# Patient Record
Sex: MALE | Race: WHITE | NOT HISPANIC OR LATINO | Employment: OTHER | URBAN - METROPOLITAN AREA
[De-identification: names, ages, dates, MRNs, and addresses within clinical notes are randomized per-mention and may not be internally consistent; named-entity substitution may affect disease eponyms.]

---

## 2017-01-01 ENCOUNTER — ANTI-COAG VISIT (OUTPATIENT)
Dept: CARDIOLOGY | Facility: CLINIC | Age: 82
End: 2017-01-01

## 2017-01-01 ENCOUNTER — TELEPHONE (OUTPATIENT)
Dept: NEUROSURGERY | Facility: CLINIC | Age: 82
End: 2017-01-01

## 2017-01-01 ENCOUNTER — PATIENT MESSAGE (OUTPATIENT)
Dept: NEUROSURGERY | Facility: CLINIC | Age: 82
End: 2017-01-01

## 2017-01-01 ENCOUNTER — ANESTHESIA EVENT (OUTPATIENT)
Dept: SURGERY | Facility: HOSPITAL | Age: 82
DRG: 907 | End: 2017-01-01
Payer: MEDICARE

## 2017-01-01 ENCOUNTER — OFFICE VISIT (OUTPATIENT)
Dept: CARDIOLOGY | Facility: CLINIC | Age: 82
End: 2017-01-01
Payer: MEDICARE

## 2017-01-01 ENCOUNTER — HOSPITAL ENCOUNTER (EMERGENCY)
Facility: HOSPITAL | Age: 82
Discharge: ANOTHER HOSPITAL | End: 2017-10-04
Attending: SURGERY
Payer: MEDICARE

## 2017-01-01 ENCOUNTER — LAB VISIT (OUTPATIENT)
Dept: LAB | Facility: HOSPITAL | Age: 82
End: 2017-01-01
Attending: INTERNAL MEDICINE
Payer: MEDICARE

## 2017-01-01 ENCOUNTER — OFFICE VISIT (OUTPATIENT)
Dept: DIALYSIS | Facility: HOSPITAL | Age: 82
DRG: 907 | End: 2017-01-01
Attending: HOSPITALIST
Payer: MEDICARE

## 2017-01-01 ENCOUNTER — PATIENT MESSAGE (OUTPATIENT)
Dept: CARDIOLOGY | Facility: CLINIC | Age: 82
End: 2017-01-01

## 2017-01-01 ENCOUNTER — ANESTHESIA EVENT (OUTPATIENT)
Dept: SURGERY | Facility: HOSPITAL | Age: 82
DRG: 459 | End: 2017-01-01
Payer: MEDICARE

## 2017-01-01 ENCOUNTER — ANESTHESIA (OUTPATIENT)
Dept: SURGERY | Facility: HOSPITAL | Age: 82
DRG: 907 | End: 2017-01-01
Payer: MEDICARE

## 2017-01-01 ENCOUNTER — TELEPHONE (OUTPATIENT)
Dept: CARDIOLOGY | Facility: CLINIC | Age: 82
End: 2017-01-01

## 2017-01-01 ENCOUNTER — ANESTHESIA (OUTPATIENT)
Dept: SURGERY | Facility: HOSPITAL | Age: 82
DRG: 459 | End: 2017-01-01
Payer: MEDICARE

## 2017-01-01 ENCOUNTER — HOSPITAL ENCOUNTER (INPATIENT)
Facility: HOSPITAL | Age: 82
LOS: 21 days | DRG: 907 | End: 2017-12-06
Attending: EMERGENCY MEDICINE | Admitting: HOSPITALIST
Payer: MEDICARE

## 2017-01-01 ENCOUNTER — HOSPITAL ENCOUNTER (INPATIENT)
Facility: HOSPITAL | Age: 82
LOS: 17 days | Discharge: NURSING, REHAB OR LONG-TERM CARE | DRG: 459 | End: 2017-10-27
Attending: INTERNAL MEDICINE | Admitting: INTERNAL MEDICINE
Payer: MEDICARE

## 2017-01-01 VITALS
WEIGHT: 200.81 LBS | BODY MASS INDEX: 30.44 KG/M2 | HEART RATE: 82 BPM | HEIGHT: 68 IN | DIASTOLIC BLOOD PRESSURE: 48 MMHG | SYSTOLIC BLOOD PRESSURE: 86 MMHG

## 2017-01-01 VITALS
WEIGHT: 164.44 LBS | HEIGHT: 62 IN | TEMPERATURE: 98 F | DIASTOLIC BLOOD PRESSURE: 72 MMHG | HEART RATE: 110 BPM | SYSTOLIC BLOOD PRESSURE: 101 MMHG | BODY MASS INDEX: 30.26 KG/M2 | OXYGEN SATURATION: 99 % | RESPIRATION RATE: 20 BRPM

## 2017-01-01 VITALS
BODY MASS INDEX: 28.34 KG/M2 | TEMPERATURE: 98 F | WEIGHT: 176.38 LBS | HEIGHT: 66 IN | RESPIRATION RATE: 35 BRPM | HEART RATE: 100 BPM | SYSTOLIC BLOOD PRESSURE: 133 MMHG | DIASTOLIC BLOOD PRESSURE: 93 MMHG | OXYGEN SATURATION: 100 %

## 2017-01-01 VITALS
SYSTOLIC BLOOD PRESSURE: 115 MMHG | OXYGEN SATURATION: 96 % | WEIGHT: 200 LBS | HEART RATE: 88 BPM | RESPIRATION RATE: 16 BRPM | TEMPERATURE: 97 F | DIASTOLIC BLOOD PRESSURE: 59 MMHG | BODY MASS INDEX: 30.41 KG/M2

## 2017-01-01 DIAGNOSIS — Z79.01 LONG-TERM (CURRENT) USE OF ANTICOAGULANTS: ICD-10-CM

## 2017-01-01 DIAGNOSIS — I48.0 PAROXYSMAL ATRIAL FIBRILLATION: ICD-10-CM

## 2017-01-01 DIAGNOSIS — R53.1 WEAKNESS: ICD-10-CM

## 2017-01-01 DIAGNOSIS — I35.0 NONRHEUMATIC AORTIC VALVE STENOSIS: ICD-10-CM

## 2017-01-01 DIAGNOSIS — M54.9 BACK PAIN, UNSPECIFIED BACK LOCATION, UNSPECIFIED BACK PAIN LATERALITY, UNSPECIFIED CHRONICITY: ICD-10-CM

## 2017-01-01 DIAGNOSIS — M54.16 LUMBAR BACK PAIN WITH RADICULOPATHY AFFECTING RIGHT LOWER EXTREMITY: ICD-10-CM

## 2017-01-01 DIAGNOSIS — I25.10 CORONARY ARTERY DISEASE INVOLVING NATIVE CORONARY ARTERY OF NATIVE HEART WITHOUT ANGINA PECTORIS: ICD-10-CM

## 2017-01-01 DIAGNOSIS — N18.6 TYPE 2 DIABETES MELLITUS WITH CHRONIC KIDNEY DISEASE ON CHRONIC DIALYSIS, WITH LONG-TERM CURRENT USE OF INSULIN: ICD-10-CM

## 2017-01-01 DIAGNOSIS — E78.00 HYPERCHOLESTEROLEMIA: ICD-10-CM

## 2017-01-01 DIAGNOSIS — I10 ESSENTIAL HYPERTENSION: ICD-10-CM

## 2017-01-01 DIAGNOSIS — M54.9 BACK PAIN: Primary | ICD-10-CM

## 2017-01-01 DIAGNOSIS — I35.0 SEVERE AORTIC STENOSIS: ICD-10-CM

## 2017-01-01 DIAGNOSIS — Z99.2 ESRD (END STAGE RENAL DISEASE) ON DIALYSIS: ICD-10-CM

## 2017-01-01 DIAGNOSIS — E11.22 TYPE 2 DIABETES MELLITUS WITH CHRONIC KIDNEY DISEASE ON CHRONIC DIALYSIS, WITH LONG-TERM CURRENT USE OF INSULIN: ICD-10-CM

## 2017-01-01 DIAGNOSIS — M54.50 LUMBAR BACK PAIN: ICD-10-CM

## 2017-01-01 DIAGNOSIS — N18.6 ESRD (END STAGE RENAL DISEASE) ON DIALYSIS: ICD-10-CM

## 2017-01-01 DIAGNOSIS — I21.4 NSTEMI (NON-ST ELEVATED MYOCARDIAL INFARCTION): Primary | ICD-10-CM

## 2017-01-01 DIAGNOSIS — D72.829 LEUKOCYTOSIS: ICD-10-CM

## 2017-01-01 DIAGNOSIS — R79.1 SUPRATHERAPEUTIC INR: ICD-10-CM

## 2017-01-01 DIAGNOSIS — Z98.1 STATUS POST LUMBAR SPINAL FUSION: ICD-10-CM

## 2017-01-01 DIAGNOSIS — M54.50 ACUTE BILATERAL LOW BACK PAIN: ICD-10-CM

## 2017-01-01 DIAGNOSIS — R00.0 TACHYCARDIA: ICD-10-CM

## 2017-01-01 DIAGNOSIS — Z79.4 TYPE 2 DIABETES MELLITUS WITH CHRONIC KIDNEY DISEASE ON CHRONIC DIALYSIS, WITH LONG-TERM CURRENT USE OF INSULIN: ICD-10-CM

## 2017-01-01 DIAGNOSIS — J18.9 PNEUMONIA: ICD-10-CM

## 2017-01-01 DIAGNOSIS — E78.00 HYPERCHOLESTEREMIA: Chronic | ICD-10-CM

## 2017-01-01 DIAGNOSIS — R79.89 ELEVATED TROPONIN: ICD-10-CM

## 2017-01-01 DIAGNOSIS — M54.40 ACUTE BILATERAL LOW BACK PAIN WITH SCIATICA, SCIATICA LATERALITY UNSPECIFIED: ICD-10-CM

## 2017-01-01 DIAGNOSIS — E78.00 HYPERCHOLESTEROLEMIA: Primary | ICD-10-CM

## 2017-01-01 DIAGNOSIS — I50.32 CHRONIC DIASTOLIC HEART FAILURE: Chronic | ICD-10-CM

## 2017-01-01 DIAGNOSIS — E86.0 DEHYDRATION: ICD-10-CM

## 2017-01-01 DIAGNOSIS — M54.17 L-S RADICULOPATHY: ICD-10-CM

## 2017-01-01 DIAGNOSIS — I21.4 NSTEMI (NON-ST ELEVATED MYOCARDIAL INFARCTION): ICD-10-CM

## 2017-01-01 DIAGNOSIS — D62 ACUTE BLOOD LOSS ANEMIA: ICD-10-CM

## 2017-01-01 DIAGNOSIS — M48.061 SPINAL STENOSIS OF LUMBAR REGION WITHOUT NEUROGENIC CLAUDICATION: ICD-10-CM

## 2017-01-01 DIAGNOSIS — I48.91 ATRIAL FIBRILLATION: ICD-10-CM

## 2017-01-01 DIAGNOSIS — T81.40XA POSTOPERATIVE INFECTION, INITIAL ENCOUNTER: ICD-10-CM

## 2017-01-01 DIAGNOSIS — T14.8XXA HEMATOMA: ICD-10-CM

## 2017-01-01 DIAGNOSIS — I50.32 CHRONIC DIASTOLIC HEART FAILURE: Primary | Chronic | ICD-10-CM

## 2017-01-01 DIAGNOSIS — N30.00 ACUTE CYSTITIS WITHOUT HEMATURIA: ICD-10-CM

## 2017-01-01 DIAGNOSIS — Z99.2 TYPE 2 DIABETES MELLITUS WITH CHRONIC KIDNEY DISEASE ON CHRONIC DIALYSIS, WITH LONG-TERM CURRENT USE OF INSULIN: ICD-10-CM

## 2017-01-01 DIAGNOSIS — Z98.890 S/P SPINAL SURGERY: ICD-10-CM

## 2017-01-01 DIAGNOSIS — T45.514A POISONING BY WARFARIN SODIUM, UNDETERMINED INTENT, INITIAL ENCOUNTER: ICD-10-CM

## 2017-01-01 DIAGNOSIS — R53.1 WEAKNESS GENERALIZED: ICD-10-CM

## 2017-01-01 DIAGNOSIS — I50.9 CONGESTIVE HEART FAILURE, UNSPECIFIED CONGESTIVE HEART FAILURE CHRONICITY, UNSPECIFIED CONGESTIVE HEART FAILURE TYPE: ICD-10-CM

## 2017-01-01 DIAGNOSIS — Z99.2 DIALYSIS PATIENT: ICD-10-CM

## 2017-01-01 DIAGNOSIS — J69.0 ASPIRATION PNEUMONIA: ICD-10-CM

## 2017-01-01 DIAGNOSIS — M48.061 LUMBAR STENOSIS: ICD-10-CM

## 2017-01-01 DIAGNOSIS — I48.91 A-FIB: ICD-10-CM

## 2017-01-01 DIAGNOSIS — R07.9 CHEST PAIN: ICD-10-CM

## 2017-01-01 DIAGNOSIS — M54.9 INTRACTABLE BACK PAIN: ICD-10-CM

## 2017-01-01 DIAGNOSIS — M48.061 SPINAL STENOSIS OF LUMBAR REGION, UNSPECIFIED WHETHER NEUROGENIC CLAUDICATION PRESENT: ICD-10-CM

## 2017-01-01 LAB
ABO + RH BLD: NORMAL
ALBUMIN SERPL BCP-MCNC: 2 G/DL
ALBUMIN SERPL BCP-MCNC: 2.1 G/DL
ALBUMIN SERPL BCP-MCNC: 2.1 G/DL
ALBUMIN SERPL BCP-MCNC: 2.2 G/DL
ALBUMIN SERPL BCP-MCNC: 2.3 G/DL
ALBUMIN SERPL BCP-MCNC: 2.4 G/DL
ALBUMIN SERPL BCP-MCNC: 2.5 G/DL
ALBUMIN SERPL BCP-MCNC: 2.6 G/DL
ALBUMIN SERPL BCP-MCNC: 2.7 G/DL
ALBUMIN SERPL BCP-MCNC: 2.8 G/DL
ALBUMIN SERPL BCP-MCNC: 2.9 G/DL
ALBUMIN SERPL BCP-MCNC: 2.9 G/DL
ALBUMIN SERPL BCP-MCNC: 3 G/DL
ALBUMIN SERPL BCP-MCNC: 3 G/DL
ALBUMIN SERPL BCP-MCNC: 3.4 G/DL
ALLENS TEST: ABNORMAL
ALP SERPL-CCNC: 100 U/L
ALP SERPL-CCNC: 100 U/L
ALP SERPL-CCNC: 101 U/L
ALP SERPL-CCNC: 103 U/L
ALP SERPL-CCNC: 104 U/L
ALP SERPL-CCNC: 107 U/L
ALP SERPL-CCNC: 108 U/L
ALP SERPL-CCNC: 110 U/L
ALP SERPL-CCNC: 110 U/L
ALP SERPL-CCNC: 114 U/L
ALP SERPL-CCNC: 116 U/L
ALP SERPL-CCNC: 117 U/L
ALP SERPL-CCNC: 119 U/L
ALP SERPL-CCNC: 119 U/L
ALP SERPL-CCNC: 134 U/L
ALP SERPL-CCNC: 155 U/L
ALP SERPL-CCNC: 164 U/L
ALP SERPL-CCNC: 164 U/L
ALP SERPL-CCNC: 168 U/L
ALP SERPL-CCNC: 176 U/L
ALP SERPL-CCNC: 176 U/L
ALP SERPL-CCNC: 182 U/L
ALP SERPL-CCNC: 193 U/L
ALP SERPL-CCNC: 193 U/L
ALP SERPL-CCNC: 196 U/L
ALP SERPL-CCNC: 201 U/L
ALP SERPL-CCNC: 203 U/L
ALP SERPL-CCNC: 207 U/L
ALP SERPL-CCNC: 233 U/L
ALP SERPL-CCNC: 84 U/L
ALP SERPL-CCNC: 93 U/L
ALP SERPL-CCNC: 96 U/L
ALP SERPL-CCNC: 99 U/L
ALT SERPL W/O P-5'-P-CCNC: 10 U/L
ALT SERPL W/O P-5'-P-CCNC: 11 U/L
ALT SERPL W/O P-5'-P-CCNC: 11 U/L
ALT SERPL W/O P-5'-P-CCNC: 13 U/L
ALT SERPL W/O P-5'-P-CCNC: 15 U/L
ALT SERPL W/O P-5'-P-CCNC: 16 U/L
ALT SERPL W/O P-5'-P-CCNC: 16 U/L
ALT SERPL W/O P-5'-P-CCNC: 18 U/L
ALT SERPL W/O P-5'-P-CCNC: 18 U/L
ALT SERPL W/O P-5'-P-CCNC: 19 U/L
ALT SERPL W/O P-5'-P-CCNC: 20 U/L
ALT SERPL W/O P-5'-P-CCNC: 21 U/L
ALT SERPL W/O P-5'-P-CCNC: 23 U/L
ALT SERPL W/O P-5'-P-CCNC: 24 U/L
ALT SERPL W/O P-5'-P-CCNC: 26 U/L
ALT SERPL W/O P-5'-P-CCNC: 27 U/L
ALT SERPL W/O P-5'-P-CCNC: 27 U/L
ALT SERPL W/O P-5'-P-CCNC: 28 U/L
ALT SERPL W/O P-5'-P-CCNC: 28 U/L
ALT SERPL W/O P-5'-P-CCNC: 29 U/L
ALT SERPL W/O P-5'-P-CCNC: 30 U/L
ALT SERPL W/O P-5'-P-CCNC: 31 U/L
ALT SERPL W/O P-5'-P-CCNC: 32 U/L
ALT SERPL W/O P-5'-P-CCNC: 32 U/L
ALT SERPL W/O P-5'-P-CCNC: 33 U/L
ALT SERPL W/O P-5'-P-CCNC: 34 U/L
ALT SERPL W/O P-5'-P-CCNC: 38 U/L
ALT SERPL W/O P-5'-P-CCNC: 5 U/L
ALT SERPL W/O P-5'-P-CCNC: 8 U/L
ALT SERPL W/O P-5'-P-CCNC: 9 U/L
ALT SERPL W/O P-5'-P-CCNC: <5 U/L
AMMONIA PLAS-SCNC: 23 UMOL/L
ANION GAP SERPL CALC-SCNC: 10 MMOL/L
ANION GAP SERPL CALC-SCNC: 11 MMOL/L
ANION GAP SERPL CALC-SCNC: 12 MMOL/L
ANION GAP SERPL CALC-SCNC: 13 MMOL/L
ANION GAP SERPL CALC-SCNC: 14 MMOL/L
ANION GAP SERPL CALC-SCNC: 15 MMOL/L
ANION GAP SERPL CALC-SCNC: 15 MMOL/L
ANION GAP SERPL CALC-SCNC: 16 MMOL/L
ANION GAP SERPL CALC-SCNC: 8 MMOL/L
ANION GAP SERPL CALC-SCNC: 9 MMOL/L
ANISOCYTOSIS BLD QL SMEAR: SLIGHT
AORTIC VALVE REGURGITATION: ABNORMAL
APTT BLDCRRT: 24.8 SEC
APTT BLDCRRT: 30.1 SEC
APTT BLDCRRT: 31.6 SEC
APTT BLDCRRT: 40.9 SEC
APTT BLDCRRT: 48.3 SEC
AST SERPL-CCNC: 101 U/L
AST SERPL-CCNC: 102 U/L
AST SERPL-CCNC: 107 U/L
AST SERPL-CCNC: 121 U/L
AST SERPL-CCNC: 20 U/L
AST SERPL-CCNC: 21 U/L
AST SERPL-CCNC: 25 U/L
AST SERPL-CCNC: 25 U/L
AST SERPL-CCNC: 26 U/L
AST SERPL-CCNC: 29 U/L
AST SERPL-CCNC: 30 U/L
AST SERPL-CCNC: 31 U/L
AST SERPL-CCNC: 32 U/L
AST SERPL-CCNC: 33 U/L
AST SERPL-CCNC: 34 U/L
AST SERPL-CCNC: 34 U/L
AST SERPL-CCNC: 35 U/L
AST SERPL-CCNC: 37 U/L
AST SERPL-CCNC: 52 U/L
AST SERPL-CCNC: 54 U/L
AST SERPL-CCNC: 66 U/L
AST SERPL-CCNC: 70 U/L
AST SERPL-CCNC: 70 U/L
AST SERPL-CCNC: 80 U/L
AST SERPL-CCNC: 83 U/L
AST SERPL-CCNC: 83 U/L
AST SERPL-CCNC: 84 U/L
AST SERPL-CCNC: 87 U/L
AST SERPL-CCNC: 96 U/L
AST SERPL-CCNC: 99 U/L
AST SERPL-CCNC: 99 U/L
BACTERIA #/AREA URNS AUTO: ABNORMAL /HPF
BACTERIA BLD CULT: NORMAL
BACTERIA SPEC AEROBE CULT: NO GROWTH
BACTERIA SPEC AEROBE CULT: NO GROWTH
BACTERIA SPEC AEROBE CULT: NORMAL
BACTERIA SPEC AEROBE CULT: NORMAL
BACTERIA SPEC ANAEROBE CULT: NORMAL
BACTERIA SPEC ANAEROBE CULT: NORMAL
BACTERIA UR CULT: NORMAL
BASOPHILS # BLD AUTO: 0.01 K/UL
BASOPHILS # BLD AUTO: 0.01 K/UL
BASOPHILS # BLD AUTO: 0.02 K/UL
BASOPHILS # BLD AUTO: 0.03 K/UL
BASOPHILS # BLD AUTO: 0.04 K/UL
BASOPHILS # BLD AUTO: 0.05 K/UL
BASOPHILS # BLD AUTO: 0.05 K/UL
BASOPHILS # BLD AUTO: 0.06 K/UL
BASOPHILS NFR BLD: 0.1 %
BASOPHILS NFR BLD: 0.2 %
BASOPHILS NFR BLD: 0.3 %
BASOPHILS NFR BLD: 0.4 %
BASOPHILS NFR BLD: 0.5 %
BASOPHILS NFR BLD: 0.5 %
BASOPHILS NFR BLD: 0.6 %
BASOPHILS NFR BLD: 0.7 %
BASOPHILS NFR BLD: 0.8 %
BILIRUB SERPL-MCNC: 0.3 MG/DL
BILIRUB SERPL-MCNC: 0.4 MG/DL
BILIRUB SERPL-MCNC: 0.5 MG/DL
BILIRUB SERPL-MCNC: 0.6 MG/DL
BILIRUB SERPL-MCNC: 0.8 MG/DL
BILIRUB SERPL-MCNC: 1.1 MG/DL
BILIRUB UR QL STRIP: NEGATIVE
BLD GP AB SCN CELLS X3 SERPL QL: NORMAL
BLD PROD TYP BPU: NORMAL
BLOOD UNIT EXPIRATION DATE: NORMAL
BLOOD UNIT TYPE CODE: 5100
BLOOD UNIT TYPE CODE: 6200
BLOOD UNIT TYPE CODE: 9500
BLOOD UNIT TYPE: NORMAL
BNP SERPL-MCNC: 1135 PG/ML
BNP SERPL-MCNC: 831 PG/ML
BNP SERPL-MCNC: 907 PG/ML
BUN SERPL-MCNC: 13 MG/DL
BUN SERPL-MCNC: 13 MG/DL
BUN SERPL-MCNC: 15 MG/DL
BUN SERPL-MCNC: 16 MG/DL
BUN SERPL-MCNC: 18 MG/DL
BUN SERPL-MCNC: 19 MG/DL
BUN SERPL-MCNC: 20 MG/DL
BUN SERPL-MCNC: 20 MG/DL
BUN SERPL-MCNC: 21 MG/DL
BUN SERPL-MCNC: 22 MG/DL
BUN SERPL-MCNC: 22 MG/DL
BUN SERPL-MCNC: 23 MG/DL
BUN SERPL-MCNC: 26 MG/DL
BUN SERPL-MCNC: 28 MG/DL
BUN SERPL-MCNC: 29 MG/DL
BUN SERPL-MCNC: 29 MG/DL
BUN SERPL-MCNC: 30 MG/DL
BUN SERPL-MCNC: 30 MG/DL
BUN SERPL-MCNC: 31 MG/DL
BUN SERPL-MCNC: 33 MG/DL
BUN SERPL-MCNC: 34 MG/DL
BUN SERPL-MCNC: 35 MG/DL
BUN SERPL-MCNC: 36 MG/DL
BUN SERPL-MCNC: 37 MG/DL
BUN SERPL-MCNC: 39 MG/DL
BUN SERPL-MCNC: 42 MG/DL
BUN SERPL-MCNC: 43 MG/DL
BUN SERPL-MCNC: 44 MG/DL
BUN SERPL-MCNC: 45 MG/DL
BUN SERPL-MCNC: 46 MG/DL
BUN SERPL-MCNC: 47 MG/DL
BUN SERPL-MCNC: 52 MG/DL
BUN SERPL-MCNC: 56 MG/DL
BUN SERPL-MCNC: 58 MG/DL
CALCIUM SERPL-MCNC: 10 MG/DL
CALCIUM SERPL-MCNC: 10 MG/DL
CALCIUM SERPL-MCNC: 10.1 MG/DL
CALCIUM SERPL-MCNC: 10.1 MG/DL
CALCIUM SERPL-MCNC: 10.2 MG/DL
CALCIUM SERPL-MCNC: 10.3 MG/DL
CALCIUM SERPL-MCNC: 10.3 MG/DL
CALCIUM SERPL-MCNC: 10.4 MG/DL
CALCIUM SERPL-MCNC: 10.5 MG/DL
CALCIUM SERPL-MCNC: 10.5 MG/DL
CALCIUM SERPL-MCNC: 10.6 MG/DL
CALCIUM SERPL-MCNC: 10.6 MG/DL
CALCIUM SERPL-MCNC: 7.7 MG/DL
CALCIUM SERPL-MCNC: 8.4 MG/DL
CALCIUM SERPL-MCNC: 8.6 MG/DL
CALCIUM SERPL-MCNC: 8.8 MG/DL
CALCIUM SERPL-MCNC: 9.1 MG/DL
CALCIUM SERPL-MCNC: 9.1 MG/DL
CALCIUM SERPL-MCNC: 9.2 MG/DL
CALCIUM SERPL-MCNC: 9.3 MG/DL
CALCIUM SERPL-MCNC: 9.4 MG/DL
CALCIUM SERPL-MCNC: 9.5 MG/DL
CALCIUM SERPL-MCNC: 9.6 MG/DL
CALCIUM SERPL-MCNC: 9.7 MG/DL
CALCIUM SERPL-MCNC: 9.8 MG/DL
CALCIUM SERPL-MCNC: 9.8 MG/DL
CALCIUM SERPL-MCNC: 9.9 MG/DL
CHLORIDE SERPL-SCNC: 100 MMOL/L
CHLORIDE SERPL-SCNC: 100 MMOL/L
CHLORIDE SERPL-SCNC: 101 MMOL/L
CHLORIDE SERPL-SCNC: 102 MMOL/L
CHLORIDE SERPL-SCNC: 103 MMOL/L
CHLORIDE SERPL-SCNC: 104 MMOL/L
CHLORIDE SERPL-SCNC: 105 MMOL/L
CHLORIDE SERPL-SCNC: 106 MMOL/L
CHLORIDE SERPL-SCNC: 107 MMOL/L
CHLORIDE SERPL-SCNC: 108 MMOL/L
CHLORIDE SERPL-SCNC: 109 MMOL/L
CHLORIDE SERPL-SCNC: 110 MMOL/L
CHLORIDE SERPL-SCNC: 111 MMOL/L
CHLORIDE SERPL-SCNC: 112 MMOL/L
CHLORIDE SERPL-SCNC: 95 MMOL/L
CHLORIDE SERPL-SCNC: 96 MMOL/L
CHLORIDE SERPL-SCNC: 98 MMOL/L
CHLORIDE SERPL-SCNC: 99 MMOL/L
CHOLEST SERPL-MCNC: 118 MG/DL
CHOLEST/HDLC SERPL: 2.9 {RATIO}
CHOLEST/HDLC SERPL: 6.5 {RATIO}
CK MB SERPL-MCNC: 1.4 NG/ML
CK MB SERPL-RTO: 6.4 %
CK SERPL-CCNC: 121 U/L
CK SERPL-CCNC: 22 U/L
CK SERPL-CCNC: 22 U/L
CLARITY UR REFRACT.AUTO: ABNORMAL
CO2 SERPL-SCNC: 15 MMOL/L
CO2 SERPL-SCNC: 17 MMOL/L
CO2 SERPL-SCNC: 18 MMOL/L
CO2 SERPL-SCNC: 18 MMOL/L
CO2 SERPL-SCNC: 19 MMOL/L
CO2 SERPL-SCNC: 19 MMOL/L
CO2 SERPL-SCNC: 20 MMOL/L
CO2 SERPL-SCNC: 20 MMOL/L
CO2 SERPL-SCNC: 21 MMOL/L
CO2 SERPL-SCNC: 22 MMOL/L
CO2 SERPL-SCNC: 23 MMOL/L
CO2 SERPL-SCNC: 24 MMOL/L
CO2 SERPL-SCNC: 25 MMOL/L
CO2 SERPL-SCNC: 26 MMOL/L
CO2 SERPL-SCNC: 27 MMOL/L
CO2 SERPL-SCNC: 27 MMOL/L
CO2 SERPL-SCNC: 28 MMOL/L
CO2 SERPL-SCNC: 31 MMOL/L
CO2 SERPL-SCNC: 35 MMOL/L
CODING SYSTEM: NORMAL
COLOR UR AUTO: ABNORMAL
CORONARY STENOSIS: ABNORMAL
CREAT SERPL-MCNC: 2.4 MG/DL
CREAT SERPL-MCNC: 2.4 MG/DL
CREAT SERPL-MCNC: 2.5 MG/DL
CREAT SERPL-MCNC: 2.6 MG/DL
CREAT SERPL-MCNC: 2.7 MG/DL
CREAT SERPL-MCNC: 2.8 MG/DL
CREAT SERPL-MCNC: 3 MG/DL
CREAT SERPL-MCNC: 3.1 MG/DL
CREAT SERPL-MCNC: 3.2 MG/DL
CREAT SERPL-MCNC: 3.2 MG/DL
CREAT SERPL-MCNC: 3.3 MG/DL
CREAT SERPL-MCNC: 3.4 MG/DL
CREAT SERPL-MCNC: 3.5 MG/DL
CREAT SERPL-MCNC: 3.5 MG/DL
CREAT SERPL-MCNC: 3.6 MG/DL
CREAT SERPL-MCNC: 3.7 MG/DL
CREAT SERPL-MCNC: 3.7 MG/DL
CREAT SERPL-MCNC: 3.9 MG/DL
CREAT SERPL-MCNC: 4 MG/DL
CREAT SERPL-MCNC: 4.1 MG/DL
CREAT SERPL-MCNC: 4.2 MG/DL
CREAT SERPL-MCNC: 4.4 MG/DL
CREAT SERPL-MCNC: 4.4 MG/DL
CREAT SERPL-MCNC: 4.5 MG/DL
CREAT SERPL-MCNC: 4.5 MG/DL
CREAT SERPL-MCNC: 4.6 MG/DL
CREAT SERPL-MCNC: 4.6 MG/DL
CREAT SERPL-MCNC: 4.7 MG/DL
CREAT SERPL-MCNC: 4.7 MG/DL
CREAT SERPL-MCNC: 4.8 MG/DL
CREAT SERPL-MCNC: 5.1 MG/DL
CREAT SERPL-MCNC: 5.1 MG/DL
CREAT SERPL-MCNC: 5.3 MG/DL
CREAT SERPL-MCNC: 5.3 MG/DL
CREAT SERPL-MCNC: 5.5 MG/DL
CREAT SERPL-MCNC: 5.5 MG/DL
CREAT SERPL-MCNC: 5.7 MG/DL
CREAT SERPL-MCNC: 5.8 MG/DL
CREAT SERPL-MCNC: 5.9 MG/DL
CREAT SERPL-MCNC: 6 MG/DL
CREAT SERPL-MCNC: 6.3 MG/DL
CREAT SERPL-MCNC: 6.3 MG/DL
CREAT SERPL-MCNC: 6.5 MG/DL
CREAT SERPL-MCNC: 6.6 MG/DL
CREAT SERPL-MCNC: 6.6 MG/DL
CRP SERPL-MCNC: 159.61 MG/L
DELSYS: ABNORMAL
DIFFERENTIAL METHOD: ABNORMAL
DISPENSE STATUS: NORMAL
EOSINOPHIL # BLD AUTO: 0 K/UL
EOSINOPHIL # BLD AUTO: 0.1 K/UL
EOSINOPHIL # BLD AUTO: 0.2 K/UL
EOSINOPHIL # BLD AUTO: 0.3 K/UL
EOSINOPHIL # BLD AUTO: 0.4 K/UL
EOSINOPHIL # BLD AUTO: 0.4 K/UL
EOSINOPHIL NFR BLD: 0 %
EOSINOPHIL NFR BLD: 0.1 %
EOSINOPHIL NFR BLD: 0.2 %
EOSINOPHIL NFR BLD: 0.3 %
EOSINOPHIL NFR BLD: 0.4 %
EOSINOPHIL NFR BLD: 0.5 %
EOSINOPHIL NFR BLD: 0.6 %
EOSINOPHIL NFR BLD: 0.7 %
EOSINOPHIL NFR BLD: 0.7 %
EOSINOPHIL NFR BLD: 0.8 %
EOSINOPHIL NFR BLD: 0.9 %
EOSINOPHIL NFR BLD: 1 %
EOSINOPHIL NFR BLD: 1.1 %
EOSINOPHIL NFR BLD: 1.2 %
EOSINOPHIL NFR BLD: 1.2 %
EOSINOPHIL NFR BLD: 1.3 %
EOSINOPHIL NFR BLD: 1.3 %
EOSINOPHIL NFR BLD: 1.5 %
EOSINOPHIL NFR BLD: 1.5 %
EOSINOPHIL NFR BLD: 1.6 %
EOSINOPHIL NFR BLD: 1.7 %
EOSINOPHIL NFR BLD: 1.7 %
EOSINOPHIL NFR BLD: 1.9 %
EOSINOPHIL NFR BLD: 1.9 %
EOSINOPHIL NFR BLD: 2 %
EOSINOPHIL NFR BLD: 2.2 %
EOSINOPHIL NFR BLD: 2.2 %
EOSINOPHIL NFR BLD: 2.4 %
EOSINOPHIL NFR BLD: 2.5 %
EOSINOPHIL NFR BLD: 2.7 %
EOSINOPHIL NFR BLD: 2.8 %
EOSINOPHIL NFR BLD: 2.9 %
EOSINOPHIL NFR BLD: 2.9 %
EOSINOPHIL NFR BLD: 3.4 %
EOSINOPHIL NFR BLD: 3.8 %
EOSINOPHIL NFR BLD: 3.9 %
EOSINOPHIL NFR BLD: 4.3 %
ERYTHROCYTE [DISTWIDTH] IN BLOOD BY AUTOMATED COUNT: 15.6 %
ERYTHROCYTE [DISTWIDTH] IN BLOOD BY AUTOMATED COUNT: 15.6 %
ERYTHROCYTE [DISTWIDTH] IN BLOOD BY AUTOMATED COUNT: 15.7 %
ERYTHROCYTE [DISTWIDTH] IN BLOOD BY AUTOMATED COUNT: 15.7 %
ERYTHROCYTE [DISTWIDTH] IN BLOOD BY AUTOMATED COUNT: 15.8 %
ERYTHROCYTE [DISTWIDTH] IN BLOOD BY AUTOMATED COUNT: 15.9 %
ERYTHROCYTE [DISTWIDTH] IN BLOOD BY AUTOMATED COUNT: 16 %
ERYTHROCYTE [DISTWIDTH] IN BLOOD BY AUTOMATED COUNT: 16.4 %
ERYTHROCYTE [DISTWIDTH] IN BLOOD BY AUTOMATED COUNT: 16.9 %
ERYTHROCYTE [DISTWIDTH] IN BLOOD BY AUTOMATED COUNT: 17.1 %
ERYTHROCYTE [DISTWIDTH] IN BLOOD BY AUTOMATED COUNT: 17.3 %
ERYTHROCYTE [DISTWIDTH] IN BLOOD BY AUTOMATED COUNT: 17.4 %
ERYTHROCYTE [DISTWIDTH] IN BLOOD BY AUTOMATED COUNT: 17.6 %
ERYTHROCYTE [DISTWIDTH] IN BLOOD BY AUTOMATED COUNT: 17.6 %
ERYTHROCYTE [DISTWIDTH] IN BLOOD BY AUTOMATED COUNT: 17.9 %
ERYTHROCYTE [DISTWIDTH] IN BLOOD BY AUTOMATED COUNT: 18 %
ERYTHROCYTE [DISTWIDTH] IN BLOOD BY AUTOMATED COUNT: 18.1 %
ERYTHROCYTE [DISTWIDTH] IN BLOOD BY AUTOMATED COUNT: 18.2 %
ERYTHROCYTE [DISTWIDTH] IN BLOOD BY AUTOMATED COUNT: 18.2 %
ERYTHROCYTE [DISTWIDTH] IN BLOOD BY AUTOMATED COUNT: 18.3 %
ERYTHROCYTE [DISTWIDTH] IN BLOOD BY AUTOMATED COUNT: 18.4 %
ERYTHROCYTE [DISTWIDTH] IN BLOOD BY AUTOMATED COUNT: 18.5 %
ERYTHROCYTE [DISTWIDTH] IN BLOOD BY AUTOMATED COUNT: 18.5 %
ERYTHROCYTE [DISTWIDTH] IN BLOOD BY AUTOMATED COUNT: 18.6 %
ERYTHROCYTE [DISTWIDTH] IN BLOOD BY AUTOMATED COUNT: 18.7 %
ERYTHROCYTE [DISTWIDTH] IN BLOOD BY AUTOMATED COUNT: 18.8 %
ERYTHROCYTE [DISTWIDTH] IN BLOOD BY AUTOMATED COUNT: 18.9 %
ERYTHROCYTE [DISTWIDTH] IN BLOOD BY AUTOMATED COUNT: 19 %
ERYTHROCYTE [DISTWIDTH] IN BLOOD BY AUTOMATED COUNT: 19.1 %
ERYTHROCYTE [DISTWIDTH] IN BLOOD BY AUTOMATED COUNT: 19.3 %
ERYTHROCYTE [DISTWIDTH] IN BLOOD BY AUTOMATED COUNT: 19.4 %
ERYTHROCYTE [DISTWIDTH] IN BLOOD BY AUTOMATED COUNT: 19.6 %
ERYTHROCYTE [DISTWIDTH] IN BLOOD BY AUTOMATED COUNT: 20 %
ERYTHROCYTE [DISTWIDTH] IN BLOOD BY AUTOMATED COUNT: 20 %
ERYTHROCYTE [SEDIMENTATION RATE] IN BLOOD BY WESTERGREN METHOD: 20 MM/H
ERYTHROCYTE [SEDIMENTATION RATE] IN BLOOD BY WESTERGREN METHOD: 92 MM/HR
EST. GFR  (AFRICAN AMERICAN): 10.1 ML/MIN/1.73 M^2
EST. GFR  (AFRICAN AMERICAN): 10.1 ML/MIN/1.73 M^2
EST. GFR  (AFRICAN AMERICAN): 10.5 ML/MIN/1.73 M^2
EST. GFR  (AFRICAN AMERICAN): 10.5 ML/MIN/1.73 M^2
EST. GFR  (AFRICAN AMERICAN): 11 ML/MIN/1.73 M^2
EST. GFR  (AFRICAN AMERICAN): 11 ML/MIN/1.73 M^2
EST. GFR  (AFRICAN AMERICAN): 11.9 ML/MIN/1.73 M^2
EST. GFR  (AFRICAN AMERICAN): 12.2 ML/MIN/1.73 M^2
EST. GFR  (AFRICAN AMERICAN): 12.2 ML/MIN/1.73 M^2
EST. GFR  (AFRICAN AMERICAN): 12.5 ML/MIN/1.73 M^2
EST. GFR  (AFRICAN AMERICAN): 12.5 ML/MIN/1.73 M^2
EST. GFR  (AFRICAN AMERICAN): 12.8 ML/MIN/1.73 M^2
EST. GFR  (AFRICAN AMERICAN): 12.8 ML/MIN/1.73 M^2
EST. GFR  (AFRICAN AMERICAN): 13.2 ML/MIN/1.73 M^2
EST. GFR  (AFRICAN AMERICAN): 13.2 ML/MIN/1.73 M^2
EST. GFR  (AFRICAN AMERICAN): 13.9 ML/MIN/1.73 M^2
EST. GFR  (AFRICAN AMERICAN): 14.3 ML/MIN/1.73 M^2
EST. GFR  (AFRICAN AMERICAN): 14.8 ML/MIN/1.73 M^2
EST. GFR  (AFRICAN AMERICAN): 15.2 ML/MIN/1.73 M^2
EST. GFR  (AFRICAN AMERICAN): 16.2 ML/MIN/1.73 M^2
EST. GFR  (AFRICAN AMERICAN): 16.2 ML/MIN/1.73 M^2
EST. GFR  (AFRICAN AMERICAN): 16.8 ML/MIN/1.73 M^2
EST. GFR  (AFRICAN AMERICAN): 17.4 ML/MIN/1.73 M^2
EST. GFR  (AFRICAN AMERICAN): 17.4 ML/MIN/1.73 M^2
EST. GFR  (AFRICAN AMERICAN): 18 ML/MIN/1.73 M^2
EST. GFR  (AFRICAN AMERICAN): 18.6 ML/MIN/1.73 M^2
EST. GFR  (AFRICAN AMERICAN): 19.4 ML/MIN/1.73 M^2
EST. GFR  (AFRICAN AMERICAN): 19.4 ML/MIN/1.73 M^2
EST. GFR  (AFRICAN AMERICAN): 20.1 ML/MIN/1.73 M^2
EST. GFR  (AFRICAN AMERICAN): 20.9 ML/MIN/1.73 M^2
EST. GFR  (AFRICAN AMERICAN): 22.7 ML/MIN/1.73 M^2
EST. GFR  (AFRICAN AMERICAN): 23.8 ML/MIN/1.73 M^2
EST. GFR  (AFRICAN AMERICAN): 24.9 ML/MIN/1.73 M^2
EST. GFR  (AFRICAN AMERICAN): 26.1 ML/MIN/1.73 M^2
EST. GFR  (AFRICAN AMERICAN): 27.4 ML/MIN/1.73 M^2
EST. GFR  (AFRICAN AMERICAN): 27.4 ML/MIN/1.73 M^2
EST. GFR  (AFRICAN AMERICAN): 8.1 ML/MIN/1.73 M^2
EST. GFR  (AFRICAN AMERICAN): 8.1 ML/MIN/1.73 M^2
EST. GFR  (AFRICAN AMERICAN): 8.2 ML/MIN/1.73 M^2
EST. GFR  (AFRICAN AMERICAN): 8.5 ML/MIN/1.73 M^2
EST. GFR  (AFRICAN AMERICAN): 8.5 ML/MIN/1.73 M^2
EST. GFR  (AFRICAN AMERICAN): 9 ML/MIN/1.73 M^2
EST. GFR  (AFRICAN AMERICAN): 9.1 ML/MIN/1.73 M^2
EST. GFR  (AFRICAN AMERICAN): 9.2 ML/MIN/1.73 M^2
EST. GFR  (AFRICAN AMERICAN): 9.6 ML/MIN/1.73 M^2
EST. GFR  (NON AFRICAN AMERICAN): 10.3 ML/MIN/1.73 M^2
EST. GFR  (NON AFRICAN AMERICAN): 10.5 ML/MIN/1.73 M^2
EST. GFR  (NON AFRICAN AMERICAN): 10.5 ML/MIN/1.73 M^2
EST. GFR  (NON AFRICAN AMERICAN): 10.8 ML/MIN/1.73 M^2
EST. GFR  (NON AFRICAN AMERICAN): 10.8 ML/MIN/1.73 M^2
EST. GFR  (NON AFRICAN AMERICAN): 11.1 ML/MIN/1.73 M^2
EST. GFR  (NON AFRICAN AMERICAN): 11.1 ML/MIN/1.73 M^2
EST. GFR  (NON AFRICAN AMERICAN): 11.4 ML/MIN/1.73 M^2
EST. GFR  (NON AFRICAN AMERICAN): 11.4 ML/MIN/1.73 M^2
EST. GFR  (NON AFRICAN AMERICAN): 12.1 ML/MIN/1.73 M^2
EST. GFR  (NON AFRICAN AMERICAN): 12.4 ML/MIN/1.73 M^2
EST. GFR  (NON AFRICAN AMERICAN): 12.8 ML/MIN/1.73 M^2
EST. GFR  (NON AFRICAN AMERICAN): 13.2 ML/MIN/1.73 M^2
EST. GFR  (NON AFRICAN AMERICAN): 14 ML/MIN/1.73 M^2
EST. GFR  (NON AFRICAN AMERICAN): 14 ML/MIN/1.73 M^2
EST. GFR  (NON AFRICAN AMERICAN): 14.5 ML/MIN/1.73 M^2
EST. GFR  (NON AFRICAN AMERICAN): 15 ML/MIN/1.73 M^2
EST. GFR  (NON AFRICAN AMERICAN): 15 ML/MIN/1.73 M^2
EST. GFR  (NON AFRICAN AMERICAN): 16 ML/MIN/1.73 M^2
EST. GFR  (NON AFRICAN AMERICAN): 16.1 ML/MIN/1.73 M^2
EST. GFR  (NON AFRICAN AMERICAN): 16.7 ML/MIN/1.73 M^2
EST. GFR  (NON AFRICAN AMERICAN): 16.7 ML/MIN/1.73 M^2
EST. GFR  (NON AFRICAN AMERICAN): 17.4 ML/MIN/1.73 M^2
EST. GFR  (NON AFRICAN AMERICAN): 18.1 ML/MIN/1.73 M^2
EST. GFR  (NON AFRICAN AMERICAN): 19.7 ML/MIN/1.73 M^2
EST. GFR  (NON AFRICAN AMERICAN): 20.6 ML/MIN/1.73 M^2
EST. GFR  (NON AFRICAN AMERICAN): 21.5 ML/MIN/1.73 M^2
EST. GFR  (NON AFRICAN AMERICAN): 22.6 ML/MIN/1.73 M^2
EST. GFR  (NON AFRICAN AMERICAN): 23.7 ML/MIN/1.73 M^2
EST. GFR  (NON AFRICAN AMERICAN): 23.7 ML/MIN/1.73 M^2
EST. GFR  (NON AFRICAN AMERICAN): 7 ML/MIN/1.73 M^2
EST. GFR  (NON AFRICAN AMERICAN): 7 ML/MIN/1.73 M^2
EST. GFR  (NON AFRICAN AMERICAN): 7.1 ML/MIN/1.73 M^2
EST. GFR  (NON AFRICAN AMERICAN): 7.4 ML/MIN/1.73 M^2
EST. GFR  (NON AFRICAN AMERICAN): 7.4 ML/MIN/1.73 M^2
EST. GFR  (NON AFRICAN AMERICAN): 7.8 ML/MIN/1.73 M^2
EST. GFR  (NON AFRICAN AMERICAN): 8 ML/MIN/1.73 M^2
EST. GFR  (NON AFRICAN AMERICAN): 8 ML/MIN/1.73 M^2
EST. GFR  (NON AFRICAN AMERICAN): 8.3 ML/MIN/1.73 M^2
EST. GFR  (NON AFRICAN AMERICAN): 8.7 ML/MIN/1.73 M^2
EST. GFR  (NON AFRICAN AMERICAN): 8.7 ML/MIN/1.73 M^2
EST. GFR  (NON AFRICAN AMERICAN): 9.1 ML/MIN/1.73 M^2
EST. GFR  (NON AFRICAN AMERICAN): 9.1 ML/MIN/1.73 M^2
EST. GFR  (NON AFRICAN AMERICAN): 9.5 ML/MIN/1.73 M^2
EST. GFR  (NON AFRICAN AMERICAN): 9.5 ML/MIN/1.73 M^2
ESTIMATED AVG GLUCOSE: 100 MG/DL
ESTIMATED PA SYSTOLIC PRESSURE: 43.09
FACT X PPP CHRO-ACNC: 0.52 IU/ML
FACT X PPP CHRO-ACNC: 0.57 IU/ML
FACT X PPP CHRO-ACNC: 0.57 IU/ML
FACT X PPP CHRO-ACNC: 0.65 IU/ML
FACT X PPP CHRO-ACNC: 0.65 IU/ML
FACT X PPP CHRO-ACNC: 0.66 IU/ML
FACT X PPP CHRO-ACNC: 0.86 IU/ML
FACT X PPP CHRO-ACNC: 0.94 IU/ML
FACT X PPP CHRO-ACNC: <0.1 IU/ML
FIO2: 100
FIO2: 21
FIO2: 35
FLOW: 15
FLOW: 4
GGT SERPL-CCNC: 33 U/L
GLUCOSE SERPL-MCNC: 100 MG/DL
GLUCOSE SERPL-MCNC: 102 MG/DL
GLUCOSE SERPL-MCNC: 103 MG/DL
GLUCOSE SERPL-MCNC: 105 MG/DL
GLUCOSE SERPL-MCNC: 105 MG/DL
GLUCOSE SERPL-MCNC: 108 MG/DL
GLUCOSE SERPL-MCNC: 109 MG/DL
GLUCOSE SERPL-MCNC: 112 MG/DL
GLUCOSE SERPL-MCNC: 113 MG/DL
GLUCOSE SERPL-MCNC: 114 MG/DL
GLUCOSE SERPL-MCNC: 114 MG/DL
GLUCOSE SERPL-MCNC: 115 MG/DL
GLUCOSE SERPL-MCNC: 115 MG/DL
GLUCOSE SERPL-MCNC: 120 MG/DL
GLUCOSE SERPL-MCNC: 121 MG/DL
GLUCOSE SERPL-MCNC: 126 MG/DL
GLUCOSE SERPL-MCNC: 126 MG/DL
GLUCOSE SERPL-MCNC: 128 MG/DL
GLUCOSE SERPL-MCNC: 130 MG/DL
GLUCOSE SERPL-MCNC: 131 MG/DL
GLUCOSE SERPL-MCNC: 131 MG/DL
GLUCOSE SERPL-MCNC: 132 MG/DL
GLUCOSE SERPL-MCNC: 133 MG/DL
GLUCOSE SERPL-MCNC: 134 MG/DL
GLUCOSE SERPL-MCNC: 135 MG/DL
GLUCOSE SERPL-MCNC: 136 MG/DL
GLUCOSE SERPL-MCNC: 137 MG/DL
GLUCOSE SERPL-MCNC: 141 MG/DL
GLUCOSE SERPL-MCNC: 143 MG/DL
GLUCOSE SERPL-MCNC: 146 MG/DL
GLUCOSE SERPL-MCNC: 148 MG/DL
GLUCOSE SERPL-MCNC: 150 MG/DL
GLUCOSE SERPL-MCNC: 150 MG/DL
GLUCOSE SERPL-MCNC: 153 MG/DL
GLUCOSE SERPL-MCNC: 164 MG/DL
GLUCOSE SERPL-MCNC: 164 MG/DL
GLUCOSE SERPL-MCNC: 165 MG/DL
GLUCOSE SERPL-MCNC: 168 MG/DL
GLUCOSE SERPL-MCNC: 173 MG/DL
GLUCOSE SERPL-MCNC: 183 MG/DL
GLUCOSE SERPL-MCNC: 196 MG/DL
GLUCOSE SERPL-MCNC: 56 MG/DL
GLUCOSE SERPL-MCNC: 66 MG/DL
GLUCOSE SERPL-MCNC: 66 MG/DL
GLUCOSE SERPL-MCNC: 67 MG/DL
GLUCOSE SERPL-MCNC: 68 MG/DL (ref 70–110)
GLUCOSE SERPL-MCNC: 71 MG/DL (ref 70–110)
GLUCOSE SERPL-MCNC: 76 MG/DL
GLUCOSE SERPL-MCNC: 78 MG/DL (ref 70–110)
GLUCOSE SERPL-MCNC: 98 MG/DL
GLUCOSE SERPL-MCNC: 99 MG/DL
GLUCOSE UR QL STRIP: NEGATIVE
GRAM STN SPEC: NORMAL
HAV IGM SERPL QL IA: NEGATIVE
HBA1C MFR BLD HPLC: 5.1 %
HBV CORE IGM SERPL QL IA: NEGATIVE
HBV SURFACE AG SERPL QL IA: NEGATIVE
HCO3 UR-SCNC: 10.5 MMOL/L (ref 24–28)
HCO3 UR-SCNC: 22.5 MMOL/L (ref 24–28)
HCO3 UR-SCNC: 23.1 MMOL/L (ref 24–28)
HCO3 UR-SCNC: 23.1 MMOL/L (ref 24–28)
HCO3 UR-SCNC: 23.5 MMOL/L (ref 24–28)
HCO3 UR-SCNC: 23.9 MMOL/L (ref 24–28)
HCO3 UR-SCNC: 25.9 MMOL/L (ref 24–28)
HCO3 UR-SCNC: 26.6 MMOL/L (ref 24–28)
HCT VFR BLD AUTO: 16.2 %
HCT VFR BLD AUTO: 21.1 %
HCT VFR BLD AUTO: 21.4 %
HCT VFR BLD AUTO: 22.4 %
HCT VFR BLD AUTO: 22.8 %
HCT VFR BLD AUTO: 23.3 %
HCT VFR BLD AUTO: 23.4 %
HCT VFR BLD AUTO: 24.7 %
HCT VFR BLD AUTO: 25.3 %
HCT VFR BLD AUTO: 25.9 %
HCT VFR BLD AUTO: 26 %
HCT VFR BLD AUTO: 26.1 %
HCT VFR BLD AUTO: 26.7 %
HCT VFR BLD AUTO: 27.1 %
HCT VFR BLD AUTO: 27.4 %
HCT VFR BLD AUTO: 27.5 %
HCT VFR BLD AUTO: 27.6 %
HCT VFR BLD AUTO: 27.8 %
HCT VFR BLD AUTO: 27.8 %
HCT VFR BLD AUTO: 28.1 %
HCT VFR BLD AUTO: 28.2 %
HCT VFR BLD AUTO: 28.3 %
HCT VFR BLD AUTO: 28.4 %
HCT VFR BLD AUTO: 28.4 %
HCT VFR BLD AUTO: 28.6 %
HCT VFR BLD AUTO: 28.6 %
HCT VFR BLD AUTO: 28.8 %
HCT VFR BLD AUTO: 28.9 %
HCT VFR BLD AUTO: 29.2 %
HCT VFR BLD AUTO: 29.2 %
HCT VFR BLD AUTO: 29.3 %
HCT VFR BLD AUTO: 29.4 %
HCT VFR BLD AUTO: 29.8 %
HCT VFR BLD AUTO: 29.8 %
HCT VFR BLD AUTO: 30 %
HCT VFR BLD AUTO: 30.1 %
HCT VFR BLD AUTO: 30.2 %
HCT VFR BLD AUTO: 30.2 %
HCT VFR BLD AUTO: 30.8 %
HCT VFR BLD AUTO: 31 %
HCT VFR BLD AUTO: 31.3 %
HCT VFR BLD AUTO: 31.6 %
HCT VFR BLD AUTO: 32 %
HCT VFR BLD AUTO: 32.9 %
HCT VFR BLD AUTO: 33.2 %
HCT VFR BLD AUTO: 34.6 %
HCT VFR BLD CALC: 24 %PCV (ref 36–54)
HCT VFR BLD CALC: 26 %PCV (ref 36–54)
HCT VFR BLD CALC: 27 %PCV (ref 36–54)
HCV AB SERPL QL IA: NEGATIVE
HDL/CHOLESTEROL RATIO: 15.5 %
HDLC SERPL-MCNC: 207 MG/DL
HDLC SERPL-MCNC: 32 MG/DL
HDLC SERPL-MCNC: 41 MG/DL
HDLC SERPL: 34.7 %
HGB BLD-MCNC: 10 G/DL
HGB BLD-MCNC: 10 G/DL
HGB BLD-MCNC: 10.2 G/DL
HGB BLD-MCNC: 10.7 G/DL
HGB BLD-MCNC: 5.1 G/DL
HGB BLD-MCNC: 6.7 G/DL
HGB BLD-MCNC: 6.8 G/DL
HGB BLD-MCNC: 7 G/DL
HGB BLD-MCNC: 7.5 G/DL
HGB BLD-MCNC: 7.6 G/DL
HGB BLD-MCNC: 7.9 G/DL
HGB BLD-MCNC: 8 G/DL
HGB BLD-MCNC: 8.1 G/DL
HGB BLD-MCNC: 8.2 G/DL
HGB BLD-MCNC: 8.3 G/DL
HGB BLD-MCNC: 8.3 G/DL
HGB BLD-MCNC: 8.4 G/DL
HGB BLD-MCNC: 8.4 G/DL
HGB BLD-MCNC: 8.5 G/DL
HGB BLD-MCNC: 8.5 G/DL
HGB BLD-MCNC: 8.6 G/DL
HGB BLD-MCNC: 8.7 G/DL
HGB BLD-MCNC: 8.8 G/DL
HGB BLD-MCNC: 8.8 G/DL
HGB BLD-MCNC: 9 G/DL
HGB BLD-MCNC: 9.2 G/DL
HGB BLD-MCNC: 9.3 G/DL
HGB BLD-MCNC: 9.4 G/DL
HGB BLD-MCNC: 9.6 G/DL
HGB BLD-MCNC: 9.8 G/DL
HGB BLD-MCNC: 9.9 G/DL
HGB UR QL STRIP: ABNORMAL
HYALINE CASTS UR QL AUTO: 0 /LPF
IMM GRANULOCYTES # BLD AUTO: 0.02 K/UL
IMM GRANULOCYTES # BLD AUTO: 0.02 K/UL
IMM GRANULOCYTES # BLD AUTO: 0.03 K/UL
IMM GRANULOCYTES # BLD AUTO: 0.04 K/UL
IMM GRANULOCYTES # BLD AUTO: 0.04 K/UL
IMM GRANULOCYTES # BLD AUTO: 0.05 K/UL
IMM GRANULOCYTES # BLD AUTO: 0.06 K/UL
IMM GRANULOCYTES # BLD AUTO: 0.07 K/UL
IMM GRANULOCYTES # BLD AUTO: 0.08 K/UL
IMM GRANULOCYTES # BLD AUTO: 0.09 K/UL
IMM GRANULOCYTES # BLD AUTO: 0.1 K/UL
IMM GRANULOCYTES # BLD AUTO: 0.11 K/UL
IMM GRANULOCYTES # BLD AUTO: 0.12 K/UL
IMM GRANULOCYTES # BLD AUTO: 0.13 K/UL
IMM GRANULOCYTES # BLD AUTO: 0.13 K/UL
IMM GRANULOCYTES # BLD AUTO: 0.14 K/UL
IMM GRANULOCYTES # BLD AUTO: 0.15 K/UL
IMM GRANULOCYTES # BLD AUTO: 0.16 K/UL
IMM GRANULOCYTES # BLD AUTO: 0.16 K/UL
IMM GRANULOCYTES # BLD AUTO: 0.17 K/UL
IMM GRANULOCYTES # BLD AUTO: 0.17 K/UL
IMM GRANULOCYTES # BLD AUTO: 0.18 K/UL
IMM GRANULOCYTES NFR BLD AUTO: 0.3 %
IMM GRANULOCYTES NFR BLD AUTO: 0.3 %
IMM GRANULOCYTES NFR BLD AUTO: 0.4 %
IMM GRANULOCYTES NFR BLD AUTO: 0.5 %
IMM GRANULOCYTES NFR BLD AUTO: 0.6 %
IMM GRANULOCYTES NFR BLD AUTO: 0.7 %
IMM GRANULOCYTES NFR BLD AUTO: 0.8 %
IMM GRANULOCYTES NFR BLD AUTO: 0.9 %
IMM GRANULOCYTES NFR BLD AUTO: 1 %
IMM GRANULOCYTES NFR BLD AUTO: 1 %
IMM GRANULOCYTES NFR BLD AUTO: 1.1 %
IMM GRANULOCYTES NFR BLD AUTO: 1.1 %
IMM GRANULOCYTES NFR BLD AUTO: 1.2 %
IMM GRANULOCYTES NFR BLD AUTO: 1.3 %
INR PPP: 1
INR PPP: 1
INR PPP: 1.1
INR PPP: 1.2
INR PPP: 1.3
INR PPP: 1.4
INR PPP: 1.5
INR PPP: 1.6
INR PPP: 1.7
INR PPP: 1.7
INR PPP: 1.8
INR PPP: 1.9
INR PPP: 2
INR PPP: 2.1
INR PPP: 2.2
INR PPP: 2.3
INR PPP: 2.4
INR PPP: 2.4
INR PPP: 2.7
INR PPP: 3.8
INR PPP: 8.7
KETONES UR QL STRIP: NEGATIVE
LACTATE SERPL-SCNC: 1.1 MMOL/L
LACTATE SERPL-SCNC: 1.2 MMOL/L
LACTATE SERPL-SCNC: 1.3 MMOL/L
LACTATE SERPL-SCNC: 1.6 MMOL/L
LACTATE SERPL-SCNC: 1.7 MMOL/L
LDH SERPL L TO P-CCNC: 201 U/L
LDLC SERPL CALC-MCNC: 129.2 MG/DL
LDLC SERPL CALC-MCNC: 55 MG/DL
LEUKOCYTE ESTERASE UR QL STRIP: ABNORMAL
LYMPHOCYTES # BLD AUTO: 0.6 K/UL
LYMPHOCYTES # BLD AUTO: 0.7 K/UL
LYMPHOCYTES # BLD AUTO: 0.8 K/UL
LYMPHOCYTES # BLD AUTO: 0.9 K/UL
LYMPHOCYTES # BLD AUTO: 1 K/UL
LYMPHOCYTES # BLD AUTO: 1.1 K/UL
LYMPHOCYTES # BLD AUTO: 1.2 K/UL
LYMPHOCYTES # BLD AUTO: 1.3 K/UL
LYMPHOCYTES # BLD AUTO: 1.4 K/UL
LYMPHOCYTES # BLD AUTO: 1.5 K/UL
LYMPHOCYTES # BLD AUTO: 2 K/UL
LYMPHOCYTES # BLD AUTO: 2.8 K/UL
LYMPHOCYTES NFR BLD: 10.1 %
LYMPHOCYTES NFR BLD: 10.4 %
LYMPHOCYTES NFR BLD: 10.4 %
LYMPHOCYTES NFR BLD: 11.2 %
LYMPHOCYTES NFR BLD: 11.5 %
LYMPHOCYTES NFR BLD: 11.8 %
LYMPHOCYTES NFR BLD: 11.8 %
LYMPHOCYTES NFR BLD: 11.9 %
LYMPHOCYTES NFR BLD: 12 %
LYMPHOCYTES NFR BLD: 12 %
LYMPHOCYTES NFR BLD: 12.1 %
LYMPHOCYTES NFR BLD: 12.5 %
LYMPHOCYTES NFR BLD: 12.5 %
LYMPHOCYTES NFR BLD: 13.2 %
LYMPHOCYTES NFR BLD: 13.9 %
LYMPHOCYTES NFR BLD: 14.3 %
LYMPHOCYTES NFR BLD: 14.6 %
LYMPHOCYTES NFR BLD: 16.9 %
LYMPHOCYTES NFR BLD: 18.9 %
LYMPHOCYTES NFR BLD: 3.1 %
LYMPHOCYTES NFR BLD: 4.7 %
LYMPHOCYTES NFR BLD: 5 %
LYMPHOCYTES NFR BLD: 5.3 %
LYMPHOCYTES NFR BLD: 5.3 %
LYMPHOCYTES NFR BLD: 6.1 %
LYMPHOCYTES NFR BLD: 6.2 %
LYMPHOCYTES NFR BLD: 6.5 %
LYMPHOCYTES NFR BLD: 6.5 %
LYMPHOCYTES NFR BLD: 6.6 %
LYMPHOCYTES NFR BLD: 6.8 %
LYMPHOCYTES NFR BLD: 6.9 %
LYMPHOCYTES NFR BLD: 7.1 %
LYMPHOCYTES NFR BLD: 7.3 %
LYMPHOCYTES NFR BLD: 7.3 %
LYMPHOCYTES NFR BLD: 7.5 %
LYMPHOCYTES NFR BLD: 7.7 %
LYMPHOCYTES NFR BLD: 7.8 %
LYMPHOCYTES NFR BLD: 7.9 %
LYMPHOCYTES NFR BLD: 7.9 %
LYMPHOCYTES NFR BLD: 8.2 %
LYMPHOCYTES NFR BLD: 8.4 %
LYMPHOCYTES NFR BLD: 8.7 %
LYMPHOCYTES NFR BLD: 8.8 %
LYMPHOCYTES NFR BLD: 9.3 %
LYMPHOCYTES NFR BLD: 9.9 %
MAGNESIUM SERPL-MCNC: 1.1 MG/DL
MAGNESIUM SERPL-MCNC: 1.3 MG/DL
MAGNESIUM SERPL-MCNC: 1.6 MG/DL
MAGNESIUM SERPL-MCNC: 1.6 MG/DL
MAGNESIUM SERPL-MCNC: 1.7 MG/DL
MAGNESIUM SERPL-MCNC: 1.8 MG/DL
MAGNESIUM SERPL-MCNC: 1.9 MG/DL
MAGNESIUM SERPL-MCNC: 2 MG/DL
MAGNESIUM SERPL-MCNC: 2 MG/DL
MAGNESIUM SERPL-MCNC: 2.2 MG/DL
MCH RBC QN AUTO: 29.3 PG
MCH RBC QN AUTO: 29.4 PG
MCH RBC QN AUTO: 29.5 PG
MCH RBC QN AUTO: 29.8 PG
MCH RBC QN AUTO: 29.9 PG
MCH RBC QN AUTO: 30.2 PG
MCH RBC QN AUTO: 30.3 PG
MCH RBC QN AUTO: 30.3 PG
MCH RBC QN AUTO: 30.4 PG
MCH RBC QN AUTO: 30.5 PG
MCH RBC QN AUTO: 30.7 PG
MCH RBC QN AUTO: 30.7 PG
MCH RBC QN AUTO: 30.8 PG
MCH RBC QN AUTO: 30.9 PG
MCH RBC QN AUTO: 31 PG
MCH RBC QN AUTO: 31.2 PG
MCH RBC QN AUTO: 31.5 PG
MCH RBC QN AUTO: 32 PG
MCH RBC QN AUTO: 32.1 PG
MCH RBC QN AUTO: 32.4 PG
MCH RBC QN AUTO: 32.7 PG
MCH RBC QN AUTO: 32.9 PG
MCH RBC QN AUTO: 32.9 PG
MCH RBC QN AUTO: 33 PG
MCH RBC QN AUTO: 33.2 PG
MCH RBC QN AUTO: 33.7 PG
MCH RBC QN AUTO: 33.7 PG
MCH RBC QN AUTO: 33.9 PG
MCH RBC QN AUTO: 33.9 PG
MCH RBC QN AUTO: 34 PG
MCH RBC QN AUTO: 34 PG
MCH RBC QN AUTO: 34.1 PG
MCH RBC QN AUTO: 34.1 PG
MCH RBC QN AUTO: 34.4 PG
MCH RBC QN AUTO: 34.4 PG
MCH RBC QN AUTO: 34.5 PG
MCH RBC QN AUTO: 35.2 PG
MCH RBC QN AUTO: 35.8 PG
MCHC RBC AUTO-ENTMCNC: 29.4 G/DL
MCHC RBC AUTO-ENTMCNC: 29.4 G/DL
MCHC RBC AUTO-ENTMCNC: 29.5 G/DL
MCHC RBC AUTO-ENTMCNC: 29.8 G/DL
MCHC RBC AUTO-ENTMCNC: 29.9 G/DL
MCHC RBC AUTO-ENTMCNC: 29.9 G/DL
MCHC RBC AUTO-ENTMCNC: 30 G/DL
MCHC RBC AUTO-ENTMCNC: 30 G/DL
MCHC RBC AUTO-ENTMCNC: 30.1 G/DL
MCHC RBC AUTO-ENTMCNC: 30.2 G/DL
MCHC RBC AUTO-ENTMCNC: 30.3 G/DL
MCHC RBC AUTO-ENTMCNC: 30.4 G/DL
MCHC RBC AUTO-ENTMCNC: 30.5 G/DL
MCHC RBC AUTO-ENTMCNC: 30.5 G/DL
MCHC RBC AUTO-ENTMCNC: 30.6 G/DL
MCHC RBC AUTO-ENTMCNC: 30.8 G/DL
MCHC RBC AUTO-ENTMCNC: 30.9 G/DL
MCHC RBC AUTO-ENTMCNC: 30.9 G/DL
MCHC RBC AUTO-ENTMCNC: 31 G/DL
MCHC RBC AUTO-ENTMCNC: 31.3 G/DL
MCHC RBC AUTO-ENTMCNC: 31.3 G/DL
MCHC RBC AUTO-ENTMCNC: 31.4 G/DL
MCHC RBC AUTO-ENTMCNC: 31.4 G/DL
MCHC RBC AUTO-ENTMCNC: 31.5 G/DL
MCHC RBC AUTO-ENTMCNC: 31.6 G/DL
MCHC RBC AUTO-ENTMCNC: 31.6 G/DL
MCHC RBC AUTO-ENTMCNC: 31.8 G/DL
MCHC RBC AUTO-ENTMCNC: 31.9 G/DL
MCHC RBC AUTO-ENTMCNC: 32 G/DL
MCHC RBC AUTO-ENTMCNC: 32 G/DL
MCHC RBC AUTO-ENTMCNC: 32.1 G/DL
MCHC RBC AUTO-ENTMCNC: 32.2 G/DL
MCHC RBC AUTO-ENTMCNC: 32.3 G/DL
MCHC RBC AUTO-ENTMCNC: 32.5 G/DL
MCV RBC AUTO: 100 FL
MCV RBC AUTO: 101 FL
MCV RBC AUTO: 102 FL
MCV RBC AUTO: 102 FL
MCV RBC AUTO: 103 FL
MCV RBC AUTO: 104 FL
MCV RBC AUTO: 105 FL
MCV RBC AUTO: 106 FL
MCV RBC AUTO: 107 FL
MCV RBC AUTO: 108 FL
MCV RBC AUTO: 110 FL
MCV RBC AUTO: 110 FL
MCV RBC AUTO: 116 FL
MCV RBC AUTO: 98 FL
MCV RBC AUTO: 99 FL
MICROSCOPIC COMMENT: ABNORMAL
MODE: ABNORMAL
MONOCYTES # BLD AUTO: 0.4 K/UL
MONOCYTES # BLD AUTO: 0.5 K/UL
MONOCYTES # BLD AUTO: 0.6 K/UL
MONOCYTES # BLD AUTO: 0.7 K/UL
MONOCYTES # BLD AUTO: 0.8 K/UL
MONOCYTES # BLD AUTO: 0.9 K/UL
MONOCYTES # BLD AUTO: 1 K/UL
MONOCYTES NFR BLD: 3.1 %
MONOCYTES NFR BLD: 3.1 %
MONOCYTES NFR BLD: 3.2 %
MONOCYTES NFR BLD: 3.4 %
MONOCYTES NFR BLD: 3.5 %
MONOCYTES NFR BLD: 3.8 %
MONOCYTES NFR BLD: 3.9 %
MONOCYTES NFR BLD: 4 %
MONOCYTES NFR BLD: 4.3 %
MONOCYTES NFR BLD: 4.5 %
MONOCYTES NFR BLD: 5 %
MONOCYTES NFR BLD: 5.1 %
MONOCYTES NFR BLD: 5.1 %
MONOCYTES NFR BLD: 5.3 %
MONOCYTES NFR BLD: 5.4 %
MONOCYTES NFR BLD: 5.5 %
MONOCYTES NFR BLD: 5.6 %
MONOCYTES NFR BLD: 5.6 %
MONOCYTES NFR BLD: 5.7 %
MONOCYTES NFR BLD: 5.9 %
MONOCYTES NFR BLD: 6 %
MONOCYTES NFR BLD: 6.1 %
MONOCYTES NFR BLD: 6.2 %
MONOCYTES NFR BLD: 6.5 %
MONOCYTES NFR BLD: 6.7 %
MONOCYTES NFR BLD: 7.1 %
MONOCYTES NFR BLD: 7.2 %
MONOCYTES NFR BLD: 7.5 %
MONOCYTES NFR BLD: 7.7 %
MONOCYTES NFR BLD: 7.7 %
MONOCYTES NFR BLD: 7.9 %
MONOCYTES NFR BLD: 8.4 %
MONOCYTES NFR BLD: 8.5 %
MONOCYTES NFR BLD: 8.7 %
NEUTROPHILS # BLD AUTO: 10.1 K/UL
NEUTROPHILS # BLD AUTO: 10.4 K/UL
NEUTROPHILS # BLD AUTO: 10.8 K/UL
NEUTROPHILS # BLD AUTO: 11.1 K/UL
NEUTROPHILS # BLD AUTO: 11.2 K/UL
NEUTROPHILS # BLD AUTO: 11.6 K/UL
NEUTROPHILS # BLD AUTO: 11.7 K/UL
NEUTROPHILS # BLD AUTO: 11.8 K/UL
NEUTROPHILS # BLD AUTO: 12.3 K/UL
NEUTROPHILS # BLD AUTO: 12.3 K/UL
NEUTROPHILS # BLD AUTO: 12.5 K/UL
NEUTROPHILS # BLD AUTO: 12.6 K/UL
NEUTROPHILS # BLD AUTO: 12.6 K/UL
NEUTROPHILS # BLD AUTO: 13 K/UL
NEUTROPHILS # BLD AUTO: 13.7 K/UL
NEUTROPHILS # BLD AUTO: 14.8 K/UL
NEUTROPHILS # BLD AUTO: 15.1 K/UL
NEUTROPHILS # BLD AUTO: 18.2 K/UL
NEUTROPHILS # BLD AUTO: 19.1 K/UL
NEUTROPHILS # BLD AUTO: 4.6 K/UL
NEUTROPHILS # BLD AUTO: 4.8 K/UL
NEUTROPHILS # BLD AUTO: 5.1 K/UL
NEUTROPHILS # BLD AUTO: 5.4 K/UL
NEUTROPHILS # BLD AUTO: 5.6 K/UL
NEUTROPHILS # BLD AUTO: 5.7 K/UL
NEUTROPHILS # BLD AUTO: 6.4 K/UL
NEUTROPHILS # BLD AUTO: 7 K/UL
NEUTROPHILS # BLD AUTO: 7.1 K/UL
NEUTROPHILS # BLD AUTO: 7.2 K/UL
NEUTROPHILS # BLD AUTO: 7.3 K/UL
NEUTROPHILS # BLD AUTO: 7.4 K/UL
NEUTROPHILS # BLD AUTO: 7.6 K/UL
NEUTROPHILS # BLD AUTO: 7.7 K/UL
NEUTROPHILS # BLD AUTO: 7.9 K/UL
NEUTROPHILS # BLD AUTO: 8.2 K/UL
NEUTROPHILS # BLD AUTO: 8.4 K/UL
NEUTROPHILS # BLD AUTO: 8.5 K/UL
NEUTROPHILS # BLD AUTO: 8.8 K/UL
NEUTROPHILS # BLD AUTO: 8.8 K/UL
NEUTROPHILS # BLD AUTO: 9.5 K/UL
NEUTROPHILS # BLD AUTO: 9.8 K/UL
NEUTROPHILS NFR BLD: 67.1 %
NEUTROPHILS NFR BLD: 74.5 %
NEUTROPHILS NFR BLD: 74.8 %
NEUTROPHILS NFR BLD: 74.8 %
NEUTROPHILS NFR BLD: 75.9 %
NEUTROPHILS NFR BLD: 76.7 %
NEUTROPHILS NFR BLD: 76.8 %
NEUTROPHILS NFR BLD: 76.8 %
NEUTROPHILS NFR BLD: 76.9 %
NEUTROPHILS NFR BLD: 77.6 %
NEUTROPHILS NFR BLD: 78.1 %
NEUTROPHILS NFR BLD: 78.4 %
NEUTROPHILS NFR BLD: 78.5 %
NEUTROPHILS NFR BLD: 78.6 %
NEUTROPHILS NFR BLD: 78.6 %
NEUTROPHILS NFR BLD: 78.8 %
NEUTROPHILS NFR BLD: 79.2 %
NEUTROPHILS NFR BLD: 80 %
NEUTROPHILS NFR BLD: 81.1 %
NEUTROPHILS NFR BLD: 81.5 %
NEUTROPHILS NFR BLD: 81.6 %
NEUTROPHILS NFR BLD: 83.3 %
NEUTROPHILS NFR BLD: 83.3 %
NEUTROPHILS NFR BLD: 83.9 %
NEUTROPHILS NFR BLD: 84 %
NEUTROPHILS NFR BLD: 84.2 %
NEUTROPHILS NFR BLD: 84.5 %
NEUTROPHILS NFR BLD: 84.5 %
NEUTROPHILS NFR BLD: 84.7 %
NEUTROPHILS NFR BLD: 84.8 %
NEUTROPHILS NFR BLD: 85 %
NEUTROPHILS NFR BLD: 85.1 %
NEUTROPHILS NFR BLD: 85.4 %
NEUTROPHILS NFR BLD: 86.3 %
NEUTROPHILS NFR BLD: 86.5 %
NEUTROPHILS NFR BLD: 86.8 %
NEUTROPHILS NFR BLD: 86.8 %
NEUTROPHILS NFR BLD: 87.8 %
NEUTROPHILS NFR BLD: 88.5 %
NEUTROPHILS NFR BLD: 88.9 %
NEUTROPHILS NFR BLD: 88.9 %
NEUTROPHILS NFR BLD: 90.8 %
NEUTROPHILS NFR BLD: 90.8 %
NEUTROPHILS NFR BLD: 91 %
NEUTROPHILS NFR BLD: 93 %
NITRITE UR QL STRIP: NEGATIVE
NONHDLC SERPL-MCNC: 175 MG/DL
NONHDLC SERPL-MCNC: 77 MG/DL
NRBC BLD-RTO: 0 /100 WBC
NUM UNITS TRANS FFP: NORMAL
NUM UNITS TRANS PACKED RBC: NORMAL
PCO2 BLDA: 17 MMHG (ref 35–45)
PCO2 BLDA: 27.6 MMHG (ref 35–45)
PCO2 BLDA: 30.9 MMHG (ref 35–45)
PCO2 BLDA: 34.2 MMHG (ref 35–45)
PCO2 BLDA: 36.1 MMHG (ref 35–45)
PCO2 BLDA: 38.7 MMHG (ref 35–45)
PCO2 BLDA: 40.7 MMHG (ref 35–45)
PCO2 BLDA: 50.6 MMHG (ref 35–45)
PEEP: 8
PH SMN: 7.32 [PH] (ref 7.35–7.45)
PH SMN: 7.35 [PH] (ref 7.35–7.45)
PH SMN: 7.39 [PH] (ref 7.35–7.45)
PH SMN: 7.4 [PH] (ref 7.35–7.45)
PH SMN: 7.43 [PH] (ref 7.35–7.45)
PH SMN: 7.44 [PH] (ref 7.35–7.45)
PH SMN: 7.48 [PH] (ref 7.35–7.45)
PH SMN: 7.59 [PH] (ref 7.35–7.45)
PH UR STRIP: 5 [PH] (ref 5–8)
PH UR STRIP: 5 [PH] (ref 5–8)
PH UR STRIP: 7 [PH] (ref 5–8)
PHOSPHATE SERPL-MCNC: 2.1 MG/DL
PHOSPHATE SERPL-MCNC: 2.3 MG/DL
PHOSPHATE SERPL-MCNC: 2.3 MG/DL
PHOSPHATE SERPL-MCNC: 2.4 MG/DL
PHOSPHATE SERPL-MCNC: 2.6 MG/DL
PHOSPHATE SERPL-MCNC: 2.8 MG/DL
PHOSPHATE SERPL-MCNC: 2.9 MG/DL
PHOSPHATE SERPL-MCNC: 2.9 MG/DL
PHOSPHATE SERPL-MCNC: 3 MG/DL
PHOSPHATE SERPL-MCNC: 3 MG/DL
PHOSPHATE SERPL-MCNC: 3.1 MG/DL
PHOSPHATE SERPL-MCNC: 3.1 MG/DL
PHOSPHATE SERPL-MCNC: 3.2 MG/DL
PHOSPHATE SERPL-MCNC: 3.2 MG/DL
PHOSPHATE SERPL-MCNC: 3.6 MG/DL
PHOSPHATE SERPL-MCNC: 3.7 MG/DL
PHOSPHATE SERPL-MCNC: 4.1 MG/DL
PHOSPHATE SERPL-MCNC: 4.3 MG/DL
PHOSPHATE SERPL-MCNC: 4.8 MG/DL
PHOSPHATE SERPL-MCNC: 4.9 MG/DL
PHOSPHATE SERPL-MCNC: 5.3 MG/DL
PHOSPHATE SERPL-MCNC: 5.6 MG/DL
PHOSPHATE SERPL-MCNC: 5.7 MG/DL
PHOSPHATE SERPL-MCNC: 6.3 MG/DL
PHOSPHATE SERPL-MCNC: 6.6 MG/DL
PLATELET # BLD AUTO: 161 K/UL
PLATELET # BLD AUTO: 162 K/UL
PLATELET # BLD AUTO: 165 K/UL
PLATELET # BLD AUTO: 168 K/UL
PLATELET # BLD AUTO: 175 K/UL
PLATELET # BLD AUTO: 181 K/UL
PLATELET # BLD AUTO: 195 K/UL
PLATELET # BLD AUTO: 198 K/UL
PLATELET # BLD AUTO: 198 K/UL
PLATELET # BLD AUTO: 200 K/UL
PLATELET # BLD AUTO: 200 K/UL
PLATELET # BLD AUTO: 203 K/UL
PLATELET # BLD AUTO: 204 K/UL
PLATELET # BLD AUTO: 204 K/UL
PLATELET # BLD AUTO: 208 K/UL
PLATELET # BLD AUTO: 210 K/UL
PLATELET # BLD AUTO: 214 K/UL
PLATELET # BLD AUTO: 220 K/UL
PLATELET # BLD AUTO: 224 K/UL
PLATELET # BLD AUTO: 227 K/UL
PLATELET # BLD AUTO: 228 K/UL
PLATELET # BLD AUTO: 234 K/UL
PLATELET # BLD AUTO: 239 K/UL
PLATELET # BLD AUTO: 255 K/UL
PLATELET # BLD AUTO: 257 K/UL
PLATELET # BLD AUTO: 258 K/UL
PLATELET # BLD AUTO: 262 K/UL
PLATELET # BLD AUTO: 263 K/UL
PLATELET # BLD AUTO: 266 K/UL
PLATELET # BLD AUTO: 269 K/UL
PLATELET # BLD AUTO: 271 K/UL
PLATELET # BLD AUTO: 281 K/UL
PLATELET # BLD AUTO: 283 K/UL
PLATELET # BLD AUTO: 285 K/UL
PLATELET # BLD AUTO: 292 K/UL
PLATELET # BLD AUTO: 298 K/UL
PLATELET # BLD AUTO: 301 K/UL
PLATELET # BLD AUTO: 309 K/UL
PLATELET # BLD AUTO: 315 K/UL
PLATELET # BLD AUTO: 320 K/UL
PLATELET # BLD AUTO: 322 K/UL
PLATELET # BLD AUTO: 325 K/UL
PLATELET # BLD AUTO: 334 K/UL
PLATELET # BLD AUTO: 357 K/UL
PLATELET # BLD AUTO: 365 K/UL
PLATELET # BLD AUTO: 373 K/UL
PLATELET BLD QL SMEAR: ABNORMAL
PMV BLD AUTO: 10.1 FL
PMV BLD AUTO: 10.3 FL
PMV BLD AUTO: 10.4 FL
PMV BLD AUTO: 10.4 FL
PMV BLD AUTO: 10.5 FL
PMV BLD AUTO: 10.6 FL
PMV BLD AUTO: 10.7 FL
PMV BLD AUTO: 10.8 FL
PMV BLD AUTO: 10.9 FL
PMV BLD AUTO: 10.9 FL
PMV BLD AUTO: 11 FL
PMV BLD AUTO: 11.1 FL
PMV BLD AUTO: 11.2 FL
PMV BLD AUTO: 11.3 FL
PMV BLD AUTO: 11.3 FL
PMV BLD AUTO: 11.4 FL
PMV BLD AUTO: 11.5 FL
PMV BLD AUTO: 11.7 FL
PMV BLD AUTO: 11.7 FL
PMV BLD AUTO: 9.8 FL
PMV BLD AUTO: ABNORMAL FL
PO2 BLDA: 14 MMHG (ref 40–60)
PO2 BLDA: 240 MMHG (ref 80–100)
PO2 BLDA: 246 MMHG (ref 80–100)
PO2 BLDA: 27 MMHG (ref 40–60)
PO2 BLDA: 270 MMHG (ref 80–100)
PO2 BLDA: 277 MMHG (ref 80–100)
PO2 BLDA: 56 MMHG (ref 80–100)
PO2 BLDA: 81 MMHG (ref 80–100)
POC BE: -1 MMOL/L
POC BE: -1 MMOL/L
POC BE: -14 MMOL/L
POC BE: -3 MMOL/L
POC BE: 0 MMOL/L
POC BE: 5 MMOL/L
POC IONIZED CALCIUM: 1.26 MMOL/L (ref 1.06–1.42)
POC IONIZED CALCIUM: 1.29 MMOL/L (ref 1.06–1.42)
POC IONIZED CALCIUM: 1.29 MMOL/L (ref 1.06–1.42)
POC SATURATED O2: 100 % (ref 95–100)
POC SATURATED O2: 15 % (ref 95–100)
POC SATURATED O2: 53 % (ref 95–100)
POC SATURATED O2: 91 % (ref 95–100)
POC SATURATED O2: 96 % (ref 95–100)
POC TCO2: 11 MMOL/L (ref 24–29)
POC TCO2: 24 MMOL/L (ref 23–27)
POC TCO2: 25 MMOL/L (ref 23–27)
POC TCO2: 25 MMOL/L (ref 23–27)
POC TCO2: 27 MMOL/L (ref 23–27)
POC TCO2: 27 MMOL/L (ref 24–29)
POCT GLUCOSE: 103 MG/DL (ref 70–110)
POCT GLUCOSE: 104 MG/DL (ref 70–110)
POCT GLUCOSE: 105 MG/DL (ref 70–110)
POCT GLUCOSE: 105 MG/DL (ref 70–110)
POCT GLUCOSE: 106 MG/DL (ref 70–110)
POCT GLUCOSE: 108 MG/DL (ref 70–110)
POCT GLUCOSE: 108 MG/DL (ref 70–110)
POCT GLUCOSE: 109 MG/DL (ref 70–110)
POCT GLUCOSE: 112 MG/DL (ref 70–110)
POCT GLUCOSE: 112 MG/DL (ref 70–110)
POCT GLUCOSE: 113 MG/DL (ref 70–110)
POCT GLUCOSE: 113 MG/DL (ref 70–110)
POCT GLUCOSE: 114 MG/DL (ref 70–110)
POCT GLUCOSE: 114 MG/DL (ref 70–110)
POCT GLUCOSE: 115 MG/DL (ref 70–110)
POCT GLUCOSE: 116 MG/DL (ref 70–110)
POCT GLUCOSE: 118 MG/DL (ref 70–110)
POCT GLUCOSE: 118 MG/DL (ref 70–110)
POCT GLUCOSE: 121 MG/DL (ref 70–110)
POCT GLUCOSE: 122 MG/DL (ref 70–110)
POCT GLUCOSE: 122 MG/DL (ref 70–110)
POCT GLUCOSE: 123 MG/DL (ref 70–110)
POCT GLUCOSE: 123 MG/DL (ref 70–110)
POCT GLUCOSE: 125 MG/DL (ref 70–110)
POCT GLUCOSE: 126 MG/DL (ref 70–110)
POCT GLUCOSE: 126 MG/DL (ref 70–110)
POCT GLUCOSE: 127 MG/DL (ref 70–110)
POCT GLUCOSE: 128 MG/DL (ref 70–110)
POCT GLUCOSE: 129 MG/DL (ref 70–110)
POCT GLUCOSE: 130 MG/DL (ref 70–110)
POCT GLUCOSE: 131 MG/DL (ref 70–110)
POCT GLUCOSE: 131 MG/DL (ref 70–110)
POCT GLUCOSE: 132 MG/DL (ref 70–110)
POCT GLUCOSE: 133 MG/DL (ref 70–110)
POCT GLUCOSE: 134 MG/DL (ref 70–110)
POCT GLUCOSE: 135 MG/DL (ref 70–110)
POCT GLUCOSE: 135 MG/DL (ref 70–110)
POCT GLUCOSE: 136 MG/DL (ref 70–110)
POCT GLUCOSE: 138 MG/DL (ref 70–110)
POCT GLUCOSE: 139 MG/DL (ref 70–110)
POCT GLUCOSE: 140 MG/DL (ref 70–110)
POCT GLUCOSE: 140 MG/DL (ref 70–110)
POCT GLUCOSE: 141 MG/DL (ref 70–110)
POCT GLUCOSE: 142 MG/DL (ref 70–110)
POCT GLUCOSE: 143 MG/DL (ref 70–110)
POCT GLUCOSE: 145 MG/DL (ref 70–110)
POCT GLUCOSE: 145 MG/DL (ref 70–110)
POCT GLUCOSE: 146 MG/DL (ref 70–110)
POCT GLUCOSE: 147 MG/DL (ref 70–110)
POCT GLUCOSE: 148 MG/DL (ref 70–110)
POCT GLUCOSE: 152 MG/DL (ref 70–110)
POCT GLUCOSE: 152 MG/DL (ref 70–110)
POCT GLUCOSE: 155 MG/DL (ref 70–110)
POCT GLUCOSE: 156 MG/DL (ref 70–110)
POCT GLUCOSE: 156 MG/DL (ref 70–110)
POCT GLUCOSE: 157 MG/DL (ref 70–110)
POCT GLUCOSE: 158 MG/DL (ref 70–110)
POCT GLUCOSE: 162 MG/DL (ref 70–110)
POCT GLUCOSE: 163 MG/DL (ref 70–110)
POCT GLUCOSE: 164 MG/DL (ref 70–110)
POCT GLUCOSE: 165 MG/DL (ref 70–110)
POCT GLUCOSE: 168 MG/DL (ref 70–110)
POCT GLUCOSE: 169 MG/DL (ref 70–110)
POCT GLUCOSE: 172 MG/DL (ref 70–110)
POCT GLUCOSE: 172 MG/DL (ref 70–110)
POCT GLUCOSE: 173 MG/DL (ref 70–110)
POCT GLUCOSE: 173 MG/DL (ref 70–110)
POCT GLUCOSE: 176 MG/DL (ref 70–110)
POCT GLUCOSE: 177 MG/DL (ref 70–110)
POCT GLUCOSE: 179 MG/DL (ref 70–110)
POCT GLUCOSE: 181 MG/DL (ref 70–110)
POCT GLUCOSE: 182 MG/DL (ref 70–110)
POCT GLUCOSE: 182 MG/DL (ref 70–110)
POCT GLUCOSE: 184 MG/DL (ref 70–110)
POCT GLUCOSE: 184 MG/DL (ref 70–110)
POCT GLUCOSE: 185 MG/DL (ref 70–110)
POCT GLUCOSE: 187 MG/DL (ref 70–110)
POCT GLUCOSE: 189 MG/DL (ref 70–110)
POCT GLUCOSE: 191 MG/DL (ref 70–110)
POCT GLUCOSE: 192 MG/DL (ref 70–110)
POCT GLUCOSE: 193 MG/DL (ref 70–110)
POCT GLUCOSE: 193 MG/DL (ref 70–110)
POCT GLUCOSE: 194 MG/DL (ref 70–110)
POCT GLUCOSE: 194 MG/DL (ref 70–110)
POCT GLUCOSE: 196 MG/DL (ref 70–110)
POCT GLUCOSE: 199 MG/DL (ref 70–110)
POCT GLUCOSE: 199 MG/DL (ref 70–110)
POCT GLUCOSE: 200 MG/DL (ref 70–110)
POCT GLUCOSE: 204 MG/DL (ref 70–110)
POCT GLUCOSE: 205 MG/DL (ref 70–110)
POCT GLUCOSE: 206 MG/DL (ref 70–110)
POCT GLUCOSE: 207 MG/DL (ref 70–110)
POCT GLUCOSE: 208 MG/DL (ref 70–110)
POCT GLUCOSE: 208 MG/DL (ref 70–110)
POCT GLUCOSE: 214 MG/DL (ref 70–110)
POCT GLUCOSE: 216 MG/DL (ref 70–110)
POCT GLUCOSE: 216 MG/DL (ref 70–110)
POCT GLUCOSE: 217 MG/DL (ref 70–110)
POCT GLUCOSE: 218 MG/DL (ref 70–110)
POCT GLUCOSE: 221 MG/DL (ref 70–110)
POCT GLUCOSE: 225 MG/DL (ref 70–110)
POCT GLUCOSE: 225 MG/DL (ref 70–110)
POCT GLUCOSE: 239 MG/DL (ref 70–110)
POCT GLUCOSE: 248 MG/DL (ref 70–110)
POCT GLUCOSE: 249 MG/DL (ref 70–110)
POCT GLUCOSE: 271 MG/DL (ref 70–110)
POCT GLUCOSE: 275 MG/DL (ref 70–110)
POCT GLUCOSE: 292 MG/DL (ref 70–110)
POCT GLUCOSE: 295 MG/DL (ref 70–110)
POCT GLUCOSE: 309 MG/DL (ref 70–110)
POCT GLUCOSE: 55 MG/DL (ref 70–110)
POCT GLUCOSE: 56 MG/DL (ref 70–110)
POCT GLUCOSE: 61 MG/DL (ref 70–110)
POCT GLUCOSE: 62 MG/DL (ref 70–110)
POCT GLUCOSE: 67 MG/DL (ref 70–110)
POCT GLUCOSE: 69 MG/DL (ref 70–110)
POCT GLUCOSE: 70 MG/DL (ref 70–110)
POCT GLUCOSE: 71 MG/DL (ref 70–110)
POCT GLUCOSE: 72 MG/DL (ref 70–110)
POCT GLUCOSE: 75 MG/DL (ref 70–110)
POCT GLUCOSE: 79 MG/DL (ref 70–110)
POCT GLUCOSE: 80 MG/DL (ref 70–110)
POCT GLUCOSE: 81 MG/DL (ref 70–110)
POCT GLUCOSE: 85 MG/DL (ref 70–110)
POCT GLUCOSE: 86 MG/DL (ref 70–110)
POCT GLUCOSE: 87 MG/DL (ref 70–110)
POCT GLUCOSE: 87 MG/DL (ref 70–110)
POCT GLUCOSE: 88 MG/DL (ref 70–110)
POCT GLUCOSE: 89 MG/DL (ref 70–110)
POCT GLUCOSE: 90 MG/DL (ref 70–110)
POCT GLUCOSE: 91 MG/DL (ref 70–110)
POCT GLUCOSE: 91 MG/DL (ref 70–110)
POCT GLUCOSE: 94 MG/DL (ref 70–110)
POCT GLUCOSE: 97 MG/DL (ref 70–110)
POCT GLUCOSE: 97 MG/DL (ref 70–110)
POTASSIUM BLD-SCNC: 4 MMOL/L (ref 3.5–5.1)
POTASSIUM BLD-SCNC: 4.1 MMOL/L (ref 3.5–5.1)
POTASSIUM BLD-SCNC: 4.2 MMOL/L (ref 3.5–5.1)
POTASSIUM SERPL-SCNC: 3.1 MMOL/L
POTASSIUM SERPL-SCNC: 3.2 MMOL/L
POTASSIUM SERPL-SCNC: 3.2 MMOL/L
POTASSIUM SERPL-SCNC: 3.4 MMOL/L
POTASSIUM SERPL-SCNC: 3.5 MMOL/L
POTASSIUM SERPL-SCNC: 3.6 MMOL/L
POTASSIUM SERPL-SCNC: 3.7 MMOL/L
POTASSIUM SERPL-SCNC: 3.8 MMOL/L
POTASSIUM SERPL-SCNC: 3.9 MMOL/L
POTASSIUM SERPL-SCNC: 3.9 MMOL/L
POTASSIUM SERPL-SCNC: 4 MMOL/L
POTASSIUM SERPL-SCNC: 4.1 MMOL/L
POTASSIUM SERPL-SCNC: 4.1 MMOL/L
POTASSIUM SERPL-SCNC: 4.2 MMOL/L
POTASSIUM SERPL-SCNC: 4.3 MMOL/L
POTASSIUM SERPL-SCNC: 4.4 MMOL/L
POTASSIUM SERPL-SCNC: 4.5 MMOL/L
POTASSIUM SERPL-SCNC: 4.6 MMOL/L
POTASSIUM SERPL-SCNC: 4.7 MMOL/L
POTASSIUM SERPL-SCNC: 4.9 MMOL/L
POTASSIUM SERPL-SCNC: 4.9 MMOL/L
POTASSIUM SERPL-SCNC: 5 MMOL/L
POTASSIUM SERPL-SCNC: 5.1 MMOL/L
POTASSIUM SERPL-SCNC: 5.2 MMOL/L
POTASSIUM SERPL-SCNC: 5.3 MMOL/L
POTASSIUM SERPL-SCNC: 5.3 MMOL/L
POTASSIUM SERPL-SCNC: 5.4 MMOL/L
POTASSIUM SERPL-SCNC: 5.5 MMOL/L
POTASSIUM SERPL-SCNC: 5.6 MMOL/L
PROCALCITONIN SERPL IA-MCNC: 1.09 NG/ML
PROCALCITONIN SERPL IA-MCNC: 1.49 NG/ML
PROT SERPL-MCNC: 5 G/DL
PROT SERPL-MCNC: 5.3 G/DL
PROT SERPL-MCNC: 5.4 G/DL
PROT SERPL-MCNC: 5.4 G/DL
PROT SERPL-MCNC: 5.5 G/DL
PROT SERPL-MCNC: 5.8 G/DL
PROT SERPL-MCNC: 5.8 G/DL
PROT SERPL-MCNC: 5.9 G/DL
PROT SERPL-MCNC: 5.9 G/DL
PROT SERPL-MCNC: 6 G/DL
PROT SERPL-MCNC: 6.1 G/DL
PROT SERPL-MCNC: 6.2 G/DL
PROT SERPL-MCNC: 6.3 G/DL
PROT SERPL-MCNC: 6.4 G/DL
PROT SERPL-MCNC: 6.5 G/DL
PROT SERPL-MCNC: 6.6 G/DL
PROT SERPL-MCNC: 6.6 G/DL
PROT SERPL-MCNC: 6.7 G/DL
PROT SERPL-MCNC: 6.7 G/DL
PROT SERPL-MCNC: 6.8 G/DL
PROT SERPL-MCNC: 6.9 G/DL
PROT SERPL-MCNC: 7 G/DL
PROT SERPL-MCNC: 7.3 G/DL
PROT UR QL STRIP: ABNORMAL
PROTHROMBIN TIME: 10.7 SEC
PROTHROMBIN TIME: 10.9 SEC
PROTHROMBIN TIME: 11.2 SEC
PROTHROMBIN TIME: 11.5 SEC
PROTHROMBIN TIME: 11.6 SEC
PROTHROMBIN TIME: 12 SEC
PROTHROMBIN TIME: 12.1 SEC
PROTHROMBIN TIME: 13 SEC
PROTHROMBIN TIME: 13.2 SEC
PROTHROMBIN TIME: 13.2 SEC
PROTHROMBIN TIME: 13.3 SEC
PROTHROMBIN TIME: 13.3 SEC
PROTHROMBIN TIME: 13.5 SEC
PROTHROMBIN TIME: 14 SEC
PROTHROMBIN TIME: 14.1 SEC
PROTHROMBIN TIME: 14.2 SEC
PROTHROMBIN TIME: 14.2 SEC
PROTHROMBIN TIME: 14.3 SEC
PROTHROMBIN TIME: 14.5 SEC
PROTHROMBIN TIME: 14.7 SEC
PROTHROMBIN TIME: 15.7 SEC
PROTHROMBIN TIME: 16 SEC
PROTHROMBIN TIME: 17.5 SEC
PROTHROMBIN TIME: 18.9 SEC
PROTHROMBIN TIME: 19.4 SEC
PROTHROMBIN TIME: 19.6 SEC
PROTHROMBIN TIME: 20 SEC
PROTHROMBIN TIME: 20.3 SEC
PROTHROMBIN TIME: 21.3 SEC
PROTHROMBIN TIME: 22.5 SEC
PROTHROMBIN TIME: 22.7 SEC
PROTHROMBIN TIME: 24.3 SEC
PROTHROMBIN TIME: 38.2 SEC
PROTHROMBIN TIME: 38.2 SEC
PROTHROMBIN TIME: 38.5 SEC
PROTHROMBIN TIME: 84.6 SEC
RBC # BLD AUTO: 1.62 M/UL
RBC # BLD AUTO: 2 M/UL
RBC # BLD AUTO: 2.08 M/UL
RBC # BLD AUTO: 2.18 M/UL
RBC # BLD AUTO: 2.21 M/UL
RBC # BLD AUTO: 2.31 M/UL
RBC # BLD AUTO: 2.31 M/UL
RBC # BLD AUTO: 2.46 M/UL
RBC # BLD AUTO: 2.51 M/UL
RBC # BLD AUTO: 2.59 M/UL
RBC # BLD AUTO: 2.63 M/UL
RBC # BLD AUTO: 2.64 M/UL
RBC # BLD AUTO: 2.67 M/UL
RBC # BLD AUTO: 2.68 M/UL
RBC # BLD AUTO: 2.68 M/UL
RBC # BLD AUTO: 2.72 M/UL
RBC # BLD AUTO: 2.74 M/UL
RBC # BLD AUTO: 2.75 M/UL
RBC # BLD AUTO: 2.75 M/UL
RBC # BLD AUTO: 2.77 M/UL
RBC # BLD AUTO: 2.78 M/UL
RBC # BLD AUTO: 2.78 M/UL
RBC # BLD AUTO: 2.79 M/UL
RBC # BLD AUTO: 2.8 M/UL
RBC # BLD AUTO: 2.81 M/UL
RBC # BLD AUTO: 2.81 M/UL
RBC # BLD AUTO: 2.84 M/UL
RBC # BLD AUTO: 2.85 M/UL
RBC # BLD AUTO: 2.88 M/UL
RBC # BLD AUTO: 2.9 M/UL
RBC # BLD AUTO: 2.91 M/UL
RBC # BLD AUTO: 2.92 M/UL
RBC # BLD AUTO: 2.92 M/UL
RBC # BLD AUTO: 2.93 M/UL
RBC # BLD AUTO: 2.94 M/UL
RBC # BLD AUTO: 2.95 M/UL
RBC # BLD AUTO: 2.96 M/UL
RBC # BLD AUTO: 2.99 M/UL
RBC # BLD AUTO: 2.99 M/UL
RBC # BLD AUTO: 3.05 M/UL
RBC # BLD AUTO: 3.05 M/UL
RBC # BLD AUTO: 3.06 M/UL
RBC # BLD AUTO: 3.11 M/UL
RBC # BLD AUTO: 3.22 M/UL
RBC #/AREA URNS AUTO: 20 /HPF (ref 0–4)
RBC #/AREA URNS AUTO: 37 /HPF (ref 0–4)
RBC #/AREA URNS AUTO: 42 /HPF (ref 0–4)
RETIRED EF AND QEF - SEE NOTES: 55 (ref 55–65)
SAMPLE: ABNORMAL
SITE: ABNORMAL
SODIUM BLD-SCNC: 138 MMOL/L (ref 136–145)
SODIUM BLD-SCNC: 138 MMOL/L (ref 136–145)
SODIUM BLD-SCNC: 139 MMOL/L (ref 136–145)
SODIUM SERPL-SCNC: 135 MMOL/L
SODIUM SERPL-SCNC: 135 MMOL/L
SODIUM SERPL-SCNC: 136 MMOL/L
SODIUM SERPL-SCNC: 137 MMOL/L
SODIUM SERPL-SCNC: 138 MMOL/L
SODIUM SERPL-SCNC: 139 MMOL/L
SODIUM SERPL-SCNC: 140 MMOL/L
SODIUM SERPL-SCNC: 141 MMOL/L
SODIUM SERPL-SCNC: 142 MMOL/L
SODIUM SERPL-SCNC: 143 MMOL/L
SODIUM SERPL-SCNC: 144 MMOL/L
SP GR UR STRIP: 1.01 (ref 1–1.03)
SP GR UR STRIP: 1.02 (ref 1–1.03)
SP GR UR STRIP: 1.02 (ref 1–1.03)
SP02: 100
SQUAMOUS #/AREA URNS AUTO: 1 /HPF
SQUAMOUS #/AREA URNS AUTO: 7 /HPF
TRANS ERYTHROCYTES VOL PATIENT: NORMAL ML
TRANS PLATPHERESIS VOL PATIENT: NORMAL ML
TRICUSPID VALVE REGURGITATION: ABNORMAL
TRIGL SERPL-MCNC: 110 MG/DL
TRIGL SERPL-MCNC: 229 MG/DL
TROPONIN I SERPL DL<=0.01 NG/ML-MCNC: 0.38 NG/ML
TROPONIN I SERPL DL<=0.01 NG/ML-MCNC: 0.39 NG/ML
TROPONIN I SERPL DL<=0.01 NG/ML-MCNC: 0.4 NG/ML
TROPONIN I SERPL DL<=0.01 NG/ML-MCNC: 0.4 NG/ML
TROPONIN I SERPL DL<=0.01 NG/ML-MCNC: 0.41 NG/ML
TROPONIN I SERPL DL<=0.01 NG/ML-MCNC: 0.92 NG/ML
TROPONIN I SERPL DL<=0.01 NG/ML-MCNC: 1.16 NG/ML
TROPONIN I SERPL DL<=0.01 NG/ML-MCNC: 1.23 NG/ML
TSH SERPL DL<=0.005 MIU/L-ACNC: 0.99 UIU/ML
URN SPEC COLLECT METH UR: ABNORMAL
UROBILINOGEN UR STRIP-ACNC: NEGATIVE EU/DL
VANCOMYCIN SERPL-MCNC: 10.6 UG/ML
VANCOMYCIN SERPL-MCNC: 12.9 UG/ML
VANCOMYCIN SERPL-MCNC: 14.6 UG/ML
VANCOMYCIN SERPL-MCNC: 7.9 UG/ML
VANCOMYCIN SERPL-MCNC: 8.2 UG/ML
VANCOMYCIN SERPL-MCNC: 9.4 UG/ML
VANCOMYCIN SERPL-MCNC: 9.9 UG/ML
WBC # BLD AUTO: 10.09 K/UL
WBC # BLD AUTO: 10.35 K/UL
WBC # BLD AUTO: 10.59 K/UL
WBC # BLD AUTO: 10.92 K/UL
WBC # BLD AUTO: 10.97 K/UL
WBC # BLD AUTO: 11.94 K/UL
WBC # BLD AUTO: 11.96 K/UL
WBC # BLD AUTO: 12.68 K/UL
WBC # BLD AUTO: 12.85 K/UL
WBC # BLD AUTO: 12.89 K/UL
WBC # BLD AUTO: 13 K/UL
WBC # BLD AUTO: 13 K/UL
WBC # BLD AUTO: 13.15 K/UL
WBC # BLD AUTO: 13.73 K/UL
WBC # BLD AUTO: 14.19 K/UL
WBC # BLD AUTO: 14.61 K/UL
WBC # BLD AUTO: 14.61 K/UL
WBC # BLD AUTO: 14.96 K/UL
WBC # BLD AUTO: 15.08 K/UL
WBC # BLD AUTO: 15.29 K/UL
WBC # BLD AUTO: 15.62 K/UL
WBC # BLD AUTO: 16.42 K/UL
WBC # BLD AUTO: 16.6 K/UL
WBC # BLD AUTO: 16.68 K/UL
WBC # BLD AUTO: 17.07 K/UL
WBC # BLD AUTO: 17.21 K/UL
WBC # BLD AUTO: 19.57 K/UL
WBC # BLD AUTO: 21 K/UL
WBC # BLD AUTO: 6.23 K/UL
WBC # BLD AUTO: 6.79 K/UL
WBC # BLD AUTO: 6.86 K/UL
WBC # BLD AUTO: 7.11 K/UL
WBC # BLD AUTO: 7.11 K/UL
WBC # BLD AUTO: 7.26 K/UL
WBC # BLD AUTO: 7.28 K/UL
WBC # BLD AUTO: 7.51 K/UL
WBC # BLD AUTO: 8.32 K/UL
WBC # BLD AUTO: 8.96 K/UL
WBC # BLD AUTO: 9.06 K/UL
WBC # BLD AUTO: 9.12 K/UL
WBC # BLD AUTO: 9.14 K/UL
WBC # BLD AUTO: 9.19 K/UL
WBC # BLD AUTO: 9.36 K/UL
WBC # BLD AUTO: 9.39 K/UL
WBC # BLD AUTO: 9.68 K/UL
WBC # BLD AUTO: 9.82 K/UL
WBC #/AREA URNS AUTO: >100 /HPF (ref 0–5)
WBC CLUMPS UR QL AUTO: ABNORMAL
YEAST UR QL AUTO: ABNORMAL

## 2017-01-01 PROCEDURE — 63600175 PHARM REV CODE 636 W HCPCS: Performed by: STUDENT IN AN ORGANIZED HEALTH CARE EDUCATION/TRAINING PROGRAM

## 2017-01-01 PROCEDURE — 86850 RBC ANTIBODY SCREEN: CPT

## 2017-01-01 PROCEDURE — 25000003 PHARM REV CODE 250: Performed by: NURSE PRACTITIONER

## 2017-01-01 PROCEDURE — 85025 COMPLETE CBC W/AUTO DIFF WBC: CPT

## 2017-01-01 PROCEDURE — 25000003 PHARM REV CODE 250: Performed by: PHYSICIAN ASSISTANT

## 2017-01-01 PROCEDURE — 99214 OFFICE O/P EST MOD 30 MIN: CPT | Mod: S$PBB,,, | Performed by: NURSE PRACTITIONER

## 2017-01-01 PROCEDURE — 94640 AIRWAY INHALATION TREATMENT: CPT

## 2017-01-01 PROCEDURE — 85610 PROTHROMBIN TIME: CPT

## 2017-01-01 PROCEDURE — 25000003 PHARM REV CODE 250: Performed by: INTERNAL MEDICINE

## 2017-01-01 PROCEDURE — 80100016 HC MAINTENANCE HEMODIALYSIS

## 2017-01-01 PROCEDURE — 92526 ORAL FUNCTION THERAPY: CPT

## 2017-01-01 PROCEDURE — 63600175 PHARM REV CODE 636 W HCPCS: Performed by: HOSPITALIST

## 2017-01-01 PROCEDURE — 85610 PROTHROMBIN TIME: CPT | Mod: 91

## 2017-01-01 PROCEDURE — 99222 1ST HOSP IP/OBS MODERATE 55: CPT | Mod: AI,GC,, | Performed by: HOSPITALIST

## 2017-01-01 PROCEDURE — 25000003 PHARM REV CODE 250: Performed by: HOSPITALIST

## 2017-01-01 PROCEDURE — 36600 WITHDRAWAL OF ARTERIAL BLOOD: CPT

## 2017-01-01 PROCEDURE — 80061 LIPID PANEL: CPT

## 2017-01-01 PROCEDURE — P9035 PLATELET PHERES LEUKOREDUCED: HCPCS

## 2017-01-01 PROCEDURE — G8998 SWALLOW D/C STATUS: HCPCS | Mod: CL

## 2017-01-01 PROCEDURE — 97535 SELF CARE MNGMENT TRAINING: CPT

## 2017-01-01 PROCEDURE — 99221 1ST HOSP IP/OBS SF/LOW 40: CPT | Mod: ,,, | Performed by: PHYSICIAN ASSISTANT

## 2017-01-01 PROCEDURE — 25000003 PHARM REV CODE 250: Performed by: STUDENT IN AN ORGANIZED HEALTH CARE EDUCATION/TRAINING PROGRAM

## 2017-01-01 PROCEDURE — 11000001 HC ACUTE MED/SURG PRIVATE ROOM

## 2017-01-01 PROCEDURE — P9017 PLASMA 1 DONOR FRZ W/IN 8 HR: HCPCS

## 2017-01-01 PROCEDURE — 63600175 PHARM REV CODE 636 W HCPCS: Performed by: ANESTHESIOLOGY

## 2017-01-01 PROCEDURE — 83880 ASSAY OF NATRIURETIC PEPTIDE: CPT

## 2017-01-01 PROCEDURE — 94668 MNPJ CHEST WALL SBSQ: CPT

## 2017-01-01 PROCEDURE — 94761 N-INVAS EAR/PLS OXIMETRY MLT: CPT

## 2017-01-01 PROCEDURE — 97530 THERAPEUTIC ACTIVITIES: CPT

## 2017-01-01 PROCEDURE — 20600001 HC STEP DOWN PRIVATE ROOM

## 2017-01-01 PROCEDURE — 80053 COMPREHEN METABOLIC PANEL: CPT

## 2017-01-01 PROCEDURE — D9220A PRA ANESTHESIA: Mod: CRNA,,, | Performed by: NURSE ANESTHETIST, CERTIFIED REGISTERED

## 2017-01-01 PROCEDURE — 25000003 PHARM REV CODE 250: Performed by: SURGERY

## 2017-01-01 PROCEDURE — 36415 COLL VENOUS BLD VENIPUNCTURE: CPT

## 2017-01-01 PROCEDURE — 99233 SBSQ HOSP IP/OBS HIGH 50: CPT | Mod: ,,, | Performed by: NURSE PRACTITIONER

## 2017-01-01 PROCEDURE — 84100 ASSAY OF PHOSPHORUS: CPT

## 2017-01-01 PROCEDURE — C9290 INJ, BUPIVACAINE LIPOSOME: HCPCS | Performed by: NEUROLOGICAL SURGERY

## 2017-01-01 PROCEDURE — P9047 ALBUMIN (HUMAN), 25%, 50ML: HCPCS | Performed by: NURSE PRACTITIONER

## 2017-01-01 PROCEDURE — 25000003 PHARM REV CODE 250: Performed by: NURSE ANESTHETIST, CERTIFIED REGISTERED

## 2017-01-01 PROCEDURE — 99233 SBSQ HOSP IP/OBS HIGH 50: CPT | Mod: ,,, | Performed by: HOSPITALIST

## 2017-01-01 PROCEDURE — 99233 SBSQ HOSP IP/OBS HIGH 50: CPT | Mod: ,,, | Performed by: PHYSICIAN ASSISTANT

## 2017-01-01 PROCEDURE — 99223 1ST HOSP IP/OBS HIGH 75: CPT | Mod: ,,, | Performed by: PHYSICIAN ASSISTANT

## 2017-01-01 PROCEDURE — 86920 COMPATIBILITY TEST SPIN: CPT

## 2017-01-01 PROCEDURE — 99024 POSTOP FOLLOW-UP VISIT: CPT | Mod: GC,,, | Performed by: NEUROLOGICAL SURGERY

## 2017-01-01 PROCEDURE — 22633 ARTHRD CMBN 1NTRSPC LUMBAR: CPT | Mod: ,,, | Performed by: NEUROLOGICAL SURGERY

## 2017-01-01 PROCEDURE — 96372 THER/PROPH/DIAG INJ SC/IM: CPT

## 2017-01-01 PROCEDURE — 97110 THERAPEUTIC EXERCISES: CPT

## 2017-01-01 PROCEDURE — 99232 SBSQ HOSP IP/OBS MODERATE 35: CPT | Mod: GC,,, | Performed by: INTERNAL MEDICINE

## 2017-01-01 PROCEDURE — P9021 RED BLOOD CELLS UNIT: HCPCS

## 2017-01-01 PROCEDURE — 86985 SPLIT BLOOD OR PRODUCTS: CPT

## 2017-01-01 PROCEDURE — 37000009 HC ANESTHESIA EA ADD 15 MINS: Performed by: NEUROLOGICAL SURGERY

## 2017-01-01 PROCEDURE — D9220A PRA ANESTHESIA: Mod: ANES,,, | Performed by: ANESTHESIOLOGY

## 2017-01-01 PROCEDURE — 99900035 HC TECH TIME PER 15 MIN (STAT)

## 2017-01-01 PROCEDURE — 85730 THROMBOPLASTIN TIME PARTIAL: CPT

## 2017-01-01 PROCEDURE — 27800505 HC CATH, RADIAL ARTERY KIT: Performed by: ANESTHESIOLOGY

## 2017-01-01 PROCEDURE — 99285 EMERGENCY DEPT VISIT HI MDM: CPT | Mod: GC,,, | Performed by: EMERGENCY MEDICINE

## 2017-01-01 PROCEDURE — 83735 ASSAY OF MAGNESIUM: CPT

## 2017-01-01 PROCEDURE — 87015 SPECIMEN INFECT AGNT CONCNTJ: CPT

## 2017-01-01 PROCEDURE — 90935 HEMODIALYSIS ONE EVALUATION: CPT | Mod: ,,, | Performed by: NURSE PRACTITIONER

## 2017-01-01 PROCEDURE — 86900 BLOOD TYPING SEROLOGIC ABO: CPT

## 2017-01-01 PROCEDURE — 85025 COMPLETE CBC W/AUTO DIFF WBC: CPT | Mod: 91

## 2017-01-01 PROCEDURE — 99231 SBSQ HOSP IP/OBS SF/LOW 25: CPT | Mod: GC,,, | Performed by: INTERNAL MEDICINE

## 2017-01-01 PROCEDURE — 99024 POSTOP FOLLOW-UP VISIT: CPT | Mod: ,,, | Performed by: PHYSICIAN ASSISTANT

## 2017-01-01 PROCEDURE — 99232 SBSQ HOSP IP/OBS MODERATE 35: CPT | Mod: ,,, | Performed by: PSYCHIATRY & NEUROLOGY

## 2017-01-01 PROCEDURE — 84460 ALANINE AMINO (ALT) (SGPT): CPT

## 2017-01-01 PROCEDURE — 93454 CORONARY ARTERY ANGIO S&I: CPT | Mod: 26,GC,, | Performed by: INTERNAL MEDICINE

## 2017-01-01 PROCEDURE — 87106 FUNGI IDENTIFICATION YEAST: CPT

## 2017-01-01 PROCEDURE — 93005 ELECTROCARDIOGRAM TRACING: CPT

## 2017-01-01 PROCEDURE — 97802 MEDICAL NUTRITION INDIV IN: CPT

## 2017-01-01 PROCEDURE — 99232 SBSQ HOSP IP/OBS MODERATE 35: CPT | Mod: ,,, | Performed by: HOSPITALIST

## 2017-01-01 PROCEDURE — 22015 I&D ABSCESS P-SPINE L/S/LS: CPT | Mod: 78,GC,, | Performed by: NEUROLOGICAL SURGERY

## 2017-01-01 PROCEDURE — 84484 ASSAY OF TROPONIN QUANT: CPT

## 2017-01-01 PROCEDURE — 63600175 PHARM REV CODE 636 W HCPCS: Performed by: NURSE PRACTITIONER

## 2017-01-01 PROCEDURE — 25000242 PHARM REV CODE 250 ALT 637 W/ HCPCS: Performed by: HOSPITALIST

## 2017-01-01 PROCEDURE — 80100014 HC HEMODIALYSIS 1:1

## 2017-01-01 PROCEDURE — 27000221 HC OXYGEN, UP TO 24 HOURS

## 2017-01-01 PROCEDURE — 25000003 PHARM REV CODE 250

## 2017-01-01 PROCEDURE — 20000000 HC ICU ROOM

## 2017-01-01 PROCEDURE — 87116 MYCOBACTERIA CULTURE: CPT | Mod: 59

## 2017-01-01 PROCEDURE — 83605 ASSAY OF LACTIC ACID: CPT

## 2017-01-01 PROCEDURE — 25000242 PHARM REV CODE 250 ALT 637 W/ HCPCS: Performed by: PHYSICIAN ASSISTANT

## 2017-01-01 PROCEDURE — 93010 ELECTROCARDIOGRAM REPORT: CPT | Mod: ,,, | Performed by: INTERNAL MEDICINE

## 2017-01-01 PROCEDURE — 0SG107J FUSION OF 2 OR MORE LUMBAR VERTEBRAL JOINTS WITH AUTOLOGOUS TISSUE SUBSTITUTE, POSTERIOR APPROACH, ANTERIOR COLUMN, OPEN APPROACH: ICD-10-PCS | Performed by: NEUROLOGICAL SURGERY

## 2017-01-01 PROCEDURE — 92523 SPEECH SOUND LANG COMPREHEN: CPT

## 2017-01-01 PROCEDURE — 80202 ASSAY OF VANCOMYCIN: CPT

## 2017-01-01 PROCEDURE — 12000002 HC ACUTE/MED SURGE SEMI-PRIVATE ROOM

## 2017-01-01 PROCEDURE — 27201423 OPTIME MED/SURG SUP & DEVICES STERILE SUPPLY: Performed by: NEUROLOGICAL SURGERY

## 2017-01-01 PROCEDURE — 27800903 OPTIME MED/SURG SUP & DEVICES OTHER IMPLANTS: Performed by: NEUROLOGICAL SURGERY

## 2017-01-01 PROCEDURE — 87075 CULTR BACTERIA EXCEPT BLOOD: CPT | Mod: 59

## 2017-01-01 PROCEDURE — C1894 INTRO/SHEATH, NON-LASER: HCPCS

## 2017-01-01 PROCEDURE — 80069 RENAL FUNCTION PANEL: CPT

## 2017-01-01 PROCEDURE — 22842 INSERT SPINE FIXATION DEVICE: CPT | Mod: ,,, | Performed by: NEUROLOGICAL SURGERY

## 2017-01-01 PROCEDURE — 0ST20ZZ RESECTION OF LUMBAR VERTEBRAL DISC, OPEN APPROACH: ICD-10-PCS | Performed by: NEUROLOGICAL SURGERY

## 2017-01-01 PROCEDURE — B2111ZZ FLUOROSCOPY OF MULTIPLE CORONARY ARTERIES USING LOW OSMOLAR CONTRAST: ICD-10-PCS | Performed by: INTERNAL MEDICINE

## 2017-01-01 PROCEDURE — 85520 HEPARIN ASSAY: CPT | Mod: 91

## 2017-01-01 PROCEDURE — 63600175 PHARM REV CODE 636 W HCPCS: Performed by: INTERNAL MEDICINE

## 2017-01-01 PROCEDURE — 0SG00AJ FUSION OF LUMBAR VERTEBRAL JOINT WITH INTERBODY FUSION DEVICE, POSTERIOR APPROACH, ANTERIOR COLUMN, OPEN APPROACH: ICD-10-PCS | Performed by: NEUROLOGICAL SURGERY

## 2017-01-01 PROCEDURE — 63600175 PHARM REV CODE 636 W HCPCS: Performed by: NURSE ANESTHETIST, CERTIFIED REGISTERED

## 2017-01-01 PROCEDURE — 92610 EVALUATE SWALLOWING FUNCTION: CPT

## 2017-01-01 PROCEDURE — 87040 BLOOD CULTURE FOR BACTERIA: CPT

## 2017-01-01 PROCEDURE — 63600175 PHARM REV CODE 636 W HCPCS

## 2017-01-01 PROCEDURE — 85018 HEMOGLOBIN: CPT

## 2017-01-01 PROCEDURE — 01NB0ZZ RELEASE LUMBAR NERVE, OPEN APPROACH: ICD-10-PCS | Performed by: NEUROLOGICAL SURGERY

## 2017-01-01 PROCEDURE — 94667 MNPJ CHEST WALL 1ST: CPT

## 2017-01-01 PROCEDURE — 93005 ELECTROCARDIOGRAM TRACING: CPT | Mod: PBBFAC,PO | Performed by: INTERNAL MEDICINE

## 2017-01-01 PROCEDURE — 36430 TRANSFUSION BLD/BLD COMPNT: CPT

## 2017-01-01 PROCEDURE — P9011 BLOOD SPLIT UNIT: HCPCS

## 2017-01-01 PROCEDURE — 97165 OT EVAL LOW COMPLEX 30 MIN: CPT

## 2017-01-01 PROCEDURE — 99238 HOSP IP/OBS DSCHRG MGMT 30/<: CPT | Mod: ,,, | Performed by: HOSPITALIST

## 2017-01-01 PROCEDURE — 36556 INSERT NON-TUNNEL CV CATH: CPT

## 2017-01-01 PROCEDURE — 99233 SBSQ HOSP IP/OBS HIGH 50: CPT | Mod: ,,, | Performed by: PSYCHIATRY & NEUROLOGY

## 2017-01-01 PROCEDURE — 82550 ASSAY OF CK (CPK): CPT

## 2017-01-01 PROCEDURE — 80053 COMPREHEN METABOLIC PANEL: CPT | Mod: 91

## 2017-01-01 PROCEDURE — 99233 SBSQ HOSP IP/OBS HIGH 50: CPT | Mod: ,,, | Performed by: INTERNAL MEDICINE

## 2017-01-01 PROCEDURE — 87086 URINE CULTURE/COLONY COUNT: CPT

## 2017-01-01 PROCEDURE — 86901 BLOOD TYPING SEROLOGIC RH(D): CPT

## 2017-01-01 PROCEDURE — 99232 SBSQ HOSP IP/OBS MODERATE 35: CPT | Mod: ,,, | Performed by: PHYSICIAN ASSISTANT

## 2017-01-01 PROCEDURE — 85520 HEPARIN ASSAY: CPT

## 2017-01-01 PROCEDURE — 83615 LACTATE (LD) (LDH) ENZYME: CPT

## 2017-01-01 PROCEDURE — 84145 PROCALCITONIN (PCT): CPT

## 2017-01-01 PROCEDURE — 25000003 PHARM REV CODE 250: Performed by: ANESTHESIOLOGY

## 2017-01-01 PROCEDURE — 94660 CPAP INITIATION&MGMT: CPT

## 2017-01-01 PROCEDURE — 71000033 HC RECOVERY, INTIAL HOUR: Performed by: NEUROLOGICAL SURGERY

## 2017-01-01 PROCEDURE — 63600175 PHARM REV CODE 636 W HCPCS: Mod: JW | Performed by: HOSPITALIST

## 2017-01-01 PROCEDURE — 87103 BLOOD FUNGUS CULTURE: CPT

## 2017-01-01 PROCEDURE — 85014 HEMATOCRIT: CPT

## 2017-01-01 PROCEDURE — 99232 SBSQ HOSP IP/OBS MODERATE 35: CPT | Mod: GC,,, | Performed by: HOSPITALIST

## 2017-01-01 PROCEDURE — 27000173 HC ACAPELLA DEVICE DH OR DM

## 2017-01-01 PROCEDURE — 90935 HEMODIALYSIS ONE EVALUATION: CPT | Mod: ,,, | Performed by: INTERNAL MEDICINE

## 2017-01-01 PROCEDURE — 37000008 HC ANESTHESIA 1ST 15 MINUTES: Performed by: NEUROLOGICAL SURGERY

## 2017-01-01 PROCEDURE — 82962 GLUCOSE BLOOD TEST: CPT | Performed by: NEUROLOGICAL SURGERY

## 2017-01-01 PROCEDURE — 99222 1ST HOSP IP/OBS MODERATE 55: CPT | Mod: ,,, | Performed by: INTERNAL MEDICINE

## 2017-01-01 PROCEDURE — 94664 DEMO&/EVAL PT USE INHALER: CPT

## 2017-01-01 PROCEDURE — 71000039 HC RECOVERY, EACH ADD'L HOUR: Performed by: NEUROLOGICAL SURGERY

## 2017-01-01 PROCEDURE — 84132 ASSAY OF SERUM POTASSIUM: CPT

## 2017-01-01 PROCEDURE — 97116 GAIT TRAINING THERAPY: CPT

## 2017-01-01 PROCEDURE — G8997 SWALLOW GOAL STATUS: HCPCS | Mod: CK

## 2017-01-01 PROCEDURE — 82140 ASSAY OF AMMONIA: CPT

## 2017-01-01 PROCEDURE — 80074 ACUTE HEPATITIS PANEL: CPT

## 2017-01-01 PROCEDURE — 99213 OFFICE O/P EST LOW 20 MIN: CPT | Mod: PBBFAC,PO,25 | Performed by: NURSE PRACTITIONER

## 2017-01-01 PROCEDURE — 87088 URINE BACTERIA CULTURE: CPT

## 2017-01-01 PROCEDURE — 99232 SBSQ HOSP IP/OBS MODERATE 35: CPT | Mod: ,,, | Performed by: INTERNAL MEDICINE

## 2017-01-01 PROCEDURE — 3008F BODY MASS INDEX DOCD: CPT | Mod: ,,, | Performed by: NURSE PRACTITIONER

## 2017-01-01 PROCEDURE — 93306 TTE W/DOPPLER COMPLETE: CPT | Mod: 26,,, | Performed by: INTERNAL MEDICINE

## 2017-01-01 PROCEDURE — 92611 MOTION FLUOROSCOPY/SWALLOW: CPT

## 2017-01-01 PROCEDURE — 96361 HYDRATE IV INFUSION ADD-ON: CPT

## 2017-01-01 PROCEDURE — 81001 URINALYSIS AUTO W/SCOPE: CPT

## 2017-01-01 PROCEDURE — 36000710: Performed by: NEUROLOGICAL SURGERY

## 2017-01-01 PROCEDURE — 1159F MED LIST DOCD IN RCRD: CPT | Mod: ,,, | Performed by: NURSE PRACTITIONER

## 2017-01-01 PROCEDURE — 20930 SP BONE ALGRFT MORSEL ADD-ON: CPT | Mod: ,,, | Performed by: NEUROLOGICAL SURGERY

## 2017-01-01 PROCEDURE — 63005 REMOVE SPINE LAMINA 1/2 LMBR: CPT | Mod: 59,,, | Performed by: NEUROLOGICAL SURGERY

## 2017-01-01 PROCEDURE — 87040 BLOOD CULTURE FOR BACTERIA: CPT | Mod: 59

## 2017-01-01 PROCEDURE — C1729 CATH, DRAINAGE: HCPCS | Performed by: NEUROLOGICAL SURGERY

## 2017-01-01 PROCEDURE — 82803 BLOOD GASES ANY COMBINATION: CPT

## 2017-01-01 PROCEDURE — 97164 PT RE-EVAL EST PLAN CARE: CPT

## 2017-01-01 PROCEDURE — 63600175 PHARM REV CODE 636 W HCPCS: Performed by: PHYSICIAN ASSISTANT

## 2017-01-01 PROCEDURE — 99152 MOD SED SAME PHYS/QHP 5/>YRS: CPT | Mod: GC,,, | Performed by: INTERNAL MEDICINE

## 2017-01-01 PROCEDURE — G0378 HOSPITAL OBSERVATION PER HR: HCPCS

## 2017-01-01 PROCEDURE — G8996 SWALLOW CURRENT STATUS: HCPCS | Mod: CL

## 2017-01-01 PROCEDURE — 76937 US GUIDE VASCULAR ACCESS: CPT

## 2017-01-01 PROCEDURE — 87205 SMEAR GRAM STAIN: CPT

## 2017-01-01 PROCEDURE — 99222 1ST HOSP IP/OBS MODERATE 55: CPT | Mod: GC,,, | Performed by: INTERNAL MEDICINE

## 2017-01-01 PROCEDURE — 87070 CULTURE OTHR SPECIMN AEROBIC: CPT | Mod: 59

## 2017-01-01 PROCEDURE — 80048 BASIC METABOLIC PNL TOTAL CA: CPT

## 2017-01-01 PROCEDURE — 27000190 HC CPAP FULL FACE MASK W/VALVE

## 2017-01-01 PROCEDURE — 82553 CREATINE MB FRACTION: CPT

## 2017-01-01 PROCEDURE — 99024 POSTOP FOLLOW-UP VISIT: CPT | Mod: ,,, | Performed by: NEUROLOGICAL SURGERY

## 2017-01-01 PROCEDURE — 25000003 PHARM REV CODE 250: Performed by: NEUROLOGICAL SURGERY

## 2017-01-01 PROCEDURE — G8997 SWALLOW GOAL STATUS: HCPCS | Mod: CI

## 2017-01-01 PROCEDURE — 85651 RBC SED RATE NONAUTOMATED: CPT

## 2017-01-01 PROCEDURE — 90935 HEMODIALYSIS ONE EVALUATION: CPT

## 2017-01-01 PROCEDURE — 36000711: Performed by: NEUROLOGICAL SURGERY

## 2017-01-01 PROCEDURE — 63600175 PHARM REV CODE 636 W HCPCS: Performed by: SURGERY

## 2017-01-01 PROCEDURE — 90945 DIALYSIS ONE EVALUATION: CPT | Mod: ,,, | Performed by: INTERNAL MEDICINE

## 2017-01-01 PROCEDURE — 86141 C-REACTIVE PROTEIN HS: CPT

## 2017-01-01 PROCEDURE — C1751 CATH, INF, PER/CENT/MIDLINE: HCPCS

## 2017-01-01 PROCEDURE — 96360 HYDRATION IV INFUSION INIT: CPT

## 2017-01-01 PROCEDURE — 00BY0ZZ EXCISION OF LUMBAR SPINAL CORD, OPEN APPROACH: ICD-10-PCS | Performed by: NEUROLOGICAL SURGERY

## 2017-01-01 PROCEDURE — 36000707: Performed by: NEUROLOGICAL SURGERY

## 2017-01-01 PROCEDURE — 99223 1ST HOSP IP/OBS HIGH 75: CPT | Mod: 25,,, | Performed by: INTERNAL MEDICINE

## 2017-01-01 PROCEDURE — 97162 PT EVAL MOD COMPLEX 30 MIN: CPT

## 2017-01-01 PROCEDURE — 20936 SP BONE AGRFT LOCAL ADD-ON: CPT | Mod: ,,, | Performed by: NEUROLOGICAL SURGERY

## 2017-01-01 PROCEDURE — 85027 COMPLETE CBC AUTOMATED: CPT

## 2017-01-01 PROCEDURE — 99152 MOD SED SAME PHYS/QHP 5/>YRS: CPT

## 2017-01-01 PROCEDURE — C1713 ANCHOR/SCREW BN/BN,TIS/BN: HCPCS | Performed by: NEUROLOGICAL SURGERY

## 2017-01-01 PROCEDURE — 94799 UNLISTED PULMONARY SVC/PX: CPT

## 2017-01-01 PROCEDURE — 63600175 PHARM REV CODE 636 W HCPCS: Performed by: NEUROLOGICAL SURGERY

## 2017-01-01 PROCEDURE — 00CT0ZZ EXTIRPATION OF MATTER FROM SPINAL MENINGES, OPEN APPROACH: ICD-10-PCS | Performed by: NEUROLOGICAL SURGERY

## 2017-01-01 PROCEDURE — P9047 ALBUMIN (HUMAN), 25%, 50ML: HCPCS | Performed by: HOSPITALIST

## 2017-01-01 PROCEDURE — 83036 HEMOGLOBIN GLYCOSYLATED A1C: CPT

## 2017-01-01 PROCEDURE — 87070 CULTURE OTHR SPECIMN AEROBIC: CPT

## 2017-01-01 PROCEDURE — 93306 TTE W/DOPPLER COMPLETE: CPT

## 2017-01-01 PROCEDURE — 99223 1ST HOSP IP/OBS HIGH 75: CPT | Mod: ,,, | Performed by: NURSE PRACTITIONER

## 2017-01-01 PROCEDURE — 99223 1ST HOSP IP/OBS HIGH 75: CPT | Mod: GC,,, | Performed by: INTERNAL MEDICINE

## 2017-01-01 PROCEDURE — 99285 EMERGENCY DEPT VISIT HI MDM: CPT | Mod: 25

## 2017-01-01 PROCEDURE — 22614 ARTHRD PST TQ 1NTRSPC EA ADD: CPT | Mod: ,,, | Performed by: NEUROLOGICAL SURGERY

## 2017-01-01 PROCEDURE — 84443 ASSAY THYROID STIM HORMONE: CPT

## 2017-01-01 PROCEDURE — 36620 INSERTION CATHETER ARTERY: CPT | Mod: 59,GC,, | Performed by: ANESTHESIOLOGY

## 2017-01-01 PROCEDURE — 84450 TRANSFERASE (AST) (SGOT): CPT

## 2017-01-01 PROCEDURE — 36000706: Performed by: NEUROLOGICAL SURGERY

## 2017-01-01 PROCEDURE — 99231 SBSQ HOSP IP/OBS SF/LOW 25: CPT | Mod: ,,, | Performed by: HOSPITALIST

## 2017-01-01 PROCEDURE — 97803 MED NUTRITION INDIV SUBSEQ: CPT

## 2017-01-01 PROCEDURE — 99233 SBSQ HOSP IP/OBS HIGH 50: CPT | Mod: GC,,, | Performed by: INTERNAL MEDICINE

## 2017-01-01 PROCEDURE — 99291 CRITICAL CARE FIRST HOUR: CPT | Mod: 25

## 2017-01-01 PROCEDURE — 25500020 PHARM REV CODE 255: Performed by: HOSPITALIST

## 2017-01-01 PROCEDURE — 87205 SMEAR GRAM STAIN: CPT | Mod: 59

## 2017-01-01 PROCEDURE — 36569 INSJ PICC 5 YR+ W/O IMAGING: CPT

## 2017-01-01 PROCEDURE — 82977 ASSAY OF GGT: CPT

## 2017-01-01 PROCEDURE — 97112 NEUROMUSCULAR REEDUCATION: CPT

## 2017-01-01 PROCEDURE — 87102 FUNGUS ISOLATION CULTURE: CPT | Mod: 59

## 2017-01-01 PROCEDURE — 84132 ASSAY OF SERUM POTASSIUM: CPT | Mod: 91

## 2017-01-01 PROCEDURE — 1125F AMNT PAIN NOTED PAIN PRSNT: CPT | Mod: ,,, | Performed by: NURSE PRACTITIONER

## 2017-01-01 PROCEDURE — 99999 PR PBB SHADOW E&M-EST. PATIENT-LVL III: CPT | Mod: PBBFAC,,, | Performed by: NURSE PRACTITIONER

## 2017-01-01 PROCEDURE — 27100025 HC TUBING, SET FLUID WARMER: Performed by: ANESTHESIOLOGY

## 2017-01-01 PROCEDURE — 84484 ASSAY OF TROPONIN QUANT: CPT | Mod: 91

## 2017-01-01 PROCEDURE — 92507 TX SP LANG VOICE COMM INDIV: CPT

## 2017-01-01 PROCEDURE — 22853 INSJ BIOMECHANICAL DEVICE: CPT | Mod: ,,, | Performed by: NEUROLOGICAL SURGERY

## 2017-01-01 PROCEDURE — 97161 PT EVAL LOW COMPLEX 20 MIN: CPT

## 2017-01-01 PROCEDURE — G8996 SWALLOW CURRENT STATUS: HCPCS | Mod: CJ

## 2017-01-01 PROCEDURE — 99220 PR INITIAL OBSERVATION CARE,LEVL III: CPT | Mod: ,,, | Performed by: PHYSICIAN ASSISTANT

## 2017-01-01 DEVICE — BONE 30CC CANCELLOUS CRUSHED: Type: IMPLANTABLE DEVICE | Site: SPINE LUMBAR | Status: FUNCTIONAL

## 2017-01-01 DEVICE — SCREW BONE SPINAL CREO 6.5X50: Type: IMPLANTABLE DEVICE | Site: BACK | Status: FUNCTIONAL

## 2017-01-01 DEVICE — CAP SPINAL LOCK THRD CREO 5.5: Type: IMPLANTABLE DEVICE | Site: BACK | Status: FUNCTIONAL

## 2017-01-01 DEVICE — IMPLANTABLE DEVICE: Type: IMPLANTABLE DEVICE | Site: BACK | Status: FUNCTIONAL

## 2017-01-01 DEVICE — PUTTY DBX 10CC: Type: IMPLANTABLE DEVICE | Site: SPINE LUMBAR | Status: FUNCTIONAL

## 2017-01-01 RX ORDER — SODIUM CHLORIDE 9 MG/ML
INJECTION, SOLUTION INTRAVENOUS
Status: DISCONTINUED | OUTPATIENT
Start: 2017-01-01 | End: 2017-01-01 | Stop reason: HOSPADM

## 2017-01-01 RX ORDER — NAPROXEN SODIUM 220 MG/1
81 TABLET, FILM COATED ORAL DAILY
Status: DISCONTINUED | OUTPATIENT
Start: 2017-01-01 | End: 2017-01-01

## 2017-01-01 RX ORDER — ACETAMINOPHEN 325 MG/1
650 TABLET ORAL EVERY 8 HOURS PRN
Status: DISCONTINUED | OUTPATIENT
Start: 2017-01-01 | End: 2017-01-01 | Stop reason: HOSPADM

## 2017-01-01 RX ORDER — MIDODRINE HYDROCHLORIDE 5 MG/1
10 TABLET ORAL ONCE
Status: DISCONTINUED | OUTPATIENT
Start: 2017-01-01 | End: 2017-01-01

## 2017-01-01 RX ORDER — MIDODRINE HYDROCHLORIDE 5 MG/1
10 TABLET ORAL 3 TIMES DAILY
Status: DISCONTINUED | OUTPATIENT
Start: 2017-01-01 | End: 2017-01-01 | Stop reason: HOSPADM

## 2017-01-01 RX ORDER — CYCLOBENZAPRINE HCL 5 MG
5 TABLET ORAL 3 TIMES DAILY PRN
Status: DISCONTINUED | OUTPATIENT
Start: 2017-01-01 | End: 2017-01-01 | Stop reason: HOSPADM

## 2017-01-01 RX ORDER — FENTANYL CITRATE 50 UG/ML
25 INJECTION, SOLUTION INTRAMUSCULAR; INTRAVENOUS EVERY 30 MIN PRN
Status: DISCONTINUED | OUTPATIENT
Start: 2017-01-01 | End: 2017-01-01 | Stop reason: HOSPADM

## 2017-01-01 RX ORDER — SODIUM CHLORIDE 9 MG/ML
INJECTION, SOLUTION INTRAVENOUS CONTINUOUS
Status: DISCONTINUED | OUTPATIENT
Start: 2017-01-01 | End: 2017-01-01

## 2017-01-01 RX ORDER — CEFAZOLIN SODIUM 1 G/3ML
INJECTION, POWDER, FOR SOLUTION INTRAMUSCULAR; INTRAVENOUS
Status: DISCONTINUED | OUTPATIENT
Start: 2017-01-01 | End: 2017-01-01

## 2017-01-01 RX ORDER — SODIUM CHLORIDE 9 MG/ML
INJECTION, SOLUTION INTRAVENOUS CONTINUOUS PRN
Status: DISCONTINUED | OUTPATIENT
Start: 2017-01-01 | End: 2017-01-01

## 2017-01-01 RX ORDER — SODIUM CHLORIDE 9 MG/ML
INJECTION, SOLUTION INTRAVENOUS ONCE
Status: DISCONTINUED | OUTPATIENT
Start: 2017-01-01 | End: 2017-01-01

## 2017-01-01 RX ORDER — METOPROLOL TARTRATE 25 MG/1
25 TABLET, FILM COATED ORAL 2 TIMES DAILY
Status: DISCONTINUED | OUTPATIENT
Start: 2017-01-01 | End: 2017-01-01

## 2017-01-01 RX ORDER — INSULIN ASPART 100 [IU]/ML
0-5 INJECTION, SOLUTION INTRAVENOUS; SUBCUTANEOUS
Status: DISCONTINUED | OUTPATIENT
Start: 2017-01-01 | End: 2017-01-01

## 2017-01-01 RX ORDER — IBUPROFEN 200 MG
24 TABLET ORAL
Status: DISCONTINUED | OUTPATIENT
Start: 2017-01-01 | End: 2017-01-01 | Stop reason: HOSPADM

## 2017-01-01 RX ORDER — HEPARIN SODIUM,PORCINE/D5W 25000/250
17 INTRAVENOUS SOLUTION INTRAVENOUS CONTINUOUS
Status: DISCONTINUED | OUTPATIENT
Start: 2017-01-01 | End: 2017-01-01

## 2017-01-01 RX ORDER — SODIUM CHLORIDE 9 MG/ML
1000 INJECTION, SOLUTION INTRAVENOUS
Status: COMPLETED | OUTPATIENT
Start: 2017-01-01 | End: 2017-01-01

## 2017-01-01 RX ORDER — CYCLOBENZAPRINE HCL 5 MG
5 TABLET ORAL 3 TIMES DAILY
Status: DISCONTINUED | OUTPATIENT
Start: 2017-01-01 | End: 2017-01-01

## 2017-01-01 RX ORDER — ROSUVASTATIN CALCIUM 40 MG/1
TABLET, COATED ORAL
Qty: 30 TABLET | Refills: 6 | Status: ON HOLD | OUTPATIENT
Start: 2017-01-01 | End: 2017-01-01 | Stop reason: HOSPADM

## 2017-01-01 RX ORDER — FENTANYL 12.5 UG/1
1 PATCH TRANSDERMAL
Status: DISCONTINUED | OUTPATIENT
Start: 2017-01-01 | End: 2017-01-01

## 2017-01-01 RX ORDER — METOPROLOL TARTRATE 1 MG/ML
5 INJECTION, SOLUTION INTRAVENOUS ONCE
Status: COMPLETED | OUTPATIENT
Start: 2017-01-01 | End: 2017-01-01

## 2017-01-01 RX ORDER — ASPIRIN 81 MG/1
81 TABLET ORAL DAILY
COMMUNITY

## 2017-01-01 RX ORDER — ACETAMINOPHEN 160 MG/5ML
200 SUSPENSION, ORAL (FINAL DOSE FORM) ORAL DAILY
COMMUNITY

## 2017-01-01 RX ORDER — PHENYLEPHRINE HYDROCHLORIDE 10 MG/ML
INJECTION INTRAVENOUS
Status: DISCONTINUED | OUTPATIENT
Start: 2017-01-01 | End: 2017-01-01

## 2017-01-01 RX ORDER — ROCURONIUM BROMIDE 10 MG/ML
INJECTION, SOLUTION INTRAVENOUS
Status: DISCONTINUED | OUTPATIENT
Start: 2017-01-01 | End: 2017-01-01

## 2017-01-01 RX ORDER — SODIUM CHLORIDE 9 MG/ML
INJECTION, SOLUTION INTRAVENOUS
Status: DISCONTINUED | OUTPATIENT
Start: 2017-01-01 | End: 2017-01-01

## 2017-01-01 RX ORDER — SODIUM CHLORIDE 9 MG/ML
INJECTION, SOLUTION INTRAVENOUS ONCE
Status: DISCONTINUED | OUTPATIENT
Start: 2017-01-01 | End: 2017-01-01 | Stop reason: HOSPADM

## 2017-01-01 RX ORDER — SODIUM CHLORIDE 9 MG/ML
INJECTION, SOLUTION INTRAVENOUS ONCE
Status: COMPLETED | OUTPATIENT
Start: 2017-01-01 | End: 2017-01-01

## 2017-01-01 RX ORDER — WARFARIN 4 MG/1
TABLET ORAL
Qty: 35 TABLET | Refills: 3 | Status: ON HOLD | OUTPATIENT
Start: 2017-01-01 | End: 2017-01-01 | Stop reason: HOSPADM

## 2017-01-01 RX ORDER — GUAIFENESIN/DEXTROMETHORPHAN 100-10MG/5
10 SYRUP ORAL EVERY 6 HOURS
Status: DISCONTINUED | OUTPATIENT
Start: 2017-01-01 | End: 2017-01-01 | Stop reason: HOSPADM

## 2017-01-01 RX ORDER — RAMELTEON 8 MG/1
8 TABLET ORAL NIGHTLY PRN
Refills: 0
Start: 2017-01-01

## 2017-01-01 RX ORDER — FUROSEMIDE 10 MG/ML
INJECTION INTRAMUSCULAR; INTRAVENOUS
Status: DISCONTINUED | OUTPATIENT
Start: 2017-01-01 | End: 2017-01-01

## 2017-01-01 RX ORDER — POTASSIUM CHLORIDE 20 MEQ/1
40 TABLET, EXTENDED RELEASE ORAL DAILY
Status: COMPLETED | OUTPATIENT
Start: 2017-01-01 | End: 2017-01-01

## 2017-01-01 RX ORDER — RAMELTEON 8 MG/1
8 TABLET ORAL NIGHTLY PRN
Status: DISCONTINUED | OUTPATIENT
Start: 2017-01-01 | End: 2017-01-01 | Stop reason: HOSPADM

## 2017-01-01 RX ORDER — RAMELTEON 8 MG/1
8 TABLET ORAL NIGHTLY PRN
Status: DISCONTINUED | OUTPATIENT
Start: 2017-01-01 | End: 2017-01-01

## 2017-01-01 RX ORDER — PHENYLEPHRINE HYDROCHLORIDE 10 MG/ML
INJECTION INTRAVENOUS
Status: COMPLETED
Start: 2017-01-01 | End: 2017-01-01

## 2017-01-01 RX ORDER — FLUCONAZOLE 200 MG/1
200 TABLET ORAL DAILY
Status: DISCONTINUED | OUTPATIENT
Start: 2017-01-01 | End: 2017-01-01 | Stop reason: HOSPADM

## 2017-01-01 RX ORDER — FENTANYL 100 UG/H
PATCH TRANSDERMAL
Status: DISCONTINUED
Start: 2017-01-01 | End: 2017-01-01 | Stop reason: WASHOUT

## 2017-01-01 RX ORDER — PREGABALIN 25 MG/1
50 CAPSULE ORAL DAILY
Status: DISCONTINUED | OUTPATIENT
Start: 2017-01-01 | End: 2017-01-01 | Stop reason: HOSPADM

## 2017-01-01 RX ORDER — HYDROCODONE BITARTRATE AND ACETAMINOPHEN 500; 5 MG/1; MG/1
TABLET ORAL
Status: DISCONTINUED | OUTPATIENT
Start: 2017-01-01 | End: 2017-01-01

## 2017-01-01 RX ORDER — METOPROLOL TARTRATE 25 MG/1
25 TABLET, FILM COATED ORAL 3 TIMES DAILY
Status: DISCONTINUED | OUTPATIENT
Start: 2017-01-01 | End: 2017-01-01

## 2017-01-01 RX ORDER — MAG HYDROX/ALUMINUM HYD/SIMETH 200-200-20
30 SUSPENSION, ORAL (FINAL DOSE FORM) ORAL EVERY 4 HOURS PRN
Status: DISCONTINUED | OUTPATIENT
Start: 2017-01-01 | End: 2017-01-01

## 2017-01-01 RX ORDER — SUCCINYLCHOLINE CHLORIDE 20 MG/ML
INJECTION INTRAMUSCULAR; INTRAVENOUS
Status: DISCONTINUED | OUTPATIENT
Start: 2017-01-01 | End: 2017-01-01

## 2017-01-01 RX ORDER — ATORVASTATIN CALCIUM 20 MG/1
80 TABLET, FILM COATED ORAL DAILY
Status: DISCONTINUED | OUTPATIENT
Start: 2017-01-01 | End: 2017-01-01 | Stop reason: HOSPADM

## 2017-01-01 RX ORDER — LIDOCAINE 50 MG/G
2 PATCH TOPICAL
Status: DISCONTINUED | OUTPATIENT
Start: 2017-01-01 | End: 2017-01-01

## 2017-01-01 RX ORDER — GABAPENTIN 300 MG/1
600 CAPSULE ORAL NIGHTLY
Status: DISCONTINUED | OUTPATIENT
Start: 2017-01-01 | End: 2017-01-01

## 2017-01-01 RX ORDER — ONDANSETRON 4 MG/1
4 TABLET, FILM COATED ORAL EVERY 8 HOURS PRN
COMMUNITY

## 2017-01-01 RX ORDER — PROPOFOL 10 MG/ML
VIAL (ML) INTRAVENOUS
Status: DISCONTINUED | OUTPATIENT
Start: 2017-01-01 | End: 2017-01-01

## 2017-01-01 RX ORDER — METOPROLOL SUCCINATE 50 MG/1
50 TABLET, EXTENDED RELEASE ORAL DAILY
Status: DISCONTINUED | OUTPATIENT
Start: 2017-01-01 | End: 2017-01-01

## 2017-01-01 RX ORDER — GABAPENTIN 300 MG/1
300 CAPSULE ORAL NIGHTLY
Status: DISCONTINUED | OUTPATIENT
Start: 2017-01-01 | End: 2017-01-01

## 2017-01-01 RX ORDER — SERTRALINE HYDROCHLORIDE 25 MG/1
100 TABLET, FILM COATED ORAL NIGHTLY
Status: DISCONTINUED | OUTPATIENT
Start: 2017-01-01 | End: 2017-01-01 | Stop reason: HOSPADM

## 2017-01-01 RX ORDER — MUPIROCIN 20 MG/G
1 OINTMENT TOPICAL
Status: CANCELLED | OUTPATIENT
Start: 2017-01-01

## 2017-01-01 RX ORDER — BACITRACIN 50000 [IU]/1
INJECTION, POWDER, FOR SOLUTION INTRAMUSCULAR
Status: DISCONTINUED | OUTPATIENT
Start: 2017-01-01 | End: 2017-01-01 | Stop reason: HOSPADM

## 2017-01-01 RX ORDER — HYDROCODONE BITARTRATE AND ACETAMINOPHEN 10; 325 MG/1; MG/1
1 TABLET ORAL EVERY 6 HOURS PRN
Status: DISCONTINUED | OUTPATIENT
Start: 2017-01-01 | End: 2017-01-01

## 2017-01-01 RX ORDER — IPRATROPIUM BROMIDE AND ALBUTEROL SULFATE 2.5; .5 MG/3ML; MG/3ML
3 SOLUTION RESPIRATORY (INHALATION) EVERY 6 HOURS PRN
Status: DISCONTINUED | OUTPATIENT
Start: 2017-01-01 | End: 2017-01-01

## 2017-01-01 RX ORDER — MIDODRINE HYDROCHLORIDE 10 MG/1
10 TABLET ORAL 3 TIMES DAILY
Qty: 90 TABLET | Refills: 0
Start: 2017-01-01 | End: 2018-10-26

## 2017-01-01 RX ORDER — HYDROMORPHONE HYDROCHLORIDE 2 MG/ML
INJECTION, SOLUTION INTRAMUSCULAR; INTRAVENOUS; SUBCUTANEOUS
Status: DISCONTINUED | OUTPATIENT
Start: 2017-01-01 | End: 2017-01-01

## 2017-01-01 RX ORDER — SERTRALINE HYDROCHLORIDE 100 MG/1
100 TABLET, FILM COATED ORAL NIGHTLY
Status: DISCONTINUED | OUTPATIENT
Start: 2017-01-01 | End: 2017-01-01 | Stop reason: HOSPADM

## 2017-01-01 RX ORDER — ATORVASTATIN CALCIUM 80 MG/1
80 TABLET, FILM COATED ORAL DAILY
Qty: 90 TABLET | Refills: 0
Start: 2017-01-01 | End: 2018-10-27

## 2017-01-01 RX ORDER — METOPROLOL TARTRATE 1 MG/ML
5 INJECTION, SOLUTION INTRAVENOUS ONCE
Status: DISCONTINUED | OUTPATIENT
Start: 2017-01-01 | End: 2017-01-01

## 2017-01-01 RX ORDER — CISATRACURIUM BESYLATE 2 MG/ML
INJECTION, SOLUTION INTRAVENOUS
Status: DISPENSED
Start: 2017-01-01 | End: 2017-01-01

## 2017-01-01 RX ORDER — ALBUMIN HUMAN 250 G/1000ML
25 SOLUTION INTRAVENOUS CONTINUOUS PRN
Status: DISCONTINUED | OUTPATIENT
Start: 2017-01-01 | End: 2017-01-01

## 2017-01-01 RX ORDER — AMOXICILLIN 250 MG
1 CAPSULE ORAL 2 TIMES DAILY
COMMUNITY
Start: 2017-01-01

## 2017-01-01 RX ORDER — ALBUMIN HUMAN 250 G/1000ML
25 SOLUTION INTRAVENOUS ONCE
Status: DISCONTINUED | OUTPATIENT
Start: 2017-01-01 | End: 2017-01-01

## 2017-01-01 RX ORDER — ALBUMIN HUMAN 250 G/1000ML
25 SOLUTION INTRAVENOUS ONCE
Status: COMPLETED | OUTPATIENT
Start: 2017-01-01 | End: 2017-01-01

## 2017-01-01 RX ORDER — IBUPROFEN 200 MG
16 TABLET ORAL
Status: DISCONTINUED | OUTPATIENT
Start: 2017-01-01 | End: 2017-01-01 | Stop reason: HOSPADM

## 2017-01-01 RX ORDER — PANTOPRAZOLE SODIUM 40 MG/1
40 TABLET, DELAYED RELEASE ORAL
Status: COMPLETED | OUTPATIENT
Start: 2017-01-01 | End: 2017-01-01

## 2017-01-01 RX ORDER — MIDODRINE HYDROCHLORIDE 5 MG/1
10 TABLET ORAL CONTINUOUS PRN
Status: DISCONTINUED | OUTPATIENT
Start: 2017-01-01 | End: 2017-01-01 | Stop reason: HOSPADM

## 2017-01-01 RX ORDER — CEFAZOLIN SODIUM 2 G/50ML
2 SOLUTION INTRAVENOUS ONCE
Status: COMPLETED | OUTPATIENT
Start: 2017-01-01 | End: 2017-01-01

## 2017-01-01 RX ORDER — ACETAMINOPHEN 325 MG/1
650 TABLET ORAL EVERY 8 HOURS
Status: DISCONTINUED | OUTPATIENT
Start: 2017-01-01 | End: 2017-01-01

## 2017-01-01 RX ORDER — BISACODYL 10 MG
10 SUPPOSITORY, RECTAL RECTAL DAILY
Status: DISCONTINUED | OUTPATIENT
Start: 2017-01-01 | End: 2017-01-01

## 2017-01-01 RX ORDER — BISACODYL 10 MG
10 SUPPOSITORY, RECTAL RECTAL DAILY PRN
Status: DISCONTINUED | OUTPATIENT
Start: 2017-01-01 | End: 2017-01-01 | Stop reason: HOSPADM

## 2017-01-01 RX ORDER — AMITRIPTYLINE HYDROCHLORIDE 25 MG/1
25 TABLET, FILM COATED ORAL NIGHTLY PRN
COMMUNITY

## 2017-01-01 RX ORDER — NITROGLYCERIN 0.3 MG/1
0.3 TABLET SUBLINGUAL EVERY 5 MIN PRN
Status: DISCONTINUED | OUTPATIENT
Start: 2017-01-01 | End: 2017-01-01

## 2017-01-01 RX ORDER — ONDANSETRON 8 MG/1
8 TABLET, ORALLY DISINTEGRATING ORAL EVERY 6 HOURS PRN
Status: DISCONTINUED | OUTPATIENT
Start: 2017-01-01 | End: 2017-01-01

## 2017-01-01 RX ORDER — BUPIVACAINE HYDROCHLORIDE AND EPINEPHRINE 5; 5 MG/ML; UG/ML
INJECTION, SOLUTION EPIDURAL; INTRACAUDAL; PERINEURAL
Status: DISCONTINUED | OUTPATIENT
Start: 2017-01-01 | End: 2017-01-01 | Stop reason: HOSPADM

## 2017-01-01 RX ORDER — WARFARIN SODIUM 5 MG/1
10 TABLET ORAL
Status: DISCONTINUED | OUTPATIENT
Start: 2017-01-01 | End: 2017-01-01

## 2017-01-01 RX ORDER — CEFAZOLIN SODIUM 2 G/50ML
2 SOLUTION INTRAVENOUS
Status: DISCONTINUED | OUTPATIENT
Start: 2017-01-01 | End: 2017-01-01

## 2017-01-01 RX ORDER — WARFARIN 1 MG/1
4 TABLET ORAL DAILY
Status: DISCONTINUED | OUTPATIENT
Start: 2017-01-01 | End: 2017-01-01

## 2017-01-01 RX ORDER — ONDANSETRON 2 MG/ML
4 INJECTION INTRAMUSCULAR; INTRAVENOUS DAILY PRN
Status: DISCONTINUED | OUTPATIENT
Start: 2017-01-01 | End: 2017-01-01 | Stop reason: HOSPADM

## 2017-01-01 RX ORDER — ONDANSETRON HYDROCHLORIDE 2 MG/ML
INJECTION, SOLUTION INTRAMUSCULAR; INTRAVENOUS
Status: DISCONTINUED | OUTPATIENT
Start: 2017-01-01 | End: 2017-01-01

## 2017-01-01 RX ORDER — NAPROXEN SODIUM 220 MG/1
81 TABLET, FILM COATED ORAL
Status: DISCONTINUED | OUTPATIENT
Start: 2017-01-01 | End: 2017-01-01

## 2017-01-01 RX ORDER — SODIUM CHLORIDE 0.9 % (FLUSH) 0.9 %
3 SYRINGE (ML) INJECTION
Status: DISCONTINUED | OUTPATIENT
Start: 2017-01-01 | End: 2017-01-01

## 2017-01-01 RX ORDER — SODIUM POLYSTYRENE SULFONATE 15 G/60ML
15 SUSPENSION ORAL; RECTAL ONCE
Status: DISCONTINUED | OUTPATIENT
Start: 2017-01-01 | End: 2017-01-01

## 2017-01-01 RX ORDER — PANTOPRAZOLE SODIUM 40 MG/1
40 TABLET, DELAYED RELEASE ORAL NIGHTLY
Status: DISCONTINUED | OUTPATIENT
Start: 2017-01-01 | End: 2017-01-01 | Stop reason: HOSPADM

## 2017-01-01 RX ORDER — METOPROLOL TARTRATE 25 MG/1
25 TABLET, FILM COATED ORAL 2 TIMES DAILY
Status: DISCONTINUED | OUTPATIENT
Start: 2017-01-01 | End: 2017-01-01 | Stop reason: HOSPADM

## 2017-01-01 RX ORDER — ROSUVASTATIN CALCIUM 40 MG/1
40 TABLET, COATED ORAL NIGHTLY
Qty: 30 TABLET | Refills: 6 | Status: SHIPPED | OUTPATIENT
Start: 2017-01-01 | End: 2017-01-01 | Stop reason: SDUPTHER

## 2017-01-01 RX ORDER — ACETAMINOPHEN 500 MG
500 TABLET ORAL ONCE
Status: COMPLETED | OUTPATIENT
Start: 2017-01-01 | End: 2017-01-01

## 2017-01-01 RX ORDER — INSULIN ASPART 100 [IU]/ML
0-5 INJECTION, SOLUTION INTRAVENOUS; SUBCUTANEOUS
Status: DISCONTINUED | OUTPATIENT
Start: 2017-01-01 | End: 2017-01-01 | Stop reason: HOSPADM

## 2017-01-01 RX ORDER — AMOXICILLIN 250 MG
2 CAPSULE ORAL NIGHTLY PRN
Status: DISCONTINUED | OUTPATIENT
Start: 2017-01-01 | End: 2017-01-01 | Stop reason: HOSPADM

## 2017-01-01 RX ORDER — HYDROMORPHONE HYDROCHLORIDE 1 MG/ML
0.2 INJECTION, SOLUTION INTRAMUSCULAR; INTRAVENOUS; SUBCUTANEOUS EVERY 5 MIN PRN
Status: COMPLETED | OUTPATIENT
Start: 2017-01-01 | End: 2017-01-01

## 2017-01-01 RX ORDER — METOPROLOL SUCCINATE 50 MG/1
150 TABLET, EXTENDED RELEASE ORAL DAILY
Status: DISCONTINUED | OUTPATIENT
Start: 2017-01-01 | End: 2017-01-01

## 2017-01-01 RX ORDER — MIDODRINE HYDROCHLORIDE 5 MG/1
5 TABLET ORAL 3 TIMES DAILY
Status: DISCONTINUED | OUTPATIENT
Start: 2017-01-01 | End: 2017-01-01

## 2017-01-01 RX ORDER — METOPROLOL TARTRATE 25 MG/1
25 TABLET, FILM COATED ORAL 2 TIMES DAILY
Qty: 60 TABLET | Refills: 0
Start: 2017-01-01 | End: 2018-10-26

## 2017-01-01 RX ORDER — MUPIROCIN 20 MG/G
1 OINTMENT TOPICAL 2 TIMES DAILY
Status: DISPENSED | OUTPATIENT
Start: 2017-01-01 | End: 2017-01-01

## 2017-01-01 RX ORDER — GLUCAGON 1 MG
1 KIT INJECTION
Status: DISCONTINUED | OUTPATIENT
Start: 2017-01-01 | End: 2017-01-01 | Stop reason: HOSPADM

## 2017-01-01 RX ORDER — PREGABALIN 25 MG/1
50 CAPSULE ORAL DAILY
Status: DISCONTINUED | OUTPATIENT
Start: 2017-01-01 | End: 2017-01-01

## 2017-01-01 RX ORDER — MIDODRINE HYDROCHLORIDE 5 MG/1
10 TABLET ORAL 3 TIMES DAILY
Status: DISCONTINUED | OUTPATIENT
Start: 2017-01-01 | End: 2017-01-01

## 2017-01-01 RX ORDER — SODIUM CHLORIDE 0.9 % (FLUSH) 0.9 %
3 SYRINGE (ML) INJECTION
Status: DISCONTINUED | OUTPATIENT
Start: 2017-01-01 | End: 2017-01-01 | Stop reason: HOSPADM

## 2017-01-01 RX ORDER — ATORVASTATIN CALCIUM 40 MG/1
40 TABLET, FILM COATED ORAL
Status: COMPLETED | OUTPATIENT
Start: 2017-01-01 | End: 2017-01-01

## 2017-01-01 RX ORDER — VANCOMYCIN HYDROCHLORIDE 1 G/20ML
INJECTION, POWDER, LYOPHILIZED, FOR SOLUTION INTRAVENOUS
Status: DISCONTINUED | OUTPATIENT
Start: 2017-01-01 | End: 2017-01-01 | Stop reason: HOSPADM

## 2017-01-01 RX ORDER — AMOXICILLIN 250 MG
1 CAPSULE ORAL 2 TIMES DAILY
Status: DISCONTINUED | OUTPATIENT
Start: 2017-01-01 | End: 2017-01-01 | Stop reason: HOSPADM

## 2017-01-01 RX ORDER — ACETAMINOPHEN 325 MG/1
650 TABLET ORAL EVERY 8 HOURS PRN
Status: DISCONTINUED | OUTPATIENT
Start: 2017-01-01 | End: 2017-01-01

## 2017-01-01 RX ORDER — CELECOXIB 200 MG/1
200 CAPSULE ORAL 2 TIMES DAILY
COMMUNITY

## 2017-01-01 RX ORDER — PREGABALIN 25 MG/1
25 CAPSULE ORAL ONCE
Status: COMPLETED | OUTPATIENT
Start: 2017-01-01 | End: 2017-01-01

## 2017-01-01 RX ORDER — DEXTROSE 50 % IN WATER (D50W) INTRAVENOUS SYRINGE
Status: COMPLETED
Start: 2017-01-01 | End: 2017-01-01

## 2017-01-01 RX ORDER — PROPOFOL 10 MG/ML
VIAL (ML) INTRAVENOUS CONTINUOUS PRN
Status: DISCONTINUED | OUTPATIENT
Start: 2017-01-01 | End: 2017-01-01

## 2017-01-01 RX ORDER — BENZONATATE 100 MG/1
100 CAPSULE ORAL 3 TIMES DAILY PRN
Status: DISCONTINUED | OUTPATIENT
Start: 2017-01-01 | End: 2017-01-01 | Stop reason: HOSPADM

## 2017-01-01 RX ORDER — HEPARIN SODIUM 5000 [USP'U]/ML
5000 INJECTION, SOLUTION INTRAVENOUS; SUBCUTANEOUS EVERY 8 HOURS
Status: DISCONTINUED | OUTPATIENT
Start: 2017-01-01 | End: 2017-01-01 | Stop reason: HOSPADM

## 2017-01-01 RX ORDER — IPRATROPIUM BROMIDE AND ALBUTEROL SULFATE 2.5; .5 MG/3ML; MG/3ML
3 SOLUTION RESPIRATORY (INHALATION) EVERY 4 HOURS PRN
Status: DISCONTINUED | OUTPATIENT
Start: 2017-01-01 | End: 2017-01-01 | Stop reason: HOSPADM

## 2017-01-01 RX ORDER — LEVALBUTEROL 1.25 MG/.5ML
1.25 SOLUTION, CONCENTRATE RESPIRATORY (INHALATION) EVERY 8 HOURS
Status: DISCONTINUED | OUTPATIENT
Start: 2017-01-01 | End: 2017-01-01 | Stop reason: HOSPADM

## 2017-01-01 RX ORDER — PREGABALIN 25 MG/1
75 CAPSULE ORAL DAILY
Status: DISCONTINUED | OUTPATIENT
Start: 2017-01-01 | End: 2017-01-01

## 2017-01-01 RX ORDER — CEFEPIME HYDROCHLORIDE 1 G/50ML
1 INJECTION, SOLUTION INTRAVENOUS
Status: DISCONTINUED | OUTPATIENT
Start: 2017-01-01 | End: 2017-01-01

## 2017-01-01 RX ORDER — METHYLPREDNISOLONE ACETATE 40 MG/ML
INJECTION, SUSPENSION INTRA-ARTICULAR; INTRALESIONAL; INTRAMUSCULAR; SOFT TISSUE
Status: DISCONTINUED | OUTPATIENT
Start: 2017-01-01 | End: 2017-01-01 | Stop reason: HOSPADM

## 2017-01-01 RX ORDER — METOPROLOL SUCCINATE 100 MG/1
100 TABLET, EXTENDED RELEASE ORAL DAILY
Status: DISCONTINUED | OUTPATIENT
Start: 2017-01-01 | End: 2017-01-01

## 2017-01-01 RX ORDER — PHENYLEPHRINE HCL IN 0.9% NACL 1 MG/10 ML
SYRINGE (ML) INTRAVENOUS
Status: DISPENSED
Start: 2017-01-01 | End: 2017-01-01

## 2017-01-01 RX ORDER — DIAZEPAM 10 MG/2ML
2 INJECTION INTRAMUSCULAR EVERY 6 HOURS PRN
Status: DISCONTINUED | OUTPATIENT
Start: 2017-01-01 | End: 2017-01-01 | Stop reason: HOSPADM

## 2017-01-01 RX ORDER — ONDANSETRON 8 MG/1
8 TABLET, ORALLY DISINTEGRATING ORAL EVERY 8 HOURS PRN
Status: DISCONTINUED | OUTPATIENT
Start: 2017-01-01 | End: 2017-01-01

## 2017-01-01 RX ORDER — WARFARIN 4 MG/1
TABLET ORAL
Qty: 35 TABLET | Refills: 3 | Status: SHIPPED | OUTPATIENT
Start: 2017-01-01 | End: 2017-01-01 | Stop reason: SDUPTHER

## 2017-01-01 RX ORDER — ASPIRIN 81 MG/1
81 TABLET ORAL DAILY
Status: DISCONTINUED | OUTPATIENT
Start: 2017-01-01 | End: 2017-01-01

## 2017-01-01 RX ORDER — LIDOCAINE HCL/PF 100 MG/5ML
SYRINGE (ML) INTRAVENOUS
Status: DISCONTINUED | OUTPATIENT
Start: 2017-01-01 | End: 2017-01-01

## 2017-01-01 RX ORDER — ACETAMINOPHEN 325 MG/1
650 TABLET ORAL EVERY 8 HOURS
Status: DISCONTINUED | OUTPATIENT
Start: 2017-01-01 | End: 2017-01-01 | Stop reason: HOSPADM

## 2017-01-01 RX ORDER — HEPARIN SODIUM,PORCINE/D5W 25000/250
17 INTRAVENOUS SOLUTION INTRAVENOUS CONTINUOUS
Status: DISPENSED | OUTPATIENT
Start: 2017-01-01 | End: 2017-01-01

## 2017-01-01 RX ORDER — INSULIN LISPRO 100 [IU]/ML
INJECTION, SOLUTION INTRAVENOUS; SUBCUTANEOUS 3 TIMES DAILY PRN
Status: ON HOLD | COMMUNITY
End: 2017-01-01 | Stop reason: HOSPADM

## 2017-01-01 RX ORDER — SODIUM CHLORIDE 9 MG/ML
INJECTION, SOLUTION INTRAVENOUS ONCE
Status: CANCELLED | OUTPATIENT
Start: 2017-01-01 | End: 2017-01-01

## 2017-01-01 RX ORDER — HYDROCODONE BITARTRATE AND ACETAMINOPHEN 10; 325 MG/1; MG/1
1 TABLET ORAL EVERY 6 HOURS PRN
Status: DISCONTINUED | OUTPATIENT
Start: 2017-01-01 | End: 2017-01-01 | Stop reason: HOSPADM

## 2017-01-01 RX ORDER — LIDOCAINE 50 MG/G
3 PATCH TOPICAL
Status: DISCONTINUED | OUTPATIENT
Start: 2017-01-01 | End: 2017-01-01 | Stop reason: HOSPADM

## 2017-01-01 RX ORDER — GABAPENTIN 300 MG/1
300 CAPSULE ORAL NIGHTLY
COMMUNITY

## 2017-01-01 RX ORDER — MAGNESIUM SULFATE HEPTAHYDRATE 40 MG/ML
2 INJECTION, SOLUTION INTRAVENOUS ONCE
Status: COMPLETED | OUTPATIENT
Start: 2017-01-01 | End: 2017-01-01

## 2017-01-01 RX ORDER — FENTANYL CITRATE 50 UG/ML
INJECTION, SOLUTION INTRAMUSCULAR; INTRAVENOUS
Status: DISCONTINUED | OUTPATIENT
Start: 2017-01-01 | End: 2017-01-01

## 2017-01-01 RX ORDER — MUPIROCIN 20 MG/G
OINTMENT TOPICAL
Status: CANCELLED | OUTPATIENT
Start: 2017-01-01

## 2017-01-01 RX ORDER — METOPROLOL TARTRATE 1 MG/ML
2.5 INJECTION, SOLUTION INTRAVENOUS EVERY 8 HOURS
Status: DISCONTINUED | OUTPATIENT
Start: 2017-01-01 | End: 2017-01-01 | Stop reason: HOSPADM

## 2017-01-01 RX ORDER — CYCLOBENZAPRINE HCL 5 MG
5 TABLET ORAL 2 TIMES DAILY
Status: DISCONTINUED | OUTPATIENT
Start: 2017-01-01 | End: 2017-01-01

## 2017-01-01 RX ORDER — CHLORDIAZEPOXIDE HYDROCHLORIDE AND CLIDINIUM BROMIDE 5; 2.5 MG/1; MG/1
1 CAPSULE ORAL 2 TIMES DAILY
Refills: 0 | Status: ON HOLD | COMMUNITY
Start: 2017-01-01 | End: 2017-01-01 | Stop reason: CLARIF

## 2017-01-01 RX ORDER — ALBUTEROL SULFATE 0.83 MG/ML
2.5 SOLUTION RESPIRATORY (INHALATION) EVERY 4 HOURS PRN
Status: DISCONTINUED | OUTPATIENT
Start: 2017-01-01 | End: 2017-01-01

## 2017-01-01 RX ORDER — CISATRACURIUM BESYLATE 10 MG/ML
INJECTION, SOLUTION INTRAVENOUS
Status: DISCONTINUED | OUTPATIENT
Start: 2017-01-01 | End: 2017-01-01

## 2017-01-01 RX ORDER — ETOMIDATE 2 MG/ML
INJECTION INTRAVENOUS
Status: DISCONTINUED | OUTPATIENT
Start: 2017-01-01 | End: 2017-01-01

## 2017-01-01 RX ORDER — PANTOPRAZOLE SODIUM 40 MG/1
40 TABLET, DELAYED RELEASE ORAL NIGHTLY
Qty: 30 TABLET | Refills: 0 | Status: SHIPPED | OUTPATIENT
Start: 2017-01-01 | End: 2018-10-26

## 2017-01-01 RX ORDER — DIAZEPAM 5 MG/1
5 TABLET ORAL EVERY 6 HOURS PRN
Status: DISCONTINUED | OUTPATIENT
Start: 2017-01-01 | End: 2017-01-01

## 2017-01-01 RX ORDER — LIDOCAINE HYDROCHLORIDE AND EPINEPHRINE 15; 5 MG/ML; UG/ML
INJECTION, SOLUTION EPIDURAL
Status: DISCONTINUED | OUTPATIENT
Start: 2017-01-01 | End: 2017-01-01 | Stop reason: HOSPADM

## 2017-01-01 RX ORDER — BISACODYL 10 MG
10 SUPPOSITORY, RECTAL RECTAL DAILY PRN
COMMUNITY

## 2017-01-01 RX ORDER — OLANZAPINE 10 MG/2ML
2.5 INJECTION, POWDER, FOR SOLUTION INTRAMUSCULAR ONCE AS NEEDED
Status: ACTIVE | OUTPATIENT
Start: 2017-01-01 | End: 2017-01-01

## 2017-01-01 RX ORDER — FENTANYL 25 UG/1
1 PATCH TRANSDERMAL
Status: DISCONTINUED | OUTPATIENT
Start: 2017-01-01 | End: 2017-01-01

## 2017-01-01 RX ORDER — ACETAMINOPHEN 325 MG/1
650 TABLET ORAL EVERY 6 HOURS
Status: DISCONTINUED | OUTPATIENT
Start: 2017-01-01 | End: 2017-01-01

## 2017-01-01 RX ORDER — HYDROMORPHONE HYDROCHLORIDE 1 MG/ML
0.5 INJECTION, SOLUTION INTRAMUSCULAR; INTRAVENOUS; SUBCUTANEOUS EVERY 6 HOURS PRN
Status: DISCONTINUED | OUTPATIENT
Start: 2017-01-01 | End: 2017-01-01

## 2017-01-01 RX ORDER — DIGOXIN 125 MCG
0.25 TABLET ORAL EVERY 6 HOURS
Status: COMPLETED | OUTPATIENT
Start: 2017-01-01 | End: 2017-01-01

## 2017-01-01 RX ORDER — POLYETHYLENE GLYCOL 3350 17 G/17G
17 POWDER, FOR SOLUTION ORAL 2 TIMES DAILY
Status: DISCONTINUED | OUTPATIENT
Start: 2017-01-01 | End: 2017-01-01 | Stop reason: HOSPADM

## 2017-01-01 RX ORDER — IPRATROPIUM BROMIDE AND ALBUTEROL SULFATE 2.5; .5 MG/3ML; MG/3ML
3 SOLUTION RESPIRATORY (INHALATION)
Status: DISCONTINUED | OUTPATIENT
Start: 2017-01-01 | End: 2017-01-01

## 2017-01-01 RX ORDER — HEPARIN SODIUM 5000 [USP'U]/ML
5000 INJECTION, SOLUTION INTRAVENOUS; SUBCUTANEOUS EVERY 8 HOURS
Status: DISCONTINUED | OUTPATIENT
Start: 2017-01-01 | End: 2017-01-01

## 2017-01-01 RX ORDER — HYDROMORPHONE HYDROCHLORIDE 1 MG/ML
1 INJECTION, SOLUTION INTRAMUSCULAR; INTRAVENOUS; SUBCUTANEOUS
Status: COMPLETED | OUTPATIENT
Start: 2017-01-01 | End: 2017-01-01

## 2017-01-01 RX ORDER — DIPHENHYDRAMINE HCL 25 MG
50 CAPSULE ORAL
Status: DISCONTINUED | OUTPATIENT
Start: 2017-01-01 | End: 2017-01-01

## 2017-01-01 RX ORDER — CEFEPIME HYDROCHLORIDE 1 G/50ML
1 INJECTION, SOLUTION INTRAVENOUS EVERY 24 HOURS
Status: DISCONTINUED | OUTPATIENT
Start: 2017-01-01 | End: 2017-01-01

## 2017-01-01 RX ORDER — MICONAZOLE NITRATE 2 %
POWDER (GRAM) TOPICAL 2 TIMES DAILY
Status: DISCONTINUED | OUTPATIENT
Start: 2017-01-01 | End: 2017-01-01 | Stop reason: HOSPADM

## 2017-01-01 RX ORDER — FENTANYL CITRATE 50 UG/ML
50 INJECTION, SOLUTION INTRAMUSCULAR; INTRAVENOUS
Status: DISCONTINUED | OUTPATIENT
Start: 2017-01-01 | End: 2017-01-01

## 2017-01-01 RX ORDER — TRAMADOL HYDROCHLORIDE 50 MG/1
50 TABLET ORAL EVERY 8 HOURS PRN
Status: DISCONTINUED | OUTPATIENT
Start: 2017-01-01 | End: 2017-01-01 | Stop reason: HOSPADM

## 2017-01-01 RX ORDER — ONDANSETRON 2 MG/ML
4 INJECTION INTRAMUSCULAR; INTRAVENOUS
Status: COMPLETED | OUTPATIENT
Start: 2017-01-01 | End: 2017-01-01

## 2017-01-01 RX ORDER — ROSUVASTATIN CALCIUM 20 MG/1
40 TABLET, COATED ORAL NIGHTLY
Status: DISCONTINUED | OUTPATIENT
Start: 2017-01-01 | End: 2017-01-01

## 2017-01-01 RX ORDER — ELUXADOLINE 75 MG/1
1 TABLET, FILM COATED ORAL 2 TIMES DAILY
Refills: 0 | Status: ON HOLD | COMMUNITY
Start: 2017-01-01 | End: 2017-01-01 | Stop reason: CLARIF

## 2017-01-01 RX ADMIN — ATORVASTATIN CALCIUM 80 MG: 20 TABLET, FILM COATED ORAL at 09:10

## 2017-01-01 RX ADMIN — DIAZEPAM 2 MG: 5 INJECTION, SOLUTION INTRAMUSCULAR; INTRAVENOUS at 03:10

## 2017-01-01 RX ADMIN — GUAIFENESIN AND DEXTROMETHORPHAN 10 ML: 100; 10 SYRUP ORAL at 03:12

## 2017-01-01 RX ADMIN — FENTANYL CITRATE 50 MCG: 50 INJECTION, SOLUTION INTRAMUSCULAR; INTRAVENOUS at 09:11

## 2017-01-01 RX ADMIN — ETOMIDATE 10 MG: 2 INJECTION, SOLUTION INTRAVENOUS at 09:11

## 2017-01-01 RX ADMIN — METOPROLOL TARTRATE 2.5 MG: 5 INJECTION, SOLUTION INTRAVENOUS at 10:12

## 2017-01-01 RX ADMIN — METOPROLOL SUCCINATE 50 MG: 50 TABLET, EXTENDED RELEASE ORAL at 10:10

## 2017-01-01 RX ADMIN — STANDARDIZED SENNA CONCENTRATE AND DOCUSATE SODIUM 1 TABLET: 8.6; 5 TABLET, FILM COATED ORAL at 09:11

## 2017-01-01 RX ADMIN — ROSUVASTATIN CALCIUM 40 MG: 20 TABLET, FILM COATED ORAL at 10:10

## 2017-01-01 RX ADMIN — METOPROLOL SUCCINATE 100 MG: 100 TABLET, EXTENDED RELEASE ORAL at 12:10

## 2017-01-01 RX ADMIN — MIDODRINE HYDROCHLORIDE 10 MG: 5 TABLET ORAL at 04:12

## 2017-01-01 RX ADMIN — MIDODRINE HYDROCHLORIDE 10 MG: 5 TABLET ORAL at 11:12

## 2017-01-01 RX ADMIN — BISACODYL 10 MG: 10 SUPPOSITORY RECTAL at 10:11

## 2017-01-01 RX ADMIN — ERYTHROPOIETIN 10000 UNITS: 10000 INJECTION, SOLUTION INTRAVENOUS; SUBCUTANEOUS at 10:10

## 2017-01-01 RX ADMIN — PHENYLEPHRINE HYDROCHLORIDE 200 MCG: 10 INJECTION INTRAVENOUS at 04:10

## 2017-01-01 RX ADMIN — ATORVASTATIN CALCIUM 80 MG: 20 TABLET, FILM COATED ORAL at 10:11

## 2017-01-01 RX ADMIN — PANTOPRAZOLE SODIUM 40 MG: 40 TABLET, DELAYED RELEASE ORAL at 09:10

## 2017-01-01 RX ADMIN — Medication 2 MCG/KG/MIN: at 02:10

## 2017-01-01 RX ADMIN — HEPARIN SODIUM 5000 UNITS: 5000 INJECTION, SOLUTION INTRAVENOUS; SUBCUTANEOUS at 10:10

## 2017-01-01 RX ADMIN — PANTOPRAZOLE SODIUM 40 MG: 40 TABLET, DELAYED RELEASE ORAL at 08:11

## 2017-01-01 RX ADMIN — METOPROLOL TARTRATE 25 MG: 25 TABLET ORAL at 10:10

## 2017-01-01 RX ADMIN — PANTOPRAZOLE SODIUM 40 MG: 40 TABLET, DELAYED RELEASE ORAL at 10:10

## 2017-01-01 RX ADMIN — PREGABALIN 50 MG: 25 CAPSULE ORAL at 08:12

## 2017-01-01 RX ADMIN — INSULIN ASPART 3 UNITS: 100 INJECTION, SOLUTION INTRAVENOUS; SUBCUTANEOUS at 05:11

## 2017-01-01 RX ADMIN — SERTRALINE 100 MG: 100 TABLET, FILM COATED ORAL at 11:11

## 2017-01-01 RX ADMIN — FLUCONAZOLE 200 MG: 200 TABLET ORAL at 02:11

## 2017-01-01 RX ADMIN — INSULIN ASPART 1 UNITS: 100 INJECTION, SOLUTION INTRAVENOUS; SUBCUTANEOUS at 10:11

## 2017-01-01 RX ADMIN — PREGABALIN 75 MG: 25 CAPSULE ORAL at 10:11

## 2017-01-01 RX ADMIN — METOPROLOL TARTRATE 5 MG: 5 INJECTION INTRAVENOUS at 01:12

## 2017-01-01 RX ADMIN — STANDARDIZED SENNA CONCENTRATE AND DOCUSATE SODIUM 1 TABLET: 8.6; 5 TABLET, FILM COATED ORAL at 09:10

## 2017-01-01 RX ADMIN — Medication 16 G: at 12:11

## 2017-01-01 RX ADMIN — INSULIN DETEMIR 20 UNITS: 100 INJECTION, SOLUTION SUBCUTANEOUS at 09:10

## 2017-01-01 RX ADMIN — MIDODRINE HYDROCHLORIDE 10 MG: 5 TABLET ORAL at 10:11

## 2017-01-01 RX ADMIN — INSULIN ASPART 1 UNITS: 100 INJECTION, SOLUTION INTRAVENOUS; SUBCUTANEOUS at 09:11

## 2017-01-01 RX ADMIN — TRAMADOL HYDROCHLORIDE 50 MG: 50 TABLET, COATED ORAL at 09:11

## 2017-01-01 RX ADMIN — HYDROCODONE BITARTRATE AND ACETAMINOPHEN 1 TABLET: 10; 325 TABLET ORAL at 12:10

## 2017-01-01 RX ADMIN — GUAIFENESIN AND DEXTROMETHORPHAN 10 ML: 100; 10 SYRUP ORAL at 11:11

## 2017-01-01 RX ADMIN — GABAPENTIN 300 MG: 300 CAPSULE ORAL at 12:10

## 2017-01-01 RX ADMIN — METOPROLOL TARTRATE 25 MG: 25 TABLET ORAL at 06:11

## 2017-01-01 RX ADMIN — ACETAMINOPHEN 650 MG: 325 TABLET ORAL at 11:12

## 2017-01-01 RX ADMIN — POLYETHYLENE GLYCOL 3350 17 G: 17 POWDER, FOR SOLUTION ORAL at 09:11

## 2017-01-01 RX ADMIN — METOPROLOL TARTRATE 25 MG: 25 TABLET, FILM COATED ORAL at 09:11

## 2017-01-01 RX ADMIN — MICONAZOLE NITRATE: 20 POWDER TOPICAL at 09:12

## 2017-01-01 RX ADMIN — DEXTROSE MONOHYDRATE 12.5 G: 25 INJECTION, SOLUTION INTRAVENOUS at 05:10

## 2017-01-01 RX ADMIN — SERTRALINE 100 MG: 100 TABLET, FILM COATED ORAL at 08:11

## 2017-01-01 RX ADMIN — HYDROCODONE BITARTRATE AND ACETAMINOPHEN 1 TABLET: 10; 325 TABLET ORAL at 08:10

## 2017-01-01 RX ADMIN — SERTRALINE 100 MG: 50 TABLET, FILM COATED ORAL at 08:10

## 2017-01-01 RX ADMIN — MICONAZOLE NITRATE: 20 POWDER TOPICAL at 10:11

## 2017-01-01 RX ADMIN — MAGNESIUM SULFATE IN WATER 2 G: 40 INJECTION, SOLUTION INTRAVENOUS at 10:10

## 2017-01-01 RX ADMIN — HEPARIN SODIUM AND DEXTROSE 17 UNITS/KG/HR: 10000; 5 INJECTION INTRAVENOUS at 05:10

## 2017-01-01 RX ADMIN — MICONAZOLE NITRATE: 20 POWDER TOPICAL at 11:11

## 2017-01-01 RX ADMIN — FENTANYL 1 PATCH: 12 PATCH TRANSDERMAL at 05:10

## 2017-01-01 RX ADMIN — ACETAMINOPHEN 650 MG: 325 TABLET ORAL at 09:11

## 2017-01-01 RX ADMIN — TRAMADOL HYDROCHLORIDE 50 MG: 50 TABLET, COATED ORAL at 09:12

## 2017-01-01 RX ADMIN — ROCURONIUM BROMIDE 10 MG: 10 INJECTION, SOLUTION INTRAVENOUS at 09:11

## 2017-01-01 RX ADMIN — IPRATROPIUM BROMIDE AND ALBUTEROL SULFATE 3 ML: .5; 3 SOLUTION RESPIRATORY (INHALATION) at 12:11

## 2017-01-01 RX ADMIN — Medication 1 TABLET: at 09:10

## 2017-01-01 RX ADMIN — FLUCONAZOLE 200 MG: 200 TABLET ORAL at 10:11

## 2017-01-01 RX ADMIN — IPRATROPIUM BROMIDE AND ALBUTEROL SULFATE 3 ML: .5; 3 SOLUTION RESPIRATORY (INHALATION) at 01:11

## 2017-01-01 RX ADMIN — INSULIN DETEMIR 5 UNITS: 100 INJECTION, SOLUTION SUBCUTANEOUS at 09:11

## 2017-01-01 RX ADMIN — SODIUM CHLORIDE 300 ML: 900 INJECTION, SOLUTION INTRAVENOUS at 09:11

## 2017-01-01 RX ADMIN — FENTANYL CITRATE 25 MCG: 50 INJECTION, SOLUTION INTRAMUSCULAR; INTRAVENOUS at 08:11

## 2017-01-01 RX ADMIN — TRAMADOL HYDROCHLORIDE 50 MG: 50 TABLET, COATED ORAL at 12:11

## 2017-01-01 RX ADMIN — IPRATROPIUM BROMIDE AND ALBUTEROL SULFATE 3 ML: .5; 3 SOLUTION RESPIRATORY (INHALATION) at 08:11

## 2017-01-01 RX ADMIN — MIDODRINE HYDROCHLORIDE 10 MG: 5 TABLET ORAL at 09:10

## 2017-01-01 RX ADMIN — IPRATROPIUM BROMIDE AND ALBUTEROL SULFATE 3 ML: .5; 3 SOLUTION RESPIRATORY (INHALATION) at 08:12

## 2017-01-01 RX ADMIN — METOPROLOL TARTRATE 25 MG: 25 TABLET ORAL at 05:11

## 2017-01-01 RX ADMIN — HYDROMORPHONE HYDROCHLORIDE 0.2 MG: 1 INJECTION, SOLUTION INTRAMUSCULAR; INTRAVENOUS; SUBCUTANEOUS at 12:10

## 2017-01-01 RX ADMIN — GUAIFENESIN AND DEXTROMETHORPHAN 10 ML: 100; 10 SYRUP ORAL at 06:11

## 2017-01-01 RX ADMIN — HEPARIN SODIUM 5000 UNITS: 5000 INJECTION, SOLUTION INTRAVENOUS; SUBCUTANEOUS at 09:10

## 2017-01-01 RX ADMIN — METOPROLOL TARTRATE 25 MG: 25 TABLET ORAL at 09:11

## 2017-01-01 RX ADMIN — GUAIFENESIN AND DEXTROMETHORPHAN 10 ML: 100; 10 SYRUP ORAL at 05:11

## 2017-01-01 RX ADMIN — ACETAMINOPHEN 650 MG: 325 TABLET ORAL at 04:10

## 2017-01-01 RX ADMIN — Medication 1.5 MCG/KG/MIN: at 09:10

## 2017-01-01 RX ADMIN — ACETAMINOPHEN 650 MG: 325 TABLET ORAL at 01:10

## 2017-01-01 RX ADMIN — STANDARDIZED SENNA CONCENTRATE AND DOCUSATE SODIUM 1 TABLET: 8.6; 5 TABLET, FILM COATED ORAL at 02:10

## 2017-01-01 RX ADMIN — HYDROCODONE BITARTRATE AND ACETAMINOPHEN 1 TABLET: 10; 325 TABLET ORAL at 09:10

## 2017-01-01 RX ADMIN — PANTOPRAZOLE SODIUM 40 MG: 40 TABLET, DELAYED RELEASE ORAL at 08:10

## 2017-01-01 RX ADMIN — HEPARIN SODIUM AND DEXTROSE 14.94 UNITS/KG/HR: 10000; 5 INJECTION INTRAVENOUS at 11:10

## 2017-01-01 RX ADMIN — HYDROCODONE BITARTRATE AND ACETAMINOPHEN 1 TABLET: 10; 325 TABLET ORAL at 11:11

## 2017-01-01 RX ADMIN — ONDANSETRON 4 MG: 2 INJECTION, SOLUTION INTRAMUSCULAR; INTRAVENOUS at 04:10

## 2017-01-01 RX ADMIN — CYCLOBENZAPRINE HYDROCHLORIDE 5 MG: 5 TABLET, FILM COATED ORAL at 12:11

## 2017-01-01 RX ADMIN — DEXTROSE MONOHYDRATE 12.5 G: 25 INJECTION, SOLUTION INTRAVENOUS at 02:10

## 2017-01-01 RX ADMIN — AMPICILLIN SODIUM AND SULBACTAM SODIUM 3 G: 2; 1 INJECTION, POWDER, FOR SOLUTION INTRAMUSCULAR; INTRAVENOUS at 08:10

## 2017-01-01 RX ADMIN — MOXIFLOXACIN HYDROCHLORIDE 400 MG: 400 INJECTION, SOLUTION INTRAVENOUS at 08:12

## 2017-01-01 RX ADMIN — STANDARDIZED SENNA CONCENTRATE AND DOCUSATE SODIUM 1 TABLET: 8.6; 5 TABLET, FILM COATED ORAL at 07:10

## 2017-01-01 RX ADMIN — INSULIN ASPART 2 UNITS: 100 INJECTION, SOLUTION INTRAVENOUS; SUBCUTANEOUS at 12:11

## 2017-01-01 RX ADMIN — ATORVASTATIN CALCIUM 80 MG: 20 TABLET, FILM COATED ORAL at 08:12

## 2017-01-01 RX ADMIN — ALBUMIN (HUMAN) 25 G: 12.5 SOLUTION INTRAVENOUS at 05:11

## 2017-01-01 RX ADMIN — VANCOMYCIN HYDROCHLORIDE 1000 MG: 1 INJECTION, POWDER, LYOPHILIZED, FOR SOLUTION INTRAVENOUS at 04:11

## 2017-01-01 RX ADMIN — STANDARDIZED SENNA CONCENTRATE AND DOCUSATE SODIUM 1 TABLET: 8.6; 5 TABLET, FILM COATED ORAL at 02:11

## 2017-01-01 RX ADMIN — METOPROLOL TARTRATE 25 MG: 25 TABLET ORAL at 10:11

## 2017-01-01 RX ADMIN — ATORVASTATIN CALCIUM 80 MG: 20 TABLET, FILM COATED ORAL at 08:11

## 2017-01-01 RX ADMIN — STANDARDIZED SENNA CONCENTRATE AND DOCUSATE SODIUM 1 TABLET: 8.6; 5 TABLET, FILM COATED ORAL at 08:12

## 2017-01-01 RX ADMIN — CEFEPIME HYDROCHLORIDE 1 G: 1 INJECTION, POWDER, FOR SOLUTION INTRAMUSCULAR; INTRAVENOUS at 10:11

## 2017-01-01 RX ADMIN — CYCLOBENZAPRINE HYDROCHLORIDE 5 MG: 5 TABLET, FILM COATED ORAL at 05:11

## 2017-01-01 RX ADMIN — MIDODRINE HYDROCHLORIDE 10 MG: 5 TABLET ORAL at 01:11

## 2017-01-01 RX ADMIN — HYDROMORPHONE HYDROCHLORIDE 0.2 MG: 1 INJECTION, SOLUTION INTRAMUSCULAR; INTRAVENOUS; SUBCUTANEOUS at 06:10

## 2017-01-01 RX ADMIN — ALBUMIN (HUMAN) 25 G: 12.5 SOLUTION INTRAVENOUS at 01:11

## 2017-01-01 RX ADMIN — STANDARDIZED SENNA CONCENTRATE AND DOCUSATE SODIUM 1 TABLET: 8.6; 5 TABLET, FILM COATED ORAL at 10:11

## 2017-01-01 RX ADMIN — POLYETHYLENE GLYCOL 3350 17 G: 17 POWDER, FOR SOLUTION ORAL at 08:11

## 2017-01-01 RX ADMIN — HEPARIN SODIUM 5000 UNITS: 5000 INJECTION, SOLUTION INTRAVENOUS; SUBCUTANEOUS at 06:10

## 2017-01-01 RX ADMIN — ALBUTEROL SULFATE 2.5 MG: 2.5 SOLUTION RESPIRATORY (INHALATION) at 08:11

## 2017-01-01 RX ADMIN — VANCOMYCIN HYDROCHLORIDE 1000 MG: 1 INJECTION, POWDER, LYOPHILIZED, FOR SOLUTION INTRAVENOUS at 11:12

## 2017-01-01 RX ADMIN — ACETAMINOPHEN 650 MG: 325 TABLET ORAL at 10:11

## 2017-01-01 RX ADMIN — MIDODRINE HYDROCHLORIDE 10 MG: 5 TABLET ORAL at 07:11

## 2017-01-01 RX ADMIN — HYDROMORPHONE HYDROCHLORIDE 0.2 MG: 2 INJECTION, SOLUTION INTRAMUSCULAR; INTRAVENOUS; SUBCUTANEOUS at 05:10

## 2017-01-01 RX ADMIN — LIDOCAINE 2 PATCH: 50 PATCH TOPICAL at 05:11

## 2017-01-01 RX ADMIN — ROSUVASTATIN CALCIUM 40 MG: 20 TABLET, FILM COATED ORAL at 08:10

## 2017-01-01 RX ADMIN — MIDODRINE HYDROCHLORIDE 10 MG: 5 TABLET ORAL at 09:12

## 2017-01-01 RX ADMIN — SODIUM CHLORIDE: 0.9 INJECTION, SOLUTION INTRAVENOUS at 02:10

## 2017-01-01 RX ADMIN — MIDODRINE HYDROCHLORIDE 10 MG: 5 TABLET ORAL at 03:11

## 2017-01-01 RX ADMIN — ROSUVASTATIN CALCIUM 40 MG: 20 TABLET, FILM COATED ORAL at 09:10

## 2017-01-01 RX ADMIN — MUPIROCIN 1 G: 20 OINTMENT TOPICAL at 10:11

## 2017-01-01 RX ADMIN — MIDODRINE HYDROCHLORIDE 10 MG: 5 TABLET ORAL at 02:11

## 2017-01-01 RX ADMIN — FLUCONAZOLE 200 MG: 200 TABLET ORAL at 08:12

## 2017-01-01 RX ADMIN — LEVALBUTEROL 1.25 MG: 1.25 SOLUTION, CONCENTRATE RESPIRATORY (INHALATION) at 11:12

## 2017-01-01 RX ADMIN — ACETAMINOPHEN 650 MG: 325 TABLET ORAL at 06:12

## 2017-01-01 RX ADMIN — MICONAZOLE NITRATE: 20 POWDER TOPICAL at 10:12

## 2017-01-01 RX ADMIN — HYDROCODONE BITARTRATE AND ACETAMINOPHEN 1 TABLET: 10; 325 TABLET ORAL at 10:10

## 2017-01-01 RX ADMIN — LEVALBUTEROL 1.25 MG: 1.25 SOLUTION, CONCENTRATE RESPIRATORY (INHALATION) at 03:12

## 2017-01-01 RX ADMIN — METOPROLOL SUCCINATE 100 MG: 100 TABLET, EXTENDED RELEASE ORAL at 09:10

## 2017-01-01 RX ADMIN — GABAPENTIN 600 MG: 300 CAPSULE ORAL at 09:10

## 2017-01-01 RX ADMIN — LIDOCAINE 2 PATCH: 50 PATCH TOPICAL at 12:11

## 2017-01-01 RX ADMIN — Medication 1 TABLET: at 10:10

## 2017-01-01 RX ADMIN — PANTOPRAZOLE SODIUM 40 MG: 40 TABLET, DELAYED RELEASE ORAL at 10:11

## 2017-01-01 RX ADMIN — HYDROCODONE BITARTRATE AND ACETAMINOPHEN 1 TABLET: 10; 325 TABLET ORAL at 01:10

## 2017-01-01 RX ADMIN — PANTOPRAZOLE SODIUM 40 MG: 40 TABLET, DELAYED RELEASE ORAL at 10:12

## 2017-01-01 RX ADMIN — METOPROLOL TARTRATE 2.5 MG: 5 INJECTION, SOLUTION INTRAVENOUS at 06:12

## 2017-01-01 RX ADMIN — FLUCONAZOLE 200 MG: 200 TABLET ORAL at 08:11

## 2017-01-01 RX ADMIN — RAMELTEON 8 MG: 8 TABLET, FILM COATED ORAL at 11:11

## 2017-01-01 RX ADMIN — MIDODRINE HYDROCHLORIDE 10 MG: 5 TABLET ORAL at 01:10

## 2017-01-01 RX ADMIN — HYDROMORPHONE HYDROCHLORIDE 0.5 MG: 1 INJECTION, SOLUTION INTRAMUSCULAR; INTRAVENOUS; SUBCUTANEOUS at 10:12

## 2017-01-01 RX ADMIN — SERTRALINE 100 MG: 50 TABLET, FILM COATED ORAL at 09:10

## 2017-01-01 RX ADMIN — ACETAMINOPHEN 650 MG: 325 TABLET ORAL at 06:10

## 2017-01-01 RX ADMIN — STANDARDIZED SENNA CONCENTRATE AND DOCUSATE SODIUM 1 TABLET: 8.6; 5 TABLET, FILM COATED ORAL at 08:10

## 2017-01-01 RX ADMIN — MUPIROCIN 1 G: 20 OINTMENT TOPICAL at 09:11

## 2017-01-01 RX ADMIN — IPRATROPIUM BROMIDE AND ALBUTEROL SULFATE 3 ML: .5; 3 SOLUTION RESPIRATORY (INHALATION) at 07:12

## 2017-01-01 RX ADMIN — HEPARIN SODIUM 5000 UNITS: 5000 INJECTION, SOLUTION INTRAVENOUS; SUBCUTANEOUS at 02:10

## 2017-01-01 RX ADMIN — POLYETHYLENE GLYCOL 3350 17 G: 17 POWDER, FOR SOLUTION ORAL at 10:11

## 2017-01-01 RX ADMIN — SUCCINYLCHOLINE CHLORIDE 120 MG: 20 INJECTION, SOLUTION INTRAMUSCULAR; INTRAVENOUS at 12:10

## 2017-01-01 RX ADMIN — METOPROLOL TARTRATE 2.5 MG: 5 INJECTION, SOLUTION INTRAVENOUS at 02:12

## 2017-01-01 RX ADMIN — Medication 1 MCG/KG/MIN: at 10:10

## 2017-01-01 RX ADMIN — ACETAMINOPHEN 650 MG: 325 TABLET ORAL at 09:10

## 2017-01-01 RX ADMIN — HYDROCODONE BITARTRATE AND ACETAMINOPHEN 1 TABLET: 10; 325 TABLET ORAL at 05:11

## 2017-01-01 RX ADMIN — INSULIN DETEMIR 18 UNITS: 100 INJECTION, SOLUTION SUBCUTANEOUS at 12:10

## 2017-01-01 RX ADMIN — MICONAZOLE NITRATE: 20 POWDER TOPICAL at 09:11

## 2017-01-01 RX ADMIN — ACETAMINOPHEN 650 MG: 325 TABLET ORAL at 06:11

## 2017-01-01 RX ADMIN — MIDODRINE HYDROCHLORIDE 10 MG: 5 TABLET ORAL at 05:10

## 2017-01-01 RX ADMIN — STANDARDIZED SENNA CONCENTRATE AND DOCUSATE SODIUM 1 TABLET: 8.6; 5 TABLET, FILM COATED ORAL at 01:11

## 2017-01-01 RX ADMIN — ALBUMIN (HUMAN) 25 G: 12.5 SOLUTION INTRAVENOUS at 09:11

## 2017-01-01 RX ADMIN — TRAMADOL HYDROCHLORIDE 50 MG: 50 TABLET, COATED ORAL at 02:12

## 2017-01-01 RX ADMIN — PHENYLEPHRINE HYDROCHLORIDE 100 MCG: 10 INJECTION INTRAVENOUS at 12:10

## 2017-01-01 RX ADMIN — Medication 1 TABLET: at 08:10

## 2017-01-01 RX ADMIN — MIDODRINE HYDROCHLORIDE 10 MG: 5 TABLET ORAL at 06:12

## 2017-01-01 RX ADMIN — SODIUM CHLORIDE 250 ML: 900 INJECTION, SOLUTION INTRAVENOUS at 01:11

## 2017-01-01 RX ADMIN — METOPROLOL TARTRATE 25 MG: 25 TABLET ORAL at 02:11

## 2017-01-01 RX ADMIN — GUAIFENESIN AND DEXTROMETHORPHAN 10 ML: 100; 10 SYRUP ORAL at 12:11

## 2017-01-01 RX ADMIN — IPRATROPIUM BROMIDE AND ALBUTEROL SULFATE 3 ML: .5; 3 SOLUTION RESPIRATORY (INHALATION) at 12:12

## 2017-01-01 RX ADMIN — INSULIN ASPART 2 UNITS: 100 INJECTION, SOLUTION INTRAVENOUS; SUBCUTANEOUS at 08:11

## 2017-01-01 RX ADMIN — MICONAZOLE NITRATE: 20 POWDER TOPICAL at 11:12

## 2017-01-01 RX ADMIN — HYDROCODONE BITARTRATE AND ACETAMINOPHEN 1 TABLET: 10; 325 TABLET ORAL at 06:11

## 2017-01-01 RX ADMIN — INSULIN DETEMIR 18 UNITS: 100 INJECTION, SOLUTION SUBCUTANEOUS at 09:10

## 2017-01-01 RX ADMIN — MUPIROCIN 1 G: 20 OINTMENT TOPICAL at 08:11

## 2017-01-01 RX ADMIN — MIDODRINE HYDROCHLORIDE 10 MG: 5 TABLET ORAL at 06:11

## 2017-01-01 RX ADMIN — MIDODRINE HYDROCHLORIDE 5 MG: 5 TABLET ORAL at 02:10

## 2017-01-01 RX ADMIN — FENTANYL 1 PATCH: 12 PATCH TRANSDERMAL at 06:10

## 2017-01-01 RX ADMIN — ACETAMINOPHEN 650 MG: 325 TABLET ORAL at 05:11

## 2017-01-01 RX ADMIN — DEXTROSE MONOHYDRATE 25 G: 25 INJECTION, SOLUTION INTRAVENOUS at 06:10

## 2017-01-01 RX ADMIN — ACETAMINOPHEN 650 MG: 325 TABLET ORAL at 10:10

## 2017-01-01 RX ADMIN — ASPIRIN 81 MG CHEWABLE TABLET 81 MG: 81 TABLET CHEWABLE at 08:10

## 2017-01-01 RX ADMIN — LIDOCAINE 2 PATCH: 50 PATCH TOPICAL at 09:11

## 2017-01-01 RX ADMIN — GUAIFENESIN AND DEXTROMETHORPHAN 10 ML: 100; 10 SYRUP ORAL at 05:12

## 2017-01-01 RX ADMIN — ACETAMINOPHEN 650 MG: 325 TABLET ORAL at 04:11

## 2017-01-01 RX ADMIN — HEPARIN SODIUM 5000 UNITS: 5000 INJECTION, SOLUTION INTRAVENOUS; SUBCUTANEOUS at 01:10

## 2017-01-01 RX ADMIN — GUAIFENESIN AND DEXTROMETHORPHAN 10 ML: 100; 10 SYRUP ORAL at 02:11

## 2017-01-01 RX ADMIN — ONDANSETRON 4 MG: 2 INJECTION INTRAMUSCULAR; INTRAVENOUS at 07:10

## 2017-01-01 RX ADMIN — CEFAZOLIN SODIUM 2 G: 2 SOLUTION INTRAVENOUS at 06:11

## 2017-01-01 RX ADMIN — HEPARIN SODIUM 5000 UNITS: 5000 INJECTION, SOLUTION INTRAVENOUS; SUBCUTANEOUS at 05:10

## 2017-01-01 RX ADMIN — SODIUM CHLORIDE 250 ML: 0.9 INJECTION, SOLUTION INTRAVENOUS at 04:11

## 2017-01-01 RX ADMIN — SODIUM CHLORIDE: 0.9 INJECTION, SOLUTION INTRAVENOUS at 09:10

## 2017-01-01 RX ADMIN — MICONAZOLE NITRATE: 20 POWDER TOPICAL at 02:11

## 2017-01-01 RX ADMIN — METOPROLOL TARTRATE 2.5 MG: 5 INJECTION, SOLUTION INTRAVENOUS at 01:12

## 2017-01-01 RX ADMIN — PHENYLEPHRINE HYDROCHLORIDE 0.5 MCG/KG/MIN: 10 INJECTION INTRAVENOUS at 10:11

## 2017-01-01 RX ADMIN — GUAIFENESIN AND DEXTROMETHORPHAN 10 ML: 100; 10 SYRUP ORAL at 10:11

## 2017-01-01 RX ADMIN — METOPROLOL TARTRATE 25 MG: 25 TABLET, FILM COATED ORAL at 10:11

## 2017-01-01 RX ADMIN — METOPROLOL TARTRATE 25 MG: 25 TABLET ORAL at 08:10

## 2017-01-01 RX ADMIN — RAMELTEON 8 MG: 8 TABLET, FILM COATED ORAL at 09:11

## 2017-01-01 RX ADMIN — PANTOPRAZOLE SODIUM 40 MG: 40 TABLET, DELAYED RELEASE ORAL at 12:11

## 2017-01-01 RX ADMIN — ROSUVASTATIN CALCIUM 40 MG: 20 TABLET, FILM COATED ORAL at 12:10

## 2017-01-01 RX ADMIN — REMIFENTANIL HYDROCHLORIDE 0.2 MCG/KG/MIN: 1 INJECTION, POWDER, LYOPHILIZED, FOR SOLUTION INTRAVENOUS at 01:10

## 2017-01-01 RX ADMIN — LIDOCAINE 3 PATCH: 50 PATCH TOPICAL at 06:12

## 2017-01-01 RX ADMIN — LIDOCAINE 2 PATCH: 50 PATCH TOPICAL at 07:11

## 2017-01-01 RX ADMIN — PHENYLEPHRINE HYDROCHLORIDE 100 MCG: 10 INJECTION INTRAVENOUS at 10:11

## 2017-01-01 RX ADMIN — ACETAMINOPHEN 650 MG: 325 TABLET ORAL at 03:11

## 2017-01-01 RX ADMIN — INSULIN ASPART 3 UNITS: 100 INJECTION, SOLUTION INTRAVENOUS; SUBCUTANEOUS at 12:11

## 2017-01-01 RX ADMIN — MIDODRINE HYDROCHLORIDE 10 MG: 5 TABLET ORAL at 05:11

## 2017-01-01 RX ADMIN — RAMELTEON 8 MG: 8 TABLET, FILM COATED ORAL at 10:10

## 2017-01-01 RX ADMIN — LIDOCAINE 3 PATCH: 50 PATCH TOPICAL at 11:11

## 2017-01-01 RX ADMIN — METOPROLOL SUCCINATE 100 MG: 100 TABLET, EXTENDED RELEASE ORAL at 01:10

## 2017-01-01 RX ADMIN — METOPROLOL TARTRATE 2.5 MG: 5 INJECTION, SOLUTION INTRAVENOUS at 11:12

## 2017-01-01 RX ADMIN — MIDODRINE HYDROCHLORIDE 5 MG: 5 TABLET ORAL at 10:10

## 2017-01-01 RX ADMIN — FLUCONAZOLE 200 MG: 200 TABLET ORAL at 09:12

## 2017-01-01 RX ADMIN — STANDARDIZED SENNA CONCENTRATE AND DOCUSATE SODIUM 1 TABLET: 8.6; 5 TABLET, FILM COATED ORAL at 10:10

## 2017-01-01 RX ADMIN — ALBUMIN (HUMAN) 25 G: 12.5 SOLUTION INTRAVENOUS at 08:12

## 2017-01-01 RX ADMIN — METOPROLOL SUCCINATE 100 MG: 100 TABLET, EXTENDED RELEASE ORAL at 02:10

## 2017-01-01 RX ADMIN — MIDODRINE HYDROCHLORIDE 10 MG: 5 TABLET ORAL at 09:11

## 2017-01-01 RX ADMIN — STANDARDIZED SENNA CONCENTRATE AND DOCUSATE SODIUM 1 TABLET: 8.6; 5 TABLET, FILM COATED ORAL at 08:11

## 2017-01-01 RX ADMIN — SERTRALINE 100 MG: 100 TABLET, FILM COATED ORAL at 09:11

## 2017-01-01 RX ADMIN — GUAIFENESIN AND DEXTROMETHORPHAN 10 ML: 100; 10 SYRUP ORAL at 03:11

## 2017-01-01 RX ADMIN — FENTANYL 1 PATCH: 12 PATCH TRANSDERMAL at 12:11

## 2017-01-01 RX ADMIN — POTASSIUM CHLORIDE 40 MEQ: 1500 TABLET, EXTENDED RELEASE ORAL at 10:10

## 2017-01-01 RX ADMIN — GUAIFENESIN AND DEXTROMETHORPHAN 10 ML: 100; 10 SYRUP ORAL at 01:12

## 2017-01-01 RX ADMIN — PHENYLEPHRINE HYDROCHLORIDE 100 MCG: 10 INJECTION INTRAVENOUS at 04:10

## 2017-01-01 RX ADMIN — MICONAZOLE NITRATE: 20 POWDER TOPICAL at 08:11

## 2017-01-01 RX ADMIN — RAMELTEON 8 MG: 8 TABLET, FILM COATED ORAL at 08:10

## 2017-01-01 RX ADMIN — INSULIN ASPART 1 UNITS: 100 INJECTION, SOLUTION INTRAVENOUS; SUBCUTANEOUS at 10:10

## 2017-01-01 RX ADMIN — LEVALBUTEROL 1.25 MG: 1.25 SOLUTION, CONCENTRATE RESPIRATORY (INHALATION) at 07:12

## 2017-01-01 RX ADMIN — ATORVASTATIN CALCIUM 80 MG: 20 TABLET, FILM COATED ORAL at 09:12

## 2017-01-01 RX ADMIN — Medication 0.5 MCG/KG/MIN: at 05:10

## 2017-01-01 RX ADMIN — INSULIN HUMAN 10 UNITS: 100 INJECTION, SOLUTION PARENTERAL at 06:10

## 2017-01-01 RX ADMIN — HYDROCODONE BITARTRATE AND ACETAMINOPHEN 1 TABLET: 10; 325 TABLET ORAL at 08:11

## 2017-01-01 RX ADMIN — SERTRALINE 100 MG: 50 TABLET, FILM COATED ORAL at 12:10

## 2017-01-01 RX ADMIN — ATORVASTATIN CALCIUM 80 MG: 20 TABLET, FILM COATED ORAL at 10:10

## 2017-01-01 RX ADMIN — IPRATROPIUM BROMIDE AND ALBUTEROL SULFATE 3 ML: .5; 3 SOLUTION RESPIRATORY (INHALATION) at 09:12

## 2017-01-01 RX ADMIN — METOPROLOL TARTRATE 25 MG: 25 TABLET, FILM COATED ORAL at 12:11

## 2017-01-01 RX ADMIN — BENZONATATE 100 MG: 100 CAPSULE ORAL at 10:11

## 2017-01-01 RX ADMIN — INSULIN ASPART 2 UNITS: 100 INJECTION, SOLUTION INTRAVENOUS; SUBCUTANEOUS at 05:11

## 2017-01-01 RX ADMIN — ACETAMINOPHEN 650 MG: 325 TABLET ORAL at 10:12

## 2017-01-01 RX ADMIN — IPRATROPIUM BROMIDE AND ALBUTEROL SULFATE 3 ML: .5; 3 SOLUTION RESPIRATORY (INHALATION) at 01:12

## 2017-01-01 RX ADMIN — ATORVASTATIN CALCIUM 80 MG: 20 TABLET, FILM COATED ORAL at 08:10

## 2017-01-01 RX ADMIN — INSULIN ASPART 1 UNITS: 100 INJECTION, SOLUTION INTRAVENOUS; SUBCUTANEOUS at 09:10

## 2017-01-01 RX ADMIN — ACETAMINOPHEN 650 MG: 325 TABLET ORAL at 12:10

## 2017-01-01 RX ADMIN — GUAIFENESIN AND DEXTROMETHORPHAN 10 ML: 100; 10 SYRUP ORAL at 01:11

## 2017-01-01 RX ADMIN — ATORVASTATIN CALCIUM 80 MG: 20 TABLET, FILM COATED ORAL at 01:11

## 2017-01-01 RX ADMIN — PANTOPRAZOLE SODIUM 40 MG: 40 TABLET, DELAYED RELEASE ORAL at 09:11

## 2017-01-01 RX ADMIN — Medication 2.5 MG: at 06:11

## 2017-01-01 RX ADMIN — ERYTHROPOIETIN 10000 UNITS: 10000 INJECTION, SOLUTION INTRAVENOUS; SUBCUTANEOUS at 12:11

## 2017-01-01 RX ADMIN — ACETAMINOPHEN 650 MG: 325 TABLET ORAL at 03:12

## 2017-01-01 RX ADMIN — SODIUM CHLORIDE: 0.9 INJECTION, SOLUTION INTRAVENOUS at 12:10

## 2017-01-01 RX ADMIN — ACETAMINOPHEN 650 MG: 325 TABLET ORAL at 02:12

## 2017-01-01 RX ADMIN — RAMELTEON 8 MG: 8 TABLET, FILM COATED ORAL at 10:11

## 2017-01-01 RX ADMIN — ACETAMINOPHEN 650 MG: 325 TABLET ORAL at 11:11

## 2017-01-01 RX ADMIN — SODIUM CHLORIDE 500 ML: 0.9 INJECTION, SOLUTION INTRAVENOUS at 04:11

## 2017-01-01 RX ADMIN — INSULIN DETEMIR 18 UNITS: 100 INJECTION, SOLUTION SUBCUTANEOUS at 10:10

## 2017-01-01 RX ADMIN — POLYETHYLENE GLYCOL 3350 17 G: 17 POWDER, FOR SOLUTION ORAL at 02:11

## 2017-01-01 RX ADMIN — MICONAZOLE NITRATE: 20 POWDER TOPICAL at 12:12

## 2017-01-01 RX ADMIN — PANTOPRAZOLE SODIUM 40 MG: 40 TABLET, DELAYED RELEASE ORAL at 09:12

## 2017-01-01 RX ADMIN — HYDROCODONE BITARTRATE AND ACETAMINOPHEN 1 TABLET: 10; 325 TABLET ORAL at 09:11

## 2017-01-01 RX ADMIN — CEFAZOLIN SODIUM 2 G: 2 SOLUTION INTRAVENOUS at 04:11

## 2017-01-01 RX ADMIN — MOXIFLOXACIN HYDROCHLORIDE 400 MG: 400 INJECTION, SOLUTION INTRAVENOUS at 09:12

## 2017-01-01 RX ADMIN — ERYTHROPOIETIN 10000 UNITS: 10000 INJECTION, SOLUTION INTRAVENOUS; SUBCUTANEOUS at 04:12

## 2017-01-01 RX ADMIN — HYDROCODONE BITARTRATE AND ACETAMINOPHEN 1 TABLET: 10; 325 TABLET ORAL at 07:11

## 2017-01-01 RX ADMIN — METOPROLOL TARTRATE 2.5 MG: 5 INJECTION, SOLUTION INTRAVENOUS at 03:12

## 2017-01-01 RX ADMIN — ATORVASTATIN CALCIUM 80 MG: 20 TABLET, FILM COATED ORAL at 09:11

## 2017-01-01 RX ADMIN — ACETAMINOPHEN 650 MG: 325 TABLET ORAL at 05:10

## 2017-01-01 RX ADMIN — STANDARDIZED SENNA CONCENTRATE AND DOCUSATE SODIUM 1 TABLET: 8.6; 5 TABLET, FILM COATED ORAL at 01:10

## 2017-01-01 RX ADMIN — PREGABALIN 25 MG: 25 CAPSULE ORAL at 01:10

## 2017-01-01 RX ADMIN — ACETAMINOPHEN 650 MG: 325 TABLET ORAL at 02:11

## 2017-01-01 RX ADMIN — METOPROLOL TARTRATE 25 MG: 25 TABLET ORAL at 03:11

## 2017-01-01 RX ADMIN — ALBUMIN (HUMAN) 25 G: 12.5 SOLUTION INTRAVENOUS at 08:11

## 2017-01-01 RX ADMIN — Medication 1 TABLET: at 07:10

## 2017-01-01 RX ADMIN — SERTRALINE 100 MG: 100 TABLET, FILM COATED ORAL at 10:11

## 2017-01-01 RX ADMIN — PREGABALIN 50 MG: 25 CAPSULE ORAL at 02:11

## 2017-01-01 RX ADMIN — INSULIN ASPART 4 UNITS: 100 INJECTION, SOLUTION INTRAVENOUS; SUBCUTANEOUS at 12:11

## 2017-01-01 RX ADMIN — ACETAMINOPHEN 650 MG: 325 TABLET ORAL at 05:12

## 2017-01-01 RX ADMIN — INSULIN DETEMIR 10 UNITS: 100 INJECTION, SOLUTION SUBCUTANEOUS at 10:11

## 2017-01-01 RX ADMIN — IPRATROPIUM BROMIDE AND ALBUTEROL SULFATE 3 ML: .5; 3 SOLUTION RESPIRATORY (INHALATION) at 07:11

## 2017-01-01 RX ADMIN — MICONAZOLE NITRATE: 20 POWDER TOPICAL at 08:12

## 2017-01-01 RX ADMIN — CALCIUM GLUCONATE 1 G: 94 INJECTION, SOLUTION INTRAVENOUS at 06:10

## 2017-01-01 RX ADMIN — BISACODYL 10 MG: 10 SUPPOSITORY RECTAL at 09:11

## 2017-01-01 RX ADMIN — LIDOCAINE HYDROCHLORIDE 80 MG: 20 INJECTION, SOLUTION INTRAVENOUS at 09:11

## 2017-01-01 RX ADMIN — SERTRALINE 100 MG: 100 TABLET, FILM COATED ORAL at 10:12

## 2017-01-01 RX ADMIN — CISATRACURIUM BESYLATE 4 MG: 10 INJECTION INTRAVENOUS at 10:11

## 2017-01-01 RX ADMIN — HYDROCODONE BITARTRATE AND ACETAMINOPHEN 1 TABLET: 10; 325 TABLET ORAL at 02:11

## 2017-01-01 RX ADMIN — ALBUMIN (HUMAN) 25 G: 12.5 SOLUTION INTRAVENOUS at 12:11

## 2017-01-01 RX ADMIN — FENTANYL 1 PATCH: 12 PATCH TRANSDERMAL at 03:11

## 2017-01-01 RX ADMIN — FENTANYL CITRATE 100 MCG: 50 INJECTION, SOLUTION INTRAMUSCULAR; INTRAVENOUS at 12:10

## 2017-01-01 RX ADMIN — IPRATROPIUM BROMIDE AND ALBUTEROL SULFATE 3 ML: .5; 3 SOLUTION RESPIRATORY (INHALATION) at 09:10

## 2017-01-01 RX ADMIN — HYDROCODONE BITARTRATE AND ACETAMINOPHEN 1 TABLET: 10; 325 TABLET ORAL at 06:10

## 2017-01-01 RX ADMIN — HYDROMORPHONE HYDROCHLORIDE 0.2 MG: 1 INJECTION, SOLUTION INTRAMUSCULAR; INTRAVENOUS; SUBCUTANEOUS at 05:10

## 2017-01-01 RX ADMIN — ASPIRIN 81 MG CHEWABLE TABLET 81 MG: 81 TABLET CHEWABLE at 10:10

## 2017-01-01 RX ADMIN — SODIUM CHLORIDE 300 ML: 0.9 INJECTION, SOLUTION INTRAVENOUS at 11:10

## 2017-01-01 RX ADMIN — STANDARDIZED SENNA CONCENTRATE AND DOCUSATE SODIUM 1 TABLET: 8.6; 5 TABLET, FILM COATED ORAL at 11:12

## 2017-01-01 RX ADMIN — HYDROMORPHONE HYDROCHLORIDE 0.2 MG: 1 INJECTION, SOLUTION INTRAMUSCULAR; INTRAVENOUS; SUBCUTANEOUS at 02:10

## 2017-01-01 RX ADMIN — LIDOCAINE 2 PATCH: 50 PATCH TOPICAL at 06:11

## 2017-01-01 RX ADMIN — INSULIN ASPART 2 UNITS: 100 INJECTION, SOLUTION INTRAVENOUS; SUBCUTANEOUS at 11:11

## 2017-01-01 RX ADMIN — ALBUTEROL SULFATE 2.5 MG: 2.5 SOLUTION RESPIRATORY (INHALATION) at 05:11

## 2017-01-01 RX ADMIN — ALBUMIN (HUMAN) 25 G: 12.5 SOLUTION INTRAVENOUS at 10:11

## 2017-01-01 RX ADMIN — SODIUM CHLORIDE: 0.9 INJECTION, SOLUTION INTRAVENOUS at 01:10

## 2017-01-01 RX ADMIN — SODIUM CHLORIDE 300 ML: 0.9 INJECTION, SOLUTION INTRAVENOUS at 04:11

## 2017-01-01 RX ADMIN — PROPOFOL 100 MCG/KG/MIN: 10 INJECTION, EMULSION INTRAVENOUS at 01:10

## 2017-01-01 RX ADMIN — Medication 1 MCG/KG/MIN: at 09:10

## 2017-01-01 RX ADMIN — MIDODRINE HYDROCHLORIDE 10 MG: 5 TABLET ORAL at 03:12

## 2017-01-01 RX ADMIN — PREGABALIN 50 MG: 25 CAPSULE ORAL at 09:12

## 2017-01-01 RX ADMIN — ACETAMINOPHEN 500 MG: 500 TABLET ORAL at 10:10

## 2017-01-01 RX ADMIN — ATORVASTATIN CALCIUM 80 MG: 20 TABLET, FILM COATED ORAL at 02:11

## 2017-01-01 RX ADMIN — ATORVASTATIN CALCIUM 40 MG: 40 TABLET, FILM COATED ORAL at 09:10

## 2017-01-01 RX ADMIN — IPRATROPIUM BROMIDE AND ALBUTEROL SULFATE 3 ML: .5; 3 SOLUTION RESPIRATORY (INHALATION) at 09:11

## 2017-01-01 RX ADMIN — HYDROCODONE BITARTRATE AND ACETAMINOPHEN 1 TABLET: 10; 325 TABLET ORAL at 04:10

## 2017-01-01 RX ADMIN — PHENYLEPHRINE HYDROCHLORIDE 50 MCG: 10 INJECTION INTRAVENOUS at 09:11

## 2017-01-01 RX ADMIN — LIDOCAINE 3 PATCH: 50 PATCH TOPICAL at 11:12

## 2017-01-01 RX ADMIN — DIGOXIN 0.25 MG: 0.12 TABLET ORAL at 01:12

## 2017-01-01 RX ADMIN — MIDODRINE HYDROCHLORIDE 10 MG: 5 TABLET ORAL at 02:12

## 2017-01-01 RX ADMIN — PHENYLEPHRINE HYDROCHLORIDE 0.25 MCG/KG/MIN: 10 INJECTION INTRAVENOUS at 01:10

## 2017-01-01 RX ADMIN — PREGABALIN 50 MG: 25 CAPSULE ORAL at 08:11

## 2017-01-01 RX ADMIN — ACETAMINOPHEN 650 MG: 325 TABLET ORAL at 01:12

## 2017-01-01 RX ADMIN — PANTOPRAZOLE SODIUM 40 MG: 40 TABLET, DELAYED RELEASE ORAL at 12:10

## 2017-01-01 RX ADMIN — FENTANYL 1 PATCH: 12 PATCH TRANSDERMAL at 09:10

## 2017-01-01 RX ADMIN — CEFAZOLIN 1 G: 1 INJECTION, POWDER, FOR SOLUTION INTRAVENOUS at 10:11

## 2017-01-01 RX ADMIN — DEXTROSE MONOHYDRATE 12.5 G: 25 INJECTION, SOLUTION INTRAVENOUS at 07:10

## 2017-01-01 RX ADMIN — DIGOXIN 0.25 MG: 0.12 TABLET ORAL at 06:12

## 2017-01-01 RX ADMIN — GUAIFENESIN AND DEXTROMETHORPHAN 10 ML: 100; 10 SYRUP ORAL at 07:11

## 2017-01-01 RX ADMIN — SERTRALINE 100 MG: 50 TABLET, FILM COATED ORAL at 10:10

## 2017-01-01 RX ADMIN — SODIUM CHLORIDE 500 ML: 0.9 INJECTION, SOLUTION INTRAVENOUS at 10:10

## 2017-01-01 RX ADMIN — SODIUM CHLORIDE 500 ML: 900 INJECTION, SOLUTION INTRAVENOUS at 07:10

## 2017-01-01 RX ADMIN — PANTOPRAZOLE SODIUM 40 MG: 40 TABLET, DELAYED RELEASE ORAL at 11:11

## 2017-01-01 RX ADMIN — METOPROLOL TARTRATE 25 MG: 25 TABLET, FILM COATED ORAL at 08:11

## 2017-01-01 RX ADMIN — ROCURONIUM BROMIDE 5 MG: 10 INJECTION, SOLUTION INTRAVENOUS at 12:10

## 2017-01-01 RX ADMIN — PREGABALIN 50 MG: 25 CAPSULE ORAL at 10:11

## 2017-01-01 RX ADMIN — IPRATROPIUM BROMIDE AND ALBUTEROL SULFATE 3 ML: .5; 3 SOLUTION RESPIRATORY (INHALATION) at 10:10

## 2017-01-01 RX ADMIN — PREGABALIN 50 MG: 25 CAPSULE ORAL at 09:11

## 2017-01-01 RX ADMIN — POTASSIUM CHLORIDE 40 MEQ: 1500 TABLET, EXTENDED RELEASE ORAL at 12:10

## 2017-01-01 RX ADMIN — INSULIN DETEMIR 20 UNITS: 100 INJECTION, SOLUTION SUBCUTANEOUS at 01:10

## 2017-01-01 RX ADMIN — HYDROCODONE BITARTRATE AND ACETAMINOPHEN 1 TABLET: 10; 325 TABLET ORAL at 02:10

## 2017-01-01 RX ADMIN — CEFAZOLIN 2 G: 1 INJECTION, POWDER, FOR SOLUTION INTRAVENOUS at 01:10

## 2017-01-01 RX ADMIN — FUROSEMIDE 40 MG: 10 INJECTION, SOLUTION INTRAMUSCULAR; INTRAVENOUS at 08:11

## 2017-01-01 RX ADMIN — CEFTRIAXONE 1 G: 1 INJECTION, SOLUTION INTRAVENOUS at 02:11

## 2017-01-01 RX ADMIN — ERYTHROPOIETIN 5000 UNITS: 10000 INJECTION, SOLUTION INTRAVENOUS; SUBCUTANEOUS at 11:11

## 2017-01-01 RX ADMIN — MIDODRINE HYDROCHLORIDE 10 MG: 5 TABLET ORAL at 10:12

## 2017-01-01 RX ADMIN — SODIUM CHLORIDE 300 ML: 0.9 INJECTION, SOLUTION INTRAVENOUS at 03:11

## 2017-01-01 RX ADMIN — HYDROMORPHONE HYDROCHLORIDE 1 MG: 1 INJECTION, SOLUTION INTRAMUSCULAR; INTRAVENOUS; SUBCUTANEOUS at 07:10

## 2017-01-01 RX ADMIN — Medication 1 TABLET: at 01:10

## 2017-01-01 RX ADMIN — HEPARIN SODIUM AND DEXTROSE 17 UNITS/KG/HR: 10000; 5 INJECTION INTRAVENOUS at 11:10

## 2017-01-01 RX ADMIN — METOPROLOL TARTRATE 25 MG: 25 TABLET ORAL at 06:12

## 2017-01-01 RX ADMIN — HEPARIN SODIUM 5000 UNITS: 5000 INJECTION, SOLUTION INTRAVENOUS; SUBCUTANEOUS at 02:11

## 2017-01-01 RX ADMIN — ERYTHROPOIETIN 5000 UNITS: 10000 INJECTION, SOLUTION INTRAVENOUS; SUBCUTANEOUS at 12:11

## 2017-01-01 RX ADMIN — CEFAZOLIN SODIUM 2 G: 2 SOLUTION INTRAVENOUS at 01:11

## 2017-01-01 RX ADMIN — FLUCONAZOLE 200 MG: 200 TABLET ORAL at 05:11

## 2017-01-01 RX ADMIN — METOPROLOL TARTRATE 25 MG: 25 TABLET ORAL at 11:11

## 2017-01-01 RX ADMIN — SODIUM CHLORIDE 250 ML: 0.9 INJECTION, SOLUTION INTRAVENOUS at 07:11

## 2017-01-01 RX ADMIN — ASPIRIN 81 MG CHEWABLE TABLET 81 MG: 81 TABLET CHEWABLE at 04:10

## 2017-01-01 RX ADMIN — WARFARIN SODIUM 4 MG: 1 TABLET ORAL at 05:10

## 2017-01-01 RX ADMIN — GABAPENTIN 600 MG: 300 CAPSULE ORAL at 08:10

## 2017-01-01 RX ADMIN — METOPROLOL TARTRATE 25 MG: 25 TABLET ORAL at 05:12

## 2017-01-01 RX ADMIN — SERTRALINE 100 MG: 100 TABLET, FILM COATED ORAL at 12:11

## 2017-01-01 RX ADMIN — STANDARDIZED SENNA CONCENTRATE AND DOCUSATE SODIUM 1 TABLET: 8.6; 5 TABLET, FILM COATED ORAL at 09:12

## 2017-01-01 RX ADMIN — CEFEPIME HYDROCHLORIDE 1 G: 1 INJECTION, POWDER, FOR SOLUTION INTRAMUSCULAR; INTRAVENOUS at 06:11

## 2017-01-01 RX ADMIN — MIDODRINE HYDROCHLORIDE 5 MG: 5 TABLET ORAL at 01:10

## 2017-01-01 RX ADMIN — Medication 1 MCG/KG/MIN: at 03:10

## 2017-01-01 RX ADMIN — SERTRALINE 100 MG: 100 TABLET, FILM COATED ORAL at 09:12

## 2017-01-01 RX ADMIN — SODIUM CHLORIDE, SODIUM GLUCONATE, SODIUM ACETATE, POTASSIUM CHLORIDE, MAGNESIUM CHLORIDE, SODIUM PHOSPHATE, DIBASIC, AND POTASSIUM PHOSPHATE: .53; .5; .37; .037; .03; .012; .00082 INJECTION, SOLUTION INTRAVENOUS at 08:11

## 2017-01-01 RX ADMIN — HYDROCODONE BITARTRATE AND ACETAMINOPHEN 1 TABLET: 10; 325 TABLET ORAL at 12:11

## 2017-01-01 RX ADMIN — INSULIN DETEMIR 18 UNITS: 100 INJECTION, SOLUTION SUBCUTANEOUS at 08:10

## 2017-01-01 RX ADMIN — METOPROLOL SUCCINATE 150 MG: 100 TABLET, EXTENDED RELEASE ORAL at 08:10

## 2017-01-01 RX ADMIN — ACETAMINOPHEN 650 MG: 325 TABLET ORAL at 09:12

## 2017-01-01 RX ADMIN — SODIUM CHLORIDE: 0.9 INJECTION, SOLUTION INTRAVENOUS at 08:12

## 2017-01-01 RX ADMIN — HEPARIN SODIUM AND DEXTROSE 17 UNITS/KG/HR: 10000; 5 INJECTION INTRAVENOUS at 04:10

## 2017-01-01 RX ADMIN — SERTRALINE 100 MG: 100 TABLET, FILM COATED ORAL at 11:12

## 2017-01-01 RX ADMIN — IOHEXOL 100 ML: 350 INJECTION, SOLUTION INTRAVENOUS at 11:12

## 2017-01-01 RX ADMIN — HYDROCODONE BITARTRATE AND ACETAMINOPHEN 1 TABLET: 10; 325 TABLET ORAL at 03:10

## 2017-01-01 RX ADMIN — GUAIFENESIN AND DEXTROMETHORPHAN 10 ML: 100; 10 SYRUP ORAL at 12:12

## 2017-01-01 RX ADMIN — FENTANYL CITRATE 25 MCG: 50 INJECTION, SOLUTION INTRAMUSCULAR; INTRAVENOUS at 12:11

## 2017-01-01 RX ADMIN — ASPIRIN 81 MG CHEWABLE TABLET 81 MG: 81 TABLET CHEWABLE at 02:10

## 2017-01-01 RX ADMIN — IPRATROPIUM BROMIDE AND ALBUTEROL SULFATE 3 ML: .5; 3 SOLUTION RESPIRATORY (INHALATION) at 02:12

## 2017-01-01 RX ADMIN — CYCLOBENZAPRINE HYDROCHLORIDE 5 MG: 5 TABLET, FILM COATED ORAL at 01:11

## 2017-01-01 RX ADMIN — PHENYLEPHRINE HYDROCHLORIDE 100 MCG: 10 INJECTION INTRAVENOUS at 09:11

## 2017-01-01 RX ADMIN — MIDODRINE HYDROCHLORIDE 10 MG: 5 TABLET ORAL at 12:11

## 2017-01-01 RX ADMIN — METOPROLOL TARTRATE 25 MG: 25 TABLET ORAL at 09:12

## 2017-01-01 RX ADMIN — Medication 1 TABLET: at 12:10

## 2017-01-01 RX ADMIN — HEPARIN SODIUM AND DEXTROSE 17 UNITS/KG/HR: 10000; 5 INJECTION INTRAVENOUS at 02:10

## 2017-01-01 RX ADMIN — PANTOPRAZOLE SODIUM 40 MG: 40 TABLET, DELAYED RELEASE ORAL at 11:12

## 2017-01-01 RX ADMIN — STANDARDIZED SENNA CONCENTRATE AND DOCUSATE SODIUM 1 TABLET: 8.6; 5 TABLET, FILM COATED ORAL at 12:12

## 2017-01-01 RX ADMIN — HEPARIN SODIUM AND DEXTROSE 17 UNITS/KG/HR: 10000; 5 INJECTION INTRAVENOUS at 10:10

## 2017-01-01 RX ADMIN — MIDODRINE HYDROCHLORIDE 10 MG: 5 TABLET ORAL at 01:12

## 2017-01-01 RX ADMIN — LIDOCAINE 3 PATCH: 50 PATCH TOPICAL at 06:11

## 2017-01-01 RX ADMIN — ERYTHROPOIETIN 10000 UNITS: 10000 INJECTION, SOLUTION INTRAVENOUS; SUBCUTANEOUS at 12:12

## 2017-01-01 RX ADMIN — ALBUTEROL SULFATE 2.5 MG: 2.5 SOLUTION RESPIRATORY (INHALATION) at 12:11

## 2017-01-01 RX ADMIN — TRAMADOL HYDROCHLORIDE 50 MG: 50 TABLET, COATED ORAL at 10:11

## 2017-01-01 RX ADMIN — CEFEPIME HYDROCHLORIDE 1 G: 1 INJECTION, POWDER, FOR SOLUTION INTRAMUSCULAR; INTRAVENOUS at 09:11

## 2017-01-01 RX ADMIN — METOPROLOL TARTRATE 25 MG: 25 TABLET ORAL at 07:10

## 2017-01-01 RX ADMIN — ROCURONIUM BROMIDE 30 MG: 10 INJECTION, SOLUTION INTRAVENOUS at 09:11

## 2017-01-01 RX ADMIN — IPRATROPIUM BROMIDE AND ALBUTEROL SULFATE 3 ML: .5; 3 SOLUTION RESPIRATORY (INHALATION) at 08:10

## 2017-01-01 RX ADMIN — HYDROCODONE BITARTRATE AND ACETAMINOPHEN 1 TABLET: 10; 325 TABLET ORAL at 05:10

## 2017-01-01 RX ADMIN — RAMELTEON 8 MG: 8 TABLET, FILM COATED ORAL at 12:11

## 2017-01-01 RX ADMIN — ACETAMINOPHEN 650 MG: 325 TABLET ORAL at 03:10

## 2017-01-01 RX ADMIN — MIDODRINE HYDROCHLORIDE 10 MG: 5 TABLET ORAL at 02:10

## 2017-01-01 RX ADMIN — LEVALBUTEROL 1.25 MG: 1.25 SOLUTION, CONCENTRATE RESPIRATORY (INHALATION) at 12:12

## 2017-01-01 RX ADMIN — METOPROLOL TARTRATE 25 MG: 25 TABLET ORAL at 09:10

## 2017-01-01 RX ADMIN — PREGABALIN 50 MG: 25 CAPSULE ORAL at 01:11

## 2017-01-01 RX ADMIN — HYDROMORPHONE HYDROCHLORIDE 0.2 MG: 1 INJECTION, SOLUTION INTRAMUSCULAR; INTRAVENOUS; SUBCUTANEOUS at 11:10

## 2017-01-01 RX ADMIN — SODIUM CHLORIDE 250 ML: 0.9 INJECTION, SOLUTION INTRAVENOUS at 09:11

## 2017-01-01 RX ADMIN — PROPOFOL 70 MG: 10 INJECTION, EMULSION INTRAVENOUS at 12:10

## 2017-01-01 RX ADMIN — CEFTRIAXONE 1 G: 1 INJECTION, SOLUTION INTRAVENOUS at 03:11

## 2017-01-01 RX ADMIN — MIDODRINE HYDROCHLORIDE 5 MG: 5 TABLET ORAL at 05:10

## 2017-01-01 RX ADMIN — INSULIN DETEMIR 20 UNITS: 100 INJECTION, SOLUTION SUBCUTANEOUS at 08:10

## 2017-01-01 RX ADMIN — MIDODRINE HYDROCHLORIDE 10 MG: 5 TABLET ORAL at 10:10

## 2017-01-01 RX ADMIN — GUAIFENESIN AND DEXTROMETHORPHAN 10 ML: 100; 10 SYRUP ORAL at 06:12

## 2017-01-01 RX ADMIN — MIDODRINE HYDROCHLORIDE 10 MG: 5 TABLET ORAL at 05:12

## 2017-01-01 RX ADMIN — STANDARDIZED SENNA CONCENTRATE AND DOCUSATE SODIUM 1 TABLET: 8.6; 5 TABLET, FILM COATED ORAL at 10:12

## 2017-01-01 RX ADMIN — AMPICILLIN SODIUM AND SULBACTAM SODIUM 3 G: 2; 1 INJECTION, POWDER, FOR SOLUTION INTRAMUSCULAR; INTRAVENOUS at 09:10

## 2017-01-01 RX ADMIN — Medication 1 TABLET: at 02:10

## 2017-01-01 RX ADMIN — ASPIRIN 81 MG: 81 TABLET, COATED ORAL at 10:11

## 2017-01-01 RX ADMIN — FENTANYL CITRATE 25 MCG: 50 INJECTION, SOLUTION INTRAMUSCULAR; INTRAVENOUS at 09:11

## 2017-01-01 RX ADMIN — ATORVASTATIN CALCIUM 80 MG: 20 TABLET, FILM COATED ORAL at 07:10

## 2017-01-01 RX ADMIN — SODIUM CHLORIDE 1.5 G: 9 INJECTION, SOLUTION INTRAVENOUS at 09:10

## 2017-01-01 RX ADMIN — MIDODRINE HYDROCHLORIDE 5 MG: 5 TABLET ORAL at 09:10

## 2017-01-01 RX ADMIN — MIDODRINE HYDROCHLORIDE 10 MG: 5 TABLET ORAL at 11:11

## 2017-01-01 RX ADMIN — MIDODRINE HYDROCHLORIDE 5 MG: 5 TABLET ORAL at 06:10

## 2017-01-01 RX ADMIN — VANCOMYCIN HYDROCHLORIDE 1000 MG: 1 INJECTION, POWDER, LYOPHILIZED, FOR SOLUTION INTRAVENOUS at 10:11

## 2017-01-26 NOTE — PROGRESS NOTES
Patient called to report his INR of 1.9 on 1/23. Confirmed [facility] with [facility] from [place].

## 2017-04-13 NOTE — PROGRESS NOTES
Verbal result taken from ____patient_____. PT/INR __1.9_____ Date drawn___4/10/17_____ Hardcopy to be faxed.

## 2017-04-25 NOTE — PROGRESS NOTES
Pt said if the next on 5/2/17 is good he want to go every other week. He live to far to keep going every week.

## 2017-05-22 NOTE — PROGRESS NOTES
Irina with Dr. Donovan let us know that the pt's pharmacy notified them of a possible DDI with the pt's warfarin.  The pt had a Rx for fluconazole filled on 5/3/2017.  I have tried to reach the pt to verify if/when he is taking this med, but had to LMB.  I will try to reach him again this afternoon.

## 2017-05-22 NOTE — PROGRESS NOTES
"I was able to reach the pt.  He did report taking a course of fluconazole for a kidney stone/infection and reports being done with this Rx for a "few weeks."  I reminded him to contact us with all new medications in the future.  "

## 2017-05-26 NOTE — PROGRESS NOTES
Verbal result taken from ____titus_____. PT/INR __2.0_____ Date drawn____5/23/17____ Hardcopy to be faxed.     Patient is adamant that he was already called but someone and told to continue on current dose and repeat INR next month. It was not anyone from here as there is no record of an INR or a phone call this week. We will repeat INR in 2 weeks due to this being a newer dose. Patient advised that only one provider should be managing his coumadin.

## 2017-06-14 NOTE — PROGRESS NOTES
Patient called to report that on 6/27 he's having carpal tunnel surgery and states [place]. Ahuja at [place] instructed him to hold coumadin for 3 days, advised Patient to please get Dr. Ahuja's Ph. # and call it in to coumadin clinic, next INR is due 6/20

## 2017-06-14 NOTE — PROGRESS NOTES
Patient has a CHADS-VASc score of 5.0 (age (+2), DM, CAD, HTN. I am not recommending Lovenox and will inform Dr. Donovan of these plans. We will advise him on these plans with his upcoming INR 6/20.

## 2017-08-01 NOTE — PROGRESS NOTES
[facility] ph:[phone] to see if patient had inr drawn 7/24 left voice message for nurse Juan 8/1.

## 2017-08-08 NOTE — PATIENT INSTRUCTIONS
Start rosuvastatin (crestor) 20mg (1/2 tablet) nightly.  Blood test in 6 weeks.  Contact pain clinic for pain management.  Contact urologist regarding blood in urine.   Continue current medications.  Echocardiogram in 6 months.  Return to clinic in 6 months.

## 2017-08-08 NOTE — PROGRESS NOTES
Mr. Cline is a patient of Dr. Donovan and was last seen in [facility] 12/29/2016.        Subjective:   Patient ID:  Donta Cline is a 84 y.o. male who presents for follow-up of Atrial Fibrillation; Coronary Artery Disease; and Congestive Heart Failure  .   Problem List:  Atrial fib - parox onset 10/15  Chr diastolic heart failure   COPD   ESRD on HD since 2015  Aortic stenosis - moderate   CAD (OM1 lesion on [facility] in 2008)  Hypercholesterolemia   DM type 2   Obesity    HPI:     Mr. Cline is an 83yo male with a PMHx of CAD (OM1 lesion on [facility] in 2008), moderate aortic stenosis, paroxysmal atrial fibrillation, HFpEF, ESRD on HD (MWF), HTN, HLD, DM II, and obesity here for follow up. He says that he has JOHN which is chronic and not worsened. He says that he experienced some chest pain on month ago during dialysis and took a nitroglycerin which relieved the pain. He has also had another episode where he had chest pain in bed and took 3 nitroglycerin which resolved the pain. He denies palpitations, dizziness, syncope, leg edema, claudication, PND, or orthopnea. He is in a wheelchair but uses a walker to ambulate at home. He is currently taking coumadin for stroke prophylaxis and says that he sometimes experiences hematuria (he has a urethral stent) which has worsened since his last stent was inserted 2-3 months ago. He has not yet informed his urologist (who is in [place]). He is also taking ASA 81mg Mon, Wed, and Friday. He stopped his atorvastatin secondary to joint pain about 2 months ago. LDL has increased to 129 from 61.2. He also takes metoprolol 50mg MWF. His BPs have been running low. He says that his movement is limited by severe back pain. Hepatic transaminases are within normal limits. He has DM on insulin and HA1C is 7.7. Weight is stable over the past 6 months.    Recent Cardiac Tests:    2D Echo (12/29/2016):  CONCLUSIONS     1 - Moderate left atrial enlargement.     2 - Concentric hypertrophy.      3 - Normal left ventricular systolic function (EF 60-65%).     4 - Left ventricular diastolic dysfunction.     5 - Normal right ventricular systolic function .     6 - Moderate aortic stenosis, VEL = 1.16 cm2, peak velocity = 2.9 m/s, mean gradient = 23.0 mmHg.     7 - Mild to moderate mitral regurgitation.     8 - Pulmonary hypertension. The estimated PA systolic pressure is 44 mmHg.     Current Outpatient Prescriptions   Medication Sig    albuterol (ACCUNEB) 1.25 mg/3 mL Nebu every 6 (six) hours as needed.    alprazolam (XANAX) 0.25 MG tablet Take 0.25 mg by mouth as needed.    aspirin (ECOTRIN) 81 MG EC tablet 3 days a week (Patient taking differently: Take 81 mg by mouth every Mon, Wed, Fri. )    atorvastatin (LIPITOR) 40 MG tablet Take 1 tablet (40 mg total) by mouth once daily.    B complex vitamins (B COMPLEX) TbSR Take 1 tablet by mouth Daily.      CALCIUM CARBONATE/VITAMIN D3 (VITAMIN D-3 ORAL) Take 1 tablet by mouth once daily.    gabapentin (NEURONTIN) 100 MG capsule Take 1 capsule by mouth every evening.    hydrocodone-acetaminophen 10-325mg (NORCO)  mg Tab Take 1 tablet by mouth every 6 (six) hours as needed.    insulin glargine (LANTUS) 100 unit/mL injection Inject 20 Units into the skin At bedtime.     insulin lispro (HUMALOG KWIKPEN) 100 unit/mL InPn pen Inject 6 Units into the skin 3 (three) times daily with meals.    metoprolol succinate (TOPROL-XL) 50 MG 24 hr tablet take 1 tablet by mouth once daily (Patient taking differently: m w f)    NITROSTAT 0.4 mg SL tablet place 1 tablet under the tongue if needed every 5 minutes for chest pain for 3 doses IF NO RELIEF AFTER 3RD DOSE CALL PRESCRIBER .    pantoprazole (PROTONIX) 40 MG tablet Take 1 tablet by mouth nightly.    NAKIT-AZ RX 1- mg-mg-mcg Tab Take 1 tablet by mouth once daily.    sertraline (ZOLOFT) 100 MG tablet Take 1 tablet by mouth every evening.    temazepam (RESTORIL) 30 mg capsule Take 30 mg by  "mouth nightly as needed.     warfarin (COUMADIN) 4 MG tablet take 1 tablet by mouth once daily     Current Facility-Administered Medications   Medication    albuterol sulfate nebulizer solution 2.5 mg     Review of Systems   Constitution: Positive for weakness and malaise/fatigue.   HENT: Positive for headaches.    Eyes: Negative for blurred vision.   Cardiovascular: Positive for chest pain, dyspnea on exertion, irregular heartbeat and palpitations. Negative for claudication, leg swelling, orthopnea, paroxysmal nocturnal dyspnea and syncope.   Respiratory: Positive for cough. Negative for shortness of breath.    Hematologic/Lymphatic: Negative for bleeding problem.   Skin: Negative for rash.   Musculoskeletal: Positive for arthritis, back pain, joint pain, muscle cramps, muscle weakness and myalgias.   Gastrointestinal: Positive for abdominal pain. Negative for constipation, diarrhea and nausea.   Genitourinary: Positive for hematuria and nocturia.   Neurological: Positive for light-headedness. Negative for loss of balance and numbness.   Psychiatric/Behavioral: Negative for altered mental status.   Allergic/Immunologic: Negative for persistent infections.     Objective:        BP (!) 86/48   Pulse 82   Ht 5' 8" (1.727 m)   Wt 91.1 kg (200 lb 13.4 oz)   BMI 30.54 kg/m²     Physical Exam   Constitutional: He is oriented to person, place, and time. He appears well-developed and well-nourished.   HENT:   Head: Normocephalic.   Nose: Nose normal.   Eyes: Pupils are equal, round, and reactive to light.   Neck: No JVD present. Carotid bruit is not present.   Cardiovascular: Normal rate, S1 normal and S2 normal.  An irregularly irregular rhythm present. Exam reveals no gallop.    Murmur heard.   Harsh midsystolic murmur is present with a grade of 2/6  at the upper right sternal border radiating to the neck  Pulses:       Carotid pulses are 2+ on the right side, and 2+ on the left side.       Radial pulses are 2+ on " the right side, and 2+ on the left side.   Pulmonary/Chest: Breath sounds normal. No respiratory distress.   Abdominal: Soft. Bowel sounds are normal. He exhibits no distension. There is no tenderness.   Musculoskeletal: Normal range of motion. He exhibits no edema.   Neurological: He is alert and oriented to person, place, and time.   Skin: Skin is warm and dry. No erythema.        Psychiatric: He has a normal mood and affect. His speech is normal and behavior is normal.   Nursing note and vitals reviewed.    Lab Results   Component Value Date     08/02/2017    K 4.4 08/02/2017    MG 1.8 05/12/2011    CL 95 08/02/2017    CO2 31 (H) 08/02/2017    BUN 44 (H) 08/02/2017    CREATININE 5.8 (H) 08/02/2017     (H) 08/02/2017    HGBA1C 7.7 (H) 05/11/2011    AST 21 08/02/2017    ALT 27 08/02/2017    ALBUMIN 3.3 (L) 03/26/2013    PROT 6.2 03/26/2013    BILITOT 0.3 03/26/2013    WBC 7.47 03/26/2013    HGB 12.3 (L) 03/26/2013    HCT 36.7 (L) 03/26/2013    MCV 96.3 (H) 03/26/2013     03/26/2013    TSH 3.2 03/31/2008    CHOL 207 (H) 08/02/2017    HDL 32 (L) 08/02/2017    LDLCALC 129.2 08/02/2017    TRIG 229 (H) 08/02/2017       Recent Labs  Lab 05/23/17 06/06/17 06/20/17 07/06/17   INR 2.0 1.7 2.2 1.9      Test(s) Reviewed  I have reviewed the following in detail:  [] Stress test   [] Angiography   [] Echocardiogram   [x] Labs   [x] Other:  EKG     Assessment:         1. Chronic diastolic heart failure. EF 60%. Weight stable. There are no signs of significant fluid overload on clinical exam. Patient on hemodialysis.      2. Coronary artery disease involving native coronary artery of native heart without angina pectoris. Two episodes of angina at rest over one month ago. No further episodes of angina during dialysis. CP while in bed likely GERD. JOHN chronic and not worsening. On ASA on dialysis days. Patient stopped statin.     3. Hypercholesteremia. .2. Patient stopped atorvastatin due to muscle aches.  Will start rosuvastatin.     4. Essential hypertension. Controlled. Patient takes metoprolol on dialysis days only.     5. Paroxysmal atrial fibrillation. In AF today. Likely chronic at this point. On coumadin for stroke    6. Nonrheumatic aortic valve stenosis. Moderate aortic stenosis, VEL = 1.16 cm2, peak velocity = 2.9 m/s, mean gradient = 23.0 mmHg per echo 12/2016. Discussed symptoms to watch for (JOHN, hypotension) indicating worsening AS. Discussed TAVR procedure as a possible future treatment option.     7. ESRD (end stage renal disease) on dialysis. M,W,F schedule.     Plan:     Donta was seen today for atrial fibrillation, coronary artery disease and congestive heart failure.    Diagnoses and all orders for this visit:    Chronic diastolic heart failure    Coronary artery disease involving native coronary artery of native heart without angina pectoris  -     EKG 12-lead    Hypercholesteremia  -     rosuvastatin (CRESTOR) 40 MG Tab; Take 1/2 a tab a day    Essential hypertension    Paroxysmal atrial fibrillation    Nonrheumatic aortic valve stenosis  -     2D echo with color flow doppler; Future; Expected date: 02/08/2018    ESRD (end stage renal disease) on dialysis    Start rosuvastatin (crestor) 20mg (1/2 tablet) nightly.  Blood test in 6 weeks.  Contact pain clinic for pain management.  Contact urologist regarding blood in urine.   Continue current medications.  Echocardiogram in 6 months.  Return to clinic in 6 months.    Return in about 6 months (around 2/8/2018).    ------------------------------------------------------------------    SAIGE Briones, NP-C  Consult Cardiology

## 2017-08-08 NOTE — TELEPHONE ENCOUNTER
Patsy with [facility] pharmacy notified to cancel prescription for Atorvastatin 40mg from pt's profile. Pt states he  no longer takes this medication.

## 2017-08-28 NOTE — PROGRESS NOTES
MD Sukhjinder Ruiz MA   Cc: Irina Johnson LPN             Do not discharge him from [facility] Clinic.   He is on dialysis 3 days a week. Suggest you see if the Hemodialysis center in [place] can draw the INR.    Previous Messages      ----- Message -----   From: Sukhjinder Larsen MA   Sent: 8/24/2017   3:12 PM   To: Prabha Donovan MD     Good Afternoon Dr. Prabha Donovan,     I just wanted to inform you that we have been trying to contact Donta Cline to re-schedule a PT/INR.  We have been unable to reach the patient by phone after multiple attempts and are now mailing out a letter.  If the patient does not respond or schedule an INR within 7 days, the patient will be discharged from the Coumadin Clinic.  I just wanted to make sure you were aware of the current situation.

## 2017-08-28 NOTE — PROGRESS NOTES
The pt called me back this afternoon.  He is reporting that he has transferred his warfarin monitoring to his local PCP, as he feels that we were monitoring his levels too closely.  I am discharging him from the Coumadin Clinic at this time.

## 2017-08-28 NOTE — PROGRESS NOTES
See the above notes.  We can try to get the pt's INRs through his dialysis, if he will let us know where he gets dialysis.  It appears that the main issue is that we cannot get the pt to answer the phone.  I tried to reach him today through is listed home and cell numbers and had to LMFCB on both.  We will continue to try to reach him.

## 2017-10-02 NOTE — TELEPHONE ENCOUNTER
Pt's daughter Wendy notified of results. Wendy states pt having generalized muscle aches. Wendy states pt now wheelchair bound, unable to walk or drive any longer. Wendy states she will hold Atorvastatin for 1 week to determine if muscle aches improved off Atorvastatin.

## 2017-10-02 NOTE — TELEPHONE ENCOUNTER
----- Message from Prabha Donovan MD sent at 10/1/2017  9:18 PM CDT -----  Cholesterol levels are good. Continue same meds

## 2017-10-04 PROBLEM — M25.551 CHRONIC HIP PAIN, RIGHT: Status: ACTIVE | Noted: 2017-01-01

## 2017-10-04 PROBLEM — I21.4 NSTEMI (NON-ST ELEVATED MYOCARDIAL INFARCTION): Status: ACTIVE | Noted: 2017-01-01

## 2017-10-04 PROBLEM — G89.29 CHRONIC HIP PAIN, RIGHT: Status: ACTIVE | Noted: 2017-01-01

## 2017-10-04 NOTE — ED PROVIDER NOTES
[facility]                                     October 4, 2017                   Chief Complaint  85 y.o. male with Leg Pain (rt upper leg) and General Illness    History of Present Illness  Donta Cline presents to the emergency room with fatigue and illness today  Patient has had dwindling activity debilitation last 2 weeks, worse this week now  Patient has had mild chest discomfort, daughter states he's been short of breath  Daughter states the patient had increasing memory difficulties and poor historian  Patient has a history of 8 and cardiac stents, elevated troponin and BNP in the ER   Patient also is now having intractable low back pain, history of back surgery noted  Patient can no longer ambulate or care for himself at home, incredibly debilitated    The history is provided by the patient    Past Medical History   -- Bladder cancer    -- CKD (chronic kidney disease) stage 3, GFR 30-59 ml/min    -- Coronary artery disease    -- Diabetes mellitus    -- Dyslipidemia    -- Hypertension    -- Prostate cancer    -- Ventricular tachycardia      No past surgical history on file.   ALLERGIES: Darvocet and morphine  No family history on file.    Review of Systems and Physical Exam     Review of Systems  -- Constitution - no fever, denies fatigue, no weakness, no chills  -- Eyes - no tearing or redness, no visual disturbance  -- Ear, Nose - no tinnitus or earache, no nasal congestion or discharge  -- Mouth,Throat - no sore throat, no toothache, normal voice, normal swallowing  -- Respiratory - shortness of breath, no JOHN  -- Cardiovascular - chest pain, no palpitations, denies claudication  -- Gastrointestinal - denies abdominal pain, nausea, vomiting, or diarrhea  -- Genitourinary - no dysuria, no denies flank pain, no hematuria or frequency   -- Musculoskeletal - back pain, negative for myalgias and arthralgias   -- Neurological - no headache, denies weakness or seizure; no LOC  -- Skin -  denies pallor, rash, or changes in skin. no hives or welts noted    Vital Signs  -- His blood pressure is 146/77 and his pulse is 92.   -- His respiration is 16 and oxygen saturation is 96%.      Physical Exam  -- Nursing note and vitals reviewed  -- Head: Atraumatic. Normocephalic. No obvious abnormality  -- Eyes: Pupils are equal and reactive to light. Normal conjunctiva and lids  -- Cardiac: Normal rate, regular rhythm and normal heart sounds  -- Pulmonary: Crackles at the bilateral bases with no active wheezing  -- Abdominal: Soft, no tenderness. Normal bowel sounds. Normal liver edge  -- Musculoskeletal: Tenderness around the L4/L5 right paraspinous area  -- Neurological: No focal deficits. Showed good interaction with staff  -- Vascular: Posterior tibial, dorsalis pedis and radial pulses 2+ bilaterally      Emergency Room Course     Lab values  --    -- K 3.1 (L)   -- CL 98   -- CO2 35 (H)   -- BUN 26 (H)   -- CREATININE 3.4 (H)   -- GLU 56 (L)   -- ALKPHOS 104   -- AST 26   -- ALT 19   -- BILITOT 1.1 (H)   -- ALBUMIN 3.4 (L)   -- PROT 6.8   -- WBC 10.92   -- HGB 10.7 (L)   -- HCT 34.6 (L)   --    -- CPK 22   -- CPK 22   -- CPKMB 1.4   -- TROPONINI 0.385 (H)   -- INR 8.7 (HH)   -- BNP 1,135 (H)     EKG  -- The EKG findings today were without concerning findings from baseline    Radiology  -- Chest x-ray showed no infiltrate and showed no acute pathology   -- Right hip x-ray shows no acute findings  --CT L-spine shows significant degeneration/foraminal narrowing of the L-spine     Medications Given  -- nitroGLYCERIN 2% TD oint ointment 0.5 inch (not administered)   -- norepinephrine 4 mg in dextrose 5 % 250 mL infusion (not administered)   -- HYDROmorphone injection 1 mg (1 mg Intramuscular Given 10/4/17 0747)   -- ondansetron injection 4 mg (4 mg Intramuscular Given 10/4/17 0747)   -- pantoprazole EC tablet 40 mg (40 mg Oral Given 10/4/17 0947)   -- atorvastatin tablet 40 mg (40 mg Oral Given  10/4/17 0947)   -- 0.9%  NaCl infusion (500 mLs Intravenous New Bag 10/4/17 1000)      Central Line  -- Performed by: Xavier Mckeon MD  -- Consent Done: Emergent Situation  -- Indications: vascular access  -- Anesthesia: local infiltration  -- Local anesthetic: lidocaine 1% without epinephrine  -- Anesthetic total: 5 ml  -- Preparation: skin prepped with ChloraPrep  -- Location details: right femoral unsuccessful  -- Location details: Left subclavian successful  -- Catheter type: triple lumen  -- Catheter size: 7.5 Fr  -- Number of attempts: 2  -- Post-procedure: line sutured  -- Complications: none     Critical Care ED Physician Time (minutes):  -- Performed by: Xavier Mckeon M.D.  -- Date/Time: 10:44 AM 10/4/2017   -- Direct Patient Care (Face Time): 20  -- Additional History from Records or Taking Additional History: 10  -- Ordering, Reviewing, and Interpreting Diagnostic Studies: 10  -- Total Time in Documentation: 10  -- Consultation with Other Physicians: 0  -- Consultation with Family Related to Condition: 10  -- Total Critical Care Time: 60    ED Physician Notes  -- 10:44 AM: Donta Cline is a 85 y.o. male resented with severe debility/fatigue  -- Elevated troponin, elevated BNP, patient did not go to dialysis as scheduled today  -- The patient also had a systolic blood pressure of 80, central line and pressors started  -- Higher level of care, patient's family prefers [facility]    Diagnosis  -- NSTEMI   -- Weakness  -- Elevated troponin  -- Dialysis patient  -- Congestive heart failure  -- Intractable back pain  -- Poisoning by warfarin   -- Hypotension    Disposition and Plan  -- Disposition: transfer  -- Condition: stable  -- The patient needs a higher level of care  -- The patient is appropriate for transfer     This note is dictated on Dragon Natural Speaking word recognition program.  There are word recognition mistakes that are occasionally missed on review.                Xavier OSUNA  MD Linda  10/05/17 1023

## 2017-10-04 NOTE — ED TRIAGE NOTES
ENTERS ER WITH HIS DAUGHTER.  HAS BEEN C/O RT UPPER LEG/ HIP DISCOMFORT X 4-6 WEEKS NOW.  NO SWELLING, REDNESS,  EDEMA OR DEFORMITY NOTED.  DENIES ANY FALLING OR TRAUMA.  GOOD DISTAL SENSATION.

## 2017-10-04 NOTE — ED NOTES
LEVOPHED WAS PLACED ON HOLD DUE TO IMPROVEMENT WITH BLOOD PRESSURE.  MED SENT WITH EMS ON TRANSFER FOR PRECAUTIONARY EFFORTS AS NEEDED.

## 2017-10-05 PROBLEM — E87.6 HYPOKALEMIA: Status: ACTIVE | Noted: 2017-01-01

## 2017-10-05 PROBLEM — T45.511A COUMADIN TOXICITY: Status: ACTIVE | Noted: 2017-01-01

## 2017-10-06 PROBLEM — J69.0 ASPIRATION PNEUMONIA: Status: ACTIVE | Noted: 2017-01-01

## 2017-10-09 PROBLEM — N39.0 URINARY TRACT INFECTION: Status: ACTIVE | Noted: 2017-01-01

## 2017-10-10 NOTE — PLAN OF CARE
Outside Transfer Acceptance Note    Transferring Physician or Mid Level Provider/Speciality: Dr. Caleb Gore, [facility]     Accepting Physician: Desi Ellsworth     Date of Acceptance: 10/10/2017     Transferring Facility/Hospital: [facility] in [place], [place]    Reason for Transfer to [facility]: Family request for second opinion by Neurosurgery for back pain and lambar disc disease    Report from Transferring Physician or Mid-Level provider/Hospital course: 86 y/o male with past medical history of Type 2 diabetes with ESRD on home insulin therapy, chronic low back and right hip pain due to lumbar spinal stenosis with history of lumbar back surgeries at L4 and L5 and recent epidural steroid injections to address chronic pain in 9/2017 as outpatient, ESRD on HD (MWF), severe aortic stenosis with VEL 0.7 cm2, CAD, HFpEF, bladder cancer and paroxysmal atrial fibrillation on long term anticoagulation with Coumadin was originally seen at [facility] due to worsening low back pain and inability to ambulate with decline in function over past 5 weeks. Patient was noted to have elevate troponin so transferred to [facility] for Cardiology evaluation. Patient was diagnosed with NSTEMI and treated medically. 2 D echo revealed severe aortic stenosis with preserved EF and Cardiology evaluated patient and did not feel LHC warranted at this time but recommended outpatient follow-up with his regular Cardiologist and likely need for outpatient LHC to evaluate his aortic stenosis and coronary anatomy. Patient has been having significant low back pain and right hip pain that is chronic requiring pain medication to control. Patient has been having issues with delirium related to pain medications and also related to aspiration PNA that patient developed in hospital for which patient is currently on IV Zosyn to treat and has required Geodan prn for agitation. Patient was seen and evaluated by  "Neurosurgery at [place] (Dr. James) who noted Ct scan of lumbar spine showed moderate to severe acquired spinal stenosis and bilateral foraminal encroachment at L3-L4. Dr. James did not feel patient was a good surgical candidate due to his multitude of co-morbid conditions. Family (specifically daughter according to Dr. Gore) is  not happy with that opinion and now requesting a second opinion as family notes patient has "debilitating back and hip pain". Daughter requesting transfer to [facility] for second opinion as not happy with care and concerning management of back and hip issues. Dr. Gore states patient is medically stable for transfer but is very debilitated and they were working on SNF vs. IP Rehab vs. LTAC placement for patient. Patient is currently receiving IV Zosyn for aspiration pneumonia and yesterday on 10/9  underwent hemodialysis for chronic HD needs. Patient currently on Coumadin for stroke prevention in patient with PAF.     To do list upon patient arrival:   · Consult Neurosurgery for second opinion concerning chronic back and hip pain in patient with moderate to severe lumbar stenosis  · Consult Nephrology for HD needs while inpatient. Next HD due on 10/11.   · Social Work consult for discharge planning    Please call extension [phone] upon patient arrival to floor for [facility] admit team assignment and for additional admit orders. If patient is coming from another [facility] facility please also call [phone] to inform the admit team/office that patient has arrived from the [facility] facility to the floor so patient can be evaluated.      TREVOR SILVERIO MD  Attending Staff Physician   [facility]  Pager: [phone]  Spectralink: [phone]    "

## 2017-10-11 PROBLEM — K62.5 BRBPR (BRIGHT RED BLOOD PER RECTUM): Status: ACTIVE | Noted: 2017-01-01

## 2017-10-11 PROBLEM — M48.061 LUMBAR STENOSIS: Status: ACTIVE | Noted: 2017-01-01

## 2017-10-11 PROBLEM — M54.16 LUMBAR BACK PAIN WITH RADICULOPATHY AFFECTING RIGHT LOWER EXTREMITY: Status: ACTIVE | Noted: 2017-01-01

## 2017-10-11 NOTE — HPI
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 for second opinion concerning chronic back and hip pain in patient with moderate to severe lumbar stenosis.     Patient was originally seen at [facility] on 10/5/17 due to worsening low back pain and inability to ambulate with decline in function over past 5 weeks. There, patient was noted to have elevated troponin (peak 1.030) so transferred to [facility] for Cardiology evaluation. Patient was diagnosed with NSTEMI and treated medically as his INR was found to be supratherapeutic and on attempted reversal with FFP/vit k he ended up in flash pulmonary edema.  2 D echo revealed moderate-severe aortic stenosis (david 0.7, mean grad 28, velocity 3.4) with preserved EF.  Cardiology evaluated patient and did not feel LHC warranted at this time but recommended outpatient follow-up with his regular Cardiologist and likely need for outpatient LHC to evaluate his aortic stenosis and coronary anatomy.      During this admission, patient was having delirium and developed aspiration PNA (placed on IV Zosyn). Additionally, pt was evaluated by NSx who felt he would be a candidate for surgery after coronary anrgiogram for risk stratitfication and mgmt prior to surgery.

## 2017-10-11 NOTE — PROGRESS NOTES
Hemodialysis x 2 hours complete. Pt demanded tx be terminated, became hostile toward nurse. Blood returned without difficulty. Needles removed and pressure held x 10 minutes until hemostasis achieved. Gauze pressure dressing applied and secured with tape. Dr. Morel at bedside.

## 2017-10-11 NOTE — ASSESSMENT & PLAN NOTE
-pt had AMS at OSH and was reported to have aspiration event  -patient received zosyn at OSH, but given that this is suspected to be aspiration will start patient on unasyn (renally dosed)  -will get updated CXRay  -remained afebrile but will get updated CBC, CMP this AM

## 2017-10-11 NOTE — PROGRESS NOTES
Pt arrived to unit on stretcher via [facility], denies pain, alert, no s/sx distress noted. Daughter and spouse at bedside, pt and family oriented to room and use of call light, side railsx3, fall risk discussed, bed alarm set. Medicine team notified of arrival, BRITNI

## 2017-10-11 NOTE — PT/OT/SLP EVAL
"Speech Language Pathology  Bedside Swallow Study  Evaluation    Donta Cline   MRN: [id number]   [id number] A    Admitting Diagnosis: Aspiration pneumonia    Diet recommendations: Solid Diet Level: Regular  Liquid Diet Level: Thin (no straws)   · No straws,   · HOB to 90 degrees,   · Small bites/sips,   · 1 bite/sip at a time,   · Remain upright 30 minutes post meal   · Meds whole 1 at a time  · Continue to monitor for signs and symptoms of aspiration and discontinue oral feeding should you notice any of the following: watery eyes, reddened facial area, wet vocal quality, increased work of breathing, change in respiratory status, increased congestion, coughing, fever, etc.     Pt would benefit from a MBSS to objectively r/o aspiration and determine the least restrictive and safest po diet 2/2 reported frequent coughing during meals, aspiration dx, and report of globus sensation during meals.     SLP Treatment Date: 10/11/17  Speech Start Time: 0843     Speech Stop Time: 0900     Speech Total (min): 17 min       TREATMENT BILLABLE MINUTES:  Eval Swallow and Oral Function 17    Diagnosis: Aspiration pneumonia    CXR 10/6: Probable small effusions, similar, to decrease since yesterday's exam. No new infiltrates.    H&P: "Mr. 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  Patient was originally seen at [facility] on 10/5/17 due to worsening low back pain and inability to ambulate with decline in function " "over past 5 weeks. There, patient was noted to have elevated troponin so transferred to [facility] for Cardiology evaluation. Patient was diagnosed with NSTEMI and treated medically. Patient was found to have elevated INR and no stents could be placed.  His INR was attempted to be revered with 4U of FFP and vit K but patient went into flash pulm edema.  At this time, 2 D echo revealed severe aortic stenosis with preserved EF and Cardiology evaluated patient and did not feel [facility] warranted at this time but recommended outpatient follow-up with his regular Cardiologist and likely need for outpatient [facility] to evaluate his aortic stenosis and coronary anatomy.   During this admission, patient was having delirium and developed aspiration PNA (placed on IV Zosyn). Additionally, pt was evaluated by NSx who felt he was not a candidate for surgery. Patient was seen and evaluated by Neurosurgery at [place] (Dr. James) who noted Ct scan of lumbar spine showed moderate to severe acquired spinal stenosis and bilateral foraminal encroachment at L3-L4. Patient is here for second opionion from [facility]."    Past Medical History:   Diagnosis Date    Bladder cancer     Chronic diastolic heart failure 8/21/2012    3/13: AOSAT: 98, FICKCI: 2.41, FICKCO: 5.18 PAPRES: 34/16 (23), PASAT: 65, PVR: 1.74 PWPRES: 18/18 (14), RA PRES: 14/13 (12), RV: 40/0, 10     Chronic hip pain, right 10/4/2017    Coronary artery disease involving native coronary artery of native heart without angina pectoris     Dyslipidemia     Encounter for blood transfusion     ESRD (end stage renal disease) on dialysis 6/9/2014    Essential hypertension     Hypercholesteremia 8/21/2012    Long-term (current) use of anticoagulants 10/9/2015    NSTEMI (non-ST elevated myocardial infarction) 10/4/2017    Paroxysmal atrial fibrillation 10/8/2015    Prostate cancer     Severe aortic stenosis 10/14/2014    Type 2 diabetes mellitus with chronic kidney " disease on chronic dialysis, with long-term current use of insulin 12/15/2013    Ventricular tachycardia     monomorphic     Past Surgical History:   Procedure Laterality Date    BACK SURGERY      laminectomy x2    COLON SURGERY      EXTENSIVE PROSTATE SURGER      Prostatectomy, Radical    JOINT REPLACEMENT      left hip       Has the patient been evaluated by SLP for swallowing? : Yes  Keep patient NPO?: No   General Precautions: Standard, aspiration, fall    Current Respiratory Status:  (room air)     Prior diet: reg/thin; globus sensation; frequent coughing during meals    Subjective:  Pt awake and cooperative  Patient goals: none stated.         Objective:      Oral Musculature Evaluation  Oral Musculature: WFL  Dentition: present and adequate  Mucosal Quality: adequate  Mandibular Strength and Mobility: WFL  Oral Labial Strength and Mobility: WFL  Velar Elevation: WFL  Buccal Strength and Mobility: decreased tone (c/w age)  Volitional Cough: productive  Volitional Swallow: timely  Voice Prior to PO Intake: clear     Bedside Swallow Eval:  Consistencies Assessed:   THIN:-ice chip x1, self regulated cup sip of water x3, self regulated straw sip of water x5  PUREE:- tsp bites of apple sauce x5  SOLID: -bite of hortensia cracker x3  Oral Phase: WFL  Pharyngeal Phase: coughing/choking, globus sensation    BSS completed. Pt presented with a productive cough prior to po intake. Family reported frequent coughing noted during meals for the past few months. Pt with an inconsistent cough throughout assessment, however, unable to determine if cough is indicative of swallowing difficulty 2/2 to  Pt coughing prior to po intake. Coughing did not increase as trials increased. Pt reported he sometimes has to stop eating because he can feel food sticking in his throat.     Recommend: regular diet and thin liquids, strict aspiration precautions, Continue to monitor for signs and symptoms of aspiration and discontinue oral  feeding should you notice any of the following: watery eyes, reddened facial area, wet vocal quality, increased work of breathing, change in respiratory status, increased congestion, coughing, fever, etc. Pt would benefit from a MBSS to objectively r/o aspiration and determine the least restrictive and safest po diet 2/2 reported frequent coughing during meals, aspiration dx, and report of globus sensation during meals.     Assessment:  Donta Cline is a 85 y.o. male with a medical diagnosis of Aspiration pneumonia and presents with Dysphagia. ST will continue to follow.          Discharge recommendations: Discharge Facility/Level Of Care Needs:  (tbd)     Goals:    SLP Goals        Problem: SLP Goal    Goal Priority Disciplines Outcome   SLP Goal     SLP Ongoing (interventions implemented as appropriate)   Description:  Speech Therapy Short Term Goals  Goals expected to be met by 10/18  1. Pt will participate in a MBSS to objectively r/o aspiration and determine the least restrictive and safest po diet.                      Plan:   Patient to be seen Therapy Frequency: 5 x/week   Plan of Care expires: 11/09/17  Plan of Care reviewed with: patient, daughter, family  SLP Follow-up?: Yes         SLP G-Codes  Functional Assessment Tool Used: noms  Score: 5  Functional Limitations: Swallowing  Swallow Current Status (): JERRY  Swallow Goal Status (): DUGLAS Sneed, CCC-SLP   Pager: [phone]  10/11/2017

## 2017-10-11 NOTE — PLAN OF CARE
Extended Emergency Contact Information  Primary Emergency Contact: Wendy Cisneros  Address: [address]           [place] [address] [facility]  Home Phone: [phone]  Mobile Phone: [phone]  Relation: Daughter  Secondary Emergency Contact: Simona Galarza  Address: [address]            [place] [address] [facility]  Home Phone: [phone]  Relation: Daughter    J Artemio Richardson Iii, MD  [address] [place] [address]    Future Appointments  Date Time Provider Department Center   10/12/2017 9:00 AM [facility] 500 LB LIMIT [facility]     Payor: MEDICARE / Plan: MEDICARE PART A & B / Product Type: Government /       [facility] [place] [address], [place] - [address] [place] [id number]  Phone: [phone] Fax: [phone]       10/11/17 1627   Discharge Assessment   Assessment Type Discharge Planning Assessment   Confirmed/corrected address and phone number on facesheet? Yes   Assessment information obtained from? Patient;Medical Record   Expected Length of Stay (days) 3   Prior to hospitilization cognitive status: Alert/Oriented   Prior to hospitalization functional status: Assistive Equipment   Current cognitive status: Alert/Oriented   Current Functional Status: Assistive Equipment;Needs Assistance   Lives With alone   Able to Return to Prior Arrangements yes   Is patient able to care for self after discharge? Unable to determine at this time (comments)   Patient's perception of discharge disposition home or selfcare   Readmission Within The Last 30 Days no previous admission in last 30 days   Patient currently being followed by outpatient case management? No   Patient currently receives any other outside agency services? No   Equipment Currently Used at Home bedside commode;shower chair;rollator;wheelchair;cane, straight   Do you have any problems affording any of your prescribed medications? No   Is the  patient taking medications as prescribed? yes   Does the patient have transportation home? Yes   Transportation Available family or friend will provide   Does the patient receive services at the [facility]in Clinic? Yes   Discharge Plan A Home with family;Home Health   Discharge Plan B Skilled Nursing Facility   Patient/Family In Agreement With Plan yes   Readmission Questionnaire   Living Arrangements house   Have you felt down, depressed, or hopeless? 1   Have you felt little interest or pleasure in doing things? 1

## 2017-10-11 NOTE — ASSESSMENT & PLAN NOTE
Theron Cline is a 85 year old white male with past medical history of bladder CA in which he is in remission (was treated with chemotherapy), CAD, A-fib (treated with warfarin) and ESRD on Hd. Patient was transfered from [facility] where he presented with worsening right leg and back pain, general weakness. In hospital presented with increase troponin levels, diagnosed with NSTEMI, increase in INR where was treated with FFP and Vit K, flash pulmonary edema, started on Bi-PAP and aspirated pneumonia (treated with Zosyn).     Consulted for dialysis treatment: iHD MWF, dialyzed in [facility], Rt lower arm AVF, duration 3.5, followed by Dr. Mathur, EDW 90 kg and had his last dialysis on Monday.     - Patient will continue with dialysis treatment today for removal of toxins and excess fluid, Goal of 3 hrs with a UF of 1-2 L as tolerated by patient, maintain MAP>65  - Anemia (hgb 9.9) with hypertension 163/80 mmHg, once controlled will start on EPO, No IV iron since patient with current infection  - Continue with current diet renal diet (low sodium , low phosphate, low potassium).  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

## 2017-10-11 NOTE — CONSULTS
"Cardiology Initial Consult Note    10/11/2017    Reason for Consult: Elevated troponin    SUBJECTIVE  Donta Cline is a 85 y.o. male with a history significant for DM, ESRD on MWF HD via R fistula, severe AS (VEL 0.7, V 3.4 m/s, MG 28 mmHg) but low flow-low gradient with SVI 24, chronic persistent AF on warfarin, CAD ([facility] from 2008 - OM1 ISR reduced to 0% with cutting balloon, 30% RCA ISR - subsequent PET in 2011 negative for ischemia), chronic low back and right hip pain due to lumbar stenosis.  He reports that his back and leg pain have been debilitating and he is no longer able to walk on his own.      From the H&P: "Patient was originally seen at [facility] on 10/5/17 due to worsening low back pain and inability to ambulate with decline in function over past 5 weeks. There, patient was noted to have elevated troponin so transferred to [facility] for Cardiology evaluation. Patient was diagnosed with NSTEMI and treated medically. Patient was found to have elevated INR and no stents could be placed.  His INR was attempted to be revered with 4U of FFP and vit K but patient went into flash pulm edema.  At this time, 2 D echo revealed severe aortic stenosis with preserved EF and Cardiology evaluated patient and did not feel C warranted at this time but recommended outpatient follow-up with his regular Cardiologist and likely need for outpatient C to evaluate his aortic stenosis and coronary anatomy."    Note from Dr. Simon at [facility]: "86 yo male who presented to [facility] for evaluation of debilitating lower back pain that radiated into his right hip and leg. Daughter reported history of back surgery with Dr. James and recent epidural steroid injections from Dr. Callum Reddy. Pain progressed to constant and "unable to walk" prompting ER visit. Also ESRD on HD which he has missed secondary to pain. At [facility] his evaluation revealed AFib with CVR, an INR of >8, and elevated " "Troponin. He reported occasional angina but usually only on HD days and denied any chest pain the day of admit...will need [facility] to evaluate AS and coronary anatomy, can be done as OP with primary cardiologist (Vane)."      While at [facility], he was evaluated by neurosurgery who recommended transfer to tertiary care center.      His only complaint is debilitating back and leg pain, which has left him unable to walk or do much activity. He denies chest pain at this time, and says he did not have any when he presented to the hospital.  He does occasionally have pain in his neck prior to dialysis, had some this morning.    ?    OBJECTIVE  Current Facility-Administered Medications   Medication Dose Route Frequency Provider Last Rate Last Dose    0.9%  NaCl infusion   Intravenous PRN Vince Jacobs MD        0.9%  NaCl infusion   Intravenous Once Vince Jacobs MD        [START ON 10/12/2017] ampicillin-sulbactam 3 g in sodium chloride 0.9 % 100 mL IVPB (ready to mix system)  3 g Intravenous Daily Alessia Ch MD        aspirin chewable tablet 81 mg  81 mg Oral Every Mon, Wed, Fri Barby Del Real MD   81 mg at 10/11/17 1000    dextrose 50% injection 12.5 g  12.5 g Intravenous PRN Barby Del Real MD        dextrose 50% injection 25 g  25 g Intravenous PRN Barby Del Real MD        gabapentin capsule 600 mg  600 mg Oral QHS Tigre Smith MD        glucagon (human recombinant) injection 1 mg  1 mg Intramuscular PRN Barby Del Real MD        glucose chewable tablet 16 g  16 g Oral PRN Barby Del Real MD        glucose chewable tablet 24 g  24 g Oral PRN Barby Del Real MD        hydrocodone-acetaminophen 10-325mg per tablet 1 tablet  1 tablet Oral Q6H PRN Barby Del Real MD   1 tablet at 10/11/17 0057    insulin aspart pen 0-5 Units  0-5 Units Subcutaneous QID (AC + HS) PRN Tigre Smith MD        insulin detemir pen 20 Units  20 Units Subcutaneous QHS Barby Del Real MD "   20 Units at 10/11/17 0114    metoprolol succinate (TOPROL-XL) 24 hr tablet 50 mg  50 mg Oral Daily Barby Del Real MD   50 mg at 10/11/17 1000    nitroGLYCERIN SL tablet 0.3 mg  0.3 mg Sublingual Q5 Min PRN Barby Del Real MD        pantoprazole EC tablet 40 mg  40 mg Oral Nightly Barby Del Real MD   40 mg at 10/11/17 0058    potassium chloride SA CR tablet 40 mEq  40 mEq Oral Daily Tigrejeffrey Smith MD   40 mEq at 10/11/17 1000    ramelteon tablet 8 mg  8 mg Oral Nightly PRN Barby Del Real MD        rosuvastatin tablet 40 mg  40 mg Oral QHS Barby Del Real MD   40 mg at 10/11/17 0057    sertraline tablet 100 mg  100 mg Oral QHS Barby Del Real MD   100 mg at 10/11/17 0057    vit b cmplx 3-fa-vit c-biotin 1- mg-mg-mcg per tablet 1 tablet  1 tablet Oral Daily Barby Del Real MD        warfarin (COUMADIN) tablet 4 mg  4 mg Oral Daily Barby Del Real MD           Past Medical History:   Diagnosis Date    Bladder cancer     Chronic diastolic heart failure 8/21/2012    3/13: AOSAT: 98, FICKCI: 2.41, FICKCO: 5.18 PAPRES: 34/16 (23), PASAT: 65, PVR: 1.74 PWPRES: 18/18 (14), RA PRES: 14/13 (12), RV: 40/0, 10     Chronic hip pain, right 10/4/2017    Coronary artery disease involving native coronary artery of native heart without angina pectoris     Dyslipidemia     Encounter for blood transfusion     ESRD (end stage renal disease) on dialysis 6/9/2014    Essential hypertension     Hypercholesteremia 8/21/2012    Long-term (current) use of anticoagulants 10/9/2015    NSTEMI (non-ST elevated myocardial infarction) 10/4/2017    Paroxysmal atrial fibrillation 10/8/2015    Prostate cancer     Severe aortic stenosis 10/14/2014    Type 2 diabetes mellitus with chronic kidney disease on chronic dialysis, with long-term current use of insulin 12/15/2013    Ventricular tachycardia     monomorphic       Past Surgical History:   Procedure Laterality Date    BACK SURGERY       laminectomy x2    COLON SURGERY      EXTENSIVE PROSTATE SURGER      Prostatectomy, Radical    JOINT REPLACEMENT      left hip       Review of patient's allergies indicates:   Allergen Reactions    Darvocet a500  [propoxyphene n-acetaminophen]      Other reaction(s): Unknown    Morphine      Other reaction(s): Unknown       No family history on file.    Social History     Social History    Marital status:      Spouse name: N/A    Number of children: N/A    Years of education: N/A     Social History Main Topics    Smoking status: Former Smoker     Packs/day: 3.00     Years: 40.00     Quit date: 1/1/1980    Smokeless tobacco: Former User    Alcohol use No      Comment: a few drinks    Drug use: Unknown    Sexual activity: Not Asked     Other Topics Concern    None     Social History Narrative    None       Physical Exam  Vitals: /69 (BP Location: Left arm, Patient Position: Lying)   Pulse 91   Temp 97.9 °F (36.6 °C) (Oral)   Resp 20   SpO2 97%    Gen: NAD  Head/Eyes/Ears/Nose: NCAT, MMM   Neck: No JVD  Lung: CTAB, eupneic  Heart: Regular rate and rhythm, II/VI crescendo-decrescendo systolic ejection murmur at RUSB  Abdomen: Soft, NT/ND, NABS  Extremities: No LE edema bilaterally, warm.   Skin: Normal color and turgor.  Neuro: AAOx3      Recent Labs  Lab 10/09/17  0556 10/10/17  0408 10/11/17  0119    140 139   K 4.1 3.3* 3.4*   CO2 20* 29 26    100 99   BUN 49* 20 26*   CREATININE 5.30* 3.20* 4.6*   * 139* 183*   CALCIUM 10.0 9.6 10.3   ALT 38 40 27   AST 23 29 31   ALKPHOS 82 90 104   BILITOT 0.8 0.9 0.6   PROT 6.4 6.1* 6.4   ALBUMIN 3.5 3.4* 2.8*         Recent Labs  Lab 10/09/17  0556 10/10/17  0408 10/11/17  0119   WBC 8.00 6.40 7.11  7.11   HGB 10.0* 9.4* 9.9*  9.9*   HCT 30.8* 28.7* 30.8*  30.8*    163 200  200   * 107* 107*  107*       Recent Labs  Lab 10/09/17  0556 10/10/17  0408 10/11/17  0349   APTT  --   --  30.1   INR 1.5* 1.4* 1.4*        Recent Labs  Lab 10/04/17  1623 10/05/17  0021 10/05/17  1113   TROPONINI 0.672* 1.030* 0.732*        Results  TTE 10/2017  1. The study quality is average.   2. Global left ventricular systolic function is normal. The left   ventricular ejection fraction is 74%.   3. Severe aortic valve stenosis is present. Aortic valve area   continuity equation is 0.7 cm2.    4. Moderate calcification of the aortic valve is noted.  Mild mitral   annular calcification is noted.   5. The left atrium is mildly enlarged. Left atrial diameter is 4.2   cms. The right atrium is mildly enlarged. Right atrial diameter is   4.67 cms. Left ventricular diastolic septal thickness is 1.9 cms. Left   ventricular diastolic postero basal free wall thickness is 1.9 cms.   The inferior vena cava is moderately increased in size.    6. Mild to moderate (1-2+) tricuspid regurgitation. Mild (1+) aortic   regurgitation. Mild (1+) mitral regurgitation. Trace pulmonic   regurgitation.    7. A moderate pleural effusion is noted.   8. The pulmonary artery systolic pressure is 39 mmHg.     EKG   10/11/17: Atrial fibrillation, non-specific T flattening, HR 95 bpm  ?  ASSESSMENT AND PLAN  Mr. Cline is a 85-yo man with history significant for DM, ESRD on MWF HD via R fistula, severe AS (VEL 0.7, V 3.4 m/s, MG 28 mmHg) but low flow-low gradient with SVI 24, chronic persistent AF on warfarin, CAD ([facility] from 2008 - OM1 ISR reduced to 0% with cutting balloon, 30% RCA ISR - subsequent PET in 2011 negative for ischemia), chronic low back and right hip pain due to lumbar stenosis. He had a mildly elevated troponin on admission to [facility], asymptomatic.  Do not think this is ACS, but likely demand and poor clearance in the setting of severe AS and ESRD.    If he gets surgery by neurosurgery, he may need further workup for cardiac clearance prior to surgery.  His functional capacity is currently poor and he is unable to walk.  Given this with occasional angina,  would uptitrate medical therapy (goal HR 60's ideally) and would consider further workup pending surgery recs.      Recommendations:   - Will order echo here to better evaluate aortic valve.  If it is indeed severe AS, may consider BAV prior to any surgical intervention, if deemed appropriate by neurosurgery.   - Increase metoprolol to XL 75mg qdaily for better HR control (goal HR would be 60's) and uptitrate as tolerated  - Continue aspirin, rosuvastatin  - Nitro SL PRN  - No need for further workup for mildly elevated troponin at this time in the setting of severe AS and ESRD.  However, if neurosurgery is planning operative intervention, would consider [facility] for pre-op risk stratification.   - For afib, would consider resumption of warfarin when no further procedures are planned    Discussed with Cardiology Attending: Dr. Isael Newby MD  Cardiology Fellow

## 2017-10-11 NOTE — PLAN OF CARE
Problem: Patient Care Overview  Goal: Plan of Care Review  Outcome: Ongoing (interventions implemented as appropriate)  Patient remained free from falls & or injuries, participated in a barium swallow exam on today, became aggravated with dialysis & refused to complete session, discussed careplan with son & patient at bedside with understanding of plan

## 2017-10-11 NOTE — HPI
Mr. Cline is a 84 y/o male with past medical history of Type 2 diabetes with ESRD (MWF, Fistula on R arm) on home insulin therapy, chronic low back and right hip pain (due to lumbar spinal stenosis hx back surgeries at L4 and L5 and recent epidural steroid injections to address chronic pain in 9/2017),  severe aortic stenosis with VEL 0.7 cm2, CAD, HFpEF, bladder cancer and paroxysmal atrial fibrillation (Coumadin) [place] for second opinion concerning chronic back and hip pain in patient with moderate to severe lumbar stenosis.    Patient was originally seen at [facility] on 10/5/17 due to worsening low back pain and inability to ambulate with decline in function over past 5 weeks. There, patient was noted to have elevated troponin so transferred to [facility] for Cardiology evaluation. Patient was diagnosed with NSTEMI and treated medically. Patient was found to have elevated INR and no stents could be placed.  His INR was attempted to be revered with 4U of FFP and vit K but patient went into flash pulm edema.  At this time, 2 D echo revealed severe aortic stenosis with preserved EF and Cardiology evaluated patient and did not feel LHC warranted at this time but recommended outpatient follow-up with his regular Cardiologist and likely need for outpatient LHC to evaluate his aortic stenosis and coronary anatomy.     During this admission, patient was having delirium and developed aspiration PNA (placed on IV Zosyn). Additionally, pt was evaluated by NSx who felt he was not a candidate for surgery. Patient was seen and evaluated by Neurosurgery at [facility] (Dr. James) who noted Ct scan of lumbar spine showed moderate to severe acquired spinal stenosis and bilateral foraminal encroachment at L3-L4. Patient is here for second opionion from [facility].

## 2017-10-11 NOTE — SUBJECTIVE & OBJECTIVE
Past Medical History:   Diagnosis Date    Bladder cancer     Chronic diastolic heart failure 2012    3/13: AOSAT: 98, FICKCI: 2.41, FICKCO: 5.18 PAPRES: 34/16 (23), PASAT: 65, PVR: 1.74 PWPRES:  (14), RA PRES:  (12), RV: 40/0, 10     Chronic hip pain, right 10/4/2017    Coronary artery disease involving native coronary artery of native heart without angina pectoris     Dyslipidemia     Encounter for blood transfusion     ESRD (end stage renal disease) on dialysis 2014    Essential hypertension     Hypercholesteremia 2012    Long-term (current) use of anticoagulants 10/9/2015    NSTEMI (non-ST elevated myocardial infarction) 10/4/2017    Paroxysmal atrial fibrillation 10/8/2015    Prostate cancer     Severe aortic stenosis 10/14/2014    Type 2 diabetes mellitus with chronic kidney disease on chronic dialysis, with long-term current use of insulin 12/15/2013    Ventricular tachycardia     monomorphic       Past Surgical History:   Procedure Laterality Date    BACK SURGERY      laminectomy x2    COLON SURGERY      EXTENSIVE PROSTATE SURGER      Prostatectomy, Radical    JOINT REPLACEMENT      left hip       Review of patient's allergies indicates:   Allergen Reactions    Darvocet a500  [propoxyphene n-acetaminophen]      Other reaction(s): Unknown    Morphine      Other reaction(s): Unknown       Current Facility-Administered Medications on File Prior to Encounter   Medication    [] potassium chloride 20 mEq in 100 mL IVPB (FOR CENTRAL LINE ADMINISTRATION ONLY)    [COMPLETED] warfarin (COUMADIN) tablet 7.5 mg    [DISCONTINUED] albuterol sulfate nebulizer solution 1.25 mg    [DISCONTINUED] albuterol-ipratropium 2.5mg-0.5mg/3mL nebulizer solution 3 mL    [DISCONTINUED] aspirin EC tablet 81 mg    [DISCONTINUED] aspirin tablet 325 mg    [DISCONTINUED] enoxaparin injection 80 mg    [DISCONTINUED] epoetin preeti injection 15,000 Units    [DISCONTINUED] fluconazole  (DIFLUCAN) IVPB 200 mg 100 mL    [DISCONTINUED] hydrocodone-acetaminophen 10-325mg per tablet 1 tablet    [DISCONTINUED] hydrocodone-acetaminophen 5-325mg per tablet 2 tablet    [DISCONTINUED] metoprolol succinate (TOPROL-XL) 24 hr tablet 50 mg    [DISCONTINUED] pantoprazole EC tablet 40 mg    [DISCONTINUED] piperacillin-tazobactam IVPB 3.375 g    [DISCONTINUED] potassium chloride SA CR tablet 20 mEq    [DISCONTINUED] quetiapine tablet 100 mg    [DISCONTINUED] rosuvastatin tablet 40 mg    [DISCONTINUED] sertraline tablet 100 mg    [DISCONTINUED] temazepam capsule 15 mg    [DISCONTINUED] tizanidine tablet 4 mg    [DISCONTINUED] trazodone tablet 100 mg    [DISCONTINUED] warfarin (COUMADIN) tablet 4 mg    [DISCONTINUED] warfarin (COUMADIN) tablet 7.5 mg    [DISCONTINUED] ziprasidone injection 20 mg     Current Outpatient Prescriptions on File Prior to Encounter   Medication Sig    albuterol (ACCUNEB) 1.25 mg/3 mL Nebu every 6 (six) hours as needed.    alprazolam (XANAX) 0.25 MG tablet Take 0.25 mg by mouth as needed.    aspirin (ECOTRIN) 81 MG EC tablet 3 days a week (Patient taking differently: Take 81 mg by mouth every Mon, Wed, Fri. )    CALCIUM CARBONATE/VITAMIN D3 (VITAMIN D-3 ORAL) Take 1 tablet by mouth once daily.    chlordiazepoxide-clidinium 5-2.5 mg (LIBRAX) 5-2.5 mg Cap Take 1 capsule by mouth 2 (two) times daily.    coenzyme Q10 200 mg capsule Take 200 mg by mouth once daily.    gabapentin (NEURONTIN) 100 MG capsule Take 300 mg by mouth every evening.     hydrocodone-acetaminophen 10-325mg (NORCO)  mg Tab Take 1 tablet by mouth every 6 (six) hours as needed.    insulin glargine (LANTUS) 100 unit/mL injection Inject 20 Units into the skin At bedtime.     insulin lispro (HUMALOG KWIKPEN) 100 unit/mL InPn pen Inject 6 Units into the skin 3 (three) times daily with meals.    metoprolol succinate (TOPROL-XL) 50 MG 24 hr tablet take 1 tablet by mouth once daily (Patient taking  differently: m w f)    NITROSTAT 0.4 mg SL tablet place 1 tablet under the tongue if needed every 5 minutes for chest pain for 3 doses IF NO RELIEF AFTER 3RD DOSE CALL PRESCRIBER .    pantoprazole (PROTONIX) 40 MG tablet Take 1 tablet by mouth nightly.    ANKIT-AZ RX 1- mg-mg-mcg Tab Take 1 tablet by mouth once daily.    rosuvastatin (CRESTOR) 40 MG Tab Take 1/2 a tab a day (Patient taking differently: Take 40 mg by mouth every evening. )    sertraline (ZOLOFT) 100 MG tablet Take 1 tablet by mouth every evening.    temazepam (RESTORIL) 30 mg capsule Take 30 mg by mouth nightly as needed.     VIBERZI 75 mg Tab Take 1 tablet by mouth 2 (two) times daily.    warfarin (COUMADIN) 4 MG tablet take 1 tablet by mouth once daily     Family History     None        Social History Main Topics    Smoking status: Former Smoker     Packs/day: 3.00     Years: 40.00     Quit date: 1/1/1980    Smokeless tobacco: Former User    Alcohol use No      Comment: a few drinks    Drug use: Unknown    Sexual activity: Not on file     Review of Systems   Constitutional: Positive for unexpected weight change. Negative for chills, fatigue and fever.   Respiratory: Negative for cough, shortness of breath and wheezing.    Cardiovascular: Negative for chest pain, palpitations and leg swelling.   Gastrointestinal: Negative for abdominal pain, constipation, diarrhea and nausea.   Endocrine: Negative for polydipsia, polyphagia and polyuria.   Genitourinary: Negative for dysuria, flank pain, hematuria and urgency.   Musculoskeletal: Positive for back pain, gait problem and myalgias. Negative for arthralgias, neck pain and neck stiffness.   Skin: Negative for rash and wound.        Bruises noted diffusely through upper extremities   Neurological: Positive for weakness. Negative for seizures and headaches.   Hematological: Negative for adenopathy. Bruises/bleeds easily.   Psychiatric/Behavioral: Positive for confusion (not  currently but on last admission). Negative for agitation and behavioral problems.     Objective:     Vital Signs (Most Recent):  Temp: 98.2 °F (36.8 °C) (10/10/17 2330)  Pulse: 100 (10/10/17 2330)  Resp: 20 (10/10/17 2330)  BP: (!) 172/79 (10/10/17 2330)  SpO2: 97 % (10/10/17 2330) Vital Signs (24h Range):  Temp:  [97.6 °F (36.4 °C)-98.9 °F (37.2 °C)] 98.2 °F (36.8 °C)  Pulse:  [] 100  Resp:  [18-20] 20  SpO2:  [95 %-98 %] 97 %  BP: (134-172)/(60-83) 172/79        There is no height or weight on file to calculate BMI.    Physical Exam   Constitutional: He is oriented to person, place, and time. He appears well-developed and well-nourished.   Pt is alert orientated and conversing appropriately   HENT:   Head: Normocephalic.   Nose: Nose normal.   Mouth/Throat: Oropharynx is clear and moist.   Eyes: EOM are normal. Pupils are equal, round, and reactive to light. No scleral icterus.   Neck: Neck supple. No JVD present. No tracheal deviation present. No thyromegaly present.   Cardiovascular: Normal rate.  Exam reveals no gallop and no friction rub.    Murmur (4/6 systolic) heard.  Irregularly irregular rhythm   Pulmonary/Chest: No respiratory distress. He has no wheezes. He has no rales.   Decreased breath sound on R side   Abdominal: Soft. Bowel sounds are normal. He exhibits no distension and no mass. There is no tenderness. There is no rebound and no guarding.   Bruises noted diffusely throughout abdomen   Genitourinary: Rectal exam shows guaiac positive stool.   Genitourinary Comments: decreased rectal tone   Musculoskeletal: He exhibits deformity (scar from previous back surgery). He exhibits no edema.   Lymphadenopathy:     He has no cervical adenopathy.   Neurological: He is alert and oriented to person, place, and time. He exhibits normal muscle tone.   Negative asterixis    Skin: Skin is warm and dry. No rash noted. No erythema.   Psychiatric: He has a normal mood and affect. His behavior is normal.    Vitals reviewed.       Significant Labs:   CBC:   Recent Labs  Lab 10/09/17  0556 10/10/17  0408   WBC 8.00 6.40   HGB 10.0* 9.4*   HCT 30.8* 28.7*    163     CMP:   Recent Labs  Lab 10/09/17  0556 10/10/17  0408    140   K 4.1 3.3*    100   CO2 20* 29   * 139*   BUN 49* 20   CREATININE 5.30* 3.20*   CALCIUM 10.0 9.6   PROT 6.4 6.1*   ALBUMIN 3.5 3.4*   BILITOT 0.8 0.9   ALKPHOS 82 90   AST 23 29   ALT 38 40   EGFRNONAA 9* 17*     TSH: No results for input(s): TSH in the last 4320 hours.  Urine Culture: No results for input(s): LABURIN in the last 48 hours.  Urine Studies: No results for input(s): COLORU, APPEARANCEUA, PHUR, SPECGRAV, PROTEINUA, GLUCUA, KETONESU, BILIRUBINUA, OCCULTUA, NITRITE, UROBILINOGEN, LEUKOCYTESUR, RBCUA, WBCUA, BACTERIA, SQUAMEPITHEL, HYALINECASTS in the last 48 hours.    Invalid input(s): WRIGHTSUR    Significant Imaging: I have reviewed all pertinent imaging results/findings within the past 24 hours.

## 2017-10-11 NOTE — SUBJECTIVE & OBJECTIVE
Past Medical History:   Diagnosis Date    Bladder cancer     Chronic diastolic heart failure 8/21/2012    3/13: AOSAT: 98, FICKCI: 2.41, FICKCO: 5.18 PAPRES: 34/16 (23), PASAT: 65, PVR: 1.74 PWPRES: 18/18 (14), RA PRES: 14/13 (12), RV: 40/0, 10     Chronic hip pain, right 10/4/2017    Coronary artery disease involving native coronary artery of native heart without angina pectoris     Dyslipidemia     Encounter for blood transfusion     ESRD (end stage renal disease) on dialysis 6/9/2014    Essential hypertension     Hypercholesteremia 8/21/2012    Long-term (current) use of anticoagulants 10/9/2015    NSTEMI (non-ST elevated myocardial infarction) 10/4/2017    Paroxysmal atrial fibrillation 10/8/2015    Prostate cancer     Severe aortic stenosis 10/14/2014    Type 2 diabetes mellitus with chronic kidney disease on chronic dialysis, with long-term current use of insulin 12/15/2013    Ventricular tachycardia     monomorphic       Past Surgical History:   Procedure Laterality Date    BACK SURGERY      laminectomy x2    COLON SURGERY      EXTENSIVE PROSTATE SURGER      Prostatectomy, Radical    JOINT REPLACEMENT      left hip       Review of patient's allergies indicates:   Allergen Reactions    Darvocet a500  [propoxyphene n-acetaminophen]      Other reaction(s): Unknown    Morphine      Other reaction(s): Unknown     Current Facility-Administered Medications   Medication Frequency    0.9%  NaCl infusion PRN    0.9%  NaCl infusion Once    [START ON 10/12/2017] ampicillin-sulbactam 3 g in sodium chloride 0.9 % 100 mL IVPB (ready to mix system) Daily    aspirin chewable tablet 81 mg Every Mon, Wed, Fri    dextrose 50% injection 12.5 g PRN    dextrose 50% injection 25 g PRN    gabapentin capsule 600 mg QHS    glucagon (human recombinant) injection 1 mg PRN    glucose chewable tablet 16 g PRN    glucose chewable tablet 24 g PRN    hydrocodone-acetaminophen 10-325mg per tablet 1 tablet Q6H  PRN    insulin aspart pen 0-5 Units QID (AC + HS) PRN    insulin detemir pen 20 Units QHS    metoprolol succinate (TOPROL-XL) 24 hr tablet 50 mg Daily    nitroGLYCERIN SL tablet 0.3 mg Q5 Min PRN    pantoprazole EC tablet 40 mg Nightly    potassium chloride SA CR tablet 40 mEq Daily    ramelteon tablet 8 mg Nightly PRN    rosuvastatin tablet 40 mg QHS    sertraline tablet 100 mg QHS    vit b cmplx 3-fa-vit c-biotin 1- mg-mg-mcg per tablet 1 tablet Daily    warfarin (COUMADIN) tablet 4 mg Daily     Family History     None        Social History Main Topics    Smoking status: Former Smoker     Packs/day: 3.00     Years: 40.00     Quit date: 1/1/1980    Smokeless tobacco: Former User    Alcohol use No      Comment: a few drinks    Drug use: Unknown    Sexual activity: Not on file     Review of Systems   Constitutional: Positive for fatigue. Negative for appetite change, chills and fever.   HENT: Negative for congestion, ear pain, rhinorrhea and sore throat.    Eyes: Negative for pain and discharge.   Respiratory: Positive for cough. Negative for choking, chest tightness and shortness of breath.    Cardiovascular: Negative for chest pain, palpitations and leg swelling.   Gastrointestinal: Negative for abdominal pain, blood in stool, diarrhea, nausea, rectal pain and vomiting.   Endocrine: Negative for polyuria.   Genitourinary: Negative for decreased urine volume, difficulty urinating, dysuria, flank pain and hematuria.   Musculoskeletal: Positive for back pain. Negative for joint swelling and neck pain.   Skin: Negative for color change, pallor, rash and wound.   Neurological: Positive for weakness. Negative for dizziness, seizures and headaches.   Psychiatric/Behavioral: Negative for behavioral problems and confusion.     Objective:     Vital Signs (Most Recent):  Temp: 97.9 °F (36.6 °C) (10/11/17 0343)  Pulse: 94 (10/11/17 0343)  Resp: 20 (10/11/17 0343)  BP: (!) 163/80 (10/11/17 0343)  SpO2: 97  % (10/11/17 0343)  O2 Device (Oxygen Therapy): room air (10/11/17 0343) Vital Signs (24h Range):  Temp:  [97.9 °F (36.6 °C)-98.9 °F (37.2 °C)] 97.9 °F (36.6 °C)  Pulse:  [] 94  Resp:  [18-20] 20  SpO2:  [95 %-98 %] 97 %  BP: (136-172)/(62-80) 163/80        There is no height or weight on file to calculate BMI.  There is no height or weight on file to calculate BSA.    No intake/output data recorded.    Physical Exam   Constitutional: He is oriented to person, place, and time. He appears well-developed and well-nourished.   HENT:   Head: Normocephalic.   Nose: Nose normal.   Mouth/Throat: Oropharynx is clear and moist.   Eyes: No scleral icterus.   Neck: Neck supple. No JVD present. No tracheal deviation present. No thyromegaly present.   Cardiovascular: Normal rate and normal heart sounds.  Exam reveals no gallop and no friction rub.    Irregularly irregular rhythm   Pulmonary/Chest: Effort normal and breath sounds normal. No respiratory distress. He has no wheezes. He has no rales.   Abdominal: Soft. Bowel sounds are normal. He exhibits no distension and no mass. There is no tenderness. There is no rebound and no guarding.   Musculoskeletal: He exhibits no edema.   Lymphadenopathy:     He has no cervical adenopathy.   Neurological: He is alert and oriented to person, place, and time. He exhibits normal muscle tone.   Negative asterixis    Skin: Skin is warm and dry. No rash noted. No erythema.   Vitals reviewed.      Significant Labs:  ABGs:   Recent Labs  Lab 10/07/17  1248   PH 7.251*   PCO2 53.8*   HCO3 23.1     BMP:   Recent Labs  Lab 10/11/17  0119   *   CL 99   CO2 26   BUN 26*   CREATININE 4.6*   CALCIUM 10.3   MG 1.7     CBC:   Recent Labs  Lab 10/11/17  0119   WBC 7.11  7.11   RBC 2.88*  2.88*   HGB 9.9*  9.9*   HCT 30.8*  30.8*     200   *  107*   MCH 34.4*  34.4*   MCHC 32.1  32.1     CMP:   Recent Labs  Lab 10/11/17  0119   *   CALCIUM 10.3   ALBUMIN 2.8*   PROT  6.4      K 3.4*   CO2 26   CL 99   BUN 26*   CREATININE 4.6*   ALKPHOS 104   ALT 27   AST 31   BILITOT 0.6     LFTs:   Recent Labs  Lab 10/11/17  0119   ALT 27   AST 31   ALKPHOS 104   BILITOT 0.6   PROT 6.4   ALBUMIN 2.8*     PTH: No results for input(s): PTH in the last 168 hours.  All labs within the past 24 hours have been reviewed.

## 2017-10-11 NOTE — ASSESSMENT & PLAN NOTE
-patients INR was supra therapeutic at OSH  -according to daughter has decreased  -will get CBC now and trend H/H

## 2017-10-11 NOTE — PROGRESS NOTES
Pharmacist Renal Dose Adjustment Note    Donta Cline is a 85 y.o. male being treated with the medication ampicillin-sulbactam    Patient Data:    Vital Signs (Most Recent):  Temp: 97.9 °F (36.6 °C) (10/11/17 0343)  Pulse: 94 (10/11/17 0343)  Resp: 20 (10/11/17 0343)  BP: (!) 163/80 (10/11/17 0343)  SpO2: 97 % (10/11/17 0343)   Vital Signs (72h Range):  Temp:  [96.6 °F (35.9 °C)-98.9 °F (37.2 °C)]   Pulse:  []   Resp:  [16-20]   BP: (103-172)/(56-83)   SpO2:  [95 %-100 %]        Recent Labs     Lab 10/09/17  0556 10/10/17  0408 10/11/17  0119   CREATININE 5.30* 3.20* 4.6*     Creatinine clearance cannot be calculated (Unknown ideal weight.)    Medication: amp-sulbactam 1.5g IV q12h will be changed to amp-sulbactam 3g IV q24h, pt on HD.    Cheyenne Mcleod, JoonD  [id number]

## 2017-10-11 NOTE — PROGRESS NOTES
Patient demanded to be taken off of dialysis machine after 2 hours.  Carmen Morel and Reynaldo zendejas.

## 2017-10-11 NOTE — PLAN OF CARE
Problem: Patient Care Overview  Goal: Plan of Care Review  Outcome: Ongoing (interventions implemented as appropriate)  Plan of care reviewed with pt and family at bedside, pt able to verbalize understanding and acceptance but is confused at times, pleasant calm and cooperative.  Pt free from falls or injury, needs assistance to bedside commode. No skin breakdown noted. Swallow eval placed due to aspiration indicators, aspiration precautions reviewed with family and pt, they all verbalized understanding and agreement, MD aware.  VS stable throughout shift. No s/sx of distress noted, c/o severe pain to right lower back that radiates down to the right hip and right leg, denies numbness/tingling, right leg has no effort against gravity, pain medicine administered and repositioned. WC    Temp:  [97.9 °F (36.6 °C)-98.2 °F (36.8 °C)]   Pulse:  []   Resp:  [18-20]   BP: (136-172)/(69-80)   SpO2:  [95 %-98 %]

## 2017-10-11 NOTE — PLAN OF CARE
Problem: Occupational Therapy Goal  Goal: Occupational Therapy Goal  Goals to be met by: 10/18/17    Patient will increase functional independence with ADLs by performing:    LE Dressing with Stand-by Assistance and Assistive Devices as needed.  Grooming while standing at sink with Stand-by Assistance.  Toileting from toilet with Minimal Assistance for hygiene and clothing management.   Supine to sit with Modified [place].  Stand pivot transfers with Supervision.  Toilet transfer to toilet with Supervision.    Outcome: Ongoing (interventions implemented as appropriate)  Evaluation completed and POC established.    PIYUSH Driver

## 2017-10-11 NOTE — PT/OT/SLP EVAL
Occupational Therapy  Evaluation    Donta Cline   MRN: [id number]   Admitting Diagnosis: Aspiration pneumonia    OT Date of Treatment: 10/11/17   OT Start Time: 0817  OT Stop Time: 0832  OT Total Time (min): 15 min    Billable Minutes:  Evaluation 15    Diagnosis: Aspiration pneumonia   Recent falls    Past Medical History:   Diagnosis Date    Bladder cancer     Chronic diastolic heart failure 8/21/2012    3/13: AOSAT: 98, FICKCI: 2.41, FICKCO: 5.18 PAPRES: 34/16 (23), PASAT: 65, PVR: 1.74 PWPRES: 18/18 (14), RA PRES: 14/13 (12), RV: 40/0, 10     Chronic hip pain, right 10/4/2017    Coronary artery disease involving native coronary artery of native heart without angina pectoris     Dyslipidemia     Encounter for blood transfusion     ESRD (end stage renal disease) on dialysis 6/9/2014    Essential hypertension     Hypercholesteremia 8/21/2012    Long-term (current) use of anticoagulants 10/9/2015    NSTEMI (non-ST elevated myocardial infarction) 10/4/2017    Paroxysmal atrial fibrillation 10/8/2015    Prostate cancer     Severe aortic stenosis 10/14/2014    Type 2 diabetes mellitus with chronic kidney disease on chronic dialysis, with long-term current use of insulin 12/15/2013    Ventricular tachycardia     monomorphic      Past Surgical History:   Procedure Laterality Date    BACK SURGERY      laminectomy x2    COLON SURGERY      EXTENSIVE PROSTATE SURGER      Prostatectomy, Radical    JOINT REPLACEMENT      left hip       Referring physician: Gt  Date referred to OT: 10/11/17    General Precautions: Standard, fall  Orthopedic Precautions:    Braces:            Patient History:  Living Environment  Lives With: alone  Living Arrangements: house  Home Accessibility: stairs to enter home  Number of Stairs to Enter Home: 1  Transportation Available: family or friend will provide  Living Environment Comment: Pt lives in a [facility] with a walk-in shower and SC. Pt was (I) with ADLs and using a  rollator but has been declining functionally the last month or so. Reports multiple falls in the past 6 mos. Reports using a sock aid for LBD.   Equipment Currently Used at Home: bedside commode, shower chair, rollator, wheelchair, cane, straight    Prior level of function:   Bed Mobility/Transfers: needs device  Grooming: independent  Bathing: independent  Upper Body Dressing: independent  Lower Body Dressing: needs device  Toileting: independent  Home Management Skills: independent        Dominant hand: right    Subjective:  Communicated with RN prior to session.  Chief Complaint: LE weakness  Patient/Family stated goals: Get stronger.    Pain/Comfort  Pain Rating 1: 0/10    Objective:  Patient found with: telemetry    Cognitive Exam:  Oriented to: Person, Place, Time and Situation  Follows Commands/attention: Follows multistep  commands  Communication: clear/fluent  Memory:  No Deficits noted  Safety awareness/insight to disability: impaired  Coping skills/emotional control: Appropriate to situation    Visual/perceptual:  Intact    Physical Exam:  Postural examination/scapula alignment: No postural abnormalities identified  Skin integrity: Visible skin intact  Edema: None noted     Sensation:   Intact    Upper Extremity Range of Motion:  Right Upper Extremity: WNL  Left Upper Extremity: WNL    Upper Extremity Strength:  Right Upper Extremity: WNL  Left Upper Extremity: WNL   Strength: wnl    Fine motor coordination:   Intact    Gross motor coordination: WFL    Functional Mobility:  Bed Mobility:  Rolling/Turning to Left: Stand by assistance  Scooting/Bridging: Stand by Assistance  Supine to Sit: Stand by Assistance  Sit to Supine: Stand by Assistance    Transfers:  Sit <> Stand Assistance: Contact Guard Assistance  Sit <> Stand Assistive Device: Rolling Walker    Functional Ambulation: Pt walked from bed<>door (~4 ft x 2) with CGA using a RW and with report of LE weakness.    Activities of Daily Living:    "  UE Dressing Level of Assistance: Stand by assistance  LE Dressing Level of Assistance: Total assistance (States he uses a sock aid at home.)  Grooming Position: Seated  Grooming Level of Assistance: Supervision     Balance:   Static Sit: GOOD+: Takes MAXIMAL challenges from all directions.    Dynamic Sit: GOOD+: Maintains balance through MAXIMAL excursions of active trunk motion  Static Stand: FAIR+: Takes MINIMAL challenges from all directions  Dynamic stand: FAIR: Needs CONTACT GUARD during gait    Therapeutic Activities and Exercises:  UE ROM/MMT  Bed mobility training / assessment  Functional mobility assessment  Sit/standing balance assessment  Discussed OT POC / Post-acute plan    AM-PAC 6 CLICK ADL  How much help from another person does this patient currently need?  1 = Unable, Total/Dependent Assistance  2 = A lot, Maximum/Moderate Assistance  3 = A little, Minimum/Contact Guard/Supervision  4 = None, Modified [place]/Independent    Putting on and taking off regular lower body clothing? : 1  Bathing (including washing, rinsing, drying)?: 3  Toileting, which includes using toilet, bedpan, or urinal? : 2  Putting on and taking off regular upper body clothing?: 3  Taking care of personal grooming such as brushing teeth?: 4  Eating meals?: 4  Total Score: 17    AM-PAC Raw Score CMS "G-Code Modifier Level of Impairment Assistance   6 % Total / Unable   7 - 9 CM 80 - 100% Maximal Assist   10-14 CL 60 - 80% Moderate Assist   15 - 19 CK 40 - 60% Moderate Assist   20 - 22 CJ 20 - 40% Minimal Assist   23 CI 1-20% SBA / CGA   24 CH 0% Independent/ Mod I       Patient left supine with all lines intact and call button in reach    Assessment:  Donta Cline is a 85 y.o. male with a medical diagnosis of Aspiration pneumonia and presents with decreased (I) in multiple ADL areas, functional mobility & t/fs as well as decreased overall strength, ROM, endurance and balance. Pt has had ongoing functional " decline over the past few weeks along with multiple falls due to LE weakness and would benefit from skilled OT services as well as SNF placement to address these deficits and to facilitate improving (I) with daily tasks.    Pt evaluation falls under low complexity for evaluation coding due to performance deficits noted in 1-3 areas as stated above and 0 co-morbities affecting current functional status. Data obtained from problem focused assessments. No modifications or assistance was required for completion of evaluation. Only brief occupational profile and history review completed.     Rehab identified problem list/impairments: Rehab identified problem list/impairments: weakness, impaired functional mobilty, impaired self care skills, impaired endurance, gait instability, decreased coordination, decreased lower extremity function, decreased safety awareness, impaired balance    Rehab potential is good.    Activity tolerance: Fair    Discharge recommendations: Discharge Facility/Level Of Care Needs: nursing facility, skilled     Barriers to discharge: Barriers to Discharge: Decreased caregiver support    Equipment recommendations: none     GOALS:    Occupational Therapy Goals        Problem: Occupational Therapy Goal    Goal Priority Disciplines Outcome Interventions   Occupational Therapy Goal     OT, PT/OT     Description:  Goals to be met by: 10/18/17    Patient will increase functional independence with ADLs by performing:    LE Dressing with Stand-by Assistance and Assistive Devices as needed.  Grooming while standing at sink with Stand-by Assistance.  Toileting from toilet with Minimal Assistance for hygiene and clothing management.   Supine to sit with Modified [place].  Stand pivot transfers with Supervision.  Toilet transfer to toilet with Supervision.                      PLAN:  Patient to be seen 3 x/week to address the above listed problems via self-care/home management, therapeutic exercises,  therapeutic activities  Plan of Care expires: 11/10/17  Plan of Care reviewed with: patient, daughter         Marc HUTCHINSON PIYUSH Crockett  10/11/2017

## 2017-10-11 NOTE — CONSULTS
[facility]  Nephrology  Consult Note    Patient Name: Donta Cline  MRN: [id number]  Admission Date: 10/10/2017  Hospital Length of Stay: 1 days  Attending Provider: Alessia Ch MD   Primary Care Physician: ZAID Richardson Iii, MD  Principal Problem:Aspiration pneumonia    Inpatient consult to Nephrology  Consult performed by: YANELIS CHONG  Consult ordered by: ALEXANDREA FALLON  Reason for consult: ESRD on HD        Subjective:     HPI: Theron Cline is a 85 year old white male with past medical history of bladder CA in which he is in remission (was treated with chemotherapy), CAD, A-fib (treated with warfarin) and ESRD on Hd. Patient was transfered from [facility] where he presented with worsening right leg and back pain, general weakness. In hospital presented with increase troponin levels, diagnosed with NSTEMI, increase in INR where was treated with FFP and Vit K, flash pulmonary edema, started on Bi-PAP and aspirated pneumonia (treated with Zosyn). 2 D echo revealed severe aortic stenosis with preserved EF and Cardiology evaluated patient and did not feel LHC warranted at this time but recommended outpatient follow-up with his regular Cardiologist and likely need for outpatient C to evaluate his aortic stenosis and coronary anatomy. Additionally, pt was evaluated by NSx who felt he was not a candidate for surgery. Patient was seen and evaluated by Neurosurgery at [place] (Dr. James) who noted Ct scan of lumbar spine showed moderate to severe acquired spinal stenosis and bilateral foraminal encroachment at L3-L4. Patient is here for second opionion from [facility].    Consulted for dialysis treatment: iHD MWF, [facility], Rt lower arm AVF, duration 3.5, followed by Dr. Mathur, EDW 90 kg and had his last dialysis on Monday.     Past Medical History:   Diagnosis Date    Bladder cancer     Chronic diastolic heart failure 8/21/2012    3/13:  AOSAT: 98, FICKCI: 2.41, FICKCO: 5.18 PAPRES: 34/16 (23), PASAT: 65, PVR: 1.74 PWPRES: 18/18 (14), RA PRES: 14/13 (12), RV: 40/0, 10     Chronic hip pain, right 10/4/2017    Coronary artery disease involving native coronary artery of native heart without angina pectoris     Dyslipidemia     Encounter for blood transfusion     ESRD (end stage renal disease) on dialysis 6/9/2014    Essential hypertension     Hypercholesteremia 8/21/2012    Long-term (current) use of anticoagulants 10/9/2015    NSTEMI (non-ST elevated myocardial infarction) 10/4/2017    Paroxysmal atrial fibrillation 10/8/2015    Prostate cancer     Severe aortic stenosis 10/14/2014    Type 2 diabetes mellitus with chronic kidney disease on chronic dialysis, with long-term current use of insulin 12/15/2013    Ventricular tachycardia     monomorphic       Past Surgical History:   Procedure Laterality Date    BACK SURGERY      laminectomy x2    COLON SURGERY      EXTENSIVE PROSTATE SURGER      Prostatectomy, Radical    JOINT REPLACEMENT      left hip       Review of patient's allergies indicates:   Allergen Reactions    Darvocet a500  [propoxyphene n-acetaminophen]      Other reaction(s): Unknown    Morphine      Other reaction(s): Unknown     Current Facility-Administered Medications   Medication Frequency    0.9%  NaCl infusion PRN    0.9%  NaCl infusion Once    [START ON 10/12/2017] ampicillin-sulbactam 3 g in sodium chloride 0.9 % 100 mL IVPB (ready to mix system) Daily    aspirin chewable tablet 81 mg Every Mon, Wed, Fri    dextrose 50% injection 12.5 g PRN    dextrose 50% injection 25 g PRN    gabapentin capsule 600 mg QHS    glucagon (human recombinant) injection 1 mg PRN    glucose chewable tablet 16 g PRN    glucose chewable tablet 24 g PRN    hydrocodone-acetaminophen 10-325mg per tablet 1 tablet Q6H PRN    insulin aspart pen 0-5 Units QID (AC + HS) PRN    insulin detemir pen 20 Units QHS    metoprolol  succinate (TOPROL-XL) 24 hr tablet 50 mg Daily    nitroGLYCERIN SL tablet 0.3 mg Q5 Min PRN    pantoprazole EC tablet 40 mg Nightly    potassium chloride SA CR tablet 40 mEq Daily    ramelteon tablet 8 mg Nightly PRN    rosuvastatin tablet 40 mg QHS    sertraline tablet 100 mg QHS    vit b cmplx 3-fa-vit c-biotin 1- mg-mg-mcg per tablet 1 tablet Daily    warfarin (COUMADIN) tablet 4 mg Daily     Family History     None        Social History Main Topics    Smoking status: Former Smoker     Packs/day: 3.00     Years: 40.00     Quit date: 1/1/1980    Smokeless tobacco: Former User    Alcohol use No      Comment: a few drinks    Drug use: Unknown    Sexual activity: Not on file     Review of Systems   Constitutional: Positive for fatigue. Negative for appetite change, chills and fever.   HENT: Negative for congestion, ear pain, rhinorrhea and sore throat.    Eyes: Negative for pain and discharge.   Respiratory: Positive for cough. Negative for choking, chest tightness and shortness of breath.    Cardiovascular: Negative for chest pain, palpitations and leg swelling.   Gastrointestinal: Negative for abdominal pain, blood in stool, diarrhea, nausea, rectal pain and vomiting.   Endocrine: Negative for polyuria.   Genitourinary: Negative for decreased urine volume, difficulty urinating, dysuria, flank pain and hematuria.   Musculoskeletal: Positive for back pain. Negative for joint swelling and neck pain.   Skin: Negative for color change, pallor, rash and wound.   Neurological: Positive for weakness. Negative for dizziness, seizures and headaches.   Psychiatric/Behavioral: Negative for behavioral problems and confusion.     Objective:     Vital Signs (Most Recent):  Temp: 97.9 °F (36.6 °C) (10/11/17 0343)  Pulse: 94 (10/11/17 0343)  Resp: 20 (10/11/17 0343)  BP: (!) 163/80 (10/11/17 0343)  SpO2: 97 % (10/11/17 0343)  O2 Device (Oxygen Therapy): room air (10/11/17 0343) Vital Signs (24h Range):  Temp:   [97.9 °F (36.6 °C)-98.9 °F (37.2 °C)] 97.9 °F (36.6 °C)  Pulse:  [] 94  Resp:  [18-20] 20  SpO2:  [95 %-98 %] 97 %  BP: (136-172)/(62-80) 163/80        There is no height or weight on file to calculate BMI.  There is no height or weight on file to calculate BSA.    No intake/output data recorded.    Physical Exam   Constitutional: He is oriented to person, place, and time. He appears well-developed and well-nourished.   HENT:   Head: Normocephalic.   Nose: Nose normal.   Mouth/Throat: Oropharynx is clear and moist.   Eyes: No scleral icterus.   Neck: Neck supple. No JVD present. No tracheal deviation present. No thyromegaly present.   Cardiovascular: Normal rate and normal heart sounds.  Exam reveals no gallop and no friction rub.    Irregularly irregular rhythm   Pulmonary/Chest: Effort normal and breath sounds normal. No respiratory distress. He has no wheezes. He has no rales.   Abdominal: Soft. Bowel sounds are normal. He exhibits no distension and no mass. There is no tenderness. There is no rebound and no guarding.   Musculoskeletal: He exhibits no edema.   Lymphadenopathy:     He has no cervical adenopathy.   Neurological: He is alert and oriented to person, place, and time. He exhibits normal muscle tone.   Negative asterixis    Skin: Skin is warm and dry. No rash noted. No erythema.   Vitals reviewed.      Significant Labs:  ABGs:   Recent Labs  Lab 10/07/17  1248   PH 7.251*   PCO2 53.8*   HCO3 23.1     BMP:   Recent Labs  Lab 10/11/17  0119   *   CL 99   CO2 26   BUN 26*   CREATININE 4.6*   CALCIUM 10.3   MG 1.7     CBC:   Recent Labs  Lab 10/11/17  0119   WBC 7.11  7.11   RBC 2.88*  2.88*   HGB 9.9*  9.9*   HCT 30.8*  30.8*     200   *  107*   MCH 34.4*  34.4*   MCHC 32.1  32.1     CMP:   Recent Labs  Lab 10/11/17  0119   *   CALCIUM 10.3   ALBUMIN 2.8*   PROT 6.4      K 3.4*   CO2 26   CL 99   BUN 26*   CREATININE 4.6*   ALKPHOS 104   ALT 27   AST 31    BILITOT 0.6     LFTs:   Recent Labs  Lab 10/11/17  0119   ALT 27   AST 31   ALKPHOS 104   BILITOT 0.6   PROT 6.4   ALBUMIN 2.8*     PTH: No results for input(s): PTH in the last 168 hours.  All labs within the past 24 hours have been reviewed.      Assessment/Plan:     ESRD (end stage renal disease) on dialysis    Theron Cline is a 85 year old white male with past medical history of bladder CA in which he is in remission (was treated with chemotherapy), CAD, A-fib (treated with warfarin) and ESRD on Hd. Patient was transfered from [facility] where he presented with worsening right leg and back pain, general weakness. In hospital presented with increase troponin levels, diagnosed with NSTEMI, increase in INR where was treated with FFP and Vit K, flash pulmonary edema, started on Bi-PAP and aspirated pneumonia (treated with Zosyn).     Consulted for dialysis treatment: iHD MWF, dialyzed in [facility], Rt lower arm AVF, duration 3.5, followed by Dr. Mathur, EDW 90 kg and had his last dialysis on Monday.     - Patient will continue with dialysis treatment today for removal of toxins and excess fluid, Goal of 3 hrs with a UF of 1-2 L as tolerated by patient, maintain MAP>65  - Anemia (hgb 9.9) with hypertension 163/80 mmHg, once controlled will start on EPO, No IV iron since patient with current infection  - Continue with current diet renal diet (low sodium , low phosphate, low potassium).  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 I have personally interviewed and examined the patient at bedside.

## 2017-10-11 NOTE — PLAN OF CARE
Problem: SLP Goal  Goal: SLP Goal  Speech Therapy Short Term Goals  Goals expected to be met by 10/18  1. Pt will participate in a MBSS to objectively r/o aspiration and determine the least restrictive and safest po diet.   Outcome: Ongoing (interventions implemented as appropriate)  BSS completed. Recommend: regular diet and thin liquids, strict aspiration precautions, Continue to monitor for signs and symptoms of aspiration and discontinue oral feeding should you notice any of the following: watery eyes, reddened facial area, wet vocal quality, increased work of breathing, change in respiratory status, increased congestion, coughing, fever, etc. Pt would benefit from a MBSS to objectively r/o aspiration and determine the least restrictive and safest po diet 2/2 reported frequent coughing during meals, aspiration dx, and report of globus sensation during meals.   YUMIKO Ortiz., CCC-SLP  Pager: [phone]  10/11/2017

## 2017-10-11 NOTE — H&P
[facility] Medicine  History & Physical    Patient Name: Donta Cline  MRN: [id number]  Admission Date: 10/10/2017  Attending Physician: Alessia Ch MD   Primary Care Provider: ZAID Richardson Iii, MD    [facility] Medicine Team: [facility] MED 1 Barby Del Real MD     Patient information was obtained from patient, relative(s) and ER records.     Subjective:     Principal Problem:Aspiration pneumonia    Chief Complaint: No chief complaint on file.       HPI: Mr. 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    Patient was originally seen at [facility] on 10/5/17 due to worsening low back pain and inability to ambulate with decline in function over past 5 weeks. There, patient was noted to have elevated troponin so transferred to [facility] for Cardiology evaluation. Patient was diagnosed with NSTEMI and treated medically. Patient was found to have elevated INR and no stents could be placed.  His INR was attempted to be revered with 4U of FFP and vit K but patient went into flash pulm edema.  At this time, 2 D echo revealed severe aortic stenosis with preserved EF and Cardiology evaluated patient and did not feel C warranted at this time but recommended outpatient follow-up with his regular Cardiologist and likely need for outpatient C to evaluate his aortic stenosis and coronary anatomy.     During this admission, patient was having delirium and  developed aspiration PNA (placed on IV Zosyn). Additionally, pt was evaluated by NSx who felt he was not a candidate for surgery. Patient was seen and evaluated by Neurosurgery at [place] (Dr. James) who noted Ct scan of lumbar spine showed moderate to severe acquired spinal stenosis and bilateral foraminal encroachment at L3-L4. Patient is here for second opionion from NSx.    Past Medical History:   Diagnosis Date    Bladder cancer     Chronic diastolic heart failure 2012    3/13: AOSAT: 98, FICKCI: 2.41, FICKCO: 5.18 PAPRES: 34/16 (23), PASAT: 65, PVR: 1.74 PWPRES:  (14), RA PRES:  (12), RV: 40/0, 10     Chronic hip pain, right 10/4/2017    Coronary artery disease involving native coronary artery of native heart without angina pectoris     Dyslipidemia     Encounter for blood transfusion     ESRD (end stage renal disease) on dialysis 2014    Essential hypertension     Hypercholesteremia 2012    Long-term (current) use of anticoagulants 10/9/2015    NSTEMI (non-ST elevated myocardial infarction) 10/4/2017    Paroxysmal atrial fibrillation 10/8/2015    Prostate cancer     Severe aortic stenosis 10/14/2014    Type 2 diabetes mellitus with chronic kidney disease on chronic dialysis, with long-term current use of insulin 12/15/2013    Ventricular tachycardia     monomorphic       Past Surgical History:   Procedure Laterality Date    BACK SURGERY      laminectomy x2    COLON SURGERY      EXTENSIVE PROSTATE SURGER      Prostatectomy, Radical    JOINT REPLACEMENT      left hip       Review of patient's allergies indicates:   Allergen Reactions    Darvocet a500  [propoxyphene n-acetaminophen]      Other reaction(s): Unknown    Morphine      Other reaction(s): Unknown       Current Facility-Administered Medications on File Prior to Encounter   Medication    [] potassium chloride 20 mEq in 100 mL IVPB (FOR CENTRAL LINE ADMINISTRATION ONLY)    [COMPLETED]  warfarin (COUMADIN) tablet 7.5 mg    [DISCONTINUED] albuterol sulfate nebulizer solution 1.25 mg    [DISCONTINUED] albuterol-ipratropium 2.5mg-0.5mg/3mL nebulizer solution 3 mL    [DISCONTINUED] aspirin EC tablet 81 mg    [DISCONTINUED] aspirin tablet 325 mg    [DISCONTINUED] enoxaparin injection 80 mg    [DISCONTINUED] epoetin preeti injection 15,000 Units    [DISCONTINUED] fluconazole (DIFLUCAN) IVPB 200 mg 100 mL    [DISCONTINUED] hydrocodone-acetaminophen 10-325mg per tablet 1 tablet    [DISCONTINUED] hydrocodone-acetaminophen 5-325mg per tablet 2 tablet    [DISCONTINUED] metoprolol succinate (TOPROL-XL) 24 hr tablet 50 mg    [DISCONTINUED] pantoprazole EC tablet 40 mg    [DISCONTINUED] piperacillin-tazobactam IVPB 3.375 g    [DISCONTINUED] potassium chloride SA CR tablet 20 mEq    [DISCONTINUED] quetiapine tablet 100 mg    [DISCONTINUED] rosuvastatin tablet 40 mg    [DISCONTINUED] sertraline tablet 100 mg    [DISCONTINUED] temazepam capsule 15 mg    [DISCONTINUED] tizanidine tablet 4 mg    [DISCONTINUED] trazodone tablet 100 mg    [DISCONTINUED] warfarin (COUMADIN) tablet 4 mg    [DISCONTINUED] warfarin (COUMADIN) tablet 7.5 mg    [DISCONTINUED] ziprasidone injection 20 mg     Current Outpatient Prescriptions on File Prior to Encounter   Medication Sig    albuterol (ACCUNEB) 1.25 mg/3 mL Nebu every 6 (six) hours as needed.    alprazolam (XANAX) 0.25 MG tablet Take 0.25 mg by mouth as needed.    aspirin (ECOTRIN) 81 MG EC tablet 3 days a week (Patient taking differently: Take 81 mg by mouth every Mon, Wed, Fri. )    CALCIUM CARBONATE/VITAMIN D3 (VITAMIN D-3 ORAL) Take 1 tablet by mouth once daily.    chlordiazepoxide-clidinium 5-2.5 mg (LIBRAX) 5-2.5 mg Cap Take 1 capsule by mouth 2 (two) times daily.    coenzyme Q10 200 mg capsule Take 200 mg by mouth once daily.    gabapentin (NEURONTIN) 100 MG capsule Take 300 mg by mouth every evening.     hydrocodone-acetaminophen 10-325mg  (NORCO)  mg Tab Take 1 tablet by mouth every 6 (six) hours as needed.    insulin glargine (LANTUS) 100 unit/mL injection Inject 20 Units into the skin At bedtime.     insulin lispro (HUMALOG KWIKPEN) 100 unit/mL InPn pen Inject 6 Units into the skin 3 (three) times daily with meals.    metoprolol succinate (TOPROL-XL) 50 MG 24 hr tablet take 1 tablet by mouth once daily (Patient taking differently: m w f)    NITROSTAT 0.4 mg SL tablet place 1 tablet under the tongue if needed every 5 minutes for chest pain for 3 doses IF NO RELIEF AFTER 3RD DOSE CALL PRESCRIBER .    pantoprazole (PROTONIX) 40 MG tablet Take 1 tablet by mouth nightly.    AKNIT-AZ RX 1- mg-mg-mcg Tab Take 1 tablet by mouth once daily.    rosuvastatin (CRESTOR) 40 MG Tab Take 1/2 a tab a day (Patient taking differently: Take 40 mg by mouth every evening. )    sertraline (ZOLOFT) 100 MG tablet Take 1 tablet by mouth every evening.    temazepam (RESTORIL) 30 mg capsule Take 30 mg by mouth nightly as needed.     VIBERZI 75 mg Tab Take 1 tablet by mouth 2 (two) times daily.    warfarin (COUMADIN) 4 MG tablet take 1 tablet by mouth once daily     Family History     None        Social History Main Topics    Smoking status: Former Smoker     Packs/day: 3.00     Years: 40.00     Quit date: 1/1/1980    Smokeless tobacco: Former User    Alcohol use No      Comment: a few drinks    Drug use: Unknown    Sexual activity: Not on file     Review of Systems   Constitutional: Positive for unexpected weight change. Negative for chills, fatigue and fever.   Respiratory: Negative for cough, shortness of breath and wheezing.    Cardiovascular: Negative for chest pain, palpitations and leg swelling.   Gastrointestinal: Negative for abdominal pain, constipation, diarrhea and nausea.   Endocrine: Negative for polydipsia, polyphagia and polyuria.   Genitourinary: Negative for dysuria, flank pain, hematuria and urgency.   Musculoskeletal:  Positive for back pain, gait problem and myalgias. Negative for arthralgias, neck pain and neck stiffness.   Skin: Negative for rash and wound.        Bruises noted diffusely through upper extremities   Neurological: Positive for weakness. Negative for seizures and headaches.   Hematological: Negative for adenopathy. Bruises/bleeds easily.   Psychiatric/Behavioral: Positive for confusion (not currently but on last admission). Negative for agitation and behavioral problems.     Objective:     Vital Signs (Most Recent):  Temp: 98.2 °F (36.8 °C) (10/10/17 2330)  Pulse: 100 (10/10/17 2330)  Resp: 20 (10/10/17 2330)  BP: (!) 172/79 (10/10/17 2330)  SpO2: 97 % (10/10/17 2330) Vital Signs (24h Range):  Temp:  [97.6 °F (36.4 °C)-98.9 °F (37.2 °C)] 98.2 °F (36.8 °C)  Pulse:  [] 100  Resp:  [18-20] 20  SpO2:  [95 %-98 %] 97 %  BP: (134-172)/(60-83) 172/79        There is no height or weight on file to calculate BMI.    Physical Exam   Constitutional: He is oriented to person, place, and time. He appears well-developed and well-nourished.   Pt is alert orientated and conversing appropriately   HENT:   Head: Normocephalic.   Nose: Nose normal.   Mouth/Throat: Oropharynx is clear and moist.   Eyes: EOM are normal. Pupils are equal, round, and reactive to light. No scleral icterus.   Neck: Neck supple. No JVD present. No tracheal deviation present. No thyromegaly present.   Cardiovascular: Normal rate.  Exam reveals no gallop and no friction rub.    Murmur (4/6 systolic) heard.  Irregularly irregular rhythm   Pulmonary/Chest: No respiratory distress. He has no wheezes. He has no rales.   Decreased breath sound on R side   Abdominal: Soft. Bowel sounds are normal. He exhibits no distension and no mass. There is no tenderness. There is no rebound and no guarding.   Bruises noted diffusely throughout abdomen   Genitourinary: Rectal exam shows guaiac positive stool.   Genitourinary Comments: decreased rectal tone    Musculoskeletal: He exhibits deformity (scar from previous back surgery). He exhibits no edema.   Lymphadenopathy:     He has no cervical adenopathy.   Neurological: He is alert and oriented to person, place, and time. He exhibits normal muscle tone.   Negative asterixis    Skin: Skin is warm and dry. No rash noted. No erythema.   Psychiatric: He has a normal mood and affect. His behavior is normal.   Vitals reviewed.       Significant Labs:   CBC:   Recent Labs  Lab 10/09/17  0556 10/10/17  0408   WBC 8.00 6.40   HGB 10.0* 9.4*   HCT 30.8* 28.7*    163     CMP:   Recent Labs  Lab 10/09/17  0556 10/10/17  0408    140   K 4.1 3.3*    100   CO2 20* 29   * 139*   BUN 49* 20   CREATININE 5.30* 3.20*   CALCIUM 10.0 9.6   PROT 6.4 6.1*   ALBUMIN 3.5 3.4*   BILITOT 0.8 0.9   ALKPHOS 82 90   AST 23 29   ALT 38 40   EGFRNONAA 9* 17*     TSH: No results for input(s): TSH in the last 4320 hours.  Urine Culture: No results for input(s): LABURIN in the last 48 hours.  Urine Studies: No results for input(s): COLORU, APPEARANCEUA, PHUR, SPECGRAV, PROTEINUA, GLUCUA, KETONESU, BILIRUBINUA, OCCULTUA, NITRITE, UROBILINOGEN, LEUKOCYTESUR, RBCUA, WBCUA, BACTERIA, SQUAMEPITHEL, HYALINECASTS in the last 48 hours.    Invalid input(s): WRIGHTSUR    Significant Imaging: I have reviewed all pertinent imaging results/findings within the past 24 hours.    Assessment/Plan:     * Aspiration pneumonia    -pt had AMS at OSH and was reported to have aspiration event  -patient received zosyn at OSH, but given that this is suspected to be aspiration will start patient on unasyn (renally dosed)  -will get updated CXRay  -remained afebrile but will get updated CBC, CMP this AM        BRBPR (bright red blood per rectum)    -patients INR was supra therapeutic at OSH  -according to daughter has decreased  -will get CBC now and trend H/H        Lumbar stenosis    -patient has stated this has been chronic but has been losing bowel  functions for the past several months.    -denies saddle paraesthesia  -Ct scan of lumbar spine showed moderate to severe acquired spinal stenosis and bilateral foraminal encroachment at L3-L4  -Neurosurgery consulted, appreciate recs          Hypokalemia    -will get updated CMP          NSTEMI (non-ST elevated myocardial infarction)    -recent NSTEMI on 10/5/17  -treated medically  -continue aspirin, currently on coumadin           Paroxysmal atrial fibrillation    -currently on coumadin, will continue 4 mg daily  -INR pending (goal of 2-3)          Severe aortic stenosis              ESRD (end stage renal disease) on dialysis    -HD on MWF  -has fistula on R arm  -inpatient consult to nephrology, appreciate help          Type 2 diabetes mellitus with chronic kidney disease on chronic dialysis, with long-term current use of insulin    -20U qhs  -updated A1c pending  -diabetic, renal diet        Weakness generalized    -PT/OT ordered  -consult to social work for placement on discharge            VTE Risk Mitigation         Ordered     warfarin (COUMADIN) tablet 4 mg  Daily     Route:  Oral        10/11/17 0006     Medium Risk of VTE  Once      10/11/17 0006             Barby Del Real MD  Department of Hospital Medicine   [facility]

## 2017-10-11 NOTE — HPI
Threon Cline is a 85 year old white male with past medical history of bladder CA in which he is in remission (was treated with chemotherapy), CAD, A-fib (treated with warfarin) and ESRD on Hd. Patient was transfered from [facility] where he presented with worsening right leg and back pain, general weakness. In hospital presented with increase troponin levels, diagnosed with NSTEMI, increase in INR where was treated with FFP and Vit K, flash pulmonary edema, started on Bi-PAP and aspirated pneumonia (treated with Zosyn). 2 D echo revealed severe aortic stenosis with preserved EF and Cardiology evaluated patient and did not feel C warranted at this time but recommended outpatient follow-up with his regular Cardiologist and likely need for outpatient C to evaluate his aortic stenosis and coronary anatomy. Additionally, pt was evaluated by NSx who felt he was not a candidate for surgery. Patient was seen and evaluated by Neurosurgery at [place] (Dr. James) who noted Ct scan of lumbar spine showed moderate to severe acquired spinal stenosis and bilateral foraminal encroachment at L3-L4. Patient is here for second opionion from Ns.    Consulted for dialysis treatment: iHD MWF, dialyzed in [facility] theo, Rt lower arm AVF, duration 3.5, followed by Dr. Mathur, EDW 90 kg and had his last dialysis on Monday.

## 2017-10-11 NOTE — PLAN OF CARE
Problem: Physical Therapy Goal  Goal: Physical Therapy Goal  Goals to be met by: 10/21/2017    Patient will increase functional independence with mobility by performin. Supine to sit with Modified [place]  2. Sit to supine with Modified [place]  3. Sit to stand transfer with Contact Guard Assistance using Rolling Walker  4. Bed to chair transfer with Contact Guard Assistance using Rolling Walker  5. Gait  x 75 feet with Contact Guard Assistance using Rolling Walker.     Outcome: Ongoing (interventions implemented as appropriate)  Evaluation complete. Goals appropriate to improve functional mobility.    Edgar Wright III, DPT, PT  10/11/2017

## 2017-10-11 NOTE — PT/OT/SLP EVAL
Physical Therapy  Evaluation    Donta Cline   MRN: [id number]   Admitting Diagnosis: Aspiration pneumonia    PT Received On: 10/11/17  PT Start Time: 0817     PT Stop Time: 0833    PT Total Time (min): 16 min       Billable Minutes:  Evaluation 16    Diagnosis: Aspiration pneumonia      Past Medical History:   Diagnosis Date    Bladder cancer     Chronic diastolic heart failure 8/21/2012    3/13: AOSAT: 98, FICKCI: 2.41, FICKCO: 5.18 PAPRES: 34/16 (23), PASAT: 65, PVR: 1.74 PWPRES: 18/18 (14), RA PRES: 14/13 (12), RV: 40/0, 10     Chronic hip pain, right 10/4/2017    Coronary artery disease involving native coronary artery of native heart without angina pectoris     Dyslipidemia     Encounter for blood transfusion     ESRD (end stage renal disease) on dialysis 6/9/2014    Essential hypertension     Hypercholesteremia 8/21/2012    Long-term (current) use of anticoagulants 10/9/2015    NSTEMI (non-ST elevated myocardial infarction) 10/4/2017    Paroxysmal atrial fibrillation 10/8/2015    Prostate cancer     Severe aortic stenosis 10/14/2014    Type 2 diabetes mellitus with chronic kidney disease on chronic dialysis, with long-term current use of insulin 12/15/2013    Ventricular tachycardia     monomorphic      Past Surgical History:   Procedure Laterality Date    BACK SURGERY      laminectomy x2    COLON SURGERY      EXTENSIVE PROSTATE SURGER      Prostatectomy, Radical    JOINT REPLACEMENT      left hip       Referring physician: Dima  Date referred to PT: 10/10/2017    General Precautions: Standard, aspiration, fall  Orthopedic Precautions: N/A   Braces: N/A       Do you have any cultural, spiritual, Baptist conflicts, given your current situation?: none stated    Patient History:  Lives With: child(johana), adult  Living Arrangements: house  Home Accessibility: stairs to enter home  Number of Stairs to Enter Home: 1  Stair Railings at Home: none  Transportation Available: family or friend  will provide  Living Environment Comment: Patient lives alone in 1-story home 1 CORTES no HRs. Significant functional decline over the course of 3 weeks from independence without equipment to needing RW for ambulation. Walk-in shower with shower chair. BSC over toilet. Currently using WC for community mobility. Reports multiple falls in the past 6 months and attributes falls to LE weakness.  Equipment Currently Used at Home: bedside commode, cane, straight, shower chair, walker, rolling, wheelchair  DME owned (not currently used): none    Previous Level of Function:  Ambulation Skills: needs device  Transfer Skills: needs device  ADL Skills: independent    Subjective:  Communicated with RN prior to session.  Patient agreeable to PT session. No initial reports of pain. Daughter present. Reports significant functional decline over the past 3 weeks from indep without equipment to requiring assist with equipment/using wheelchair as primary means of mobility  Chief Complaint: R-sided LBP  Patient goals: none stated    Pain/Comfort  Pain Rating 1:  (R-sided LBP not rated with VAS)  Pain Rating Post-Intervention 1:  (R-sided LBP not rated with VAS)      Objective:   Patient found with: telemetry, peripheral IV     Cognitive Exam:  Oriented to: Person, Place, Time and Situation    Follows Commands/attention: Follows multistep  commands  Communication: clear/fluent  Safety awareness/insight to disability: impaired    Physical Exam:  Postural examination/scapula alignment: Rounded shoulder and Head forward    Skin integrity: Visible skin intact  Edema: None noted     Sensation:   Intact to light touch BLE    Lower Extremity Range of Motion:  Right Lower Extremity: WFL; slight limitation to knee extension  Left Lower Extremity: WFL; slight limitation to knee extension    Lower Extremity Strength:  Right Lower Extremity: WFL except 3+/5  Left Lower Extremity: WFL except 3-/5     Gross motor coordination: WFL    Functional  Mobility:  Bed Mobility:  Rolling/Turning to Left: Stand by assistance (patient in L-sidelying position upon room entry for LBP relief)  Scooting/Bridging: Stand by Assistance  Supine to Sit: Stand by Assistance  Sit to Supine:  (not observed; patient left seated EOB with daughter present)    Transfers:  Sit <> Stand Assistance: Minimum Assistance  Sit <> Stand Assistive Device: Rolling Walker  Bed <> Chair Technique: Stand Pivot  Bed <> Chair Assistance: Minimum Assistance  Bed <> Chair Assistive Device: Rolling Walker    Gait:   Gait Distance: 12ft within room  Assistance 1: Minimum assistance  Gait Assistive Device: Rolling walker  Gait Pattern: swing-through gait  Gait Deviation(s): decreased rohit, increased time in double stance, decreased velocity of limb motion, decreased step length, decreased stride length, excessive knee flexion, forward lean, decreased weight-shifting ability, foot flat    Stairs:  Not assessed secondary to patient fatigue after 12 foot ambulation    Balance:   Static Sit: NORMAL: No deviations seen in posture held statically  Dynamic Sit: GOOD-: Maintains balance through MODERATE excursions of active trunk movement,     Static Stand: POOR+: Needs MINIMAL assist to maintain  Dynamic stand: POOR: N/A Min Assist using rolling walker    Therapeutic Activities and Exercises:  Patient and daughter educated on role of PT/POC.    Bed mobility with supervision.  Sit<>stand Min Assist using rolling walker.  Gait 12ftx1 Min Assist using rolling walker.    Safest to ambulate using rolling walker at this time.  Declined further ambulation secondary to fatigue.    Additional education provided to patient and wife on safety with ambulation using rolling walker. Verbalized understanding.    Safe to transfer with RN/PCT staff    AM-PAC 6 CLICK MOBILITY  How much help from another person does this patient currently need?   1 = Unable, Total/Dependent Assistance  2 = A lot, Maximum/Moderate  Assistance  3 = A little, Minimum/Contact Guard/Supervision  4 = None, Modified [place]/Independent    Turning over in bed (including adjusting bedclothes, sheets and blankets)?: 3  Sitting down on and standing up from a chair with arms (e.g., wheelchair, bedside commode, etc.): 3  Moving from lying on back to sitting on the side of the bed?: 3  Moving to and from a bed to a chair (including a wheelchair)?: 3  Need to walk in hospital room?: 3  Climbing 3-5 steps with a railing?: 2  Total Score: 17     AM-PAC Raw Score CMS G-Code Modifier Level of Impairment Assistance   6 % Total / Unable   7 - 9 CM 80 - 100% Maximal Assist   10 - 14 CL 60 - 80% Moderate Assist   15 - 19 CK 40 - 60% Moderate Assist   20 - 22 CJ 20 - 40% Minimal Assist   23 CI 1-20% SBA / CGA   24 CH 0% Independent/ Mod I     Patient left seated EOB with all lines intact, call button in reach, RN notified and daughter present.    Assessment:   Donta Cline is a 85 y.o. male with a medical diagnosis of Aspiration pneumonia and presents with rehab identified impairments listed below. Bed mobility with SBA for safety. Transfers and limited ambulation using rolling walker with Min Assist for safety. Ambulation distance limited by self-reported fatigue. To benefit from continued skilled intervention to address deficits prior to transition to SNF to improve safety and overall functional mobility.    History: personal factors and/or comorbidities that impact the plan of care: aspiration pneumonia; significant functional decline; fall risk; recent falls  Evaluation of Body Systems: cardiovascular/pulmonary, integumentary, musculoskeletal, neuromuscular, cognition/communication  Functional Outcome Tools: AMPAC, ROM, MMT  Clinical Presentation: stable      Evaluation Complexity Level: Low     Rehab identified problem list/impairments: Rehab identified problem list/impairments: weakness, impaired endurance, impaired sensation, impaired self  care skills, impaired functional mobilty, gait instability, impaired balance, decreased coordination, decreased lower extremity function, pain, decreased safety awareness, impaired coordination    Rehab potential is good.    Activity tolerance: Fair    Discharge recommendations: Discharge Facility/Level Of Care Needs: nursing facility, skilled     Barriers to discharge: Barriers to Discharge: Decreased caregiver support (patient requiring increased level of assist at this time)    Equipment recommendations: Equipment Needed After Discharge: none     GOALS:    Physical Therapy Goals        Problem: Physical Therapy Goal    Goal Priority Disciplines Outcome Goal Variances Interventions   Physical Therapy Goal     PT/OT, PT Ongoing (interventions implemented as appropriate)     Description:  Goals to be met by: 10/21/2017    Patient will increase functional independence with mobility by performin. Supine to sit with Modified [place]  2. Sit to supine with Modified [place]  3. Sit to stand transfer with Contact Guard Assistance using Rolling Walker  4. Bed to chair transfer with Contact Guard Assistance using Rolling Walker  5. Gait  x 75 feet with Contact Guard Assistance using Rolling Walker.                       PLAN:    Patient to be seen 3 x/week to address the above listed problems via gait training, therapeutic activities, therapeutic exercises, neuromuscular re-education  Plan of Care expires: 17  Plan of Care reviewed with: patient, daughter          Edgar Wright LORRIE, PT  10/11/2017

## 2017-10-11 NOTE — ASSESSMENT & PLAN NOTE
-patient has stated this has been chronic but has been losing bowel functions for the past several months.    -denies saddle paraesthesia  -Ct scan of lumbar spine showed moderate to severe acquired spinal stenosis and bilateral foraminal encroachment at L3-L4  -Neurosurgery consulted, appreciate recs

## 2017-10-12 PROBLEM — R79.89 ELEVATED TROPONIN: Status: ACTIVE | Noted: 2017-01-01

## 2017-10-12 PROBLEM — M54.16 LUMBAR BACK PAIN WITH RADICULOPATHY AFFECTING RIGHT LOWER EXTREMITY: Status: ACTIVE | Noted: 2017-01-01

## 2017-10-12 NOTE — ASSESSMENT & PLAN NOTE
Mr. Cline is a 85yoM with multiple active medical comorbidities, including recent NSTEMI, severe aortic stenosis, ESRD, and aspiration pneumonia.    Given multiple active medical comorbidities, Mr. Cline presents as a high risk to undergo elective surgery at this time. He remains full strength in all extremities and is functionally pain limited. I have discussed this in depth with the patient and his family. He does not wish to proceed with surgery. Should he change his mind in the future, he would require clearance and risk stratification from both hospital medicine and cardiology.

## 2017-10-12 NOTE — HOSPITAL COURSE
10/12: Patient still in pain, fentanyl patch ordered, consulted PM&R. Surgical intervention not indicated per NSx. However, family strongly leaning towards surgical intervention and have unrealistic expectation. If surgical intervention, patient needs LHC and full cardiac work up. Given his recent NSTEMI and severe AS along with other co-morbities, patient is at high risk for surgery. Patient also with nausea and heart burn this morning. MBSS today, aspiration risk with honey thick liquids, speech recommended dental soft diet now.   10/13: Patient with no complaints of pain this AM. Patient has been assessed by cardiology who request angiogram before any surgical intervention. Patient with low flow low gradient aortic stenosis and would not benefit from TAVR or balloon valvuloplasty per cardiology. Patient in HD this AM. Interventional cardiology consult place, possible right heart cath today  10/14/2017- No acute events overnight patient states his back pain has been controlled adequately with his current medications. Plan to have patient evaluated by cardiology with angiography on Monday then NSGY to evaluate for surgical intervention.   10/15: awaiting cath tomorrow  10/16/2017- No acute events overnight, patient to have heart cath today, HD this morning.   10/17/2017- No acute events overnight patient tolerated left heart cath well. Heart cath showed RCA 75% stenosis with other assessed vessels irregular. NATHANIEL scores of 3. Per notes patient to undergo lumbar decompression on 10/19. Patient pain remains controlled with current medications.   10/18/2017- Patient had episode of delirium overnight per daughter. Patient attempting to call 911 and leave hospital. Delirium precautions in place, educated patient's daughter on importance of keeping blinds up during day, awake during day, reorientation, etc. Family states that they spoke with Dr. Cervantes yesterday in regards to possible surgical procedure tomorrow. Will  contact to see what their plan is.   10/19/2017- No acute events overnight, patient was less confused overnight per family. Pain still well controlled at this time. Patient to have laminectomy tomorrow with Dr. Sands, will follow up on recommendations.   10/20/2017- No acute events overnight, patient to OR this AM. Will follow up surgical recommendations.

## 2017-10-12 NOTE — PHARMACY MED REC
"Admission Medication Reconciliation - Pharmacy Consult Note    The home medication history was taken by Raiza Wright, Pharmacy Tech. Based on information gathered and subsequent review by the clinical pharmacist, the items below may need attention.    You may go to "Admission" then "Reconcile Home Medications" tabs to review and/or act upon these items.      No issues noted with the medication reconciliation.      Please address this information as you see fit.  Feel free to contact us if you have any questions or require assistance.    Cheyenne Mcleod, PharmD  [phone]      Patient's prior to admission medication regimen was as follows:    Prescriptions Prior to Admission   Medication Sig    alprazolam (XANAX) 0.25 MG tablet Take 0.25 mg by mouth daily as needed for Anxiety.     aspirin (ECOTRIN) 81 MG EC tablet 3 days a week (Patient taking differently: Take 81 mg by mouth every Mon, Wed, Fri. )    coenzyme Q10 200 mg capsule Take 200 mg by mouth once daily.    gabapentin (NEURONTIN) 300 MG capsule Take 300 mg by mouth every evening.    hydrocodone-acetaminophen 10-325mg (NORCO)  mg Tab Take 1 tablet by mouth every 6 (six) hours as needed for Pain.     insulin glargine (LANTUS) 100 unit/mL injection Inject 20 Units into the skin At bedtime.     insulin lispro (HUMALOG) 100 unit/mL injection Inject into the skin 3 (three) times daily as needed for High Blood Sugar.    metoprolol succinate (TOPROL-XL) 50 MG 24 hr tablet take 1 tablet by mouth once daily (Patient taking differently: Take one tablet by mouth daily on Monday, Wednesday and Friday)    NITROSTAT 0.4 mg SL tablet place 1 tablet under the tongue if needed every 5 minutes for chest pain for 3 doses IF NO RELIEF AFTER 3RD DOSE CALL PRESCRIBER .    pantoprazole (PROTONIX) 40 MG tablet Take 1 tablet by mouth nightly.    ANKIT-AZ RX 1- mg-mg-mcg Tab Take 1 tablet by mouth once daily.    rosuvastatin (CRESTOR) 40 MG Tab Take 1/2 a " tab a day (Patient taking differently: Take 40 mg by mouth every evening. )    sertraline (ZOLOFT) 100 MG tablet Take 1 tablet by mouth every evening.    warfarin (COUMADIN) 4 MG tablet take 1 tablet by mouth once daily         Please add appropriate    SmartPhrase below:

## 2017-10-12 NOTE — ASSESSMENT & PLAN NOTE
-pt had AMS at [facility] and was reported to have aspiration event  -on Unasyn   - CXRay with bilateral infiltrates but improved from previous cxr  -remained afebrile, no leukocytosis

## 2017-10-12 NOTE — PROGRESS NOTES
[facility]  Neurosurgery  Progress Note    Subjective:     History of Present Illness: Mr. 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     Patient was originally seen at [facility] on 10/5/17 due to worsening low back pain and inability to ambulate with decline in function over past 5 weeks. There, patient was noted to have elevated troponin so transferred to [facility] for Cardiology evaluation. Patient was diagnosed with NSTEMI and treated medically. Patient was found to have elevated INR and no stents could be placed.  His INR was attempted to be revered with 4U of FFP and vit K but patient went into flash pulm edema.  At this time, 2 D echo revealed severe aortic stenosis with preserved EF and Cardiology evaluated patient and did not feel LHC warranted at this time but recommended outpatient follow-up with his regular Cardiologist and likely need for outpatient LHC to evaluate his aortic stenosis and coronary anatomy.      During this admission, patient was having delirium and developed aspiration PNA (placed on IV Zosyn). Additionally, pt was evaluated by NSx who felt he was not a candidate for surgery. Patient was seen and evaluated by Neurosurgery at [facility] (Dr. James) who noted Ct scan of lumbar spine showed moderate to severe acquired spinal stenosis and bilateral foraminal encroachment at L3-L4. Patient is here for second opionion from NSx.    Post-Op  Info:  * No surgery found *         Interval History: NAEON. Neuro exam stable.    Medications:  Continuous Infusions:   Scheduled Meds:   sodium chloride 0.9%   Intravenous Once    ampicillin-sulbactim (UNASYN) IVPB  3 g Intravenous Daily    aspirin  81 mg Oral Every Mon, Wed, Fri    fentanyl  1 patch Transdermal Q72H    gabapentin  600 mg Oral QHS    insulin detemir  20 Units Subcutaneous QHS    metoprolol succinate  100 mg Oral Daily    pantoprazole  40 mg Oral Nightly    rosuvastatin  40 mg Oral QHS    sertraline  100 mg Oral QHS    vit b cmplx 3-fa-vit c-biotin 1- mg-mg-mcg  1 tablet Oral Daily     PRN Meds:sodium chloride 0.9%, dextrose 50%, dextrose 50%, glucagon (human recombinant), glucose, glucose, hydrocodone-acetaminophen 10-325mg, insulin aspart, nitroGLYCERIN, ramelteon     Review of Systems  Objective:        There is no height or weight on file to calculate BMI.  Vital Signs (Most Recent):  Temp: 98.3 °F (36.8 °C) (10/12/17 1200)  Pulse: 94 (10/12/17 1200)  Resp: 18 (10/12/17 1200)  BP: 137/65 (10/12/17 1200)  SpO2: 97 % (10/12/17 1200) Vital Signs (24h Range):  Temp:  [98.1 °F (36.7 °C)-99 °F (37.2 °C)] 98.3 °F (36.8 °C)  Pulse:  [] 94  Resp:  [15-18] 18  SpO2:  [95 %-99 %] 97 %  BP: (126-159)/(58-75) 137/65                           Hemodialysis AV Fistula Right forearm (Active)   Needle Size Buttonhole 10/11/2017 11:22 AM   Site Assessment Clean;Dry;Intact;No redness;No swelling 10/10/2017 11:30 PM   Patency Present;Thrill;Bruit 10/11/2017 11:22 AM   Status Accessed 10/11/2017 11:45 AM   Flows Good 10/11/2017 11:45 AM   Dressing Intervention Removed 10/11/2017 11:22 AM   Dressing Status Old drainage 10/11/2017 11:22 AM   Site Condition No complications 10/11/2017 11:22 AM   Dressing Gauze 10/11/2017 11:22 AM   Drainage Description Serosanguineous 10/11/2017 11:22 AM       Neurosurgery Physical Exam  General: well developed, well nourished, no distress  Head: normocephalic,  atraumatic  Neurologic: Alert and oriented. Thought content appropriate  GCS: Motor: 6/Verbal: 5/Eyes: 4 GCS Total: 15  Mental Status: Awake, Alert, Oriented x 4  Language: No aphasia  Speech: No dysarthria  Cranial nerves: face symmetric, tongue midline, CN II-XII grossly intact.   Eyes: pupils equal, round, reactive to light with accommodation, EOMI  Pulmonary: normal respirations, not labored, no accessory muscles used  Abdomen: soft, non-distended, not tender to palpation  Sensory: intact to light touch throughout  Motor Strength: Moves all extremities spontaneously with good tone.  Full strength upper and lower extremities. No abnormal movements seen.     Strength  Deltoids Triceps Biceps Wrist Extension Wrist Flexion Hand    Upper: R 5/5 5/5 5/5 5/5 5/5 5/5    L 5/5 5/5 5/5 5/5 5/5 5/5     Iliopsoas Quadriceps Knee  Flexion Tibialis  anterior Gastro- cnemius EHL   Lower: R 5/5 5/5 5/5 5/5 5/5 5/5    L 5/5 5/5 5/5 5/5 5/5 5/5     Pronator Drift: no drift noted  Finger-to-nose: Intact bilaterally  Funez: absent  Clonus: absent  Babinski: absent  Pulses: 2+ and symmetric radial and dorsalis pedis  Skin: warm, dry and intact, no rashes      Significant Labs:    Recent Labs  Lab 10/11/17  0119 10/12/17  0538   * 99    140   K 3.4* 3.8   CL 99 108   CO2 26 23   BUN 26* 19   CREATININE 4.6* 4.1*   CALCIUM 10.3 9.4   MG 1.7  --        Recent Labs  Lab 10/11/17  0119 10/12/17  0538   WBC 7.11  7.11 6.23   HGB 9.9*  9.9* 9.2*   HCT 30.8*  30.8* 28.9*     200 161       Recent Labs  Lab 10/11/17  0349 10/12/17  0538   INR 1.4* 2.3*   APTT 30.1  --      Microbiology Results (last 7 days)     ** No results found for the last 168 hours. **        Significant Diagnostics:  MRI lumbar spine reviewed.   Suspected postsurgical changes of L3-L5 laminectomies.    Multilevel degenerative changes most pronounced at L3-L4 noting 6 mm of anterolisthesis of L3 on L4, a large circumferential disc bulge with  a large superimposed right paracentral disc extrusion and severe bilateral facet hypertrophy contributing to near-complete obliteration of the spinal canal and severe bilateral neural foraminal narrowing.    Assessment/Plan:     Lumbar back pain with radiculopathy affecting right lower extremity    Mr. Cline is a 85yoM with multiple active medical comorbidities, including recent NSTEMI, severe aortic stenosis, ESRD, and aspiration pneumonia.    Given multiple active medical comorbidities, Mr. Cline presents as a high risk to undergo elective surgery at this time. He remains full strength in all extremities and is functionally pain limited. I have discussed this in depth with the patient and his family. He does not wish to proceed with surgery. Should he change his mind in the future, he would require clearance and risk stratification from both hospital medicine and cardiology.           Will sign off at this time. Mr. Cline and his family have my contact information, should they wish to discuss this further following discharge. In the interim, please do not hesitate to contact with questions.  Discussed with Dr. Cervantes.    Sulma Weller PA-C  Neurosurgery  [facility]

## 2017-10-12 NOTE — SUBJECTIVE & OBJECTIVE
Interval History: NAEON. Neuro exam stable.    Medications:  Continuous Infusions:   Scheduled Meds:   sodium chloride 0.9%   Intravenous Once    ampicillin-sulbactim (UNASYN) IVPB  3 g Intravenous Daily    aspirin  81 mg Oral Every Mon, Wed, Fri    fentanyl  1 patch Transdermal Q72H    gabapentin  600 mg Oral QHS    insulin detemir  20 Units Subcutaneous QHS    metoprolol succinate  100 mg Oral Daily    pantoprazole  40 mg Oral Nightly    rosuvastatin  40 mg Oral QHS    sertraline  100 mg Oral QHS    vit b cmplx 3-fa-vit c-biotin 1- mg-mg-mcg  1 tablet Oral Daily     PRN Meds:sodium chloride 0.9%, dextrose 50%, dextrose 50%, glucagon (human recombinant), glucose, glucose, hydrocodone-acetaminophen 10-325mg, insulin aspart, nitroGLYCERIN, ramelteon     Review of Systems  Objective:        There is no height or weight on file to calculate BMI.  Vital Signs (Most Recent):  Temp: 98.3 °F (36.8 °C) (10/12/17 1200)  Pulse: 94 (10/12/17 1200)  Resp: 18 (10/12/17 1200)  BP: 137/65 (10/12/17 1200)  SpO2: 97 % (10/12/17 1200) Vital Signs (24h Range):  Temp:  [98.1 °F (36.7 °C)-99 °F (37.2 °C)] 98.3 °F (36.8 °C)  Pulse:  [] 94  Resp:  [15-18] 18  SpO2:  [95 %-99 %] 97 %  BP: (126-159)/(58-75) 137/65                           Hemodialysis AV Fistula Right forearm (Active)   Needle Size Buttonhole 10/11/2017 11:22 AM   Site Assessment Clean;Dry;Intact;No redness;No swelling 10/10/2017 11:30 PM   Patency Present;Thrill;Bruit 10/11/2017 11:22 AM   Status Accessed 10/11/2017 11:45 AM   Flows Good 10/11/2017 11:45 AM   Dressing Intervention Removed 10/11/2017 11:22 AM   Dressing Status Old drainage 10/11/2017 11:22 AM   Site Condition No complications 10/11/2017 11:22 AM   Dressing Gauze 10/11/2017 11:22 AM   Drainage Description Serosanguineous 10/11/2017 11:22 AM       Neurosurgery Physical Exam  General: well developed, well nourished, no distress  Head: normocephalic, atraumatic  Neurologic: Alert and  oriented. Thought content appropriate  GCS: Motor: 6/Verbal: 5/Eyes: 4 GCS Total: 15  Mental Status: Awake, Alert, Oriented x 4  Language: No aphasia  Speech: No dysarthria  Cranial nerves: face symmetric, tongue midline, CN II-XII grossly intact.   Eyes: pupils equal, round, reactive to light with accommodation, EOMI  Pulmonary: normal respirations, not labored, no accessory muscles used  Abdomen: soft, non-distended, not tender to palpation  Sensory: intact to light touch throughout  Motor Strength: Moves all extremities spontaneously with good tone.  Full strength upper and lower extremities. No abnormal movements seen.     Strength  Deltoids Triceps Biceps Wrist Extension Wrist Flexion Hand    Upper: R 5/5 5/5 5/5 5/5 5/5 5/5    L 5/5 5/5 5/5 5/5 5/5 5/5     Iliopsoas Quadriceps Knee  Flexion Tibialis  anterior Gastro- cnemius EHL   Lower: R 5/5 5/5 5/5 5/5 5/5 5/5    L 5/5 5/5 5/5 5/5 5/5 5/5     Pronator Drift: no drift noted  Finger-to-nose: Intact bilaterally  Funez: absent  Clonus: absent  Babinski: absent  Pulses: 2+ and symmetric radial and dorsalis pedis  Skin: warm, dry and intact, no rashes      Significant Labs:    Recent Labs  Lab 10/11/17  0119 10/12/17  0538   * 99    140   K 3.4* 3.8   CL 99 108   CO2 26 23   BUN 26* 19   CREATININE 4.6* 4.1*   CALCIUM 10.3 9.4   MG 1.7  --        Recent Labs  Lab 10/11/17  0119 10/12/17  0538   WBC 7.11  7.11 6.23   HGB 9.9*  9.9* 9.2*   HCT 30.8*  30.8* 28.9*     200 161       Recent Labs  Lab 10/11/17  0349 10/12/17  0538   INR 1.4* 2.3*   APTT 30.1  --      Microbiology Results (last 7 days)     ** No results found for the last 168 hours. **        Significant Diagnostics:  MRI lumbar spine reviewed.   Suspected postsurgical changes of L3-L5 laminectomies.    Multilevel degenerative changes most pronounced at L3-L4 noting 6 mm of anterolisthesis of L3 on L4, a large circumferential disc bulge with a large superimposed right  paracentral disc extrusion and severe bilateral facet hypertrophy contributing to near-complete obliteration of the spinal canal and severe bilateral neural foraminal narrowing.

## 2017-10-12 NOTE — SUBJECTIVE & OBJECTIVE
Interval History: Please see hospital course.     Review of Systems   Constitutional: Positive for fatigue. Negative for appetite change, chills and fever.   HENT: Negative for congestion, ear pain, rhinorrhea and sore throat.    Eyes: Negative for pain and discharge.   Respiratory: Positive for cough. Negative for choking, chest tightness and shortness of breath.    Cardiovascular: Negative for chest pain, palpitations and leg swelling.   Gastrointestinal: Negative for abdominal pain, blood in stool, diarrhea, nausea, rectal pain and vomiting.   Endocrine: Negative for polyuria.   Genitourinary: Negative for decreased urine volume, difficulty urinating, dysuria, flank pain and hematuria.   Musculoskeletal: Positive for back pain. Negative for joint swelling and neck pain.   Skin: Negative for color change, pallor, rash and wound.   Neurological: Positive for weakness. Negative for dizziness, seizures and headaches.   Psychiatric/Behavioral: Negative for behavioral problems and confusion.     Objective:     Vital Signs (Most Recent):  Temp: 98.3 °F (36.8 °C) (10/12/17 1200)  Pulse: 94 (10/12/17 1200)  Resp: 18 (10/12/17 1200)  BP: 137/65 (10/12/17 1200)  SpO2: 97 % (10/12/17 1200) Vital Signs (24h Range):  Temp:  [98.1 °F (36.7 °C)-99 °F (37.2 °C)] 98.3 °F (36.8 °C)  Pulse:  [] 94  Resp:  [15-18] 18  SpO2:  [95 %-99 %] 97 %  BP: (126-159)/(58-75) 137/65        There is no height or weight on file to calculate BMI.  No intake or output data in the 24 hours ending 10/12/17 1550   Physical Exam   Constitutional: He is oriented to person, place, and time. He appears well-developed and well-nourished.   Pt is alert orientated and conversing appropriately   HENT:   Head: Normocephalic.   Nose: Nose normal.   Mouth/Throat: Oropharynx is clear and moist.   Eyes: EOM are normal. Pupils are equal, round, and reactive to light. No scleral icterus.   Neck: Neck supple. No JVD present. No tracheal deviation present. No  thyromegaly present.   Cardiovascular: Normal rate.  Exam reveals no gallop and no friction rub.    Murmur (4/6 systolic) heard.  Irregularly irregular rhythm   Pulmonary/Chest: No respiratory distress. He has no wheezes. He has no rales.   Decreased breath sound on R side   Abdominal: Soft. Bowel sounds are normal. He exhibits no distension and no mass. There is no tenderness. There is no rebound and no guarding.   Bruises noted diffusely throughout abdomen   Genitourinary: Rectal exam shows guaiac positive stool.   Genitourinary Comments: decreased rectal tone   Musculoskeletal: He exhibits deformity (scar from previous back surgery). He exhibits no edema.   Lymphadenopathy:     He has no cervical adenopathy.   Neurological: He is alert and oriented to person, place, and time. He exhibits normal muscle tone.   Negative asterixis    Skin: Skin is warm and dry. No rash noted. No erythema.   Psychiatric: He has a normal mood and affect. His behavior is normal.   Vitals reviewed.      Significant Labs:   CBC:   Recent Labs  Lab 10/11/17  0119 10/12/17  0538   WBC 7.11  7.11 6.23   HGB 9.9*  9.9* 9.2*   HCT 30.8*  30.8* 28.9*     200 161     CMP:   Recent Labs  Lab 10/11/17  0119 10/12/17  0538    140   K 3.4* 3.8   CL 99 108   CO2 26 23   * 99   BUN 26* 19   CREATININE 4.6* 4.1*   CALCIUM 10.3 9.4   PROT 6.4 5.9*   ALBUMIN 2.8* 2.6*   BILITOT 0.6 0.4   ALKPHOS 104 84   AST 31 33   ALT 27 28   ANIONGAP 14 9   EGFRNONAA 10.8* 12.4*       Significant Imaging: I have reviewed all pertinent imaging results/findings within the past 24 hours.

## 2017-10-12 NOTE — PROGRESS NOTES
[facility] Hospital Medicine  Progress Note    Patient Name: Donta Cline  MRN: [id number]  Patient Class: IP- Inpatient   Admission Date: 10/10/2017  Length of Stay: 2 days  Attending Physician: Alessia Ch MD  Primary Care Provider: ZAID Richardson Iii, MD    [facility] Medicine Team: [facility] HOSP MED 1 Tigre Smith MD    Subjective:     Principal Problem:Aspiration pneumonia    HPI:  Mr. 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 for second opinion concerning chronic back and hip pain in patient with moderate to severe lumbar stenosis.    Patient was originally seen at [facility] on 10/5/17 due to worsening low back pain and inability to ambulate with decline in function over past 5 weeks. There, patient was noted to have elevated troponin so transferred to [facility] for Cardiology evaluation. Patient was diagnosed with NSTEMI and treated medically. Patient was found to have elevated INR and no stents could be placed.  His INR was attempted to be revered with 4U of FFP and vit K but patient went into flash pulm edema.  At this time, 2 D echo revealed severe aortic stenosis with preserved EF and Cardiology evaluated patient and did not feel LHC warranted at this time but recommended outpatient follow-up with his regular Cardiologist and likely need for outpatient C to evaluate his aortic stenosis and coronary anatomy.     During this admission, patient was having delirium and developed aspiration PNA (placed on IV Zosyn). Additionally, pt was evaluated by NSx who  felt he was not a candidate for surgery. Patient was seen and evaluated by Neurosurgery at [place] (Dr. James) who noted Ct scan of lumbar spine showed moderate to severe acquired spinal stenosis and bilateral foraminal encroachment at L3-L4. Patient is here for second opionion from NSx.    Hospital Course:  10/12: Patient still in pain, fentanyl patch ordered, consulted PM&R. Surgical intervention not indicated per NSx. However, family strongly leaning towards surgical intervention and have unrealistic expectation. If surgical intervention, patient needs LHC and full cardiac work up. Given his recent NSTEMI and severe AS along with other co-morbities, patient is at high risk for surgery. Patient also with nausea and heart burn this morning. MBSS today, aspiration risk with honey thick liquids, speech recommended dental soft diet now.     Interval History: Please see hospital course.     Review of Systems   Constitutional: Positive for fatigue. Negative for appetite change, chills and fever.   HENT: Negative for congestion, ear pain, rhinorrhea and sore throat.    Eyes: Negative for pain and discharge.   Respiratory: Positive for cough. Negative for choking, chest tightness and shortness of breath.    Cardiovascular: Negative for chest pain, palpitations and leg swelling.   Gastrointestinal: Negative for abdominal pain, blood in stool, diarrhea, nausea, rectal pain and vomiting.   Endocrine: Negative for polyuria.   Genitourinary: Negative for decreased urine volume, difficulty urinating, dysuria, flank pain and hematuria.   Musculoskeletal: Positive for back pain. Negative for joint swelling and neck pain.   Skin: Negative for color change, pallor, rash and wound.   Neurological: Positive for weakness. Negative for dizziness, seizures and headaches.   Psychiatric/Behavioral: Negative for behavioral problems and confusion.     Objective:     Vital Signs (Most Recent):  Temp: 98.3 °F (36.8 °C) (10/12/17  1200)  Pulse: 94 (10/12/17 1200)  Resp: 18 (10/12/17 1200)  BP: 137/65 (10/12/17 1200)  SpO2: 97 % (10/12/17 1200) Vital Signs (24h Range):  Temp:  [98.1 °F (36.7 °C)-99 °F (37.2 °C)] 98.3 °F (36.8 °C)  Pulse:  [] 94  Resp:  [15-18] 18  SpO2:  [95 %-99 %] 97 %  BP: (126-159)/(58-75) 137/65        There is no height or weight on file to calculate BMI.  No intake or output data in the 24 hours ending 10/12/17 1550   Physical Exam   Constitutional: He is oriented to person, place, and time. He appears well-developed and well-nourished.   Pt is alert orientated and conversing appropriately   HENT:   Head: Normocephalic.   Nose: Nose normal.   Mouth/Throat: Oropharynx is clear and moist.   Eyes: EOM are normal. Pupils are equal, round, and reactive to light. No scleral icterus.   Neck: Neck supple. No JVD present. No tracheal deviation present. No thyromegaly present.   Cardiovascular: Normal rate.  Exam reveals no gallop and no friction rub.    Murmur (4/6 systolic) heard.  Irregularly irregular rhythm   Pulmonary/Chest: No respiratory distress. He has no wheezes. He has no rales.   Decreased breath sound on R side   Abdominal: Soft. Bowel sounds are normal. He exhibits no distension and no mass. There is no tenderness. There is no rebound and no guarding.   Bruises noted diffusely throughout abdomen   Genitourinary: Rectal exam shows guaiac positive stool.   Genitourinary Comments: decreased rectal tone   Musculoskeletal: He exhibits deformity (scar from previous back surgery). He exhibits no edema.   Lymphadenopathy:     He has no cervical adenopathy.   Neurological: He is alert and oriented to person, place, and time. He exhibits normal muscle tone.   Negative asterixis    Skin: Skin is warm and dry. No rash noted. No erythema.   Psychiatric: He has a normal mood and affect. His behavior is normal.   Vitals reviewed.      Significant Labs:   CBC:   Recent Labs  Lab 10/11/17  0119 10/12/17  0538   WBC 7.11   7.11 6.23   HGB 9.9*  9.9* 9.2*   HCT 30.8*  30.8* 28.9*     200 161     CMP:   Recent Labs  Lab 10/11/17  0119 10/12/17  0538    140   K 3.4* 3.8   CL 99 108   CO2 26 23   * 99   BUN 26* 19   CREATININE 4.6* 4.1*   CALCIUM 10.3 9.4   PROT 6.4 5.9*   ALBUMIN 2.8* 2.6*   BILITOT 0.6 0.4   ALKPHOS 104 84   AST 31 33   ALT 27 28   ANIONGAP 14 9   EGFRNONAA 10.8* 12.4*       Significant Imaging: I have reviewed all pertinent imaging results/findings within the past 24 hours.    Assessment/Plan:      * Aspiration pneumonia    -pt had AMS at OSH and was reported to have aspiration event  -on Unasyn   - CXRay with bilateral infiltrates but improved from previous cxr  -remained afebrile, no leukocytosis      Lumbar stenosis    -patient has stated this has been chronic but has been losing bowel functions for the past several months.    -denies saddle paraesthesia  -Ct scan of lumbar spine showed moderate to severe acquired spinal stenosis and bilateral foraminal encroachment at L3-L4  -MRI consistent with lumbar stenosis in the L3-4. Refer to MRI result   -Neurosurgery consulted, don't feel surgery is indicated at this point,however, family wants surgical intervention, unlikely given patients significant cardiac history, age and other co-morbidities. High risk for surgery.   -Fentanyl patch for pain management   -consulted PM& R, awaiting evaluation           Hypokalemia    -will get updated CMP          NSTEMI (non-ST elevated myocardial infarction)    -recent NSTEMI on 10/5/17  -treated medically  -continue aspirin, metoprolol, statin   -EKG with non specific ST changes and A.fib with troponin 0.399, down from OSH 1.030  -Cardiology following appreciate recs, attributed elevated troponin to AS and ESRD  -2D echo pending           Paroxysmal atrial fibrillation    -currently on coumadin, will continue 4 mg daily  -INR pending (goal of 2-3)       ESRD (end stage renal disease) on dialysis    -HD on  MWF  -has fistula on R arm  -inpatient consult to nephrology, appreciate help  -HD yesterday           Type 2 diabetes mellitus with chronic kidney disease on chronic dialysis, with long-term current use of insulin    -20U qhs  -updated A1c pending  -diabetic, renal diet  -BG optimal         Weakness generalized    -PT/OT ordered  -consult to social work for placement on discharge rehab vs SNF             VTE Risk Mitigation         Ordered     Medium Risk of VTE  Once      10/11/17 0006              Tigre Smith MD  Department of Hospital Medicine   [facility]

## 2017-10-12 NOTE — SUBJECTIVE & OBJECTIVE
Facility-Administered Medications Prior to Admission   Medication Dose Route Frequency Provider Last Rate Last Dose    albuterol sulfate nebulizer solution 2.5 mg  2.5 mg Nebulization Q4H PRN Homeyadiann Donovan MD   2.5 mg at 12/09/13 1400     Prescriptions Prior to Admission   Medication Sig Dispense Refill Last Dose    alprazolam (XANAX) 0.25 MG tablet Take 0.25 mg by mouth daily as needed for Anxiety.   0 Past Week at Unknown time    aspirin (ECOTRIN) 81 MG EC tablet 3 days a week (Patient taking differently: Take 81 mg by mouth every Mon, Wed, Fri. )  0 10/3/2017 at Unknown time    coenzyme Q10 200 mg capsule Take 200 mg by mouth once daily.   10/3/2017 at Unknown time    gabapentin (NEURONTIN) 300 MG capsule Take 300 mg by mouth every evening.       hydrocodone-acetaminophen 10-325mg (NORCO)  mg Tab Take 1 tablet by mouth every 6 (six) hours as needed for Pain.   0 10/3/2017 at Unknown time    insulin glargine (LANTUS) 100 unit/mL injection Inject 20 Units into the skin At bedtime.    10/3/2017 at Unknown time    insulin lispro (HUMALOG) 100 unit/mL injection Inject into the skin 3 (three) times daily as needed for High Blood Sugar.       metoprolol succinate (TOPROL-XL) 50 MG 24 hr tablet take 1 tablet by mouth once daily (Patient taking differently: Take one tablet by mouth daily on Monday, Wednesday and Friday) 30 tablet 11 10/2/2017    NITROSTAT 0.4 mg SL tablet place 1 tablet under the tongue if needed every 5 minutes for chest pain for 3 doses IF NO RELIEF AFTER 3RD DOSE CALL PRESCRIBER . 100 tablet 6 Unknown at Unknown time    pantoprazole (PROTONIX) 40 MG tablet Take 1 tablet by mouth nightly.  0 10/3/2017 at Unknown time    ANKIT-AZ RX 1- mg-mg-mcg Tab Take 1 tablet by mouth once daily.  0 10/3/2017 at Unknown time    rosuvastatin (CRESTOR) 40 MG Tab Take 1/2 a tab a day (Patient taking differently: Take 40 mg by mouth every evening. ) 30 tablet 6 Past Week at Unknown  time    sertraline (ZOLOFT) 100 MG tablet Take 1 tablet by mouth every evening.  0 10/3/2017 at Unknown time    warfarin (COUMADIN) 4 MG tablet take 1 tablet by mouth once daily 35 tablet 3 10/3/2017 at Unknown time    albuterol (ACCUNEB) 1.25 mg/3 mL Nebu every 6 (six) hours as needed.  0 More than a month at Unknown time    CALCIUM CARBONATE/VITAMIN D3 (VITAMIN D-3 ORAL) Take 1 tablet by mouth once daily.   More than a month at Unknown time    chlordiazepoxide-clidinium 5-2.5 mg (LIBRAX) 5-2.5 mg Cap Take 1 capsule by mouth 2 (two) times daily.  0 More than a month at Unknown time    temazepam (RESTORIL) 30 mg capsule Take 30 mg by mouth nightly as needed for Insomnia.   0 10/3/2017 at Unknown time    VIBERZI 75 mg Tab Take 1 tablet by mouth 2 (two) times daily.  0 More than a month at Unknown time       Review of patient's allergies indicates:   Allergen Reactions    Darvocet a500  [propoxyphene n-acetaminophen]      Other reaction(s): Unknown    Morphine      Other reaction(s): Unknown       Past Medical History:   Diagnosis Date    Bladder cancer     Chronic diastolic heart failure 8/21/2012    3/13: AOSAT: 98, FICKCI: 2.41, FICKCO: 5.18 PAPRES: 34/16 (23), PASAT: 65, PVR: 1.74 PWPRES: 18/18 (14), RA PRES: 14/13 (12), RV: 40/0, 10     Chronic hip pain, right 10/4/2017    Coronary artery disease involving native coronary artery of native heart without angina pectoris     Dyslipidemia     Encounter for blood transfusion     ESRD (end stage renal disease) on dialysis 6/9/2014    Essential hypertension     Hypercholesteremia 8/21/2012    Long-term (current) use of anticoagulants 10/9/2015    NSTEMI (non-ST elevated myocardial infarction) 10/4/2017    Paroxysmal atrial fibrillation 10/8/2015    Prostate cancer     Severe aortic stenosis 10/14/2014    Type 2 diabetes mellitus with chronic kidney disease on chronic dialysis, with long-term current use of insulin 12/15/2013    Ventricular  tachycardia     monomorphic     Past Surgical History:   Procedure Laterality Date    BACK SURGERY      laminectomy x2    COLON SURGERY      EXTENSIVE PROSTATE SURGER      Prostatectomy, Radical    JOINT REPLACEMENT      left hip     Family History     None        Social History Main Topics    Smoking status: Former Smoker     Packs/day: 3.00     Years: 40.00     Quit date: 1/1/1980    Smokeless tobacco: Former User    Alcohol use No      Comment: a few drinks    Drug use: Unknown    Sexual activity: Not on file     Review of Systems   Constitutional: Positive for activity change.      Constitutional: no fever or chills  Eyes: no visual changes  ENT: no nasal congestion or sore throat  Respiratory: no cough or shortness of breath  Cardiovascular: no chest pain or palpitations  Gastrointestinal: no nausea or vomiting, tolerating diet  Genitourinary: no hematuria or dysuria  Integument/Breast: no rash or pruritis  Hematologic/Lymphatic: no easy bruising or lymphadenopathy  Musculoskeletal: no arthralgias or myalgias, positive for back pain  Neurological: no seizures or tremors  Behavioral/Psych: no auditory or visual hallucinations  Endocrine: no heat or cold intolerance    Objective:        There is no height or weight on file to calculate BMI.  Vital Signs (Most Recent):  Temp: 98.1 °F (36.7 °C) (10/11/17 2322)  Pulse: 86 (10/11/17 2322)  Resp: 15 (10/11/17 2322)  BP: (!) 159/74 (10/11/17 2322)  SpO2: 99 % (10/11/17 2322) Vital Signs (24h Range):  Temp:  [97.9 °F (36.6 °C)-99 °F (37.2 °C)] 98.1 °F (36.7 °C)  Pulse:  [] 86  Resp:  [15-20] 15  SpO2:  [97 %-99 %] 99 %  BP: (105-163)/(48-80) 159/74       Date 10/11/17 0700 - 10/12/17 0659   Shift 6265-0411 1194-4245 4190-3747 24 Hour Total   I  N  T  A  K  E   Other 500   500    Shift Total 500   500   O  U  T  P  U  T   Other 1617   1617    Shift Total 1617   1617   Weight (kg)                            Hemodialysis AV Fistula Right forearm (Active)    Needle Size Buttonhole 10/11/2017 11:22 AM   Site Assessment Clean;Dry;Intact;No redness;No swelling 10/10/2017 11:30 PM   Patency Present;Thrill;Bruit 10/11/2017 11:22 AM   Status Accessed 10/11/2017 11:45 AM   Flows Good 10/11/2017 11:45 AM   Dressing Intervention Removed 10/11/2017 11:22 AM   Dressing Status Old drainage 10/11/2017 11:22 AM   Site Condition No complications 10/11/2017 11:22 AM   Dressing Gauze 10/11/2017 11:22 AM   Drainage Description Serosanguineous 10/11/2017 11:22 AM       Neurosurgery Physical Exam   General: well developed, well nourished, no distress  Head: normocephalic, atraumatic  Neurologic: Alert and oriented. Thought content appropriate  GCS: Motor: 6/Verbal: 5/Eyes: 4 GCS Total: 15  Mental Status: Awake, Alert, Oriented x 4  Language: No aphasia  Speech: No dysarthria  Cranial nerves: face symmetric, tongue midline, CN II-XII grossly intact.   Eyes: pupils equal, round, reactive to light with accommodation, EOMI  Pulmonary: normal respirations, not labored, no accessory muscles used  Abdomen: soft, non-distended, not tender to palpation  Sensory: intact to light touch throughout  Motor Strength: Moves all extremities spontaneously with good tone.  Full strength upper and lower extremities. No abnormal movements seen.     Strength  Deltoids Triceps Biceps Wrist Extension Wrist Flexion Hand    Upper: R 5/5 5/5 5/5 5/5 5/5 5/5    L 5/5 5/5 5/5 5/5 5/5 5/5     Iliopsoas Quadriceps Knee  Flexion Tibialis  anterior Gastro- cnemius EHL   Lower: R 5/5 5/5 5/5 5/5 5/5 5/5    L 5/5 5/5 5/5 5/5 5/5 5/5     Pronator Drift: no drift noted  Finger-to-nose: Intact bilaterally  Funez: absent  Clonus: absent  Babinski: absent  Pulses: 2+ and symmetric radial and dorsalis pedis  Skin: warm, dry and intact, no rashes        Significant Labs:    Recent Labs  Lab 10/10/17  0408 10/11/17  0119   * 183*    139   K 3.3* 3.4*    99   CO2 29 26   BUN 20 26*   CREATININE 3.20* 4.6*    CALCIUM 9.6 10.3   MG  --  1.7       Recent Labs  Lab 10/10/17  0408 10/11/17  0119   WBC 6.40 7.11  7.11   HGB 9.4* 9.9*  9.9*   HCT 28.7* 30.8*  30.8*    200  200       Recent Labs  Lab 10/10/17  0408 10/11/17  0349   LABPT 17.1*  --    INR 1.4* 1.4*   APTT  --  30.1     Microbiology Results (last 7 days)     ** No results found for the last 168 hours. **          Significant Diagnostics:  I personally reviewed CT Lspine - 1.  No CT evidence of an acute fracture.  Severe acquired spinal stenosis and bilateral foraminal encroachment at L3-L4.  2.  Postsurgical changes at the L4 and L5 levels.  3.  Refer to level by level findings as above.  No previous studies for comparison

## 2017-10-12 NOTE — PROGRESS NOTES
[facility]  Cardiology  Progress Note    Patient Name: Donta Cline  MRN: [id number]  Admission Date: 10/10/2017  Hospital Length of Stay: 2 days  Code Status: Full Code   Attending Physician: Alessia Ch MD   Primary Care Physician: ZAID Richardson Iii, MD  Expected Discharge Date: 10/13/2017  Principal Problem:Aspiration pneumonia    Subjective:     Hospital Course:   No notes on file    Review of Systems   Constitution: Negative for chills, fever, weakness and malaise/fatigue.   Eyes: Negative.    Cardiovascular: Positive for dyspnea on exertion. Negative for chest pain, irregular heartbeat, leg swelling, orthopnea and paroxysmal nocturnal dyspnea.   Respiratory: Negative for cough and shortness of breath.    Musculoskeletal: Positive for back pain. Negative for arthritis and joint pain.   Gastrointestinal: Negative for nausea and vomiting.   Neurological: Negative.      Objective:     Vital Signs (Most Recent):  Temp: 98.2 °F (36.8 °C) (10/12/17 0331)  Pulse: 90 (10/12/17 0331)  Resp: 16 (10/12/17 0331)  BP: (!) 149/69 (10/12/17 0331)  SpO2: 98 % (10/12/17 0331) Vital Signs (24h Range):  Temp:  [97.9 °F (36.6 °C)-99 °F (37.2 °C)] 98.2 °F (36.8 °C)  Pulse:  [] 90  Resp:  [15-16] 16  SpO2:  [97 %-99 %] 98 %  BP: (105-159)/(48-76) 149/69        There is no height or weight on file to calculate BMI.     SpO2: 98 %  O2 Device (Oxygen Therapy): room air      Intake/Output Summary (Last 24 hours) at 10/12/17 0855  Last data filed at 10/11/17 1345   Gross per 24 hour   Intake              500 ml   Output             1617 ml   Net            -1117 ml       Lines/Drains/Airways     Central Venous Catheter Line                 Percutaneous Central Line Insertion/Assessment - triple lumen  10/04/17 1045 left subclavian 7 days          Drain                 Hemodialysis AV Fistula Right forearm -- days                Physical Exam   Constitutional: He is oriented to person, place, and time. He  appears well-developed and well-nourished. No distress.   HENT:   Head: Normocephalic and atraumatic.   Eyes: Conjunctivae are normal. No scleral icterus.   Neck: JVD present.   Cardiovascular: Normal rate, regular rhythm, normal heart sounds and intact distal pulses.    Pulmonary/Chest: Effort normal and breath sounds normal.   Abdominal: Soft. Bowel sounds are normal. He exhibits no distension. There is no tenderness.   Neurological: He is alert and oriented to person, place, and time.   Psychiatric: He has a normal mood and affect. His behavior is normal.       Significant Labs:    Recent Results (from the past 12 hour(s))   POCT glucose    Collection Time: 10/11/17  9:25 PM   Result Value Ref Range    POCT Glucose 191 (H) 70 - 110 mg/dL   Troponin I    Collection Time: 10/12/17  5:38 AM   Result Value Ref Range    Troponin I 0.399 (H) 0.000 - 0.026 ng/mL   CBC with Automated Differential    Collection Time: 10/12/17  5:38 AM   Result Value Ref Range    WBC 6.23 3.90 - 12.70 K/uL    RBC 2.67 (L) 4.60 - 6.20 M/uL    Hemoglobin 9.2 (L) 14.0 - 18.0 g/dL    Hematocrit 28.9 (L) 40.0 - 54.0 %     (H) 82 - 98 fL    MCH 34.5 (H) 27.0 - 31.0 pg    MCHC 31.8 (L) 32.0 - 36.0 g/dL    RDW 17.4 (H) 11.5 - 14.5 %    Platelets 161 150 - 350 K/uL    MPV 9.8 9.2 - 12.9 fL    Gran # 4.8 1.8 - 7.7 K/uL    Lymph # 0.8 (L) 1.0 - 4.8 K/uL    Mono # 0.5 0.3 - 1.0 K/uL    Eos # 0.2 0.0 - 0.5 K/uL    Baso # 0.03 0.00 - 0.20 K/uL    Gran% 76.8 (H) 38.0 - 73.0 %    Lymph% 12.0 (L) 18.0 - 48.0 %    Mono% 7.5 4.0 - 15.0 %    Eosinophil% 2.9 0.0 - 8.0 %    Basophil% 0.5 0.0 - 1.9 %    Differential Method Automated    Protime-INR    Collection Time: 10/12/17  5:38 AM   Result Value Ref Range    Prothrombin Time 22.7 (H) 9.0 - 12.5 sec    INR 2.3 (H) 0.8 - 1.2   Comprehensive metabolic panel    Collection Time: 10/12/17  5:38 AM   Result Value Ref Range    Sodium 140 136 - 145 mmol/L    Potassium 3.8 3.5 - 5.1 mmol/L    Chloride 108 95 -  110 mmol/L    CO2 23 23 - 29 mmol/L    Glucose 99 70 - 110 mg/dL    BUN, Bld 19 8 - 23 mg/dL    Creatinine 4.1 (H) 0.5 - 1.4 mg/dL    Calcium 9.4 8.7 - 10.5 mg/dL    Total Protein 5.9 (L) 6.0 - 8.4 g/dL    Albumin 2.6 (L) 3.5 - 5.2 g/dL    Total Bilirubin 0.4 0.1 - 1.0 mg/dL    Alkaline Phosphatase 84 55 - 135 U/L    AST 33 10 - 40 U/L    ALT 28 10 - 44 U/L    Anion Gap 9 8 - 16 mmol/L    eGFR if African American 14.3 (A) >60 mL/min/1.73 m^2    eGFR if non African American 12.4 (A) >60 mL/min/1.73 m^2     Significant Imaging: Repeat echo pending.    Assessment and Plan:     Elevated troponin    - Repeated echo for better evaluation of aortic valve, pending. If it is indeed severe AS, may consider BAV prior to any surgical intervention, if deemed appropriate by neurosurgery.   - Increased metoprolol to XL 100mg qdaily for better HR control (goal HR would be 60's) and uptitrate as tolerated  - Continue aspirin, rosuvastatin  - Nitro SL PRN  - No need for further workup for mildly elevated troponin at this time in the setting of severe AS and ESRD.  However, if neurosurgery is planning operative intervention, would consider [facility] for pre-op risk stratification.   - For afib, would consider resumption of warfarin when no further procedures are planned            VTE Risk Mitigation         Ordered     Medium Risk of VTE  Once      10/11/17 0006          Robert Hampton MD   Internal Medicine, PGY-1    Cardiology  [facility]

## 2017-10-12 NOTE — PT/OT/SLP PROGRESS
Occupational Therapy      Donta Cline  MRN: [id number]    Patient not seen today secondary to  (pt away at swallow study). Will follow-up as available.    PIYUSH Bonlila  10/12/2017

## 2017-10-12 NOTE — PLAN OF CARE
Problem: Patient Care Overview  Goal: Plan of Care Review  Outcome: Ongoing (interventions implemented as appropriate)  Pt free of falls/injuries throughout the shift. Bed locked, in lowest position, call bell within reach. Pt afebrile, pain assessed & treated. VSS, pt in no distress, will continue to monitor.

## 2017-10-12 NOTE — HPI
Donta Cline is an 85-year-old male with PMHx of HTN, HLD, DMII with ESRD on (HD MWF with RUE fistula), CHF, CAD, a-fib (on Coumadin), bladder cancer, arthritis, s/p L DARIAN, and chronic low back and R hip pain 2/2 lumbar spinal stenosis s/p L3-L5 laminectomy and STAN.  Patient presented to [facility] on 10/5/17 for worsening and debilitating low back and RLE pain with subsequent gait instability and functional decline x 5 weeks.  Also reported bowel incontience over several months; denied saddle paraesthesia.  On arrival, found to have elevated troponin and was transferred to [facility] for Cardiology evaluation.  Upon admission, found to have supratherapeutic INR and NSTEMI.  When attempting to reverse INR, patient went into flash pulmonary edema.  2D echo revealed severe aortic stenosis with preserved EF. Cardiology was consulted and recommended outpatient [facility].  Hospital course further complicated by acute encephalopathy/delirium and aspiration pneumonia treated with Zosyn.  During admission, patient was evaluated by neurosurgery for initial presentation.  CT of lumbar spine showed moderate to severe spinal stenosis and bilateral foraminal encroachment at L3-L4.  Patient was deemed not a surgical candidate.  He then transferred to [facility] on 10/10/17 for a second opinion.  On arrival, evaluated by neurosurgery.  MRI showed multilevel degenerative changes most pronounced at L3-L4 with anterolisthesis of L3 on L4, a large circumferential disc bulge with a large superimposed R paracentral disc extrusion, and severe bilateral facet hypertrophy contributing to near-complete obliteration of the spinal canal and severe bilateral neural foraminal narrowing.  Due to patient's multiple comorbidities and high risk for surgery, decision was made to not proceed with any surgical intervention.      Functional History: Patient lives in [place], alone, in a single story home with one step to enter.  Prior to admission,  he was Ronnie with ADLs and mobility 2/2 signifcant functional decline over past several weeks.  During that time, patient required RW for short distances and WC for community distances.  Reported multiple falls over past 6 months.  DME: [facility], [place], RW, WC, shower chair.

## 2017-10-12 NOTE — HOSPITAL COURSE
10/12: Neuro exam stable.  10/18: Cards able to clear for surgery. OR scheduled for Friday.  Holding aspirin at this time.   10/19: Will pre-op for OR tomorrow. MUST hold heparin drip starting at ~5am tomorrow   10/20: to OR  10/21: +po/flatus.   10/22: Stable, transfused 1 uPRBC for dropping h/h  10/23: Weaned off pressors, but persistently low H/H requiring repeat transfusion to goal of Hb 8  10/24: Cleared for TTF on hospital medicine  10/25: Accepted to hospital medicine. symptomatic hypoglycemic episode overnight

## 2017-10-12 NOTE — HOSPITAL COURSE
10/11/17:  Evaluated by therapy.  Bed mobility SBA.  Sit to stand Tenzin with RW and transfers Tenzin with RW.  Ambulated 12 ft Tenzin with RW.  UBD SBA and LBD totalA.  10/12/17:  No PT or OT 2/2 patient off floor for MBSS.  SLP recommended dental soft diet and pudding thick liquids.  10/14/17:  Participated with OT.  Bed mobility SV-SBA.  Sit to stand CGA with RW and transfers CGA-Tenzin with RW.  Functional ambulation CGA-Tenzin with RW  UBD set-up assist and LBD set-up assist.

## 2017-10-12 NOTE — ASSESSMENT & PLAN NOTE
Mr. Cline is a 85yoM with multiple medical conditions who was transferred from [facility] for second neurosurgical opinion of LBP with RLE radic  -No acute neurosurgical intervention indicated  -BLE strength intact  -Baseline bowel incontinence, makes very little urine  -Recommend MRI Lumbar spine, this is ordered  -Will follow up after MRI  -Discussed with Dr. Cervantes

## 2017-10-12 NOTE — ASSESSMENT & PLAN NOTE
- Repeated echo for better evaluation of aortic valve, pending. If it is indeed severe AS, may consider BAV prior to any surgical intervention, if deemed appropriate by neurosurgery.   - Increased metoprolol to XL 100mg qdaily for better HR control (goal HR would be 60's) and uptitrate as tolerated  - Continue aspirin, rosuvastatin  - Nitro SL PRN  - No need for further workup for mildly elevated troponin at this time in the setting of severe AS and ESRD.  However, if neurosurgery is planning operative intervention, would consider [facility] for pre-op risk stratification.   - For afib, would consider resumption of warfarin when no further procedures are planned

## 2017-10-12 NOTE — PROCEDURES
Modifed Barium Swallow Study  Speech Start Time: 0958  Speech Stop Time: 1049  Speech Total (min): 51 min    SLP Treatment Date: 10/12/17    Reason for Referral  Patient was referred for a Modified Barium Swallow Study to assess the efficiency of his/her swallow function, rule out aspiration and make recommendations regarding safe dietary consistencies, effective compensatory strategies, and safe eating environment.     Recommendations  · Dental soft diet and pudding thick liquids  · Thicken all liquids to pudding thick with 4oz: 3-4 packets thickener  · Encourage double swallows per bolus  · Crush PO meds in pureed  · NO straws  · Fully awake and alert for PO intake  · Fully upright position for PO intake  · Small bites/ sips  · Slow rate of eating/ drinking  · 1 bite/ sip @ a time  · Refrain from talking prior to swallow completion  · Remain upright for 20-30 min post PO intake    Diagnosis   Aspiration pneumonia    Past Medical History:   Diagnosis Date    Bladder cancer     Chronic diastolic heart failure 8/21/2012    3/13: AOSAT: 98, FICKCI: 2.41, FICKCO: 5.18 PAPRES: 34/16 (23), PASAT: 65, PVR: 1.74 PWPRES: 18/18 (14), RA PRES: 14/13 (12), RV: 40/0, 10     Chronic hip pain, right 10/4/2017    Coronary artery disease involving native coronary artery of native heart without angina pectoris     Dyslipidemia     Encounter for blood transfusion     ESRD (end stage renal disease) on dialysis 6/9/2014    Essential hypertension     Hypercholesteremia 8/21/2012    Long-term (current) use of anticoagulants 10/9/2015    NSTEMI (non-ST elevated myocardial infarction) 10/4/2017    Paroxysmal atrial fibrillation 10/8/2015    Prostate cancer     Severe aortic stenosis 10/14/2014    Type 2 diabetes mellitus with chronic kidney disease on chronic dialysis, with long-term current use of insulin 12/15/2013    Ventricular tachycardia     monomorphic     Past Surgical History:   Procedure Laterality Date    BACK  SURGERY      laminectomy x2    COLON SURGERY      EXTENSIVE PROSTATE SURGER      Prostatectomy, Radical    JOINT REPLACEMENT      left hip        General Precautions: aspiration, pudding thick, fall  Current Respiratory Status:  (room air)    Oral Peripheral Examination  Oral Musculature Evaluation  Oral Musculature: WFL  Dentition: present and adequate  Mucosal Quality: adequate  Mandibular Strength and Mobility: WFL  Oral Labial Strength and Mobility: WFL  Velar Elevation: WFL  Buccal Strength and Mobility: decreased tone (c/w age)  Volitional Cough: productive  Volitional Swallow: timely  Voice Prior to PO Intake: clear    Consistencies Assessed  · Thin liquids: 1/2 tsp x2, tsp x1, cup sips x2  · Nectar thick liquids: 1/2 tsp x1, tsp x1  · Honey thick liquids: 1/2 tsp x1, tsp x1, continuous cup sips x2  · Pureed apple sauce: 1/2 tsp x2, tsp x2  · Solid saltine cracker: bite x1    Oral Preparation / Oral Phase  · Adequate bolus acceptance without anterior loss and adequate mastication  · Dec'd bolus control across consistencies  · Piecemeal deglutition across consistencies  · Dec'd BOT retraction    Pharyngeal Phase  · Premature spillage with pooling to the vallecula across consistencies.   · Adequate epiglottic inversion and hyolaryngeal elevation and excursion  · Thin liquids: Penetration during the swallow with 1/2 tsp, silent aspiration with cup sip x1 during the swallow and after the swallow with cup sip x1  · Nectar thick liquids: Penetration/aspiration with tspx1 during the swallow. Protective cough elicited, however unsuccessful to eject aspirated material from airway  · Honey thick liquids: Deep silent penetration with cup sips x2 to the level of the vocal folds during the swallow with concern for aspiration, however unable to visualize 2/2 pt's positioning  · Despite extensive clinician modelling/cueing/prompting, pt unable to perform chin tuck in order to determine if completion would resolve  penetration  · During second cup sip, pt completed initial swallow, regurgitated bolus into oral cavity, then re-swallowed material  · Puree: No penetration/ aspiration.   · Solid: No penetration/ aspiration.   · Vallecula stasis noted to range from min-severe, becoming more severe with thicker consistencies and/ or larger boluses   · Pt required cues to complete double swallow per bolus to aid clearance of vallecula stasis  · Given cue to cough upon visualization of penetrated/ aspirated material, cough noted to be unsuccessful to eject material from airway   · Scattered trace pharyngeal residue across consistencies  · Spontaneous coughing observed throughout session, appearing unrelated to PO trials, however unable to determine 2/2 limited visualization if coughing 2/2 penetration/ aspiration from retrograde flow from cervical esophageal phase    Cervical Esophageal Phase  With nectar thick and honey thick liquids, concern for retrograde flow from UES visualized.     Impressions  Pt with moderate-severe oropharyngeal phase dysphagia with aspiration of thin and nectar thick liquids, and penetration with concern for aspiration of honey thick liquids. No penetration/ aspiration with pureed and solid consistencies.     Prognosis/Plan/Education  Good. SLP to continue to follow pt to provide ongoing education and dysphagia therapy, as well as to monitor diet tolerance. Education provided re: results of MBSS and SLP POC.     Care Plan    SLP Goals        Problem: SLP Goal    Goal Priority Disciplines Outcome   SLP Goal     SLP Ongoing (interventions implemented as appropriate)   Description:  Speech Therapy Short Term Goals  Goals expected to be met by 10/19:  1. Pt will tolerate pudding thick liquids and dental soft diet with no overt s/s aspiration.   2. Given clinician model, pt will complete dysphagia exercises x10 each in order to strengthen the swallowing musculature.   3. Pt will participate in repeat MBSS when  deemed appropriate by SLP/ MD.                         G-Codes  Functional Assessment Tool Used: NOMS  Score: 3  Functional Limitations: Swallowing  Swallow Current Status (): CL  Swallow Goal Status (): DUGLAS Berger, CCC-SLP  [phone]  10/12/2017

## 2017-10-12 NOTE — ASSESSMENT & PLAN NOTE
-HD on MWF  -has fistula on R arm  -inpatient consult to nephrology, appreciate help  -HD yesterday

## 2017-10-12 NOTE — CONSULTS
Inpatient consult to Physical Medicine Rehab  Consult performed by: NIKO JOSE  Consult ordered by: ANYA VILCHIS  Reason for consult: Rehab evaluation for chronic lumbar spinal stenosis, currently not a surgical candidate      Reviewed patient history and current admission.  Rehab team following.  Full consult to follow.    SAIGE Pruitt, FNP-C  Physical Medicine & Rehabilitation   10/12/2017  Spectralink: [id number]

## 2017-10-12 NOTE — ASSESSMENT & PLAN NOTE
-recent NSTEMI on 10/5/17  -treated medically  -continue aspirin, metoprolol, statin   -EKG with non specific ST changes and A.fib with troponin 0.399, down from OSH 1.030  -Cardiology following appreciate recs, attributed elevated troponin to AS and ESRD  -2D echo pending

## 2017-10-12 NOTE — PT/OT/SLP PROGRESS
Physical Therapy      Donta Cline  MRN: [id number]    Patient not seen today secondary to off floor for barium swallow/ MD present  . Attempt x2 in AM/PM. Unable to see patient on first attempt secondary to leaving floor for barium swallow study. Unable to see patient on second attempt secondary to MD present discussing surgical interventions. Will follow-up next scheduled visit.    Edgar Wright III, PT   10/12/2017

## 2017-10-12 NOTE — CONSULTS
[facility]  Neurosurgery  Consult Note    Consults  Subjective:     Chief Complaint/Reason for Admission: LBP with RLE radic    History of Present Illness: Mr. Cline is a 86 y/o male with past medical history of Type 2 diabetes with ESRD (MWF, Fistula on R arm) on home insulin therapy, chronic low back and right hip pain (due to lumbar spinal stenosis hx back surgeries at L4 and L5 and recent epidural steroid injections to address chronic pain in 9/2017),  severe aortic stenosis with VEL 0.7 cm2, CAD, HFpEF, bladder cancer and paroxysmal atrial fibrillation (Coumadin) transferred here from [facility] for second opinion concerning chronic back and hip pain in patient with moderate to severe lumbar stenosis.     Patient was originally seen at [facility] on 10/5/17 due to worsening low back pain and inability to ambulate with decline in function over past 5 weeks. There, patient was noted to have elevated troponin so transferred to [facility] for Cardiology evaluation. Patient was diagnosed with NSTEMI and treated medically. Patient was found to have elevated INR and no stents could be placed.  His INR was attempted to be revered with 4U of FFP and vit K but patient went into flash pulm edema.  At this time, 2 D echo revealed severe aortic stenosis with preserved EF and Cardiology evaluated patient and did not feel LHC warranted at this time but recommended outpatient follow-up with his regular Cardiologist and likely need for outpatient LHC to evaluate his aortic stenosis and coronary anatomy.      During this admission, patient was having delirium and developed aspiration PNA (placed on IV Zosyn). Additionally, pt was evaluated by [facility] who felt he was not a candidate for surgery. Patient was seen and evaluated by Neurosurgery at [facility] (Dr. James) who noted Ct scan of lumbar spine showed moderate to severe acquired spinal stenosis and bilateral foraminal  encroachment at L3-L4. Patient is here for second opionion from [facility].    Facility-Administered Medications Prior to Admission   Medication Dose Route Frequency Provider Last Rate Last Dose    albuterol sulfate nebulizer solution 2.5 mg  2.5 mg Nebulization Q4H PRN Prabha Donvoan MD   2.5 mg at 12/09/13 1400     Prescriptions Prior to Admission   Medication Sig Dispense Refill Last Dose    alprazolam (XANAX) 0.25 MG tablet Take 0.25 mg by mouth daily as needed for Anxiety.   0 Past Week at Unknown time    aspirin (ECOTRIN) 81 MG EC tablet 3 days a week (Patient taking differently: Take 81 mg by mouth every Mon, Wed, Fri. )  0 10/3/2017 at Unknown time    coenzyme Q10 200 mg capsule Take 200 mg by mouth once daily.   10/3/2017 at Unknown time    gabapentin (NEURONTIN) 300 MG capsule Take 300 mg by mouth every evening.       hydrocodone-acetaminophen 10-325mg (NORCO)  mg Tab Take 1 tablet by mouth every 6 (six) hours as needed for Pain.   0 10/3/2017 at Unknown time    insulin glargine (LANTUS) 100 unit/mL injection Inject 20 Units into the skin At bedtime.    10/3/2017 at Unknown time    insulin lispro (HUMALOG) 100 unit/mL injection Inject into the skin 3 (three) times daily as needed for High Blood Sugar.       metoprolol succinate (TOPROL-XL) 50 MG 24 hr tablet take 1 tablet by mouth once daily (Patient taking differently: Take one tablet by mouth daily on Monday, Wednesday and Friday) 30 tablet 11 10/2/2017    NITROSTAT 0.4 mg SL tablet place 1 tablet under the tongue if needed every 5 minutes for chest pain for 3 doses IF NO RELIEF AFTER 3RD DOSE CALL PRESCRIBER . 100 tablet 6 Unknown at Unknown time    pantoprazole (PROTONIX) 40 MG tablet Take 1 tablet by mouth nightly.  0 10/3/2017 at Unknown time    ANKIT-AZ RX 1- mg-mg-mcg Tab Take 1 tablet by mouth once daily.  0 10/3/2017 at Unknown time    rosuvastatin (CRESTOR) 40 MG Tab Take 1/2 a tab a day (Patient taking differently:  Take 40 mg by mouth every evening. ) 30 tablet 6 Past Week at Unknown time    sertraline (ZOLOFT) 100 MG tablet Take 1 tablet by mouth every evening.  0 10/3/2017 at Unknown time    warfarin (COUMADIN) 4 MG tablet take 1 tablet by mouth once daily 35 tablet 3 10/3/2017 at Unknown time    albuterol (ACCUNEB) 1.25 mg/3 mL Nebu every 6 (six) hours as needed.  0 More than a month at Unknown time    CALCIUM CARBONATE/VITAMIN D3 (VITAMIN D-3 ORAL) Take 1 tablet by mouth once daily.   More than a month at Unknown time    chlordiazepoxide-clidinium 5-2.5 mg (LIBRAX) 5-2.5 mg Cap Take 1 capsule by mouth 2 (two) times daily.  0 More than a month at Unknown time    temazepam (RESTORIL) 30 mg capsule Take 30 mg by mouth nightly as needed for Insomnia.   0 10/3/2017 at Unknown time    VIBERZI 75 mg Tab Take 1 tablet by mouth 2 (two) times daily.  0 More than a month at Unknown time       Review of patient's allergies indicates:   Allergen Reactions    Darvocet a500  [propoxyphene n-acetaminophen]      Other reaction(s): Unknown    Morphine      Other reaction(s): Unknown       Past Medical History:   Diagnosis Date    Bladder cancer     Chronic diastolic heart failure 8/21/2012    3/13: AOSAT: 98, FICKCI: 2.41, FICKCO: 5.18 PAPRES: 34/16 (23), PASAT: 65, PVR: 1.74 PWPRES: 18/18 (14), RA PRES: 14/13 (12), RV: 40/0, 10     Chronic hip pain, right 10/4/2017    Coronary artery disease involving native coronary artery of native heart without angina pectoris     Dyslipidemia     Encounter for blood transfusion     ESRD (end stage renal disease) on dialysis 6/9/2014    Essential hypertension     Hypercholesteremia 8/21/2012    Long-term (current) use of anticoagulants 10/9/2015    NSTEMI (non-ST elevated myocardial infarction) 10/4/2017    Paroxysmal atrial fibrillation 10/8/2015    Prostate cancer     Severe aortic stenosis 10/14/2014    Type 2 diabetes mellitus with chronic kidney disease on chronic dialysis,  with long-term current use of insulin 12/15/2013    Ventricular tachycardia     monomorphic     Past Surgical History:   Procedure Laterality Date    BACK SURGERY      laminectomy x2    COLON SURGERY      EXTENSIVE PROSTATE SURGER      Prostatectomy, Radical    JOINT REPLACEMENT      left hip     Family History     None        Social History Main Topics    Smoking status: Former Smoker     Packs/day: 3.00     Years: 40.00     Quit date: 1/1/1980    Smokeless tobacco: Former User    Alcohol use No      Comment: a few drinks    Drug use: Unknown    Sexual activity: Not on file     Review of Systems   Constitutional: Positive for activity change.      Constitutional: no fever or chills  Eyes: no visual changes  ENT: no nasal congestion or sore throat  Respiratory: no cough or shortness of breath  Cardiovascular: no chest pain or palpitations  Gastrointestinal: no nausea or vomiting, tolerating diet  Genitourinary: no hematuria or dysuria  Integument/Breast: no rash or pruritis  Hematologic/Lymphatic: no easy bruising or lymphadenopathy  Musculoskeletal: no arthralgias or myalgias, positive for back pain  Neurological: no seizures or tremors  Behavioral/Psych: no auditory or visual hallucinations  Endocrine: no heat or cold intolerance    Objective:        There is no height or weight on file to calculate BMI.  Vital Signs (Most Recent):  Temp: 98.1 °F (36.7 °C) (10/11/17 2322)  Pulse: 86 (10/11/17 2322)  Resp: 15 (10/11/17 2322)  BP: (!) 159/74 (10/11/17 2322)  SpO2: 99 % (10/11/17 2322) Vital Signs (24h Range):  Temp:  [97.9 °F (36.6 °C)-99 °F (37.2 °C)] 98.1 °F (36.7 °C)  Pulse:  [] 86  Resp:  [15-20] 15  SpO2:  [97 %-99 %] 99 %  BP: (105-163)/(48-80) 159/74       Date 10/11/17 0700 - 10/12/17 0659   Shift 8909-3812 3031-6430 9946-5337 24 Hour Total   I  N  T  A  K  E   Other 500   500    Shift Total 500   500   O  U  T  P  U  T   Other 1617   1617    Shift Total 1617   1617   Weight (kg)                             Hemodialysis AV Fistula Right forearm (Active)   Needle Size Buttonhole 10/11/2017 11:22 AM   Site Assessment Clean;Dry;Intact;No redness;No swelling 10/10/2017 11:30 PM   Patency Present;Thrill;Bruit 10/11/2017 11:22 AM   Status Accessed 10/11/2017 11:45 AM   Flows Good 10/11/2017 11:45 AM   Dressing Intervention Removed 10/11/2017 11:22 AM   Dressing Status Old drainage 10/11/2017 11:22 AM   Site Condition No complications 10/11/2017 11:22 AM   Dressing Gauze 10/11/2017 11:22 AM   Drainage Description Serosanguineous 10/11/2017 11:22 AM       Neurosurgery Physical Exam   General: well developed, well nourished, no distress  Head: normocephalic, atraumatic  Neurologic: Alert and oriented. Thought content appropriate  GCS: Motor: 6/Verbal: 5/Eyes: 4 GCS Total: 15  Mental Status: Awake, Alert, Oriented x 4  Language: No aphasia  Speech: No dysarthria  Cranial nerves: face symmetric, tongue midline, CN II-XII grossly intact.   Eyes: pupils equal, round, reactive to light with accommodation, EOMI  Pulmonary: normal respirations, not labored, no accessory muscles used  Abdomen: soft, non-distended, not tender to palpation  Sensory: intact to light touch throughout  Motor Strength: Moves all extremities spontaneously with good tone.  Full strength upper and lower extremities. No abnormal movements seen.     Strength  Deltoids Triceps Biceps Wrist Extension Wrist Flexion Hand    Upper: R 5/5 5/5 5/5 5/5 5/5 5/5    L 5/5 5/5 5/5 5/5 5/5 5/5     Iliopsoas Quadriceps Knee  Flexion Tibialis  anterior Gastro- cnemius EHL   Lower: R 5/5 5/5 5/5 5/5 5/5 5/5    L 5/5 5/5 5/5 5/5 5/5 5/5     Pronator Drift: no drift noted  Finger-to-nose: Intact bilaterally  Funez: absent  Clonus: absent  Babinski: absent  Pulses: 2+ and symmetric radial and dorsalis pedis  Skin: warm, dry and intact, no rashes        Significant Labs:    Recent Labs  Lab 10/10/17  0408 10/11/17  0119   * 183*    139   K 3.3* 3.4*     99   CO2 29 26   BUN 20 26*   CREATININE 3.20* 4.6*   CALCIUM 9.6 10.3   MG  --  1.7       Recent Labs  Lab 10/10/17  0408 10/11/17  0119   WBC 6.40 7.11  7.11   HGB 9.4* 9.9*  9.9*   HCT 28.7* 30.8*  30.8*    200  200       Recent Labs  Lab 10/10/17  0408 10/11/17  0349   LABPT 17.1*  --    INR 1.4* 1.4*   APTT  --  30.1     Microbiology Results (last 7 days)     ** No results found for the last 168 hours. **          Significant Diagnostics:  I personally reviewed CT Lspine - 1.  No CT evidence of an acute fracture.  Severe acquired spinal stenosis and bilateral foraminal encroachment at L3-L4.  2.  Postsurgical changes at the L4 and L5 levels.  3.  Refer to level by level findings as above.  No previous studies for comparison    Assessment/Plan:     Lumbar back pain with radiculopathy affecting right lower extremity    Mr. Cline is a 85yoM with multiple medical conditions who was transferred from [facility] for second neurosurgical opinion of LBP with RLE radic  -No acute neurosurgical intervention indicated  -BLE strength intact  -Baseline bowel incontinence, makes very little urine  -Recommend MRI Lumbar spine, this is ordered  -Will follow up after MRI  -Discussed with Dr. Cervantes            Thank you for your consult. I will follow-up with patient. Please contact us if you have any additional questions.    Diana Weinberg PA-C  Neurosurgery  [facility]

## 2017-10-12 NOTE — ASSESSMENT & PLAN NOTE
-patient has stated this has been chronic but has been losing bowel functions for the past several months.    -denies saddle paraesthesia  -Ct scan of lumbar spine showed moderate to severe acquired spinal stenosis and bilateral foraminal encroachment at L3-L4  -MRI consistent with lumbar stenosis in the L3-4. Refer to MRI result   -Neurosurgery consulted, don't feel surgery is indicated at this point,however, family wants surgical intervention, unlikely given patients significant cardiac history, age and other co-morbidities. High risk for surgery.   -Fentanyl patch for pain management   -consulted PM& R, awaiting evaluation

## 2017-10-12 NOTE — PLAN OF CARE
Problem: SLP Goal  Goal: SLP Goal  Speech Therapy Short Term Goals  Goals expected to be met by 10/19:  1. Pt will tolerate pudding thick liquids and dental soft diet with no overt s/s aspiration.   2. Given clinician model, pt will complete dysphagia exercises x10 each in order to strengthen the swallowing musculature.   3. Pt will participate in repeat MBSS when deemed appropriate by SLP/ MD.     Outcome: Ongoing (interventions implemented as appropriate)  MBSS complete. Recommend dental soft diet and pudding thick liquids with strict aspiration precautions. See report for details.     DUGLAS Barriga, CCC-SLP  [phone]  10/12/2017

## 2017-10-12 NOTE — SUBJECTIVE & OBJECTIVE
Review of Systems   Constitution: Negative for chills, fever, weakness and malaise/fatigue.   Eyes: Negative.    Cardiovascular: Positive for dyspnea on exertion. Negative for chest pain, irregular heartbeat, leg swelling, orthopnea and paroxysmal nocturnal dyspnea.   Respiratory: Negative for cough and shortness of breath.    Musculoskeletal: Positive for back pain. Negative for arthritis and joint pain.   Gastrointestinal: Negative for nausea and vomiting.   Neurological: Negative.      Objective:     Vital Signs (Most Recent):  Temp: 98.2 °F (36.8 °C) (10/12/17 0331)  Pulse: 90 (10/12/17 0331)  Resp: 16 (10/12/17 0331)  BP: (!) 149/69 (10/12/17 0331)  SpO2: 98 % (10/12/17 0331) Vital Signs (24h Range):  Temp:  [97.9 °F (36.6 °C)-99 °F (37.2 °C)] 98.2 °F (36.8 °C)  Pulse:  [] 90  Resp:  [15-16] 16  SpO2:  [97 %-99 %] 98 %  BP: (105-159)/(48-76) 149/69        There is no height or weight on file to calculate BMI.     SpO2: 98 %  O2 Device (Oxygen Therapy): room air      Intake/Output Summary (Last 24 hours) at 10/12/17 0855  Last data filed at 10/11/17 1345   Gross per 24 hour   Intake              500 ml   Output             1617 ml   Net            -1117 ml       Lines/Drains/Airways     Central Venous Catheter Line                 Percutaneous Central Line Insertion/Assessment - triple lumen  10/04/17 1045 left subclavian 7 days          Drain                 Hemodialysis AV Fistula Right forearm -- days                Physical Exam   Constitutional: He is oriented to person, place, and time. He appears well-developed and well-nourished. No distress.   HENT:   Head: Normocephalic and atraumatic.   Eyes: Conjunctivae are normal. No scleral icterus.   Neck: JVD present.   Cardiovascular: Normal rate, regular rhythm, normal heart sounds and intact distal pulses.    Pulmonary/Chest: Effort normal and breath sounds normal.   Abdominal: Soft. Bowel sounds are normal. He exhibits no distension. There is no  tenderness.   Neurological: He is alert and oriented to person, place, and time.   Psychiatric: He has a normal mood and affect. His behavior is normal.       Significant Labs:    Recent Results (from the past 12 hour(s))   POCT glucose    Collection Time: 10/11/17  9:25 PM   Result Value Ref Range    POCT Glucose 191 (H) 70 - 110 mg/dL   Troponin I    Collection Time: 10/12/17  5:38 AM   Result Value Ref Range    Troponin I 0.399 (H) 0.000 - 0.026 ng/mL   CBC with Automated Differential    Collection Time: 10/12/17  5:38 AM   Result Value Ref Range    WBC 6.23 3.90 - 12.70 K/uL    RBC 2.67 (L) 4.60 - 6.20 M/uL    Hemoglobin 9.2 (L) 14.0 - 18.0 g/dL    Hematocrit 28.9 (L) 40.0 - 54.0 %     (H) 82 - 98 fL    MCH 34.5 (H) 27.0 - 31.0 pg    MCHC 31.8 (L) 32.0 - 36.0 g/dL    RDW 17.4 (H) 11.5 - 14.5 %    Platelets 161 150 - 350 K/uL    MPV 9.8 9.2 - 12.9 fL    Gran # 4.8 1.8 - 7.7 K/uL    Lymph # 0.8 (L) 1.0 - 4.8 K/uL    Mono # 0.5 0.3 - 1.0 K/uL    Eos # 0.2 0.0 - 0.5 K/uL    Baso # 0.03 0.00 - 0.20 K/uL    Gran% 76.8 (H) 38.0 - 73.0 %    Lymph% 12.0 (L) 18.0 - 48.0 %    Mono% 7.5 4.0 - 15.0 %    Eosinophil% 2.9 0.0 - 8.0 %    Basophil% 0.5 0.0 - 1.9 %    Differential Method Automated    Protime-INR    Collection Time: 10/12/17  5:38 AM   Result Value Ref Range    Prothrombin Time 22.7 (H) 9.0 - 12.5 sec    INR 2.3 (H) 0.8 - 1.2   Comprehensive metabolic panel    Collection Time: 10/12/17  5:38 AM   Result Value Ref Range    Sodium 140 136 - 145 mmol/L    Potassium 3.8 3.5 - 5.1 mmol/L    Chloride 108 95 - 110 mmol/L    CO2 23 23 - 29 mmol/L    Glucose 99 70 - 110 mg/dL    BUN, Bld 19 8 - 23 mg/dL    Creatinine 4.1 (H) 0.5 - 1.4 mg/dL    Calcium 9.4 8.7 - 10.5 mg/dL    Total Protein 5.9 (L) 6.0 - 8.4 g/dL    Albumin 2.6 (L) 3.5 - 5.2 g/dL    Total Bilirubin 0.4 0.1 - 1.0 mg/dL    Alkaline Phosphatase 84 55 - 135 U/L    AST 33 10 - 40 U/L    ALT 28 10 - 44 U/L    Anion Gap 9 8 - 16 mmol/L    eGFR if African  American 14.3 (A) >60 mL/min/1.73 m^2    eGFR if non African American 12.4 (A) >60 mL/min/1.73 m^2     Significant Imaging: Repeat echo pending.

## 2017-10-12 NOTE — HPI
Mr. 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     Patient was originally seen at [facility] on 10/5/17 due to worsening low back pain and inability to ambulate with decline in function over past 5 weeks. There, patient was noted to have elevated troponin so transferred to [facility] for Cardiology evaluation. Patient was diagnosed with NSTEMI and treated medically. Patient was found to have elevated INR and no stents could be placed.  His INR was attempted to be revered with 4U of FFP and vit K but patient went into flash pulm edema.  At this time, 2 D echo revealed severe aortic stenosis with preserved EF and Cardiology evaluated patient and did not feel LHC warranted at this time but recommended outpatient follow-up with his regular Cardiologist and likely need for outpatient LHC to evaluate his aortic stenosis and coronary anatomy.      During this admission, patient was having delirium and developed aspiration PNA (placed on IV Zosyn). Additionally, pt was evaluated by NSx who felt he was not a candidate for surgery. Patient was seen and evaluated by Neurosurgery at [place] (Dr. James) who noted Ct scan of lumbar spine showed moderate to severe acquired spinal stenosis and bilateral foraminal encroachment at L3-L4. Patient is here for second opionion from [facility].

## 2017-10-13 NOTE — PT/OT/SLP PROGRESS
"Speech Language Pathology  Treatment    Donta Cline   MRN: [id number]   [id number]/[id number] A    Admitting Diagnosis: Aspiration pneumonia    Diet recommendations: Solid Diet Level: Dental Soft  Liquid Diet Level: Pudding Thick   · Dental soft diet and pudding thick liquids  · Thicken all liquids to pudding thick with 4oz: 3-4 packets Resource ThickenUp Clear or 4oz: 1.5-2 packets Resource ThickenUp  · Encourage double swallows per bolus  · Crush PO meds in pureed  · NO straws  · Fully awake and alert for PO intake  · Fully upright position for PO intake  · Small bites/ sips  · Slow rate of eating/ drinking  · 1 bite/ sip @ a time  · Refrain from talking prior to swallow completion  · Remain upright for 20-30 min post PO intake    SLP Treatment Date: 10/13/17  Speech Start Time: 1443     Speech Stop Time: 1514     Speech Total (min): 31 min       TREATMENT BILLABLE MINUTES:  Treatment Swallowing Dysfunction 23 and Seld Care/Home Management Training 8    Has the patient been evaluated by SLP for swallowing? : Yes  Keep patient NPO?: No   General Precautions: Standard, aspiration, fall, pureed diet, pudding thick  Current Respiratory Status:  (room air)       Subjective:  "I can't do this anymore." Pt unable to maintain upright position to accept pudding thick liquids, reporting 2/2 severe lower back pain.     Pain/Comfort  Pain Rating 1:  (Pt reporting severe lower back pain, radiating to legs. Did not rate severity. )  Location - Orientation 1: lower  Location 1: back  Pain Addressed 1: Nurse notified  Pain Rating Post-Intervention 1:  (Pt reporting severe lower back pain, radiating to legs. Did not rate severity. )    Objective:   Pt awake and alert upon entry with son at b/s. NSG present at b/s throughout session. Son expressing frustration, reported pt to be diabetic but lunch tray inconsistent with diabetic diet. Frustration continued when results of MBSS completed yesterday, 10/12, reviewed. Pt and son provided with " extensive education re: definition/risks/overt s/s aspiration, results of MBSS, aspiration precautions listed above and SLP dietary recommendations. Pt and son verbalized understanding and agreement.     Pt's participation in dysphagia therapy limited by pt report of severe lower back pain, radiating to legs. Pt completed le exercise x3 with good ability, given model and verbal cueing to initiate, however was unable to trigger additional volitional swallows to complete additional trials 2/2 report of dry oral cavity. In order to moisten pt's oral cavity to complete additional dysphagia exercises, HOB raised while clinician thickened liquid. Pt unable to maintain upright position to accept pudding thickened water. HOB lowered. No further dysphagia exercises attempted 2/2 pt's risk of aspiration upon PO in reclined position therefore remained unable to trigger volitional swallows for dysphagia exercises 2/2 dry mouth. In addition no further dysphagia exercises attempted 2/2 pt's support of extreme lower back pain.     Extensive education provided to son re: pudding thick liquids. Written instructions provided. White board updated. No further questions. Results reviewed with medical team and NSG. Requested medical team to update dietary order to pudding thick liquids and dental soft as deemed necessary from MBSS, as well as reported pt's son's frustration with lunch tray.     Assessment:    Donta Cline is a 85 y.o. male with a medical diagnosis of Aspiration pneumonia and presents with oropharyngeal dysphagia.     Discharge recommendations: Discharge Facility/Level Of Care Needs: nursing facility, skilled     Goals:    SLP Goals        Problem: SLP Goal    Goal Priority Disciplines Outcome   SLP Goal     SLP Ongoing (interventions implemented as appropriate)   Description:  Speech Therapy Short Term Goals  Goals expected to be met by 10/19:  1. Pt will tolerate pudding thick liquids and dental soft diet with no  overt s/s aspiration.   2. Given clinician model, pt will complete dysphagia exercises x10 each in order to strengthen the swallowing musculature.   3. Pt will participate in repeat MBSS when deemed appropriate by SLP/ MD.                         Plan:   Patient to be seen Therapy Frequency: 5 x/week   Plan of Care expires: 11/10/17  Plan of Care reviewed with: patient, son  SLP Follow-up?: Yes         DUGLAS Barriga, CCC-SLP  [phone]  10/13/2017

## 2017-10-13 NOTE — SUBJECTIVE & OBJECTIVE
Interval History:   Patient evaluated during YUAN, hemodynamically stable, no respiratory distress, no complaint during treatment.     Review of patient's allergies indicates:   Allergen Reactions    Darvocet a500  [propoxyphene n-acetaminophen]      Other reaction(s): Unknown    Morphine      Other reaction(s): Unknown     Current Facility-Administered Medications   Medication Frequency    0.9%  NaCl infusion PRN    0.9%  NaCl infusion Once    ampicillin-sulbactam 3 g in sodium chloride 0.9 % 100 mL IVPB (ready to mix system) Daily    aspirin chewable tablet 81 mg Every Mon, Wed, Fri    dextrose 50% injection 12.5 g PRN    dextrose 50% injection 25 g PRN    fentanyl 12 mcg/hr 1 patch Q72H    gabapentin capsule 600 mg QHS    glucagon (human recombinant) injection 1 mg PRN    glucose chewable tablet 16 g PRN    glucose chewable tablet 24 g PRN    hydrocodone-acetaminophen 10-325mg per tablet 1 tablet Q6H PRN    insulin aspart pen 0-5 Units QID (AC + HS) PRN    insulin detemir pen 20 Units QHS    metoprolol succinate (TOPROL-XL) 24 hr tablet 100 mg Daily    nitroGLYCERIN SL tablet 0.3 mg Q5 Min PRN    pantoprazole EC tablet 40 mg Nightly    ramelteon tablet 8 mg Nightly PRN    rosuvastatin tablet 40 mg QHS    sertraline tablet 100 mg QHS    vit b cmplx 3-fa-vit c-biotin 1- mg-mg-mcg per tablet 1 tablet Daily       Objective:     Vital Signs (Most Recent):  Temp: 98 °F (36.7 °C) (10/13/17 0758)  Pulse: 78 (10/13/17 0758)  Resp: 18 (10/13/17 0758)  BP: 121/69 (10/13/17 0758)  SpO2: 99 % (10/13/17 0758)  O2 Device (Oxygen Therapy): Room Humidifier (10/13/17 0758) Vital Signs (24h Range):  Temp:  [96.6 °F (35.9 °C)-99.5 °F (37.5 °C)] 98 °F (36.7 °C)  Pulse:  [76-94] 78  Resp:  [16-18] 18  SpO2:  [94 %-99 %] 99 %  BP: (121-141)/(63-72) 121/69     Weight: 80 kg (176 lb 5.9 oz) (10/13/17 0842)  Body mass index is 27.68 kg/m².  Body surface area is 1.94 meters squared.    No intake/output data  recorded.    Physical Exam   Constitutional: He is oriented to person, place, and time. He appears well-developed and well-nourished.   HENT:   Head: Normocephalic.   Nose: Nose normal.   Mouth/Throat: Oropharynx is clear and moist.   Eyes: No scleral icterus.   Neck: Neck supple. No JVD present. No tracheal deviation present. No thyromegaly present.   Cardiovascular: Normal rate and normal heart sounds.  Exam reveals no gallop and no friction rub.    Irregularly irregular rhythm   Pulmonary/Chest: Effort normal and breath sounds normal. No respiratory distress. He has no wheezes. He has no rales.   Abdominal: Soft. Bowel sounds are normal. He exhibits no distension and no mass. There is no tenderness. There is no rebound and no guarding.   Musculoskeletal: He exhibits no edema.   Right forearm AVF with good thrill and no bruits   Lymphadenopathy:     He has no cervical adenopathy.   Neurological: He is alert and oriented to person, place, and time. He exhibits normal muscle tone.   Negative asterixis    Skin: Skin is warm and dry. No rash noted. No erythema.   Vitals reviewed.      Significant Labs:  ABGs:   Recent Labs  Lab 10/07/17  1248   PH 7.251*   PCO2 53.8*   HCO3 23.1     BMP:   Recent Labs  Lab 10/11/17  0119  10/13/17  0517   *  < > 108   CL 99  < > 111*   CO2 26  < > 23   BUN 26*  < > 29*   CREATININE 4.6*  < > 5.1*   CALCIUM 10.3  < > 10.0   MG 1.7  --   --    < > = values in this interval not displayed.  CBC:   Recent Labs  Lab 10/13/17  0517   WBC 9.19   RBC 2.84*   HGB 10.0*   HCT 31.3*      *   MCH 35.2*   MCHC 31.9*     CMP:   Recent Labs  Lab 10/13/17  0517      CALCIUM 10.0   ALBUMIN 2.7*   PROT 6.2      K 4.6   CO2 23   *   BUN 29*   CREATININE 5.1*   ALKPHOS 93   ALT 32   AST 37   BILITOT 0.4     All labs within the past 24 hours have been reviewed.

## 2017-10-13 NOTE — PROGRESS NOTES
Maintenance HD initiated via Rt Fore Arm AVF,15 Gauge needles used d/t difficulty encountered cannulating access through the button hole, Dr Jacobs at bedside and made aware..

## 2017-10-13 NOTE — PROGRESS NOTES
"While de- cannulating the AVG after completion of dialysis treatment, the pt asked this nurse where his daughter and son were since they were not in the room with him. I Informed the pt that the family went back to his room on the 11 th floor. Pt responded that "they just do not want to deal with me any more" and asked this RN to give him a shot to end his life and if you do not , then, when I go home, I'll shoot myself" This RN called the charge nurse to the bedside, and pt repeated/said the same to the charge nurse. The charge nurse then called the son who came down to the dialysis unit. The son stated that this is not the first time he has expressed the desire to end his life because of the severe back pain he has been experiencing. At that point, the primary team IM 1  was paged per charge nurse, Yasmin YOUNG.  The MD stated that he will see the pt when he gets to the room as pt was getting ready to be transported to the room. Son accompanied pt to the room and primary RN made aware.  "

## 2017-10-13 NOTE — NURSING
Called med team 1 to clarify orders for interventional cardiology orders. NPO after midnight on Sunday night for angiogram Monday am. No plans for neuro surgery as of yet per IM team 1.

## 2017-10-13 NOTE — TELEPHONE ENCOUNTER
----- Message from Adeline Novoa sent at 10/13/2017 11:16 AM CDT -----  Contact: Alex hernandez [phone]  Alex is calling to speak with Sulma to find out if and when his dad will need to have surgery. Please call

## 2017-10-13 NOTE — PT/OT/SLP PROGRESS
"Occupational Therapy  Treatment    Donta Cline   MRN: [id number]   Admitting Diagnosis: Aspiration pneumonia    OT Date of Treatment: 10/13/17   OT Start Time: 1630  OT Stop Time: 1640  OT Total Time (min): 10 min    Billable Minutes:  Therapeutic Activity 10    General Precautions: Standard, aspiration, fall, pudding thick, pureed diet  Orthopedic Precautions: N/A  Braces: N/A         Subjective:  Communicated with pt and son prior to session.  "I am hurting worse than normal"  Pain/Comfort  Pain Rating 1:  (severe pain in BLE's)  Pain Addressed 2: Pre-medicate for activity, Nurse notified    Objective:  Patient found with:  (left sidelying in bed with son present)     Functional Mobility:  Bed Mobility:  Rolling/Turning to Left: Supervision (with pillow between knees)  Scooting/Bridging: Stand by Assistance  Supine to Sit: Stand by Assistance, WIth side rail  Sit to Supine: Stand by Assistance    Transfers:   Sit <> Stand Assistance: Minimum Assistance (2 trials)  Sit <> Stand Assistive Device: No Assistive Device    Functional Ambulation: Pt side stepped to left with min A x 2 HHA    Activities of Daily Living:     LE Dressing Level of Assistance:  (pt reported uses sock aid at home)        Toileting Level of Assistance:  (brief changed with total A standing)            Balance:   Static Sit: GOOD: Takes MODERATE challenges from all directions  Dynamic Sit: GOOD: Maintains balance through MODERATE excursions of active trunk movement  Static Stand: FAIR+: Takes MINIMAL challenges from all directions  Dynamic stand: FAIR: Needs CONTACT GUARD during gait      AM-PAC 6 CLICK ADL   How much help from another person does this patient currently need?   1 = Unable, Total/Dependent Assistance  2 = A lot, Maximum/Moderate Assistance  3 = A little, Minimum/Contact Guard/Supervision  4 = None, Modified [place]/Independent    Putting on and taking off regular lower body clothing? : 2  Bathing (including washing, rinsing, " "drying)?: 3  Toileting, which includes using toilet, bedpan, or urinal? : 2  Putting on and taking off regular upper body clothing?: 3  Taking care of personal grooming such as brushing teeth?: 4  Eating meals?: 4  Total Score: 18     AM-PAC Raw Score CMS "G-Code Modifier Level of Impairment Assistance   6 % Total / Unable   7 - 8 CM 80 - 100% Maximal Assist   9-13 CL 60 - 80% Moderate Assist   14 - 19 CK 40 - 60% Moderate Assist   20 - 22 CJ 20 - 40% Minimal Assist   23 CI 1-20% SBA / CGA   24 CH 0% Independent/ Mod I       Patient left left sidelying with call button in reach and son present    ASSESSMENT:  Pt tolerated tx and reported increased pain this date.  Pt cont to require assist with transfers, LBD and toileting. Pt will cont to benefit from OT to address limitations and increase functional indep and safety    Rehab identified problem list/impairments: Rehab identified problem list/impairments: weakness, impaired endurance, impaired self care skills, impaired balance, gait instability, impaired functional mobilty, decreased coordination, decreased lower extremity function, pain    Rehab potential is good.    Activity tolerance: Good    Discharge recommendations: Discharge Facility/Level Of Care Needs: nursing facility, skilled     Barriers to discharge: Barriers to Discharge: Decreased caregiver support    Equipment recommendations: none     GOALS:    Occupational Therapy Goals        Problem: Occupational Therapy Goal    Goal Priority Disciplines Outcome Interventions   Occupational Therapy Goal     OT, PT/OT Ongoing (interventions implemented as appropriate)    Description:  Goals to be met by: 10/18/17    Patient will increase functional independence with ADLs by performing:    LE Dressing with Stand-by Assistance and Assistive Devices as needed.  Grooming while standing at sink with Stand-by Assistance.  Toileting from toilet with Minimal Assistance for hygiene and clothing management.   Supine " to sit with Modified [place].  Stand pivot transfers with Supervision.  Toilet transfer to toilet with Supervision.                      Plan:  Patient to be seen 3 x/week to address the above listed problems via self-care/home management, therapeutic activities, therapeutic exercises  Plan of Care expires: 11/10/17  Plan of Care reviewed with: patient, son         PIYUSH Bonilla  10/13/2017

## 2017-10-13 NOTE — ASSESSMENT & PLAN NOTE
Theron Cline is a 85 year old white male with past medical history of bladder CA in which he is in remission (was treated with chemotherapy), CAD, A-fib (treated with warfarin) and ESRD on Hd. Patient was transfered from [facility] where he presented with worsening right leg and back pain, general weakness. In hospital presented with increase troponin levels, diagnosed with NSTEMI, increase in INR where was treated with FFP and Vit K, flash pulmonary edema, started on Bi-PAP and aspirated pneumonia (treated with Zosyn).     Consulted for dialysis treatment: iHD MWF, dialyzed in [facility], Rt lower arm AVF, duration 3.5, followed by Dr. Mathur, EDW 90 kg and had his last dialysis on Monday.     - Patient will continue with dialysis treatment today for removal of toxins and excess fluid, Goal of 3 hrs with a UF of 1-2 L as tolerated by patient, maintain MAP>65  - Anemia (hgb 10) with hypertension 163/80 mmHg, May start on EPO, No IV iron since patient with current infection  - Continue with current diet renal diet (low sodium , low phosphate, low potassium).  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

## 2017-10-13 NOTE — SUBJECTIVE & OBJECTIVE
ROS   Constitution: Negative for chills, fever, weakness and malaise/fatigue.   Eyes: Negative.    Cardiovascular: Positive for dyspnea on exertion. Negative for chest pain, irregular heartbeat, leg swelling, orthopnea and paroxysmal nocturnal dyspnea.   Respiratory: Negative for cough and shortness of breath.    Musculoskeletal: Positive for back pain. Negative for arthritis and joint pain.   Gastrointestinal: Negative for nausea and vomiting.   Neurological: Negative.      Objective:     Vital Signs (Most Recent):  Temp: 98.1 °F (36.7 °C) (10/13/17 0905)  Pulse: 93 (10/13/17 1130)  Resp: 18 (10/13/17 0758)  BP: 114/68 (10/13/17 1100)  SpO2: 99 % (10/13/17 0758) Vital Signs (24h Range):  Temp:  [96.6 °F (35.9 °C)-99.5 °F (37.5 °C)] 98.1 °F (36.7 °C)  Pulse:  [76-93] 93  Resp:  [16-18] 18  SpO2:  [94 %-99 %] 99 %  BP: (114-141)/(62-73) 114/68     Weight: 80 kg (176 lb 5.9 oz)  Body mass index is 27.68 kg/m².     SpO2: 99 %  O2 Device (Oxygen Therapy): Room Humidifier    No intake or output data in the 24 hours ending 10/13/17 1329    Lines/Drains/Airways     Central Venous Catheter Line                 Percutaneous Central Line Insertion/Assessment - triple lumen  10/04/17 1045 left subclavian 9 days          Drain                 Hemodialysis AV Fistula Right forearm -- days                Physical Exam   Constitutional: He is oriented to person, place, and time. He appears well-developed and well-nourished. No distress.   HENT:   Head: Normocephalic and atraumatic.   Eyes: Conjunctivae are normal. No scleral icterus.   Neck: JVD present.   Cardiovascular: Normal rate, regular rhythm, normal heart sounds and intact distal pulses.    Pulmonary/Chest: Effort normal and breath sounds normal.   Abdominal: Soft. Bowel sounds are normal. He exhibits no distension. There is no tenderness.   Neurological: He is alert and oriented to person, place, and time.   Psychiatric: He has a normal mood and affect. His behavior is  normal.     Significant Labs:     Recent Results (from the past 12 hour(s))   CBC with Automated Differential    Collection Time: 10/13/17  5:17 AM   Result Value Ref Range    WBC 9.19 3.90 - 12.70 K/uL    RBC 2.84 (L) 4.60 - 6.20 M/uL    Hemoglobin 10.0 (L) 14.0 - 18.0 g/dL    Hematocrit 31.3 (L) 40.0 - 54.0 %     (H) 82 - 98 fL    MCH 35.2 (H) 27.0 - 31.0 pg    MCHC 31.9 (L) 32.0 - 36.0 g/dL    RDW 17.1 (H) 11.5 - 14.5 %    Platelets 195 150 - 350 K/uL    MPV 10.3 9.2 - 12.9 fL    Gran # 7.2 1.8 - 7.7 K/uL    Lymph # 1.1 1.0 - 4.8 K/uL    Mono # 0.6 0.3 - 1.0 K/uL    Eos # 0.3 0.0 - 0.5 K/uL    Baso # 0.02 0.00 - 0.20 K/uL    Gran% 78.5 (H) 38.0 - 73.0 %    Lymph% 11.8 (L) 18.0 - 48.0 %    Mono% 6.5 4.0 - 15.0 %    Eosinophil% 2.7 0.0 - 8.0 %    Basophil% 0.2 0.0 - 1.9 %    Differential Method Automated    Protime-INR    Collection Time: 10/13/17  5:17 AM   Result Value Ref Range    Prothrombin Time 22.5 (H) 9.0 - 12.5 sec    INR 2.2 (H) 0.8 - 1.2   Comprehensive metabolic panel    Collection Time: 10/13/17  5:17 AM   Result Value Ref Range    Sodium 142 136 - 145 mmol/L    Potassium 4.6 3.5 - 5.1 mmol/L    Chloride 111 (H) 95 - 110 mmol/L    CO2 23 23 - 29 mmol/L    Glucose 108 70 - 110 mg/dL    BUN, Bld 29 (H) 8 - 23 mg/dL    Creatinine 5.1 (H) 0.5 - 1.4 mg/dL    Calcium 10.0 8.7 - 10.5 mg/dL    Total Protein 6.2 6.0 - 8.4 g/dL    Albumin 2.7 (L) 3.5 - 5.2 g/dL    Total Bilirubin 0.4 0.1 - 1.0 mg/dL    Alkaline Phosphatase 93 55 - 135 U/L    AST 37 10 - 40 U/L    ALT 32 10 - 44 U/L    Anion Gap 8 8 - 16 mmol/L    eGFR if African American 11.0 (A) >60 mL/min/1.73 m^2    eGFR if non African American 9.5 (A) >60 mL/min/1.73 m^2   POCT glucose    Collection Time: 10/13/17  7:50 AM   Result Value Ref Range    POCT Glucose 75 70 - 110 mg/dL     Significant Imaging:     ECHO 10/12:  CONCLUSIONS     1 - Concentric remodeling.     2 - Normal left ventricular systolic function (EF 55-60%).     3 - Mildly depressed  right ventricular systolic function .     4 - Biatrial enlargement.     5 - Pulmonary hypertension. The estimated PA systolic pressure is 43 mmHg.     6 - Increased central venous pressure.

## 2017-10-13 NOTE — PLAN OF CARE
Problem: Patient Care Overview  Goal: Plan of Care Review  3 hr hd completed, net fluid [id number]=5163 ms, target goal achieved, pt tolerated well.  AVF deaccessed, , pressure held to the site 12 mins and 10 mins, A/V respectively, hemostasis achieved, bruit/thrill present post tx.  Report given to Emely YOUNG.

## 2017-10-13 NOTE — ASSESSMENT & PLAN NOTE
-  Elevated troponin on arrival to [facility]--> supratherapeutic INR and NSTEMI.  When attempting to reverse INR, patient went into flash pulmonary edema.    -  2D echo revealed severe aortic stenosis with preserved EF.   -  Cardiology was consulted and recommended outpatient [facility].

## 2017-10-13 NOTE — ASSESSMENT & PLAN NOTE
-PT/OT ordered  -consult to social work for placement on discharge rehab vs SNF   - Rehab consulted, following

## 2017-10-13 NOTE — PLAN OF CARE
Problem: Patient Care Overview  Goal: Plan of Care Review  Outcome: Ongoing (interventions implemented as appropriate)  Pt free of falls/injuries throughout the shift. Bed locked, in lowest position, call bell within reach. Pt afebrile, pain assessed & denied. VSS, pt in no distress will continue to monitor.

## 2017-10-13 NOTE — PROGRESS NOTES
[facility]  Nephrology  Progress Note    Patient Name: Donta Cline  MRN: [id number]  Admission Date: 10/10/2017  Hospital Length of Stay: 3 days  Attending Provider: Alessia Ch MD   Primary Care Physician: ZAID Richardson Iii, MD  Principal Problem:Aspiration pneumonia    Subjective:     HPI: Theron Cline is a 85 year old white male with past medical history of bladder CA in which he is in remission (was treated with chemotherapy), CAD, A-fib (treated with warfarin) and ESRD on Hd. Patient was transfered from [facility] where he presented with worsening right leg and back pain, general weakness. In hospital presented with increase troponin levels, diagnosed with NSTEMI, increase in INR where was treated with FFP and Vit K, flash pulmonary edema, started on Bi-PAP and aspirated pneumonia (treated with Zosyn). 2 D echo revealed severe aortic stenosis with preserved EF and Cardiology evaluated patient and did not feel LHC warranted at this time but recommended outpatient follow-up with his regular Cardiologist and likely need for outpatient C to evaluate his aortic stenosis and coronary anatomy. Additionally, pt was evaluated by NSx who felt he was not a candidate for surgery. Patient was seen and evaluated by Neurosurgery at [place] (Dr. James) who noted Ct scan of lumbar spine showed moderate to severe acquired spinal stenosis and bilateral foraminal encroachment at L3-L4. Patient is here for second opionion from [facility].    Consulted for dialysis treatment: [facility] MWF[facility], Rt lower arm AVF, duration 3.5, followed by Dr. Mathur, EDW 90 kg and had his last dialysis on Monday.     Interval History:   Patient evaluated during YUAN, hemodynamically stable, no respiratory distress, no complaint during treatment.     Review of patient's allergies indicates:   Allergen Reactions    Darvocet a500  [propoxyphene n-acetaminophen]      Other  reaction(s): Unknown    Morphine      Other reaction(s): Unknown     Current Facility-Administered Medications   Medication Frequency    0.9%  NaCl infusion PRN    0.9%  NaCl infusion Once    ampicillin-sulbactam 3 g in sodium chloride 0.9 % 100 mL IVPB (ready to mix system) Daily    aspirin chewable tablet 81 mg Every Mon, Wed, Fri    dextrose 50% injection 12.5 g PRN    dextrose 50% injection 25 g PRN    fentanyl 12 mcg/hr 1 patch Q72H    gabapentin capsule 600 mg QHS    glucagon (human recombinant) injection 1 mg PRN    glucose chewable tablet 16 g PRN    glucose chewable tablet 24 g PRN    hydrocodone-acetaminophen 10-325mg per tablet 1 tablet Q6H PRN    insulin aspart pen 0-5 Units QID (AC + HS) PRN    insulin detemir pen 20 Units QHS    metoprolol succinate (TOPROL-XL) 24 hr tablet 100 mg Daily    nitroGLYCERIN SL tablet 0.3 mg Q5 Min PRN    pantoprazole EC tablet 40 mg Nightly    ramelteon tablet 8 mg Nightly PRN    rosuvastatin tablet 40 mg QHS    sertraline tablet 100 mg QHS    vit b cmplx 3-fa-vit c-biotin 1- mg-mg-mcg per tablet 1 tablet Daily       Objective:     Vital Signs (Most Recent):  Temp: 98 °F (36.7 °C) (10/13/17 0758)  Pulse: 78 (10/13/17 0758)  Resp: 18 (10/13/17 0758)  BP: 121/69 (10/13/17 0758)  SpO2: 99 % (10/13/17 0758)  O2 Device (Oxygen Therapy): Room Humidifier (10/13/17 0758) Vital Signs (24h Range):  Temp:  [96.6 °F (35.9 °C)-99.5 °F (37.5 °C)] 98 °F (36.7 °C)  Pulse:  [76-94] 78  Resp:  [16-18] 18  SpO2:  [94 %-99 %] 99 %  BP: (121-141)/(63-72) 121/69     Weight: 80 kg (176 lb 5.9 oz) (10/13/17 0842)  Body mass index is 27.68 kg/m².  Body surface area is 1.94 meters squared.    No intake/output data recorded.    Physical Exam   Constitutional: He is oriented to person, place, and time. He appears well-developed and well-nourished.   HENT:   Head: Normocephalic.   Nose: Nose normal.   Mouth/Throat: Oropharynx is clear and moist.   Eyes: No scleral icterus.    Neck: Neck supple. No JVD present. No tracheal deviation present. No thyromegaly present.   Cardiovascular: Normal rate and normal heart sounds.  Exam reveals no gallop and no friction rub.    Irregularly irregular rhythm   Pulmonary/Chest: Effort normal and breath sounds normal. No respiratory distress. He has no wheezes. He has no rales.   Abdominal: Soft. Bowel sounds are normal. He exhibits no distension and no mass. There is no tenderness. There is no rebound and no guarding.   Musculoskeletal: He exhibits no edema.   Right forearm AVF with good thrill and no bruits   Lymphadenopathy:     He has no cervical adenopathy.   Neurological: He is alert and oriented to person, place, and time. He exhibits normal muscle tone.   Negative asterixis    Skin: Skin is warm and dry. No rash noted. No erythema.   Vitals reviewed.      Significant Labs:  ABGs:   Recent Labs  Lab 10/07/17  1248   PH 7.251*   PCO2 53.8*   HCO3 23.1     BMP:   Recent Labs  Lab 10/11/17  0119  10/13/17  0517   *  < > 108   CL 99  < > 111*   CO2 26  < > 23   BUN 26*  < > 29*   CREATININE 4.6*  < > 5.1*   CALCIUM 10.3  < > 10.0   MG 1.7  --   --    < > = values in this interval not displayed.  CBC:   Recent Labs  Lab 10/13/17  0517   WBC 9.19   RBC 2.84*   HGB 10.0*   HCT 31.3*      *   MCH 35.2*   MCHC 31.9*     CMP:   Recent Labs  Lab 10/13/17  0517      CALCIUM 10.0   ALBUMIN 2.7*   PROT 6.2      K 4.6   CO2 23   *   BUN 29*   CREATININE 5.1*   ALKPHOS 93   ALT 32   AST 37   BILITOT 0.4     All labs within the past 24 hours have been reviewed.       Assessment/Plan:     ESRD (end stage renal disease) on dialysis    Theron Cline is a 85 year old white male with past medical history of bladder CA in which he is in remission (was treated with chemotherapy), CAD, A-fib (treated with warfarin) and ESRD on Hd. Patient was transfered from [facility] where he presented with worsening right  leg and back pain, general weakness. In hospital presented with increase troponin levels, diagnosed with NSTEMI, increase in INR where was treated with FFP and Vit K, flash pulmonary edema, started on Bi-PAP and aspirated pneumonia (treated with Zosyn).     Consulted for dialysis treatment: iHD MWF, dialyzed in [facility], Rt lower arm AVF, duration 3.5, followed by Dr. Mathur, EDW 90 kg and had his last dialysis on Monday.     - Patient will continue with dialysis treatment today for removal of toxins and excess fluid, Goal of 3 hrs with a UF of 1-2 L as tolerated by patient, maintain MAP>65  - Anemia (hgb 10) with hypertension 163/80 mmHg, May start on EPO, No IV iron since patient with current infection  - Continue with current diet renal diet (low sodium , low phosphate, low potassium).  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

## 2017-10-13 NOTE — ASSESSMENT & PLAN NOTE
-  History of chronic low back and R hip pain 2/2 lumbar spinal stenosis s/p L3-L5 laminectomy and STAN.  -  Presented with worsening and debilitating low back and RLE pain with subsequent gait instability and functional decline x 5 weeks.    -  Also reported bowel incontience over several months; denied saddle paraesthesia.    -  CT of lumbar spine showed moderate to severe spinal stenosis and bilateral foraminal encroachment at L3-L4.   -  MRI showed multilevel degenerative changes most pronounced at L3-L4 with anterolisthesis of L3 on L4, a large circumferential disc bulge with a large superimposed R paracentral disc extrusion, and severe bilateral facet hypertrophy contributing to near-complete obliteration of the spinal canal and severe bilateral neural foraminal narrowing.    -  Due to patient's multiple comorbidities and high risk for surgery, decision was made to not proceed with any surgical intervention.   -  Pain regimen: fentanyl patch 12 mcg/hr Q72 hours, gabapentin 600 mg QHS, hydrocodone-acetaminophen  mg Q6H PRN    Recommendations  -  Encourage mobility, OOB in chair at least 3 hours per day, and early ambulation as appropriate  -  PT/OT evaluate and treat  -  Monitor for bowel and bladder dysfunction  -  Monitor for spasticity  -  Monitor for and prevent skin breakdown and pressure ulcers  · Early mobility, repositioning/weight shifting every 20-30 minutes when sitting, turn patient every 2 hours, proper mattress/overlay and chair cushioning, pressure relief/heel protector boots  -  DVT prophylaxis:  On Xarelto   -  Reviewed discharge options (IP rehab, SNF, HH therapy, and OP therapy)

## 2017-10-13 NOTE — PLAN OF CARE
Problem: Occupational Therapy Goal  Goal: Occupational Therapy Goal  Goals to be met by: 10/18/17    Patient will increase functional independence with ADLs by performing:    LE Dressing with Stand-by Assistance and Assistive Devices as needed.  Grooming while standing at sink with Stand-by Assistance.  Toileting from toilet with Minimal Assistance for hygiene and clothing management.   Supine to sit with Modified [place].  Stand pivot transfers with Supervision.  Toilet transfer to toilet with Supervision.     Outcome: Ongoing (interventions implemented as appropriate)  Progressing. PIYUSH Bonilla  10/13/2017

## 2017-10-13 NOTE — PROGRESS NOTES
[facility]  Cardiology  Progress Note    Patient Name: Donta Cline  MRN: [id number]  Admission Date: 10/10/2017  Hospital Length of Stay: 3 days  Code Status: Full Code   Attending Physician: Alessia Ch MD   Primary Care Physician: ZAID Richardson Iii, MD  Expected Discharge Date: 10/13/2017  Principal Problem:Aspiration pneumonia    Subjective:     Hospital Course:   No notes on file    ROS   Constitution: Negative for chills, fever, weakness and malaise/fatigue.   Eyes: Negative.    Cardiovascular: Positive for dyspnea on exertion. Negative for chest pain, irregular heartbeat, leg swelling, orthopnea and paroxysmal nocturnal dyspnea.   Respiratory: Negative for cough and shortness of breath.    Musculoskeletal: Positive for back pain. Negative for arthritis and joint pain.   Gastrointestinal: Negative for nausea and vomiting.   Neurological: Negative.      Objective:     Vital Signs (Most Recent):  Temp: 98.1 °F (36.7 °C) (10/13/17 0905)  Pulse: 93 (10/13/17 1130)  Resp: 18 (10/13/17 0758)  BP: 114/68 (10/13/17 1100)  SpO2: 99 % (10/13/17 0758) Vital Signs (24h Range):  Temp:  [96.6 °F (35.9 °C)-99.5 °F (37.5 °C)] 98.1 °F (36.7 °C)  Pulse:  [76-93] 93  Resp:  [16-18] 18  SpO2:  [94 %-99 %] 99 %  BP: (114-141)/(62-73) 114/68     Weight: 80 kg (176 lb 5.9 oz)  Body mass index is 27.68 kg/m².     SpO2: 99 %  O2 Device (Oxygen Therapy): Room Humidifier    No intake or output data in the 24 hours ending 10/13/17 1329    Lines/Drains/Airways     Central Venous Catheter Line                 Percutaneous Central Line Insertion/Assessment - triple lumen  10/04/17 1045 left subclavian 9 days          Drain                 Hemodialysis AV Fistula Right forearm -- days                Physical Exam   Constitutional: He is oriented to person, place, and time. He appears well-developed and well-nourished. No distress.   HENT:   Head: Normocephalic and atraumatic.   Eyes: Conjunctivae are normal. No  scleral icterus.   Neck: JVD present.   Cardiovascular: Normal rate, regular rhythm, normal heart sounds and intact distal pulses.    Pulmonary/Chest: Effort normal and breath sounds normal.   Abdominal: Soft. Bowel sounds are normal. He exhibits no distension. There is no tenderness.   Neurological: He is alert and oriented to person, place, and time.   Psychiatric: He has a normal mood and affect. His behavior is normal.     Significant Labs:     Recent Results (from the past 12 hour(s))   CBC with Automated Differential    Collection Time: 10/13/17  5:17 AM   Result Value Ref Range    WBC 9.19 3.90 - 12.70 K/uL    RBC 2.84 (L) 4.60 - 6.20 M/uL    Hemoglobin 10.0 (L) 14.0 - 18.0 g/dL    Hematocrit 31.3 (L) 40.0 - 54.0 %     (H) 82 - 98 fL    MCH 35.2 (H) 27.0 - 31.0 pg    MCHC 31.9 (L) 32.0 - 36.0 g/dL    RDW 17.1 (H) 11.5 - 14.5 %    Platelets 195 150 - 350 K/uL    MPV 10.3 9.2 - 12.9 fL    Gran # 7.2 1.8 - 7.7 K/uL    Lymph # 1.1 1.0 - 4.8 K/uL    Mono # 0.6 0.3 - 1.0 K/uL    Eos # 0.3 0.0 - 0.5 K/uL    Baso # 0.02 0.00 - 0.20 K/uL    Gran% 78.5 (H) 38.0 - 73.0 %    Lymph% 11.8 (L) 18.0 - 48.0 %    Mono% 6.5 4.0 - 15.0 %    Eosinophil% 2.7 0.0 - 8.0 %    Basophil% 0.2 0.0 - 1.9 %    Differential Method Automated    Protime-INR    Collection Time: 10/13/17  5:17 AM   Result Value Ref Range    Prothrombin Time 22.5 (H) 9.0 - 12.5 sec    INR 2.2 (H) 0.8 - 1.2   Comprehensive metabolic panel    Collection Time: 10/13/17  5:17 AM   Result Value Ref Range    Sodium 142 136 - 145 mmol/L    Potassium 4.6 3.5 - 5.1 mmol/L    Chloride 111 (H) 95 - 110 mmol/L    CO2 23 23 - 29 mmol/L    Glucose 108 70 - 110 mg/dL    BUN, Bld 29 (H) 8 - 23 mg/dL    Creatinine 5.1 (H) 0.5 - 1.4 mg/dL    Calcium 10.0 8.7 - 10.5 mg/dL    Total Protein 6.2 6.0 - 8.4 g/dL    Albumin 2.7 (L) 3.5 - 5.2 g/dL    Total Bilirubin 0.4 0.1 - 1.0 mg/dL    Alkaline Phosphatase 93 55 - 135 U/L    AST 37 10 - 40 U/L    ALT 32 10 - 44 U/L    Anion Gap  8 8 - 16 mmol/L    eGFR if African American 11.0 (A) >60 mL/min/1.73 m^2    eGFR if non African American 9.5 (A) >60 mL/min/1.73 m^2   POCT glucose    Collection Time: 10/13/17  7:50 AM   Result Value Ref Range    POCT Glucose 75 70 - 110 mg/dL     Significant Imaging:     ECHO 10/12:  CONCLUSIONS     1 - Concentric remodeling.     2 - Normal left ventricular systolic function (EF 55-60%).     3 - Mildly depressed right ventricular systolic function .     4 - Biatrial enlargement.     5 - Pulmonary hypertension. The estimated PA systolic pressure is 43 mmHg.     6 - Increased central venous pressure.     Assessment and Plan:     Elevated troponin    - Increased metoprolol to XL 100mg qdaily for better HR control (goal HR would be 60's) and uptitrate as tolerated  - Continue aspirin, rosuvastatin  - Nitro SL PRN  - Recommend restarting warfarin as no procedures are planned at this time  - Should patient decide to proceed with neurosurgical intervention, [facility] for risk stratification would be appropriate at that time            VTE Risk Mitigation         Ordered     Medium Risk of VTE  Once      10/11/17 0006          Robert Hmapton MD   Internal Medicine, PGY-1    Cardiology  [facility]

## 2017-10-13 NOTE — SUBJECTIVE & OBJECTIVE
Interval History: Patient with no complaints of pain this AM. Patient has been assessed by cardiology who request angiogram before any surgical intervention. Patient with low flow low gradient aortic stenosis and would not benefit from TAVR or balloon valvuloplasty per cardiology. Patient in HD this AM. Patient and family requesting surgical intervention now after patient originally declined. Will attempt to contact neurosurgery to see if they will attempt surgical intervention at this time.      Review of Systems   Constitutional: Negative for appetite change, chills, fatigue and fever.   HENT: Negative for congestion, ear pain, rhinorrhea and sore throat.    Eyes: Negative for pain and discharge.   Respiratory: Negative for cough, choking, chest tightness and shortness of breath.    Cardiovascular: Negative for chest pain, palpitations and leg swelling.   Gastrointestinal: Negative for abdominal pain, blood in stool, diarrhea, nausea, rectal pain and vomiting.   Endocrine: Negative for polyuria.   Genitourinary: Negative for decreased urine volume, difficulty urinating, dysuria, flank pain and hematuria.   Musculoskeletal: Negative for back pain, joint swelling and neck pain.   Skin: Negative for color change, pallor, rash and wound.   Neurological: Negative for dizziness, seizures, weakness and headaches.   Psychiatric/Behavioral: Negative for behavioral problems and confusion.     Objective:     Vital Signs (Most Recent):  Temp: 98 °F (36.7 °C) (10/13/17 0758)  Pulse: 78 (10/13/17 0758)  Resp: 18 (10/13/17 0758)  BP: 121/69 (10/13/17 0758)  SpO2: 99 % (10/13/17 0758) Vital Signs (24h Range):  Temp:  [96.6 °F (35.9 °C)-99.5 °F (37.5 °C)] 98 °F (36.7 °C)  Pulse:  [76-94] 78  Resp:  [16-18] 18  SpO2:  [94 %-99 %] 99 %  BP: (121-141)/(63-72) 121/69     Weight: 80 kg (176 lb 5.9 oz)  Body mass index is 27.68 kg/m².  No intake or output data in the 24 hours ending 10/13/17 0846   Physical Exam   Constitutional: He is  oriented to person, place, and time. He appears well-developed and well-nourished.   Pt is alert orientated and conversing appropriately   HENT:   Head: Normocephalic.   Nose: Nose normal.   Mouth/Throat: Oropharynx is clear and moist.   Eyes: EOM are normal. Pupils are equal, round, and reactive to light. No scleral icterus.   Neck: Neck supple. No JVD present. No tracheal deviation present. No thyromegaly present.   Cardiovascular: Normal rate.  Exam reveals no gallop and no friction rub.    Murmur (4/6 systolic) heard.  Irregularly irregular rhythm   Pulmonary/Chest: No respiratory distress. He has no wheezes. He has no rales.   Decreased breath sound on R side   Abdominal: Soft. Bowel sounds are normal. He exhibits no distension and no mass. There is no tenderness. There is no rebound and no guarding.   Bruises noted diffusely throughout abdomen   Musculoskeletal: He exhibits deformity (scar from previous back surgery). He exhibits no edema.   Lymphadenopathy:     He has no cervical adenopathy.   Neurological: He is alert and oriented to person, place, and time. He exhibits normal muscle tone.   Skin: Skin is warm and dry. No rash noted. No erythema.   Psychiatric: He has a normal mood and affect. His behavior is normal.   Vitals reviewed.      Significant Labs:   BMP:   Recent Labs  Lab 10/13/17  0517         K 4.6   *   CO2 23   BUN 29*   CREATININE 5.1*   CALCIUM 10.0     CBC:   Recent Labs  Lab 10/12/17  0538 10/13/17  0517   WBC 6.23 9.19   HGB 9.2* 10.0*   HCT 28.9* 31.3*    195     Coagulation:   Recent Labs  Lab 10/13/17  0517   INR 2.2*       Significant Imaging: I have reviewed all pertinent imaging results/findings within the past 24 hours.

## 2017-10-13 NOTE — TELEPHONE ENCOUNTER
I returned the pt son call (). concerning if his father was a candidate for surgery. I inquired if the pt was still admittedb. He stated he was and told by Sulma that if he had any questions he could contact her at the [phone].  I informed  that I would give Sulma the message immediately.    verbalized understanding. Per Advise  Thank you

## 2017-10-13 NOTE — PLAN OF CARE
Problem: SLP Goal  Goal: SLP Goal  Speech Therapy Short Term Goals  Goals expected to be met by 10/19:  1. Pt will tolerate pudding thick liquids and dental soft diet with no overt s/s aspiration.   2. Given clinician model, pt will complete dysphagia exercises x10 each in order to strengthen the swallowing musculature.   3. Pt will participate in repeat MBSS when deemed appropriate by SLP/ MD.       Outcome: Ongoing (interventions implemented as appropriate)  Recommend ongoing pudding thick liquids and dental soft diet. Recommend consider alternative means for hydration. Pt requires assistance to thicken liquids to pudding thick consistency.     DUGLAS Barriga, CCC-SLP  [phone]  10/13/2017

## 2017-10-13 NOTE — PROGRESS NOTES
PM&R consult follow up.     Patient unavailable to evaluation x 3.  Will follow progress with therapy over the weekend for final rehab recommendation.    SAIGE Pruitt, FNP-C  Physical Medicine & Rehabilitation   10/13/2017  Spectralink: [phone]

## 2017-10-13 NOTE — ASSESSMENT & PLAN NOTE
- pt had AMS at OSH and was reported to have aspiration event  - on Unasyn   - CXR with bilateral infiltrates but improved from previous cxr  - remained afebrile, no leukocytosis   - Diet per speech therapy

## 2017-10-13 NOTE — ASSESSMENT & PLAN NOTE
-  S/p NSTEMI  -  Evaluated by cardiology-->  Due to type of stenosis patient will not benefit from TAVR or balloon angioplasty  -  Angiography scheduled for 10/16/17

## 2017-10-13 NOTE — NURSING
Pt has been in more pain that usual today. Has hard time with movement and prefers to be on his left side with bed flat.  He is already at risk for aspiration and is on thicken liquids and dental soft diet. Discussed orders with pt and son and explained aspiration precautions.

## 2017-10-13 NOTE — ASSESSMENT & PLAN NOTE
-  Aspiration pneumonia treated with Zosyn.    -  MBSS 10/12/17: SLP recommended dental soft diet and pudding thick liquids

## 2017-10-13 NOTE — ASSESSMENT & PLAN NOTE
-patient has stated this has been chronic but has been losing bowel functions for the past several months.    -denies saddle paraesthesia  -Ct scan of lumbar spine showed moderate to severe acquired spinal stenosis and bilateral foraminal encroachment at L3-L4  -MRI consistent with lumbar stenosis in the L3-4. Refer to MRI result   -Neurosurgery consulted, don't feel surgery is indicated at this point,however, family wants surgical intervention, unlikely given patients significant cardiac history, age and other co-morbidities. High risk for surgery.   -Fentanyl patch for pain management   -consulted PM& R  - Patient now requesting surgical intervention, per previous neurosurgical note the patient declined surgical intervention, will contact neurosurgery to clarify

## 2017-10-14 NOTE — SUBJECTIVE & OBJECTIVE
Interval History: No acute events overnight patient states his back pain has been controlled adequately with his current medications. Plan to have patient evaluated by cardiology with angiography on Monday then NSGY to evaluate for surgical intervention.     Review of Systems   Constitutional: Negative for appetite change, chills, fatigue and fever.   HENT: Negative for congestion, ear pain, rhinorrhea and sore throat.    Eyes: Negative for pain and discharge.   Respiratory: Negative for cough, choking, chest tightness and shortness of breath.    Cardiovascular: Negative for chest pain, palpitations and leg swelling.   Gastrointestinal: Negative for abdominal pain, blood in stool, diarrhea, nausea, rectal pain and vomiting.   Endocrine: Negative for polyuria.   Genitourinary: Negative for decreased urine volume, difficulty urinating, dysuria, flank pain and hematuria.   Musculoskeletal: Positive for back pain. Negative for joint swelling and neck pain.   Skin: Negative for color change, pallor, rash and wound.   Neurological: Negative for dizziness, seizures, weakness and headaches.   Psychiatric/Behavioral: Negative for behavioral problems and confusion.     Objective:     Vital Signs (Most Recent):  Temp: 97.1 °F (36.2 °C) (10/14/17 1137)  Pulse: 80 (10/14/17 1137)  Resp: 16 (10/14/17 1137)  BP: 133/70 (10/14/17 1137)  SpO2: 97 % (10/14/17 1137) Vital Signs (24h Range):  Temp:  [97.1 °F (36.2 °C)-99.1 °F (37.3 °C)] 97.1 °F (36.2 °C)  Pulse:  [78-96] 80  Resp:  [16-18] 16  SpO2:  [95 %-97 %] 97 %  BP: (113-145)/(53-70) 133/70     Weight: 80 kg (176 lb 5.9 oz)  Body mass index is 27.68 kg/m².  No intake or output data in the 24 hours ending 10/14/17 1318   Physical Exam   Constitutional: He is oriented to person, place, and time. He appears well-developed and well-nourished.   Pt is alert orientated and conversing appropriately   HENT:   Head: Normocephalic.   Nose: Nose normal.   Mouth/Throat: Oropharynx is clear  and moist.   Eyes: EOM are normal. Pupils are equal, round, and reactive to light. No scleral icterus.   Neck: Neck supple. No JVD present. No tracheal deviation present. No thyromegaly present.   Cardiovascular: Normal rate.  Exam reveals no gallop and no friction rub.    Murmur (4/6 systolic) heard.  Irregularly irregular rhythm   Pulmonary/Chest: Effort normal and breath sounds normal. No respiratory distress. He has no wheezes. He has no rales.   Abdominal: Soft. Bowel sounds are normal. He exhibits no distension and no mass. There is no tenderness. There is no rebound and no guarding.   Lymphadenopathy:     He has no cervical adenopathy.   Neurological: He is alert and oriented to person, place, and time. He exhibits normal muscle tone.   Skin: Skin is warm and dry. No rash noted. No erythema.   Psychiatric: He has a normal mood and affect. His behavior is normal.   Vitals reviewed.      Significant Labs:   BMP:   Recent Labs  Lab 10/14/17  0434   *      K 4.2      CO2 26   BUN 23   CREATININE 4.2*   CALCIUM 9.7     CBC:   Recent Labs  Lab 10/13/17  0517 10/14/17  0434   WBC 9.19 9.06   HGB 10.0* 10.2*   HCT 31.3* 32.9*    204       Significant Imaging: I have reviewed all pertinent imaging results/findings within the past 24 hours.

## 2017-10-14 NOTE — PLAN OF CARE
Problem: Occupational Therapy Goal  Goal: Occupational Therapy Goal  Goals to be met by: 10/18/17    Patient will increase functional independence with ADLs by performing:    LE Dressing with Stand-by Assistance and Assistive Devices as needed. MET 10/14  Grooming while standing at sink with Stand-by Assistance.  Toileting from toilet with Minimal Assistance for hygiene and clothing management.   Supine to sit with Modified [place].  Stand pivot transfers with Supervision.  Toilet transfer to toilet with Supervision.     Outcome: Ongoing (interventions implemented as appropriate)  Con't POC.    PIYUSH Bardales

## 2017-10-14 NOTE — ASSESSMENT & PLAN NOTE
-recent NSTEMI on 10/5/17  -treated medically  -continue aspirin, metoprolol, statin   -EKG with non specific ST changes and A.fib with troponin 0.399, down from OSH 1.030  -Cardiology following appreciate recs, attributed elevated troponin to AS and ESRD  -2D echo; concentric remodeling, normal left ventricular systolic function (EF 55-60%), mildly depressed right ventricular systolic function, biatrial enlargement, estimated PA systolic pressure is 43 mmHg, increased central venous pressure

## 2017-10-14 NOTE — PLAN OF CARE
Problem: Patient Care Overview  Goal: Plan of Care Review  Outcome: Ongoing (interventions implemented as appropriate)  VS and assessment performed per orders. Pain medication given as ordered, moderate relief. Plan of care reviewed with pt, all concerns addressed. Pt free from injury or any falls.

## 2017-10-14 NOTE — PROGRESS NOTES
[facility] Hospital Medicine  Progress Note    Patient Name: Donta Cline  MRN: [id number]  Patient Class: IP- Inpatient   Admission Date: 10/10/2017  Length of Stay: 4 days  Attending Physician: Alessia Ch MD  Primary Care Provider: ZAID Richardson Iii, MD    [facility] Edgar Richard MD    Subjective:     Principal Problem:Aspiration pneumonia    HPI:  Mr. 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    Patient was originally seen at [facility] on 10/5/17 due to worsening low back pain and inability to ambulate with decline in function over past 5 weeks. There, patient was noted to have elevated troponin so transferred to [facility] for Cardiology evaluation. Patient was diagnosed with NSTEMI and treated medically. Patient was found to have elevated INR and no stents could be placed.  His INR was attempted to be revered with 4U of FFP and vit K but patient went into flash pulm edema.  At this time, 2 D echo revealed severe aortic stenosis with preserved EF and Cardiology evaluated patient and did not feel LHC warranted at this time but recommended outpatient follow-up with his regular Cardiologist and likely need for outpatient [facility] to evaluate his aortic stenosis and coronary anatomy.     During this admission, patient was having delirium and developed aspiration PNA (placed on IV Zosyn). Additionally, pt was evaluated by NSx who  felt he was not a candidate for surgery. Patient was seen and evaluated by Neurosurgery at [place] (Dr. James) who noted Ct scan of lumbar spine showed moderate to severe acquired spinal stenosis and bilateral foraminal encroachment at L3-L4. Patient is here for second opionion from NSx.    Hospital Course:  10/12: Patient still in pain, fentanyl patch ordered, consulted PM&R. Surgical intervention not indicated per NSx. However, family strongly leaning towards surgical intervention and have unrealistic expectation. If surgical intervention, patient needs LHC and full cardiac work up. Given his recent NSTEMI and severe AS along with other co-morbities, patient is at high risk for surgery. Patient also with nausea and heart burn this morning. MBSS today, aspiration risk with honey thick liquids, speech recommended dental soft diet now.   10/13: Patient with no complaints of pain this AM. Patient has been assessed by cardiology who request angiogram before any surgical intervention. Patient with low flow low gradient aortic stenosis and would not benefit from TAVR or balloon valvuloplasty per cardiology. Patient in HD this AM. Interventional cardiology consult place, possible right heart cath today  10/14/2017- No acute events overnight patient states his back pain has been controlled adequately with his current medications. Plan to have patient evaluated by cardiology with angiography on Monday then NSGY to evaluate for surgical intervention.       Interval History: No acute events overnight patient states his back pain has been controlled adequately with his current medications. Plan to have patient evaluated by cardiology with angiography on Monday then NSGY to evaluate for surgical intervention.     Review of Systems   Constitutional: Negative for appetite change, chills, fatigue and fever.   HENT: Negative for congestion, ear pain, rhinorrhea and sore throat.    Eyes: Negative for pain and discharge.    Respiratory: Negative for cough, choking, chest tightness and shortness of breath.    Cardiovascular: Negative for chest pain, palpitations and leg swelling.   Gastrointestinal: Negative for abdominal pain, blood in stool, diarrhea, nausea, rectal pain and vomiting.   Endocrine: Negative for polyuria.   Genitourinary: Negative for decreased urine volume, difficulty urinating, dysuria, flank pain and hematuria.   Musculoskeletal: Positive for back pain. Negative for joint swelling and neck pain.   Skin: Negative for color change, pallor, rash and wound.   Neurological: Negative for dizziness, seizures, weakness and headaches.   Psychiatric/Behavioral: Negative for behavioral problems and confusion.     Objective:     Vital Signs (Most Recent):  Temp: 97.1 °F (36.2 °C) (10/14/17 1137)  Pulse: 80 (10/14/17 1137)  Resp: 16 (10/14/17 1137)  BP: 133/70 (10/14/17 1137)  SpO2: 97 % (10/14/17 1137) Vital Signs (24h Range):  Temp:  [97.1 °F (36.2 °C)-99.1 °F (37.3 °C)] 97.1 °F (36.2 °C)  Pulse:  [78-96] 80  Resp:  [16-18] 16  SpO2:  [95 %-97 %] 97 %  BP: (113-145)/(53-70) 133/70     Weight: 80 kg (176 lb 5.9 oz)  Body mass index is 27.68 kg/m².  No intake or output data in the 24 hours ending 10/14/17 1318   Physical Exam   Constitutional: He is oriented to person, place, and time. He appears well-developed and well-nourished.   Pt is alert orientated and conversing appropriately   HENT:   Head: Normocephalic.   Nose: Nose normal.   Mouth/Throat: Oropharynx is clear and moist.   Eyes: EOM are normal. Pupils are equal, round, and reactive to light. No scleral icterus.   Neck: Neck supple. No JVD present. No tracheal deviation present. No thyromegaly present.   Cardiovascular: Normal rate.  Exam reveals no gallop and no friction rub.    Murmur (4/6 systolic) heard.  Irregularly irregular rhythm   Pulmonary/Chest: Effort normal and breath sounds normal. No respiratory distress. He has no wheezes. He has no rales.   Abdominal:  Soft. Bowel sounds are normal. He exhibits no distension and no mass. There is no tenderness. There is no rebound and no guarding.   Lymphadenopathy:     He has no cervical adenopathy.   Neurological: He is alert and oriented to person, place, and time. He exhibits normal muscle tone.   Skin: Skin is warm and dry. No rash noted. No erythema.   Psychiatric: He has a normal mood and affect. His behavior is normal.   Vitals reviewed.      Significant Labs:   BMP:   Recent Labs  Lab 10/14/17  0434   *      K 4.2      CO2 26   BUN 23   CREATININE 4.2*   CALCIUM 9.7     CBC:   Recent Labs  Lab 10/13/17  0517 10/14/17  0434   WBC 9.19 9.06   HGB 10.0* 10.2*   HCT 31.3* 32.9*    204       Significant Imaging: I have reviewed all pertinent imaging results/findings within the past 24 hours.    Assessment/Plan:      * Aspiration pneumonia    - pt had AMS at OSH and was reported to have aspiration event  - on Unasyn   - CXR with bilateral infiltrates but improved from previous cxr  - remained afebrile, no leukocytosis   - Diet per speech therapy         BRBPR (bright red blood per rectum)    - patients INR was supra therapeutic at OSH  - according to daughter has decreased  - CBC stable        Lumbar stenosis    -patient has stated this has been chronic but has been losing bowel functions for the past several months.    -denies saddle paraesthesia  -Ct scan of lumbar spine showed moderate to severe acquired spinal stenosis and bilateral foraminal encroachment at L3-L4  -MRI consistent with lumbar stenosis in the L3-4. Refer to MRI result   -Neurosurgery consulted, don't feel surgery is indicated at this point,however, family wants surgical intervention, unlikely given patients significant cardiac history, age and other co-morbidities. High risk for surgery.   -Fentanyl patch for pain management   -consulted PM& R  - Patient to have angiography Monday then to be evaluated by NSGY          Hypokalemia     -resolved at this time  - will continue to monitor           NSTEMI (non-ST elevated myocardial infarction)    -recent NSTEMI on 10/5/17  -treated medically  -continue aspirin, metoprolol, statin   -EKG with non specific ST changes and A.fib with troponin 0.399, down from OSH 1.030  -Cardiology following appreciate recs, attributed elevated troponin to AS and ESRD  -2D echo; concentric remodeling, normal left ventricular systolic function (EF 55-60%), mildly depressed right ventricular systolic function, biatrial enlargement, estimated PA systolic pressure is 43 mmHg, increased central venous pressure           Paroxysmal atrial fibrillation    -hold AC if undergoing right heart cath          Severe aortic stenosis    - cardiology consulted  - given type of stenosis patient will not benefit from TAVR or balloon angioplasty  - recommend angiography prior to any surgical intervention  - medications recommendations appreciated           ESRD (end stage renal disease) on dialysis    -HD on MWF  -has fistula on R arm  -inpatient consult to nephrology, appreciate help            Type 2 diabetes mellitus with chronic kidney disease on chronic dialysis, with long-term current use of insulin    -20U qhs  -updated A1c pending  -diabetic, renal diet  -BG optimal         Weakness generalized    -PT/OT ordered  - consult to social work for placement on discharge rehab vs SNF   - Rehab consulted, following           Essential hypertension    - currently normotensive   - will continue to follow           Coronary artery disease involving native coronary artery of native heart without angina pectoris    - holding DAPT if undergoing heart cath  - angiography planned for Monday per cardiology            Hypercholesteremia    - continue home rosuvastatin             VTE Risk Mitigation         Ordered     heparin 25,000 units in dextrose 5% 250 mL (100 units/mL) infusion  Continuous     Route:  Intravenous        10/14/17 0701      heparin 25,000 units in dextrose 5% 250 mL (100 units/mL) bolus from bag; ADDITIONAL PRN BOLUS  As needed (PRN)     Route:  Intravenous        10/14/17 0701     heparin 25,000 units in dextrose 5% 250 mL (100 units/mL) bolus from bag; ADDITIONAL PRN BOLUS  As needed (PRN)     Route:  Intravenous        10/14/17 0701     Medium Risk of VTE  Once      10/11/17 0006              Edgar Richard MD  Department of Hospital Medicine   [facility]

## 2017-10-14 NOTE — ASSESSMENT & PLAN NOTE
-PT/OT ordered  - consult to social work for placement on discharge rehab vs SNF   - Rehab consulted, following

## 2017-10-14 NOTE — PT/OT/SLP PROGRESS
"Occupational Therapy  Treatment    Donta Cline   MRN: [id number]   Admitting Diagnosis: Aspiration pneumonia    OT Date of Treatment: 10/14/17   OT Start Time: 1007  OT Stop Time: 1052  OT Total Time (min): 45 min    Billable Minutes:  Self Care/Home Management 30 and Therapeutic Activity 15    General Precautions: Standard, fall  Orthopedic Precautions:    Braces:           Subjective:  Communicated with RN prior to session.  "It feels good to clean myself up."  Pain/Comfort  Pain Rating 1: 0/10    Objective:        Functional Mobility:  Bed Mobility:  Rolling/Turning to Left: Supervision  Scooting/Bridging: Stand by Assistance  Supine to Sit: Stand by Assistance  Sit to Supine:  (Left up in chair.)    Transfers:   Sit <> Stand Assistance: Contact Guard Assistance  Sit <> Stand Assistive Device: Rolling Walker  Bed <> Chair Technique: Stand Pivot  Bed <> Chair Transfer Assistance: Minimum Assistance (Due to pt's RLE giving out and him plopping.)  Bed <> Chair Assistive Device: Rolling Walker  Toilet Transfer Technique: Stand Pivot  Toilet Transfer Assistance: Contact Guard Assistance (BSC)  Toilet Transfer Assistive Device: Rolling Walker    Functional Ambulation: Pt walked from the bed>bathroom>chair with CGA using a RW - demonstrated hunched over posture and R-sided limp when returning to chair and RLE gave out a bit requiring Min A.     Activities of Daily Living:     UE Dressing Level of Assistance: Set-up Assistance  LE Dressing Level of Assistance: Set-up Assistance (Donned socks with sock aid.)  Grooming Position: Seated at sink  Grooming Level of Assistance: Supervision  Toileting Where Assessed: Bedside commode  Toileting Level of Assistance: Total assistance (To doff/don brief.)  Bathing Where Assessed: Sitting sinkside  Bathing Level of Assistance: Supervision    Balance:   Static Sit: GOOD+: Takes MAXIMAL challenges from all directions.    Dynamic Sit: GOOD: Maintains balance through MODERATE excursions " "of active trunk movement  Static Stand: FAIR+: Takes MINIMAL challenges from all directions  Dynamic stand: FAIR: Needs CONTACT GUARD during gait    Therapeutic Activities and Exercises:  Pt completed both oral care and bathing at sink sitting on BSC (2/2 unable to complete all tasks in standing).  Poorer quality functional mobility towards end of session due to R hip pain.     AM-PAC 6 CLICK ADL   How much help from another person does this patient currently need?   1 = Unable, Total/Dependent Assistance  2 = A lot, Maximum/Moderate Assistance  3 = A little, Minimum/Contact Guard/Supervision  4 = None, Modified [place]/Independent    Putting on and taking off regular lower body clothing? : 3  Bathing (including washing, rinsing, drying)?: 3  Toileting, which includes using toilet, bedpan, or urinal? : 2  Putting on and taking off regular upper body clothing?: 3  Taking care of personal grooming such as brushing teeth?: 4  Eating meals?: 4  Total Score: 19     AM-PAC Raw Score CMS "G-Code Modifier Level of Impairment Assistance   6 % Total / Unable   7 - 8 CM 80 - 100% Maximal Assist   9-13 CL 60 - 80% Moderate Assist   14 - 19 CK 40 - 60% Moderate Assist   20 - 22 CJ 20 - 40% Minimal Assist   23 CI 1-20% SBA / CGA   24 CH 0% Independent/ Mod I       Patient left up in chair with call button in reach and son present    ASSESSMENT:  Donta Cline is a 85 y.o. male with a medical diagnosis of Aspiration pneumonia and continues with decreased (I) in multiple ADL areas, functional mobility & t/fs as well as decreased overall strength, ROM, endurance and balance. Pt would continue to benefit from skilled OT services as well as SNF placement to address these deficits and to facilitate improving (I) with daily tasks.    Rehab identified problem list/impairments: Rehab identified problem list/impairments: weakness, decreased lower extremity function, gait instability, impaired self care skills, impaired " endurance, impaired functional mobilty, impaired balance, decreased coordination    Rehab potential is good.    Activity tolerance: Good    Discharge recommendations: Discharge Facility/Level Of Care Needs: nursing facility, skilled     Barriers to discharge: Barriers to Discharge: Decreased caregiver support    Equipment recommendations: none     GOALS:    Occupational Therapy Goals        Problem: Occupational Therapy Goal    Goal Priority Disciplines Outcome Interventions   Occupational Therapy Goal     OT, PT/OT Ongoing (interventions implemented as appropriate)    Description:  Goals to be met by: 10/18/17    Patient will increase functional independence with ADLs by performing:    LE Dressing with Stand-by Assistance and Assistive Devices as needed. MET 10/14  Grooming while standing at sink with Stand-by Assistance.  Toileting from toilet with Minimal Assistance for hygiene and clothing management.   Supine to sit with Modified [place].  Stand pivot transfers with Supervision.  Toilet transfer to toilet with Supervision.                       Plan:  Patient to be seen 3 x/week to address the above listed problems via self-care/home management, therapeutic exercises, therapeutic activities  Plan of Care expires: 11/10/17  Plan of Care reviewed with: patient, PIYUSH Morrissey  10/14/2017

## 2017-10-14 NOTE — ASSESSMENT & PLAN NOTE
- cardiology consulted  - given type of stenosis patient will not benefit from TAVR or balloon angioplasty  - recommend angiography prior to any surgical intervention  - medications recommendations appreciated

## 2017-10-14 NOTE — ASSESSMENT & PLAN NOTE
-patient has stated this has been chronic but has been losing bowel functions for the past several months.    -denies saddle paraesthesia  -Ct scan of lumbar spine showed moderate to severe acquired spinal stenosis and bilateral foraminal encroachment at L3-L4  -MRI consistent with lumbar stenosis in the L3-4. Refer to MRI result   -Neurosurgery consulted, don't feel surgery is indicated at this point,however, family wants surgical intervention, unlikely given patients significant cardiac history, age and other co-morbidities. High risk for surgery.   -Fentanyl patch for pain management   -consulted PM& R  - Patient to have angiography Monday then to be evaluated by NSGY

## 2017-10-15 PROBLEM — J69.0 ASPIRATION PNEUMONIA: Status: RESOLVED | Noted: 2017-01-01 | Resolved: 2017-01-01

## 2017-10-15 NOTE — SUBJECTIVE & OBJECTIVE
Interval History: patient reports constipation    Review of Systems   Constitutional: Negative for appetite change, chills, fatigue and fever.   HENT: Negative for congestion, ear pain, rhinorrhea and sore throat.    Eyes: Negative for pain and discharge.   Respiratory: Negative for cough, choking, chest tightness and shortness of breath.    Cardiovascular: Negative for chest pain, palpitations and leg swelling.   Gastrointestinal: Negative for abdominal pain, blood in stool, diarrhea, nausea, rectal pain and vomiting.   Endocrine: Negative for polyuria.   Genitourinary: Negative for decreased urine volume, difficulty urinating, dysuria, flank pain and hematuria.   Musculoskeletal: Positive for back pain. Negative for joint swelling and neck pain.   Skin: Negative for color change, pallor, rash and wound.   Neurological: Negative for dizziness, seizures, weakness and headaches.   Psychiatric/Behavioral: Negative for behavioral problems and confusion.     Objective:     Vital Signs (Most Recent):  Temp: 99.5 °F (37.5 °C) (10/15/17 1244)  Pulse: 80 (10/15/17 1244)  Resp: 17 (10/15/17 1244)  BP: (!) 148/63 (10/15/17 1244)  SpO2: 97 % (10/15/17 1244) Vital Signs (24h Range):  Temp:  [97.8 °F (36.6 °C)-99.5 °F (37.5 °C)] 99.5 °F (37.5 °C)  Pulse:  [71-84] 80  Resp:  [15-18] 17  SpO2:  [97 %-100 %] 97 %  BP: (131-148)/(60-68) 148/63     Weight: 80 kg (176 lb 5.9 oz)  Body mass index is 27.68 kg/m².    Intake/Output Summary (Last 24 hours) at 10/15/17 1336  Last data filed at 10/14/17 1855   Gross per 24 hour   Intake            445.2 ml   Output                0 ml   Net            445.2 ml      Physical Exam   Constitutional: He is oriented to person, place, and time. He appears well-developed and well-nourished.   Pt is alert orientated and conversing appropriately   HENT:   Head: Normocephalic.   Nose: Nose normal.   Mouth/Throat: Oropharynx is clear and moist.   Eyes: EOM are normal. Pupils are equal, round, and  reactive to light. No scleral icterus.   Neck: Neck supple. No JVD present. No tracheal deviation present. No thyromegaly present.   Cardiovascular: Normal rate.  Exam reveals no gallop and no friction rub.    Murmur (4/6 systolic) heard.  Irregularly irregular rhythm   Pulmonary/Chest: Effort normal and breath sounds normal. No respiratory distress. He has no wheezes. He has no rales.   Abdominal: Soft. Bowel sounds are normal. He exhibits no distension and no mass. There is no tenderness. There is no rebound and no guarding.   Lymphadenopathy:     He has no cervical adenopathy.   Neurological: He is alert and oriented to person, place, and time. He exhibits normal muscle tone.   Skin: Skin is warm and dry. No rash noted. No erythema.   Psychiatric: He has a normal mood and affect. His behavior is normal.   Vitals reviewed.      Significant Labs: All pertinent labs within the past 24 hours have been reviewed.    Significant Imaging: I have reviewed all pertinent imaging results/findings within the past 24 hours.

## 2017-10-15 NOTE — PROGRESS NOTES
[facility] Hospital Medicine  Progress Note    Patient Name: Donta Cline  MRN: [id number]  Patient Class: IP- Inpatient   Admission Date: 10/10/2017  Length of Stay: 5 days  Attending Physician: Alessia Ch MD  Primary Care Provider: ZAID Richardsno Iii, MD    [facility] Medicine Team: [facility] HOSP MED 1 Brittaney Marcial MD    Subjective:     Principal Problem:Lumbar stenosis    HPI:  Mr. 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    Patient was originally seen at [facility] on 10/5/17 due to worsening low back pain and inability to ambulate with decline in function over past 5 weeks. There, patient was noted to have elevated troponin so transferred to [facility] for Cardiology evaluation. Patient was diagnosed with NSTEMI and treated medically. Patient was found to have elevated INR and no stents could be placed.  His INR was attempted to be revered with 4U of FFP and vit K but patient went into flash pulm edema.  At this time, 2 D echo revealed severe aortic stenosis with preserved EF and Cardiology evaluated patient and did not feel LHC warranted at this time but recommended outpatient follow-up with his regular Cardiologist and likely need for outpatient C to evaluate his aortic stenosis and coronary anatomy.     During this admission, patient was having delirium and developed aspiration PNA (placed on IV Zosyn). Additionally, pt was evaluated by NSx who  felt he was not a candidate for surgery. Patient was seen and evaluated by Neurosurgery at [place] (Dr. James) who noted Ct scan of lumbar spine showed moderate to severe acquired spinal stenosis and bilateral foraminal encroachment at L3-L4. Patient is here for second opionion from NSx.    Hospital Course:  10/12: Patient still in pain, fentanyl patch ordered, consulted PM&R. Surgical intervention not indicated per NSx. However, family strongly leaning towards surgical intervention and have unrealistic expectation. If surgical intervention, patient needs LHC and full cardiac work up. Given his recent NSTEMI and severe AS along with other co-morbities, patient is at high risk for surgery. Patient also with nausea and heart burn this morning. MBSS today, aspiration risk with honey thick liquids, speech recommended dental soft diet now.   10/13: Patient with no complaints of pain this AM. Patient has been assessed by cardiology who request angiogram before any surgical intervention. Patient with low flow low gradient aortic stenosis and would not benefit from TAVR or balloon valvuloplasty per cardiology. Patient in HD this AM. Interventional cardiology consult place, possible right heart cath today  10/14/2017- No acute events overnight patient states his back pain has been controlled adequately with his current medications. Plan to have patient evaluated by cardiology with angiography on Monday then NSGY to evaluate for surgical intervention.   10/15: awaiting cath tomorrow    Interval History: patient reports constipation    Review of Systems   Constitutional: Negative for appetite change, chills, fatigue and fever.   HENT: Negative for congestion, ear pain, rhinorrhea and sore throat.    Eyes: Negative for pain and discharge.   Respiratory: Negative for cough, choking, chest tightness and shortness of breath.    Cardiovascular: Negative for chest pain, palpitations and leg swelling.   Gastrointestinal:  Negative for abdominal pain, blood in stool, diarrhea, nausea, rectal pain and vomiting.   Endocrine: Negative for polyuria.   Genitourinary: Negative for decreased urine volume, difficulty urinating, dysuria, flank pain and hematuria.   Musculoskeletal: Positive for back pain. Negative for joint swelling and neck pain.   Skin: Negative for color change, pallor, rash and wound.   Neurological: Negative for dizziness, seizures, weakness and headaches.   Psychiatric/Behavioral: Negative for behavioral problems and confusion.     Objective:     Vital Signs (Most Recent):  Temp: 99.5 °F (37.5 °C) (10/15/17 1244)  Pulse: 80 (10/15/17 1244)  Resp: 17 (10/15/17 1244)  BP: (!) 148/63 (10/15/17 1244)  SpO2: 97 % (10/15/17 1244) Vital Signs (24h Range):  Temp:  [97.8 °F (36.6 °C)-99.5 °F (37.5 °C)] 99.5 °F (37.5 °C)  Pulse:  [71-84] 80  Resp:  [15-18] 17  SpO2:  [97 %-100 %] 97 %  BP: (131-148)/(60-68) 148/63     Weight: 80 kg (176 lb 5.9 oz)  Body mass index is 27.68 kg/m².    Intake/Output Summary (Last 24 hours) at 10/15/17 1336  Last data filed at 10/14/17 1855   Gross per 24 hour   Intake            445.2 ml   Output                0 ml   Net            445.2 ml      Physical Exam   Constitutional: He is oriented to person, place, and time. He appears well-developed and well-nourished.   Pt is alert orientated and conversing appropriately   HENT:   Head: Normocephalic.   Nose: Nose normal.   Mouth/Throat: Oropharynx is clear and moist.   Eyes: EOM are normal. Pupils are equal, round, and reactive to light. No scleral icterus.   Neck: Neck supple. No JVD present. No tracheal deviation present. No thyromegaly present.   Cardiovascular: Normal rate.  Exam reveals no gallop and no friction rub.    Murmur (4/6 systolic) heard.  Irregularly irregular rhythm   Pulmonary/Chest: Effort normal and breath sounds normal. No respiratory distress. He has no wheezes. He has no rales.   Abdominal: Soft. Bowel sounds are normal. He  exhibits no distension and no mass. There is no tenderness. There is no rebound and no guarding.   Lymphadenopathy:     He has no cervical adenopathy.   Neurological: He is alert and oriented to person, place, and time. He exhibits normal muscle tone.   Skin: Skin is warm and dry. No rash noted. No erythema.   Psychiatric: He has a normal mood and affect. His behavior is normal.   Vitals reviewed.      Significant Labs: All pertinent labs within the past 24 hours have been reviewed.    Significant Imaging: I have reviewed all pertinent imaging results/findings within the past 24 hours.    Assessment/Plan:      * Lumbar stenosis    -patient has stated this has been chronic but has been losing bowel functions for the past several months.    -denies saddle paraesthesia  -Ct scan of lumbar spine showed moderate to severe acquired spinal stenosis and bilateral foraminal encroachment at L3-L4  -MRI consistent with lumbar stenosis in the L3-4. Refer to MRI result   -Neurosurgery consulted, don't feel surgery is indicated at this point,however, family wants surgical intervention, unlikely given patients significant cardiac history, age and other co-morbidities. High risk for surgery.   -Fentanyl patch for pain management   -consulted PM& R  - Patient to have angiography Monday then to be evaluated by NSGY          NSTEMI (non-ST elevated myocardial infarction)    -recent NSTEMI on 10/5/17  -treated medically  -continue aspirin, metoprolol, statin   -EKG with non specific ST changes and A.fib with troponin 0.399, down from OSH 1.030  -Cardiology following appreciate recs, attributed elevated troponin to AS and ESRD  -2D echo; concentric remodeling, normal left ventricular systolic function (EF 55-60%), mildly depressed right ventricular systolic function, biatrial enlargement, estimated PA systolic pressure is 43 mmHg, increased central venous pressure   - heparin drip        BRBPR (bright red blood per rectum)    - patients  INR was supra therapeutic at OSH  - according to daughter has decreased  - CBC stable        Hypokalemia    -resolved at this time  - will continue to monitor           Paroxysmal atrial fibrillation    -hold AC if undergoing right heart cath          Severe aortic stenosis    - cardiology consulted  - given type of stenosis patient will not benefit from TAVR or balloon angioplasty  - recommend angiography prior to any surgical intervention  - medications recommendations appreciated           ESRD (end stage renal disease) on dialysis    -HD on MWF  -has fistula on R arm  -inpatient consult to nephrology, appreciate help            Type 2 diabetes mellitus with chronic kidney disease on chronic dialysis, with long-term current use of insulin    -20U qhs  -updated A1c pending  -diabetic, renal diet  -BG optimal         Weakness generalized    -PT/OT ordered  - consult to social work for placement on discharge rehab vs SNF   - Rehab consulted, following           Essential hypertension    - currently normotensive   - will continue to follow           Coronary artery disease involving native coronary artery of native heart without angina pectoris    - holding DAPT if undergoing heart cath  - angiography planned for Monday per cardiology            Hypercholesteremia    - continue home rosuvastatin             VTE Risk Mitigation         Ordered     heparin 25,000 units in dextrose 5% 250 mL (100 units/mL) infusion  Continuous     Route:  Intravenous        10/14/17 0701     heparin 25,000 units in dextrose 5% 250 mL (100 units/mL) bolus from bag; ADDITIONAL PRN BOLUS  As needed (PRN)     Route:  Intravenous        10/14/17 0701     heparin 25,000 units in dextrose 5% 250 mL (100 units/mL) bolus from bag; ADDITIONAL PRN BOLUS  As needed (PRN)     Route:  Intravenous        10/14/17 0701     Medium Risk of VTE  Once      10/11/17 0006              Brittaney Marcial MD  Department of Hospital Medicine   [facility]  [place]

## 2017-10-15 NOTE — ASSESSMENT & PLAN NOTE
-recent NSTEMI on 10/5/17  -treated medically  -continue aspirin, metoprolol, statin   -EKG with non specific ST changes and A.fib with troponin 0.399, down from OSH 1.030  -Cardiology following appreciate recs, attributed elevated troponin to AS and ESRD  -2D echo; concentric remodeling, normal left ventricular systolic function (EF 55-60%), mildly depressed right ventricular systolic function, biatrial enlargement, estimated PA systolic pressure is 43 mmHg, increased central venous pressure   - heparin drip

## 2017-10-16 PROBLEM — J69.0 ASPIRATION PNEUMONIA: Status: ACTIVE | Noted: 2017-01-01

## 2017-10-16 NOTE — PROGRESS NOTES
Spoke with Dr. Penelope CASON stated that she is unsure of the time for cardiac cath and does not know if pt will do dialysis prior to procedure. Will monitor patient calendar for changes/updates.

## 2017-10-16 NOTE — SUBJECTIVE & OBJECTIVE
Interval History: No acute events overnight patient states that his back pain has been well controlled. Patient to have HD and angiography today.     Review of Systems   Constitutional: Negative for appetite change, chills, fatigue and fever.   HENT: Negative for congestion, ear pain, rhinorrhea and sore throat.    Eyes: Negative for pain and discharge.   Respiratory: Negative for cough, choking, chest tightness and shortness of breath.    Cardiovascular: Negative for chest pain, palpitations and leg swelling.   Gastrointestinal: Negative for abdominal pain, blood in stool, diarrhea, nausea, rectal pain and vomiting.   Endocrine: Negative for polyuria.   Genitourinary: Negative for decreased urine volume, difficulty urinating, dysuria, flank pain and hematuria.   Musculoskeletal: Positive for back pain. Negative for joint swelling and neck pain.   Skin: Negative for color change, pallor, rash and wound.   Neurological: Negative for dizziness, seizures, weakness and headaches.   Psychiatric/Behavioral: Negative for behavioral problems and confusion.     Objective:     Vital Signs (Most Recent):  Temp: 97.7 °F (36.5 °C) (10/16/17 1230)  Pulse: 82 (10/16/17 1230)  Resp: 16 (10/16/17 1230)  BP: 119/66 (10/16/17 1230)  SpO2: 96 % (10/16/17 0737) Vital Signs (24h Range):  Temp:  [96.1 °F (35.6 °C)-99 °F (37.2 °C)] 97.7 °F (36.5 °C)  Pulse:  [67-85] 82  Resp:  [16-18] 16  SpO2:  [95 %-100 %] 96 %  BP: (104-145)/(52-85) 119/66     Weight: 80 kg (176 lb 5.9 oz)  Body mass index is 27.68 kg/m².    Intake/Output Summary (Last 24 hours) at 10/16/17 1316  Last data filed at 10/16/17 1230   Gross per 24 hour   Intake           1113.2 ml   Output             2100 ml   Net           -986.8 ml      Physical Exam   Constitutional: He is oriented to person, place, and time. He appears well-developed and well-nourished.   Pt is alert orientated and conversing appropriately   HENT:   Head: Normocephalic.   Nose: Nose normal.    Mouth/Throat: Oropharynx is clear and moist.   Eyes: EOM are normal. Pupils are equal, round, and reactive to light. No scleral icterus.   Neck: Neck supple. No JVD present. No tracheal deviation present. No thyromegaly present.   Cardiovascular: Normal rate.  Exam reveals no gallop and no friction rub.    Murmur (4/6 systolic) heard.  Irregularly irregular rhythm   Pulmonary/Chest: Effort normal and breath sounds normal. No respiratory distress. He has no wheezes. He has no rales.   Abdominal: Soft. Bowel sounds are normal. He exhibits no distension and no mass. There is no tenderness. There is no rebound and no guarding.   Lymphadenopathy:     He has no cervical adenopathy.   Neurological: He is alert and oriented to person, place, and time. He exhibits normal muscle tone.   Skin: Skin is warm and dry. No rash noted. No erythema.   Psychiatric: He has a normal mood and affect. His behavior is normal.   Vitals reviewed.      Significant Labs:   BMP:   Recent Labs  Lab 10/16/17  0901   *      K 4.4      CO2 20*   BUN 42*   CREATININE 5.3*   CALCIUM 9.5     CBC:   Recent Labs  Lab 10/15/17  0419 10/16/17  0901   WBC 10.35 8.32   HGB 10.0* 9.8*   HCT 31.6* 30.2*    214       Significant Imaging: I have reviewed all pertinent imaging results/findings within the past 24 hours.

## 2017-10-16 NOTE — PROGRESS NOTES
[facility]  Nephrology  Progress Note    Patient Name: Donta Cline  MRN: [id number]  Admission Date: 10/10/2017  Hospital Length of Stay: 6 days  Attending Provider: Alessia Ch MD   Primary Care Physician: ZAID Richardson Iii, MD  Principal Problem:Lumbar stenosis    Subjective:     HPI: Theron Cline is a 85 year old white male with past medical history of bladder CA in which he is in remission (was treated with chemotherapy), CAD, A-fib (treated with warfarin) and ESRD on Hd. Patient was transfered from [facility] where he presented with worsening right leg and back pain, general weakness. In hospital presented with increase troponin levels, diagnosed with NSTEMI, increase in INR where was treated with FFP and Vit K, flash pulmonary edema, started on Bi-PAP and aspirated pneumonia (treated with Zosyn). 2 D echo revealed severe aortic stenosis with preserved EF and Cardiology evaluated patient and did not feel C warranted at this time but recommended outpatient follow-up with his regular Cardiologist and likely need for outpatient C to evaluate his aortic stenosis and coronary anatomy. Additionally, pt was evaluated by NSx who felt he was not a candidate for surgery. Patient was seen and evaluated by Neurosurgery at [place] (Dr. James) who noted Ct scan of lumbar spine showed moderate to severe acquired spinal stenosis and bilateral foraminal encroachment at L3-L4. Patient is here for second opionion from [facility].    Consulted for dialysis treatment: [facility]HD MWF, [facility], Rt lower arm AVF, duration 3.5, followed by Dr. Mathur, EDW 90 kg and had his last dialysis on Monday.     Interval History:   Patient evaluated during dialysis, no respiratory distress, has no complaints, intake of 1.2 L     Review of patient's allergies indicates:   Allergen Reactions    Darvocet a500  [propoxyphene n-acetaminophen]      Other reaction(s): Unknown     Morphine      Other reaction(s): Unknown     Current Facility-Administered Medications   Medication Frequency    0.9%  NaCl infusion PRN    0.9%  NaCl infusion Once    aspirin chewable tablet 81 mg Every Mon, Wed, Fri    dextrose 50% injection 12.5 g PRN    dextrose 50% injection 25 g PRN    fentanyl 12 mcg/hr 1 patch Q72H    glucagon (human recombinant) injection 1 mg PRN    glucose chewable tablet 16 g PRN    glucose chewable tablet 24 g PRN    heparin 25,000 units in dextrose 5% 250 mL (100 units/mL) bolus from bag; ADDITIONAL PRN BOLUS PRN    heparin 25,000 units in dextrose 5% 250 mL (100 units/mL) bolus from bag; ADDITIONAL PRN BOLUS PRN    hydrocodone-acetaminophen 10-325mg per tablet 1 tablet Q6H PRN    insulin aspart pen 0-5 Units QID (AC + HS) PRN    insulin detemir pen 20 Units QHS    metoprolol succinate (TOPROL-XL) 24 hr tablet 100 mg Daily    nitroGLYCERIN SL tablet 0.3 mg Q5 Min PRN    pantoprazole EC tablet 40 mg Nightly    ramelteon tablet 8 mg Nightly PRN    rosuvastatin tablet 40 mg QHS    senna-docusate 8.6-50 mg per tablet 1 tablet BID    sertraline tablet 100 mg QHS    vit b cmplx 3-fa-vit c-biotin 1- mg-mg-mcg per tablet 1 tablet Daily       Objective:     Vital Signs (Most Recent):  Temp: 96.1 °F (35.6 °C) (10/16/17 0737)  Pulse: 75 (10/16/17 0737)  Resp: 16 (10/16/17 0737)  BP: (!) 118/56 (10/16/17 0737)  SpO2: 96 % (10/16/17 0737)  O2 Device (Oxygen Therapy): room air (10/16/17 0737) Vital Signs (24h Range):  Temp:  [96.1 °F (35.6 °C)-99.5 °F (37.5 °C)] 96.1 °F (35.6 °C)  Pulse:  [67-80] 75  Resp:  [16-18] 16  SpO2:  [95 %-100 %] 96 %  BP: (115-148)/(56-85) 118/56     Weight: 80 kg (176 lb 5.9 oz) (10/13/17 0842)  Body mass index is 27.68 kg/m².  Body surface area is 1.94 meters squared.    I/O last 3 completed shifts:  In: 1263.2 [P.O.:1100; I.V.:163.2]  Out: -     Physical Exam   Constitutional: He is oriented to person, place, and time. He appears  well-developed and well-nourished.   HENT:   Head: Normocephalic.   Nose: Nose normal.   Mouth/Throat: Oropharynx is clear and moist.   Eyes: No scleral icterus.   Neck: Neck supple. No JVD present. No tracheal deviation present. No thyromegaly present.   Cardiovascular: Normal rate, regular rhythm and normal heart sounds.  Exam reveals no gallop and no friction rub.    Pulmonary/Chest: Effort normal and breath sounds normal. No respiratory distress. He has no wheezes. He has no rales.   Abdominal: Soft. Bowel sounds are normal. He exhibits no distension and no mass. There is no tenderness. There is no rebound and no guarding.   Musculoskeletal: He exhibits no edema.   Right forearm AVF with good thrill and no bruits   Lymphadenopathy:     He has no cervical adenopathy.   Neurological: He is alert and oriented to person, place, and time. He exhibits normal muscle tone.   Negative asterixis    Skin: Skin is warm and dry. No rash noted. No erythema.   Vitals reviewed.      Significant Labs:  BMP:   Recent Labs  Lab 10/11/17  0119  10/15/17  0419   *  < > 66*   CL 99  < > 104   CO2 26  < > 23   BUN 26*  < > 33*   CREATININE 4.6*  < > 4.7*   CALCIUM 10.3  < > 9.5   MG 1.7  --   --    < > = values in this interval not displayed.  CBC:   Recent Labs  Lab 10/15/17  0419   WBC 10.35   RBC 2.93*   HGB 10.0*   HCT 31.6*      *   MCH 34.1*   MCHC 31.6*     CMP:   Recent Labs  Lab 10/15/17  0419   GLU 66*   CALCIUM 9.5   ALBUMIN 2.6*   PROT 6.2      K 4.2   CO2 23      BUN 33*   CREATININE 4.7*   ALKPHOS 104   ALT 30   AST 32   BILITOT 0.5     All labs within the past 24 hours have been reviewed.         Assessment/Plan:     ESRD (end stage renal disease) on dialysis    Theron Cline is a 85 year old white male with past medical history of bladder CA in which he is in remission (was treated with chemotherapy), CAD, A-fib (treated with warfarin) and ESRD on Hd. Patient was transfered from  [facility] where he presented with worsening right leg and back pain, general weakness. In hospital presented with increase troponin levels, diagnosed with NSTEMI, increase in INR where was treated with FFP and Vit K, flash pulmonary edema, started on Bi-PAP and aspirated pneumonia (treated with Zosyn).     Consulted for dialysis treatment: iHD MWF, dialyzed in [facility], Rt lower arm AVF, duration 3.5, followed by Dr. Mathur, EDW 90 kg and had his last dialysis on Monday.     - Patient will continue with dialysis treatment today for removal of toxins and excess fluid, Goal of 3 hrs with a UF of 1-2 L as tolerated by patient, maintain MAP>65, bath will be adjusted to chemistry results.   - Patient has been presenting with low blood sugar  - Anemia (hgb 10) with controlled blood pressures 118/56 mmHg, May start on EPO, No IV iron since patient with current infection  - Continue with current diet renal diet (low sodium , low phosphate, low potassium).  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            Vince Moreira  Nephrology  Fellow  [facility]    Pager [phone]    Patient seen and examined with Dr Jacobs;   I have reviewed and agree with assessment and plan

## 2017-10-16 NOTE — ASSESSMENT & PLAN NOTE
- Increased metoprolol to XL 100mg qdaily for better HR control (goal HR would be 60's) and uptitrate as tolerated  - Continue aspirin, rosuvastatin  - Nitro SL PRN  - for LHC today, further recs to follow post LHC depending upon findings.

## 2017-10-16 NOTE — PROGRESS NOTES
[facility] Hospital Medicine  Progress Note    Patient Name: Donta Cline  MRN: [id number]  Patient Class: IP- Inpatient   Admission Date: 10/10/2017  Length of Stay: 6 days  Attending Physician: Alessia Ch MD  Primary Care Provider: ZAID Richardson Iii, MD    [facility] Medicine Team: [facility] 1 Edgar Richard MD    Subjective:     Principal Problem:Lumbar stenosis    HPI:  Mr. 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    Patient was originally seen at [facility] on 10/5/17 due to worsening low back pain and inability to ambulate with decline in function over past 5 weeks. There, patient was noted to have elevated troponin so transferred to [facility] for Cardiology evaluation. Patient was diagnosed with NSTEMI and treated medically. Patient was found to have elevated INR and no stents could be placed.  His INR was attempted to be revered with 4U of FFP and vit K but patient went into flash pulm edema.  At this time, 2 D echo revealed severe aortic stenosis with preserved EF and Cardiology evaluated patient and did not feel C warranted at this time but recommended outpatient follow-up with his regular Cardiologist and likely need for outpatient [facility] to evaluate his aortic stenosis and coronary anatomy.     During this admission, patient was having delirium and developed aspiration PNA (placed on IV Zosyn). Additionally, pt was evaluated by NSx who felt  he was not a candidate for surgery. Patient was seen and evaluated by Neurosurgery at [place] (Dr. James) who noted Ct scan of lumbar spine showed moderate to severe acquired spinal stenosis and bilateral foraminal encroachment at L3-L4. Patient is here for second opionion from NSx.    Hospital Course:  10/12: Patient still in pain, fentanyl patch ordered, consulted PM&R. Surgical intervention not indicated per NSx. However, family strongly leaning towards surgical intervention and have unrealistic expectation. If surgical intervention, patient needs LHC and full cardiac work up. Given his recent NSTEMI and severe AS along with other co-morbities, patient is at high risk for surgery. Patient also with nausea and heart burn this morning. MBSS today, aspiration risk with honey thick liquids, speech recommended dental soft diet now.   10/13: Patient with no complaints of pain this AM. Patient has been assessed by cardiology who request angiogram before any surgical intervention. Patient with low flow low gradient aortic stenosis and would not benefit from TAVR or balloon valvuloplasty per cardiology. Patient in HD this AM. Interventional cardiology consult place, possible right heart cath today  10/14/2017- No acute events overnight patient states his back pain has been controlled adequately with his current medications. Plan to have patient evaluated by cardiology with angiography on Monday then NSGY to evaluate for surgical intervention.   10/15: awaiting cath tomorrow  10/16/2017- No acute events overnight, patient to have heart cath today, HD this morning.     Interval History: No acute events overnight patient states that his back pain has been well controlled. Patient to have HD and angiography today.     Review of Systems   Constitutional: Negative for appetite change, chills, fatigue and fever.   HENT: Negative for congestion, ear pain, rhinorrhea and sore throat.    Eyes: Negative for pain and  discharge.   Respiratory: Negative for cough, choking, chest tightness and shortness of breath.    Cardiovascular: Negative for chest pain, palpitations and leg swelling.   Gastrointestinal: Negative for abdominal pain, blood in stool, diarrhea, nausea, rectal pain and vomiting.   Endocrine: Negative for polyuria.   Genitourinary: Negative for decreased urine volume, difficulty urinating, dysuria, flank pain and hematuria.   Musculoskeletal: Positive for back pain. Negative for joint swelling and neck pain.   Skin: Negative for color change, pallor, rash and wound.   Neurological: Negative for dizziness, seizures, weakness and headaches.   Psychiatric/Behavioral: Negative for behavioral problems and confusion.     Objective:     Vital Signs (Most Recent):  Temp: 97.7 °F (36.5 °C) (10/16/17 1230)  Pulse: 82 (10/16/17 1230)  Resp: 16 (10/16/17 1230)  BP: 119/66 (10/16/17 1230)  SpO2: 96 % (10/16/17 0737) Vital Signs (24h Range):  Temp:  [96.1 °F (35.6 °C)-99 °F (37.2 °C)] 97.7 °F (36.5 °C)  Pulse:  [67-85] 82  Resp:  [16-18] 16  SpO2:  [95 %-100 %] 96 %  BP: (104-145)/(52-85) 119/66     Weight: 80 kg (176 lb 5.9 oz)  Body mass index is 27.68 kg/m².    Intake/Output Summary (Last 24 hours) at 10/16/17 1316  Last data filed at 10/16/17 1230   Gross per 24 hour   Intake           1113.2 ml   Output             2100 ml   Net           -986.8 ml      Physical Exam   Constitutional: He is oriented to person, place, and time. He appears well-developed and well-nourished.   Pt is alert orientated and conversing appropriately   HENT:   Head: Normocephalic.   Nose: Nose normal.   Mouth/Throat: Oropharynx is clear and moist.   Eyes: EOM are normal. Pupils are equal, round, and reactive to light. No scleral icterus.   Neck: Neck supple. No JVD present. No tracheal deviation present. No thyromegaly present.   Cardiovascular: Normal rate.  Exam reveals no gallop and no friction rub.    Murmur (4/6 systolic) heard.  Irregularly  irregular rhythm   Pulmonary/Chest: Effort normal and breath sounds normal. No respiratory distress. He has no wheezes. He has no rales.   Abdominal: Soft. Bowel sounds are normal. He exhibits no distension and no mass. There is no tenderness. There is no rebound and no guarding.   Lymphadenopathy:     He has no cervical adenopathy.   Neurological: He is alert and oriented to person, place, and time. He exhibits normal muscle tone.   Skin: Skin is warm and dry. No rash noted. No erythema.   Psychiatric: He has a normal mood and affect. His behavior is normal.   Vitals reviewed.      Significant Labs:   BMP:   Recent Labs  Lab 10/16/17  0901   *      K 4.4      CO2 20*   BUN 42*   CREATININE 5.3*   CALCIUM 9.5     CBC:   Recent Labs  Lab 10/15/17  0419 10/16/17  0901   WBC 10.35 8.32   HGB 10.0* 9.8*   HCT 31.6* 30.2*    214       Significant Imaging: I have reviewed all pertinent imaging results/findings within the past 24 hours.    Assessment/Plan:      * Lumbar stenosis    -patient has stated this has been chronic but has been losing bowel functions for the past several months.    -denies saddle paraesthesia  -Ct scan of lumbar spine showed moderate to severe acquired spinal stenosis and bilateral foraminal encroachment at L3-L4  -MRI consistent with lumbar stenosis in the L3-4. Refer to MRI result   -Neurosurgery consulted, given age and other co-morbidities. High risk for surgery.   -Fentanyl patch for pain management   -consulted PM& R  - Patient to have angiography 10/16  then to be evaluated by NSGY            Lumbar back pain with radiculopathy affecting right lower extremity    - pain currently well controlled  - will continue to follow           BRBPR (bright red blood per rectum)    - patients INR was supra therapeutic at OSH  - according to daughter has decreased  - CBC stable        Hypokalemia    - resolved at this time  - will continue to monitor           NSTEMI (non-ST  elevated myocardial infarction)    -recent NSTEMI on 10/5/17  -treated medically  -continue aspirin, metoprolol, statin   -EKG with non specific ST changes and A.fib with troponin 0.399, down from OSH 1.030  -Cardiology following appreciate recs, attributed elevated troponin to AS and ESRD  -2D echo; concentric remodeling, normal left ventricular systolic function (EF 55-60%), mildly depressed right ventricular systolic function, biatrial enlargement, estimated PA systolic pressure is 43 mmHg, increased central venous pressure   - angiography 10/16        Paroxysmal atrial fibrillation    -hold AC when undergoing right heart cath          Severe aortic stenosis    - cardiology consulted  - given type of stenosis patient will not benefit from TAVR or balloon angioplasty  - recommend angiography prior to any surgical intervention  - medications recommendations appreciated           ESRD (end stage renal disease) on dialysis    -HD on MWF  -has fistula on R arm  -inpatient consult to nephrology, appreciate help            Type 2 diabetes mellitus with chronic kidney disease on chronic dialysis, with long-term current use of insulin    - 18U qhs  - updated A1c pending  - diabetic, renal diet          Weakness generalized    - PT/OT ordered  - consult to social work for placement on discharge rehab vs SNF   - Rehab consulted, following           Essential hypertension    - currently normotensive   - will continue to follow           Coronary artery disease involving native coronary artery of native heart without angina pectoris    - holding DAPT if undergoing heart cath  - angiography 10/16          Hypercholesteremia    - continue home rosuvastatin           Chronic diastolic heart failure    - patient to undergo cardiac catheterization 10/16 to assist in evaluation of possible surgical intervention for spinal stenosis  - cardiac echo 10/12;    Concentric remodeling.    Normal left ventricular systolic function (EF  55-60%).    Mildly depressed right ventricular systolic function .     Biatrial enlargement.     Pulmonary hypertension. The estimated PA systolic pressure is 43 mmHg.      Increased central venous pressure.              VTE Risk Mitigation         Ordered     heparin 25,000 units in dextrose 5% 250 mL (100 units/mL) infusion  Continuous     Route:  Intravenous        10/14/17 0701     heparin 25,000 units in dextrose 5% 250 mL (100 units/mL) bolus from bag; ADDITIONAL PRN BOLUS  As needed (PRN)     Route:  Intravenous        10/14/17 0701     heparin 25,000 units in dextrose 5% 250 mL (100 units/mL) bolus from bag; ADDITIONAL PRN BOLUS  As needed (PRN)     Route:  Intravenous        10/14/17 0701     Medium Risk of VTE  Once      10/11/17 0006              Edgar Richard MD  Department of Hospital Medicine   [facility]

## 2017-10-16 NOTE — PROGRESS NOTES
Attempted to see patient this morning but patient off the floor. Will plan for lumbar decompression on Thursday pending clearance from Cardiology and results of Heart cath today. Will follow up on patient. Discussed with Dr. Cervantes.      Moon Dong PA-C  Neurosurgery  [phone]

## 2017-10-16 NOTE — ASSESSMENT & PLAN NOTE
- PT/OT ordered  - consult to social work for placement on discharge rehab vs SNF   - Rehab consulted, following

## 2017-10-16 NOTE — PLAN OF CARE
Problem: Physical Therapy Goal  Goal: Physical Therapy Goal  Goals to be met by: 10/21/2017    Patient will increase functional independence with mobility by performin. Supine to sit with Modified [place]  2. Sit to supine with Modified [place]  3. Sit to stand transfer with Contact Guard Assistance using Rolling Walker - Met   Updated: Sit to stand transfer with Supervision using rolling walker  4. Bed to chair transfer with Contact Guard Assistance using Rolling Walker - Met   Updated: Bed to chair transfer with Supervision using rolling walker  5. Gait  x 75 feet with Contact Guard Assistance using Rolling Walker.      Outcome: Ongoing (interventions implemented as appropriate)  Goals updated to reflect progress.    Edgar Wright III, DPT, PT  10/16/2017

## 2017-10-16 NOTE — PROGRESS NOTES
"Heparin infusion paused at 0354. Infusion was to be stopped at midnight prior to cardiac cath this morning. Paged Dr. Jimenez to notify of extended infusion; MD to update "the morning team." Will continue to monitor.  "

## 2017-10-16 NOTE — PROGRESS NOTES
[facility]  Cardiology  Progress Note    Patient Name: Donta Cline  MRN: [id number]  Admission Date: 10/10/2017  Hospital Length of Stay: 6 days  Code Status: Full Code   Attending Physician: Alessia Ch MD   Primary Care Physician: ZAID Richardson Iii, MD  Expected Discharge Date: 10/16/2017  Principal Problem:Lumbar stenosis    Subjective:     Hospital Course:   No notes on file    Interval History: tanvi diagnostic [facility] today    Review of Systems   Constitution: Negative.   HENT: Negative.    Eyes: Negative.    Cardiovascular: Positive for chest pain and dyspnea on exertion.   Respiratory: Negative.    Endocrine: Negative.    Skin: Negative.    Musculoskeletal: Positive for back pain.   Gastrointestinal: Negative.    Genitourinary: Negative.    Neurological: Negative.      Objective:     Vital Signs (Most Recent):  Temp: 97.9 °F (36.6 °C) (10/16/17 0852)  Pulse: 76 (10/16/17 1100)  Resp: 16 (10/16/17 0852)  BP: 134/68 (10/16/17 1100)  SpO2: 96 % (10/16/17 0737) Vital Signs (24h Range):  Temp:  [96.1 °F (35.6 °C)-99.5 °F (37.5 °C)] 97.9 °F (36.6 °C)  Pulse:  [67-80] 76  Resp:  [16-18] 16  SpO2:  [95 %-100 %] 96 %  BP: (104-148)/(52-85) 134/68     Weight: 80 kg (176 lb 5.9 oz)  Body mass index is 27.68 kg/m².     SpO2: 96 %  O2 Device (Oxygen Therapy): room air      Intake/Output Summary (Last 24 hours) at 10/16/17 1110  Last data filed at 10/15/17 1833   Gross per 24 hour   Intake            863.2 ml   Output                0 ml   Net            863.2 ml       Lines/Drains/Airways     Central Venous Catheter Line                 Percutaneous Central Line Insertion/Assessment - triple lumen  10/04/17 1045 left subclavian 12 days          Drain                 Hemodialysis AV Fistula Right forearm -- days                Physical Exam   Constitutional: He is oriented to person, place, and time. He appears well-developed and well-nourished.   HENT:   Head: Normocephalic and atraumatic.   Eyes:  Conjunctivae and EOM are normal. Pupils are equal, round, and reactive to light.   Neck: Normal range of motion. Neck supple. No JVD present.   Cardiovascular: Normal rate and regular rhythm.  Exam reveals no gallop and no friction rub.    Murmur heard.  rsis 3/6 SEKOU with rads to carotids   Pulmonary/Chest: Effort normal and breath sounds normal. No respiratory distress. He has no wheezes. He has no rales. He exhibits no tenderness.   Abdominal: Soft. Bowel sounds are normal. He exhibits no distension. There is no tenderness.   Musculoskeletal: Normal range of motion. He exhibits no edema or tenderness.   Neurological: He is alert and oriented to person, place, and time.   Skin: Skin is warm and dry. No erythema. No pallor.       Significant Labs:   CMP   Recent Labs  Lab 10/15/17  0419 10/16/17  0901    136   K 4.2 4.4    102   CO2 23 20*   GLU 66* 120*   BUN 33* 42*   CREATININE 4.7* 5.3*   CALCIUM 9.5 9.5   PROT 6.2 6.3   ALBUMIN 2.6* 2.7*   BILITOT 0.5 0.5   ALKPHOS 104 103   AST 32 33   ALT 30 29   ANIONGAP 12 14   ESTGFRAFRICA 12.2* 10.5*   EGFRNONAA 10.5* 9.1*   , CBC   Recent Labs  Lab 10/15/17  0419 10/16/17  0901   WBC 10.35 8.32   HGB 10.0* 9.8*   HCT 31.6* 30.2*    214   , Lipid Panel No results for input(s): CHOL, HDL, LDLCALC, TRIG, CHOLHDL in the last 48 hours. and Troponin No results for input(s): TROPONINI in the last 48 hours.    Significant Imaging: Echocardiogram:   2D echo with color flow doppler:   Results for orders placed or performed during the hospital encounter of 10/10/17   2D echo with color flow doppler   Result Value Ref Range    EF 55 55 - 65    Aortic Valve Regurgitation TRIVIAL     Est. 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     Patient was originally seen at [facility] on 10/5/17 due to worsening low back pain and inability to ambulate with decline in function over past 5 weeks. There, patient was noted to have elevated troponin (peak 1.030) so transferred to [facility] for Cardiology evaluation. Patient was diagnosed with NSTEMI and treated medically as his INR was found to be supratherapeutic and on attempted reversal with FFP/vit k he ended up in flash pulmonary edema.  2 D echo revealed moderate-severe aortic stenosis (david 0.7, mean grad 28, velocity 3.4) with preserved EF.  Cardiology evaluated patient and did not feel [facility] warranted at this time but recommended outpatient follow-up with his regular Cardiologist and likely need for outpatient [facility] to evaluate his aortic stenosis and coronary anatomy.      During this admission, patient was having delirium and developed aspiration PNA (placed on IV Zosyn). Additionally, pt was evaluated by NSx who felt he would be a candidate for surgery after coronary anrgiogram for risk stratitfication and mgmt prior to surgery.    Elevated troponin    - Increased metoprolol to XL 100mg qdaily for better HR control (goal HR would be 60's) and uptitrate as tolerated  - Continue aspirin, rosuvastatin  - Nitro SL PRN  - for LHC today, further recs to follow post [facility] depending upon findings.        Moderate Aortic Stenosis  DAVID is low but SVI does not allow him to qualify as severe  Defer any treatment at this time.  Outpatient follow up with primary  cardiologist Dr Donovan    VTE Risk Mitigation         Ordered     heparin 25,000 units in dextrose 5% 250 mL (100 units/mL) infusion  Continuous     Route:  Intravenous        10/14/17 0701     heparin 25,000 units in dextrose 5% 250 mL (100 units/mL) bolus from bag; ADDITIONAL PRN BOLUS  As needed (PRN)     Route:  Intravenous        10/14/17 0701     heparin 25,000 units in dextrose 5% 250 mL (100 units/mL) bolus from bag; ADDITIONAL PRN BOLUS  As needed (PRN)     Route:  Intravenous        10/14/17 0701     Medium Risk of VTE  Once      10/11/17 0006          Sher Ramirez MD  Cardiology  [facility]

## 2017-10-16 NOTE — CONSULTS
PM&R consult follow up.    Attempted evaluation again today x 2; however, patient off floor.  Per chart review, neurosurgery planning for lumbar decompression on Thursday pending clearance from Cardiology and results of heart cath.  Not ready for discharge.  Will sign off.  If patient is not surgical candidate, it is unlikely that he would tolerate IPR program and SNF would be more appropriate.  If patient is surgical candidate, please re-consult when patient is medically stable, participating with therapy, and ready for discharge.    SAIGE Pruitt, FNP-C  Physical Medicine & Rehabilitation   10/16/2017  Spectralink: [id number]

## 2017-10-16 NOTE — ASSESSMENT & PLAN NOTE
-recent NSTEMI on 10/5/17  -treated medically  -continue aspirin, metoprolol, statin   -EKG with non specific ST changes and A.fib with troponin 0.399, down from OSH 1.030  -Cardiology following appreciate recs, attributed elevated troponin to AS and ESRD  -2D echo; concentric remodeling, normal left ventricular systolic function (EF 55-60%), mildly depressed right ventricular systolic function, biatrial enlargement, estimated PA systolic pressure is 43 mmHg, increased central venous pressure   - angiography 10/16

## 2017-10-16 NOTE — PT/OT/SLP PROGRESS
Speech Language Pathology  Attempted      oDnta Cline  MRN: [id number]    Patient not seen today secondary to pt unavailable (off the floor for HD). Will follow-up at a later date and time as pt is available.  Thank you.    Gissel Mcdaniel, SABAS-SLP   10/16/2017

## 2017-10-16 NOTE — PROGRESS NOTES
Pt returned from cath lab. Alert and oriented. R groin site with dressing CDI< no bleeding or hematoma noted. Explained bed rest lying flat. Son at bedside. VS stable. Cont to monitor closely

## 2017-10-16 NOTE — PROGRESS NOTES
Maintenance dialysis started to RFA fistula with 2-15 gauge needles avoiding buttonholes.  Tolerated well.

## 2017-10-16 NOTE — SUBJECTIVE & OBJECTIVE
Interval History: tanvi diagnostic [facility] today    Review of Systems   Constitution: Negative.   HENT: Negative.    Eyes: Negative.    Cardiovascular: Positive for chest pain and dyspnea on exertion.   Respiratory: Negative.    Endocrine: Negative.    Skin: Negative.    Musculoskeletal: Positive for back pain.   Gastrointestinal: Negative.    Genitourinary: Negative.    Neurological: Negative.      Objective:     Vital Signs (Most Recent):  Temp: 97.9 °F (36.6 °C) (10/16/17 0852)  Pulse: 76 (10/16/17 1100)  Resp: 16 (10/16/17 0852)  BP: 134/68 (10/16/17 1100)  SpO2: 96 % (10/16/17 0737) Vital Signs (24h Range):  Temp:  [96.1 °F (35.6 °C)-99.5 °F (37.5 °C)] 97.9 °F (36.6 °C)  Pulse:  [67-80] 76  Resp:  [16-18] 16  SpO2:  [95 %-100 %] 96 %  BP: (104-148)/(52-85) 134/68     Weight: 80 kg (176 lb 5.9 oz)  Body mass index is 27.68 kg/m².     SpO2: 96 %  O2 Device (Oxygen Therapy): room air      Intake/Output Summary (Last 24 hours) at 10/16/17 1110  Last data filed at 10/15/17 1833   Gross per 24 hour   Intake            863.2 ml   Output                0 ml   Net            863.2 ml       Lines/Drains/Airways     Central Venous Catheter Line                 Percutaneous Central Line Insertion/Assessment - triple lumen  10/04/17 1045 left subclavian 12 days          Drain                 Hemodialysis AV Fistula Right forearm -- days                Physical Exam   Constitutional: He is oriented to person, place, and time. He appears well-developed and well-nourished.   HENT:   Head: Normocephalic and atraumatic.   Eyes: Conjunctivae and EOM are normal. Pupils are equal, round, and reactive to light.   Neck: Normal range of motion. Neck supple. No JVD present.   Cardiovascular: Normal rate and regular rhythm.  Exam reveals no gallop and no friction rub.    Murmur heard.  rsis 3/6 SEKOU with rads to carotids   Pulmonary/Chest: Effort normal and breath sounds normal. No respiratory distress. He has no wheezes. He has no rales. He  exhibits no tenderness.   Abdominal: Soft. Bowel sounds are normal. He exhibits no distension. There is no tenderness.   Musculoskeletal: Normal range of motion. He exhibits no edema or tenderness.   Neurological: He is alert and oriented to person, place, and time.   Skin: Skin is warm and dry. No erythema. No pallor.       Significant Labs:   CMP   Recent Labs  Lab 10/15/17  0419 10/16/17  0901    136   K 4.2 4.4    102   CO2 23 20*   GLU 66* 120*   BUN 33* 42*   CREATININE 4.7* 5.3*   CALCIUM 9.5 9.5   PROT 6.2 6.3   ALBUMIN 2.6* 2.7*   BILITOT 0.5 0.5   ALKPHOS 104 103   AST 32 33   ALT 30 29   ANIONGAP 12 14   ESTGFRAFRICA 12.2* 10.5*   EGFRNONAA 10.5* 9.1*   , CBC   Recent Labs  Lab 10/15/17  0419 10/16/17  0901   WBC 10.35 8.32   HGB 10.0* 9.8*   HCT 31.6* 30.2*    214   , Lipid Panel No results for input(s): CHOL, HDL, LDLCALC, TRIG, CHOLHDL in the last 48 hours. and Troponin No results for input(s): TROPONINI in the last 48 hours.    Significant Imaging: Echocardiogram:   2D echo with color flow doppler:   Results for orders placed or performed during the hospital encounter of 10/10/17   2D echo with color flow doppler   Result Value Ref Range    EF 55 55 - 65    Aortic Valve Regurgitation TRIVIAL     Est. PA Systolic Pressure 43.09 (A)     Tricuspid Valve Regurgitation TRIVIAL    ekg: nsr, no st/t changes indicative of ischemia

## 2017-10-16 NOTE — SUBJECTIVE & OBJECTIVE
Interval History:   Patient evaluated during dialysis, no respiratory distress, has no complaints, intake of 1.2 L     Review of patient's allergies indicates:   Allergen Reactions    Darvocet a500  [propoxyphene n-acetaminophen]      Other reaction(s): Unknown    Morphine      Other reaction(s): Unknown     Current Facility-Administered Medications   Medication Frequency    0.9%  NaCl infusion PRN    0.9%  NaCl infusion Once    aspirin chewable tablet 81 mg Every Mon, Wed, Fri    dextrose 50% injection 12.5 g PRN    dextrose 50% injection 25 g PRN    fentanyl 12 mcg/hr 1 patch Q72H    glucagon (human recombinant) injection 1 mg PRN    glucose chewable tablet 16 g PRN    glucose chewable tablet 24 g PRN    heparin 25,000 units in dextrose 5% 250 mL (100 units/mL) bolus from bag; ADDITIONAL PRN BOLUS PRN    heparin 25,000 units in dextrose 5% 250 mL (100 units/mL) bolus from bag; ADDITIONAL PRN BOLUS PRN    hydrocodone-acetaminophen 10-325mg per tablet 1 tablet Q6H PRN    insulin aspart pen 0-5 Units QID (AC + HS) PRN    insulin detemir pen 20 Units QHS    metoprolol succinate (TOPROL-XL) 24 hr tablet 100 mg Daily    nitroGLYCERIN SL tablet 0.3 mg Q5 Min PRN    pantoprazole EC tablet 40 mg Nightly    ramelteon tablet 8 mg Nightly PRN    rosuvastatin tablet 40 mg QHS    senna-docusate 8.6-50 mg per tablet 1 tablet BID    sertraline tablet 100 mg QHS    vit b cmplx 3-fa-vit c-biotin 1- mg-mg-mcg per tablet 1 tablet Daily       Objective:     Vital Signs (Most Recent):  Temp: 96.1 °F (35.6 °C) (10/16/17 0737)  Pulse: 75 (10/16/17 0737)  Resp: 16 (10/16/17 0737)  BP: (!) 118/56 (10/16/17 0737)  SpO2: 96 % (10/16/17 0737)  O2 Device (Oxygen Therapy): room air (10/16/17 0737) Vital Signs (24h Range):  Temp:  [96.1 °F (35.6 °C)-99.5 °F (37.5 °C)] 96.1 °F (35.6 °C)  Pulse:  [67-80] 75  Resp:  [16-18] 16  SpO2:  [95 %-100 %] 96 %  BP: (115-148)/(56-85) 118/56     Weight: 80 kg (176 lb 5.9 oz)  (10/13/17 0842)  Body mass index is 27.68 kg/m².  Body surface area is 1.94 meters squared.    I/O last 3 completed shifts:  In: 1263.2 [P.O.:1100; I.V.:163.2]  Out: -     Physical Exam   Constitutional: He is oriented to person, place, and time. He appears well-developed and well-nourished.   HENT:   Head: Normocephalic.   Nose: Nose normal.   Mouth/Throat: Oropharynx is clear and moist.   Eyes: No scleral icterus.   Neck: Neck supple. No JVD present. No tracheal deviation present. No thyromegaly present.   Cardiovascular: Normal rate, regular rhythm and normal heart sounds.  Exam reveals no gallop and no friction rub.    Pulmonary/Chest: Effort normal and breath sounds normal. No respiratory distress. He has no wheezes. He has no rales.   Abdominal: Soft. Bowel sounds are normal. He exhibits no distension and no mass. There is no tenderness. There is no rebound and no guarding.   Musculoskeletal: He exhibits no edema.   Right forearm AVF with good thrill and no bruits   Lymphadenopathy:     He has no cervical adenopathy.   Neurological: He is alert and oriented to person, place, and time. He exhibits normal muscle tone.   Negative asterixis    Skin: Skin is warm and dry. No rash noted. No erythema.   Vitals reviewed.      Significant Labs:  BMP:   Recent Labs  Lab 10/11/17  0119  10/15/17  0419   *  < > 66*   CL 99  < > 104   CO2 26  < > 23   BUN 26*  < > 33*   CREATININE 4.6*  < > 4.7*   CALCIUM 10.3  < > 9.5   MG 1.7  --   --    < > = values in this interval not displayed.  CBC:   Recent Labs  Lab 10/15/17  0419   WBC 10.35   RBC 2.93*   HGB 10.0*   HCT 31.6*      *   MCH 34.1*   MCHC 31.6*     CMP:   Recent Labs  Lab 10/15/17  0419   GLU 66*   CALCIUM 9.5   ALBUMIN 2.6*   PROT 6.2      K 4.2   CO2 23      BUN 33*   CREATININE 4.7*   ALKPHOS 104   ALT 30   AST 32   BILITOT 0.5     All labs within the past 24 hours have been reviewed.

## 2017-10-16 NOTE — PROCEDURES
"    Post Cath Note  Referring Physician: Alessia Ch MD  Procedure: HEART CATH-LEFT (Left)       Access: Right CFA   Diagnostic left heart catheterization for risk stratification  See full report for further details    Intervention:   Diagnostic angiogram    -The LM has luminal irregularities. There is NATHANIEL 3 flow.    -The LAD has luminal irregularities. There is NATHANIEL 3 flow.    -The LCX has luminal irregularities. There is NATHANIEL 3 flow.    -The ostial RCA has a 75% stenosis. There is NATHANIEL 3 flow.    -The mid RCA has a 50% stenosis. There is NATHANIEL 3 flow.      Closure device: Manual pressure    Post Cath Exam:   /66 (BP Location: Left arm, Patient Position: Lying)   Pulse 82   Temp 97.7 °F (36.5 °C) (Oral)   Resp 16   Ht 5' 6.93" (1.7 m)   Wt 80 kg (176 lb 5.9 oz)   SpO2 96%   BMI 27.68 kg/m²   No unusual pain, hematoma, thrill or bruit at vascular access site.  Distal pulse present without signs of ischemia.    Recommendations:   - Routine post-cath care  - Continue medical management, Follow-up with outpatient cardiologist   - Continue ASA/statin/BB  - Consider elective angioplasty of RCA as outpatient once stable from neurosurgical perspective      Signed:  Gt Sorto MD  Cardiology Fellow  Pager [phone]          "

## 2017-10-16 NOTE — PLAN OF CARE
Problem: Patient Care Overview  Goal: Plan of Care Review  Outcome: Ongoing (interventions implemented as appropriate)  Dialysis complete.  Blood rinsed back.  [place] pulled from RFA fistula.  Pressure held x 5 minutes.  Hemostasis achieved.  Covered with gauze and paper tape.  +thrill +bruit.  Net UF 1.5L.  Tolerated well.  Family at the bedside during tx.

## 2017-10-16 NOTE — PT/OT/SLP PROGRESS
Physical Therapy  Treatment    Donta Cline   MRN: [id number]   Admitting Diagnosis: Lumbar stenosis    PT Received On: 10/16/17  PT Start Time: 1357     PT Stop Time: 1410    PT Total Time (min): 13 min       Billable Minutes:  Therapeutic Activity 13    Treatment Type: Treatment  PT/PTA: PT             General Precautions: Standard, fall  Orthopedic Precautions: N/A   Braces: N/A    Do you have any cultural, spiritual, Mormon conflicts, given your current situation?: none stated    Subjective:  Communicated with RN prior to session.  Patient agreeable to PT session. Reports 7/10 LBP at rest in sidelying position. Son and daughter present.    Pain/Comfort  Pain Rating 1: 7/10  Location - Orientation 1: lower  Location 1: back  Pain Addressed 1: Distraction, Reposition, Cessation of Activity  Pain Rating Post-Intervention 1: 7/10    Objective:   Patient found with:  (L sidelying with son present)    Functional Mobility:  Bed Mobility:   Rolling/Turning to Left:  (patient in L sidelying position upon room entry)  Scooting/Bridging: Supervision (seated)  Supine to Sit: Supervision  Sit to Supine:  (not observed; patient left up in bedside chair with son present)    Transfers:  Sit <> Stand Assistance: Contact Guard Assistance  Sit <> Stand Assistive Device: Rolling Walker  Bed <> Chair Technique: Stand Pivot  Bed <> Chair Assistance: Contact Guard Assistance  Bed <> Chair Assistive Device: Rolling Walker    Gait:   Gait Distance: 12ftx2  Assistance 1: Contact Guard Assistance  Gait Assistive Device: Rolling walker  Gait Pattern: swing-through gait  Gait Deviation(s): decreased rohit, increased time in double stance, decreased velocity of limb motion, decreased step length, decreased swing-to-stance ratio, decreased toe-to-floor clearance, foot flat, forward lean, decreased weight-shifting ability    Balance:   Static Sit: NORMAL: No deviations seen in posture held statically  Dynamic Sit: NORMAL: No deviations  seen in posture held dynamically  Static Stand: FAIR: Maintains without assist but unable to take challenges  Dynamic stand: FAIR: Needs CONTACT GUARD during gait     Therapeutic Activities and Exercises:  Patient and son educated on role of PT/POC.    Patient reported needing to use rest room this session.  Bed mobility with supervision.  Sit<>stand with CGA using rolling walker.  Gait 12ft to bathroom CGA using rolling walker.  Significant forward lean with decreased step length bilaterally using rolling walker.    Stand pivot to toilet CGA using rolling walker.  Min Assist for sit<>stand from low toilet.  BSC chair placed over toilet to assist with sit<>Stand transfer.    Gait 12ft to bedside chair with stand pivot transfer using rolling walker requiring CGA for safety.  Patient brushed teeth sitting in bedside chair with set-up assistance.    Ambulation distance secondary to LBP.  Educated on the importance of therapy services to increase tolerance to ambulation. Verbalized understanding.  Educated on seated LE therex to be performed daily: AP, LAQ, and march x 10 reps each    Safe to transfer and ambulate to restroom with RN staff using rolling walker.     AM-PAC 6 CLICK MOBILITY  How much help from another person does this patient currently need?   1 = Unable, Total/Dependent Assistance  2 = A lot, Maximum/Moderate Assistance  3 = A little, Minimum/Contact Guard/Supervision  4 = None, Modified [place]/Independent    Turning over in bed (including adjusting bedclothes, sheets and blankets)?: 3  Sitting down on and standing up from a chair with arms (e.g., wheelchair, bedside commode, etc.): 3  Moving from lying on back to sitting on the side of the bed?: 3  Moving to and from a bed to a chair (including a wheelchair)?: 3  Need to walk in hospital room?: 3  Climbing 3-5 steps with a railing?: 2  Total Score: 17    AM-PAC Raw Score CMS G-Code Modifier Level of Impairment Assistance   6 % Total /  Unable   7 - 9 CM 80 - 100% Maximal Assist   10 - 14 CL 60 - 80% Moderate Assist   15 - 19 CK 40 - 60% Moderate Assist   20 - 22 CJ 20 - 40% Minimal Assist   23 CI 1-20% SBA / CGA   24 CH 0% Independent/ Mod I     Patient left up in chair with all lines intact, call button in reach, RN notified and son present.    Assessment:  Donta Cline is a 85 y.o. male with a medical diagnosis of Lumbar stenosis and presents with rehab identified impairments listed below. Most limited by LBP. Good tolerance to therapy session with gait 12ftx2 with CGA using rolling walker. Significant forward lean over RW to decrease LBP. To benefit from continued skilled intervention to address deficits.    Rehab identified problem list/impairments: Rehab identified problem list/impairments: weakness, impaired endurance, impaired functional mobilty, impaired sensation, impaired self care skills, gait instability, impaired balance, decreased lower extremity function, pain    Rehab potential is good.    Activity tolerance: Good    Discharge recommendations: Discharge Facility/Level Of Care Needs: nursing facility, skilled     Barriers to discharge: Barriers to Discharge: Decreased caregiver support    Equipment recommendations: Equipment Needed After Discharge: none     GOALS:    Physical Therapy Goals        Problem: Physical Therapy Goal    Goal Priority Disciplines Outcome Goal Variances Interventions   Physical Therapy Goal     PT/OT, PT Ongoing (interventions implemented as appropriate)     Description:  Goals to be met by: 10/21/2017    Patient will increase functional independence with mobility by performin. Supine to sit with Modified [place]  2. Sit to supine with Modified [place]  3. Sit to stand transfer with Contact Guard Assistance using Rolling Walker - Met   Updated: Sit to stand transfer with Supervision using rolling walker  4. Bed to chair transfer with Contact Guard Assistance using Rolling Walker -  Met   Updated: Bed to chair transfer with Supervision using rolling walker  5. Gait  x 75 feet with Contact Guard Assistance using Rolling Walker.                        PLAN:    Patient to be seen 3 x/week  to address the above listed problems via gait training, therapeutic activities, therapeutic exercises, neuromuscular re-education  Plan of Care expires: 11/11/17  Plan of Care reviewed with: patient, daughter, son         Edgar Wright III, PT  10/16/2017

## 2017-10-16 NOTE — ASSESSMENT & PLAN NOTE
-patient has stated this has been chronic but has been losing bowel functions for the past several months.    -denies saddle paraesthesia  -Ct scan of lumbar spine showed moderate to severe acquired spinal stenosis and bilateral foraminal encroachment at L3-L4  -MRI consistent with lumbar stenosis in the L3-4. Refer to MRI result   -Neurosurgery consulted, given age and other co-morbidities. High risk for surgery.   -Fentanyl patch for pain management   -consulted PM& R  - Patient to have angiography 10/16  then to be evaluated by NSGY

## 2017-10-16 NOTE — ASSESSMENT & PLAN NOTE
- patient to undergo cardiac catheterization 10/16 to assist in evaluation of possible surgical intervention for spinal stenosis  - cardiac echo 10/12;    Concentric remodeling.    Normal left ventricular systolic function (EF 55-60%).    Mildly depressed right ventricular systolic function .     Biatrial enlargement.     Pulmonary hypertension. The estimated PA systolic pressure is 43 mmHg.      Increased central venous pressure.

## 2017-10-16 NOTE — CONSULTS
INTERVENTIONAL CARDIOLOGY CONSULT      Referring Physician:  Alessia Ch MD  Indication: NSTEMI     HPI: 85 year old man with CAD (PCI Om1 2008, 30% RCA ISR; PET 2011 negative for ischemia), ESRD, severe AS (VEL 0.7, Vmax 3.4, MG 28; low flow low gradient SVI 24), Dm, chronic AF on coumadin, lumbar stenosis admitted with worsening back pain and LE weakness.   Cardiology consulted for NSTEMI with peak troponin 1.1, without significant EKG changes. Troponinemia in setting of ESRD, vol overload now resolved. CP free. Not a candidate for AS intervention. Echo with EF 55-60 without regional WMAs. Cardiology recommended medical management; if going for LHC, intervention only if proximal significant disease if plan to go for neurosurgical intervention.   Pt has decided to pursue NSGY.    ROS:  Constitution: Negative for fever, chills, weight loss or gain.   HENT: Negative for sore throat, rhinorrhea, or headache.  Eyes: Negative for blurred or double vision.   Cardiovascular: See above  Pulmonary: Negative for SOB   Gastrointestinal: Negative for abdominal pain, nausea, vomiting, or diarrhea.   : Negative for dysuria.   Neurological: Negative for focal weakness or sensory changes.    Past Medical History:   Diagnosis Date    Bladder cancer     Chronic diastolic heart failure 8/21/2012    3/13: AOSAT: 98, FICKCI: 2.41, FICKCO: 5.18 PAPRES: 34/16 (23), PASAT: 65, PVR: 1.74 PWPRES: 18/18 (14), RA PRES: 14/13 (12), RV: 40/0, 10     Chronic hip pain, right 10/4/2017    Coronary artery disease involving native coronary artery of native heart without angina pectoris     Dyslipidemia     Encounter for blood transfusion     ESRD (end stage renal disease) on dialysis 6/9/2014    Essential hypertension     Hypercholesteremia 8/21/2012    Long-term (current) use of anticoagulants 10/9/2015    NSTEMI (non-ST elevated myocardial infarction) 10/4/2017    Paroxysmal atrial fibrillation 10/8/2015    Prostate cancer      Severe aortic stenosis 10/14/2014    Type 2 diabetes mellitus with chronic kidney disease on chronic dialysis, with long-term current use of insulin 12/15/2013    Ventricular tachycardia     monomorphic     Past Surgical History:   Procedure Laterality Date    BACK SURGERY      laminectomy x2    COLON SURGERY      EXTENSIVE PROSTATE SURGER      Prostatectomy, Radical    JOINT REPLACEMENT      left hip     No family history on file.  Social History   Substance Use Topics    Smoking status: Former Smoker     Packs/day: 3.00     Years: 40.00     Quit date: 1/1/1980    Smokeless tobacco: Former User    Alcohol use No      Comment: a few drinks     Review of patient's allergies indicates:   Allergen Reactions    Darvocet a500  [propoxyphene n-acetaminophen]      Other reaction(s): Unknown    Morphine      Other reaction(s): Unknown      sodium chloride 0.9%   Intravenous Once    aspirin  81 mg Oral Every Mon, Wed, Fri    fentanyl  1 patch Transdermal Q72H    insulin detemir  18 Units Subcutaneous QHS    metoprolol succinate  100 mg Oral Daily    pantoprazole  40 mg Oral Nightly    rosuvastatin  40 mg Oral QHS    senna-docusate 8.6-50 mg  1 tablet Oral BID    sertraline  100 mg Oral QHS    vit b cmplx 3-fa-vit c-biotin 1- mg-mg-mcg  1 tablet Oral Daily     No current facility-administered medications on file prior to encounter.      Current Outpatient Prescriptions on File Prior to Encounter   Medication Sig Dispense Refill    alprazolam (XANAX) 0.25 MG tablet Take 0.25 mg by mouth daily as needed for Anxiety.   0    aspirin (ECOTRIN) 81 MG EC tablet 3 days a week (Patient taking differently: Take 81 mg by mouth every Mon, Wed, Fri. )  0    coenzyme Q10 200 mg capsule Take 200 mg by mouth once daily.      hydrocodone-acetaminophen 10-325mg (NORCO)  mg Tab Take 1 tablet by mouth every 6 (six) hours as needed for Pain.   0    insulin glargine (LANTUS) 100 unit/mL injection Inject 20  Units into the skin At bedtime.       metoprolol succinate (TOPROL-XL) 50 MG 24 hr tablet take 1 tablet by mouth once daily (Patient taking differently: Take one tablet by mouth daily on Monday, Wednesday and Friday) 30 tablet 11    NITROSTAT 0.4 mg SL tablet place 1 tablet under the tongue if needed every 5 minutes for chest pain for 3 doses IF NO RELIEF AFTER 3RD DOSE CALL PRESCRIBER . 100 tablet 6    pantoprazole (PROTONIX) 40 MG tablet Take 1 tablet by mouth nightly.  0    ANKIT-AZ RX 1- mg-mg-mcg Tab Take 1 tablet by mouth once daily.  0    rosuvastatin (CRESTOR) 40 MG Tab Take 1/2 a tab a day (Patient taking differently: Take 40 mg by mouth every evening. ) 30 tablet 6    sertraline (ZOLOFT) 100 MG tablet Take 1 tablet by mouth every evening.  0    warfarin (COUMADIN) 4 MG tablet take 1 tablet by mouth once daily 35 tablet 3       Continuous Infusions:     Scheduled Meds:   sodium chloride 0.9%   Intravenous Once    aspirin  81 mg Oral Every Mon, Wed, Fri    fentanyl  1 patch Transdermal Q72H    insulin detemir  18 Units Subcutaneous QHS    metoprolol succinate  100 mg Oral Daily    pantoprazole  40 mg Oral Nightly    rosuvastatin  40 mg Oral QHS    senna-docusate 8.6-50 mg  1 tablet Oral BID    sertraline  100 mg Oral QHS    vit b cmplx 3-fa-vit c-biotin 1- mg-mg-mcg  1 tablet Oral Daily     PRN Meds:sodium chloride 0.9%, dextrose 50%, dextrose 50%, glucagon (human recombinant), glucose, glucose, heparin (PORCINE), heparin (PORCINE), hydrocodone-acetaminophen 10-325mg, insulin aspart, nitroGLYCERIN, ramelteon  OBJECTIVE:     Vitals:    10/16/17 0915 10/16/17 0930 10/16/17 0945 10/16/17 1000   BP: 116/61 117/62 (!) 106/54 (!) 104/52   Pulse: 72 77 75 71   Resp:       Temp:       TempSrc:       SpO2:       Weight:       Height:         Gen: Lying in bed, no acute distress, elderly man  HEENT: Supple, no JVD  Cvs: irregularly irregualar 2/6 systolic ejection murmur at  RUSB  Resp: Normal work of breathing, clear bilaterally  Abd: Soft, non-tender and not distended  Ext: Warm, no edema  Vascular:   LEFT RIGHT   RADIAL 2+ AVF   BRACHIAL 2+ AVF   FEMORAL 2+ 2+   DP 2+ 2+   Carlin's Test Normal Normal     Labs:       Recent Labs  Lab 10/13/17  0517 10/14/17  0434 10/15/17  0419    140 139   K 4.6 4.2 4.2   * 104 104   CO2 23 26 23   BUN 29* 23 33*   CREATININE 5.1* 4.2* 4.7*    121* 66*   ANIONGAP 8 10 12       Recent Labs  Lab 10/11/17  0119   MG 1.7   PHOS 2.9       Recent Labs  Lab 10/13/17  0517 10/14/17  0434 10/15/17  0419   AST 37 35 32   ALT 32 34 30   ALKPHOS 93 101 104   BILITOT 0.4 0.5 0.5   ALBUMIN 2.7* 2.8* 2.6*        Recent Labs  Lab 10/14/17  0434 10/15/17  0419 10/16/17  0901   WBC 9.06 10.35 8.32   HGB 10.2* 10.0* 9.8*   HCT 32.9* 31.6* 30.2*    224 214   GRAN 78.1*  7.1 78.8*  8.2* 76.8*  6.4       Recent Labs  Lab 10/12/17  0538 10/13/17  0517 10/14/17  0434   INR 2.3* 2.2* 1.9*       Recent Labs  Lab 10/12/17  0538   TROPONINI 0.399*      Micro:   Blood Cultures  No results found for: LABBLOO  Urine Cultures  Lab Results   Component Value Date    LABURIN  10/12/2017     Multiple organisms isolated. None in predominance.  Repeat if    LABURIN clinically necessary. 10/12/2017    LABURIN  10/08/2017     RAMON ALBICANS  > 100,000 cfu/ml  Treatment of asymptomatic candiduria is not recommended (except for   specific populations). Candida isolated in the urine typically   represents colonization. If an indwelling urinary catheter is present  it should be removed or replaced.      LABURIN  01/16/2004     >100,000 ORGANISMS/ML -PSEUDOMONAS AERUGINOSA  Augmentin                       RESISTANT     Amikacin                        SENSITIVE     Ciprofloxacin                   SENSITIVE     Ceftriaxone                     INTERMEDIATE  Doxycycline                     RESISTANT     Gentamicin                      SENSITIVE     Tobramycin                       SENSITIVE     Bactrim                         RESISTANT     Timentin                        SENSITIVE          IMAGING:   TTE:  EF   Date Value Ref Range Status   10/12/2017 55 55 - 65    12/29/2016 60 55 - 65    10/27/2014 70 55 - 65    10/12/17 CONCLUSIONS     1 - Concentric remodeling.     2 - Normal left ventricular systolic function (EF 55-60%).     3 - Mildly depressed right ventricular systolic function .     4 - Biatrial enlargement.     5 - Pulmonary hypertension. The estimated PA systolic pressure is 43 mmHg.     6 - Increased central venous pressure.     IMPRESSION:    85 year old man with CAD (PCI Om1 2008, 30% RCA ISR; PET 2011 negative for ischemia), ESRD, severe AS (VEL 0.7, Vmax 3.4, MG 28; low flow low gradient SVI 24), Dm, chronic AF on coumadin, lumbar stenosis admitted with worsening back pain and LE weakness and with NSTEMI on presentation. Plan for [facility] to assess for significant proximal lesions prior to possible lumbar stenosis intervention.     PROCEDURAL RISK STRATIFICATION:   Contrast Nephropathy Post-PCI/Renal Risk Score: n/a (ESRD)    Prior Contrast Allergy:  none  Sedation   Prior Sedation: yes   History of Obstructive Sleep Apnea/SDB: no   Pertinent Allergies:   Latex: no   ASA: no   Heparin-Induced Thrombocytopenia: no  PLAN:   1. NSTEMI  2. Coronary artery disease  3. Severe AS  -Plan for [facility] today    -The risks, benefits & alternatives of the procedure were explained to the patient.    -The risks of coronary angiography include but are not limited to:  Bleeding, infection, heart rhythm abnormalities, allergic reactions, kidney injury, stroke and death.    -Should stenting be indicated, the patient has agreed to dual anti-platelet therapy for 1-consecutive year with a drug-eluting stent and a minimum of 1-month with the use of a bare metal stent.    -The risks of moderate sedation include hypotension, respiratory depression, arrhythmias, bronchospasm, & death.    -Informed  consent was obtained & the patient is agreeable to proceed with the procedure.  -This patient was discussed with the attending interventional cardiologist who agrees with the above assessment & plan.      Gt Sorto MD  Cardiology Fellow  Pager [phone]

## 2017-10-16 NOTE — PROGRESS NOTES
"Received call from Dr. Penelope CASON reported that she spoke with the cardiology team and they stated "that's okay" and cardiac cath is still on schedule after prolonged heparin infusion. Will continue to monitor.  "

## 2017-10-16 NOTE — PROGRESS NOTES
Pt's daughter at bedside, inquiring about time of procedure tomorrow. Patient calendar for tomorrow only shows dialysis. Paged on-call to notify; awaiting return response. Will continue to monitor.

## 2017-10-16 NOTE — PROGRESS NOTES
Transported from dialysis unit to room 1146A via stretcher by transporter accompanied by daughter.

## 2017-10-16 NOTE — ASSESSMENT & PLAN NOTE
Theron Cline is a 85 year old white male with past medical history of bladder CA in which he is in remission (was treated with chemotherapy), CAD, A-fib (treated with warfarin) and ESRD on Hd. Patient was transfered from [facility] where he presented with worsening right leg and back pain, general weakness. In hospital presented with increase troponin levels, diagnosed with NSTEMI, increase in INR where was treated with FFP and Vit K, flash pulmonary edema, started on Bi-PAP and aspirated pneumonia (treated with Zosyn).     Consulted for dialysis treatment: iHD MWF, dialyzed in [facility], Rt lower arm AVF, duration 3.5, followed by Dr. Mathur, EDW 90 kg and had his last dialysis on Monday.     - Patient will continue with dialysis treatment today for removal of toxins and excess fluid, Goal of 3 hrs with a UF of 1-2 L as tolerated by patient, maintain MAP>65, bath will be adjusted to chemistry results.   - Patient has been presenting with low blood sugar  - Anemia (hgb 10) with controlled blood pressures 118/56 mmHg, May start on EPO, No IV iron since patient with current infection  - Continue with current diet renal diet (low sodium , low phosphate, low potassium).  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

## 2017-10-17 PROBLEM — M48.061 SPINAL STENOSIS OF LUMBAR REGION WITHOUT NEUROGENIC CLAUDICATION: Status: ACTIVE | Noted: 2017-01-01

## 2017-10-17 NOTE — ASSESSMENT & PLAN NOTE
-patient has stated this has been chronic but has been losing bowel functions for the past several months.    -denies saddle paraesthesia  -Ct scan of lumbar spine showed moderate to severe acquired spinal stenosis and bilateral foraminal encroachment at L3-L4  -MRI consistent with lumbar stenosis in the L3-4. Refer to MRI result   -Neurosurgery consulted, given age and other co-morbidities. High risk for surgery.   -Fentanyl patch for pain management   -consulted PM& R  - Angiography 10/16, to be evaluated by NSGY possible lumbar decompression 10/19

## 2017-10-17 NOTE — PLAN OF CARE
10/17/17 1543   Discharge Reassessment   Assessment Type Discharge Planning Reassessment   Provided patient/caregiver education on the expected discharge date and the discharge plan No   Do you have any problems affording any of your prescribed medications? No   Discharge Plan A Skilled Nursing Facility   Discharge Plan B Home with family;Home Health   Can the patient answer the patient profile reliably? Yes, cognitively intact   How does the patient rate their overall health at the present time? Good   Describe the patient's ability to walk at the present time. Walks with the help of equipment   How often would a person be available to care for the patient? Whenever needed   Number of comorbid conditions (as recorded on the chart) Two   During the past month, has the patient often been bothered by feeling down, depressed or hopeless? Yes   During the past month, has the patient often been bothered by little interest or pleasure in doing things? Yes

## 2017-10-17 NOTE — PROGRESS NOTES
Pt seen and examined at bedside  Increase toprol xl to 150mg po daily  May proceed to neurosurgery without further cardiac intervention  RCRI 0.9% risk of perioperative cardiac event  Elective PCI of RCA lesion as o/p to be arranged on cardiology clinic follow up  Medically optimized, continue current meds  Hold heparin 4 hours prior to surgery  Transition to coumadin when cleared from neuro standpoint  Cardiology will sign off  Thank you for allowing us to partake in the care of Mr Cline

## 2017-10-17 NOTE — ASSESSMENT & PLAN NOTE
- cardiology consulted  - given type of stenosis patient will not benefit from TAVR or balloon angioplasty  - angiography prior to surgical intervention  - medications recommendations appreciated

## 2017-10-17 NOTE — SUBJECTIVE & OBJECTIVE
Interval History: NAEON. Patient underwent heart cath and HD for ESRD yesterday. Cardiology cleared for surgery and recommended stopping heparin GTT 4 hours prior to surgery. Patient was laying in bed complaining of significant RLE pain > back pain with associated muscle spasms.     Medications:  Continuous Infusions:   heparin (porcine) in D5W 17 Units/kg/hr (10/17/17 1012)     Scheduled Meds:   sodium chloride 0.9%   Intravenous Once    sodium chloride 0.9%   Intravenous Once    aspirin  81 mg Oral Every Mon, Wed, Fri    fentanyl  1 patch Transdermal Q72H    insulin detemir  18 Units Subcutaneous QHS    [START ON 10/18/2017] metoprolol succinate  150 mg Oral Daily    pantoprazole  40 mg Oral Nightly    rosuvastatin  40 mg Oral QHS    senna-docusate 8.6-50 mg  1 tablet Oral BID    sertraline  100 mg Oral QHS    vit b cmplx 3-fa-vit c-biotin 1- mg-mg-mcg  1 tablet Oral Daily     PRN Meds:sodium chloride 0.9%, dextrose 50%, dextrose 50%, diphenhydrAMINE, glucagon (human recombinant), glucose, glucose, heparin (PORCINE), heparin (PORCINE), hydrocodone-acetaminophen 10-325mg, insulin aspart, nitroGLYCERIN, ramelteon     Review of Systems   Constitutional: no fever, chills or night sweats. No changes in weight   Eyes: no visual changes   ENT: no nasal congestion or sore throat   Respiratory: no cough or shortness of breath   Cardiovascular: no chest pain or palpitations   Gastrointestinal: no nausea or vomiting   Genitourinary: no hematuria or dysuria   Integument/Breast: no rash or pruritis   Hematologic/Lymphatic: no easy bruising or lymphadenopathy   Musculoskeletal: no arthralgias or myalgias. Positive for low back pain < RLE pain   Neurological: no seizures or tremors. No weakness or paresthesia.   Behavioral/Psych: no auditory or visual hallucinations   Endocrine: no heat or cold intolerance     Objective:     Weight: 80 kg (176 lb 5.9 oz)  Body mass index is 28.47 kg/m².  Vital Signs (Most  Recent):  Temp: 97.6 °F (36.4 °C) (10/17/17 1117)  Pulse: 75 (10/17/17 1148)  Resp: 16 (10/17/17 1117)  BP: 112/64 (10/17/17 1117)  SpO2: 99 % (10/17/17 1117) Vital Signs (24h Range):  Temp:  [96.5 °F (35.8 °C)-98.1 °F (36.7 °C)] 97.6 °F (36.4 °C)  Pulse:  [69-86] 75  Resp:  [16-20] 16  SpO2:  [94 %-99 %] 99 %  BP: (111-134)/(58-72) 112/64       Date 10/17/17 0700 - 10/18/17 0659   Shift 6537-3112 7706-8340 3547-4749 24 Hour Total   I  N  T  A  K  E   P.O. 720   720    I.V.  (mL/kg) 51  (0.6)   51  (0.6)    Shift Total  (mL/kg) 771  (9.6)   771  (9.6)   O  U  T  P  U  T   Urine  (mL/kg/hr) 0   0    Emesis/NG output 0   0    Stool 1   1    Blood 0   0    Shift Total  (mL/kg) 1  (0)   1  (0)   Weight (kg) 80 80 80 80                        Hemodialysis AV Fistula Right forearm (Active)   Needle Size 15ga 10/16/2017  9:01 AM   Site Assessment Clean;Dry;Intact;No redness;No swelling 10/17/2017  8:20 AM   Patency Present;Thrill;Bruit 10/17/2017  8:20 AM   Status Patent 10/16/2017  7:50 PM   Flows Good 10/16/2017  7:50 PM   Dressing Intervention New dressing 10/16/2017 12:30 PM   Dressing Status Clean;Dry;Intact 10/16/2017  7:50 PM   Site Condition No complications 10/16/2017 12:30 PM   Dressing Gauze 10/16/2017 12:30 PM   Drainage Description Serosanguineous 10/11/2017 11:22 AM       Neurosurgery Physical Exam   Vital signs: reviewed above.   Constitutional: well-developed, moderate distress secondary to pain. Generalized deconditioning.    Cardiovascular: regular rate and rhythm  Resp: normal respirations, not labored, no accessory muscles used  Abdomen: soft, non-distended, not tender to palpation  Pulses: palpable distal pulses   Skin: warm, dry and intact, no rashes  Neurological  GCS: Motor: 6/Verbal: 5/Eyes: 4 GCS Total: 15  Mental Status: Awake, Alert, Oriented x 4  Follows commands   Head: normocephalic, atraumatic  PERRL, EOMI,   Facial expression symmetric  Moves all extremities with good strength; 5/5 BUE. 4/5  BLE unable to accurately assess secondary to pain           Significant Labs:    Recent Labs  Lab 10/16/17  0901 10/17/17  0654   * 103    137   K 4.4 4.4    101   CO2 20* 25   BUN 42* 23   CREATININE 5.3* 3.7*   CALCIUM 9.5 9.6       Recent Labs  Lab 10/16/17  0901 10/17/17  0654 10/17/17  0951   WBC 8.32 7.28 7.26   HGB 9.8* 9.8* 9.6*   HCT 30.2* 31.0* 30.2*    208 198     No results for input(s): LABPT, INR, APTT in the last 48 hours.  Microbiology Results (last 7 days)     Procedure Component Value Units Date/Time    Urine culture [[id number]] Collected:  10/12/17 1926    Order Status:  Completed Specimen:  Urine Updated:  10/14/17 0956     Urine Culture, Routine Multiple organisms isolated. None in predominance.  Repeat if     Urine Culture, Routine clinically necessary.    Urine culture [[id number]]     Order Status:  Completed Specimen:  Urine             Significant Diagnostics:  None new

## 2017-10-17 NOTE — ASSESSMENT & PLAN NOTE
Mr. Cline is a 85yoM with multiple active medical comorbidities, including recent NSTEMI, severe aortic stenosis, ESRD with HD MWF, and aspiration pneumonia.    -Cardiology cleared for surgery; recommended stopping heparin GTT 4 hours prior to surgery and to continue ASA 81mg.  -OR scheduled for 10/19/17. Will obtain consents.  -Medical management per primary.   -Recommend adding muscle relaxant prn spasms.   -Appreciate cardiac recs.   -PT/OT/OOB daily   -Will continue to follow.    Discussed with Dr. Cervantes

## 2017-10-17 NOTE — PLAN OF CARE
Problem: Patient Care Overview  Goal: Plan of Care Review  Outcome: Ongoing (interventions implemented as appropriate)  Patient started on heparin drip at 13.6 cc/hr. Patient has received one prn dose of Percocet for c/o back pain with mild relief noted. Telemetry in progress with SR noted. Patient admits to be depressed regarding delay of surgery and son's cancer diagnosis. Emotional support given; declined offer of  services.    Problem: Diabetes, Type 2 (Adult)  Goal: Signs and Symptoms of Listed Potential Problems Will be Absent, Minimized or Managed (Diabetes, Type 2)  Signs and symptoms of listed potential problems will be absent, minimized or managed by discharge/transition of care (reference Diabetes, Type 2 (Adult) CPG).   Outcome: Ongoing (interventions implemented as appropriate)  Glucoses have ranged between 115 - 143mg/dl this shift. Tolerating 2000 ADA dental soft diet. ST reinforced need to have all liquids to be PUDDING THICK CONSISTENCY. Order changed to reflect recommendation.    Problem: Fall Risk (Adult)  Goal: Identify Related Risk Factors and Signs and Symptoms  Related risk factors and signs and symptoms are identified upon initiation of Human Response Clinical Practice Guideline (CPG)   Outcome: Ongoing (interventions implemented as appropriate)  No injuries noted.     Problem: Pressure Ulcer Risk (Дмитрий Scale) (Adult,Obstetrics,Pediatric)  Goal: Identify Related Risk Factors and Signs and Symptoms  Related risk factors and signs and symptoms are identified upon initiation of Human Response Clinical Practice Guideline (CPG)   Outcome: Ongoing (interventions implemented as appropriate)  No skin breakdown noted.

## 2017-10-17 NOTE — PT/OT/SLP PROGRESS
Physical Therapy  Treatment    Donta Cline   MRN: [id number]   Admitting Diagnosis: Lumbar stenosis    PT Received On: 10/17/17  PT Start Time: 1339     PT Stop Time: 1347    PT Total Time (min): 8 min       Billable Minutes:  Gait Training 8    Treatment Type: Treatment  PT/PTA: PT             General Precautions: Standard, fall  Orthopedic Precautions: N/A   Braces: N/A    Do you have any cultural, spiritual, Temple conflicts, given your current situation?: none stated    Subjective:  Communicated with RN prior to session.  Patient agreeable to PT session. Son present. States he is too weak to walk. And reports he had difficulty getting off commode earlier secondary to weakness.    Pain/Comfort  Pain Rating 1: 7/10  Location - Orientation 1: lower  Location 1: back  Pain Addressed 1: Reposition, Distraction, Cessation of Activity  Pain Rating Post-Intervention 1: 7/10    Objective:   Patient found with:  (seated EOB with son and SLP present)    Functional Mobility:  Bed Mobility:   Rolling/Turning to Left:  (not observed; patient seated EOB upon room entry)    Transfers:  Sit <> Stand Assistance: Contact Guard Assistance  Sit <> Stand Assistive Device: Rolling Walker  Bed <> Chair Technique: Stand Pivot  Bed <> Chair Assistance: Contact Guard Assistance  Bed <> Chair Assistive Device: Rolling Walker    Gait:   Gait Distance: 60ftx1  Assistance 1: Contact Guard Assistance  Gait Assistive Device: Rolling walker  Gait Pattern: swing-through gait  Gait Deviation(s): decreased rohit, increased time in double stance, decreased velocity of limb motion, decreased step length, decreased stride length, forward lean, foot flat, decreased weight-shifting ability    Balance:   Static Sit: NORMAL: No deviations seen in posture held statically  Dynamic Sit: NORMAL: No deviations seen in posture held dynamically  Static Stand: FAIR: Maintains without assist but unable to take challenges  Dynamic stand: FAIR: Needs CONTACT  GUARD during gait     Therapeutic Activities and Exercises:  Patient educated on role of PT/POC.    Additional education provided on the importance of participation with therapy services. Needs reinforcement.    Seated EOB upon room entry.  CGA for sit to stand transfer using rolling walker.     Gait 60ftx1 CGA using rolling walker. Verbal cues provided to stay within rolling walker frame. Needs reinforcement.     Declined further ambulation.   Returned to room with CGA and stand pivot transfer to EOB using rolling walker.  Left seated EOB with son present.    Son present and reports patient scheduled to have LB surgery Thursday 10/19  Safest to ambulate using rolling walker at this time with assist of 1.     AM-PAC 6 CLICK MOBILITY  How much help from another person does this patient currently need?   1 = Unable, Total/Dependent Assistance  2 = A lot, Maximum/Moderate Assistance  3 = A little, Minimum/Contact Guard/Supervision  4 = None, Modified [place]/Independent    Turning over in bed (including adjusting bedclothes, sheets and blankets)?: 3  Sitting down on and standing up from a chair with arms (e.g., wheelchair, bedside commode, etc.): 3  Moving from lying on back to sitting on the side of the bed?: 3  Moving to and from a bed to a chair (including a wheelchair)?: 3  Need to walk in hospital room?: 3  Climbing 3-5 steps with a railing?: 2  Total Score: 17    AM-PAC Raw Score CMS G-Code Modifier Level of Impairment Assistance   6 % Total / Unable   7 - 9 CM 80 - 100% Maximal Assist   10 - 14 CL 60 - 80% Moderate Assist   15 - 19 CK 40 - 60% Moderate Assist   20 - 22 CJ 20 - 40% Minimal Assist   23 CI 1-20% SBA / CGA   24 CH 0% Independent/ Mod I     Patient left seated EOB with all lines intact, call button in reach, RN notified and son present.    Assessment:  Donta Cline is a 85 y.o. male with a medical diagnosis of Lumbar stenosis and presents with rehab identified impairments listed  below. Patient needs significant motivation to participate with therapy services. Improved tolerance to ambulation this session using rolling walker without LOB. Per son, patient scheduled to have surgery 10/19/17. Safest to ambulate using rolling walker at this time with assist of 1. To benefit from continued skilled intervention to address deficits.    Rehab identified problem list/impairments: Rehab identified problem list/impairments: weakness, impaired endurance, impaired functional mobilty, decreased lower extremity function, pain, decreased safety awareness, gait instability, impaired balance    Rehab potential is good.    Activity tolerance: Good    Discharge recommendations: Discharge Facility/Level Of Care Needs: nursing facility, skilled     Barriers to discharge: Barriers to Discharge: Decreased caregiver support (patient requiring significantly increased level of assist at this time secondary to pain)    Equipment recommendations: Equipment Needed After Discharge: none     GOALS:    Physical Therapy Goals        Problem: Physical Therapy Goal    Goal Priority Disciplines Outcome Goal Variances Interventions   Physical Therapy Goal     PT/OT, PT Ongoing (interventions implemented as appropriate)     Description:  Goals to be met by: 10/21/2017    Patient will increase functional independence with mobility by performin. Supine to sit with Modified [place]  2. Sit to supine with Modified [place]  3. Sit to stand transfer with Contact Guard Assistance using Rolling Walker - Met   Updated: Sit to stand transfer with Supervision using rolling walker  4. Bed to chair transfer with Contact Guard Assistance using Rolling Walker - Met   Updated: Bed to chair transfer with Supervision using rolling walker  5. Gait  x 75 feet with Contact Guard Assistance using Rolling Walker.                        PLAN:    Patient to be seen 3 x/week  to address the above listed problems via gait training,  therapeutic activities, therapeutic exercises, neuromuscular re-education  Plan of Care expires: 11/11/17  Plan of Care reviewed with: patient, son         Edgar GIBSON Kyle ANDREW, PT  10/17/2017

## 2017-10-17 NOTE — SUBJECTIVE & OBJECTIVE
Interval History: No acute events overnight, patient with no new complaints this AM. Patient states that his back pain has been well controled.     Review of Systems   Constitutional: Negative for appetite change, chills, fatigue and fever.   HENT: Negative for congestion, ear pain, rhinorrhea and sore throat.    Eyes: Negative for pain and discharge.   Respiratory: Negative for cough, choking, chest tightness and shortness of breath.    Cardiovascular: Negative for chest pain, palpitations and leg swelling.   Gastrointestinal: Negative for abdominal pain, blood in stool, diarrhea, nausea, rectal pain and vomiting.   Endocrine: Negative for polyuria.   Genitourinary: Negative for decreased urine volume, difficulty urinating, dysuria, flank pain and hematuria.   Musculoskeletal: Positive for back pain. Negative for joint swelling and neck pain.   Skin: Negative for color change, pallor, rash and wound.   Neurological: Negative for dizziness, seizures, weakness and headaches.   Psychiatric/Behavioral: Negative for behavioral problems and confusion.     Objective:     Vital Signs (Most Recent):  Temp: 97.6 °F (36.4 °C) (10/17/17 1117)  Pulse: 75 (10/17/17 1148)  Resp: 16 (10/17/17 1117)  BP: 112/64 (10/17/17 1117)  SpO2: 99 % (10/17/17 1117) Vital Signs (24h Range):  Temp:  [96.5 °F (35.8 °C)-98.1 °F (36.7 °C)] 97.6 °F (36.4 °C)  Pulse:  [69-86] 75  Resp:  [16-20] 16  SpO2:  [94 %-99 %] 99 %  BP: (111-134)/(58-72) 112/64     Weight: 80 kg (176 lb 5.9 oz)  Body mass index is 27.68 kg/m².    Intake/Output Summary (Last 24 hours) at 10/17/17 1218  Last data filed at 10/17/17 0800   Gross per 24 hour   Intake              960 ml   Output             2100 ml   Net            -1140 ml      Physical Exam   Constitutional: He is oriented to person, place, and time. He appears well-developed and well-nourished.   Pt is alert orientated and conversing appropriately   HENT:   Head: Normocephalic.   Nose: Nose normal.    Mouth/Throat: Oropharynx is clear and moist.   Eyes: EOM are normal. Pupils are equal, round, and reactive to light. No scleral icterus.   Neck: Neck supple. No JVD present. No tracheal deviation present. No thyromegaly present.   Cardiovascular: Normal rate.  Exam reveals no gallop and no friction rub.    Murmur (4/6 systolic) heard.  Irregularly irregular rhythm,  Vascular access site with no pain, erythema, bleeding    Pulmonary/Chest: Effort normal and breath sounds normal. No respiratory distress. He has no wheezes. He has no rales.   Abdominal: Soft. Bowel sounds are normal. He exhibits no distension and no mass. There is no tenderness. There is no rebound and no guarding.   Lymphadenopathy:     He has no cervical adenopathy.   Neurological: He is alert and oriented to person, place, and time. He exhibits normal muscle tone.   Skin: Skin is warm and dry. No rash noted. No erythema.   Psychiatric: He has a normal mood and affect. His behavior is normal.   Vitals reviewed.      Significant Labs:   BMP:   Recent Labs  Lab 10/17/17  0654         K 4.4      CO2 25   BUN 23   CREATININE 3.7*   CALCIUM 9.6     CBC:   Recent Labs  Lab 10/16/17  0901 10/17/17  0654 10/17/17  0951   WBC 8.32 7.28 7.26   HGB 9.8* 9.8* 9.6*   HCT 30.2* 31.0* 30.2*    208 198       Significant Imaging: I have reviewed all pertinent imaging results/findings within the past 24 hours.

## 2017-10-17 NOTE — ASSESSMENT & PLAN NOTE
- patient underwent cardiac catheterization 10/16 to assist in evaluation of possible surgical intervention for spinal stenosis  - cardiac echo 10/12;    Concentric remodeling.    Normal left ventricular systolic function (EF 55-60%).    Mildly depressed right ventricular systolic function .     Biatrial enlargement.     Pulmonary hypertension. The estimated PA systolic pressure is 43 mmHg.      Increased central venous pressure.

## 2017-10-17 NOTE — PLAN OF CARE
Problem: SLP Goal  Goal: SLP Goal  Speech Therapy Short Term Goals  Goals expected to be met by 10/19:  1. Pt will tolerate pudding thick liquids and dental soft diet with no overt s/s aspiration.   2. Given clinician model, pt will complete dysphagia exercises x10 each in order to strengthen the swallowing musculature.   3. Pt will participate in repeat MBSS when deemed appropriate by SLP/ MD.       Outcome: Ongoing (interventions implemented as appropriate)  Pt participated well w/ tx tasks.  Goals remain appropriate.  Cont ST per POC.    Gissel Mcdaniel CCC-SLP  10/17/2017

## 2017-10-17 NOTE — PT/OT/SLP PROGRESS
"Speech Language Pathology  Treatment    Donta Cline   MRN: [id number]   Admitting Diagnosis: Lumbar stenosis    Diet recommendations: Solid Diet Level: Dental Soft  Liquid Diet Level: Pudding Thick Feed only when awake/alert, HOB to 90 degrees, Small bites/sips, 1 bite/sip at a time, Double swallow with each bite/sip, Eliminate distractions, Avoid talking while eating and Assistance with thickening liquids    SLP Treatment Date: 10/17/17  Speech Start Time: 1305     Speech Stop Time: 1328     Speech Total (min): 23 min       TREATMENT BILLABLE MINUTES:  Treatment Swallowing Dysfunction 13 and Seld Care/Home Management Training 10    Has the patient been evaluated by SLP for swallowing? : Yes  Keep patient NPO?: No   General Precautions: Standard, aspiration, fall, pudding thick  Current Respiratory Status:  (room air)       Communicated w/ RN prior to and following tx session    Subjective:  "I've had a lot going on."  Pt stated re: situation    Pain/Comfort  Pain Rating 1: 0/10  Pain Rating Post-Intervention 1: 0/10    Objective:   Patient found with: peripheral IV    Pt was awake and alert upon SLP presentation.  He reported not receiving thickener packets w/ meals at bedside.  SLP entered order into pt chart for liquid thickener and communicated w/ RN re: need for liquid thickening to pudding consistency.  Pt and his son were provided education re: MBSS results, risk of aspiration, silent aspiration, liquid thickening, normal swallow mechanics and dysphagia ex's.  They indicated good understanding of the information provided.      Pt was seated upright at edge of bed.  He was offered 1/2 tsp bites of pudding thickened juice x4 w/ mod-max cues needed to attend to task and not talk w/ food in the oral cavity and for double swallows.  He exhibited throat clearing x2/4 trials w/ initial 2 trials likely d/t poor attention, safety awareness and insight.  Pt tolerated final trials w/ no overt s/s of " penetration/aspiration.  Pt completed basic dysphagia ex's x5 reps for glottal /g, k/, falsetto /i/, le maneuver, and effortful swallows given min cues.      FIM:                                 Assessment:  Donta Cline is a 85 y.o. male with a medical diagnosis of Lumbar stenosis and presents with dysphagia.    Discharge recommendations: Discharge Facility/Level Of Care Needs: nursing facility, skilled     Goals:    SLP Goals        Problem: SLP Goal    Goal Priority Disciplines Outcome   SLP Goal     SLP Ongoing (interventions implemented as appropriate)   Description:  Speech Therapy Short Term Goals  Goals expected to be met by 10/19:  1. Pt will tolerate pudding thick liquids and dental soft diet with no overt s/s aspiration.   2. Given clinician model, pt will complete dysphagia exercises x10 each in order to strengthen the swallowing musculature.   3. Pt will participate in repeat MBSS when deemed appropriate by SLP/ MD.                         Plan:   Patient to be seen Therapy Frequency: 5 x/week   Plan of Care expires: 11/10/17  Plan of Care reviewed with: patient, son  SLP Follow-up?: Yes              Gissel Mcdaniel CCC-SLP  10/17/2017

## 2017-10-17 NOTE — PROGRESS NOTES
[facility]  Neurosurgery  Progress Note    Subjective:     History of Present Illness: Mr. 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     Patient was originally seen at [facility] on 10/5/17 due to worsening low back pain and inability to ambulate with decline in function over past 5 weeks. There, patient was noted to have elevated troponin so transferred to [facility] for Cardiology evaluation. Patient was diagnosed with NSTEMI and treated medically. Patient was found to have elevated INR and no stents could be placed.  His INR was attempted to be revered with 4U of FFP and vit K but patient went into flash pulm edema.  At this time, 2 D echo revealed severe aortic stenosis with preserved EF and Cardiology evaluated patient and did not feel LHC warranted at this time but recommended outpatient follow-up with his regular Cardiologist and likely need for outpatient LHC to evaluate his aortic stenosis and coronary anatomy.      During this admission, patient was having delirium and developed aspiration PNA (placed on IV Zosyn). Additionally, pt was evaluated by [facility] who felt he was not a candidate for surgery. Patient was seen and evaluated by Neurosurgery at [facility] (Dr. James) who noted Ct scan of lumbar spine showed moderate to severe acquired spinal stenosis and bilateral foraminal encroachment at L3-L4. Patient is here for second opionion from [facility].    Post-Op  Info:  Procedure(s) (LRB):  HEART CATH-LEFT (Left)         Interval History: NAEON. Patient underwent heart cath and HD for ESRD yesterday. Cardiology cleared for surgery and recommended stopping heparin GTT 4 hours prior to surgery. Patient was laying in bed complaining of significant RLE pain > back pain with associated muscle spasms.     Medications:  Continuous Infusions:   heparin (porcine) in D5W 17 Units/kg/hr (10/17/17 1012)     Scheduled Meds:   sodium chloride 0.9%   Intravenous Once    sodium chloride 0.9%   Intravenous Once    aspirin  81 mg Oral Every Mon, Wed, Fri    fentanyl  1 patch Transdermal Q72H    insulin detemir  18 Units Subcutaneous QHS    [START ON 10/18/2017] metoprolol succinate  150 mg Oral Daily    pantoprazole  40 mg Oral Nightly    rosuvastatin  40 mg Oral QHS    senna-docusate 8.6-50 mg  1 tablet Oral BID    sertraline  100 mg Oral QHS    vit b cmplx 3-fa-vit c-biotin 1- mg-mg-mcg  1 tablet Oral Daily     PRN Meds:sodium chloride 0.9%, dextrose 50%, dextrose 50%, diphenhydrAMINE, glucagon (human recombinant), glucose, glucose, heparin (PORCINE), heparin (PORCINE), hydrocodone-acetaminophen 10-325mg, insulin aspart, nitroGLYCERIN, ramelteon     Review of Systems   Constitutional: no fever, chills or night sweats. No changes in weight   Eyes: no visual changes   ENT: no nasal congestion or sore throat   Respiratory: no cough or shortness of breath   Cardiovascular: no chest pain or palpitations   Gastrointestinal: no nausea or vomiting   Genitourinary: no hematuria or dysuria   Integument/Breast: no rash or pruritis   Hematologic/Lymphatic: no easy bruising or lymphadenopathy   Musculoskeletal: no arthralgias or myalgias. Positive for low back pain < RLE pain   Neurological: no seizures or tremors. No weakness or paresthesia.   Behavioral/Psych: no auditory or visual hallucinations   Endocrine: no heat or cold intolerance     Objective:     Weight: 80 kg (176 lb 5.9  oz)  Body mass index is 28.47 kg/m².  Vital Signs (Most Recent):  Temp: 97.6 °F (36.4 °C) (10/17/17 1117)  Pulse: 75 (10/17/17 1148)  Resp: 16 (10/17/17 1117)  BP: 112/64 (10/17/17 1117)  SpO2: 99 % (10/17/17 1117) Vital Signs (24h Range):  Temp:  [96.5 °F (35.8 °C)-98.1 °F (36.7 °C)] 97.6 °F (36.4 °C)  Pulse:  [69-86] 75  Resp:  [16-20] 16  SpO2:  [94 %-99 %] 99 %  BP: (111-134)/(58-72) 112/64       Date 10/17/17 0700 - 10/18/17 0659   Shift 5696-1123 6863-2337 5450-0764 24 Hour Total   I  N  T  A  K  E   P.O. 720   720    I.V.  (mL/kg) 51  (0.6)   51  (0.6)    Shift Total  (mL/kg) 771  (9.6)   771  (9.6)   O  U  T  P  U  T   Urine  (mL/kg/hr) 0   0    Emesis/NG output 0   0    Stool 1   1    Blood 0   0    Shift Total  (mL/kg) 1  (0)   1  (0)   Weight (kg) 80 80 80 80                        Hemodialysis AV Fistula Right forearm (Active)   Needle Size 15ga 10/16/2017  9:01 AM   Site Assessment Clean;Dry;Intact;No redness;No swelling 10/17/2017  8:20 AM   Patency Present;Thrill;Bruit 10/17/2017  8:20 AM   Status Patent 10/16/2017  7:50 PM   Flows Good 10/16/2017  7:50 PM   Dressing Intervention New dressing 10/16/2017 12:30 PM   Dressing Status Clean;Dry;Intact 10/16/2017  7:50 PM   Site Condition No complications 10/16/2017 12:30 PM   Dressing Gauze 10/16/2017 12:30 PM   Drainage Description Serosanguineous 10/11/2017 11:22 AM       Neurosurgery Physical Exam   Vital signs: reviewed above.   Constitutional: well-developed, moderate distress secondary to pain. Generalized deconditioning.    Cardiovascular: regular rate and rhythm  Resp: normal respirations, not labored, no accessory muscles used  Abdomen: soft, non-distended, not tender to palpation  Pulses: palpable distal pulses   Skin: warm, dry and intact, no rashes  Neurological  GCS: Motor: 6/Verbal: 5/Eyes: 4 GCS Total: 15  Mental Status: Awake, Alert, Oriented x 4  Follows commands   Head: normocephalic, atraumatic  PERRL, EOMI,   Facial expression  symmetric  Moves all extremities with good strength; 5/5 BUE. 4/5 BLE unable to accurately assess secondary to pain           Significant Labs:    Recent Labs  Lab 10/16/17  0901 10/17/17  0654   * 103    137   K 4.4 4.4    101   CO2 20* 25   BUN 42* 23   CREATININE 5.3* 3.7*   CALCIUM 9.5 9.6       Recent Labs  Lab 10/16/17  0901 10/17/17  0654 10/17/17  0951   WBC 8.32 7.28 7.26   HGB 9.8* 9.8* 9.6*   HCT 30.2* 31.0* 30.2*    208 198     No results for input(s): LABPT, INR, APTT in the last 48 hours.  Microbiology Results (last 7 days)     Procedure Component Value Units Date/Time    Urine culture [[id number]] Collected:  10/12/17 1926    Order Status:  Completed Specimen:  Urine Updated:  10/14/17 0956     Urine Culture, Routine Multiple organisms isolated. None in predominance.  Repeat if     Urine Culture, Routine clinically necessary.    Urine culture [[id number]]     Order Status:  Completed Specimen:  Urine             Significant Diagnostics:  None new    Assessment/Plan:     Lumbar back pain with radiculopathy affecting right lower extremity    Mr. Cline is a 85yoM with multiple active medical comorbidities, including recent NSTEMI, severe aortic stenosis, ESRD with HD MWF, and aspiration pneumonia.    -Cardiology cleared for surgery; recommended stopping heparin GTT 4 hours prior to surgery and to continue ASA 81mg.  -OR tentatively scheduled for 10/19/17. Will obtain consents and Dr. Cervantes to have in depth discussion with patient and family today.  -Medical management per primary.   -Recommend adding muscle relaxant prn spasms.   -Appreciate cardiac recs.   -PT/OT/OOB daily   -Will continue to follow.    Discussed with Dr. Helen Ramirez PA-C  Neurosurgery  [facility]

## 2017-10-17 NOTE — ASSESSMENT & PLAN NOTE
- PT/OT ordered  - consult to social work for placement on discharge rehab vs SNF   - Will re-consult PMR after surgical intervention

## 2017-10-17 NOTE — PROGRESS NOTES
[facility] Hospital Medicine  Progress Note    Patient Name: Donta Cline  MRN: [id number]  Patient Class: IP- Inpatient   Admission Date: 10/10/2017  Length of Stay: 7 days  Attending Physician: Lior Cutler MD  Primary Care Provider: ZAID Richardson Iii, MD    [facility] Medicine Team: [facility] HOSP MED 1 Edgar Richard MD    Subjective:     Principal Problem:Lumbar stenosis    HPI:  Mr. 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    Patient was originally seen at [facility] on 10/5/17 due to worsening low back pain and inability to ambulate with decline in function over past 5 weeks. There, patient was noted to have elevated troponin so transferred to [facility] for Cardiology evaluation. Patient was diagnosed with NSTEMI and treated medically. Patient was found to have elevated INR and no stents could be placed.  His INR was attempted to be revered with 4U of FFP and vit K but patient went into flash pulm edema.  At this time, 2 D echo revealed severe aortic stenosis with preserved EF and Cardiology evaluated patient and did not feel [facility] warranted at this time but recommended outpatient follow-up with his regular Cardiologist and likely need for outpatient [facility] to evaluate his aortic stenosis and coronary anatomy.     During this admission, patient was having delirium and developed aspiration PNA (placed on IV Zosyn). Additionally, pt was evaluated by NSx who felt he  was not a candidate for surgery. Patient was seen and evaluated by Neurosurgery at [place] (Dr. James) who noted Ct scan of lumbar spine showed moderate to severe acquired spinal stenosis and bilateral foraminal encroachment at L3-L4. Patient is here for second opionion from NSx.    Hospital Course:  10/12: Patient still in pain, fentanyl patch ordered, consulted PM&R. Surgical intervention not indicated per NSx. However, family strongly leaning towards surgical intervention and have unrealistic expectation. If surgical intervention, patient needs LHC and full cardiac work up. Given his recent NSTEMI and severe AS along with other co-morbities, patient is at high risk for surgery. Patient also with nausea and heart burn this morning. MBSS today, aspiration risk with honey thick liquids, speech recommended dental soft diet now.   10/13: Patient with no complaints of pain this AM. Patient has been assessed by cardiology who request angiogram before any surgical intervention. Patient with low flow low gradient aortic stenosis and would not benefit from TAVR or balloon valvuloplasty per cardiology. Patient in HD this AM. Interventional cardiology consult place, possible right heart cath today  10/14/2017- No acute events overnight patient states his back pain has been controlled adequately with his current medications. Plan to have patient evaluated by cardiology with angiography on Monday then NSGY to evaluate for surgical intervention.   10/15: awaiting cath tomorrow  10/16/2017- No acute events overnight, patient to have heart cath today, HD this morning.   10/17/2017- No acute events overnight patient tolerated left heart cath well. Heart cath showed RCA 75% stenosis with other assessed vessels irregular. NATHANIEL scores of 3. Per notes patient to undergo lumbar decompression on 10/19. Patient pain remains controlled with current medications.       Interval History: No acute events overnight, patient with no new  complaints this AM. Patient states that his back pain has been well controled.     Review of Systems   Constitutional: Negative for appetite change, chills, fatigue and fever.   HENT: Negative for congestion, ear pain, rhinorrhea and sore throat.    Eyes: Negative for pain and discharge.   Respiratory: Negative for cough, choking, chest tightness and shortness of breath.    Cardiovascular: Negative for chest pain, palpitations and leg swelling.   Gastrointestinal: Negative for abdominal pain, blood in stool, diarrhea, nausea, rectal pain and vomiting.   Endocrine: Negative for polyuria.   Genitourinary: Negative for decreased urine volume, difficulty urinating, dysuria, flank pain and hematuria.   Musculoskeletal: Positive for back pain. Negative for joint swelling and neck pain.   Skin: Negative for color change, pallor, rash and wound.   Neurological: Negative for dizziness, seizures, weakness and headaches.   Psychiatric/Behavioral: Negative for behavioral problems and confusion.     Objective:     Vital Signs (Most Recent):  Temp: 97.6 °F (36.4 °C) (10/17/17 1117)  Pulse: 75 (10/17/17 1148)  Resp: 16 (10/17/17 1117)  BP: 112/64 (10/17/17 1117)  SpO2: 99 % (10/17/17 1117) Vital Signs (24h Range):  Temp:  [96.5 °F (35.8 °C)-98.1 °F (36.7 °C)] 97.6 °F (36.4 °C)  Pulse:  [69-86] 75  Resp:  [16-20] 16  SpO2:  [94 %-99 %] 99 %  BP: (111-134)/(58-72) 112/64     Weight: 80 kg (176 lb 5.9 oz)  Body mass index is 27.68 kg/m².    Intake/Output Summary (Last 24 hours) at 10/17/17 1218  Last data filed at 10/17/17 0800   Gross per 24 hour   Intake              960 ml   Output             2100 ml   Net            -1140 ml      Physical Exam   Constitutional: He is oriented to person, place, and time. He appears well-developed and well-nourished.   Pt is alert orientated and conversing appropriately   HENT:   Head: Normocephalic.   Nose: Nose normal.   Mouth/Throat: Oropharynx is clear and moist.   Eyes: EOM are normal. Pupils  are equal, round, and reactive to light. No scleral icterus.   Neck: Neck supple. No JVD present. No tracheal deviation present. No thyromegaly present.   Cardiovascular: Normal rate.  Exam reveals no gallop and no friction rub.    Murmur (4/6 systolic) heard.  Irregularly irregular rhythm,  Vascular access site with no pain, erythema, bleeding    Pulmonary/Chest: Effort normal and breath sounds normal. No respiratory distress. He has no wheezes. He has no rales.   Abdominal: Soft. Bowel sounds are normal. He exhibits no distension and no mass. There is no tenderness. There is no rebound and no guarding.   Lymphadenopathy:     He has no cervical adenopathy.   Neurological: He is alert and oriented to person, place, and time. He exhibits normal muscle tone.   Skin: Skin is warm and dry. No rash noted. No erythema.   Psychiatric: He has a normal mood and affect. His behavior is normal.   Vitals reviewed.      Significant Labs:   BMP:   Recent Labs  Lab 10/17/17  0654         K 4.4      CO2 25   BUN 23   CREATININE 3.7*   CALCIUM 9.6     CBC:   Recent Labs  Lab 10/16/17  0901 10/17/17  0654 10/17/17  0951   WBC 8.32 7.28 7.26   HGB 9.8* 9.8* 9.6*   HCT 30.2* 31.0* 30.2*    208 198       Significant Imaging: I have reviewed all pertinent imaging results/findings within the past 24 hours.    Assessment/Plan:      * Lumbar stenosis    -patient has stated this has been chronic but has been losing bowel functions for the past several months.    -denies saddle paraesthesia  -Ct scan of lumbar spine showed moderate to severe acquired spinal stenosis and bilateral foraminal encroachment at L3-L4  -MRI consistent with lumbar stenosis in the L3-4. Refer to MRI result   -Neurosurgery consulted, given age and other co-morbidities. High risk for surgery.   -Fentanyl patch for pain management   -consulted PM& R  - Angiography 10/16, to be evaluated by NSGY possible lumbar decompression 10/19             Lumbar back pain with radiculopathy affecting right lower extremity    - pain currently well controlled  - will continue to follow           BRBPR (bright red blood per rectum)    - patients INR was supra therapeutic at OSH  - according to daughter has decreased  - CBC stable        Hypokalemia    - resolved at this time  - will continue to monitor           NSTEMI (non-ST elevated myocardial infarction)    -recent NSTEMI on 10/5/17  -treated medically  -continue aspirin, metoprolol, statin   -EKG with non specific ST changes and A.fib with troponin 0.399, down from OSH 1.030  -Cardiology following appreciate recs, attributed elevated troponin to AS and ESRD  -2D echo; concentric remodeling, normal left ventricular systolic function (EF 55-60%), mildly depressed right ventricular systolic function, biatrial enlargement, estimated PA systolic pressure is 43 mmHg, increased central venous pressure   - angiography 10/16        Paroxysmal atrial fibrillation    -heparin gtt following heart cath           Severe aortic stenosis    - cardiology consulted  - given type of stenosis patient will not benefit from TAVR or balloon angioplasty  - angiography prior to surgical intervention  - medications recommendations appreciated           ESRD (end stage renal disease) on dialysis    -HD on MWF  -has fistula on R arm  -inpatient consult to nephrology, appreciate help            Type 2 diabetes mellitus with chronic kidney disease on chronic dialysis, with long-term current use of insulin    - 18U qhs  - updated A1c pending  - diabetic, renal diet          Weakness generalized    - PT/OT ordered  - consult to social work for placement on discharge rehab vs SNF   - Will re-consult PMR after surgical intervention           Essential hypertension    - currently normotensive   - will continue to follow           Coronary artery disease involving native coronary artery of native heart without angina pectoris    - holding DAPT if  undergoing heart cath  - angiography 10/16          Hypercholesteremia    - continue home rosuvastatin           Chronic diastolic heart failure    - patient underwent cardiac catheterization 10/16 to assist in evaluation of possible surgical intervention for spinal stenosis  - cardiac echo 10/12;    Concentric remodeling.    Normal left ventricular systolic function (EF 55-60%).    Mildly depressed right ventricular systolic function .     Biatrial enlargement.     Pulmonary hypertension. The estimated PA systolic pressure is 43 mmHg.      Increased central venous pressure.              VTE Risk Mitigation         Ordered     heparin 25,000 units in dextrose 5% 250 mL (100 units/mL) infusion  Continuous     Route:  Intravenous        10/17/17 0811     heparin 25,000 units in dextrose 5% 250 mL (100 units/mL) infusion  Continuous     Route:  Intravenous        10/14/17 0701     heparin 25,000 units in dextrose 5% 250 mL (100 units/mL) bolus from bag; ADDITIONAL PRN BOLUS  As needed (PRN)     Route:  Intravenous        10/14/17 0701     heparin 25,000 units in dextrose 5% 250 mL (100 units/mL) bolus from bag; ADDITIONAL PRN BOLUS  As needed (PRN)     Route:  Intravenous        10/14/17 0701     Medium Risk of VTE  Once      10/11/17 0006              Edgar Richard MD  Department of Hospital Medicine   [facility]

## 2017-10-18 NOTE — PROGRESS NOTES
HD treatment started. No complications with access to right lower arm. Lines secured and telemetry in place.

## 2017-10-18 NOTE — PT/OT/SLP PROGRESS
"Speech Language Pathology      Donta Cline  MRN: [id number]   1146/1146 A    Patient not seen today secondary to ONOFRE for Dialysis in AM and refusal in PM despite max encouragement from SLP. Confusion evident. Mr. Cline stated, "Whats the point? My daughter abandoned me." Pt threatening to pull out central line stating ,"I will bleed to death before anyone can do anything about it." SLP had to actively pull pt's hand away from neck x3. Attempted distraction and initiation of swallowing exercises, however, pt perseverating on daughter "abandoning him at a mortuary" yesterday and at dialysis today. Daughter entered room, and SLP left room to inform RN. RN and charge RN informed of pt threatening to pull central line. Charge RN entered pt's room. Will follow-up next treatment date.    DUGLAS Strange, CCC-SLP   Pager: [phone]  10/18/2017      "

## 2017-10-18 NOTE — PROGRESS NOTES
[facility]  Nephrology  Progress Note    Patient Name: Donta Cline  MRN: [id number]  Admission Date: 10/10/2017  Hospital Length of Stay: 8 days  Attending Provider: Lior Cutler MD   Primary Care Physician: ZAID Richardson Iii, MD  Principal Problem:Lumbar stenosis    Subjective:     HPI: Theron Cline is a 85 year old white male with past medical history of bladder CA in which he is in remission (was treated with chemotherapy), CAD, A-fib (treated with warfarin) and ESRD on Hd. Patient was transfered from [facility] where he presented with worsening right leg and back pain, general weakness. In hospital presented with increase troponin levels, diagnosed with NSTEMI, increase in INR where was treated with FFP and Vit K, flash pulmonary edema, started on Bi-PAP and aspirated pneumonia (treated with Zosyn). 2 D echo revealed severe aortic stenosis with preserved EF and Cardiology evaluated patient and did not feel LHC warranted at this time but recommended outpatient follow-up with his regular Cardiologist and likely need for outpatient LHC to evaluate his aortic stenosis and coronary anatomy. Additionally, pt was evaluated by NSx who felt he was not a candidate for surgery. Patient was seen and evaluated by Neurosurgery at [place] (Dr. James) who noted Ct scan of lumbar spine showed moderate to severe acquired spinal stenosis and bilateral foraminal encroachment at L3-L4. Patient is here for second opionion from [facility].    Consulted for dialysis treatment: iHD MWF, dialyzed in [facility], Rt lower arm AVF, duration 3.5, followed by Dr. Mathur, EDW 90 kg and had his last dialysis on Monday.     Interval History:   Patient evaluated at bedside in [facility], hemodynamically stable, no respiratory distress, has been complaining of right hip pain.     Review of patient's allergies indicates:   Allergen Reactions    Darvocet a500  [propoxyphene n-acetaminophen]      Other  reaction(s): Unknown    Morphine      Other reaction(s): Unknown     Current Facility-Administered Medications   Medication Frequency    0.9%  NaCl infusion Once    0.9%  NaCl infusion PRN    0.9%  NaCl infusion Once    aspirin chewable tablet 81 mg Every Mon, Wed, Fri    dextrose 50% injection 12.5 g PRN    dextrose 50% injection 25 g PRN    diphenhydrAMINE capsule 50 mg On Call Procedure    fentanyl 12 mcg/hr 1 patch Q72H    glucagon (human recombinant) injection 1 mg PRN    glucose chewable tablet 16 g PRN    glucose chewable tablet 24 g PRN    heparin 25,000 units in dextrose 5% 250 mL (100 units/mL) bolus from bag; ADDITIONAL PRN BOLUS PRN    heparin 25,000 units in dextrose 5% 250 mL (100 units/mL) bolus from bag; ADDITIONAL PRN BOLUS PRN    heparin 25,000 units in dextrose 5% 250 mL (100 units/mL) infusion Continuous    hydrocodone-acetaminophen 10-325mg per tablet 1 tablet Q6H PRN    insulin aspart pen 0-5 Units QID (AC + HS) PRN    insulin detemir pen 18 Units QHS    metoprolol succinate 24 hr tablet 150 mg Daily    nitroGLYCERIN SL tablet 0.3 mg Q5 Min PRN    pantoprazole EC tablet 40 mg Nightly    ramelteon tablet 8 mg Nightly PRN    rosuvastatin tablet 40 mg QHS    senna-docusate 8.6-50 mg per tablet 1 tablet BID    sertraline tablet 100 mg QHS    vit b cmplx 3-fa-vit c-biotin 1- mg-mg-mcg per tablet 1 tablet Daily       Objective:     Vital Signs (Most Recent):  Temp: 97.7 °F (36.5 °C) (10/18/17 0814)  Pulse: 83 (10/18/17 0814)  Resp: 20 (10/18/17 0814)  BP: 136/85 (10/18/17 0814)  SpO2: 95 % (10/18/17 0814)  O2 Device (Oxygen Therapy): room air (10/17/17 2135) Vital Signs (24h Range):  Temp:  [96.6 °F (35.9 °C)-97.9 °F (36.6 °C)] 97.7 °F (36.5 °C)  Pulse:  [69-88] 83  Resp:  [15-20] 20  SpO2:  [95 %-99 %] 95 %  BP: (112-156)/(58-89) 136/85     Weight: 80 kg (176 lb 5.9 oz) (10/17/17 1300)  Body mass index is 28.47 kg/m².  Body surface area is 1.93 meters squared.    I/O  last 3 completed shifts:  In: 1011 [P.O.:960; I.V.:51]  Out: 51 [Urine:50; Stool:1]    Physical Exam   Constitutional: He is oriented to person, place, and time. He appears well-developed and well-nourished.   HENT:   Head: Normocephalic.   Nose: Nose normal.   Mouth/Throat: Oropharynx is clear and moist.   Eyes: No scleral icterus.   Neck: Neck supple. No JVD present. No tracheal deviation present. No thyromegaly present.   Cardiovascular: Normal rate, regular rhythm and normal heart sounds.  Exam reveals no gallop and no friction rub.    Pulmonary/Chest: Effort normal and breath sounds normal. No respiratory distress. He has no wheezes. He has no rales.   Abdominal: Soft. Bowel sounds are normal. He exhibits no distension and no mass. There is no tenderness. There is no rebound and no guarding.   Musculoskeletal: He exhibits no edema.   Right forearm AVF with good thrill and no bruits   Lymphadenopathy:     He has no cervical adenopathy.   Neurological: He is alert and oriented to person, place, and time. He exhibits normal muscle tone.   Negative asterixis    Skin: Skin is warm and dry. No rash noted. No erythema.   Vitals reviewed.      Significant Labs:  ABGs: No results for input(s): PH, PCO2, HCO3, POCSATURATED, BE in the last 168 hours.  BMP:   Recent Labs  Lab 10/18/17  0523   GLU 98      CO2 23   BUN 35*   CREATININE 5.5*   CALCIUM 9.6     CBC:   Recent Labs  Lab 10/18/17  0523   WBC 6.79   RBC 2.77*   HGB 9.4*   HCT 29.4*      *   MCH 33.9*   MCHC 32.0     CMP:   Recent Labs  Lab 10/18/17  0523   GLU 98   CALCIUM 9.6   ALBUMIN 2.5*   PROT 6.2      K 4.4   CO2 23      BUN 35*   CREATININE 5.5*   ALKPHOS 107   ALT 23   AST 25   BILITOT 0.5     All labs within the past 24 hours have been reviewed.       Assessment/Plan:     ESRD (end stage renal disease) on dialysis    Theron Cline is a 85 year old white male with past medical history of bladder CA in which he is in  remission (was treated with chemotherapy), CAD, A-fib (treated with warfarin) and ESRD on Hd. Patient was transfered from [facility] where he presented with worsening right leg and back pain, general weakness. In hospital presented with increase troponin levels, diagnosed with NSTEMI, increase in INR where was treated with FFP and Vit K, flash pulmonary edema, started on Bi-PAP and aspirated pneumonia (treated with Zosyn).     Consulted for dialysis treatment: iHD MWF, dialyzed in [facility], Rt lower arm AVF, duration 3.5, followed by Dr. Mathur, EDW 90 kg and had his last dialysis on Monday.     - Patient will continue with dialysis treatment today for removal of toxins and excess fluid, Goal of 3 hrs with a UF of 1 L as tolerated by patient, maintain MAP>65, bath will be adjusted to chemistry results.   - Anemia (Hgb 9.4) with controlled blood pressures 136/85 mmHg, May start on EPO, No IV iron since patient with current infection  - Continue with current diet renal diet (low sodium , low phosphate, low potassium)  - Continue with rosuvastatin.            Vince Moreira  Nephrology  Fellow  [facility]    Pager [phone]  I have reviewed and concur with the fellow's hemodialysis prescription plan. I have personally interviewed and examined the patient at bedside during hemodialysis session.

## 2017-10-18 NOTE — SUBJECTIVE & OBJECTIVE
Interval History: Patient delirious overnight, spoke with daughter about prevention of delirium. Delirium precautions in place. Plan to speak to NSGY about possible surgical intervention and plan.     Review of Systems   Constitutional: Negative for appetite change, chills, fatigue and fever.   HENT: Negative for congestion, ear pain, rhinorrhea and sore throat.    Eyes: Negative for pain and discharge.   Respiratory: Negative for cough, choking, chest tightness and shortness of breath.    Cardiovascular: Negative for chest pain, palpitations and leg swelling.   Gastrointestinal: Negative for abdominal pain, blood in stool, diarrhea, nausea, rectal pain and vomiting.   Endocrine: Negative for polyuria.   Genitourinary: Negative for decreased urine volume, difficulty urinating, dysuria, flank pain and hematuria.   Musculoskeletal: Positive for back pain. Negative for joint swelling and neck pain.   Skin: Negative for color change, pallor, rash and wound.   Neurological: Negative for dizziness, seizures, weakness and headaches.   Psychiatric/Behavioral: Negative for behavioral problems and confusion.     Objective:     Vital Signs (Most Recent):  Temp: 97 °F (36.1 °C) (10/18/17 0915)  Pulse: 70 (10/18/17 1045)  Resp: 18 (10/18/17 1045)  BP: (!) 133/49 (10/18/17 1045)  SpO2: 95 % (10/18/17 0814) Vital Signs (24h Range):  Temp:  [96.6 °F (35.9 °C)-97.9 °F (36.6 °C)] 97 °F (36.1 °C)  Pulse:  [63-88] 70  Resp:  [15-20] 18  SpO2:  [95 %-99 %] 95 %  BP: (110-156)/(47-89) 133/49     Weight: 80 kg (176 lb 5.9 oz)  Body mass index is 28.47 kg/m².    Intake/Output Summary (Last 24 hours) at 10/18/17 1056  Last data filed at 10/18/17 0600   Gross per 24 hour   Intake              651 ml   Output               51 ml   Net              600 ml      Physical Exam   Constitutional: He is oriented to person, place, and time. He appears well-developed and well-nourished.   Pt is alert orientated and conversing appropriately   HENT:    Head: Normocephalic.   Nose: Nose normal.   Mouth/Throat: Oropharynx is clear and moist.   Eyes: EOM are normal. Pupils are equal, round, and reactive to light. No scleral icterus.   Neck: Neck supple. No JVD present. No tracheal deviation present. No thyromegaly present.   Cardiovascular: Normal rate.  Exam reveals no gallop and no friction rub.    Murmur (4/6 systolic) heard.  Irregularly irregular rhythm,  Vascular access site with no pain, erythema, bleeding    Pulmonary/Chest: Effort normal and breath sounds normal. No respiratory distress. He has no wheezes. He has no rales.   Abdominal: Soft. Bowel sounds are normal. He exhibits no distension and no mass. There is no tenderness. There is no rebound and no guarding.   Lymphadenopathy:     He has no cervical adenopathy.   Neurological: He is alert and oriented to person, place, and time. He exhibits normal muscle tone.   Skin: Skin is warm and dry. No rash noted. No erythema.   Psychiatric: He has a normal mood and affect. His behavior is normal.   Vitals reviewed.      Significant Labs:   BMP:   Recent Labs  Lab 10/18/17  0523   GLU 98      K 4.4      CO2 23   BUN 35*   CREATININE 5.5*   CALCIUM 9.6     CBC:   Recent Labs  Lab 10/17/17  0654 10/17/17  0951 10/18/17  0523   WBC 7.28 7.26 6.79   HGB 9.8* 9.6* 9.4*   HCT 31.0* 30.2* 29.4*    198 198       Significant Imaging: I have reviewed all pertinent imaging results/findings within the past 24 hours.

## 2017-10-18 NOTE — PROGRESS NOTES
[facility] Medicine  Progress Note    Patient Name: Donta Cline  MRN: [id number]  Patient Class: IP- Inpatient   Admission Date: 10/10/2017  Length of Stay: 8 days  Attending Physician: Lior Cutler MD  Primary Care Provider: ZAID Richardson Iii, MD    [facility] Medicine Team: [facility] MED 1 Edgar Richard MD    Subjective:     Principal Problem:Lumbar stenosis    HPI:  Mr. 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    Patient was originally seen at [facility] on 10/5/17 due to worsening low back pain and inability to ambulate with decline in function over past 5 weeks. There, patient was noted to have elevated troponin so transferred to [facility] for Cardiology evaluation. Patient was diagnosed with NSTEMI and treated medically. Patient was found to have elevated INR and no stents could be placed.  His INR was attempted to be revered with 4U of FFP and vit K but patient went into flash pulm edema.  At this time, 2 D echo revealed severe aortic stenosis with preserved EF and Cardiology evaluated patient and did not feel [facility] warranted at this time but recommended outpatient follow-up with his regular Cardiologist and likely need for outpatient [facility] to evaluate his aortic stenosis and coronary anatomy.     During this admission, patient was having delirium and developed aspiration PNA (placed on IV Zosyn). Additionally, pt was evaluated by NSx who felt he  was not a candidate for surgery. Patient was seen and evaluated by Neurosurgery at [place] (Dr. James) who noted Ct scan of lumbar spine showed moderate to severe acquired spinal stenosis and bilateral foraminal encroachment at L3-L4. Patient is here for second opionion from NSx.    Hospital Course:  10/12: Patient still in pain, fentanyl patch ordered, consulted PM&R. Surgical intervention not indicated per NSx. However, family strongly leaning towards surgical intervention and have unrealistic expectation. If surgical intervention, patient needs LHC and full cardiac work up. Given his recent NSTEMI and severe AS along with other co-morbities, patient is at high risk for surgery. Patient also with nausea and heart burn this morning. MBSS today, aspiration risk with honey thick liquids, speech recommended dental soft diet now.   10/13: Patient with no complaints of pain this AM. Patient has been assessed by cardiology who request angiogram before any surgical intervention. Patient with low flow low gradient aortic stenosis and would not benefit from TAVR or balloon valvuloplasty per cardiology. Patient in HD this AM. Interventional cardiology consult place, possible right heart cath today  10/14/2017- No acute events overnight patient states his back pain has been controlled adequately with his current medications. Plan to have patient evaluated by cardiology with angiography on Monday then NSGY to evaluate for surgical intervention.   10/15: awaiting cath tomorrow  10/16/2017- No acute events overnight, patient to have heart cath today, HD this morning.   10/17/2017- No acute events overnight patient tolerated left heart cath well. Heart cath showed RCA 75% stenosis with other assessed vessels irregular. NATHANIEL scores of 3. Per notes patient to undergo lumbar decompression on 10/19. Patient pain remains controlled with current medications.   10/18/2017- Patient had episode of delirium overnight per daughter.  Patient attempting to call 911 and leave hospital. Delirium precautions in place, educated patient's daughter on importance of keeping blinds up during day, awake during day, reorientation, etc. Family states that they spoke with Dr. Cervantes yesterday in regards to possible surgical procedure tomorrow. Will contact to see what their plan is.       Interval History: Patient delirious overnight, spoke with daughter about prevention of delirium. Delirium precautions in place. Plan to speak to NSGY about possible surgical intervention and plan.     Review of Systems   Constitutional: Negative for appetite change, chills, fatigue and fever.   HENT: Negative for congestion, ear pain, rhinorrhea and sore throat.    Eyes: Negative for pain and discharge.   Respiratory: Negative for cough, choking, chest tightness and shortness of breath.    Cardiovascular: Negative for chest pain, palpitations and leg swelling.   Gastrointestinal: Negative for abdominal pain, blood in stool, diarrhea, nausea, rectal pain and vomiting.   Endocrine: Negative for polyuria.   Genitourinary: Negative for decreased urine volume, difficulty urinating, dysuria, flank pain and hematuria.   Musculoskeletal: Positive for back pain. Negative for joint swelling and neck pain.   Skin: Negative for color change, pallor, rash and wound.   Neurological: Negative for dizziness, seizures, weakness and headaches.   Psychiatric/Behavioral: Negative for behavioral problems and confusion.     Objective:     Vital Signs (Most Recent):  Temp: 97 °F (36.1 °C) (10/18/17 0915)  Pulse: 70 (10/18/17 1045)  Resp: 18 (10/18/17 1045)  BP: (!) 133/49 (10/18/17 1045)  SpO2: 95 % (10/18/17 0814) Vital Signs (24h Range):  Temp:  [96.6 °F (35.9 °C)-97.9 °F (36.6 °C)] 97 °F (36.1 °C)  Pulse:  [63-88] 70  Resp:  [15-20] 18  SpO2:  [95 %-99 %] 95 %  BP: (110-156)/(47-89) 133/49     Weight: 80 kg (176 lb 5.9 oz)  Body mass index is 28.47 kg/m².    Intake/Output Summary (Last 24 hours)  at 10/18/17 1056  Last data filed at 10/18/17 0600   Gross per 24 hour   Intake              651 ml   Output               51 ml   Net              600 ml      Physical Exam   Constitutional: He is oriented to person, place, and time. He appears well-developed and well-nourished.   Pt is alert orientated and conversing appropriately   HENT:   Head: Normocephalic.   Nose: Nose normal.   Mouth/Throat: Oropharynx is clear and moist.   Eyes: EOM are normal. Pupils are equal, round, and reactive to light. No scleral icterus.   Neck: Neck supple. No JVD present. No tracheal deviation present. No thyromegaly present.   Cardiovascular: Normal rate.  Exam reveals no gallop and no friction rub.    Murmur (4/6 systolic) heard.  Irregularly irregular rhythm,  Vascular access site with no pain, erythema, bleeding    Pulmonary/Chest: Effort normal and breath sounds normal. No respiratory distress. He has no wheezes. He has no rales.   Abdominal: Soft. Bowel sounds are normal. He exhibits no distension and no mass. There is no tenderness. There is no rebound and no guarding.   Lymphadenopathy:     He has no cervical adenopathy.   Neurological: He is alert and oriented to person, place, and time. He exhibits normal muscle tone.   Skin: Skin is warm and dry. No rash noted. No erythema.   Psychiatric: He has a normal mood and affect. His behavior is normal.   Vitals reviewed.      Significant Labs:   BMP:   Recent Labs  Lab 10/18/17  0523   GLU 98      K 4.4      CO2 23   BUN 35*   CREATININE 5.5*   CALCIUM 9.6     CBC:   Recent Labs  Lab 10/17/17  0654 10/17/17  0951 10/18/17  0523   WBC 7.28 7.26 6.79   HGB 9.8* 9.6* 9.4*   HCT 31.0* 30.2* 29.4*    198 198       Significant Imaging: I have reviewed all pertinent imaging results/findings within the past 24 hours.    Assessment/Plan:      * Lumbar stenosis    -patient has stated this has been chronic but has been losing bowel functions for the past several months.     -denies saddle paraesthesia  -Ct scan of lumbar spine showed moderate to severe acquired spinal stenosis and bilateral foraminal encroachment at L3-L4  -MRI consistent with lumbar stenosis in the L3-4. Refer to MRI result   -Neurosurgery consulted, given age and other co-morbidities. High risk for surgery.   -Fentanyl patch for pain management   -consulted PM& R  - Angiography 10/16, to be evaluated by NSGY possible lumbar decompression 10/19            Lumbar back pain with radiculopathy affecting right lower extremity    - pain currently well controlled  - will continue to follow           BRBPR (bright red blood per rectum)    - patients INR was supra therapeutic at OSH  - according to daughter has decreased  - CBC stable        Hypokalemia    - resolved at this time  - will continue to monitor           NSTEMI (non-ST elevated myocardial infarction)    -recent NSTEMI on 10/5/17  -treated medically  -continue aspirin, metoprolol, statin   -EKG with non specific ST changes and A.fib with troponin 0.399, down from OSH 1.030  -Cardiology following appreciate recs, attributed elevated troponin to AS and ESRD  -2D echo; concentric remodeling, normal left ventricular systolic function (EF 55-60%), mildly depressed right ventricular systolic function, biatrial enlargement, estimated PA systolic pressure is 43 mmHg, increased central venous pressure   - angiography 10/16        Paroxysmal atrial fibrillation    -heparin gtt following heart cath   - will discontinue 4 hours prior to any surgical intervention           Severe aortic stenosis    - cardiology consulted  - given type of stenosis patient will not benefit from TAVR or balloon angioplasty  - angiography prior to surgical intervention  - medication recommendations appreciated           ESRD (end stage renal disease) on dialysis    -HD on MWF  -has fistula on R arm  -inpatient consult to nephrology, appreciate help            Type 2 diabetes mellitus with  chronic kidney disease on chronic dialysis, with long-term current use of insulin    - 18U qhs  - updated A1c pending  - diabetic, renal diet          Weakness generalized    - PT/OT ordered  - consult to social work for placement on discharge rehab vs SNF   - Will re-consult PMR after surgical intervention           Essential hypertension    - currently normotensive   - will continue to follow           Coronary artery disease involving native coronary artery of native heart without angina pectoris    - angiography 10/16          Hypercholesteremia    - changed rosuvastatin to atorvastatin           Chronic diastolic heart failure    - patient underwent cardiac catheterization 10/16 to assist in evaluation of possible surgical intervention for spinal stenosis  - cardiac echo 10/12;    Concentric remodeling.    Normal left ventricular systolic function (EF 55-60%).    Mildly depressed right ventricular systolic function .     Biatrial enlargement.     Pulmonary hypertension. The estimated PA systolic pressure is 43 mmHg.      Increased central venous pressure.              VTE Risk Mitigation         Ordered     heparin 25,000 units in dextrose 5% 250 mL (100 units/mL) infusion  Continuous     Route:  Intravenous        10/17/17 0811     heparin 25,000 units in dextrose 5% 250 mL (100 units/mL) infusion  Continuous     Route:  Intravenous        10/14/17 0701     heparin 25,000 units in dextrose 5% 250 mL (100 units/mL) bolus from bag; ADDITIONAL PRN BOLUS  As needed (PRN)     Route:  Intravenous        10/14/17 0701     heparin 25,000 units in dextrose 5% 250 mL (100 units/mL) bolus from bag; ADDITIONAL PRN BOLUS  As needed (PRN)     Route:  Intravenous        10/14/17 0701     Medium Risk of VTE  Once      10/11/17 0006              Edgar Richard MD  Department of Hospital Medicine   [facility]

## 2017-10-18 NOTE — SUBJECTIVE & OBJECTIVE
Interval History:   Patient evaluated at bedside in [facility], hemodynamically stable, no respiratory distress, has been complaining of right hip pain.     Review of patient's allergies indicates:   Allergen Reactions    Darvocet a500  [propoxyphene n-acetaminophen]      Other reaction(s): Unknown    Morphine      Other reaction(s): Unknown     Current Facility-Administered Medications   Medication Frequency    0.9%  NaCl infusion Once    0.9%  NaCl infusion PRN    0.9%  NaCl infusion Once    aspirin chewable tablet 81 mg Every Mon, Wed, Fri    dextrose 50% injection 12.5 g PRN    dextrose 50% injection 25 g PRN    diphenhydrAMINE capsule 50 mg On Call Procedure    fentanyl 12 mcg/hr 1 patch Q72H    glucagon (human recombinant) injection 1 mg PRN    glucose chewable tablet 16 g PRN    glucose chewable tablet 24 g PRN    heparin 25,000 units in dextrose 5% 250 mL (100 units/mL) bolus from bag; ADDITIONAL PRN BOLUS PRN    heparin 25,000 units in dextrose 5% 250 mL (100 units/mL) bolus from bag; ADDITIONAL PRN BOLUS PRN    heparin 25,000 units in dextrose 5% 250 mL (100 units/mL) infusion Continuous    hydrocodone-acetaminophen 10-325mg per tablet 1 tablet Q6H PRN    insulin aspart pen 0-5 Units QID (AC + HS) PRN    insulin detemir pen 18 Units QHS    metoprolol succinate 24 hr tablet 150 mg Daily    nitroGLYCERIN SL tablet 0.3 mg Q5 Min PRN    pantoprazole EC tablet 40 mg Nightly    ramelteon tablet 8 mg Nightly PRN    rosuvastatin tablet 40 mg QHS    senna-docusate 8.6-50 mg per tablet 1 tablet BID    sertraline tablet 100 mg QHS    vit b cmplx 3-fa-vit c-biotin 1- mg-mg-mcg per tablet 1 tablet Daily       Objective:     Vital Signs (Most Recent):  Temp: 97.7 °F (36.5 °C) (10/18/17 0814)  Pulse: 83 (10/18/17 0814)  Resp: 20 (10/18/17 0814)  BP: 136/85 (10/18/17 0814)  SpO2: 95 % (10/18/17 0814)  O2 Device (Oxygen Therapy): room air (10/17/17 2135) Vital Signs (24h Range):  Temp:  [96.6 °F  (35.9 °C)-97.9 °F (36.6 °C)] 97.7 °F (36.5 °C)  Pulse:  [69-88] 83  Resp:  [15-20] 20  SpO2:  [95 %-99 %] 95 %  BP: (112-156)/(58-89) 136/85     Weight: 80 kg (176 lb 5.9 oz) (10/17/17 1300)  Body mass index is 28.47 kg/m².  Body surface area is 1.93 meters squared.    I/O last 3 completed shifts:  In: 1011 [P.O.:960; I.V.:51]  Out: 51 [Urine:50; Stool:1]    Physical Exam   Constitutional: He is oriented to person, place, and time. He appears well-developed and well-nourished.   HENT:   Head: Normocephalic.   Nose: Nose normal.   Mouth/Throat: Oropharynx is clear and moist.   Eyes: No scleral icterus.   Neck: Neck supple. No JVD present. No tracheal deviation present. No thyromegaly present.   Cardiovascular: Normal rate, regular rhythm and normal heart sounds.  Exam reveals no gallop and no friction rub.    Pulmonary/Chest: Effort normal and breath sounds normal. No respiratory distress. He has no wheezes. He has no rales.   Abdominal: Soft. Bowel sounds are normal. He exhibits no distension and no mass. There is no tenderness. There is no rebound and no guarding.   Musculoskeletal: He exhibits no edema.   Right forearm AVF with good thrill and no bruits   Lymphadenopathy:     He has no cervical adenopathy.   Neurological: He is alert and oriented to person, place, and time. He exhibits normal muscle tone.   Negative asterixis    Skin: Skin is warm and dry. No rash noted. No erythema.   Vitals reviewed.      Significant Labs:  ABGs: No results for input(s): PH, PCO2, HCO3, POCSATURATED, BE in the last 168 hours.  BMP:   Recent Labs  Lab 10/18/17  0523   GLU 98      CO2 23   BUN 35*   CREATININE 5.5*   CALCIUM 9.6     CBC:   Recent Labs  Lab 10/18/17  0523   WBC 6.79   RBC 2.77*   HGB 9.4*   HCT 29.4*      *   MCH 33.9*   MCHC 32.0     CMP:   Recent Labs  Lab 10/18/17  0523   GLU 98   CALCIUM 9.6   ALBUMIN 2.5*   PROT 6.2      K 4.4   CO2 23      BUN 35*   CREATININE 5.5*   ALKPHOS  107   ALT 23   AST 25   BILITOT 0.5     All labs within the past 24 hours have been reviewed.

## 2017-10-18 NOTE — ANESTHESIA PREPROCEDURE EVALUATION
[facility]  Anesthesia Pre-Operative Evaluation         Patient Name: Donta Cline  YOB: 1932  MRN: [id number]    SUBJECTIVE:     Pre-operative evaluation for Procedure(s) (LRB):  LAMINECTOMY-MINIMALLY INVASIVE--L3-4 lami and decompression, neuromonitoring (N/A)     10/18/2017    Donta Cline is a 85 y.o. 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    Pt was evaluated by Cardiology and underwent cardiac cath 10/16     pre-op assessment as follows  Pt seen and examined at bedside  Increase toprol xl to 150mg po daily  May proceed to neurosurgery without further cardiac intervention  RCRI 0.9% risk of perioperative cardiac event  Elective PCI of RCA lesion as o/p to be arranged on cardiology clinic follow up  Medically optimized, continue current meds  Hold heparin 4 hours prior to surgery  Transition to coumadin when cleared from neuro standpoint  Cardiology will sign off    Cath report 10/16/17  Patient has a right dominant coronary artery.        - Left Main Coronary Artery:             The LM has luminal irregularities. There is NATHANIEL 3 flow.     - Left Anterior Descending Artery:             The LAD has luminal irregularities. There is NATHANIEL 3 flow.     - Left Circumflex Artery:             The LCX has luminal irregularities. There is NATHANIEL 3 flow.     - Right Coronary Artery:             The ostial RCA has a 75% stenosis. There is NATHANIEL 3 flow.             The mid RCA has a 50% stenosis. There is NATHANIEL 3 flow.     - Common Femoral Artery:             The right CFA is  normal.    LDA:   CVC L subclavian     Prev airway: None documented.     Drips:    heparin (porcine) in D5W 15 Units/kg/hr (10/18/17 5176)         Patient Active Problem List   Diagnosis    Chronic diastolic heart failure    Hypercholesteremia    Coronary artery disease involving native coronary artery of native heart without angina pectoris    Essential hypertension    Generalized weakness    Uncontrolled hypertension    Weakness generalized    Type 2 diabetes mellitus with chronic kidney disease on chronic dialysis, with long-term current use of insulin    ESRD (end stage renal disease) on dialysis    Severe aortic stenosis    Paroxysmal atrial fibrillation    Long-term (current) use of anticoagulants    Chronic hip pain, right    NSTEMI (non-ST elevated myocardial infarction)    Coumadin toxicity    Hypokalemia    Urinary tract infection    Lumbar stenosis    BRBPR (bright red blood per rectum)    Lumbar back pain with radiculopathy affecting right lower extremity    Elevated troponin    Aspiration pneumonia    Spinal stenosis of lumbar region without neurogenic claudication       Review of patient's allergies indicates:   Allergen Reactions    Darvocet a500  [propoxyphene n-acetaminophen]      Other reaction(s): Unknown    Morphine      Other reaction(s): Unknown       Current Inpatient Medications:   sodium chloride 0.9%   Intravenous Once    sodium chloride 0.9%   Intravenous Once    aspirin  81 mg Oral Every Mon, Wed, Fri    fentanyl  1 patch Transdermal Q72H    insulin detemir  18 Units Subcutaneous QHS    metoprolol succinate  150 mg Oral Daily    pantoprazole  40 mg Oral Nightly    rosuvastatin  40 mg Oral QHS    senna-docusate 8.6-50 mg  1 tablet Oral BID    sertraline  100 mg Oral QHS    vit b cmplx 3-fa-vit c-biotin 1- mg-mg-mcg  1 tablet Oral Daily       No current facility-administered medications on file prior to encounter.      Current Outpatient  Prescriptions on File Prior to Encounter   Medication Sig Dispense Refill    alprazolam (XANAX) 0.25 MG tablet Take 0.25 mg by mouth daily as needed for Anxiety.   0    aspirin (ECOTRIN) 81 MG EC tablet 3 days a week (Patient taking differently: Take 81 mg by mouth every Mon, Wed, Fri. )  0    coenzyme Q10 200 mg capsule Take 200 mg by mouth once daily.      hydrocodone-acetaminophen 10-325mg (NORCO)  mg Tab Take 1 tablet by mouth every 6 (six) hours as needed for Pain.   0    insulin glargine (LANTUS) 100 unit/mL injection Inject 20 Units into the skin At bedtime.       metoprolol succinate (TOPROL-XL) 50 MG 24 hr tablet take 1 tablet by mouth once daily (Patient taking differently: Take one tablet by mouth daily on Monday, Wednesday and Friday) 30 tablet 11    NITROSTAT 0.4 mg SL tablet place 1 tablet under the tongue if needed every 5 minutes for chest pain for 3 doses IF NO RELIEF AFTER 3RD DOSE CALL PRESCRIBER . 100 tablet 6    pantoprazole (PROTONIX) 40 MG tablet Take 1 tablet by mouth nightly.  0    ANKIT-AZ RX 1- mg-mg-mcg Tab Take 1 tablet by mouth once daily.  0    rosuvastatin (CRESTOR) 40 MG Tab Take 1/2 a tab a day (Patient taking differently: Take 40 mg by mouth every evening. ) 30 tablet 6    sertraline (ZOLOFT) 100 MG tablet Take 1 tablet by mouth every evening.  0    warfarin (COUMADIN) 4 MG tablet take 1 tablet by mouth once daily 35 tablet 3       Past Surgical History:   Procedure Laterality Date    BACK SURGERY      laminectomy x2    COLON SURGERY      EXTENSIVE PROSTATE SURGER      Prostatectomy, Radical    JOINT REPLACEMENT      left hip       Social History     Social History    Marital status:      Spouse name: N/A    Number of children: N/A    Years of education: N/A     Occupational History    Not on file.     Social History Main Topics    Smoking status: Former Smoker     Packs/day: 3.00     Years: 40.00     Quit date: 1/1/1980    Smokeless  tobacco: Former User    Alcohol use No      Comment: a few drinks    Drug use: Unknown    Sexual activity: Not on file     Other Topics Concern    Not on file     Social History Narrative    No narrative on file       OBJECTIVE:     Vital Signs Range (Last 24H):  Temp:  [35.9 °C (96.6 °F)-36.6 °C (97.9 °F)]   Pulse:  [69-88]   Resp:  [15-20]   BP: (112-156)/(58-89)   SpO2:  [95 %-99 %]       CBC:   Recent Labs      10/17/17   0951  10/18/17   0523   WBC  7.26  6.79   RBC  2.85*  2.77*   HGB  9.6*  9.4*   HCT  30.2*  29.4*   PLT  198  198   MCV  106*  106*   MCH  33.7*  33.9*   MCHC  31.8*  32.0       CMP:   Recent Labs      10/17/17   0654  10/18/17   0523   NA  137  137   K  4.4  4.4   CL  101  100   CO2  25  23   BUN  23  35*   CREATININE  3.7*  5.5*   GLU  103  98   CALCIUM  9.6  9.6   ALBUMIN  2.7*  2.5*   PROT  6.4  6.2   ALKPHOS  100  107   ALT  28  23   AST  26  25   BILITOT  0.6  0.5       INR:  No results for input(s): INR, PROTIME, APTT in the last 72 hours.    Invalid input(s): PT    Diagnostic Studies: No relevant studies.    EKG:   10/11/17  Atrial fibrillation  Nonspecific ST and T wave abnormality  Abnormal ECG  When compared with ECG of 04-OCT-2017 13:00,  Premature ventricular complexes are no longer present    2D ECHO:  Results for orders placed or performed during the hospital encounter of 10/10/17   2D echo with color flow doppler   Result Value Ref Range    EF 55 55 - 65    Aortic Valve Regurgitation TRIVIAL     Est. PA Systolic Pressure 43.09 (A)     Tricuspid Valve Regurgitation TRIVIAL      Cath report 10/16/17  Patient has a right dominant coronary artery.        - Left Main Coronary Artery:             The LM has luminal irregularities. There is NATHANIEL 3 flow.     - Left Anterior Descending Artery:             The LAD has luminal irregularities. There is NATHANIEL 3 flow.     - Left Circumflex Artery:             The LCX has luminal irregularities. There is NATHANIEL 3 flow.     - Right Coronary  Artery:             The ostial RCA has a 75% stenosis. There is NATHANIEL 3 flow.             The mid RCA has a 50% stenosis. There is NATHANIEL 3 flow.     - Common Femoral Artery:             The right CFA is normal.    ASSESSMENT/PLAN:         Anesthesia Evaluation    I have reviewed the Patient Summary Reports.    I have reviewed the Nursing Notes.   I have reviewed the Medications.     Review of Systems  Anesthesia Hx:  History of prior surgery of interest to airway management or planning: Denies Family Hx of Anesthesia complications.   Denies Personal Hx of Anesthesia complications.   Hematology/Oncology:         -- Anemia (H/H 9/30): Current/Recent Cancer. (bladder)   EENT/Dental:EENT/Dental Normal   Cardiovascular:   Hypertension Past MI CAD  Dysrhythmias atrial fibrillation Aortic stenosis  HFpEF 55%   Pulmonary:   Elevated PA pressure 43   Renal/:   Chronic Renal Disease, ESRD    Musculoskeletal:  Spine Disorders:    Neurological:   Neuromuscular Disease,    Endocrine:   Diabetes, type 2, using insulin        Physical Exam  General:  Well nourished    Airway/Jaw/Neck:  Airway Findings: Mouth Opening: Normal Tongue: Normal  General Airway Assessment: Adult, Average  Mallampati: III  Improves to II with phonation.  TM Distance: Normal, at least 6 cm      Dental:  Dental Findings: Periodontal disease, Severe, Upper Dentures   Chest/Lungs:  Chest/Lungs Clear    Heart/Vascular:  Heart Findings: Normal Heart murmur: negative    Abdomen:  Abdomen Findings: Normal    Musculoskeletal:  Musculoskeletal Findings: Normal   Skin:  Skin Findings: Normal    Mental Status:  Mental Status Findings:  Cooperative, Alert and Oriented  Moaning in pain and holding right leg       Anesthesia Plan  Type of Anesthesia, risks & benefits discussed:  Anesthesia Type:  general  Patient's Preference:   Intra-op Monitoring Plan: arterial line, cardiac output, standard ASA monitors and central line  Intra-op Monitoring Plan Comments:   Post Op  Pain Control Plan: multimodal analgesia, IV/PO Opioids PRN and per primary service following discharge from PACU  Post Op Pain Control Plan Comments:   Induction:   IV  Beta Blocker:  Patient is on a Beta-Blocker and has received one dose within the past 24 hours (No further documentation required).       Informed Consent: Patient understands risks and agrees with Anesthesia plan.  Questions answered. Anesthesia consent signed with patient.  ASA Score: 4     Day of Surgery Review of History & Physical: I have interviewed and examined the patient. I have reviewed the patient's H&P dated:  There are no significant changes.  H&P update referred to the surgeon.         Ready For Surgery From Anesthesia Perspective.

## 2017-10-18 NOTE — ASSESSMENT & PLAN NOTE
- cardiology consulted  - given type of stenosis patient will not benefit from TAVR or balloon angioplasty  - angiography prior to surgical intervention  - medication recommendations appreciated

## 2017-10-18 NOTE — PLAN OF CARE
Problem: Patient Care Overview  Goal: Plan of Care Review  Outcome: Ongoing (interventions implemented as appropriate)  Patient remained free of falls/injuries on today, patient was confused throughout day but reoriented within a short period of time, family & patient excited that surgery scheduled for Friday morning hopefully will alleviate pain, discussed careplan/both parties verbalized understanding

## 2017-10-18 NOTE — ASSESSMENT & PLAN NOTE
-heparin gtt following heart cath   - will discontinue 4 hours prior to any surgical intervention

## 2017-10-18 NOTE — PT/OT/SLP PROGRESS
Occupational Therapy      Donta Cline  MRN: [id number]    Patient not seen today secondary to Dialysis. Will follow-up 10/19/17.    Shanel Williamson, OT  10/18/2017

## 2017-10-18 NOTE — ASSESSMENT & PLAN NOTE
Theron Cline is a 85 year old white male with past medical history of bladder CA in which he is in remission (was treated with chemotherapy), CAD, A-fib (treated with warfarin) and ESRD on Hd. Patient was transfered from [facility] where he presented with worsening right leg and back pain, general weakness. In hospital presented with increase troponin levels, diagnosed with NSTEMI, increase in INR where was treated with FFP and Vit K, flash pulmonary edema, started on Bi-PAP and aspirated pneumonia (treated with Zosyn).     Consulted for dialysis treatment: iHD MWF, dialyzed in [facility], Rt lower arm AVF, duration 3.5, followed by Dr. Mathur, EDW 90 kg and had his last dialysis on Monday.     - Patient will continue with dialysis treatment today for removal of toxins and excess fluid, Goal of 3 hrs with a UF of 1 L as tolerated by patient, maintain MAP>65, bath will be adjusted to chemistry results.   - Anemia (Hgb 9.4) with controlled blood pressures 136/85 mmHg, May start on EPO, No IV iron since patient with current infection  - Continue with current diet renal diet (low sodium , low phosphate, low potassium)  - Continue with rosuvastatin.

## 2017-10-18 NOTE — PLAN OF CARE
Problem: Patient Care Overview  Goal: Plan of Care Review  Outcome: Ongoing (interventions implemented as appropriate)  HD treatment complete. Duration of treatment 3 hours and 1 L removed. Treatment was tolerated well and no complications with access to right lower arm. Needles removed and hemostasis achieved. Dressing intact and no drainage noted. Thrill and bruit present.

## 2017-10-19 NOTE — PLAN OF CARE
Problem: Occupational Therapy Goal  Goal: Occupational Therapy Goal  Goals to be met by: 10/18/17    Patient will increase functional independence with ADLs by performing:    LE Dressing with Stand-by Assistance and Assistive Devices as needed. MET 10/14  Grooming while standing at sink with Stand-by Assistance.  Toileting from toilet with Minimal Assistance for hygiene and clothing management.   Supine to sit with Modified [place].  Stand pivot transfers with Supervision.  Toilet transfer to toilet with Supervision.      Con't with POC.     PIYUSH Bardales

## 2017-10-19 NOTE — PT/OT/SLP PROGRESS
Physical Therapy  Treatment    Donta Cline   MRN: [id number]   Admitting Diagnosis: Lumbar stenosis    PT Received On: 10/19/17  PT Start Time: 0745     PT Stop Time: 0808    PT Total Time (min): 23 min       Billable Minutes:  Gait Training 23    Treatment Type: Treatment  PT/PTA: PT             General Precautions: Standard, fall, aspiration, pudding thick  Orthopedic Precautions: N/A   Braces: N/A    Do you have any cultural, spiritual, Restoration conflicts, given your current situation?: none stated    Subjective:  Communicated with RN prior to session.  Patient agreeable to PT session. Seated EOB upon room entry with daughter present reporting 7/10 LBP.    Pain/Comfort  Pain Rating 1: 7/10  Location - Orientation 1: lower  Location 1: back  Pain Addressed 1: Reposition, Distraction, Cessation of Activity  Pain Rating Post-Intervention 1: 7/10    Objective:   Patient found with: peripheral IV    Functional Mobility:  Bed Mobility:   Rolling/Turning to Left:  (not observed; patient seated EOB upon room entry)    Transfers:  Sit <> Stand Assistance: Stand By Assistance (EOB x1; bedside chair x1)  Sit <> Stand Assistive Device: Rolling Walker  Bed <> Chair Technique: Stand Pivot (x2)  Bed <> Chair Assistance: Contact Guard Assistance  Bed <> Chair Assistive Device: Rolling Walker    Gait:   Gait Distance: [id number]  Assistance 1: Stand by Assistance  Gait Assistive Device: Rolling walker  Gait Pattern: swing-through gait  Gait Deviation(s): decreased rohit, increased time in double stance, decreased velocity of limb motion, decreased step length, decreased stride length, forward lean    Balance:   Static Sit: NORMAL: No deviations seen in posture held statically  Dynamic Sit: NORMAL: No deviations seen in posture held dynamically  Static Stand: FAIR: Maintains without assist but unable to take challenges  Dynamic stand: FAIR: Needs CONTACT GUARD during gait     Therapeutic Activities and Exercises:  Patient and  daughter educated on role of PT/POC.    Seated EOB upon room entry; Bed mobility not observed.    Sit<>stand from EOB SBA using rolling walker.  Gait [id number] CGA using rolling walker. No LOB or SOB; however, significant forward lean over rolling walker in attempts to relieve LBP.    Stand pivot transfer to bedside chair CGA using rolling walker.  Patient given 4-minute rest break and agreeable to another ambulation trial.  Sit<>Stand from bedside chair CGA using rolling walker.  2nd 100ft ambulation trial with CGA using rolling walker.    Educated to perform seated LE therex x 30 reps 2x/day. Verbalized understanding.  Able to recall all LE therex without verbal cues.  Reports moderate compliance with HEP.    Safest to transfer and ambulate using rolling walker with assist of 1.     AM-PAC 6 CLICK MOBILITY  How much help from another person does this patient currently need?   1 = Unable, Total/Dependent Assistance  2 = A lot, Maximum/Moderate Assistance  3 = A little, Minimum/Contact Guard/Supervision  4 = None, Modified [place]/Independent    Turning over in bed (including adjusting bedclothes, sheets and blankets)?: 4  Sitting down on and standing up from a chair with arms (e.g., wheelchair, bedside commode, etc.): 3  Moving from lying on back to sitting on the side of the bed?: 4  Moving to and from a bed to a chair (including a wheelchair)?: 3  Need to walk in hospital room?: 3  Climbing 3-5 steps with a railing?: 2  Total Score: 19    AM-PAC Raw Score CMS G-Code Modifier Level of Impairment Assistance   6 % Total / Unable   7 - 9 CM 80 - 100% Maximal Assist   10 - 14 CL 60 - 80% Moderate Assist   15 - 19 CK 40 - 60% Moderate Assist   20 - 22 CJ 20 - 40% Minimal Assist   23 CI 1-20% SBA / CGA   24 CH 0% Independent/ Mod I     Patient left up in chair with all lines intact, call button in reach, RN notified and daughter present.    Assessment:  Donta Cline is a 85 y.o. male with a medical  diagnosis of Lumbar stenosis and presents with rehab identified impairments listed below. Most limited by LBP. Good tolerance to ambulation training this session increasing distance to [id number] using rolling walker with CGA for safety. To benefit from continued skilled intervention to address deficits. Surgery scheduled for tomorrow 10/19/17. Will follow up post-surgery when new orders placed.    Rehab identified problem list/impairments: Rehab identified problem list/impairments: weakness, impaired endurance, impaired functional mobilty, impaired self care skills, decreased lower extremity function, decreased safety awareness, pain, impaired sensation    Rehab potential is good.    Activity tolerance: Good    Discharge recommendations: Discharge Facility/Level Of Care Needs: nursing facility, skilled     Barriers to discharge: Barriers to Discharge: Decreased caregiver support (patient requiring significantly increased assistance at this time)    Equipment recommendations: Equipment Needed After Discharge: none     GOALS:    Physical Therapy Goals        Problem: Physical Therapy Goal    Goal Priority Disciplines Outcome Goal Variances Interventions   Physical Therapy Goal     PT/OT, PT Ongoing (interventions implemented as appropriate)     Description:  Goals to be met by: 10/29/2017    Patient will increase functional independence with mobility by performin. Supine to sit with Modified [place]  2. Sit to supine with Modified [place]  3. Sit to stand transfer with Contact Guard Assistance using Rolling Walker - Met   Updated: Sit to stand transfer with Supervision using rolling walker  4. Bed to chair transfer with Contact Guard Assistance using Rolling Walker - Met   Updated: Bed to chair transfer with Supervision using rolling walker  5. Gait  x 75 feet with Contact Guard Assistance using Rolling Walker. - Met   Updated: Gait x 150 feet with SBA using rolling walker                        PLAN:     Patient to be seen 3 x/week  to address the above listed problems via gait training, therapeutic activities, therapeutic exercises, neuromuscular re-education  Plan of Care expires: 11/11/17  Plan of Care reviewed with: patient, daughter         Edgar GIBSON Kyle ANDREW, PT  10/19/2017

## 2017-10-19 NOTE — PLAN OF CARE
Problem: Physical Therapy Goal  Goal: Physical Therapy Goal  Goals to be met by: 10/29/2017    Patient will increase functional independence with mobility by performin. Supine to sit with Modified [place]  2. Sit to supine with Modified [place]  3. Sit to stand transfer with Contact Guard Assistance using Rolling Walker - Met   Updated: Sit to stand transfer with Supervision using rolling walker  4. Bed to chair transfer with Contact Guard Assistance using Rolling Walker - Met   Updated: Bed to chair transfer with Supervision using rolling walker  5. Gait  x 75 feet with Contact Guard Assistance using Rolling Walker. - Met   Updated: Gait x 150 feet with SBA using rolling walker      Outcome: Ongoing (interventions implemented as appropriate)  Goals assessed and updated to reflect progress.    Edgar Wright III, DPT, PT  10/19/2017

## 2017-10-19 NOTE — PT/OT/SLP PROGRESS
"Speech Language Pathology  Treatment    Donta Cline   MRN: [id number]   Admitting Diagnosis: Lumbar stenosis    Diet recommendations: Solid Diet Level: Dental Soft  Liquid Diet Level: Pudding Thick Feed only when awake/alert, HOB to 90 degrees, Small bites/sips, 1 bite/sip at a time, Double swallow with each bite/sip, Meds crushed in puree and Assistance with thickening liquids    SLP Treatment Date: 10/19/17  Speech Start Time: 1356     Speech Stop Time: 1414     Speech Total (min): 18 min       TREATMENT BILLABLE MINUTES:  Treatment Swallowing Dysfunction 9 and Seld Care/Home Management Training 9    Has the patient been evaluated by SLP for swallowing? : Yes  Keep patient NPO?: No   General Precautions: Standard, aspiration, fall, pudding thick  Current Respiratory Status:  (room air)       Subjective:  "I'm alright."  Pt stated    Pain/Comfort  Pain Rating 1: 0/10  Pain Rating Post-Intervention 1: 0/10    Objective:   Patient found with: peripheral IV    Pt completed dysphagia ex's x10 reps for glottal /g, k/, falsetto /i/, effortful swallow and le maneuver.  He was seated upright at edge of bed for optimal swallowing safety.  He was offered and accepted po trials of pudding thick liquids.  He tolerated all trials well using cued double swallows w/ throat clear seen x1.  Education was provided to pt and daughter re: normal swallow mechanics, liquid thickening recommendations, dysphagia tx,  mbss results, and SLP POC.  They indicated good understanding and agreed w/ established POC.    FIM:                                 Assessment:  Donta Cline is a 85 y.o. male with a medical diagnosis of Lumbar stenosis and presents with dysphagia.    Discharge recommendations: Discharge Facility/Level Of Care Needs: nursing facility, skilled     Goals:    SLP Goals        Problem: SLP Goal    Goal Priority Disciplines Outcome   SLP Goal     SLP Ongoing (interventions implemented as appropriate)   Description:  Speech " Therapy Short Term Goals  Goals expected to be met by 10/19:  1. Pt will tolerate pudding thick liquids and dental soft diet with no overt s/s aspiration.   2. Given clinician model, pt will complete dysphagia exercises x10 each in order to strengthen the swallowing musculature.   3. Pt will participate in repeat MBSS when deemed appropriate by SLP/ MD.                         Plan:   Patient to be seen Therapy Frequency: 5 x/week   Plan of Care expires: 11/10/17  Plan of Care reviewed with: patient, daughter  SLP Follow-up?: Yes              Gissel Mcdaniel CCC-SLP  10/19/2017

## 2017-10-19 NOTE — SUBJECTIVE & OBJECTIVE
Interval History:  NAEON.  Pt reports continued back and B/L LE leg pain.  Denies new/worsening weakness today.  Tolerating diet.  Voiding.  Denies bowel/bladder dysfunction.        Medications:  Continuous Infusions:   heparin (porcine) in D5W 15 Units/kg/hr (10/18/17 1912)     Scheduled Meds:   sodium chloride 0.9%   Intravenous Once    acetaminophen  500 mg Oral Once    atorvastatin  80 mg Oral Daily    fentanyl  1 patch Transdermal Q72H    insulin detemir  18 Units Subcutaneous QHS    metoprolol succinate  150 mg Oral Daily    pantoprazole  40 mg Oral Nightly    senna-docusate 8.6-50 mg  1 tablet Oral BID    sertraline  100 mg Oral QHS    vit b cmplx 3-fa-vit c-biotin 1- mg-mg-mcg  1 tablet Oral Daily     PRN Meds:sodium chloride 0.9%, dextrose 50%, dextrose 50%, diphenhydrAMINE, glucagon (human recombinant), glucose, glucose, heparin (PORCINE), heparin (PORCINE), hydrocodone-acetaminophen 10-325mg, insulin aspart, nitroGLYCERIN, ramelteon     Review of Systems  Objective:     Weight: 80 kg (176 lb 5.9 oz)  Body mass index is 28.47 kg/m².  Vital Signs (Most Recent):  Temp: 96 °F (35.6 °C) (10/18/17 1534)  Pulse: 73 (10/18/17 1900)  Resp: 18 (10/18/17 1534)  BP: (!) 150/65 (10/18/17 1534)  SpO2: 98 % (10/18/17 1534) Vital Signs (24h Range):  Temp:  [96 °F (35.6 °C)-98.6 °F (37 °C)] 96 °F (35.6 °C)  Pulse:  [63-88] 73  Resp:  [15-20] 18  SpO2:  [95 %-99 %] 98 %  BP: (109-156)/(43-89) 150/65       Date 10/18/17 0700 - 10/19/17 0659   Shift 7885-9900 2391-9339 4393-8382 24 Hour Total   I  N  T  A  K  E   Other 600   600    Shift Total  (mL/kg) 600  (7.5)   600  (7.5)   O  U  T  P  U  T   Other 1618   1618    Shift Total  (mL/kg) 1618  (20.2)   1618  (20.2)   Weight (kg) 80 80 80 80                        Hemodialysis AV Fistula Right forearm (Active)   Needle Size 15ga 10/18/2017 12:15 PM   Site Assessment Clean;Dry;Intact 10/18/2017 12:15 PM   Patency Present;Thrill;Bruit 10/18/2017 12:15 PM    Status Patent 10/17/2017  7:34 PM   Flows Good 10/16/2017  7:50 PM   Dressing Intervention New dressing 10/16/2017 12:30 PM   Dressing Status Clean;Dry;Intact 10/16/2017  7:50 PM   Site Condition No complications 10/17/2017  7:34 PM   Dressing Gauze 10/16/2017 12:30 PM   Drainage Description Serosanguineous 10/11/2017 11:22 AM       Neurosurgery Physical Exam   General: no distress  Neurologic: Alert and oriented. Thought content appropriate.  Head: normocephalic  GCS: Motor: 6/Verbal: 5/Eyes: 4 GCS Total: 15  Cranial nerves: face symmetric, tongue midline, pupils equal, round, reactive to light with accomodation, extraocular muscles intact  Sensory: response to light touch throughout  Motor Strength:Right HF at 4/5, otherwise full strength upper and lower extremities  Pronator Drift: no drift noted  Finger to nose normal  Lungs:  normal respiratory effort  Abdomen: soft, non-tender   Extremities: no cyanosis or edema, or clubbing      Significant Labs:    Recent Labs  Lab 10/17/17  0654 10/18/17  0523    98    137   K 4.4 4.4    100   CO2 25 23   BUN 23 35*   CREATININE 3.7* 5.5*   CALCIUM 9.6 9.6       Recent Labs  Lab 10/17/17  0654 10/17/17  0951 10/18/17  0523   WBC 7.28 7.26 6.79   HGB 9.8* 9.6* 9.4*   HCT 31.0* 30.2* 29.4*    198 198     No results for input(s): LABPT, INR, APTT in the last 48 hours.  Microbiology Results (last 7 days)     Procedure Component Value Units Date/Time    Urine culture [[id number]] Collected:  10/12/17 1926    Order Status:  Completed Specimen:  Urine Updated:  10/14/17 0956     Urine Culture, Routine Multiple organisms isolated. None in predominance.  Repeat if     Urine Culture, Routine clinically necessary.    Urine culture [[id number]]     Order Status:  Completed Specimen:  Urine

## 2017-10-19 NOTE — SUBJECTIVE & OBJECTIVE
Interval History: Booked for surgery 10/20    Medications:  Continuous Infusions:   heparin (porcine) in D5W 14.936 Units/kg/hr (10/18/17 2329)     Scheduled Meds:   sodium chloride 0.9%   Intravenous Once    atorvastatin  80 mg Oral Daily    fentanyl  1 patch Transdermal Q72H    insulin detemir  18 Units Subcutaneous QHS    metoprolol succinate  150 mg Oral Daily    pantoprazole  40 mg Oral Nightly    senna-docusate 8.6-50 mg  1 tablet Oral BID    sertraline  100 mg Oral QHS    vit b cmplx 3-fa-vit c-biotin 1- mg-mg-mcg  1 tablet Oral Daily     PRN Meds:sodium chloride, sodium chloride 0.9%, acetaminophen, dextrose 50%, dextrose 50%, diphenhydrAMINE, glucagon (human recombinant), glucose, glucose, heparin (PORCINE), heparin (PORCINE), hydrocodone-acetaminophen 10-325mg, insulin aspart, nitroGLYCERIN, ramelteon     Review of Systems  Objective:     Weight: 80 kg (176 lb 5.9 oz)  Body mass index is 28.47 kg/m².  Vital Signs (Most Recent):  Temp: 97.5 °F (36.4 °C) (10/19/17 0731)  Pulse: 65 (10/19/17 0731)  Resp: 18 (10/19/17 0731)  BP: 125/60 (10/19/17 0731)  SpO2: 98 % (10/19/17 0731) Vital Signs (24h Range):  Temp:  [96 °F (35.6 °C)-98.6 °F (37 °C)] 97.5 °F (36.4 °C)  Pulse:  [63-83] 65  Resp:  [16-20] 18  SpO2:  [95 %-98 %] 98 %  BP: (109-154)/(43-72) 125/60                           Hemodialysis AV Fistula Right forearm (Active)   Needle Size 15ga 10/18/2017 12:15 PM   Site Assessment Clean;Dry;Intact 10/18/2017  8:00 PM   Patency Present;Thrill;Bruit 10/18/2017  8:00 PM   Status Patent 10/17/2017  7:34 PM   Flows Good 10/16/2017  7:50 PM   Dressing Intervention New dressing 10/16/2017 12:30 PM   Dressing Status Clean;Dry;Intact 10/16/2017  7:50 PM   Site Condition No complications 10/17/2017  7:34 PM   Dressing Gauze 10/16/2017 12:30 PM   Drainage Description Serosanguineous 10/11/2017 11:22 AM       Physical Exam:  Vitals reviewed.    Constitutional: He appears well-developed and well-nourished.  He is not diaphoretic. No distress.     Eyes: Pupils are equal, round, and reactive to light. EOM are normal.     Cardiovascular: Normal rate.     Abdominal: Soft.     Psych/Behavior: He is alert. He is oriented to person, place, and time. He has a normal mood and affect.     Musculoskeletal: Gait is normal.     Neurological:        Sensory: There is no sensory deficit in the trunk. There is no sensory deficit in the extremities.        Cranial nerves: Cranial nerve(s) II, III, IV, V, VI, VII, VIII, IX, X, XI and XII are intact.   B/L L2-4 pain radiating from back into thinghs R>L  3/5 L EHL otherwise 5/5         Significant Labs:    Recent Labs  Lab 10/18/17  0523 10/19/17  0340   GLU 98 131*    138   K 4.4 4.9    102   CO2 23 27   BUN 35* 20   CREATININE 5.5* 4.2*   CALCIUM 9.6 9.7   MG  --  1.7       Recent Labs  Lab 10/17/17  0951 10/18/17  0523 10/19/17  0340   WBC 7.26 6.79 6.86   HGB 9.6* 9.4* 9.4*   HCT 30.2* 29.4* 30.0*    198 203       Recent Labs  Lab 10/19/17  0340   INR 1.1     Microbiology Results (last 7 days)     Procedure Component Value Units Date/Time    Urine culture [[id number]] Collected:  10/12/17 1926    Order Status:  Completed Specimen:  Urine Updated:  10/14/17 0956     Urine Culture, Routine Multiple organisms isolated. None in predominance.  Repeat if     Urine Culture, Routine clinically necessary.    Urine culture [[id number]]     Order Status:  Completed Specimen:  Urine         Recent Lab Results       10/19/17  0746 10/19/17  0741 10/19/17  0340 10/19/17  0104 10/18/17  2114      Immature Granulocytes   0.3       Immature Grans (Abs)   0.02       Unit Blood Type Code  5100[P]        Unit Expiration  [id number][P]        Unit Blood Type  O POS[P]        Albumin   2.6(L)       Alkaline Phosphatase   100       ALT   24       Anion Gap   9       AST   26       Baso #   0.04       Basophil%   0.6       Total Bilirubin   0.5  Comment:  For infants and newborns,  interpretation of results should be based  on gestational age, weight and in agreement with clinical  observations.  Premature Infant recommended reference ranges:  Up to 24 hours.............<8.0 mg/dL  Up to 48 hours............<12.0 mg/dL  3-5 days..................<15.0 mg/dL  6-29 days.................<15.0 mg/dL         BUN, Bld   20       Calcium   9.7       Chloride   102       CO2   27       CODING SYSTEM  TPIV159[P]        Creatinine   4.2(H)       Differential Method   Automated       DISPENSE STATUS  CROSSMATCHED[P]        eGFR if    13.9(A)       eGFR if non    12.1  Comment:  Calculation used to obtain the estimated glomerular filtration  rate (eGFR) is the CKD-EPI equation. Since race is unknown   in our information system, the eGFR values for   -American and Non--American patients are given   for each creatinine result.  (A)       Eos #   0.2       Eosinophil%   2.9       Glucose   131(H)       Gran #   5.1       Gran%   74.5(H)       Hematocrit   30.0(L)       Hemoglobin   9.4(L)       Heparin Anti-Xa   0.52  Comment:  Expected therapeutic range for Unfractionated heparin (UFH)  is 0.3-0.7 IU/mL.  The therapeutic range for low molecular weight heparins   (LMWH) varies with the type and , but is   typically between 0.4 and 1.1 IU/mL.   0.66  Comment:  Expected therapeutic range for Unfractionated heparin (UFH)  is 0.3-0.7 IU/mL.  The therapeutic range for low molecular weight heparins   (LMWH) varies with the type and , but is   typically between 0.4 and 1.1 IU/mL.        Coumadin Monitoring INR   1.1  Comment:  Coumadin Therapy:  2.0 - 3.0 for INR for all indicators except mechanical heart valves  and antiphospholipid syndromes which should use 2.5 - 3.5.         Lymph #   1.0       Lymph%   14.6(L)       Magnesium   1.7       MCH   33.7(H)       MCHC   31.3(L)       MCV   108(H)       Mono #   0.5       Mono%   7.1       MPV    11.5       nRBC   0       Phosphorus   4.3       Platelets   203       POCT Glucose 128(H)    141(H)     Potassium   4.9       PRODUCT CODE  P9130M40[P]        Total Protein   6.3       Protime   12.0       RBC   2.79(L)       RDW   15.6(H)       Sodium   138       UNIT NUMBER  [id number][P]        WBC   6.86                   10/18/17  1631 10/18/17  1623 10/18/17  1238      Immature Granulocytes        Immature Grans (Abs)        Unit Blood Type Code        Unit Expiration        Unit Blood Type        Albumin        Alkaline Phosphatase        ALT        Anion Gap        AST        Baso #        Basophil%        Total Bilirubin        BUN, Bld        Calcium        Chloride        CO2        CODING SYSTEM        Creatinine        Differential Method        DISPENSE STATUS        eGFR if         eGFR if non         Eos #        Eosinophil%        Glucose        Gran #        Gran%        Hematocrit        Hemoglobin        Heparin Anti-Xa 0.86  Comment:  Expected therapeutic range for Unfractionated heparin (UFH)  is 0.3-0.7 IU/mL.  The therapeutic range for low molecular weight heparins   (LMWH) varies with the type and , but is   typically between 0.4 and 1.1 IU/mL.  (H)       Coumadin Monitoring INR        Lymph #        Lymph%        Magnesium        MCH        MCHC        MCV        Mono #        Mono%        MPV        nRBC        Phosphorus        Platelets        POCT Glucose  104 103     Potassium        PRODUCT CODE        Total Protein        Protime        RBC        RDW        Sodium        UNIT NUMBER        WBC            All pertinent labs from the last 24 hours have been reviewed.    Significant Diagnostics:  CT: No results found in the last 24 hours.  Echoencephalography: No results found in the last 24 hours.  MRI: No results found in the last 24 hours.  I have reviewed all pertinent imaging results/findings within the past 24 hours.

## 2017-10-19 NOTE — PROGRESS NOTES
I have discussed with the patient and neurosurgery staff.  Patient is cleared for laminectomy and anesthesia.  Okay to hold aspirin in anticipation of surgery and administer platelets.  Then resume aspirin 81 mg a day a few days after surgery.  Discussed above with Dr. Sands.  Please resume warfarin at some point after the surgery, when it is safe.  MARCIA Phillips, FACC, BRYAN   Preventive Cardiology  Office Tel: [phone]; direct ext. [phone]  Cell: [phone]

## 2017-10-19 NOTE — PROGRESS NOTES
[facility] Hospital Medicine  Progress Note    Patient Name: Donta Cline  MRN: [id number]  Patient Class: IP- Inpatient   Admission Date: 10/10/2017  Length of Stay: 9 days  Attending Physician: Lior Cutler MD  Primary Care Provider: ZAID Richardson Iii, MD    [facility] Medicine Team: [facility] HOSP MED 1 Edgar Richard MD    Subjective:     Principal Problem:Lumbar stenosis     HPI:  Mr. 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    Patient was originally seen at [facility] on 10/5/17 due to worsening low back pain and inability to ambulate with decline in function over past 5 weeks. There, patient was noted to have elevated troponin so transferred to [facility] for Cardiology evaluation. Patient was diagnosed with NSTEMI and treated medically. Patient was found to have elevated INR and no stents could be placed.  His INR was attempted to be revered with 4U of FFP and vit K but patient went into flash pulm edema.  At this time, 2 D echo revealed severe aortic stenosis with preserved EF and Cardiology evaluated patient and did not feel C warranted at this time but recommended outpatient follow-up with his regular Cardiologist and likely need for outpatient [facility] to evaluate his aortic stenosis and coronary anatomy.     During this admission, patient was having delirium and developed aspiration PNA (placed on IV Zosyn). Additionally, pt was evaluated by NSx who felt  he was not a candidate for surgery. Patient was seen and evaluated by Neurosurgery at [place] (Dr. James) who noted Ct scan of lumbar spine showed moderate to severe acquired spinal stenosis and bilateral foraminal encroachment at L3-L4. Patient is here for second opionion from NSx.    Hospital Course:  10/12: Patient still in pain, fentanyl patch ordered, consulted PM&R. Surgical intervention not indicated per NSx. However, family strongly leaning towards surgical intervention and have unrealistic expectation. If surgical intervention, patient needs LHC and full cardiac work up. Given his recent NSTEMI and severe AS along with other co-morbities, patient is at high risk for surgery. Patient also with nausea and heart burn this morning. MBSS today, aspiration risk with honey thick liquids, speech recommended dental soft diet now.   10/13: Patient with no complaints of pain this AM. Patient has been assessed by cardiology who request angiogram before any surgical intervention. Patient with low flow low gradient aortic stenosis and would not benefit from TAVR or balloon valvuloplasty per cardiology. Patient in HD this AM. Interventional cardiology consult place, possible right heart cath today  10/14/2017- No acute events overnight patient states his back pain has been controlled adequately with his current medications. Plan to have patient evaluated by cardiology with angiography on Monday then NSGY to evaluate for surgical intervention.   10/15: awaiting cath tomorrow  10/16/2017- No acute events overnight, patient to have heart cath today, HD this morning.   10/17/2017- No acute events overnight patient tolerated left heart cath well. Heart cath showed RCA 75% stenosis with other assessed vessels irregular. NATHANIEL scores of 3. Per notes patient to undergo lumbar decompression on 10/19. Patient pain remains controlled with current medications.   10/18/2017- Patient had episode of delirium overnight per  daughter. Patient attempting to call 911 and leave hospital. Delirium precautions in place, educated patient's daughter on importance of keeping blinds up during day, awake during day, reorientation, etc. Family states that they spoke with Dr. Cervantes yesterday in regards to possible surgical procedure tomorrow. Will contact to see what their plan is.   10/19/2017- No acute events overnight, patient was less confused overnight per family. Pain still well controlled at this time. Patient to have laminectomy tomorrow with Dr. Sands, will follow up on recommendations.       Interval History: NAEON. Patient with pain still well controlled. Less confusion overnight per family. Plan for surgery tomorrow with Dr. Sands.     Review of Systems   Constitutional: Negative for appetite change, chills, fatigue and fever.   HENT: Negative for congestion, ear pain, rhinorrhea and sore throat.    Eyes: Negative for pain and discharge.   Respiratory: Negative for cough, choking, chest tightness and shortness of breath.    Cardiovascular: Negative for chest pain, palpitations and leg swelling.   Gastrointestinal: Negative for abdominal pain, blood in stool, diarrhea, nausea, rectal pain and vomiting.   Endocrine: Negative for polyuria.   Genitourinary: Negative for decreased urine volume, difficulty urinating, dysuria, flank pain and hematuria.   Musculoskeletal: Positive for back pain. Negative for joint swelling and neck pain.   Skin: Negative for color change, pallor, rash and wound.   Neurological: Negative for dizziness, seizures, weakness and headaches.   Psychiatric/Behavioral: Negative for behavioral problems and confusion.     Objective:     Vital Signs (Most Recent):  Temp: 96.7 °F (35.9 °C) (10/19/17 1118)  Pulse: 68 (10/19/17 1118)  Resp: 18 (10/19/17 1118)  BP: (!) 103/55 (10/19/17 1118)  SpO2: 99 % (10/19/17 1118) Vital Signs (24h Range):  Temp:  [96 °F (35.6 °C)-98.6 °F (37 °C)] 96.7 °F (35.9 °C)  Pulse:  [65-83] 68  Resp:   [16-20] 18  SpO2:  [95 %-99 %] 99 %  BP: (103-154)/(51-72) 103/55     Weight: 80 kg (176 lb 5.9 oz)  Body mass index is 28.47 kg/m².    Intake/Output Summary (Last 24 hours) at 10/19/17 1124  Last data filed at 10/19/17 1025   Gross per 24 hour   Intake             1185 ml   Output             1669 ml   Net             -484 ml      Physical Exam   Constitutional: He is oriented to person, place, and time. He appears well-developed and well-nourished.   Pt is alert orientated and conversing appropriately   HENT:   Head: Normocephalic.   Nose: Nose normal.   Mouth/Throat: Oropharynx is clear and moist.   Eyes: EOM are normal. Pupils are equal, round, and reactive to light. No scleral icterus.   Neck: Neck supple. No JVD present. No tracheal deviation present. No thyromegaly present.   Cardiovascular: Normal rate.  Exam reveals no gallop and no friction rub.    Murmur (4/6 systolic) heard.  Irregularly irregular rhythm,     Pulmonary/Chest: Effort normal and breath sounds normal. No respiratory distress. He has no wheezes. He has no rales.   Abdominal: Soft. Bowel sounds are normal. He exhibits no distension and no mass. There is no tenderness. There is no rebound and no guarding.   Lymphadenopathy:     He has no cervical adenopathy.   Neurological: He is alert and oriented to person, place, and time. He exhibits normal muscle tone.   Skin: Skin is warm and dry. No rash noted. No erythema.   Psychiatric: He has a normal mood and affect. His behavior is normal.   Vitals reviewed.      Significant Labs:   BMP:   Recent Labs  Lab 10/19/17  0340   *      K 4.9      CO2 27   BUN 20   CREATININE 4.2*   CALCIUM 9.7   MG 1.7     CBC:   Recent Labs  Lab 10/18/17  0523 10/19/17  0340   WBC 6.79 6.86   HGB 9.4* 9.4*   HCT 29.4* 30.0*    203     Coagulation:   Recent Labs  Lab 10/19/17  0340   INR 1.1       Significant Imaging: I have reviewed all pertinent imaging results/findings within the past 24  hours.    Assessment/Plan:      * Lumbar stenosis    -patient has stated this has been chronic but has been losing bowel functions for the past several months.    -denies saddle paraesthesia  -Ct scan of lumbar spine showed moderate to severe acquired spinal stenosis and bilateral foraminal encroachment at L3-L4  -MRI consistent with lumbar stenosis in the L3-4. Refer to MRI result   -Neurosurgery consulted, given age and other co-morbidities. High risk for surgery.   -Fentanyl patch for pain management   -consulted PM& R  - Angiography 10/16, NSGY lumbar decompression 10/20            Lumbar back pain with radiculopathy affecting right lower extremity    - pain currently well controlled  - will continue to follow           BRBPR (bright red blood per rectum)    - patients INR was supra therapeutic at OSH  - according to daughter has decreased  - CBC stable        Hypokalemia    - resolved at this time  - will continue to monitor           NSTEMI (non-ST elevated myocardial infarction)    -recent NSTEMI on 10/5/17  -treated medically  -continue aspirin, metoprolol, statin   -EKG with non specific ST changes and A.fib with troponin 0.399, down from OSH 1.030  -Cardiology following appreciate recs, attributed elevated troponin to AS and ESRD  -2D echo; concentric remodeling, normal left ventricular systolic function (EF 55-60%), mildly depressed right ventricular systolic function, biatrial enlargement, estimated PA systolic pressure is 43 mmHg, increased central venous pressure   - angiography 10/16        Paroxysmal atrial fibrillation    -heparin gtt following heart cath   - will discontinue 4 hours prior to any surgical intervention           Severe aortic stenosis    - cardiology consulted  - given type of stenosis patient will not benefit from TAVR or balloon angioplasty  - angiography prior to surgical intervention  - medication recommendations appreciated           ESRD (end stage renal disease) on dialysis     -HD on MWF  -has fistula on R arm  -inpatient consult to nephrology, appreciate help            Type 2 diabetes mellitus with chronic kidney disease on chronic dialysis, with long-term current use of insulin    - 18U qhs  - updated A1c pending  - diabetic, renal diet          Weakness generalized    - PT/OT ordered  - consult to social work for placement on discharge rehab vs SNF   - Will re-consult PMR after surgical intervention           Essential hypertension    - currently normotensive   - will continue to follow           Coronary artery disease involving native coronary artery of native heart without angina pectoris    - angiography 10/16          Hypercholesteremia    - changed rosuvastatin to atorvastatin           Chronic diastolic heart failure    - patient underwent cardiac catheterization 10/16 to assist in evaluation of possible surgical intervention for spinal stenosis  - cardiac echo 10/12;    Concentric remodeling.    Normal left ventricular systolic function (EF 55-60%).    Mildly depressed right ventricular systolic function .     Biatrial enlargement.     Pulmonary hypertension. The estimated PA systolic pressure is 43 mmHg.      Increased central venous pressure.              VTE Risk Mitigation         Ordered     heparin 25,000 units in dextrose 5% 250 mL (100 units/mL) infusion  Continuous     Route:  Intravenous        10/18/17 1408     heparin 25,000 units in dextrose 5% 250 mL (100 units/mL) bolus from bag; ADDITIONAL PRN BOLUS  As needed (PRN)     Route:  Intravenous        10/18/17 1408     heparin 25,000 units in dextrose 5% 250 mL (100 units/mL) bolus from bag; ADDITIONAL PRN BOLUS  As needed (PRN)     Route:  Intravenous        10/18/17 1408     heparin 25,000 units in dextrose 5% 250 mL (100 units/mL) infusion  Continuous     Route:  Intravenous        10/14/17 0701     Medium Risk of VTE  Once      10/11/17 0006              Edgar Richard MD  Department of Hospital Medicine    [facility]

## 2017-10-19 NOTE — ASSESSMENT & PLAN NOTE
-patient has stated this has been chronic but has been losing bowel functions for the past several months.    -denies saddle paraesthesia  -Ct scan of lumbar spine showed moderate to severe acquired spinal stenosis and bilateral foraminal encroachment at L3-L4  -MRI consistent with lumbar stenosis in the L3-4. Refer to MRI result   -Neurosurgery consulted, given age and other co-morbidities. High risk for surgery.   -Fentanyl patch for pain management   -consulted PM& R  - Angiography 10/16, NSGY lumbar decompression 10/20

## 2017-10-19 NOTE — PLAN OF CARE
Problem: Patient Care Overview  Goal: Plan of Care Review  Outcome: Ongoing (interventions implemented as appropriate)  Patient is scheduled for laminectomy in the am. Hepari drip to be stopped at 4:30 am and Ancef is OC to the OR. PTT due at 12 mn. Patient received 1 unit Plts this am. Patient has received one prn dose of Tylenol po with mild relief noted.    Problem: Diabetes, Type 2 (Adult)  Goal: Signs and Symptoms of Listed Potential Problems Will be Absent, Minimized or Managed (Diabetes, Type 2)  Signs and symptoms of listed potential problems will be absent, minimized or managed by discharge/transition of care (reference Diabetes, Type 2 (Adult) CPG).   Outcome: Ongoing (interventions implemented as appropriate)  Glucoses have ranged between 128 - 147mg/dl this shift.    Problem: Fall Risk (Adult)  Intervention: Patient Rounds  No injuries noted this shift; fall precautions maintained.      Problem: Pressure Ulcer Risk (Дмитрий Scale) (Adult,Obstetrics,Pediatric)  Intervention: Turn/Reposition Often  No skin breakdown noted.

## 2017-10-19 NOTE — PROGRESS NOTES
[facility]  Neurosurgery  Progress Note    Subjective:     History of Present Illness: Mr. 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     Patient was originally seen at [facility] on 10/5/17 due to worsening low back pain and inability to ambulate with decline in function over past 5 weeks. There, patient was noted to have elevated troponin so transferred to [facility] for Cardiology evaluation. Patient was diagnosed with NSTEMI and treated medically. Patient was found to have elevated INR and no stents could be placed.  His INR was attempted to be revered with 4U of FFP and vit K but patient went into flash pulm edema.  At this time, 2 D echo revealed severe aortic stenosis with preserved EF and Cardiology evaluated patient and did not feel LHC warranted at this time but recommended outpatient follow-up with his regular Cardiologist and likely need for outpatient LHC to evaluate his aortic stenosis and coronary anatomy.      During this admission, patient was having delirium and developed aspiration PNA (placed on IV Zosyn). Additionally, pt was evaluated by NSx who felt he was not a candidate for surgery. Patient was seen and evaluated by Neurosurgery at [place] (Dr. James) who noted Ct scan of lumbar spine showed moderate to severe acquired spinal stenosis and bilateral foraminal encroachment at L3-L4. Patient is here for second opionion from NSx.    Post-Op  Info:  Procedure(s) (LRB):  HEART CATH-LEFT (Left)         Interval History: Booked for surgery 10/20    Medications:  Continuous Infusions:   heparin (porcine) in D5W 14.936 Units/kg/hr (10/18/17 2329)     Scheduled Meds:   sodium chloride 0.9%   Intravenous Once    atorvastatin  80 mg Oral Daily    fentanyl  1 patch Transdermal Q72H    insulin detemir  18 Units Subcutaneous QHS    metoprolol succinate  150 mg Oral Daily    pantoprazole  40 mg Oral Nightly    senna-docusate 8.6-50 mg  1 tablet Oral BID    sertraline  100 mg Oral QHS    vit b cmplx 3-fa-vit c-biotin 1- mg-mg-mcg  1 tablet Oral Daily     PRN Meds:sodium chloride, sodium chloride 0.9%, acetaminophen, dextrose 50%, dextrose 50%, diphenhydrAMINE, glucagon (human recombinant), glucose, glucose, heparin (PORCINE), heparin (PORCINE), hydrocodone-acetaminophen 10-325mg, insulin aspart, nitroGLYCERIN, ramelteon     Review of Systems  Objective:     Weight: 80 kg (176 lb 5.9 oz)  Body mass index is 28.47 kg/m².  Vital Signs (Most Recent):  Temp: 97.5 °F (36.4 °C) (10/19/17 0731)  Pulse: 65 (10/19/17 0731)  Resp: 18 (10/19/17 0731)  BP: 125/60 (10/19/17 0731)  SpO2: 98 % (10/19/17 0731) Vital Signs (24h Range):  Temp:  [96 °F (35.6 °C)-98.6 °F (37 °C)] 97.5 °F (36.4 °C)  Pulse:  [63-83] 65  Resp:  [16-20] 18  SpO2:  [95 %-98 %] 98 %  BP: (109-154)/(43-72) 125/60                           Hemodialysis AV Fistula Right forearm (Active)   Needle Size 15ga 10/18/2017 12:15 PM   Site Assessment Clean;Dry;Intact 10/18/2017  8:00 PM   Patency Present;Thrill;Bruit 10/18/2017  8:00 PM   Status Patent 10/17/2017  7:34 PM   Flows Good 10/16/2017  7:50 PM   Dressing Intervention New dressing 10/16/2017 12:30 PM   Dressing Status Clean;Dry;Intact 10/16/2017  7:50 PM   Site Condition No complications 10/17/2017  7:34 PM   Dressing Gauze 10/16/2017 12:30 PM   Drainage Description Serosanguineous 10/11/2017 11:22 AM       Physical Exam:  Vitals  reviewed.    Constitutional: He appears well-developed and well-nourished. He is not diaphoretic. No distress.     Eyes: Pupils are equal, round, and reactive to light. EOM are normal.     Cardiovascular: Normal rate.     Abdominal: Soft.     Psych/Behavior: He is alert. He is oriented to person, place, and time. He has a normal mood and affect.     Musculoskeletal: Gait is normal.     Neurological:        Sensory: There is no sensory deficit in the trunk. There is no sensory deficit in the extremities.        Cranial nerves: Cranial nerve(s) II, III, IV, V, VI, VII, VIII, IX, X, XI and XII are intact.   B/L L2-4 pain radiating from back into thinghs R>L  3/5 L EHL otherwise 5/5         Significant Labs:    Recent Labs  Lab 10/18/17  0523 10/19/17  0340   GLU 98 131*    138   K 4.4 4.9    102   CO2 23 27   BUN 35* 20   CREATININE 5.5* 4.2*   CALCIUM 9.6 9.7   MG  --  1.7       Recent Labs  Lab 10/17/17  0951 10/18/17  0523 10/19/17  0340   WBC 7.26 6.79 6.86   HGB 9.6* 9.4* 9.4*   HCT 30.2* 29.4* 30.0*    198 203       Recent Labs  Lab 10/19/17  0340   INR 1.1     Microbiology Results (last 7 days)     Procedure Component Value Units Date/Time    Urine culture [[id number]] Collected:  10/12/17 1926    Order Status:  Completed Specimen:  Urine Updated:  10/14/17 0956     Urine Culture, Routine Multiple organisms isolated. None in predominance.  Repeat if     Urine Culture, Routine clinically necessary.    Urine culture [[id number]]     Order Status:  Completed Specimen:  Urine         Recent Lab Results       10/19/17  0746 10/19/17  0741 10/19/17  0340 10/19/17  0104 10/18/17  2114      Immature Granulocytes   0.3       Immature Grans (Abs)   0.02       Unit Blood Type Code  5100[P]        Unit Expiration  [id number][P]        Unit Blood Type  O POS[P]        Albumin   2.6(L)       Alkaline Phosphatase   100       ALT   24       Anion Gap   9       AST   26       Baso #   0.04       Basophil%    0.6       Total Bilirubin   0.5  Comment:  For infants and newborns, interpretation of results should be based  on gestational age, weight and in agreement with clinical  observations.  Premature Infant recommended reference ranges:  Up to 24 hours.............<8.0 mg/dL  Up to 48 hours............<12.0 mg/dL  3-5 days..................<15.0 mg/dL  6-29 days.................<15.0 mg/dL         BUN, Bld   20       Calcium   9.7       Chloride   102       CO2   27       CODING SYSTEM  NEEZ590[P]        Creatinine   4.2(H)       Differential Method   Automated       DISPENSE STATUS  CROSSMATCHED[P]        eGFR if    13.9(A)       eGFR if non    12.1  Comment:  Calculation used to obtain the estimated glomerular filtration  rate (eGFR) is the CKD-EPI equation. Since race is unknown   in our information system, the eGFR values for   -American and Non--American patients are given   for each creatinine result.  (A)       Eos #   0.2       Eosinophil%   2.9       Glucose   131(H)       Gran #   5.1       Gran%   74.5(H)       Hematocrit   30.0(L)       Hemoglobin   9.4(L)       Heparin Anti-Xa   0.52  Comment:  Expected therapeutic range for Unfractionated heparin (UFH)  is 0.3-0.7 IU/mL.  The therapeutic range for low molecular weight heparins   (LMWH) varies with the type and , but is   typically between 0.4 and 1.1 IU/mL.   0.66  Comment:  Expected therapeutic range for Unfractionated heparin (UFH)  is 0.3-0.7 IU/mL.  The therapeutic range for low molecular weight heparins   (LMWH) varies with the type and , but is   typically between 0.4 and 1.1 IU/mL.        Coumadin Monitoring INR   1.1  Comment:  Coumadin Therapy:  2.0 - 3.0 for INR for all indicators except mechanical heart valves  and antiphospholipid syndromes which should use 2.5 - 3.5.         Lymph #   1.0       Lymph%   14.6(L)       Magnesium   1.7       MCH   33.7(H)       MCHC   31.3(L)        MCV   108(H)       Mono #   0.5       Mono%   7.1       MPV   11.5       nRBC   0       Phosphorus   4.3       Platelets   203       POCT Glucose 128(H)    141(H)     Potassium   4.9       PRODUCT CODE  Z0629X79[P]        Total Protein   6.3       Protime   12.0       RBC   2.79(L)       RDW   15.6(H)       Sodium   138       UNIT NUMBER  [id number][P]        WBC   6.86                   10/18/17  1631 10/18/17  1623 10/18/17  1238      Immature Granulocytes        Immature Grans (Abs)        Unit Blood Type Code        Unit Expiration        Unit Blood Type        Albumin        Alkaline Phosphatase        ALT        Anion Gap        AST        Baso #        Basophil%        Total Bilirubin        BUN, Bld        Calcium        Chloride        CO2        CODING SYSTEM        Creatinine        Differential Method        DISPENSE STATUS        eGFR if         eGFR if non         Eos #        Eosinophil%        Glucose        Gran #        Gran%        Hematocrit        Hemoglobin        Heparin Anti-Xa 0.86  Comment:  Expected therapeutic range for Unfractionated heparin (UFH)  is 0.3-0.7 IU/mL.  The therapeutic range for low molecular weight heparins   (LMWH) varies with the type and , but is   typically between 0.4 and 1.1 IU/mL.  (H)       Coumadin Monitoring INR        Lymph #        Lymph%        Magnesium        MCH        MCHC        MCV        Mono #        Mono%        MPV        nRBC        Phosphorus        Platelets        POCT Glucose  104 103     Potassium        PRODUCT CODE        Total Protein        Protime        RBC        RDW        Sodium        UNIT NUMBER        WBC            All pertinent labs from the last 24 hours have been reviewed.    Significant Diagnostics:  CT: No results found in the last 24 hours.  Echoencephalography: No results found in the last 24 hours.  MRI: No results found in the last 24 hours.  I have reviewed all pertinent  imaging results/findings within the past 24 hours.    Assessment/Plan:     Lumbar back pain with radiculopathy affecting right lower extremity    Mr. lCine is a 85yoM with multiple active medical comorbidities, including recent NSTEMI, severe aortic stenosis, ESRD with HD MWF, and aspiration pneumonia.  -Pt with continued severe back and B/L LE pain and right HF weakness  -Cardiology cleared for surgery; recommended stopping heparin GTT 4 hours prior to surgery.  Are ok with holding aspirin at this time.  Will give 1 unit platelets morning of surgery and 1 Unit on call to OR.  Anticipate re starting aspirin 24-48 hours post op.  -OR rescheduled to Friday, 10/20 with Dr. Sands for L3-4 open laminectomy with possible intrumented v uninstrumented fusion.     -Neurosurgery to enter appropriate pre-op orders  -Medical management per primary.    -Please call with questions            Shirlene Selby MD  Neurosurgery  [facility]

## 2017-10-19 NOTE — PLAN OF CARE
Problem: SLP Goal  Goal: SLP Goal  Speech Therapy Short Term Goals  Goals expected to be met by 10/19:  1. Pt will tolerate pudding thick liquids and dental soft diet with no overt s/s aspiration.   2. Given clinician model, pt will complete dysphagia exercises x10 each in order to strengthen the swallowing musculature.   3. Pt will participate in repeat MBSS when deemed appropriate by SLP/ MD.       Outcome: Ongoing (interventions implemented as appropriate)  Pt participated well in tx session.  Cont ST per POC.    Gissel Mcdaniel CCC-SLP  10/19/2017

## 2017-10-19 NOTE — PROGRESS NOTES
[facility]  Neurosurgery  Progress Note    Subjective:     History of Present Illness: Mr. 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     Patient was originally seen at [facility] on 10/5/17 due to worsening low back pain and inability to ambulate with decline in function over past 5 weeks. There, patient was noted to have elevated troponin so transferred to [facility] for Cardiology evaluation. Patient was diagnosed with NSTEMI and treated medically. Patient was found to have elevated INR and no stents could be placed.  His INR was attempted to be revered with 4U of FFP and vit K but patient went into flash pulm edema.  At this time, 2 D echo revealed severe aortic stenosis with preserved EF and Cardiology evaluated patient and did not feel LHC warranted at this time but recommended outpatient follow-up with his regular Cardiologist and likely need for outpatient LHC to evaluate his aortic stenosis and coronary anatomy.      During this admission, patient was having delirium and developed aspiration PNA (placed on IV Zosyn). Additionally, pt was evaluated by NSx who felt he was not a candidate for surgery. Patient was seen and evaluated by Neurosurgery at [place] (Dr. James) who noted Ct scan of lumbar spine showed moderate to severe acquired spinal stenosis and bilateral foraminal encroachment at L3-L4. Patient is here for second opionion from NSx.    Post-Op  Info:  Procedure(s) (LRB):  HEART CATH-LEFT (Left)         Interval History:  NAEON.  Pt reports continued back and B/L LE leg pain.  Denies new/worsening weakness today.  Tolerating diet.  Voiding.  Denies bowel/bladder dysfunction.        Medications:  Continuous Infusions:   heparin (porcine) in D5W 15 Units/kg/hr (10/18/17 1912)     Scheduled Meds:   sodium chloride 0.9%   Intravenous Once    acetaminophen  500 mg Oral Once    atorvastatin  80 mg Oral Daily    fentanyl  1 patch Transdermal Q72H    insulin detemir  18 Units Subcutaneous QHS    metoprolol succinate  150 mg Oral Daily    pantoprazole  40 mg Oral Nightly    senna-docusate 8.6-50 mg  1 tablet Oral BID    sertraline  100 mg Oral QHS    vit b cmplx 3-fa-vit c-biotin 1- mg-mg-mcg  1 tablet Oral Daily     PRN Meds:sodium chloride 0.9%, dextrose 50%, dextrose 50%, diphenhydrAMINE, glucagon (human recombinant), glucose, glucose, heparin (PORCINE), heparin (PORCINE), hydrocodone-acetaminophen 10-325mg, insulin aspart, nitroGLYCERIN, ramelteon     Review of Systems  Objective:     Weight: 80 kg (176 lb 5.9 oz)  Body mass index is 28.47 kg/m².  Vital Signs (Most Recent):  Temp: 96 °F (35.6 °C) (10/18/17 1534)  Pulse: 73 (10/18/17 1900)  Resp: 18 (10/18/17 1534)  BP: (!) 150/65 (10/18/17 1534)  SpO2: 98 % (10/18/17 1534) Vital Signs (24h Range):  Temp:  [96 °F (35.6 °C)-98.6 °F (37 °C)] 96 °F (35.6 °C)  Pulse:  [63-88] 73  Resp:  [15-20] 18  SpO2:  [95 %-99 %] 98 %  BP: (109-156)/(43-89) 150/65       Date 10/18/17 0700 - 10/19/17 0659   Shift 9176-6173 0071-1156 0840-7118 24 Hour Total   I  N  T  A  K  E   Other 600   600    Shift Total  (mL/kg) 600  (7.5)   600  (7.5)   O  U  T  P  U  T   Other 1618   1618    Shift Total  (mL/kg) 1618  (20.2)   1618  (20.2)   Weight (kg) 80 80 80 80                        Hemodialysis AV Fistula Right forearm (Active)   Needle Size 15ga 10/18/2017 12:15 PM   Site Assessment Clean;Dry;Intact 10/18/2017 12:15  PM   Patency Present;Thrill;Bruit 10/18/2017 12:15 PM   Status Patent 10/17/2017  7:34 PM   Flows Good 10/16/2017  7:50 PM   Dressing Intervention New dressing 10/16/2017 12:30 PM   Dressing Status Clean;Dry;Intact 10/16/2017  7:50 PM   Site Condition No complications 10/17/2017  7:34 PM   Dressing Gauze 10/16/2017 12:30 PM   Drainage Description Serosanguineous 10/11/2017 11:22 AM       Neurosurgery Physical Exam   General: no distress  Neurologic: Alert and oriented. Thought content appropriate.  Head: normocephalic  GCS: Motor: 6/Verbal: 5/Eyes: 4 GCS Total: 15  Cranial nerves: face symmetric, tongue midline, pupils equal, round, reactive to light with accomodation, extraocular muscles intact  Sensory: response to light touch throughout  Motor Strength:Right HF at 4/5, otherwise full strength upper and lower extremities  Pronator Drift: no drift noted  Finger to nose normal  Lungs:  normal respiratory effort  Abdomen: soft, non-tender   Extremities: no cyanosis or edema, or clubbing      Significant Labs:    Recent Labs  Lab 10/17/17  0654 10/18/17  0523    98    137   K 4.4 4.4    100   CO2 25 23   BUN 23 35*   CREATININE 3.7* 5.5*   CALCIUM 9.6 9.6       Recent Labs  Lab 10/17/17  0654 10/17/17  0951 10/18/17  0523   WBC 7.28 7.26 6.79   HGB 9.8* 9.6* 9.4*   HCT 31.0* 30.2* 29.4*    198 198     No results for input(s): LABPT, INR, APTT in the last 48 hours.  Microbiology Results (last 7 days)     Procedure Component Value Units Date/Time    Urine culture [[id number]] Collected:  10/12/17 1926    Order Status:  Completed Specimen:  Urine Updated:  10/14/17 0956     Urine Culture, Routine Multiple organisms isolated. None in predominance.  Repeat if     Urine Culture, Routine clinically necessary.    Urine culture [[id number]]     Order Status:  Completed Specimen:  Urine             Assessment/Plan:     Lumbar back pain with radiculopathy affecting right lower extremity    Mr. Cline is a  85yoM with multiple active medical comorbidities, including recent NSTEMI, severe aortic stenosis, ESRD with HD MWF, and aspiration pneumonia.  -Pt with continued severe back and B/L LE pain and right HF weakness  -Cardiology cleared for surgery; recommended stopping heparin GTT 4 hours prior to surgery.  Are ok with holding aspirin at this time.  Will give 1 unit platelets morning of surgery and 1 Unit on call to OR.  Anticipate re starting aspirin 24-48 hours post op.  -OR rescheduled to Friday, 10/20 with Dr. Sands for L3-4 laminectomy.    -Medical management per primary.    -Please call with questions  - Discussed with SHELDON Alvarado  Neurosurgery  [facility]

## 2017-10-19 NOTE — SUBJECTIVE & OBJECTIVE
Interval History: NAEON. Patient with pain still well controlled. Less confusion overnight per family. Plan for surgery tomorrow with Dr. Sands.     Review of Systems   Constitutional: Negative for appetite change, chills, fatigue and fever.   HENT: Negative for congestion, ear pain, rhinorrhea and sore throat.    Eyes: Negative for pain and discharge.   Respiratory: Negative for cough, choking, chest tightness and shortness of breath.    Cardiovascular: Negative for chest pain, palpitations and leg swelling.   Gastrointestinal: Negative for abdominal pain, blood in stool, diarrhea, nausea, rectal pain and vomiting.   Endocrine: Negative for polyuria.   Genitourinary: Negative for decreased urine volume, difficulty urinating, dysuria, flank pain and hematuria.   Musculoskeletal: Positive for back pain. Negative for joint swelling and neck pain.   Skin: Negative for color change, pallor, rash and wound.   Neurological: Negative for dizziness, seizures, weakness and headaches.   Psychiatric/Behavioral: Negative for behavioral problems and confusion.     Objective:     Vital Signs (Most Recent):  Temp: 96.7 °F (35.9 °C) (10/19/17 1118)  Pulse: 68 (10/19/17 1118)  Resp: 18 (10/19/17 1118)  BP: (!) 103/55 (10/19/17 1118)  SpO2: 99 % (10/19/17 1118) Vital Signs (24h Range):  Temp:  [96 °F (35.6 °C)-98.6 °F (37 °C)] 96.7 °F (35.9 °C)  Pulse:  [65-83] 68  Resp:  [16-20] 18  SpO2:  [95 %-99 %] 99 %  BP: (103-154)/(51-72) 103/55     Weight: 80 kg (176 lb 5.9 oz)  Body mass index is 28.47 kg/m².    Intake/Output Summary (Last 24 hours) at 10/19/17 1124  Last data filed at 10/19/17 1025   Gross per 24 hour   Intake             1185 ml   Output             1669 ml   Net             -484 ml      Physical Exam   Constitutional: He is oriented to person, place, and time. He appears well-developed and well-nourished.   Pt is alert orientated and conversing appropriately   HENT:   Head: Normocephalic.   Nose: Nose normal.    Mouth/Throat: Oropharynx is clear and moist.   Eyes: EOM are normal. Pupils are equal, round, and reactive to light. No scleral icterus.   Neck: Neck supple. No JVD present. No tracheal deviation present. No thyromegaly present.   Cardiovascular: Normal rate.  Exam reveals no gallop and no friction rub.    Murmur (4/6 systolic) heard.  Irregularly irregular rhythm,     Pulmonary/Chest: Effort normal and breath sounds normal. No respiratory distress. He has no wheezes. He has no rales.   Abdominal: Soft. Bowel sounds are normal. He exhibits no distension and no mass. There is no tenderness. There is no rebound and no guarding.   Lymphadenopathy:     He has no cervical adenopathy.   Neurological: He is alert and oriented to person, place, and time. He exhibits normal muscle tone.   Skin: Skin is warm and dry. No rash noted. No erythema.   Psychiatric: He has a normal mood and affect. His behavior is normal.   Vitals reviewed.      Significant Labs:   BMP:   Recent Labs  Lab 10/19/17  0340   *      K 4.9      CO2 27   BUN 20   CREATININE 4.2*   CALCIUM 9.7   MG 1.7     CBC:   Recent Labs  Lab 10/18/17  0523 10/19/17  0340   WBC 6.79 6.86   HGB 9.4* 9.4*   HCT 29.4* 30.0*    203     Coagulation:   Recent Labs  Lab 10/19/17  0340   INR 1.1       Significant Imaging: I have reviewed all pertinent imaging results/findings within the past 24 hours.

## 2017-10-19 NOTE — PT/OT/SLP PROGRESS
"Occupational Therapy  Treatment    Donta Cline   MRN: [id number]   Admitting Diagnosis: Lumbar stenosis    OT Date of Treatment: 10/19/17   OT Start Time: 1043  OT Stop Time: 1107  OT Total Time (min): 24 min    Billable Minutes:  Therapeutic Exercise 24    General Precautions: Standard, fall  Orthopedic Precautions:    Braces:           Subjective:  Communicated with RN prior to session.  "I might not make it through surgery tomorrow."  Pain/Comfort  Pain Rating 1: 0/10    Objective:        Functional Mobility:  Bed Mobility: Pt deferred.       Transfers: Pt deferred.        Functional Ambulation: Pt deferred.    Activities of Daily Living: Pt deferred.       Therapeutic Activities and Exercises:  From supine with HOB up, completed BUE therex with a 3# dowel (shld presses, elbow flex/ext, IR/ER) and BLE therex AROM (hip/knee flex/ext, ABD/ADD) --- 2x15 reps each.    AM-PAC 6 CLICK ADL   How much help from another person does this patient currently need?   1 = Unable, Total/Dependent Assistance  2 = A lot, Maximum/Moderate Assistance  3 = A little, Minimum/Contact Guard/Supervision  4 = None, Modified [place]/Independent    Putting on and taking off regular lower body clothing? : 3  Bathing (including washing, rinsing, drying)?: 3  Toileting, which includes using toilet, bedpan, or urinal? : 2  Putting on and taking off regular upper body clothing?: 3  Taking care of personal grooming such as brushing teeth?: 4  Eating meals?: 4  Total Score: 19     AM-PAC Raw Score CMS "G-Code Modifier Level of Impairment Assistance   6 % Total / Unable   7 - 8 CM 80 - 100% Maximal Assist   9-13 CL 60 - 80% Moderate Assist   14 - 19 CK 40 - 60% Moderate Assist   20 - 22 CJ 20 - 40% Minimal Assist   23 CI 1-20% SBA / CGA   24 CH 0% Independent/ Mod I       Patient left supine with all lines intact and call button in reach    ASSESSMENT:  Donta Cline is a 85 y.o. male with a medical diagnosis of Lumbar stenosis and " declined EOB/OOB today stating he was tired from walking with PT and is having sx tomorrow and would like to resume after the procedure. Will continue to follow and await new orders post-surgery as needed. Scheduled for a L3-4 laminectomy.    Rehab identified problem list/impairments: Rehab identified problem list/impairments: weakness, gait instability, impaired balance, impaired endurance, impaired self care skills, impaired functional mobilty, decreased coordination, decreased safety awareness, decreased lower extremity function    Rehab potential is good.    Activity tolerance: Fair    Discharge recommendations: Discharge Facility/Level Of Care Needs: nursing facility, skilled     Barriers to discharge: Barriers to Discharge: Decreased caregiver support    Equipment recommendations: none     GOALS:    Occupational Therapy Goals        Problem: Occupational Therapy Goal    Goal Priority Disciplines Outcome Interventions   Occupational Therapy Goal     OT, PT/OT Ongoing (interventions implemented as appropriate)    Description:  Goals to be met by: 10/18/17    Patient will increase functional independence with ADLs by performing:    LE Dressing with Stand-by Assistance and Assistive Devices as needed. MET 10/14  Grooming while standing at sink with Stand-by Assistance.  Toileting from toilet with Minimal Assistance for hygiene and clothing management.   Supine to sit with Modified [place].  Stand pivot transfers with Supervision.  Toilet transfer to toilet with Supervision.                       Plan:  Patient to be seen 3 x/week to address the above listed problems via self-care/home management, therapeutic activities, therapeutic exercises  Plan of Care expires: 11/10/17  Plan of Care reviewed with: patient         PIYUSH Bardales  10/19/2017

## 2017-10-19 NOTE — ASSESSMENT & PLAN NOTE
Mr. Cline is a 85yoM with multiple active medical comorbidities, including recent NSTEMI, severe aortic stenosis, ESRD with HD MWF, and aspiration pneumonia.  -Pt with continued severe back and B/L LE pain and right HF weakness  -Cardiology cleared for surgery; recommended stopping heparin GTT 4 hours prior to surgery.  Are ok with holding aspirin at this time.  Will give 1 unit platelets morning of surgery and 1 Unit on call to OR.  Anticipate re starting aspirin 24-48 hours post op.  -OR rescheduled to Friday, 10/20 with Dr. Sands for L3-4 open laminectomy with possible intrumented v uninstrumented fusion.     -Neurosurgery to enter appropriate pre-op orders  -Medical management per primary.    -Please call with questions

## 2017-10-19 NOTE — ASSESSMENT & PLAN NOTE
Mr. Cline is a 85yoM with multiple active medical comorbidities, including recent NSTEMI, severe aortic stenosis, ESRD with HD MWF, and aspiration pneumonia.  -Pt with continued severe back and B/L LE pain and right HF weakness  -Cardiology cleared for surgery; recommended stopping heparin GTT 4 hours prior to surgery.  Are ok with holding aspirin at this time.  Will give 1 unit platelets morning of surgery and 1 Unit on call to OR.  Anticipate re starting aspirin 24-48 hours post op.  -OR rescheduled to Friday, 10/20 with Dr. Sands for L3-4 laminectomy.    -Medical management per primary.    -Please call with questions  - Discussed with Dr. Sands

## 2017-10-20 NOTE — PROGRESS NOTES
NEUROSURGERY NOTE    I had another extensive discussion with the patient and patient's daughter about the surgery.  I showed them the films and explained the goals.  I explained that my preference is to do a laminectomy only but that I am concerned about the risk of destabilization because in order to decompress the area of stenosis, aspects of the facet joints need to removed to gain access to the soft tissue compressive elements.  I explained that he already has evidence of instability, which is likely a contributing factor for why he has become so stenotic at that level.  I informed them that if there is evidence of gross instability, I will opt for a fusion.  She and the patient understood and were in agreement with the plan as well as the risks of surgery in the setting of his medical comorbidities.    Malachi Sands D.O.  Neurosurgery

## 2017-10-20 NOTE — ANESTHESIA PROCEDURE NOTES
Arterial    Diagnosis: aortic stenosis     Patient location during procedure: done in OR  Procedure start time: 10/20/2017 12:28 PM  Timeout: 10/20/2017 12:29 PM  Procedure end time: 10/20/2017 12:32 PM  Staffing  Anesthesiologist: LANDEN ANDUJAR  Resident/CRNA: RAMOS MARINO  Performed: resident/CRNA   Anesthesiologist was present at the time of the procedure.  Preanesthetic Checklist  Completed: patient identified, site marked, surgical consent, pre-op evaluation, timeout performed, IV checked, risks and benefits discussed, monitors and equipment checked and anesthesia consent givenArterial  Skin Prep: chlorhexidine gluconate  Local Infiltration: lidocaine  Orientation: left  Location: radial  Catheter Size: 20 G  Catheter placement by Ultrasound guidance. Heme positive aspiration all ports.  Vessel Caliber: medium, patent, compressibility poor  Vascular Doppler:  not doneInsertion Attempts: 3  Assessment  Dressing: secured with tape and tegaderm  Patient: Tolerated well

## 2017-10-20 NOTE — ASSESSMENT & PLAN NOTE
- heparin gtt following heart cath   - will discontinue 4 hours prior to any surgical intervention

## 2017-10-20 NOTE — PT/OT/SLP PROGRESS
Speech Language Pathology  Discharge Summary      Donta Cline   [facility] 2ND FLR Periop Pool*    MRN: [id number]    Patient not seen today secondary to Other (Comment) (Pt to OR for laminectomy, per review of medical chart. ). SLP to d/c pt from services. Please re-consult post-op if warranted when medically stable.     DUGLAS Barriga, CCC-SLP  [phone]  10/20/2017

## 2017-10-20 NOTE — PT/OT/SLP DISCHARGE
Physical Therapy Discharge Summary    Donta Cline  MRN: [id number]   Lumbar stenosis   Patient Discharged from acute Physical Therapy on 10/20/17.  Please refer to prior PT noted date on 10/19/2017 for functional status.     Assessment:   Patient appropriate for care in another setting.  GOALS:    Physical Therapy Goals        Problem: Physical Therapy Goal    Goal Priority Disciplines Outcome Goal Variances Interventions   Physical Therapy Goal     PT/OT, PT Ongoing (interventions implemented as appropriate)     Description:  Goals to be met by: 10/29/2017    Patient will increase functional independence with mobility by performin. Supine to sit with Modified [place]  2. Sit to supine with Modified [place]  3. Sit to stand transfer with Contact Guard Assistance using Rolling Walker - Met   Updated: Sit to stand transfer with Supervision using rolling walker  4. Bed to chair transfer with Contact Guard Assistance using Rolling Walker - Met   Updated: Bed to chair transfer with Supervision using rolling walker  5. Gait  x 75 feet with Contact Guard Assistance using Rolling Walker. - Met   Updated: Gait x 150 feet with SBA using rolling walker                      Reasons for Discontinuation of Therapy Services  Transfer to alternate level of care.      Plan:  Patient Discharged to: OR for lumbar surgery.  Will need new orders.    Edgar Wright III, DPT, PT  10/20/2017

## 2017-10-20 NOTE — PROGRESS NOTES
Patient reported itching to his daughter, some itching towards the end of the infusion. Not reported to nurse until infusion completed. Itching is resolving, no other symptoms noted. Blood Bank notified and recommended to inform anesthesiologist. Dr Alexandra notified of above. No further orders.

## 2017-10-20 NOTE — TRANSFER OF CARE
"Anesthesia Transfer of Care Note    Patient: Donta Cline    Procedure(s) Performed: Procedure(s) (LRB):  L2-L4 spinal fusion, . L3-L4 Interbody fusion, L2-L3 laminectomy (N/A)    Patient location: PACU    Anesthesia Type: general    Transport from OR: Transported from OR on 6-10 L/min O2 by face mask with adequate spontaneous ventilation    Post pain: adequate analgesia    Post assessment: no apparent anesthetic complications    Post vital signs: unstable    Level of consciousness: awake and alert    Nausea/Vomiting: no nausea/vomiting    Complications: none    Comments: SBP in 60s upon arrival to PACU.  Phenylephrine 100mcg IVP given, SBP in 100s, phenylephrine drip started      Last vitals:   Visit Vitals  BP (!) 103/54   Pulse 92   Temp 36.6 °C (97.8 °F) (Axillary)   Resp 15   Ht 5' 6" (1.676 m)   Wt 80 kg (176 lb 5.9 oz)   SpO2 100%   BMI 28.47 kg/m²     "

## 2017-10-20 NOTE — PROGRESS NOTES
Pt arrived to PACU, admit NIBP 65/43. Pt asleep, easily arousable.   HR & O2 sats WNL.   CRNA at bedside, Dr. Parsons called and at bedside. Lobito-synephrine boluses  x4 administered by CRNA iv. Lobito drip initated at 0.5mcg/kg/min iv per md order, See MAR  and flowsheet for vital signs.

## 2017-10-20 NOTE — SUBJECTIVE & OBJECTIVE
Interval History: No acute events overnight, patient states he is ready for surgery.     Review of Systems   Constitutional: Negative for appetite change, chills, fatigue and fever.   HENT: Negative for congestion, ear pain, rhinorrhea and sore throat.    Eyes: Negative for pain and discharge.   Respiratory: Negative for cough, choking, chest tightness and shortness of breath.    Cardiovascular: Negative for chest pain, palpitations and leg swelling.   Gastrointestinal: Negative for abdominal pain, blood in stool, diarrhea, nausea, rectal pain and vomiting.   Endocrine: Negative for polyuria.   Genitourinary: Negative for decreased urine volume, difficulty urinating, dysuria, flank pain and hematuria.   Musculoskeletal: Positive for back pain. Negative for joint swelling and neck pain.   Skin: Negative for color change, pallor, rash and wound.   Neurological: Negative for dizziness, seizures, weakness and headaches.   Psychiatric/Behavioral: Negative for behavioral problems and confusion.     Objective:     Vital Signs (Most Recent):  Temp: 97.6 °F (36.4 °C) (10/20/17 0950)  Pulse: 73 (10/20/17 0950)  Resp: 20 (10/20/17 0950)  BP: (!) 139/46 (10/20/17 0950)  SpO2: 100 % (10/20/17 0950) Vital Signs (24h Range):  Temp:  [96.3 °F (35.7 °C)-98.7 °F (37.1 °C)] 97.6 °F (36.4 °C)  Pulse:  [60-73] 73  Resp:  [14-20] 20  SpO2:  [95 %-100 %] 100 %  BP: ()/(41-66) 139/46     Weight: 80 kg (176 lb 5.9 oz)  Body mass index is 28.47 kg/m².    Intake/Output Summary (Last 24 hours) at 10/20/17 1103  Last data filed at 10/19/17 1708   Gross per 24 hour   Intake              799 ml   Output                0 ml   Net              799 ml      Physical Exam   Constitutional: He is oriented to person, place, and time. He appears well-developed and well-nourished.   Pt is alert orientated and conversing appropriately   HENT:   Head: Normocephalic.   Nose: Nose normal.   Mouth/Throat: Oropharynx is clear and moist.   Eyes: EOM are  normal. Pupils are equal, round, and reactive to light. No scleral icterus.   Neck: Neck supple. No JVD present. No tracheal deviation present. No thyromegaly present.   Cardiovascular: Normal rate.  Exam reveals no gallop and no friction rub.    Murmur (4/6 systolic) heard.  Irregularly irregular rhythm,  Vascular access site with no pain, erythema, bleeding    Pulmonary/Chest: Effort normal and breath sounds normal. No respiratory distress. He has no wheezes. He has no rales.   Abdominal: Soft. Bowel sounds are normal. He exhibits no distension and no mass. There is no tenderness. There is no rebound and no guarding.   Lymphadenopathy:     He has no cervical adenopathy.   Neurological: He is alert and oriented to person, place, and time. He exhibits normal muscle tone.   Skin: Skin is warm and dry. No rash noted. No erythema.   Psychiatric: He has a normal mood and affect. His behavior is normal.   Vitals reviewed.      Significant Labs:   BMP:   Recent Labs  Lab 10/20/17  0428         K 4.6      CO2 23   BUN 29*   CREATININE 5.5*   CALCIUM 9.7   MG 1.7     CBC:   Recent Labs  Lab 10/19/17  0340 10/20/17  0428   WBC 6.86 7.51   HGB 9.4* 8.7*   HCT 30.0* 27.6*    220     Coagulation:   Recent Labs  Lab 10/20/17  0428   INR 1.1       Significant Imaging: I have reviewed all pertinent imaging results/findings within the past 24 hours.

## 2017-10-20 NOTE — PROGRESS NOTES
[facility] Hospital Medicine  Progress Note    Patient Name: Donta Cline  MRN: [id number]  Patient Class: IP- Inpatient   Admission Date: 10/10/2017  Length of Stay: 10 days  Attending Physician: Lior Cutler MD  Primary Care Provider: ZAID Richardson Iii, MD    [facility] Medicine Team: [facility] HOSP MED 1 Edgar Richard MD    Subjective:     Principal Problem:Lumbar stenosis    HPI:  Mr. 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    Patient was originally seen at [facility] on 10/5/17 due to worsening low back pain and inability to ambulate with decline in function over past 5 weeks. There, patient was noted to have elevated troponin so transferred to [facility] for Cardiology evaluation. Patient was diagnosed with NSTEMI and treated medically. Patient was found to have elevated INR and no stents could be placed.  His INR was attempted to be revered with 4U of FFP and vit K but patient went into flash pulm edema.  At this time, 2 D echo revealed severe aortic stenosis with preserved EF and Cardiology evaluated patient and did not feel C warranted at this time but recommended outpatient follow-up with his regular Cardiologist and likely need for outpatient [facility] to evaluate his aortic stenosis and coronary anatomy.     During this admission, patient was having delirium and developed aspiration PNA (placed on IV Zosyn). Additionally, pt was evaluated by NSx who felt  he was not a candidate for surgery. Patient was seen and evaluated by Neurosurgery at [place] (Dr. James) who noted Ct scan of lumbar spine showed moderate to severe acquired spinal stenosis and bilateral foraminal encroachment at L3-L4. Patient is here for second opionion from NSx.    Hospital Course:  10/12: Patient still in pain, fentanyl patch ordered, consulted PM&R. Surgical intervention not indicated per NSx. However, family strongly leaning towards surgical intervention and have unrealistic expectation. If surgical intervention, patient needs LHC and full cardiac work up. Given his recent NSTEMI and severe AS along with other co-morbities, patient is at high risk for surgery. Patient also with nausea and heart burn this morning. MBSS today, aspiration risk with honey thick liquids, speech recommended dental soft diet now.   10/13: Patient with no complaints of pain this AM. Patient has been assessed by cardiology who request angiogram before any surgical intervention. Patient with low flow low gradient aortic stenosis and would not benefit from TAVR or balloon valvuloplasty per cardiology. Patient in HD this AM. Interventional cardiology consult place, possible right heart cath today  10/14/2017- No acute events overnight patient states his back pain has been controlled adequately with his current medications. Plan to have patient evaluated by cardiology with angiography on Monday then NSGY to evaluate for surgical intervention.   10/15: awaiting cath tomorrow  10/16/2017- No acute events overnight, patient to have heart cath today, HD this morning.   10/17/2017- No acute events overnight patient tolerated left heart cath well. Heart cath showed RCA 75% stenosis with other assessed vessels irregular. NATHANIEL scores of 3. Per notes patient to undergo lumbar decompression on 10/19. Patient pain remains controlled with current medications.   10/18/2017- Patient had episode of delirium overnight per  daughter. Patient attempting to call 911 and leave hospital. Delirium precautions in place, educated patient's daughter on importance of keeping blinds up during day, awake during day, reorientation, etc. Family states that they spoke with Dr. Cervantes yesterday in regards to possible surgical procedure tomorrow. Will contact to see what their plan is.   10/19/2017- No acute events overnight, patient was less confused overnight per family. Pain still well controlled at this time. Patient to have laminectomy tomorrow with Dr. Sands, will follow up on recommendations.   10/20/2017- No acute events overnight, patient to OR this AM. Will follow up surgical recommendations.       Interval History: No acute events overnight, patient states he is ready for surgery.     Review of Systems   Constitutional: Negative for appetite change, chills, fatigue and fever.   HENT: Negative for congestion, ear pain, rhinorrhea and sore throat.    Eyes: Negative for pain and discharge.   Respiratory: Negative for cough, choking, chest tightness and shortness of breath.    Cardiovascular: Negative for chest pain, palpitations and leg swelling.   Gastrointestinal: Negative for abdominal pain, blood in stool, diarrhea, nausea, rectal pain and vomiting.   Endocrine: Negative for polyuria.   Genitourinary: Negative for decreased urine volume, difficulty urinating, dysuria, flank pain and hematuria.   Musculoskeletal: Positive for back pain. Negative for joint swelling and neck pain.   Skin: Negative for color change, pallor, rash and wound.   Neurological: Negative for dizziness, seizures, weakness and headaches.   Psychiatric/Behavioral: Negative for behavioral problems and confusion.     Objective:     Vital Signs (Most Recent):  Temp: 97.6 °F (36.4 °C) (10/20/17 0950)  Pulse: 73 (10/20/17 0950)  Resp: 20 (10/20/17 0950)  BP: (!) 139/46 (10/20/17 0950)  SpO2: 100 % (10/20/17 0950) Vital Signs (24h Range):  Temp:  [96.3 °F (35.7 °C)-98.7 °F (37.1  °C)] 97.6 °F (36.4 °C)  Pulse:  [60-73] 73  Resp:  [14-20] 20  SpO2:  [95 %-100 %] 100 %  BP: ()/(41-66) 139/46     Weight: 80 kg (176 lb 5.9 oz)  Body mass index is 28.47 kg/m².    Intake/Output Summary (Last 24 hours) at 10/20/17 1103  Last data filed at 10/19/17 1708   Gross per 24 hour   Intake              799 ml   Output                0 ml   Net              799 ml      Physical Exam   Constitutional: He is oriented to person, place, and time. He appears well-developed and well-nourished.   Pt is alert orientated and conversing appropriately   HENT:   Head: Normocephalic.   Nose: Nose normal.   Mouth/Throat: Oropharynx is clear and moist.   Eyes: EOM are normal. Pupils are equal, round, and reactive to light. No scleral icterus.   Neck: Neck supple. No JVD present. No tracheal deviation present. No thyromegaly present.   Cardiovascular: Normal rate.  Exam reveals no gallop and no friction rub.    Murmur (4/6 systolic) heard.  Irregularly irregular rhythm,  Vascular access site with no pain, erythema, bleeding    Pulmonary/Chest: Effort normal and breath sounds normal. No respiratory distress. He has no wheezes. He has no rales.   Abdominal: Soft. Bowel sounds are normal. He exhibits no distension and no mass. There is no tenderness. There is no rebound and no guarding.   Lymphadenopathy:     He has no cervical adenopathy.   Neurological: He is alert and oriented to person, place, and time. He exhibits normal muscle tone.   Skin: Skin is warm and dry. No rash noted. No erythema.   Psychiatric: He has a normal mood and affect. His behavior is normal.   Vitals reviewed.      Significant Labs:   BMP:   Recent Labs  Lab 10/20/17  0428         K 4.6      CO2 23   BUN 29*   CREATININE 5.5*   CALCIUM 9.7   MG 1.7     CBC:   Recent Labs  Lab 10/19/17  0340 10/20/17  0428   WBC 6.86 7.51   HGB 9.4* 8.7*   HCT 30.0* 27.6*    220     Coagulation:   Recent Labs  Lab 10/20/17  0428   INR 1.1        Significant Imaging: I have reviewed all pertinent imaging results/findings within the past 24 hours.    Assessment/Plan:      * Lumbar stenosis    -patient has stated this has been chronic but has been losing bowel functions for the past several months.    -denies saddle paraesthesia  -Ct scan of lumbar spine showed moderate to severe acquired spinal stenosis and bilateral foraminal encroachment at L3-L4  -MRI consistent with lumbar stenosis in the L3-4. Refer to MRI result   -Neurosurgery consulted, given age and other co-morbidities. High risk for surgery.   -Fentanyl patch for pain management   -consulted PM& R  - Angiography 10/16, NSGY lumbar decompression 10/20            Lumbar back pain with radiculopathy affecting right lower extremity    - pain currently well controlled  - will continue to follow           BRBPR (bright red blood per rectum)    - patients INR was supra therapeutic at OSH  - according to daughter has decreased  - CBC stable        Hypokalemia    - resolved at this time  - will continue to monitor           NSTEMI (non-ST elevated myocardial infarction)    -recent NSTEMI on 10/5/17  -treated medically  -continue aspirin, metoprolol, statin   -EKG with non specific ST changes and A.fib with troponin 0.399, down from OSH 1.030  -Cardiology following appreciate recs, attributed elevated troponin to AS and ESRD  -2D echo; concentric remodeling, normal left ventricular systolic function (EF 55-60%), mildly depressed right ventricular systolic function, biatrial enlargement, estimated PA systolic pressure is 43 mmHg, increased central venous pressure   - angiography 10/16        Paroxysmal atrial fibrillation    - heparin gtt following heart cath   - will discontinue 4 hours prior to any surgical intervention           Severe aortic stenosis    - cardiology consulted  - given type of stenosis patient will not benefit from TAVR or balloon angioplasty  - angiography prior to surgical  intervention  - medication recommendations appreciated           ESRD (end stage renal disease) on dialysis    -HD on MWF  -has fistula on R arm  -inpatient consult to nephrology, appreciate help            Type 2 diabetes mellitus with chronic kidney disease on chronic dialysis, with long-term current use of insulin    - 18U qhs  - updated A1c pending  - diabetic, renal diet          Weakness generalized    - PT/OT ordered  - consult to social work for placement on discharge rehab vs SNF   - Will re-consult PMR after surgical intervention           Essential hypertension    - currently normotensive   - will continue to follow           Coronary artery disease involving native coronary artery of native heart without angina pectoris    - angiography 10/16          Hypercholesteremia    - changed rosuvastatin to atorvastatin           Chronic diastolic heart failure    - patient underwent cardiac catheterization 10/16 to assist in evaluation of possible surgical intervention for spinal stenosis  - cardiac echo 10/12;    Concentric remodeling.    Normal left ventricular systolic function (EF 55-60%).    Mildly depressed right ventricular systolic function .     Biatrial enlargement.     Pulmonary hypertension. The estimated PA systolic pressure is 43 mmHg.      Increased central venous pressure.              VTE Risk Mitigation         Ordered     heparin 25,000 units in dextrose 5% 250 mL (100 units/mL) bolus from bag; ADDITIONAL PRN BOLUS  As needed (PRN)     Route:  Intravenous        10/18/17 1408     heparin 25,000 units in dextrose 5% 250 mL (100 units/mL) bolus from bag; ADDITIONAL PRN BOLUS  As needed (PRN)     Route:  Intravenous        10/18/17 1408     heparin 25,000 units in dextrose 5% 250 mL (100 units/mL) infusion  Continuous     Route:  Intravenous        10/14/17 0701     Medium Risk of VTE  Once      10/11/17 0006              Edgar Richard MD  Department of Hospital Medicine   [facility]  [place]

## 2017-10-21 PROBLEM — I95.81 POSTPROCEDURAL HYPOTENSION: Status: ACTIVE | Noted: 2017-01-01

## 2017-10-21 NOTE — PROGRESS NOTES
Patient's daughter at bedside, Patient drowsy, awakens and visits with daughter. Updated daughter on plan of care, patient and daughter verbalized understanding. Wctm

## 2017-10-21 NOTE — PROGRESS NOTES
Dr. Parsons and Dr. Stone at bedside. MDs stated ok to go by NiBP d/t pts AFIB and a-line inaccurate at times. Lobito infusion titrated by NiBP.   VSS, pt c/o pain to back. See MAR. Will continue to monitor.

## 2017-10-21 NOTE — PROGRESS NOTES
[facility]  Nephrology  Progress Note    Patient Name: Donta Cline  MRN: [id number]  Admission Date: 10/10/2017  Hospital Length of Stay: 11 days  Attending Provider: No att. providers found   Primary Care Physician: ZAID Richardson Iii, MD  Principal Problem:Lumbar stenosis    Subjective:     HPI: Theron Cline is a 85 year old white male with past medical history of bladder CA in which he is in remission (was treated with chemotherapy), CAD, A-fib (treated with warfarin) and ESRD on Hd. Patient was transfered from [facility] where he presented with worsening right leg and back pain, general weakness. In hospital presented with increase troponin levels, diagnosed with NSTEMI, increase in INR where was treated with FFP and Vit K, flash pulmonary edema, started on Bi-PAP and aspirated pneumonia (treated with Zosyn). 2 D echo revealed severe aortic stenosis with preserved EF and Cardiology evaluated patient and did not feel LHC warranted at this time but recommended outpatient follow-up with his regular Cardiologist and likely need for outpatient C to evaluate his aortic stenosis and coronary anatomy. Additionally, pt was evaluated by NSx who felt he was not a candidate for surgery. Patient was seen and evaluated by Neurosurgery at [place] (Dr. James) who noted Ct scan of lumbar spine showed moderate to severe acquired spinal stenosis and bilateral foraminal encroachment at L3-L4. Patient is here for second opionion from [facility].    Consulted for dialysis treatment: iHD MWF, dialyzed in [facility], Rt lower arm AVF, duration 3.5, followed by Dr. Mathur, EDW 90 kg and had his last dialysis on Monday.     Interval History:   Transferred to [facility] secondary to s/p NSx intervention with s/p laminectom, hemodynamically unstable on Noosyne, no respiratory distress, pain has been well control.     Review of patient's allergies indicates:   Allergen Reactions    Darvocet a500   [propoxyphene n-acetaminophen]      Other reaction(s): Unknown    Morphine      Other reaction(s): Unknown     Current Facility-Administered Medications   Medication Frequency    0.9%  NaCl infusion (for blood administration) Q24H PRN    0.9%  NaCl infusion (for blood administration) Q24H PRN    0.9%  NaCl infusion PRN    0.9%  NaCl infusion Once    0.9%  NaCl infusion PRN    acetaminophen tablet 650 mg Q8H PRN    atorvastatin tablet 80 mg Daily    calcium gluconate 1 g in dextrose 5 % 100 mL IVPB (premix) Q10 Min PRN    dextrose 50% injection 12.5 g PRN    dextrose 50% injection 25 g PRN    dextrose 50% injection 25 g PRN    fentanyl 12 mcg/hr 1 patch Q72H    glucagon (human recombinant) injection 1 mg PRN    glucose chewable tablet 16 g PRN    glucose chewable tablet 24 g PRN    heparin (porcine) injection 5,000 Units Q8H    hydrocodone-acetaminophen 10-325mg per tablet 1 tablet Q6H PRN    insulin aspart pen 0-5 Units QID (AC + HS) PRN    insulin detemir pen 18 Units QHS    metoprolol tartrate (LOPRESSOR) tablet 25 mg BID    midodrine tablet 5 mg TID    nitroGLYCERIN SL tablet 0.3 mg Q5 Min PRN    pantoprazole EC tablet 40 mg Nightly    PHENYLephrine (MYNOR-SYNEPHRINE) 40 mg in sodium chloride 0.9% 250ml (titrating) (premix) Continuous    ramelteon tablet 8 mg Nightly PRN    senna-docusate 8.6-50 mg per tablet 1 tablet BID    sertraline tablet 100 mg QHS    vit b cmplx 3-fa-vit c-biotin 1- mg-mg-mcg per tablet 1 tablet Daily       Objective:     Vital Signs (Most Recent):  Temp: 98.1 °F (36.7 °C) (10/21/17 1500)  Pulse: 86 (10/21/17 1700)  Resp: 17 (10/21/17 1700)  BP: (!) 126/58 (10/21/17 1700)  SpO2: 100 % (10/21/17 1700)  O2 Device (Oxygen Therapy): nasal cannula (10/21/17 0700) Vital Signs (24h Range):  Temp:  [97.7 °F (36.5 °C)-98.9 °F (37.2 °C)] 98.1 °F (36.7 °C)  Pulse:  [] 86  Resp:  [10-25] 17  SpO2:  [91 %-100 %] 100 %  BP: ()/(44-76) 126/58  Arterial Line BP:  ()/(32-51) 120/38     Weight: 80 kg (176 lb 5.9 oz) (10/17/17 1300)  Body mass index is 28.47 kg/m².  Body surface area is 1.93 meters squared.    I/O last 3 completed shifts:  In: 2827.1 [I.V.:2327.1; IV Piggyback:500]  Out: 800 [Drains:200; Blood:600]    Physical Exam   Constitutional: He is oriented to person, place, and time. He appears well-developed and well-nourished.   HENT:   Head: Normocephalic.   Nose: Nose normal.   Mouth/Throat: Oropharynx is clear and moist.   Eyes: No scleral icterus.   Neck: Neck supple. No JVD present. No tracheal deviation present. No thyromegaly present.   Cardiovascular: Normal rate, regular rhythm and normal heart sounds.  Exam reveals no gallop and no friction rub.    Pulmonary/Chest: Effort normal and breath sounds normal. No respiratory distress. He has no wheezes. He has no rales.   Abdominal: Soft. Bowel sounds are normal. He exhibits no distension and no mass. There is no tenderness. There is no rebound and no guarding.   Musculoskeletal: He exhibits no edema.   Right forearm AVF with good thrill and no bruits   Lymphadenopathy:     He has no cervical adenopathy.   Neurological: He is alert and oriented to person, place, and time. He exhibits normal muscle tone.   Negative asterixis    Skin: Skin is warm and dry. No rash noted. No erythema.   Vitals reviewed.      Significant Labs:  ABGs: No results for input(s): PH, PCO2, HCO3, POCSATURATED, BE in the last 168 hours.  BMP:   Recent Labs  Lab 10/21/17  0107  10/21/17  1522   *  < > 173*     < > 107   CO2 18*  < > 17*   BUN 33*  < > 39*   CREATININE 6.0*  < > 6.6*   CALCIUM 9.2  < > 9.1   MG 1.3*  --   --    < > = values in this interval not displayed.  CBC:     Recent Labs  Lab 10/21/17  0107   WBC 19.57*   RBC 2.31*   HGB 7.6*   HCT 24.7*   *   *   MCH 32.9*   MCHC 30.8*     CMP:     Recent Labs  Lab 10/21/17  1522   *   CALCIUM 9.1   ALBUMIN 2.1*   PROT 5.3*      K 5.0  5.0    CO2 17*      BUN 39*   CREATININE 6.6*   ALKPHOS 110   ALT 11   AST 35   BILITOT 0.3     All labs within the past 24 hours have been reviewed.       Assessment/Plan:     ESRD (end stage renal disease) on dialysis    Theron Cline is a 85 year old white male with past medical history of bladder CA in which he is in remission (was treated with chemotherapy), CAD, A-fib (treated with warfarin) and ESRD on Hd. Patient was transfered from [facility] where he presented with worsening right leg and back pain, general weakness. In hospital presented with increase troponin levels, diagnosed with NSTEMI, increase in INR where was treated with FFP and Vit K, flash pulmonary edema, started on Bi-PAP and aspirated pneumonia (treated with Zosyn).     Consulted for dialysis treatment: iHD MWF, dialyzed in [facility], Rt lower arm AVF, duration 3.5, followed by Dr. Mathur, EDW 90 kg and had his last dialysis on Monday.     - Transferred to [facility] hemodynamically unstable post op- Laminectomy L3-L4  - will plan to wean vasopressors of and may start midodrine per [facility]  - No need for RRT today will await for hemodynamically better  - BUN increasing but on Steroids  - K elevated will shift today as no Kayexalate per NSx in setting of lumbar Sx  - Continue with rosuvastatin.            Thank you for your consult. I will follow-up with patient. Please contact us if you have any additional questions.    Alex Mann MD  Nephrology  [facility]

## 2017-10-21 NOTE — CONSULTS
Mr Donta Cline is a re consult, as he was already bening followed per Nephrology please see Progress note dated 10/21/2017   Alex Mann MD  Nephrology Fellow PGY4  [phone]

## 2017-10-21 NOTE — PT/OT/SLP EVAL
Speech Language Pathology  Evaluation    Donta Cline   MRN: [id number]   Admitting Diagnosis: Lumbar stenosis     Diet recommendations: Solid Diet Level: Dental Soft  Liquid Diet Level: Pudding Thick   · Thicken all liquids to pudding thick with 4oz liquid to 2 packets Resource ThickenUp thickener  · Encourage double swallows per bolus  · Crush PO meds in pureed  · NO straws  · Fully awake and alert for PO intake  · Fully upright position for PO intake  · Small bites/ sips  · Slow rate of eating/ drinking  · 1 bite/ sip @ a time  · Refrain from talking prior to swallow completion  · Remain upright for 20-30 min post PO intake    SLP Treatment Date: 10/21/17  Speech Start Time: 1055     Speech Stop Time: 1114     Speech Total (min): 19 min       TREATMENT BILLABLE MINUTES:  Treatment Swallowing Dysfunction 10 and Eval Swallow and Oral Function 9    Diagnosis: Lumbar stenosis      Past Medical History:   Diagnosis Date    Bladder cancer     Chronic diastolic heart failure 8/21/2012    3/13: AOSAT: 98, FICKCI: 2.41, FICKCO: 5.18 PAPRES: 34/16 (23), PASAT: 65, PVR: 1.74 PWPRES: 18/18 (14), RA PRES: 14/13 (12), RV: 40/0, 10     Chronic hip pain, right 10/4/2017    Coronary artery disease involving native coronary artery of native heart without angina pectoris     Dyslipidemia     Encounter for blood transfusion     ESRD (end stage renal disease) on dialysis 6/9/2014    Essential hypertension     Hypercholesteremia 8/21/2012    Long-term (current) use of anticoagulants 10/9/2015    NSTEMI (non-ST elevated myocardial infarction) 10/4/2017    Paroxysmal atrial fibrillation 10/8/2015    Prostate cancer     Severe aortic stenosis 10/14/2014    Type 2 diabetes mellitus with chronic kidney disease on chronic dialysis, with long-term current use of insulin 12/15/2013    Ventricular tachycardia     monomorphic     Past Surgical History:   Procedure Laterality Date    BACK SURGERY      laminectomy x2    COLON  SURGERY      EXTENSIVE PROSTATE SURGER      Prostatectomy, Radical    JOINT REPLACEMENT      left hip       Has the patient been evaluated by SLP for swallowing? : Yes  Keep patient NPO?: No   General Precautions: Standard, aspiration, fall, pudding thick    Current Respiratory Status:  (room air)       Prior diet: MBS 10/12 rec'd: dental soft diet and pudding thick liquids.      Subjective:  Pt awake; pleasant  Patient goals: did not state    Pain/Comfort  Pain Rating 1: 0/10  Pain Rating Post-Intervention 1: 0/10    Objective:        Oral Musculature Evaluation  Oral Musculature: WFL  Dentition: present and adequate  Mucosal Quality: adequate, sticky  Mandibular Strength and Mobility: WNL  Oral Labial Strength and Mobility: WNL  Lingual Strength and Mobility: WFL  Velar Elevation: WFL  Buccal Strength and Mobility: WFL  Volitional Cough: adequate  Volitional Swallow: able to demonstrate with some effort  Voice Prior to PO Intake: clear     Bedside Swallow Eval:  Consistencies Assessed: Puree 3 full spoonfuls and Solids 1/4 hortensia cracker and Pudding thick liquids 5 full spoonfuls  Oral Phase: WFL; mild excess to bolus manipulation likely 2/2 xerostomia  Pharyngeal Phase: no overt clinical  signs/symptoms of aspiration    Pt recalled and executed aspiration precaution of 'double swallow' indep during entire session. Skilled education provided on aspiration precautions and diet recommendations; measuring cup provided to BS to ensure accurate measurement of thickened liquid. Daughter stated misplacement of pharyngeal exercise handout; another copy provided. Whiteboard updated with diet recommendations/aspiration precautions.  No further questions. RN notified of diet recs and thickener/liquid ratio.                                  Assessment:  Donta Cline is a 85 y.o. male with a medical diagnosis of Lumbar stenosis and presents with pharyngeal dysphagia.     Do you have any cultural, spiritual, Taoism  conflicts, given your current situation?: no     Discharge recommendations: Discharge Facility/Level Of Care Needs: nursing facility, skilled     Goals:    SLP Goals        Problem: SLP Goal    Goal Priority Disciplines Outcome   SLP Goal     SLP Ongoing (interventions implemented as appropriate)   Description:  Speech Therapy Short Term Goals  Goals expected to be met by 10/28:  1. Pt will tolerate pudding thick liquids and dental soft diet with no overt s/s aspiration.   2. Given clinician model, pt will complete dysphagia exercises x10 each in order to strengthen the swallowing musculature.   3. Pt will participate in repeat MBSS when deemed appropriate by SLP/ MD.                          Plan:   Patient to be seen Therapy Frequency: 5 x/week   Plan of Care expires: 11/19/17  Plan of Care reviewed with: patient, daughter  SLP Follow-up?: Yes             Nimisha Robles M.A. CCC-SLP  Speech Language Pathologist  [phone]  10/21/2017

## 2017-10-21 NOTE — ASSESSMENT & PLAN NOTE
-issue at OSH- plan for cath initially and never received due to issues with pulm edema  -RCA stenosis, low risk per cards can have outpatient LHC with intervention after discharge  -was on hep ggt - Holding heparin gtt per neurosurgery  - aspirin 81mg daily re-started 10/22

## 2017-10-21 NOTE — PROGRESS NOTES
[facility]  Neurosurgery  Progress Note    Subjective:     History of Present Illness: Mr. Cline is a 86 y/o male with past medical history of Type 2 diabetes with ESRD (MWF, Fistula on R arm) on home insulin therapy, chronic low back and right hip pain (due to lumbar spinal stenosis hx back surgeries at L4 and L5 and recent epidural steroid injections to address chronic pain in 9/2017),  severe aortic stenosis with VEL 0.7 cm2, CAD, HFpEF, bladder cancer and paroxysmal atrial fibrillation (Coumadin) transferred here from [facility] for second opinion concerning chronic back and hip pain in patient with moderate to severe lumbar stenosis.     Patient was originally seen at [facility] on 10/5/17 due to worsening low back pain and inability to ambulate with decline in function over past 5 weeks. There, patient was noted to have elevated troponin so transferred to [facility] for Cardiology evaluation. Patient was diagnosed with NSTEMI and treated medically. Patient was found to have elevated INR and no stents could be placed.  His INR was attempted to be revered with 4U of FFP and vit K but patient went into flash pulm edema.  At this time, 2 D echo revealed severe aortic stenosis with preserved EF and Cardiology evaluated patient and did not feel LHC warranted at this time but recommended outpatient follow-up with his regular Cardiologist and likely need for outpatient LHC to evaluate his aortic stenosis and coronary anatomy.      During this admission, patient was having delirium and developed aspiration PNA (placed on IV Zosyn). Additionally, pt was evaluated by [facility] who felt he was not a candidate for surgery. Patient was seen and evaluated by Neurosurgery at [place] (Dr. James) who noted Ct scan of lumbar spine showed moderate to severe acquired spinal stenosis and bilateral foraminal encroachment at L3-L4. Patient is here for second opionion from NSx.    Post-Op  Info:  Procedure(s) (LRB):  L2-L4 spinal fusion, . L3-L4 Interbody fusion, L2-L3 laminectomy (N/A)   1 Day Post-Op     Facility-Administered Medications Prior to Admission   Medication Dose Route Frequency Provider Last Rate Last Dose    [DISCONTINUED] albuterol sulfate nebulizer solution 2.5 mg  2.5 mg Nebulization Q4H PRN Homeyar MALCOLM Donovan MD   2.5 mg at 12/09/13 1400     Prescriptions Prior to Admission   Medication Sig Dispense Refill Last Dose    alprazolam (XANAX) 0.25 MG tablet Take 0.25 mg by mouth daily as needed for Anxiety.   0 Past Week at Unknown time    aspirin (ECOTRIN) 81 MG EC tablet 3 days a week (Patient taking differently: Take 81 mg by mouth every Mon, Wed, Fri. )  0 10/3/2017 at Unknown time    coenzyme Q10 200 mg capsule Take 200 mg by mouth once daily.   10/3/2017 at Unknown time    gabapentin (NEURONTIN) 300 MG capsule Take 300 mg by mouth every evening.       hydrocodone-acetaminophen 10-325mg (NORCO)  mg Tab Take 1 tablet by mouth every 6 (six) hours as needed for Pain.   0 10/3/2017 at Unknown time    insulin glargine (LANTUS) 100 unit/mL injection Inject 20 Units into the skin At bedtime.    10/3/2017 at Unknown time    insulin lispro (HUMALOG) 100 unit/mL injection Inject into the skin 3 (three) times daily as needed for High Blood Sugar.       metoprolol succinate (TOPROL-XL) 50 MG 24 hr tablet take 1 tablet by mouth once daily (Patient taking differently: Take one tablet by mouth daily on Monday, Wednesday and Friday) 30 tablet 11 10/19/2017 at 0835    NITROSTAT 0.4 mg SL tablet place 1 tablet under the tongue if needed every 5 minutes for chest pain for 3 doses IF NO RELIEF AFTER 3RD DOSE CALL PRESCRIBER . 100 tablet 6 Unknown at Unknown time    pantoprazole (PROTONIX) 40 MG tablet Take 1 tablet by mouth nightly.  0 10/3/2017 at Unknown time    ANKIT-AZ RX 1- mg-mg-mcg Tab Take 1 tablet by mouth once daily.  0 10/3/2017 at Unknown time     rosuvastatin (CRESTOR) 40 MG Tab Take 1/2 a tab a day (Patient taking differently: Take 40 mg by mouth every evening. ) 30 tablet 6 Past Week at Unknown time    sertraline (ZOLOFT) 100 MG tablet Take 1 tablet by mouth every evening.  0 10/3/2017 at Unknown time    warfarin (COUMADIN) 4 MG tablet take 1 tablet by mouth once daily 35 tablet 3 10/3/2017 at Unknown time       Review of patient's allergies indicates:   Allergen Reactions    Morphine Other (See Comments)     Other reaction(s): severe abdominal pain    Darvocet a500  [propoxyphene n-acetaminophen]      Other reaction(s): Unknown       Past Medical History:   Diagnosis Date    Bladder cancer     Chronic diastolic heart failure 8/21/2012    3/13: AOSAT: 98, FICKCI: 2.41, FICKCO: 5.18 PAPRES: 34/16 (23), PASAT: 65, PVR: 1.74 PWPRES: 18/18 (14), RA PRES: 14/13 (12), RV: 40/0, 10     Chronic hip pain, right 10/4/2017    Coronary artery disease involving native coronary artery of native heart without angina pectoris     Dyslipidemia     Encounter for blood transfusion     ESRD (end stage renal disease) on dialysis 6/9/2014    Essential hypertension     Hypercholesteremia 8/21/2012    Long-term (current) use of anticoagulants 10/9/2015    NSTEMI (non-ST elevated myocardial infarction) 10/4/2017    Paroxysmal atrial fibrillation 10/8/2015    Prostate cancer     Severe aortic stenosis 10/14/2014    Type 2 diabetes mellitus with chronic kidney disease on chronic dialysis, with long-term current use of insulin 12/15/2013    Ventricular tachycardia     monomorphic     Past Surgical History:   Procedure Laterality Date    BACK SURGERY      laminectomy x2    COLON SURGERY      EXTENSIVE PROSTATE SURGER      Prostatectomy, Radical    JOINT REPLACEMENT      left hip     Family History     None        Social History Main Topics    Smoking status: Former Smoker     Packs/day: 3.00     Years: 40.00     Quit date: 1/1/1980    Smokeless tobacco:  Former User    Alcohol use No      Comment: a few drinks    Drug use: Unknown    Sexual activity: Not on file     Review of Systems  Objective:     Weight: 80 kg (176 lb 5.9 oz)  Body mass index is 28.47 kg/m².  Vital Signs (Most Recent):  Temp: 98.3 °F (36.8 °C) (10/21/17 1100)  Pulse: 88 (10/21/17 1400)  Resp: (!) 21 (10/21/17 1400)  BP: (!) 126/57 (10/21/17 1400)  SpO2: 100 % (10/21/17 1400) Vital Signs (24h Range):  Temp:  [97.7 °F (36.5 °C)-98.9 °F (37.2 °C)] 98.3 °F (36.8 °C)  Pulse:  [] 88  Resp:  [10-24] 21  SpO2:  [91 %-100 %] 100 %  BP: ()/(43-69) 126/57  Arterial Line BP: ()/(33-51) 112/38       Date 10/21/17 0700 - 10/22/17 0659   Shift 9518-5360 5771-1807 8683-3931 24 Hour Total   I  N  T  A  K  E   P.O. 240   240    I.V.  (mL/kg) 240  (3)   240  (3)    Shift Total  (mL/kg) 480  (6)   480  (6)   O  U  T  P  U  T   Urine  (mL/kg/hr) 30   30    Shift Total  (mL/kg) 30  (0.4)   30  (0.4)   Weight (kg) 80 80 80 80              Oxygen Concentration (%):  [2] 2         Closed/Suction Drain 10/20/17 1647 Right;Superior Back Accordion 10 Fr. (Active)   Site Description Healing 10/21/2017 11:00 AM   Dressing Type No dressing 10/21/2017 11:00 AM   Drainage Serosanguineous 10/21/2017 11:00 AM   Status To bulb suction 10/21/2017 11:00 AM   Output (mL) 80 mL 10/21/2017  6:00 AM            Hemodialysis AV Fistula Right forearm (Active)   Needle Size 15ga 10/18/2017 12:15 PM   Site Assessment Clean;Dry;Intact 10/21/2017 11:00 AM   Patency Present;Thrill;Bruit 10/21/2017 11:00 AM   Status Deaccessed 10/21/2017 11:00 AM   Flows Good 10/16/2017  7:50 PM   Dressing Intervention New dressing 10/16/2017 12:30 PM   Dressing Status Clean;Dry;Intact 10/19/2017  9:25 AM   Site Condition No complications 10/21/2017 11:00 AM   Dressing Open to air (None) 10/21/2017 11:00 AM   Drainage Description Serosanguineous 10/11/2017 11:22 AM       Physical Exam:    Constitutional: He appears well-developed and  well-nourished.     Cardiovascular: Normal rate.     Abdominal: Soft.     Psych/Behavior: He is alert. He is oriented to person, place, and time. He has a normal mood and affect.     Neurological:   AAOx3, NAD, speech fluent  PERRL, EOMI FS TM  BUCIO 4+/5 - pain limited  SILT  Dressing c/d/i         Significant Labs:    Recent Labs  Lab 10/20/17  0428 10/20/17  1756 10/21/17  0107 10/21/17  0727 10/21/17  1205    100 148* 112*  --     137 140 139  --    K 4.6 4.3 5.5* 4.9 5.1    106 106 108  --    CO2 23 19* 18* 18*  --    BUN 29* 28* 33* 36*  --    CREATININE 5.5* 5.3* 6.0* 6.3*  --    CALCIUM 9.7 8.6* 9.2 9.3  --    MG 1.7  --  1.3*  --   --        Recent Labs  Lab 10/20/17  0428 10/20/17  1756 10/21/17  0107   WBC 7.51 16.42* 19.57*   HGB 8.7* 7.5* 7.6*   HCT 27.6* 23.4* 24.7*    322 365*       Recent Labs  Lab 10/20/17  0428 10/21/17  0107   INR 1.1 1.1     Microbiology Results (last 7 days)     ** No results found for the last 168 hours. **        ABGs: No results for input(s): PH, PCO2, PO2, HCO3, POCSATURATED, BE in the last 48 hours.  Cardiac markers: No results for input(s): CKMB, CPKMB, TROPONINT, TROPONINI, MYOGLOBIN in the last 48 hours.  CMP:   Recent Labs  Lab 10/20/17  0428 10/20/17  1756 10/21/17  0107 10/21/17  0727 10/21/17  1205    100 148* 112*  --    CALCIUM 9.7 8.6* 9.2 9.3  --    ALBUMIN 2.5*  --  2.5* 2.2*  --    PROT 6.3  --  5.9*  --   --     137 140 139  --    K 4.6 4.3 5.5* 4.9 5.1   CO2 23 19* 18* 18*  --     106 106 108  --    BUN 29* 28* 33* 36*  --    CREATININE 5.5* 5.3* 6.0* 6.3*  --    ALKPHOS 96  --  134  --   --    ALT 20  --  32  --   --    AST 20  --  54*  --   --    BILITOT 0.5  --  0.4  --   --      CRP: No results for input(s): CRP in the last 48 hours.  ESR: No results for input(s): POCESR, ERYTHROCYTES in the last 48 hours.  LFTs:   Recent Labs  Lab 10/20/17  0428 10/21/17  0107 10/21/17  0727   ALT 20 32  --    AST 20 54*  --     ALKPHOS 96 134  --    BILITOT 0.5 0.4  --    PROT 6.3 5.9*  --    ALBUMIN 2.5* 2.5* 2.2*     Procalcitonin: No results for input(s): PROCAL in the last 48 hours.  Recent Lab Results       10/21/17  1205 10/21/17  0727 10/21/17  0107 10/20/17  2129 10/20/17  1756      Immature Granulocytes   0.5  0.8(H)     Immature Grans (Abs)   0.10(H)  0.13(H)     Albumin  2.2(L) 2.5(L)       Alkaline Phosphatase   134       ALT   32       Anion Gap  13 16  12     AST   54(H)       Baso #   0.04  0.04     Basophil%   0.2  0.2     Total Bilirubin   0.4  Comment:  For infants and newborns, interpretation of results should be based  on gestational age, weight and in agreement with clinical  observations.  Premature Infant recommended reference ranges:  Up to 24 hours.............<8.0 mg/dL  Up to 48 hours............<12.0 mg/dL  3-5 days..................<15.0 mg/dL  6-29 days.................<15.0 mg/dL         BUN, Bld  36(H) 33(H)  28(H)     Calcium  9.3 9.2  8.6(L)     Chloride  108 106  106     CO2  18(L) 18(L)  19(L)     Creatinine  6.3(H) 6.0(H)  5.3(H)     Differential Method   Automated  Automated     eGFR if   8.5(A) 9.1(A)  10.5(A)     eGFR if non   7.4  Comment:  Calculation used to obtain the estimated glomerular filtration  rate (eGFR) is the CKD-EPI equation. Since race is unknown   in our information system, the eGFR values for   -American and Non--American patients are given   for each creatinine result.  (A) 7.8  Comment:  Calculation used to obtain the estimated glomerular filtration  rate (eGFR) is the CKD-EPI equation. Since race is unknown   in our information system, the eGFR values for   -American and Non--American patients are given   for each creatinine result.  (A)  9.1  Comment:  Calculation used to obtain the estimated glomerular filtration  rate (eGFR) is the CKD-EPI equation. Since race is unknown   in our information system, the eGFR values  for   -American and Non--American patients are given   for each creatinine result.  (A)     Eos #   0.0  0.4     Eosinophil%   0.1  2.2     Glucose  112(H) 148(H)  100     Gran #   18.2(H)  12.3(H)     Gran%   93.0(H)  74.8(H)     Hematocrit   24.7(L)  23.4(L)     Hemoglobin   7.6(L)  7.5(L)     Coumadin Monitoring INR   1.1  Comment:  Coumadin Therapy:  2.0 - 3.0 for INR for all indicators except mechanical heart valves  and antiphospholipid syndromes which should use 2.5 - 3.5.         Lymph #   0.6(L)  2.8     Lymph%   3.1(L)  16.9(L)     Magnesium   1.3(L)       MCH   32.9(H)  33.9(H)     MCHC   30.8(L)  32.1     MCV   107(H)  106(H)     Mono #   0.6  0.8     Mono%   3.1(L)  5.1     MPV   10.7  10.6     nRBC   0  0     Phosphorus  6.3(H) 6.6(H)       Platelets   365(H)  322     POCT Glucose    172(H)      Potassium 5.1 4.9 5.5  Comment:  *No Visible Hemolysis(H)  4.3     Total Protein   5.9(L)       Protime   11.6       RBC   2.31(L)  2.21(L)     RDW   15.9(H)  15.7(H)     Sodium  139 140  137     WBC   19.57(H)  16.42(H)                 10/20/17  1751      Immature Granulocytes      Immature Grans (Abs)      Albumin      Alkaline Phosphatase      ALT      Anion Gap      AST      Baso #      Basophil%      Total Bilirubin      BUN, Bld      Calcium      Chloride      CO2      Creatinine      Differential Method      eGFR if       eGFR if non       Eos #      Eosinophil%      Glucose      Gran #      Gran%      Hematocrit      Hemoglobin      Coumadin Monitoring INR      Lymph #      Lymph%      Magnesium      MCH      MCHC      MCV      Mono #      Mono%      MPV      nRBC      Phosphorus      Platelets      POCT Glucose 106     Potassium      Total Protein      Protime      RBC      RDW      Sodium      WBC          All pertinent labs from the last 24 hours have been reviewed.    Significant Diagnostics:  x-rays pending    Assessment/Plan:     Lumbar back pain with  radiculopathy affecting right lower extremity    86 y/o M with multiple active medical comorbidities, including recent NSTEMI, severe aortic stenosis, ESRD with HD MWF, and aspiration pneumonia s/p L3-5 TLIF POD#1    Neuro stable  Cont neuro checks  CV- goal normotension. Wean pressor for normal MAP's.    Anticipate re starting aspirin 24-48 hours post op. Holding heparin gtt.   Pulm- on RA.  FENGI- goal eunatremia, ADAT.   Heme/ID- afebrile. Hgb/hct stable  ppx- BRAIN/SCD's, sqh. Gi ppx.     dispo-  Cont ICU care. Wean pressor. PTOT. Medical management per primary.              Willie Hoang MD  Neurosurgery  [facility]

## 2017-10-21 NOTE — PROGRESS NOTES
"Rad called re: order for AP/Lateral Lspine. Pt pending PT eval, pt states "unsure if he'll be able to stand for xray." Will wait for PT eval for rec's. NS and NCC notified.  "

## 2017-10-21 NOTE — ASSESSMENT & PLAN NOTE
-issue at OSH- plan for cath initially and never received due to issues with pulm edema  -RCA stenosis, low risk per cards can have outpatient LHC with intervention after discharge  -was on hep ggt  -stopped prior to OR

## 2017-10-21 NOTE — H&P
[facility]  Neurocritical Care  H&P    Admit Date: 10/10/2017  Service Date: 10/21/2017  Length of Stay: 11    Subjective:     Chief Complaint: Lumbar stenosis    History of Present Illness: Mr. Cline is a 85yoM with multiple active medical comorbidities, including recent NSTEMI with flash pulmonary edema, subsequent L heart cath, severe aortic stenosis, ESRD with HD MWF, T2DM, aspiration pneumonia, and previous back surgeries. Pt presents to [facility] s/p L3-4 laminectomy. Pt became hypotensive in OR requiring phenylephrine to maintain MAP > 70. Pt admitted to [facility] for higher level of care.     Hospital Course:  10/21: admit to [facility] s/p L3-4 laminectomy, on phenylephrine ggt, Cr 6.0, K 5.5, nephrology consulted, insulin, D50, and calcium gluconate admin    Past Medical History:   Diagnosis Date    Bladder cancer     Chronic diastolic heart failure 8/21/2012    3/13: AOSAT: 98, FICKCI: 2.41, FICKCO: 5.18 PAPRES: 34/16 (23), PASAT: 65, PVR: 1.74 PWPRES: 18/18 (14), RA PRES: 14/13 (12), RV: 40/0, 10     Chronic hip pain, right 10/4/2017    Coronary artery disease involving native coronary artery of native heart without angina pectoris     Dyslipidemia     Encounter for blood transfusion     ESRD (end stage renal disease) on dialysis 6/9/2014    Essential hypertension     Hypercholesteremia 8/21/2012    Long-term (current) use of anticoagulants 10/9/2015    NSTEMI (non-ST elevated myocardial infarction) 10/4/2017    Paroxysmal atrial fibrillation 10/8/2015    Prostate cancer     Severe aortic stenosis 10/14/2014    Type 2 diabetes mellitus with chronic kidney disease on chronic dialysis, with long-term current use of insulin 12/15/2013    Ventricular tachycardia     monomorphic     Past Surgical History:   Procedure Laterality Date    BACK SURGERY      laminectomy x2    COLON SURGERY      EXTENSIVE PROSTATE SURGER      Prostatectomy, Radical    JOINT REPLACEMENT      left hip      History reviewed. No pertinent family history.  Social History   Substance Use Topics    Smoking status: Former Smoker     Packs/day: 3.00     Years: 40.00     Quit date: 1/1/1980    Smokeless tobacco: Former User    Alcohol use No      Comment: a few drinks     Review of patient's allergies indicates:   Allergen Reactions    Morphine Other (See Comments)     Other reaction(s): severe abdominal pain    Darvocet a500  [propoxyphene n-acetaminophen]      Other reaction(s): Unknown     Review of Systems:   Constitutional: Denies fevers or chills.  Pulmonary: Denies shortness of breath or cough.  Cardiology: Denies chest pain or palpitations.  GI: Denies abdominal pain or constipation.  Neurologic: Denies new weakness, headache, or paresthesias. +back pain     Vitals:   Temp: 98.1 °F (36.7 °C) (10/21/17 0630)  Pulse: 87 (10/21/17 0709)  Resp: (!) 23 (10/21/17 0700)  BP: (!) 102/56 (10/21/17 0700)  SpO2: 100 % (10/21/17 0709)    Temp:  [97.3 °F (36.3 °C)-98.3 °F (36.8 °C)] 98.1 °F (36.7 °C)  Pulse:  [] 87  Resp:  [10-24] 23  SpO2:  [91 %-100 %] 100 %  BP: ()/(43-69) 102/56  Arterial Line BP: ()/(33-51) 93/33         Oxygen Concentration (%):  [2] 2    10/20 0701 - 10/21 0700  In: 2827.1 [I.V.:2327.1]  Out: 800 [Drains:200]     Examination:   Constitutional: Well-nourished and -developed. No apparent distress.   Eyes: Conjunctiva clear, anicteric. Lids no lesions.  Head/Ears/Nose/Mouth/Throat/Neck: Moist mucous membranes. External ears, nose atraumatic.   Cardiovascular: Regular rhythm. No murmurs. No leg edema.  Respiratory: Comfortable respirations. Clear to auscultation.  Gastrointestinal: No hernia. Soft, nondistended, nontender. + bowel sounds.    Neurologic:  -GCS [id number]  -Lethargic post op. Oriented to person, place, and time. Speech fluent. Follows commands.  -Cranial nerves II-XII grossly intact   -Motor 5/5 throughout  -Sensation to light touch intact throughout     Medications:    Continuous    phenylephrine Last Rate: 1 mcg/kg/min (10/21/17 0618)   Scheduled    sodium chloride 0.9%  Once   atorvastatin 80 mg Daily   fentanyl 1 patch Q72H   heparin (porcine) 5,000 Units Q8H   insulin detemir 18 Units QHS   metoprolol succinate 150 mg Daily   pantoprazole 40 mg Nightly   senna-docusate 8.6-50 mg 1 tablet BID   sertraline 100 mg QHS   vit b cmplx 3-fa-vit c-biotin 1- mg-mg-mcg 1 tablet Daily   PRN    sodium chloride  Q24H PRN   sodium chloride  Q24H PRN   sodium chloride 0.9%  PRN   sodium chloride 0.9%  PRN   acetaminophen 650 mg Q8H PRN   calcium gluconate IVPB 1 g Q10 Min PRN   dextrose 50% 12.5 g PRN   dextrose 50% 25 g PRN   dextrose 50% 25 g PRN   glucagon (human recombinant) 1 mg PRN   glucose 16 g PRN   glucose 24 g PRN   hydrocodone-acetaminophen 10-325mg 1 tablet Q6H PRN   insulin aspart 0-5 Units QID (AC + HS) PRN   nitroGLYCERIN 0.3 mg Q5 Min PRN   ramelteon 8 mg Nightly PRN      Today I independently reviewed pertinent medications, lines/drains/airways, imaging, cardiology, lab results, microbiology results, notably: airway secure, VSS, HDS, meds and imaging reviewed, labs reviewed - notably Cr 6 and K 5.5, nephrology consulted and aware    Assessment/Plan:     Neuro   Spinal stenosis of lumbar region without neurogenic claudication    -POD1 s/p L3-4 laminectomy  -phenylephrine ggt for MAP >70  -NSGY following  -fentanyl patch, norco prn           Pulmonary   Aspiration pneumonia    -AF, no abx  -post op leukocytosis   -completed course of unasyn  -2/2 opiate overuse @ OSH        Cardiac/Vascular   NSTEMI (non-ST elevated myocardial infarction)    -issue at OSH- plan for cath initially and never received due to issues with pulm edema  -RCA stenosis, low risk per cards can have outpatient LHC with intervention after discharge  -was on hep ggt  -stopped prior to OR        Paroxysmal atrial fibrillation    -holding home ASA, coumadin, and metoprolol        Severe aortic  stenosis    -hx of        Essential hypertension    -no antihypertensives at this time          Hypercholesteremia    -atorvastatin 80 mg qd         Chronic diastolic heart failure    -hx of   -as noted on echo 10/11          Renal/   ESRD (end stage renal disease) on dialysis    -dialysis MWF  -nephrology consulted 2/2 Cr 6.0 and K 5.5  -unable to tolerate PO or rectal kayexalate  -insulin, D50, calcium gluconate admin  -nephrology to determine dialysis plan         Hematology   Long-term (current) use of anticoagulants    -see a fib, on coumadin        Endocrine   Type 2 diabetes mellitus with chronic kidney disease on chronic dialysis, with long-term current use of insulin    -SSI   -18U qhs         Other   Postprocedural hypotension    -phenylephrine for MAP>70            Prophylaxis:  Venous Thromboembolism: mechanical  Stress Ulcer: PPI  Ventilator Pneumonia: not applicable     Activity Orders          Up ad sandra starting at 10/11 0002        Full Code    Jennifer Ramirez PA-C  Neurocritical Care  [facility]

## 2017-10-21 NOTE — HOSPITAL COURSE
10/21: admit to [facility] s/p L3-4 laminectomy, on phenylephrine ggt, Cr 6.0, K 5.5, nephrology consulted, insulin, D50, and calcium gluconate admin  10/22: Patient weaned off phenylephrine drip this morning. Remains afebrile. Cr 6.5, K 5.3; Hg 6.8 and s/p 1u pRBC. Underwent hemodialysis and required phenylephrine drip at 0.5mcg/kg/min for several hours, weaned off overnight.  10/23: Midodrine increased to 10mg TID. Potassium 3.8 and creatinie 3.5 today. Hg 6.8 today and receiving 1u pRBC. Pain moderately well controlled and requiring Norco 10mg-325 q6. Remains afebrile.  10/24: Underwent dialysis  10/26: Discharge to [facility] rehab facility.

## 2017-10-21 NOTE — ASSESSMENT & PLAN NOTE
-POD1 s/p L3-4 laminectomy  -phenylephrine ggt for MAP >70  -NSGY following  -fentanyl patch, norco prn

## 2017-10-21 NOTE — PLAN OF CARE
Problem: SLP Goal  Goal: SLP Goal  Speech Therapy Short Term Goals  Goals expected to be met by 10/28:  1. Pt will tolerate pudding thick liquids and dental soft diet with no overt s/s aspiration.   2. Given clinician model, pt will complete dysphagia exercises x10 each in order to strengthen the swallowing musculature.   3. Pt will participate in repeat MBSS when deemed appropriate by SLP/ MD.         Swallow evaluation completed with initiated POC.    Nimisha Robles M.A. CCC-SLP  Speech Language Pathologist  [phone]  10/21/2017

## 2017-10-21 NOTE — PROGRESS NOTES
Paged NS to notify of lab results, awaiting call back. Paged Anesthesia and reviewed H & H. Patient awakens to voice, oriented x3, no complaints at this time. Patient requiring O2 via NC to keep sats above 90. Patient daughter notified of status of patient via phone. Rufino

## 2017-10-21 NOTE — ASSESSMENT & PLAN NOTE
-holding home coumadin  - aspirin held perioperatively and will re-started 10/22  - metoprolol 25mg BID

## 2017-10-21 NOTE — PROGRESS NOTES
[facility] at bedside fitting patient for back brace. Patient's daughter educated on brace placement. Rufino

## 2017-10-21 NOTE — PROGRESS NOTES
Pt arrived from PACU via bed by RN x2. Placed on bedside monitor, alarms set and audible. Hemovac in place, Lobito infusing at 1 mcg/kg/min. NCC notified of pt's arrival

## 2017-10-21 NOTE — ASSESSMENT & PLAN NOTE
-dialysis MWF  -nephrology consulted 2/2 Cr 6.0 and K 5.5  -unable to tolerate PO or rectal kayexalate  -insulin, D50, calcium gluconate admin  -nephrology to determine dialysis plan

## 2017-10-21 NOTE — SUBJECTIVE & OBJECTIVE
Interval History:   Transferred to [facility] secondary to s/p NSx intervention with s/p laminectom, hemodynamically unstable on Noosyne, no respiratory distress, pain has been well control.     Review of patient's allergies indicates:   Allergen Reactions    Darvocet a500  [propoxyphene n-acetaminophen]      Other reaction(s): Unknown    Morphine      Other reaction(s): Unknown     Current Facility-Administered Medications   Medication Frequency    0.9%  NaCl infusion (for blood administration) Q24H PRN    0.9%  NaCl infusion (for blood administration) Q24H PRN    0.9%  NaCl infusion PRN    0.9%  NaCl infusion Once    0.9%  NaCl infusion PRN    acetaminophen tablet 650 mg Q8H PRN    atorvastatin tablet 80 mg Daily    calcium gluconate 1 g in dextrose 5 % 100 mL IVPB (premix) Q10 Min PRN    dextrose 50% injection 12.5 g PRN    dextrose 50% injection 25 g PRN    dextrose 50% injection 25 g PRN    fentanyl 12 mcg/hr 1 patch Q72H    glucagon (human recombinant) injection 1 mg PRN    glucose chewable tablet 16 g PRN    glucose chewable tablet 24 g PRN    heparin (porcine) injection 5,000 Units Q8H    hydrocodone-acetaminophen 10-325mg per tablet 1 tablet Q6H PRN    insulin aspart pen 0-5 Units QID (AC + HS) PRN    insulin detemir pen 18 Units QHS    metoprolol tartrate (LOPRESSOR) tablet 25 mg BID    midodrine tablet 5 mg TID    nitroGLYCERIN SL tablet 0.3 mg Q5 Min PRN    pantoprazole EC tablet 40 mg Nightly    PHENYLephrine (MYNOR-SYNEPHRINE) 40 mg in sodium chloride 0.9% 250ml (titrating) (premix) Continuous    ramelteon tablet 8 mg Nightly PRN    senna-docusate 8.6-50 mg per tablet 1 tablet BID    sertraline tablet 100 mg QHS    vit b cmplx 3-fa-vit c-biotin 1- mg-mg-mcg per tablet 1 tablet Daily       Objective:     Vital Signs (Most Recent):  Temp: 98.1 °F (36.7 °C) (10/21/17 1500)  Pulse: 86 (10/21/17 1700)  Resp: 17 (10/21/17 1700)  BP: (!) 126/58 (10/21/17 1700)  SpO2: 100 % (10/21/17  1700)  O2 Device (Oxygen Therapy): nasal cannula (10/21/17 0700) Vital Signs (24h Range):  Temp:  [97.7 °F (36.5 °C)-98.9 °F (37.2 °C)] 98.1 °F (36.7 °C)  Pulse:  [] 86  Resp:  [10-25] 17  SpO2:  [91 %-100 %] 100 %  BP: ()/(44-76) 126/58  Arterial Line BP: ()/(32-51) 120/38     Weight: 80 kg (176 lb 5.9 oz) (10/17/17 1300)  Body mass index is 28.47 kg/m².  Body surface area is 1.93 meters squared.    I/O last 3 completed shifts:  In: 2827.1 [I.V.:2327.1; IV Piggyback:500]  Out: 800 [Drains:200; Blood:600]    Physical Exam   Constitutional: He is oriented to person, place, and time. He appears well-developed and well-nourished.   HENT:   Head: Normocephalic.   Nose: Nose normal.   Mouth/Throat: Oropharynx is clear and moist.   Eyes: No scleral icterus.   Neck: Neck supple. No JVD present. No tracheal deviation present. No thyromegaly present.   Cardiovascular: Normal rate, regular rhythm and normal heart sounds.  Exam reveals no gallop and no friction rub.    Pulmonary/Chest: Effort normal and breath sounds normal. No respiratory distress. He has no wheezes. He has no rales.   Abdominal: Soft. Bowel sounds are normal. He exhibits no distension and no mass. There is no tenderness. There is no rebound and no guarding.   Musculoskeletal: He exhibits no edema.   Right forearm AVF with good thrill and no bruits   Lymphadenopathy:     He has no cervical adenopathy.   Neurological: He is alert and oriented to person, place, and time. He exhibits normal muscle tone.   Negative asterixis    Skin: Skin is warm and dry. No rash noted. No erythema.   Vitals reviewed.      Significant Labs:  ABGs: No results for input(s): PH, PCO2, HCO3, POCSATURATED, BE in the last 168 hours.  BMP:   Recent Labs  Lab 10/21/17  0107  10/21/17  1522   *  < > 173*     < > 107   CO2 18*  < > 17*   BUN 33*  < > 39*   CREATININE 6.0*  < > 6.6*   CALCIUM 9.2  < > 9.1   MG 1.3*  --   --    < > = values in this interval not  displayed.  CBC:     Recent Labs  Lab 10/21/17  0107   WBC 19.57*   RBC 2.31*   HGB 7.6*   HCT 24.7*   *   *   MCH 32.9*   MCHC 30.8*     CMP:     Recent Labs  Lab 10/21/17  1522   *   CALCIUM 9.1   ALBUMIN 2.1*   PROT 5.3*      K 5.0  5.0   CO2 17*      BUN 39*   CREATININE 6.6*   ALKPHOS 110   ALT 11   AST 35   BILITOT 0.3     All labs within the past 24 hours have been reviewed.

## 2017-10-21 NOTE — HPI
Mr. Cline is a 85yoM with multiple active medical comorbidities, including recent NSTEMI with flash pulmonary edema, subsequent L heart cath, severe aortic stenosis, ESRD with HD MWF, T2DM, aspiration pneumonia, and previous back surgeries. Pt presents to [facility] s/p L3-4 laminectomy. Pt became hypotensive in OR requiring phenylephrine to maintain MAP > 70. Pt admitted to [facility] for higher level of care.

## 2017-10-21 NOTE — ASSESSMENT & PLAN NOTE
84 y/o M with multiple active medical comorbidities, including recent NSTEMI, severe aortic stenosis, ESRD with HD MWF, and aspiration pneumonia s/p L3-5 TLIF POD#1    Neuro stable  Cont neuro checks  CV- goal normotension. Wean pressor for normal MAP's.    Anticipate re starting aspirin 24-48 hours post op. Holding heparin gtt.   Pulm- on RA.  FENGI- goal eunatremia, ADAT.   Heme/ID- afebrile. Hgb/hct stable  ppx- BRAIN/SCD's, sqh. Gi ppx.     dispo-  Cont ICU care. Wean pressor. PTOT. Medical management per primary.

## 2017-10-21 NOTE — ASSESSMENT & PLAN NOTE
-dialysis MWF  -nephrology following - will receive hemodialysis 10/22  - Cr 6.5 and K 5.3 10/22  -unable to tolerate PO or rectal kayexalate  -insulin, D50, calcium gluconate admin

## 2017-10-21 NOTE — NURSING TRANSFER
Nursing Transfer Note      10/21/2017     Transfer To: [id number] from PACU    Transfer via bed    Transfer with cardiac monitoring, LSO back brace, insulin pen    Transported by RN x2    Medicines sent: MYNOR infusing, Calcium gluconate infusing    Chart send with patient: Yes    Notified: daughter, via phone    Patient reassessed at: 10/21/17 06:30

## 2017-10-21 NOTE — ASSESSMENT & PLAN NOTE
Theron Cline is a 85 year old white male with past medical history of bladder CA in which he is in remission (was treated with chemotherapy), CAD, A-fib (treated with warfarin) and ESRD on Hd. Patient was transfered from [facility] where he presented with worsening right leg and back pain, general weakness. In hospital presented with increase troponin levels, diagnosed with NSTEMI, increase in INR where was treated with FFP and Vit K, flash pulmonary edema, started on Bi-PAP and aspirated pneumonia (treated with Zosyn).     Consulted for dialysis treatment: iHD MWF, dialyzed in [facility] theo, Rt lower arm AVF, duration 3.5, followed by Dr. Mathur, EDW 90 kg and had his last dialysis on Monday.     - Transferred to [facility] hemodynamically unstable post op- Laminectomy L3-L4  - will plan to wean vasopressors of and may start midodrine per [facility]  - No need for RRT today will await for hemodynamically better  - BUN increasing but on Steroids  - Continue with rosuvastatin.

## 2017-10-21 NOTE — PLAN OF CARE
Problem: Patient Care Overview  Goal: Plan of Care Review  Outcome: Ongoing (interventions implemented as appropriate)  No acute events throughout the day, VS and assessment per flow sheet, patient progressing towards goal as tolerated. Plan of care reviewed with Donta Cline and family, all concerns addressed. Will continue to monitor.     Pt pending PA/Lat xray once evaled by PT. Awaiting PT to bedside. NCC and NS notified.

## 2017-10-21 NOTE — ASSESSMENT & PLAN NOTE
-POD2 s/p L3-4 laminectomy 10/20  -phenylephrine ggt for MAP >70  -NSGY following  -fentanyl patch, norco prn

## 2017-10-21 NOTE — PROGRESS NOTES
Attempted ABIMAEL swallow study, patient drowsy, awakens by voice, oriented x4, moves all extremities, then patient drifts back to sleep. Notified NCC patient unable to perfom ABIMAEL at this time as unable to stay awake and alert for 15 minutes.

## 2017-10-21 NOTE — PROGRESS NOTES
Dr. Cannon with NS at bedside, 500NS bolus ordered for BP, patient with no complaints at this time. Wctm

## 2017-10-21 NOTE — SUBJECTIVE & OBJECTIVE
Facility-Administered Medications Prior to Admission   Medication Dose Route Frequency Provider Last Rate Last Dose    [DISCONTINUED] albuterol sulfate nebulizer solution 2.5 mg  2.5 mg Nebulization Q4H PRN Homenereida Donovan MD   2.5 mg at 12/09/13 1400     Prescriptions Prior to Admission   Medication Sig Dispense Refill Last Dose    alprazolam (XANAX) 0.25 MG tablet Take 0.25 mg by mouth daily as needed for Anxiety.   0 Past Week at Unknown time    aspirin (ECOTRIN) 81 MG EC tablet 3 days a week (Patient taking differently: Take 81 mg by mouth every Mon, Wed, Fri. )  0 10/3/2017 at Unknown time    coenzyme Q10 200 mg capsule Take 200 mg by mouth once daily.   10/3/2017 at Unknown time    gabapentin (NEURONTIN) 300 MG capsule Take 300 mg by mouth every evening.       hydrocodone-acetaminophen 10-325mg (NORCO)  mg Tab Take 1 tablet by mouth every 6 (six) hours as needed for Pain.   0 10/3/2017 at Unknown time    insulin glargine (LANTUS) 100 unit/mL injection Inject 20 Units into the skin At bedtime.    10/3/2017 at Unknown time    insulin lispro (HUMALOG) 100 unit/mL injection Inject into the skin 3 (three) times daily as needed for High Blood Sugar.       metoprolol succinate (TOPROL-XL) 50 MG 24 hr tablet take 1 tablet by mouth once daily (Patient taking differently: Take one tablet by mouth daily on Monday, Wednesday and Friday) 30 tablet 11 10/19/2017 at 0835    NITROSTAT 0.4 mg SL tablet place 1 tablet under the tongue if needed every 5 minutes for chest pain for 3 doses IF NO RELIEF AFTER 3RD DOSE CALL PRESCRIBER . 100 tablet 6 Unknown at Unknown time    pantoprazole (PROTONIX) 40 MG tablet Take 1 tablet by mouth nightly.  0 10/3/2017 at Unknown time    ANKIT-AZ RX 1- mg-mg-mcg Tab Take 1 tablet by mouth once daily.  0 10/3/2017 at Unknown time    rosuvastatin (CRESTOR) 40 MG Tab Take 1/2 a tab a day (Patient taking differently: Take 40 mg by mouth every evening. ) 30 tablet  6 Past Week at Unknown time    sertraline (ZOLOFT) 100 MG tablet Take 1 tablet by mouth every evening.  0 10/3/2017 at Unknown time    warfarin (COUMADIN) 4 MG tablet take 1 tablet by mouth once daily 35 tablet 3 10/3/2017 at Unknown time       Review of patient's allergies indicates:   Allergen Reactions    Morphine Other (See Comments)     Other reaction(s): severe abdominal pain    Darvocet a500  [propoxyphene n-acetaminophen]      Other reaction(s): Unknown       Past Medical History:   Diagnosis Date    Bladder cancer     Chronic diastolic heart failure 8/21/2012    3/13: AOSAT: 98, FICKCI: 2.41, FICKCO: 5.18 PAPRES: 34/16 (23), PASAT: 65, PVR: 1.74 PWPRES: 18/18 (14), RA PRES: 14/13 (12), RV: 40/0, 10     Chronic hip pain, right 10/4/2017    Coronary artery disease involving native coronary artery of native heart without angina pectoris     Dyslipidemia     Encounter for blood transfusion     ESRD (end stage renal disease) on dialysis 6/9/2014    Essential hypertension     Hypercholesteremia 8/21/2012    Long-term (current) use of anticoagulants 10/9/2015    NSTEMI (non-ST elevated myocardial infarction) 10/4/2017    Paroxysmal atrial fibrillation 10/8/2015    Prostate cancer     Severe aortic stenosis 10/14/2014    Type 2 diabetes mellitus with chronic kidney disease on chronic dialysis, with long-term current use of insulin 12/15/2013    Ventricular tachycardia     monomorphic     Past Surgical History:   Procedure Laterality Date    BACK SURGERY      laminectomy x2    COLON SURGERY      EXTENSIVE PROSTATE SURGER      Prostatectomy, Radical    JOINT REPLACEMENT      left hip     Family History     None        Social History Main Topics    Smoking status: Former Smoker     Packs/day: 3.00     Years: 40.00     Quit date: 1/1/1980    Smokeless tobacco: Former User    Alcohol use No      Comment: a few drinks    Drug use: Unknown    Sexual activity: Not on file     Review of  Systems  Objective:     Weight: 80 kg (176 lb 5.9 oz)  Body mass index is 28.47 kg/m².  Vital Signs (Most Recent):  Temp: 98.3 °F (36.8 °C) (10/21/17 1100)  Pulse: 88 (10/21/17 1400)  Resp: (!) 21 (10/21/17 1400)  BP: (!) 126/57 (10/21/17 1400)  SpO2: 100 % (10/21/17 1400) Vital Signs (24h Range):  Temp:  [97.7 °F (36.5 °C)-98.9 °F (37.2 °C)] 98.3 °F (36.8 °C)  Pulse:  [] 88  Resp:  [10-24] 21  SpO2:  [91 %-100 %] 100 %  BP: ()/(43-69) 126/57  Arterial Line BP: ()/(33-51) 112/38       Date 10/21/17 0700 - 10/22/17 0659   Shift 9426-1123 8803-8581 9403-4587 24 Hour Total   I  N  T  A  K  E   P.O. 240   240    I.V.  (mL/kg) 240  (3)   240  (3)    Shift Total  (mL/kg) 480  (6)   480  (6)   O  U  T  P  U  T   Urine  (mL/kg/hr) 30   30    Shift Total  (mL/kg) 30  (0.4)   30  (0.4)   Weight (kg) 80 80 80 80              Oxygen Concentration (%):  [2] 2         Closed/Suction Drain 10/20/17 1647 Right;Superior Back Accordion 10 Fr. (Active)   Site Description Healing 10/21/2017 11:00 AM   Dressing Type No dressing 10/21/2017 11:00 AM   Drainage Serosanguineous 10/21/2017 11:00 AM   Status To bulb suction 10/21/2017 11:00 AM   Output (mL) 80 mL 10/21/2017  6:00 AM            Hemodialysis AV Fistula Right forearm (Active)   Needle Size 15ga 10/18/2017 12:15 PM   Site Assessment Clean;Dry;Intact 10/21/2017 11:00 AM   Patency Present;Thrill;Bruit 10/21/2017 11:00 AM   Status Deaccessed 10/21/2017 11:00 AM   Flows Good 10/16/2017  7:50 PM   Dressing Intervention New dressing 10/16/2017 12:30 PM   Dressing Status Clean;Dry;Intact 10/19/2017  9:25 AM   Site Condition No complications 10/21/2017 11:00 AM   Dressing Open to air (None) 10/21/2017 11:00 AM   Drainage Description Serosanguineous 10/11/2017 11:22 AM       Physical Exam:    Constitutional: He appears well-developed and well-nourished.     Cardiovascular: Normal rate.     Abdominal: Soft.     Psych/Behavior: He is alert. He is oriented to person, place,  and time. He has a normal mood and affect.     Neurological:   AAOx3, NAD, speech fluent  PERRL, EOMI FS TM  BUCIO 4+/5 - pain limited  SILT  Dressing c/d/i         Significant Labs:    Recent Labs  Lab 10/20/17  0428 10/20/17  1756 10/21/17  0107 10/21/17  0727 10/21/17  1205    100 148* 112*  --     137 140 139  --    K 4.6 4.3 5.5* 4.9 5.1    106 106 108  --    CO2 23 19* 18* 18*  --    BUN 29* 28* 33* 36*  --    CREATININE 5.5* 5.3* 6.0* 6.3*  --    CALCIUM 9.7 8.6* 9.2 9.3  --    MG 1.7  --  1.3*  --   --        Recent Labs  Lab 10/20/17  0428 10/20/17  1756 10/21/17  0107   WBC 7.51 16.42* 19.57*   HGB 8.7* 7.5* 7.6*   HCT 27.6* 23.4* 24.7*    322 365*       Recent Labs  Lab 10/20/17  0428 10/21/17  0107   INR 1.1 1.1     Microbiology Results (last 7 days)     ** No results found for the last 168 hours. **        ABGs: No results for input(s): PH, PCO2, PO2, HCO3, POCSATURATED, BE in the last 48 hours.  Cardiac markers: No results for input(s): CKMB, CPKMB, TROPONINT, TROPONINI, MYOGLOBIN in the last 48 hours.  CMP:   Recent Labs  Lab 10/20/17  0428 10/20/17  1756 10/21/17  0107 10/21/17  0727 10/21/17  1205    100 148* 112*  --    CALCIUM 9.7 8.6* 9.2 9.3  --    ALBUMIN 2.5*  --  2.5* 2.2*  --    PROT 6.3  --  5.9*  --   --     137 140 139  --    K 4.6 4.3 5.5* 4.9 5.1   CO2 23 19* 18* 18*  --     106 106 108  --    BUN 29* 28* 33* 36*  --    CREATININE 5.5* 5.3* 6.0* 6.3*  --    ALKPHOS 96  --  134  --   --    ALT 20  --  32  --   --    AST 20  --  54*  --   --    BILITOT 0.5  --  0.4  --   --      CRP: No results for input(s): CRP in the last 48 hours.  ESR: No results for input(s): POCESR, ERYTHROCYTES in the last 48 hours.  LFTs:   Recent Labs  Lab 10/20/17  0428 10/21/17  0107 10/21/17  0727   ALT 20 32  --    AST 20 54*  --    ALKPHOS 96 134  --    BILITOT 0.5 0.4  --    PROT 6.3 5.9*  --    ALBUMIN 2.5* 2.5* 2.2*     Procalcitonin: No results for input(s):  PROCAL in the last 48 hours.  Recent Lab Results       10/21/17  1205 10/21/17  0727 10/21/17  0107 10/20/17  2129 10/20/17  1756      Immature Granulocytes   0.5  0.8(H)     Immature Grans (Abs)   0.10(H)  0.13(H)     Albumin  2.2(L) 2.5(L)       Alkaline Phosphatase   134       ALT   32       Anion Gap  13 16  12     AST   54(H)       Baso #   0.04  0.04     Basophil%   0.2  0.2     Total Bilirubin   0.4  Comment:  For infants and newborns, interpretation of results should be based  on gestational age, weight and in agreement with clinical  observations.  Premature Infant recommended reference ranges:  Up to 24 hours.............<8.0 mg/dL  Up to 48 hours............<12.0 mg/dL  3-5 days..................<15.0 mg/dL  6-29 days.................<15.0 mg/dL         BUN, Bld  36(H) 33(H)  28(H)     Calcium  9.3 9.2  8.6(L)     Chloride  108 106  106     CO2  18(L) 18(L)  19(L)     Creatinine  6.3(H) 6.0(H)  5.3(H)     Differential Method   Automated  Automated     eGFR if   8.5(A) 9.1(A)  10.5(A)     eGFR if non   7.4  Comment:  Calculation used to obtain the estimated glomerular filtration  rate (eGFR) is the CKD-EPI equation. Since race is unknown   in our information system, the eGFR values for   -American and Non--American patients are given   for each creatinine result.  (A) 7.8  Comment:  Calculation used to obtain the estimated glomerular filtration  rate (eGFR) is the CKD-EPI equation. Since race is unknown   in our information system, the eGFR values for   -American and Non--American patients are given   for each creatinine result.  (A)  9.1  Comment:  Calculation used to obtain the estimated glomerular filtration  rate (eGFR) is the CKD-EPI equation. Since race is unknown   in our information system, the eGFR values for   -American and Non--American patients are given   for each creatinine result.  (A)     Eos #   0.0  0.4      Eosinophil%   0.1  2.2     Glucose  112(H) 148(H)  100     Gran #   18.2(H)  12.3(H)     Gran%   93.0(H)  74.8(H)     Hematocrit   24.7(L)  23.4(L)     Hemoglobin   7.6(L)  7.5(L)     Coumadin Monitoring INR   1.1  Comment:  Coumadin Therapy:  2.0 - 3.0 for INR for all indicators except mechanical heart valves  and antiphospholipid syndromes which should use 2.5 - 3.5.         Lymph #   0.6(L)  2.8     Lymph%   3.1(L)  16.9(L)     Magnesium   1.3(L)       MCH   32.9(H)  33.9(H)     MCHC   30.8(L)  32.1     MCV   107(H)  106(H)     Mono #   0.6  0.8     Mono%   3.1(L)  5.1     MPV   10.7  10.6     nRBC   0  0     Phosphorus  6.3(H) 6.6(H)       Platelets   365(H)  322     POCT Glucose    172(H)      Potassium 5.1 4.9 5.5  Comment:  *No Visible Hemolysis(H)  4.3     Total Protein   5.9(L)       Protime   11.6       RBC   2.31(L)  2.21(L)     RDW   15.9(H)  15.7(H)     Sodium  139 140  137     WBC   19.57(H)  16.42(H)                 10/20/17  1751      Immature Granulocytes      Immature Grans (Abs)      Albumin      Alkaline Phosphatase      ALT      Anion Gap      AST      Baso #      Basophil%      Total Bilirubin      BUN, Bld      Calcium      Chloride      CO2      Creatinine      Differential Method      eGFR if       eGFR if non       Eos #      Eosinophil%      Glucose      Gran #      Gran%      Hematocrit      Hemoglobin      Coumadin Monitoring INR      Lymph #      Lymph%      Magnesium      MCH      MCHC      MCV      Mono #      Mono%      MPV      nRBC      Phosphorus      Platelets      POCT Glucose 106     Potassium      Total Protein      Protime      RBC      RDW      Sodium      WBC          All pertinent labs from the last 24 hours have been reviewed.    Significant Diagnostics:  x-rays pending

## 2017-10-22 NOTE — PROGRESS NOTES
Dialysis machine and water set up at bedside. Maintenance dialysis began per orders via 15 gauge fistula needles to right forearm fistula.

## 2017-10-22 NOTE — PLAN OF CARE
Problem: Physical Therapy Goal  Goal: Physical Therapy Goal  Revised Goals to be met by: 10/31/2017    Patient will increase functional independence with mobility by performin. Supine to sit with Minimum Assistance  2. Sit to supine with Minimum Assistance  3. Sit to stand transfer with Minimum Assistance using AD or No AD  4. Bed to chair transfer with Minimum Assistance using AD or No AD  5. Gait x50 feet with Minimum Assistance using AD or No AD    Goals to be met by: 10/29/2017    Patient will increase functional independence with mobility by performin. Supine to sit with Modified [place]  2. Sit to supine with Modified [place]  3. Sit to stand transfer with Contact Guard Assistance using Rolling Walker - Met   Updated: Sit to stand transfer with Supervision using rolling walker  4. Bed to chair transfer with Contact Guard Assistance using Rolling Walker - Met   Updated: Bed to chair transfer with Supervision using rolling walker  5. Gait  x 75 feet with Contact Guard Assistance using Rolling Walker. - Met   Updated: Gait x 150 feet with SBA using rolling walker       Outcome: Ongoing (interventions implemented as appropriate)    PT Re-evaluation complete. Recommending [facility] upon D/C. Please see full note for details.    Key Mai, PT, DPT  [phone]  10/22/2017

## 2017-10-22 NOTE — OP NOTE
"Date of Operation: 10/20/17    Pre-Operative Diagnosis:   1. Intractable right L3-4 radiculopathy with right lower extremity iliospoas weakness  2. Severe central and bilateral neuroforaminal L3-4 stenosis causing complete obliteration of thecal sac  3. Focal lordotic deformity with Grade 1 spondylolistheis  4. Previous non-instrumented L4-5 fusion    Post-Operative Diagnosis:  Same as above    Operation Titles:  1. L2-3 laminectomies for decompression with removal of scar tissue  2. Bilateral L3-4 facetectomies and foraminotomies   3. Near total L3-4 diskectomy from posterior approach   4. L3-4 Interbody arthrodesis, allograft and autograft   5. Placement of an expandable interbody cage at L3-4 (Globus Caliber Spacer - 10x30, 10-15mm)   6. L2-4 bilateral pedicle screw fixation (Globus Creo screws)  7. L2-4 postero-lateral fusion  8. Use of intraoperative flouroscopy  9. Use of intraoperative neuromonitoring (SSEP, EMG)    Surgeon: Malachi Sands D.O.    First Assistant: Severiano Archibald M.D. (resident)    Anesthesia: General     Level of involvement of attending surgeon: Full     Indications:   This is a 86 y/o MALE with multiple serious medical comorbidites including Type 2 diabetes, ESRD on dialysis, severe aortic stenosis, bladder cancer, paroxysmal AFIB on coumadin, and chronic low back and bilateral hip pain who [facility]surgical opinion for severe, intractable right L3-4 radiculopathy, R iliopsoas weakness and rapid decline in ability to ambulate over the past few weeks.  Prior to admission he was found to have an NSTEMI at the OSH that was treated medically and was also found to have a supra-therapeutic INR of 8 and was given 4U FFP that led to pulmonary edema.      Our initial recommendation was against surgery given his medical status. However, because the patient was in such severe, agonizing pain, he and has family pleaded to have the surgery. Patient stated the he "would " "rather die that continue with the pain."  After clearance was obtained from cardiology and the other medical teams, surgery was offered with the understanding that the patient and family fully understood and accepted the risks of performing surgery in the setting of his multiple medical conditions.    The risks, benefits and alternative options were discussed. The risks included but were not limited to bleeding, infection, death, CSF leak, nerve root injury, bowel and bladder or sexual dysfunction, weakness, numbness, paralysis, stroke, hardware migration, pseudoarthrosis, chronic pain, and chronic deformity were discussed.     Procedure In Detail:  The patient was taken to the Operating Room, and induction of general endotracheal anesthesia was initiated. After placement of adequate access, leads were placed in the legs for EMG monitoring. The patient was positioned prone on the Koffi table such that all pressure points were carefully inspected and padded. A safety pause was performed with the indicated procedure and site of surgery stated and confirmed. The old midline incision from L2-4 was marked under fluoroscopy and anesthetized with marcaine and epinephrine solution. The area was then prepped and draped in the usual sterile fashion.     A Midline incision was made with a #10 blade and with bovie cautery carried down through the lumbo-dorsal fascia, exposing the L2-3 laminae, the facet joints, as well as the tranverse processes of L3 and L4.  Dense, epidural scar from his previous surgery was encountered an incised where appropiate.  Self-retaining retractors were placed for greater exposure and hemostasis.    Under flouro guidance, the L2 pedicles were canulated and palpated to ensure that there was no breach medially and laterally. Appropriately sized pedicle screws were placed in the L2 pedicles bilaterally with adequate placement confirmed with fluoroscopy. Attention was then directed to the L3 and L4 " levels where the same procedure was repeated with appropriately sized screws placed in the bilateral L3 and 4 pedicles under flouro. Each pedicle screw was then tested with EMG  with good return of current.  SSEPs were periodically checked throughout the case and were within normal limits.    Using the matchstick drill, bilateral laminectomies at L2 and 3 wer performed and full facetectomies were performed at L3-L4 bilaterally.  There was completed obliteration of the spinal canal and the thecal sac and nerve roots were severely compressed by bone fragments, hypertrophied ligament, scar tissue, and epidural steroid injection crystals.  This debris was removed in a piecemeal fashion with Kerrison rongeurs. Further ligament was removed piecemeal to expose the disc space underneath. The exiting nerve root superior to the disc space was then visualized bilaterally and protected through the remainder of case.  A bayoneted knife was used to perform an annulotomy on the left side. An aggressive near total discectomy at L3-4 was performed using curettes, interbody roxanna, rasps and pituitaries. Interbody cage trials were sequentially used to distract the disc space. This was all done under flouro guidance    The appropriately sized expandable TLIF cage (Globus - see above) was packed with autograft obtained by morselizing the patient's own lamina and facets that were removed earlier as well as allo-graft (DBX) and then packed obliquely into the interbody space at L3-4 under fluoroscopic guidance. Confirmation of good position was seen. The lateral recess and foraminal space were inspected to ensure that there was no compression of neural elements. The operative space was irrigated with antibiotic impregnated saline. Appropriately sized pre-bent lordotic rods were placed in the pedicle screw tulip heads and secured in place with set-screws. Depomedrol was placed along the decompressed nerve roots.    Aggressive  decortication of the facet joints was completed bilaterally at L3-4 and L4-5 as well as the lamina at L2 and L4 using a match stick drill bit. The autograft previously obtained and morselized from patient's own lamina and facets was added to DBX and cancellous bone chips and packed in the posterio-lateral gutters from L2-L4 for the postero-lateral fusion. Hemostasis was achieved using bipolar cautery, floseal and fibrillar.    Number 0 Vicryl was used to close the fascia invertedly and 2-0 Vicryl subcutaneously. Staples were used for skin closure.     Estimated blood loss: 500cc    Blood products: see anesthesia report    Drain:  Hemovac    Needle/Sponge count: Correct    Anesthesia: General    Level of attending involvement: Full    Prognosis: Good    Condition: Stable

## 2017-10-22 NOTE — PT/OT/SLP RE-EVAL
Physical Therapy  Re-evaluation/Treatment    Donta Cline   MRN: [id number]   Admitting Diagnosis: Lumbar stenosis    PT Received On: 10/22/17  PT Start Time: 0840     PT Stop Time: 0913    PT Total Time (min): 33 min       Billable Minutes:  Re-eval 15 and Therapeutic Activity 15    Diagnosis: Lumbar stenosis; s/p L2-L3 laminectomy; L3-L4 interbody fusion    Past Medical History:   Diagnosis Date    Bladder cancer     Chronic diastolic heart failure 8/21/2012    3/13: AOSAT: 98, FICKCI: 2.41, FICKCO: 5.18 PAPRES: 34/16 (23), PASAT: 65, PVR: 1.74 PWPRES: 18/18 (14), RA PRES: 14/13 (12), RV: 40/0, 10     Chronic hip pain, right 10/4/2017    Coronary artery disease involving native coronary artery of native heart without angina pectoris     Dyslipidemia     Encounter for blood transfusion     ESRD (end stage renal disease) on dialysis 6/9/2014    Essential hypertension     Hypercholesteremia 8/21/2012    Long-term (current) use of anticoagulants 10/9/2015    NSTEMI (non-ST elevated myocardial infarction) 10/4/2017    Paroxysmal atrial fibrillation 10/8/2015    Prostate cancer     Severe aortic stenosis 10/14/2014    Type 2 diabetes mellitus with chronic kidney disease on chronic dialysis, with long-term current use of insulin 12/15/2013    Ventricular tachycardia     monomorphic      Past Surgical History:   Procedure Laterality Date    BACK SURGERY      laminectomy x2    COLON SURGERY      EXTENSIVE PROSTATE SURGER      Prostatectomy, Radical    JOINT REPLACEMENT      left hip     Referring physician: Mignon  Date referred to PT: 10/20/2017    General Precautions: Standard, aspiration, fall, pudding thick (spinal)  Orthopedic Precautions: N/A   Braces: LSO       Do you have any cultural, spiritual, Religion conflicts, given your current situation?: none stated    Patient History:  Lives With: child(johana), adult  Living Arrangements: house  Home Accessibility: stairs to enter home  Number of Stairs  "to Enter Home: 1  Stair Railings at Home: none  Transportation Available: family or friend will provide  Living Environment Comment: Patient lives alone in 1-story home 1 CORTES no HRs. Significant functional decline over the course of 3 weeks from independence without equipment to needing RW for ambulation. Walk-in shower with shower chair. BSC over toilet. Currently using WC for community mobility. Reports multiple falls in the past 6 months and attributes falls to LE weakness.  Equipment Currently Used at Home: bedside commode, cane, straight, shower chair, walker, rolling, wheelchair  DME owned (not currently used): none    Previous Level of Function:  Ambulation Skills: needs device  Transfer Skills: needs device  ADL Skills: independent    Subjective:  Communicated with RN (Betsey) prior to session.    Chief Complaint: "I am in a lot of pain today, girl."  Patient goals: To return to supine    Pain/Comfort  Pain Rating 1:  (pt reports signficant pain to L hip/generalized pain to back/buttocks)  Pain Addressed 1: Reposition, Distraction, Cessation of Activity, Pre-medicate for activity, Nurse notified, Other (see comments) (return to supine)  Pain Rating Post-Intervention 1:  (pt reports severe L hip pain)    Objective:   Patient found with: peripheral IV, arterial line, blood pressure cuff, pulse ox (continuous), SCD, telemetry, salazar catheter     Cognitive Exam:  Oriented to: Person, Situation    Follows Commands/attention: Follows one-step commands  Communication: clear/fluent  Safety awareness/insight to disability: impaired    Physical Exam:  Postural examination/scapula alignment: Rounded shoulder, Head forward and Posterior pelvic tilt    Skin integrity: Thin and Dry; Bruising to B UE/B LE  Edema: None noted    Sensation:   Intact - upon evaluation and per pt report, no changes in sensation to B LE. Reports diminished, but equal    Upper Extremity Range of Motion:  Right Upper Extremity: WFL  Left Upper " Extremity: WFL    Upper Extremity Strength: No focal weakness noted  Right Upper Extremity: WFL  Left Upper Extremity: WFL    Lower Extremity Range of Motion:  Right Lower Extremity: WFL  Left Lower Extremity: WFL    Lower Extremity Strength: Grossly 3-4/5 to BLE  Right Lower Extremity: WFL  Left Lower Extremity: WFL    Gross motor coordination: WFL    Functional Mobility:  Bed Mobility:  Rolling/Turning to Left: Moderate assistance  Rolling/Turning Right: Moderate assistance  Scooting/Bridging: Moderate Assistance  Supine to Sit: Moderate Assistance  Sit to Supine: Moderate Assistance    Transfers:  Sit <> Stand Assistance: Moderate Assistance (from EOB to perform x3 semi-stands for scooting toward HOB)  Sit <> Stand Assistive Device: No Assistive Device    Gait:   Gait Distance: Not appropriate this date    Sitting Balance at EOB:   Assistance Level Required: Stand-by Assistance and Contact Guard Assistance   Time: x12 min for testing   Postural deviations noted: Rounded shoulder, Head forward, Posterior pelvic tilt, Lateral weight shift of hips and Abnormal trunk flexion    Pt tolerated EOB sitting as above with VCs for neutral positioning 2/2 increased complaints of pain sitting EOB and requests to return to supine. VCs/TCs for neutral pelvis, thoracic extension, midline orientation, forward gaze and attention to tasks. Pt instructed on scooting toward HOB via gentle anterior WS due to decreased tolerance for donning LSO brace this date.    Therapeutic Activities and Exercises:  PT arrived to pt's room to find pt resting quietly; agreeable to PT session. Pt performed mobility as above. Upon return to supine, PT positioned pt in neutral near HOB with B UE supported and back in neutral; pt then requesting repositioning - pt assist to R sidelying for comfort. Questions/concerns addressed within PT scope of practice; pt, family, and RN with no further questions. PT discussed with OT.     AM-PAC 6 CLICK MOBILITY  How  much help from another person does this patient currently need?   1 = Unable, Total/Dependent Assistance  2 = A lot, Maximum/Moderate Assistance  3 = A little, Minimum/Contact Guard/Supervision  4 = None, Modified [place]/Independent    Turning over in bed (including adjusting bedclothes, sheets and blankets)?: 2  Sitting down on and standing up from a chair with arms (e.g., wheelchair, bedside commode, etc.): 2  Moving from lying on back to sitting on the side of the bed?: 2  Moving to and from a bed to a chair (including a wheelchair)?: 2  Need to walk in hospital room?: 1  Climbing 3-5 steps with a railing?: 1  Total Score: 10     AM-PAC Raw Score CMS G-Code Modifier Level of Impairment Assistance   6 % Total / Unable   7 - 9 CM 80 - 100% Maximal Assist   10 - 14 CL 60 - 80% Moderate Assist   15 - 19 CK 40 - 60% Moderate Assist   20 - 22 CJ 20 - 40% Minimal Assist   23 CI 1-20% SBA / CGA   24 CH 0% Independent/ Mod I     Patient left HOB elevated with all lines intact, call button in reach, RN notified and family present.    Assessment:   Donta Cline is a 85 y.o. male with a medical diagnosis of Lumbar stenosis s/p L2-L3 laminectomy and L3-L4 fusion. PTA pt was mod (I) with mobility and ADLs. Upon evaluation, pt presents with generalized weakness, impaired functional mobility, and decreased activity tolerance. Pt presents with significant L hip pain limiting progression with PT this date. Pt currently requires SBA-mod A for majority of mobility. Will progress as tolerated. Patient will benefit from continued PT services to address the above and below impairments..    Rehab identified problem list/impairments: Rehab identified problem list/impairments: weakness, impaired endurance, impaired sensation, impaired self care skills, impaired functional mobilty, impaired balance, gait instability, impaired cognition, decreased upper extremity function, decreased lower extremity function, decreased  safety awareness, pain, abnormal tone, decreased ROM, impaired coordination, impaired fine motor, impaired skin, impaired joint extensibility    Rehab potential is good.    Activity tolerance: Good    Discharge recommendations: Discharge Facility/Level Of Care Needs: nursing facility, skilled     Barriers to discharge: Barriers to Discharge: Inaccessible home environment, Decreased caregiver support    Equipment recommendations: Equipment Needed After Discharge:  (TBD next level of care)     GOALS:    Physical Therapy Goals        Problem: Physical Therapy Goal    Goal Priority Disciplines Outcome Goal Variances Interventions   Physical Therapy Goal     PT/OT, PT Ongoing (interventions implemented as appropriate)     Description:  Revised Goals to be met by: 10/31/2017    Patient will increase functional independence with mobility by performin. Supine to sit with Minimum Assistance  2. Sit to supine with Minimum Assistance  3. Sit to stand transfer with Minimum Assistance using AD or No AD  4. Bed to chair transfer with Minimum Assistance using AD or No AD  5. Gait x50 feet with Minimum Assistance using AD or No AD    Goals to be met by: 10/29/2017    Patient will increase functional independence with mobility by performin. Supine to sit with Modified [place]  2. Sit to supine with Modified [place]  3. Sit to stand transfer with Contact Guard Assistance using Rolling Walker - Met   Updated: Sit to stand transfer with Supervision using rolling walker  4. Bed to chair transfer with Contact Guard Assistance using Rolling Walker - Met   Updated: Bed to chair transfer with Supervision using rolling walker  5. Gait  x 75 feet with Contact Guard Assistance using Rolling Walker. - Met   Updated: Gait x 150 feet with SBA using rolling walker                       PLAN:    Patient to be seen 5 x/week to address the above listed problems via gait training, therapeutic activities, therapeutic exercises,  neuromuscular re-education  Plan of Care expires: 11/20/17  Plan of Care reviewed with: patient, daughter    Key MARGuicho Mai, PT, DPT  [phone]  10/22/2017

## 2017-10-22 NOTE — PLAN OF CARE
Problem: Patient Care Overview  Goal: Plan of Care Review  Outcome: Ongoing (interventions implemented as appropriate)  POC reviewed with patient. Pt verbalized understanding, but unsure if patient completely understands. Questions and concerns addressed.   No acute events overnight. Pt did act confused during the night, but answered all orientation questions appropriately. Pt progressing toward goals.   MD Christian, notified of pt's drop in H&H. No further orders given.  Will continue to monitor.  See flow sheets for full assessment and VS info

## 2017-10-22 NOTE — SUBJECTIVE & OBJECTIVE
Interval History:  No acute events. Patient weaned off phenylephrine drip this morning. Remains afebrile. Cr 6.5, K 5.3; Hg 6.8 and receiving 1u pRBC.    Review of Systems    Constitutional: Denies fevers or chills.  Pulmonary: Denies shortness of breath or cough.  Cardiology: Denies chest pain or palpitations.  GI: Denies abdominal pain or constipation.  Neurologic: Denies new weakness,  headache, or paresthesias.    Objective:     Vitals:  Temp: 97.9 °F (36.6 °C) (10/22/17 1100)  Pulse: 75 (10/22/17 1400)  Resp: (!) 25 (10/22/17 1400)  BP: (!) 119/92 (10/22/17 0912)  SpO2: 100 % (10/22/17 1400)    Temp:  [97.9 °F (36.6 °C)-98.4 °F (36.9 °C)] 97.9 °F (36.6 °C)  Pulse:  [75-92] 75  Resp:  [17-84] 25  SpO2:  [98 %-100 %] 100 %  BP: (106-154)/(55-92) 119/92  Arterial Line BP: ()/(18-45) 108/39              10/21 0701 - 10/22 0700  In: 1080 [P.O.:360; I.V.:720]  Out: 290 [Urine:60; Drains:230]    Physical Exam  Constitutional: Well-nourished and -developed. No apparent distress.   Eyes: Conjunctiva clear, anicteric. Lids no lesions.  Head/Ears/Nose/Mouth/Throat/Neck: Moist mucous membranes. External ears, nose atraumatic.   Cardiovascular: Regular rhythm. No murmurs. No leg edema.  Respiratory: Comfortable respirations. Clear to auscultation.  Gastrointestinal: No hernia. Soft, nondistended, nontender. + bowel sounds.     Neurologic:  -GCS E4V5M6  -Awake, alert. Oriented to person, place, and time. Speech fluent. Follows commands.  -Cranial nerves II-XII grossly intact   -Motor 5/5 throughout  -Sensation to light touch intact throughout     Medications:  Continuous  phenylephrine Last Rate: Stopped (10/22/17 0912)   Scheduled  sodium chloride 0.9%  Once   sodium chloride 0.9%  Once   atorvastatin 80 mg Daily   fentanyl 1 patch Q72H   heparin (porcine) 5,000 Units Q8H   insulin detemir 18 Units QHS   metoprolol tartrate 25 mg BID   midodrine 5 mg TID   pantoprazole 40 mg Nightly   senna-docusate 8.6-50 mg 1 tablet  BID   sertraline 100 mg QHS   vit b cmplx 3-fa-vit c-biotin 1- mg-mg-mcg 1 tablet Daily   PRN  sodium chloride  Q24H PRN   sodium chloride  Q24H PRN   sodium chloride  Q24H PRN   sodium chloride 0.9%  PRN   sodium chloride 0.9%  PRN   sodium chloride 0.9%  PRN   acetaminophen 650 mg Q8H PRN   calcium gluconate IVPB 1 g Q10 Min PRN   dextrose 50% 12.5 g PRN   dextrose 50% 25 g PRN   diazePAM 2 mg Q6H PRN   glucagon (human recombinant) 1 mg PRN   glucose 16 g PRN   glucose 24 g PRN   hydrocodone-acetaminophen 10-325mg 1 tablet Q6H PRN   insulin aspart 0-5 Units QID (AC + HS) PRN   nitroGLYCERIN 0.3 mg Q5 Min PRN   ramelteon 8 mg Nightly PRN     Labs:  Component      Latest Ref Rng & Units 10/22/2017 10/22/2017 10/22/2017           8:44 AM  8:03 AM  8:03 AM   Sodium      136 - 145 mmol/L  140    Potassium      3.5 - 5.1 mmol/L  5.0 5.0   Chloride      95 - 110 mmol/L  109    CO2      23 - 29 mmol/L  17 (L)    Glucose      70 - 110 mg/dL  114 (H)    BUN, Bld      8 - 23 mg/dL  42 (H)    Creatinine      0.5 - 1.4 mg/dL  6.5 (H)    Calcium      8.7 - 10.5 mg/dL  9.3    Total Protein      6.0 - 8.4 g/dL  5.5 (L)    Albumin      3.5 - 5.2 g/dL  2.2 (L)    Total Bilirubin      0.1 - 1.0 mg/dL  0.3    Alkaline Phosphatase      55 - 135 U/L  110    AST      10 - 40 U/L  26    ALT      10 - 44 U/L  <5 (L)    Anion Gap      8 - 16 mmol/L  14    eGFR if African American      >60 mL/min/1.73 m:2  8.2 (A)    eGFR if non African American      >60 mL/min/1.73 m:2  7.1 (A)    Group & Rh       O NEG     INDIRECT CLEO       NEG

## 2017-10-22 NOTE — PROGRESS NOTES
[facility]  Neurocritical Care  Progress Note    Admit Date: 10/10/2017  Service Date: 10/22/2017  Length of Stay: 12    Subjective:     Chief Complaint: Lumbar stenosis    History of Present Illness: Mr. Cline is a 85yoM with multiple active medical comorbidities, including recent NSTEMI with flash pulmonary edema, subsequent L heart cath, severe aortic stenosis, ESRD with HD MWF, T2DM, aspiration pneumonia, and previous back surgeries. Pt presents to [facility] s/p L3-4 laminectomy. Pt became hypotensive in OR requiring phenylephrine to maintain MAP > 70. Pt admitted to [facility] for higher level of care.     Hospital Course: 10/21: admit to [facility] s/p L3-4 laminectomy, on phenylephrine ggt, Cr 6.0, K 5.5, nephrology consulted, insulin, D50, and calcium gluconate admin  10/22: Patient weaned off phenylephrine drip this morning. Remains afebrile. Cr 6.5, K 5.3; Hg 6.8 and s/p 1u pRBC.    Interval History:  No acute events. Patient weaned off phenylephrine drip this morning. Remains afebrile. Cr 6.5, K 5.3; Hg 6.8 and receiving 1u pRBC.    Review of Systems    Constitutional: Denies fevers or chills.  Pulmonary: Denies shortness of breath or cough.  Cardiology: Denies chest pain or palpitations.  GI: Denies abdominal pain or constipation.  Neurologic: Denies new weakness,  headache, or paresthesias.    Objective:     Vitals:  Temp: 97.9 °F (36.6 °C) (10/22/17 1100)  Pulse: 75 (10/22/17 1400)  Resp: (!) 25 (10/22/17 1400)  BP: (!) 119/92 (10/22/17 0912)  SpO2: 100 % (10/22/17 1400)    Temp:  [97.9 °F (36.6 °C)-98.4 °F (36.9 °C)] 97.9 °F (36.6 °C)  Pulse:  [75-92] 75  Resp:  [17-84] 25  SpO2:  [98 %-100 %] 100 %  BP: (106-154)/(55-92) 119/92  Arterial Line BP: ()/(18-45) 108/39              10/21 0701 - 10/22 0700  In: 1080 [P.O.:360; I.V.:720]  Out: 290 [Urine:60; Drains:230]    Physical Exam  Constitutional: Well-nourished and -developed. No apparent distress.   Eyes: Conjunctiva clear, anicteric. Lids no  lesions.  Head/Ears/Nose/Mouth/Throat/Neck: Moist mucous membranes. External ears, nose atraumatic.   Cardiovascular: Regular rhythm. No murmurs. No leg edema.  Respiratory: Comfortable respirations. Clear to auscultation.  Gastrointestinal: No hernia. Soft, nondistended, nontender. + bowel sounds.     Neurologic:  -GCS E4V5M6  -Awake, alert. Oriented to person, place, and time. Speech fluent. Follows commands.  -Cranial nerves II-XII grossly intact   -Motor 5/5 throughout  -Sensation to light touch intact throughout     Medications:  Continuous  phenylephrine Last Rate: Stopped (10/22/17 0912)   Scheduled  sodium chloride 0.9%  Once   sodium chloride 0.9%  Once   atorvastatin 80 mg Daily   fentanyl 1 patch Q72H   heparin (porcine) 5,000 Units Q8H   insulin detemir 18 Units QHS   metoprolol tartrate 25 mg BID   midodrine 5 mg TID   pantoprazole 40 mg Nightly   senna-docusate 8.6-50 mg 1 tablet BID   sertraline 100 mg QHS   vit b cmplx 3-fa-vit c-biotin 1- mg-mg-mcg 1 tablet Daily   PRN  sodium chloride  Q24H PRN   sodium chloride  Q24H PRN   sodium chloride  Q24H PRN   sodium chloride 0.9%  PRN   sodium chloride 0.9%  PRN   sodium chloride 0.9%  PRN   acetaminophen 650 mg Q8H PRN   calcium gluconate IVPB 1 g Q10 Min PRN   dextrose 50% 12.5 g PRN   dextrose 50% 25 g PRN   diazePAM 2 mg Q6H PRN   glucagon (human recombinant) 1 mg PRN   glucose 16 g PRN   glucose 24 g PRN   hydrocodone-acetaminophen 10-325mg 1 tablet Q6H PRN   insulin aspart 0-5 Units QID (AC + HS) PRN   nitroGLYCERIN 0.3 mg Q5 Min PRN   ramelteon 8 mg Nightly PRN     Labs:  Component      Latest Ref Rng & Units 10/22/2017 10/22/2017 10/22/2017           8:44 AM  8:03 AM  8:03 AM   Sodium      136 - 145 mmol/L  140    Potassium      3.5 - 5.1 mmol/L  5.0 5.0   Chloride      95 - 110 mmol/L  109    CO2      23 - 29 mmol/L  17 (L)    Glucose      70 - 110 mg/dL  114 (H)    BUN, Bld      8 - 23 mg/dL  42 (H)    Creatinine      0.5 - 1.4 mg/dL  6.5  "(H)    Calcium      8.7 - 10.5 mg/dL  9.3    Total Protein      6.0 - 8.4 g/dL  5.5 (L)    Albumin      3.5 - 5.2 g/dL  2.2 (L)    Total Bilirubin      0.1 - 1.0 mg/dL  0.3    Alkaline Phosphatase      55 - 135 U/L  110    AST      10 - 40 U/L  26    ALT      10 - 44 U/L  <5 (L)    Anion Gap      8 - 16 mmol/L  14    eGFR if African American      >60 mL/min/1.73 m:2  8.2 (A)    eGFR if non African American      >60 mL/min/1.73 m:2  7.1 (A)    Group & Rh       O NEG     INDIRECT CLEO       NEG       Assessment/Plan:     Neuro   Spinal stenosis of lumbar region without neurogenic claudication    -POD2 s/p L3-4 laminectomy 10/20  -phenylephrine ggt for MAP >70  -NSGY following  -fentanyl patch, norco prn           Pulmonary   Aspiration pneumonia    -AF, no abx  -post op leukocytosis   -completed course of unasyn  -2/2 opiate overuse @ OSH        Cardiac/Vascular   NSTEMI (non-ST elevated myocardial infarction)    -issue at OSH- plan for cath initially and never received due to issues with pulm edema  -RCA stenosis, low risk per cards can have outpatient LHC with intervention after discharge  -was on hep ggt - Holding heparin gtt per neurosurgery  - aspirin 81mg daily re-started 10/22         Paroxysmal atrial fibrillation    -holding home coumadin  - aspirin held perioperatively and will re-started 10/22  - metoprolol 25mg BID        Severe aortic stenosis    -hx of        Essential hypertension    -no antihypertensives at this time          Hypercholesteremia    -atorvastatin 80 mg qd         Chronic diastolic heart failure    -as noted on echo 10/11          Renal/   Hypokalemia    K 5.3   See "ESRD"        ESRD (end stage renal disease) on dialysis    -dialysis MWF  -nephrology following - will receive hemodialysis 10/22  - Cr 6.5 and K 5.3 10/22  -unable to tolerate PO or rectal kayexalate  -insulin, D50, calcium gluconate admin        Hematology   Long-term (current) use of anticoagulants    -see a fib, on " coumadin        Endocrine   Type 2 diabetes mellitus with chronic kidney disease on chronic dialysis, with long-term current use of insulin    -SSI   -18U qhs         Other   Postprocedural hypotension    -weaned off phenylephrine 10/22  - continue to monitor BP            Prophylaxis:  Venous Thromboembolism: mechanical  Stress Ulcer: PPI  Ventilator Pneumonia: not applicable     Activity Orders          Up ad sandra starting at 10/11 0002        Full Code    Shiraz Phillips MD  Neurocritical Care  [facility]

## 2017-10-22 NOTE — PLAN OF CARE
Problem: Patient Care Overview  Goal: Plan of Care Review  Outcome: Ongoing (interventions implemented as appropriate)  No acute events throughout the day, VS and assessment per flow sheet, patient progressing towards goal as tolerated. Plan of care reviewed with Donta Cline and family, all concerns addressed. Will continue to monitor.     Pt with intermittent bouts of confusion today, pulled out alma today.

## 2017-10-22 NOTE — PT/OT/SLP PROGRESS
Occupational Therapy      Donta BARRIOS Marlyn  MRN: [id number]    Patient not seen today secondary to increased on confusion this PM and c/o pain.  Will follow up 10/23 to complete OT evaluation.      PIYUSH Blevins  10/22/2017

## 2017-10-23 PROBLEM — Z99.2 ANEMIA IN CHRONIC KIDNEY DISEASE, ON CHRONIC DIALYSIS: Status: ACTIVE | Noted: 2017-01-01

## 2017-10-23 PROBLEM — N18.6 ANEMIA IN CHRONIC KIDNEY DISEASE, ON CHRONIC DIALYSIS: Status: ACTIVE | Noted: 2017-01-01

## 2017-10-23 PROBLEM — E87.6 HYPOKALEMIA: Status: RESOLVED | Noted: 2017-01-01 | Resolved: 2017-01-01

## 2017-10-23 PROBLEM — D63.1 ANEMIA IN CHRONIC KIDNEY DISEASE, ON CHRONIC DIALYSIS: Status: ACTIVE | Noted: 2017-01-01

## 2017-10-23 PROBLEM — K62.5 BRBPR (BRIGHT RED BLOOD PER RECTUM): Status: RESOLVED | Noted: 2017-01-01 | Resolved: 2017-01-01

## 2017-10-23 NOTE — ASSESSMENT & PLAN NOTE
86 y/o M with multiple active medical comorbidities, including recent NSTEMI, severe aortic stenosis, ESRD with HD MWF, and aspiration pneumonia s/p L3-5 TLIF    Neuro stable  Cont neuro checks  CV- goal normotension. Wean pressor for normal MAP's.   ASA restarted. Holding heparin gtt.   Pulm- on RA.  FENGI- goal eunatremia, ADAT.   Heme/ID- afebrile. Hgb/hct declined, so 1uPRBCs transfused  ppx- BRANI/SCD's/SQH, sqh. Gi ppx.     dispo-  Cont ICU care. Wean pressor. PTOT. Medical management per primary.

## 2017-10-23 NOTE — PROGRESS NOTES
Notified SHELDON Kumar regarding pt's consistent cough and ratting sound in chest. Spoke with NancyRT requesting a time duo neb or prn nebs. Per orders will put those orders in now. Also reported pt received HD from [id number] and CMP was drawn. Potassium dripped from 5.2 to 3.2 and CR and Bun still how but are decreasing. Per orders please continue to monitor..

## 2017-10-23 NOTE — PROGRESS NOTES
[facility]  Neurocritical Care  Progress Note    Admit Date: 10/10/2017  Service Date: 10/23/2017  Length of Stay: 13    Subjective:     Chief Complaint: Lumbar stenosis    History of Present Illness: Mr. Cline is a 85yoM with multiple active medical comorbidities, including recent NSTEMI with flash pulmonary edema, subsequent L heart cath, severe aortic stenosis, ESRD with HD MWF, T2DM, aspiration pneumonia, and previous back surgeries. Pt presents to [facility] s/p L3-4 laminectomy. Pt became hypotensive in OR requiring phenylephrine to maintain MAP > 70. Pt admitted to [facility] for higher level of care.     Hospital Course: 10/21: admit to [facility] s/p L3-4 laminectomy, on phenylephrine ggt, Cr 6.0, K 5.5, nephrology consulted, insulin, D50, and calcium gluconate admin  10/22: Patient weaned off phenylephrine drip this morning. Remains afebrile. Cr 6.5, K 5.3; Hg 6.8 and s/p 1u pRBC.    Interval History:  Underwent hemodialysis 10/22 and required phenylephrine drip at 0.5mcg/kg/min for several hours, weaned off overnight. Midodrine increased to 10mg TID today. Potassium 3.8 and creatinie 3.5 today. Hg 6.8 today and receiving 1u pRBC. Pain moderately well controlled and requiring Norco 10mg-325 q6. Remains afebrile.    Review of Systems    Constitutional: Denies fevers or chills.  Pulmonary: Denies shortness of breath or cough.  Cardiology: Denies chest pain or palpitations.  GI: Denies abdominal pain or constipation.  Neurologic: Denies new weakness or paresthesias.    Objective:     Vitals:  Temp: 98.5 °F (36.9 °C) (10/23/17 0700)  Pulse: 85 (10/23/17 0800)  Resp: (!) 25 (10/23/17 0800)  BP: (!) 113/58 (10/23/17 0800)  SpO2: 100 % (10/23/17 0800)    Temp:  [97.9 °F (36.6 °C)-98.5 °F (36.9 °C)] 98.5 °F (36.9 °C)  Pulse:  [73-89] 85  Resp:  [18-30] 25  SpO2:  [98 %-100 %] 100 %  BP: ()/(44-86) 113/58  Arterial Line BP: ()/(32-41) 108/39              10/22 0701 - 10/23 0700  In: 1309.9 [P.O.:120;  I.V.:89.9]  Out: 690 [Drains:190]    Physical Exam  Constitutional: Well-nourished and -developed. No apparent distress.   Eyes: Conjunctiva clear, anicteric. Lids no lesions.  Head/Ears/Nose/Mouth/Throat/Neck: Moist mucous membranes. External ears, nose atraumatic.   Cardiovascular: Regular rhythm. Grade 2 systolic murmur over RUSB. No edema.  Respiratory: Comfortable respirations. Clear to auscultation.  Gastrointestinal: No hernia. Soft, nondistended, nontender. + bowel sounds.     Neurologic:  -GCS [id number]  -Awake, alert. Speech fluent. Follows commands.  -Cranial nerves II-XII grossly intact   -Motor 5/5 bilateral upper extremities, 4/5 proximal LE likely effort related  -Sensation to light touch intact throughout     Medications:  Continuous  phenylephrine Last Rate: Stopped (10/22/17 2310)   Scheduled  sodium chloride 0.9%  Once   sodium chloride 0.9%  Once   atorvastatin 80 mg Daily   epoetin preeti (PROCRIT) injection 10,000 Units Once   fentanyl 1 patch Q72H   heparin (porcine) 5,000 Units Q8H   insulin detemir 18 Units QHS   magnesium sulfate IVPB 2 g Once   metoprolol tartrate 25 mg BID   midodrine 10 mg TID   pantoprazole 40 mg Nightly   senna-docusate 8.6-50 mg 1 tablet BID   sertraline 100 mg QHS   vit b cmplx 3-fa-vit c-biotin 1- mg-mg-mcg 1 tablet Daily   PRN  sodium chloride  Q24H PRN   sodium chloride  Q24H PRN   sodium chloride  Q24H PRN   sodium chloride  Q24H PRN   sodium chloride 0.9%  PRN   sodium chloride 0.9%  PRN   sodium chloride 0.9%  PRN   acetaminophen 650 mg Q8H PRN   albuterol-ipratropium 2.5mg-0.5mg/3mL 3 mL Q4H PRN   calcium gluconate IVPB 1 g Q10 Min PRN   dextrose 50% 12.5 g PRN   dextrose 50% 25 g PRN   diazePAM 2 mg Q6H PRN   glucagon (human recombinant) 1 mg PRN   glucose 16 g PRN   glucose 24 g PRN   hydrocodone-acetaminophen 10-325mg 1 tablet Q6H PRN   insulin aspart 0-5 Units QID (AC + HS) PRN   nitroGLYCERIN 0.3 mg Q5 Min PRN   ramelteon 8 mg Nightly PRN      Labs:  Component      Latest Ref Rng & Units 10/23/2017 10/23/2017 10/23/2017           8:24 AM  1:36 AM  1:36 AM   Sodium      136 - 145 mmol/L 140 139    Potassium      3.5 - 5.1 mmol/L 3.8 3.8 3.8   Chloride      95 - 110 mmol/L 104 103    CO2      23 - 29 mmol/L 28 27    Glucose      70 - 110 mg/dL 67 (L) 102    BUN, Bld      8 - 23 mg/dL 20 18    Creatinine      0.5 - 1.4 mg/dL 3.5 (H) 3.2 (H)    Calcium      8.7 - 10.5 mg/dL 8.8 8.4 (L)    Total Protein      6.0 - 8.4 g/dL 5.4 (L)     Albumin      3.5 - 5.2 g/dL 2.2 (L) 2.1 (L)    Total Bilirubin      0.1 - 1.0 mg/dL 0.8     Alkaline Phosphatase      55 - 135 U/L 108     AST      10 - 40 U/L 29     ALT      10 - 44 U/L <5 (L)     Anion Gap      8 - 16 mmol/L 8 9    eGFR if African American      >60 mL/min/1.73 m:2 17.4 (A) 19.4 (A)    eGFR if non African American      >60 mL/min/1.73 m:2 15.0 (A) 16.7 (A)    Phosphorus      2.7 - 4.5 mg/dL  3.0 3.0   Protime      9.0 - 12.5 sec   10.9   Coumadin Monitoring INR      0.8 - 1.2   1.0   Magnesium      1.6 - 2.6 mg/dL   1.1 (L)     Component      Latest Ref Rng & Units 10/23/2017   WBC      3.90 - 12.70 K/uL 9.14   RBC      4.60 - 6.20 M/uL 2.08 (L)   Hemoglobin      14.0 - 18.0 g/dL 6.8 (L)   Hematocrit      40.0 - 54.0 % 21.1 (L)   MCV      82 - 98 fL 101 (H)   MCH      27.0 - 31.0 pg 32.7 (H)   MCHC      32.0 - 36.0 g/dL 32.2   RDW      11.5 - 14.5 % 17.3 (H)   Platelets      150 - 350 K/uL 165   MPV      9.2 - 12.9 fL 10.8   Immature Granulocytes      0.0 - 0.5 % 0.8 (H)   Gran #      1.8 - 7.7 K/uL 7.7   Immature Grans (Abs)      0.00 - 0.04 K/uL 0.07 (H)   Lymph #      1.0 - 4.8 K/uL 0.8 (L)   Mono #      0.3 - 1.0 K/uL 0.5   Eos #      0.0 - 0.5 K/uL 0.1   Baso #      0.00 - 0.20 K/uL 0.02   nRBC      0 /100 WBC 0   Gran%      38.0 - 73.0 % 84.0 (H)   Lymph%      18.0 - 48.0 % 8.4 (L)   Mono%      4.0 - 15.0 % 5.4   Eosinophil%      0.0 - 8.0 % 1.2   Basophil%      0.0 - 1.9 % 0.2   Differential Method    "    Automated     Imaging:  None new    Assessment/Plan:     Neuro   Spinal stenosis of lumbar region without neurogenic claudication    -POD 3 s/p L3-4 laminectomy 10/20  -NSGY following  -fentanyl patch, Norco  q6 prn           Pulmonary   Aspiration pneumonia    -AF, no abx  -post op leukocytosis   -completed course of unasyn  -2/2 opiate overuse @ OSH        Cardiac/Vascular   Elevated troponin    See "NSTEMI"        NSTEMI (non-ST elevated myocardial infarction)    -issue at OSH- plan for cath initially and never received due to issues with pulm edema  -RCA stenosis, low risk per cards can have outpatient LHC with intervention after discharge  -was on hep ggt - Holding heparin gtt post-op per neurosurgery  - aspirin 81mg daily likely to re-start 10/25 per neurosurgery if no signs of bleeding        Paroxysmal atrial fibrillation    - holding home coumadin post-operatively under neurosurgery clearance  - aspirin held post-op and will re-started 10/25 per neurosurgery  - metoprolol 25mg BID        Essential hypertension    -no antihypertensives at this time          Coronary artery disease involving native coronary artery of native heart without angina pectoris    Coronary stenosis > 50% on cardiac cath 10/16/17        Chronic diastolic heart failure    - most recent echo on 10/12/17 showed pulmonary hypertension with undetermined diastolic function          Renal/   ESRD (end stage renal disease) on dialysis    -dialysis MWF  -nephrology following - last dialyzed 10/22  - Cr 3.5 and K 3.8 on 10/23 s/p dialysis  - CMP daily  -unable to tolerate PO or rectal kayexalate  -insulin, D50, calcium gluconate admin  - Central line currently day 18 - remove after dialysis 10/24 if patient does not require pressors during dialysis         Endocrine   Type 2 diabetes mellitus with chronic kidney disease on chronic dialysis, with long-term current use of insulin    -SSI   -18U qhs         Other   Postprocedural " hypotension    - weaned off phenylephrine 10/22 initially, then required transiently during dialysis 10/22, now off  - midodrine increased to 10mg TID 10/23  - continue to monitor BP               Anemia in chronic kidney disease, on chronic dialysis    Hemoglobin 6.8 on 10/22 s/p 1u RBC and Hg 6.8 10/23  - 1u RBC 10/23  - Epoetin 10,000 units x1 10/23  - nephrology following              Prophylaxis:  Venous Thromboembolism: mechanical  Stress Ulcer: PPI  Ventilator Pneumonia: not applicable     Activity Orders          Up ad sandra starting at 10/11 0002        Full Code    Shiraz Phillips MD  Neurocritical Care  [facility]

## 2017-10-23 NOTE — ASSESSMENT & PLAN NOTE
- most recent echo on 10/12/17 showed pulmonary hypertension with undetermined diastolic function

## 2017-10-23 NOTE — PLAN OF CARE
Problem: Occupational Therapy Goal  Goal: Occupational Therapy Goal  Goals to be met by: 7 days  Patient will increase functional independence with ADLs by performing:    LE Dressing with Stand-by Assistance and Assistive Devices as needed.  Grooming while standing at sink with Stand-by Assistance.  Toileting from BSC with Minimal Assistance for hygiene and clothing management.   Supine to sit with Modified [place].  Toilet transfer to BS with SBA using RW.      Outcome: Ongoing (interventions implemented as appropriate)  OT goals set.  PIYUSH Blevins  10/23/2017

## 2017-10-23 NOTE — PROGRESS NOTES
Fentanyl patch removed from pt's right back and discarded. Per family request new patch not placed as ordered. Requested to hold until pt's pain not controlled by PO meds. Pt denies pain at this time

## 2017-10-23 NOTE — PROGRESS NOTES
[facility]  Nephrology  Progress Note    Patient Name: Donta Cline  MRN: [id number]  Admission Date: 10/10/2017  Hospital Length of Stay: 12 days  Attending Provider: No att. providers found   Primary Care Physician: ZAID Richardson Iii, MD  Principal Problem:Lumbar stenosis    Subjective:     HPI: Theron Cline is a 85 year old white male with past medical history of bladder CA in which he is in remission (was treated with chemotherapy), CAD, A-fib (treated with warfarin) and ESRD on Hd. Patient was transfered from [facility] where he presented with worsening right leg and back pain, general weakness. In hospital presented with increase troponin levels, diagnosed with NSTEMI, increase in INR where was treated with FFP and Vit K, flash pulmonary edema, started on Bi-PAP and aspirated pneumonia (treated with Zosyn). 2 D echo revealed severe aortic stenosis with preserved EF and Cardiology evaluated patient and did not feel LHC warranted at this time but recommended outpatient follow-up with his regular Cardiologist and likely need for outpatient C to evaluate his aortic stenosis and coronary anatomy. Additionally, pt was evaluated by NSx who felt he was not a candidate for surgery. Patient was seen and evaluated by Neurosurgery at [facility] (Dr. James) who noted Ct scan of lumbar spine showed moderate to severe acquired spinal stenosis and bilateral foraminal encroachment at L3-L4. Patient is here for second opionion from [facility].    Consulted for dialysis treatment: [facility]WF[facility], Rt lower arm AVF, duration 3.5, followed by Dr. Mathur, EDW 90 kg and had his last dialysis on Monday.     Interval History:   NAEON, Tolerated HD today without complications. Net gain 790 cc/24hrs.. Hemodynamically improving. Oxygenation stable on RA    Review of patient's allergies indicates:   Allergen Reactions    Darvocet a500  [propoxyphene n-acetaminophen]      Other  reaction(s): Unknown    Morphine      Other reaction(s): Unknown     Current Facility-Administered Medications   Medication Frequency    0.9%  NaCl infusion (for blood administration) Q24H PRN    0.9%  NaCl infusion (for blood administration) Q24H PRN    0.9%  NaCl infusion (for blood administration) Q24H PRN    0.9%  NaCl infusion PRN    0.9%  NaCl infusion Once    0.9%  NaCl infusion PRN    0.9%  NaCl infusion PRN    0.9%  NaCl infusion Once    acetaminophen tablet 650 mg Q8H PRN    aspirin chewable tablet 81 mg Daily    atorvastatin tablet 80 mg Daily    calcium gluconate 1 g in dextrose 5 % 100 mL IVPB (premix) Q10 Min PRN    dextrose 50% injection 12.5 g PRN    dextrose 50% injection 25 g PRN    diazePAM injection 2 mg Q6H PRN    fentanyl 12 mcg/hr 1 patch Q72H    glucagon (human recombinant) injection 1 mg PRN    glucose chewable tablet 16 g PRN    glucose chewable tablet 24 g PRN    heparin (porcine) injection 5,000 Units Q8H    hydrocodone-acetaminophen 10-325mg per tablet 1 tablet Q6H PRN    insulin aspart pen 0-5 Units QID (AC + HS) PRN    insulin detemir pen 18 Units QHS    metoprolol tartrate (LOPRESSOR) tablet 25 mg BID    midodrine tablet 5 mg TID    nitroGLYCERIN SL tablet 0.3 mg Q5 Min PRN    pantoprazole EC tablet 40 mg Nightly    PHENYLephrine (MYNOR-SYNEPHRINE) 40 mg in sodium chloride 0.9% 250ml (titrating) (premix) Continuous    ramelteon tablet 8 mg Nightly PRN    senna-docusate 8.6-50 mg per tablet 1 tablet BID    sertraline tablet 100 mg QHS    vit b cmplx 3-fa-vit c-biotin 1- mg-mg-mcg per tablet 1 tablet Daily       Objective:     Vital Signs (Most Recent):  Temp: 98.3 °F (36.8 °C) (10/22/17 1500)  Pulse: 83 (10/22/17 1800)  Resp: (!) 24 (10/22/17 1800)  BP: (!) 93/54 (10/22/17 1800)  SpO2: 100 % (10/22/17 1800)  O2 Device (Oxygen Therapy): room air (10/22/17 0300) Vital Signs (24h Range):  Temp:  [97.9 °F (36.6 °C)-98.4 °F (36.9 °C)] 98.3 °F (36.8  °C)  Pulse:  [75-92] 83  Resp:  [19-84] 24  SpO2:  [98 %-100 %] 100 %  BP: ()/(50-92) 93/54  Arterial Line BP: ()/(18-45) 108/39     Weight: 80 kg (176 lb 5.9 oz) (10/17/17 1300)  Body mass index is 28.47 kg/m².  Body surface area is 1.93 meters squared.    I/O last 3 completed shifts:  In: 1580 [P.O.:480; I.V.:750; Blood:350]  Out: 380 [Urine:60; Drains:320]    Physical Exam   Constitutional: He is oriented to person, place, and time. He appears well-developed and well-nourished.   HENT:   Head: Normocephalic.   Nose: Nose normal.   Mouth/Throat: Oropharynx is clear and moist.   Eyes: No scleral icterus.   Neck: Neck supple. No JVD present. No tracheal deviation present. No thyromegaly present.   Cardiovascular: Normal rate, regular rhythm and normal heart sounds.  Exam reveals no gallop and no friction rub.    Pulmonary/Chest: Effort normal and breath sounds normal. No respiratory distress. He has no wheezes. He has no rales.   Abdominal: Soft. Bowel sounds are normal. He exhibits no distension and no mass. There is no tenderness. There is no rebound and no guarding.   Musculoskeletal: He exhibits no edema.   Right forearm AVF with good thrill and no bruits   Lymphadenopathy:     He has no cervical adenopathy.   Neurological: He is alert and oriented to person, place, and time. He exhibits normal muscle tone.   Negative asterixis    Skin: Skin is warm and dry. No rash noted. No erythema.   Vitals reviewed.      Significant Labs:  ABGs: No results for input(s): PH, PCO2, HCO3, POCSATURATED, BE in the last 168 hours.  BMP:   Recent Labs  Lab 10/22/17  0200  10/22/17  0803   GLU  --   < > 114*   CL  --   < > 109   CO2  --   < > 17*   BUN  --   < > 42*   CREATININE  --   < > 6.5*   CALCIUM  --   < > 9.3   MG 1.6  --   --    < > = values in this interval not displayed.  CBC:     Recent Labs  Lab 10/22/17  0200   WBC 14.19*   RBC 2.00*   HGB 6.8*   HCT 21.4*      *   MCH 34.0*   MCHC 31.8*      CMP:     Recent Labs  Lab 10/22/17  0803  10/22/17  1640   *  --   --    CALCIUM 9.3  --   --    ALBUMIN 2.2*  --   --    PROT 5.5*  --   --      --   --    K 5.0  5.0  < > 5.2*   CO2 17*  --   --      --   --    BUN 42*  --   --    CREATININE 6.5*  --   --    ALKPHOS 110  --   --    ALT <5*  --   --    AST 26  --   --    BILITOT 0.3  --   --    < > = values in this interval not displayed.  All labs within the past 24 hours have been reviewed.       Assessment/Plan:     ESRD (end stage renal disease) on dialysis    Theron Cline is a 85 year old white male with past medical history of bladder CA in which he is in remission (was treated with chemotherapy), CAD, A-fib (treated with warfarin) and ESRD on Hd. Patient was transfered from [facility] where he presented with worsening right leg and back pain, general weakness. In hospital presented with increase troponin levels, diagnosed with NSTEMI, increase in INR where was treated with FFP and Vit K, flash pulmonary edema, started on Bi-PAP and aspirated pneumonia (treated with Zosyn).     Consulted for dialysis treatment: iHD MWF, dialyzed in [facility], Rt lower arm AVF, duration 3.5, followed by Dr. Mathur, EDW 90 kg and had his last dialysis on Monday.     - Monitoring in NCC   - hemodynamically improvinge post op- Laminectomy L3-L4 on Norepi attempting weaning looks successful with midrange onboard.  - will attempt to wean vasopressors of and may start midodrine per NCC  - Tolerated well HD tody will reassess am  - Continue with rosuvastatin.            Thank you for your consult. I will follow-up with patient. Please contact us if you have any additional questions.    Alex Mann MD  Nephrology  [facility]

## 2017-10-23 NOTE — PLAN OF CARE
SW met with pt and pt daughter at bedside.  Sw presented family with SNF list for rehab. Pt daughter requested that the pt referral be submitted to [facility] for rehabilitation services.  SW will forward referral to [facility].      Jair Ruiz, LMSW [facility]  [phone]

## 2017-10-23 NOTE — PROGRESS NOTES
Pt transported to Radiology for Xray per order via bed on portable monitor. Pt returned to 7090 following xray, placed on bedside monitor. NCC notified of completion of xray

## 2017-10-23 NOTE — SUBJECTIVE & OBJECTIVE
Interval History:   NAEON, Tolerated HD today without complications. Net gain 790 cc/24hrs.. Hemodynamically improving. Oxygenation stable on RA    Review of patient's allergies indicates:   Allergen Reactions    Darvocet a500  [propoxyphene n-acetaminophen]      Other reaction(s): Unknown    Morphine      Other reaction(s): Unknown     Current Facility-Administered Medications   Medication Frequency    0.9%  NaCl infusion (for blood administration) Q24H PRN    0.9%  NaCl infusion (for blood administration) Q24H PRN    0.9%  NaCl infusion (for blood administration) Q24H PRN    0.9%  NaCl infusion PRN    0.9%  NaCl infusion Once    0.9%  NaCl infusion PRN    0.9%  NaCl infusion PRN    0.9%  NaCl infusion Once    acetaminophen tablet 650 mg Q8H PRN    aspirin chewable tablet 81 mg Daily    atorvastatin tablet 80 mg Daily    calcium gluconate 1 g in dextrose 5 % 100 mL IVPB (premix) Q10 Min PRN    dextrose 50% injection 12.5 g PRN    dextrose 50% injection 25 g PRN    diazePAM injection 2 mg Q6H PRN    fentanyl 12 mcg/hr 1 patch Q72H    glucagon (human recombinant) injection 1 mg PRN    glucose chewable tablet 16 g PRN    glucose chewable tablet 24 g PRN    heparin (porcine) injection 5,000 Units Q8H    hydrocodone-acetaminophen 10-325mg per tablet 1 tablet Q6H PRN    insulin aspart pen 0-5 Units QID (AC + HS) PRN    insulin detemir pen 18 Units QHS    metoprolol tartrate (LOPRESSOR) tablet 25 mg BID    midodrine tablet 5 mg TID    nitroGLYCERIN SL tablet 0.3 mg Q5 Min PRN    pantoprazole EC tablet 40 mg Nightly    PHENYLephrine (MYNOR-SYNEPHRINE) 40 mg in sodium chloride 0.9% 250ml (titrating) (premix) Continuous    ramelteon tablet 8 mg Nightly PRN    senna-docusate 8.6-50 mg per tablet 1 tablet BID    sertraline tablet 100 mg QHS    vit b cmplx 3-fa-vit c-biotin 1- mg-mg-mcg per tablet 1 tablet Daily       Objective:     Vital Signs (Most Recent):  Temp: 98.3 °F (36.8 °C)  (10/22/17 1500)  Pulse: 83 (10/22/17 1800)  Resp: (!) 24 (10/22/17 1800)  BP: (!) 93/54 (10/22/17 1800)  SpO2: 100 % (10/22/17 1800)  O2 Device (Oxygen Therapy): room air (10/22/17 0300) Vital Signs (24h Range):  Temp:  [97.9 °F (36.6 °C)-98.4 °F (36.9 °C)] 98.3 °F (36.8 °C)  Pulse:  [75-92] 83  Resp:  [19-84] 24  SpO2:  [98 %-100 %] 100 %  BP: ()/(50-92) 93/54  Arterial Line BP: ()/(18-45) 108/39     Weight: 80 kg (176 lb 5.9 oz) (10/17/17 1300)  Body mass index is 28.47 kg/m².  Body surface area is 1.93 meters squared.    I/O last 3 completed shifts:  In: 1580 [P.O.:480; I.V.:750; Blood:350]  Out: 380 [Urine:60; Drains:320]    Physical Exam   Constitutional: He is oriented to person, place, and time. He appears well-developed and well-nourished.   HENT:   Head: Normocephalic.   Nose: Nose normal.   Mouth/Throat: Oropharynx is clear and moist.   Eyes: No scleral icterus.   Neck: Neck supple. No JVD present. No tracheal deviation present. No thyromegaly present.   Cardiovascular: Normal rate, regular rhythm and normal heart sounds.  Exam reveals no gallop and no friction rub.    Pulmonary/Chest: Effort normal and breath sounds normal. No respiratory distress. He has no wheezes. He has no rales.   Abdominal: Soft. Bowel sounds are normal. He exhibits no distension and no mass. There is no tenderness. There is no rebound and no guarding.   Musculoskeletal: He exhibits no edema.   Right forearm AVF with good thrill and no bruits   Lymphadenopathy:     He has no cervical adenopathy.   Neurological: He is alert and oriented to person, place, and time. He exhibits normal muscle tone.   Negative asterixis    Skin: Skin is warm and dry. No rash noted. No erythema.   Vitals reviewed.      Significant Labs:  ABGs: No results for input(s): PH, PCO2, HCO3, POCSATURATED, BE in the last 168 hours.  BMP:   Recent Labs  Lab 10/22/17  0200  10/22/17  0803   GLU  --   < > 114*   CL  --   < > 109   CO2  --   < > 17*   BUN   --   < > 42*   CREATININE  --   < > 6.5*   CALCIUM  --   < > 9.3   MG 1.6  --   --    < > = values in this interval not displayed.  CBC:     Recent Labs  Lab 10/22/17  0200   WBC 14.19*   RBC 2.00*   HGB 6.8*   HCT 21.4*      *   MCH 34.0*   MCHC 31.8*     CMP:     Recent Labs  Lab 10/22/17  0803  10/22/17  1640   *  --   --    CALCIUM 9.3  --   --    ALBUMIN 2.2*  --   --    PROT 5.5*  --   --      --   --    K 5.0  5.0  < > 5.2*   CO2 17*  --   --      --   --    BUN 42*  --   --    CREATININE 6.5*  --   --    ALKPHOS 110  --   --    ALT <5*  --   --    AST 26  --   --    BILITOT 0.3  --   --    < > = values in this interval not displayed.  All labs within the past 24 hours have been reviewed.

## 2017-10-23 NOTE — ASSESSMENT & PLAN NOTE
- holding home coumadin post-operatively under neurosurgery clearance  - aspirin held post-op and will re-started 10/25 per neurosurgery  - metoprolol 25mg BID

## 2017-10-23 NOTE — ASSESSMENT & PLAN NOTE
Theron Cline is a 85 year old white male with past medical history of bladder CA in which he is in remission (was treated with chemotherapy), CAD, A-fib (treated with warfarin) and ESRD on Hd. Patient was transfered from [facility] where he presented with worsening right leg and back pain, general weakness. In hospital presented with increase troponin levels, diagnosed with NSTEMI, increase in INR where was treated with FFP and Vit K, flash pulmonary edema, started on Bi-PAP and aspirated pneumonia (treated with Zosyn).     Consulted for dialysis treatment: iHD MWF, [facility] [place] [facility], Rt lower arm AVF, duration 3.5, followed by Dr. Mathur, EDW 90 kg and had his last dialysis on Monday.     - Monitoring in NCC maybe step down today if BP stable  - hemodynamically improvinge post op- Laminectomy L3-L4 on Norepi off since yesterday.   - Increase Midodrine to 10 mg TID  - No need for HD today will plan for tomorrow  - Continue with rosuvastatin.

## 2017-10-23 NOTE — SUBJECTIVE & OBJECTIVE
Facility-Administered Medications Prior to Admission   Medication Dose Route Frequency Provider Last Rate Last Dose    [DISCONTINUED] albuterol sulfate nebulizer solution 2.5 mg  2.5 mg Nebulization Q4H PRN Homenereida Donovan MD   2.5 mg at 12/09/13 1400     Prescriptions Prior to Admission   Medication Sig Dispense Refill Last Dose    alprazolam (XANAX) 0.25 MG tablet Take 0.25 mg by mouth daily as needed for Anxiety.   0 Past Week at Unknown time    aspirin (ECOTRIN) 81 MG EC tablet 3 days a week (Patient taking differently: Take 81 mg by mouth every Mon, Wed, Fri. )  0 10/3/2017 at Unknown time    coenzyme Q10 200 mg capsule Take 200 mg by mouth once daily.   10/3/2017 at Unknown time    gabapentin (NEURONTIN) 300 MG capsule Take 300 mg by mouth every evening.       hydrocodone-acetaminophen 10-325mg (NORCO)  mg Tab Take 1 tablet by mouth every 6 (six) hours as needed for Pain.   0 10/3/2017 at Unknown time    insulin glargine (LANTUS) 100 unit/mL injection Inject 20 Units into the skin At bedtime.    10/3/2017 at Unknown time    insulin lispro (HUMALOG) 100 unit/mL injection Inject into the skin 3 (three) times daily as needed for High Blood Sugar.       metoprolol succinate (TOPROL-XL) 50 MG 24 hr tablet take 1 tablet by mouth once daily (Patient taking differently: Take one tablet by mouth daily on Monday, Wednesday and Friday) 30 tablet 11 10/19/2017 at 0835    NITROSTAT 0.4 mg SL tablet place 1 tablet under the tongue if needed every 5 minutes for chest pain for 3 doses IF NO RELIEF AFTER 3RD DOSE CALL PRESCRIBER . 100 tablet 6 Unknown at Unknown time    pantoprazole (PROTONIX) 40 MG tablet Take 1 tablet by mouth nightly.  0 10/3/2017 at Unknown time    ANKIT-AZ RX 1- mg-mg-mcg Tab Take 1 tablet by mouth once daily.  0 10/3/2017 at Unknown time    rosuvastatin (CRESTOR) 40 MG Tab Take 1/2 a tab a day (Patient taking differently: Take 40 mg by mouth every evening. ) 30 tablet  6 Past Week at Unknown time    sertraline (ZOLOFT) 100 MG tablet Take 1 tablet by mouth every evening.  0 10/3/2017 at Unknown time    warfarin (COUMADIN) 4 MG tablet take 1 tablet by mouth once daily 35 tablet 3 10/3/2017 at Unknown time       Review of patient's allergies indicates:   Allergen Reactions    Morphine Other (See Comments)     Other reaction(s): severe abdominal pain    Darvocet a500  [propoxyphene n-acetaminophen]      Other reaction(s): Unknown       Past Medical History:   Diagnosis Date    Bladder cancer     Chronic diastolic heart failure 8/21/2012    3/13: AOSAT: 98, FICKCI: 2.41, FICKCO: 5.18 PAPRES: 34/16 (23), PASAT: 65, PVR: 1.74 PWPRES: 18/18 (14), RA PRES: 14/13 (12), RV: 40/0, 10     Chronic hip pain, right 10/4/2017    Coronary artery disease involving native coronary artery of native heart without angina pectoris     Dyslipidemia     Encounter for blood transfusion     ESRD (end stage renal disease) on dialysis 6/9/2014    Essential hypertension     Hypercholesteremia 8/21/2012    Long-term (current) use of anticoagulants 10/9/2015    NSTEMI (non-ST elevated myocardial infarction) 10/4/2017    Paroxysmal atrial fibrillation 10/8/2015    Prostate cancer     Severe aortic stenosis 10/14/2014    Type 2 diabetes mellitus with chronic kidney disease on chronic dialysis, with long-term current use of insulin 12/15/2013    Ventricular tachycardia     monomorphic     Past Surgical History:   Procedure Laterality Date    BACK SURGERY      laminectomy x2    COLON SURGERY      EXTENSIVE PROSTATE SURGER      Prostatectomy, Radical    JOINT REPLACEMENT      left hip     Family History     None        Social History Main Topics    Smoking status: Former Smoker     Packs/day: 3.00     Years: 40.00     Quit date: 1/1/1980    Smokeless tobacco: Former User    Alcohol use No      Comment: a few drinks    Drug use: Unknown    Sexual activity: Not on file     Review of  Systems    Objective:     Weight: 80 kg (176 lb 5.9 oz)  Body mass index is 28.47 kg/m².  Vital Signs (Most Recent):  Temp: 98.3 °F (36.8 °C) (10/22/17 2305)  Pulse: 78 (10/23/17 0205)  Resp: (!) 26 (10/23/17 0205)  BP: (!) 102/53 (10/23/17 0205)  SpO2: 100 % (10/23/17 0205) Vital Signs (24h Range):  Temp:  [97.9 °F (36.6 °C)-98.5 °F (36.9 °C)] 98.3 °F (36.8 °C)  Pulse:  [73-89] 78  Resp:  [18-84] 26  SpO2:  [98 %-100 %] 100 %  BP: ()/(44-92) 102/53  Arterial Line BP: ()/(18-45) 108/39                           Closed/Suction Drain 10/20/17 1647 Right;Superior Back Accordion 10 Fr. (Active)   Site Description Healing 10/21/2017 11:00 AM   Dressing Type No dressing 10/21/2017 11:00 AM   Drainage Serosanguineous 10/21/2017 11:00 AM   Status To bulb suction 10/21/2017 11:00 AM   Output (mL) 80 mL 10/21/2017  6:00 AM            Hemodialysis AV Fistula Right forearm (Active)   Needle Size 15ga 10/18/2017 12:15 PM   Site Assessment Clean;Dry;Intact 10/21/2017 11:00 AM   Patency Present;Thrill;Bruit 10/21/2017 11:00 AM   Status Deaccessed 10/21/2017 11:00 AM   Flows Good 10/16/2017  7:50 PM   Dressing Intervention New dressing 10/16/2017 12:30 PM   Dressing Status Clean;Dry;Intact 10/19/2017  9:25 AM   Site Condition No complications 10/21/2017 11:00 AM   Dressing Open to air (None) 10/21/2017 11:00 AM   Drainage Description Serosanguineous 10/11/2017 11:22 AM       Physical Exam:    Constitutional: He appears well-developed and well-nourished.     Cardiovascular: Normal rate.     Abdominal: Soft.     Psych/Behavior: He is alert. He is oriented to person, place, and time. He has a normal mood and affect.     Neurological:   AAOx3, NAD, speech fluent  PERRL, EOMI FS TM  BUCIO 4+/5 - pain limited  SILT  Dressing c/d/i         Significant Labs:    Recent Labs  Lab 10/22/17  0200  10/22/17  0803  10/22/17  1640 10/22/17  2013 10/23/17  0136   GLU  --   < > 114*  --   --  99 102   NA  --   < > 140  --   --  141 139    K 5.4*  < > 5.0  5.0  < > 5.2* 3.2*  3.2* 3.8  3.8   CL  --   < > 109  --   --  105 103   CO2  --   < > 17*  --   --  24 27   BUN  --   < > 42*  --   --  16 18   CREATININE  --   < > 6.5*  --   --  2.5* 3.2*   CALCIUM  --   < > 9.3  --   --  7.7* 8.4*   MG 1.6  --   --   --   --   --  1.1*   < > = values in this interval not displayed.    Recent Labs  Lab 10/22/17  0200 10/23/17  0136   WBC 14.19* 9.14   HGB 6.8* 6.8*   HCT 21.4* 21.1*    165       Recent Labs  Lab 10/22/17  0200 10/23/17  0136   INR 1.2 1.0     Microbiology Results (last 7 days)     ** No results found for the last 168 hours. **        ABGs: No results for input(s): PH, PCO2, PO2, HCO3, POCSATURATED, BE in the last 48 hours.  Cardiac markers: No results for input(s): CKMB, CPKMB, TROPONINT, TROPONINI, MYOGLOBIN in the last 48 hours.  CMP:   Recent Labs  Lab 10/21/17  2000  10/22/17  0803  10/22/17  1640 10/22/17  2013 10/23/17  0136   *  < > 114*  --   --  99 102   CALCIUM 9.1  < > 9.3  --   --  7.7* 8.4*   ALBUMIN 2.2*  < > 2.2*  --   --  2.0* 2.1*   PROT 5.4*  --  5.5*  --   --  5.0*  --      < > 140  --   --  141 139   K 5.1  5.1  < > 5.0  5.0  < > 5.2* 3.2*  3.2* 3.8  3.8   CO2 17*  < > 17*  --   --  24 27     < > 109  --   --  105 103   BUN 39*  < > 42*  --   --  16 18   CREATININE 6.3*  < > 6.5*  --   --  2.5* 3.2*   ALKPHOS 116  --  110  --   --  99  --    ALT 9*  --  <5*  --   --  <5*  --    AST 34  --  26  --   --  25  --    BILITOT 0.3  --  0.3  --   --  0.6  --    < > = values in this interval not displayed.  CRP: No results for input(s): CRP in the last 48 hours.  ESR: No results for input(s): POCESR, ERYTHROCYTES in the last 48 hours.  LFTs:   Recent Labs  Lab 10/21/17  2000  10/22/17  0803 10/22/17  2013 10/23/17  0136   ALT 9*  --  <5* <5*  --    AST 34  --  26 25  --    ALKPHOS 116  --  110 99  --    BILITOT 0.3  --  0.3 0.6  --    PROT 5.4*  --  5.5* 5.0*  --    ALBUMIN 2.2*  < > 2.2* 2.0* 2.1*    < > = values in this interval not displayed.  Procalcitonin: No results for input(s): PROCAL in the last 48 hours.    All pertinent labs from the last 24 hours have been reviewed.    Significant Diagnostics:  No new diagnostics at this time

## 2017-10-23 NOTE — PLAN OF CARE
Problem: Patient Care Overview  Goal: Plan of Care Review  Outcome: Ongoing (interventions implemented as appropriate)  No acute events throughout the day, VS and assessment per flow sheet, patient progressing towards goal as tolerated. Plan of care reviewed with Donta Cline and family, all concerns addressed. Will continue to monitor.     Pt received 1 unit PRBCs today per order. Pt tolerated well. Pending transfer to floor, awaiting bed.

## 2017-10-23 NOTE — PT/OT/SLP EVAL
Occupational Therapy  Evaluation/Treatment    Donta Cline   MRN: [id number]   Admitting Diagnosis: Lumbar stenosis    OT Date of Treatment: 10/23/17   OT Start Time: 1605  OT Stop Time: 1630  OT Total Time (min): 25 min    Billable Minutes:  Evaluation 15  Therapeutic Activity 10    Diagnosis: Lumbar stenosis s/p L2-L3 laminectomy; L3-L4 interbody fusion      Past Medical History:   Diagnosis Date    Bladder cancer     Chronic diastolic heart failure 8/21/2012    3/13: AOSAT: 98, FICKCI: 2.41, FICKCO: 5.18 PAPRES: 34/16 (23), PASAT: 65, PVR: 1.74 PWPRES: 18/18 (14), RA PRES: 14/13 (12), RV: 40/0, 10     Chronic hip pain, right 10/4/2017    Coronary artery disease involving native coronary artery of native heart without angina pectoris     Dyslipidemia     Encounter for blood transfusion     ESRD (end stage renal disease) on dialysis 6/9/2014    Essential hypertension     Hypercholesteremia 8/21/2012    Long-term (current) use of anticoagulants 10/9/2015    NSTEMI (non-ST elevated myocardial infarction) 10/4/2017    Paroxysmal atrial fibrillation 10/8/2015    Prostate cancer     Severe aortic stenosis 10/14/2014    Type 2 diabetes mellitus with chronic kidney disease on chronic dialysis, with long-term current use of insulin 12/15/2013    Ventricular tachycardia     monomorphic      Past Surgical History:   Procedure Laterality Date    BACK SURGERY      laminectomy x2    COLON SURGERY      EXTENSIVE PROSTATE SURGER      Prostatectomy, Radical    JOINT REPLACEMENT      left hip       Referring physician: Severiano Archibald MD  Date referred to OT: 10/20/17    General Precautions: Standard, fall, aspiration  Orthopedic Precautions: N/A  Braces: N/A    Patient History:  Living Environment  Lives With: alone  Living Arrangements: house  Home Accessibility: stairs to enter home  Number of Stairs to Enter Home: 1  Transportation Available: family or friend will provide  Living Environment Comment: Pt  lives alone in [facility] with walk-in shower with shower chair.  Pt was Mod (I) with ADLs/functional mobility using a rollator.  Pt reports gradual decline over the last month or so with multiple falls that he attributes to LE weakness.  Pt uses a sock aid for LBD.  Uses BSC placed over toilet.  Currently using w/c for community mobility.    Equipment Currently Used at Home: bedside commode, shower chair, rollator or RW? (need to clarify), cane, straight, dressing device    Prior level of function:   Bed Mobility/Transfers: needs device  Grooming: independent  Bathing: independent  Upper Body Dressing: independent  Lower Body Dressing: needs device  Toileting: independent  Home Management Skills: independent    Subjective:  Communicated with RN prior to session.    Chief Complaint: none  Patient/Family stated goals: to go to rehab    Pain/Comfort  Pain Rating 1:  (pt did not c/o pain during session)    Objective:  Patient found with: blood pressure cuff, pulse ox (continuous), SCD    Cognitive Exam:  Oriented to: Person, Place, Time and Situation  Follows Commands/attention: Follows multistep  commands  Communication: clear/fluent  Memory:  No Deficits noted  Safety awareness/insight to disability: intact  Coping skills/emotional control: Appropriate to situation    Visual/perceptual:  Intact    Physical Exam:  Postural examination/scapula alignment: Rounded shoulder and Head forward  Skin integrity: Visible skin intact  Edema: None noted     Sensation:   Intact    Upper Extremity Range of Motion:  Right Upper Extremity: WFL  Left Upper Extremity: WFL    Upper Extremity Strength:  Right Upper Extremity: WFL  Left Upper Extremity: WFL   Strength: WFL    Fine motor coordination:   Intact    Gross motor coordination: WFL    Functional Mobility:  Bed Mobility:  Rolling/Turning to Left: Supervision, With side rail  Rolling/Turning Right: Supervision, With side rail  Scooting/Bridging: Contact Guard Assistance to perform 4  "side scoots across EOB; verbal cues provided for technique  Supine to Sit: Moderate Assistance; education provided for spinal precautions  Sit to Supine: Moderate Assistance; education provided for spinal precautions    *LSO donned at EOB with total A    Transfers:  Sit <> Stand Assistance: Minimum Assistance x 3 trials from EOB  Sit <> Stand Assistive Device: No Assistive Device  Cues provided for B knee extension, upright posture, hip extension, forward gaze, and controlled descent   Pt maintained static stand ~45sec each trial    Activities of Daily Living:  Toileting Where Assessed: Other (Comment) family member holding urinal for pt while pt seated EOB  Toileting Level of Assistance: Total assistance    Therapeutic Activities and Exercises:  Pt maintained sitting balance at EOB with close SBA ~15minutes  Pt/family member educated on OT role/POC    AM-PAC 6 CLICK ADL  How much help from another person does this patient currently need?  1 = Unable, Total/Dependent Assistance  2 = A lot, Maximum/Moderate Assistance  3 = A little, Minimum/Contact Guard/Supervision  4 = None, Modified [place]/Independent    Putting on and taking off regular lower body clothing? : 1  Bathing (including washing, rinsing, drying)?: 1  Toileting, which includes using toilet, bedpan, or urinal? : 1  Putting on and taking off regular upper body clothing?: 4  Taking care of personal grooming such as brushing teeth?: 4  Eating meals?: 4  Total Score: 15    AM-PAC Raw Score CMS "G-Code Modifier Level of Impairment Assistance   6 % Total / Unable   7 - 9 CM 80 - 100% Maximal Assist   10-14 CL 60 - 80% Moderate Assist   15 - 19 CK 40 - 60% Moderate Assist   20 - 22 CJ 20 - 40% Minimal Assist   23 CI 1-20% SBA / CGA   24 CH 0% Independent/ Mod I       Patient left supine with all lines intact, call button in reach and RN/family member present    Assessment:  Donta Cline is a 85 y.o. male with a medical diagnosis of Lumbar " stenosis and presents with the below listed deficits limiting occupational performance.  PTA, pt was living alone and Mod (I) with ADL/mobility.  Pt is motivated and has excellent family support.  He is a good candidate for inpatient rehab post d/c to meet his rehabilitations needs and facilitate return to [facility].  Pt will benefit from continues skilled OT services to maximize functional independence.    Pt presented with a low complexity OT evaluation. Pt required a brief review of medical history and occupational profile. Pt demod 5+ performance deficits (physical, cognitive, or psychosocial) resulting in limitations and engagement restrictions. Clinical decision making required analytical complexity with limited treatment options. Pt with cormorbidities and required minimal modification of task/assistance with assessment.     Rehab identified problem list/impairments: Rehab identified problem list/impairments: weakness, impaired endurance, impaired self care skills, impaired functional mobilty, gait instability, impaired balance, impaired cardiopulmonary response to activity    Rehab potential is good.    Activity tolerance: Fair but progressing    Discharge recommendations: Discharge Facility/Level Of Care Needs: rehabilitation facility     Barriers to discharge: Barriers to Discharge: Inaccessible home environment, Decreased caregiver support    Equipment recommendations:  (TBD)     GOALS:    Occupational Therapy Goals        Problem: Occupational Therapy Goal    Goal Priority Disciplines Outcome Interventions   Occupational Therapy Goal     OT, PT/OT Ongoing (interventions implemented as appropriate)    Description:  Goals to be met by: 7 days  Patient will increase functional independence with ADLs by performing:    LE Dressing with Stand-by Assistance and Assistive Devices as needed.  Grooming while standing at sink with Stand-by Assistance.  Toileting from [facility] with Minimal Assistance for hygiene and clothing  management.   Supine to sit with Modified [place].  Toilet transfer to [facility] with SBA using RW.                        PLAN:  Patient to be seen 5 x/week to address the above listed problems via self-care/home management, therapeutic activities, therapeutic exercises  Plan of Care expires: 11/10/17  Plan of Care reviewed with: patient, daughter         Ad NORRIS PIYUSH Parekh  10/23/2017

## 2017-10-23 NOTE — SUBJECTIVE & OBJECTIVE
Interval History: Pressor weaned    Medications:  Continuous Infusions:   phenylephrine Stopped (10/22/17 2310)     Scheduled Meds:   sodium chloride 0.9%   Intravenous Once    sodium chloride 0.9%   Intravenous Once    atorvastatin  80 mg Oral Daily    epoetin preeti (PROCRIT) injection  10,000 Units Subcutaneous Once    fentanyl  1 patch Transdermal Q72H    heparin (porcine)  5,000 Units Subcutaneous Q8H    insulin detemir  18 Units Subcutaneous QHS    magnesium sulfate IVPB  2 g Intravenous Once    metoprolol tartrate  25 mg Oral BID    midodrine  10 mg Oral TID    pantoprazole  40 mg Oral Nightly    senna-docusate 8.6-50 mg  1 tablet Oral BID    sertraline  100 mg Oral QHS    vit b cmplx 3-fa-vit c-biotin 1- mg-mg-mcg  1 tablet Oral Daily     PRN Meds:sodium chloride, sodium chloride, sodium chloride, sodium chloride, sodium chloride 0.9%, sodium chloride 0.9%, sodium chloride 0.9%, acetaminophen, albuterol-ipratropium 2.5mg-0.5mg/3mL, [COMPLETED] calcium gluconate IVPB **AND** calcium gluconate IVPB, dextrose 50%, dextrose 50%, diazePAM, glucagon (human recombinant), glucose, glucose, hydrocodone-acetaminophen 10-325mg, insulin aspart, nitroGLYCERIN, ramelteon     Review of Systems  Objective:     Weight: 80 kg (176 lb 5.9 oz)  Body mass index is 28.47 kg/m².  Vital Signs (Most Recent):  Temp: 98.5 °F (36.9 °C) (10/23/17 0700)  Pulse: 85 (10/23/17 0800)  Resp: (!) 25 (10/23/17 0800)  BP: (!) 113/58 (10/23/17 0800)  SpO2: 100 % (10/23/17 0800) Vital Signs (24h Range):  Temp:  [97.9 °F (36.6 °C)-98.5 °F (36.9 °C)] 98.5 °F (36.9 °C)  Pulse:  [73-89] 85  Resp:  [18-30] 25  SpO2:  [98 %-100 %] 100 %  BP: ()/(44-86) 113/58  Arterial Line BP: ()/(32-41) 108/39                           Closed/Suction Drain 10/20/17 1647 Right;Superior Back Accordion 10 Fr. (Active)   Site Description Healing 10/23/2017  7:00 AM   Dressing Type No dressing 10/23/2017  7:00 AM   Drainage Serosanguineous  10/23/2017  7:00 AM   Status To bulb suction 10/23/2017  7:00 AM   Output (mL) 50 mL 10/23/2017  6:22 AM            Hemodialysis AV Fistula Right forearm (Active)   Needle Size 15ga 10/22/2017  5:00 PM   Site Assessment Clean;Dry;Intact 10/23/2017  7:00 AM   Patency Present;Thrill;Bruit 10/23/2017  7:00 AM   Status Deaccessed 10/23/2017  7:00 AM   Flows Good 10/23/2017  3:05 AM   Dressing Intervention New dressing 10/22/2017  9:05 PM   Dressing Status Dry;Intact;Clean 10/22/2017  9:05 PM   Site Condition No complications 10/23/2017  7:00 AM   Dressing Open to air (None) 10/23/2017  7:00 AM   Drainage Description Serosanguineous 10/11/2017 11:22 AM       Physical Exam:  Vitals reviewed.    Constitutional: He appears well-developed and well-nourished. He is not diaphoretic. No distress.     Eyes: Pupils are equal, round, and reactive to light. EOM are normal.     Cardiovascular: Normal rate.     Abdominal: Soft.     Psych/Behavior: He is alert. He has a normal mood and affect.     Neurological:        Cranial nerves: Cranial nerve(s) II, III, IV, V, VI, VII, VIII, IX, X, XI and XII are intact.   AOx1 (self)  4/5 L HF/EHL, otherwise 5/5       Significant Labs:    Recent Labs  Lab 10/22/17  0200  10/22/17  2013 10/23/17  0136 10/23/17  0824   GLU  --   < > 99 102 67*   NA  --   < > 141 139 140   K 5.4*  < > 3.2*  3.2* 3.8  3.8 3.8   CL  --   < > 105 103 104   CO2  --   < > 24 27 28   BUN  --   < > 16 18 20   CREATININE  --   < > 2.5* 3.2* 3.5*   CALCIUM  --   < > 7.7* 8.4* 8.8   MG 1.6  --   --  1.1*  --    < > = values in this interval not displayed.    Recent Labs  Lab 10/22/17  0200 10/23/17  0136   WBC 14.19* 9.14   HGB 6.8* 6.8*   HCT 21.4* 21.1*    165       Recent Labs  Lab 10/22/17  0200 10/23/17  0136   INR 1.2 1.0     Microbiology Results (last 7 days)     ** No results found for the last 168 hours. **        Recent Lab Results       10/23/17  0824 10/23/17  0136 10/22/17  2024 10/22/17  2013  10/22/17  1640      Immature Granulocytes  0.8(H)        Immature Grans (Abs)  0.07(H)        Albumin 2.2(L) 2.1(L)  2.0(L)      Alkaline Phosphatase 108   99      ALT <5(L)   <5(L)      Anion Gap 8 9  12      AST 29   25      Baso #  0.02        Basophil%  0.2        Total Bilirubin 0.8  Comment:  For infants and newborns, interpretation of results should be based  on gestational age, weight and in agreement with clinical  observations.  Premature Infant recommended reference ranges:  Up to 24 hours.............<8.0 mg/dL  Up to 48 hours............<12.0 mg/dL  3-5 days..................<15.0 mg/dL  6-29 days.................<15.0 mg/dL     0.6  Comment:  For infants and newborns, interpretation of results should be based  on gestational age, weight and in agreement with clinical  observations.  Premature Infant recommended reference ranges:  Up to 24 hours.............<8.0 mg/dL  Up to 48 hours............<12.0 mg/dL  3-5 days..................<15.0 mg/dL  6-29 days.................<15.0 mg/dL        BUN, Bld 20 18  16      Calcium 8.8 8.4(L)  7.7(L)      Chloride 104 103  105      CO2 28 27  24      Creatinine 3.5(H) 3.2(H)  2.5(H)      Differential Method  Automated        eGFR if  17.4(A) 19.4(A)  26.1(A)      eGFR if non  15.0  Comment:  Calculation used to obtain the estimated glomerular filtration  rate (eGFR) is the CKD-EPI equation. Since race is unknown   in our information system, the eGFR values for   -American and Non--American patients are given   for each creatinine result.  (A) 16.7  Comment:  Calculation used to obtain the estimated glomerular filtration  rate (eGFR) is the CKD-EPI equation. Since race is unknown   in our information system, the eGFR values for   -American and Non--American patients are given   for each creatinine result.  (A)  22.6  Comment:  Calculation used to obtain the estimated glomerular filtration  rate (eGFR) is  the CKD-EPI equation. Since race is unknown   in our information system, the eGFR values for   -American and Non--American patients are given   for each creatinine result.  (A)      Eos #  0.1        Eosinophil%  1.2        Glucose 67(L) 102  99      Gran #  7.7        Gran%  84.0(H)        Hematocrit  21.1(L)        Hemoglobin  6.8(L)        Coumadin Monitoring INR  1.0  Comment:  Coumadin Therapy:  2.0 - 3.0 for INR for all indicators except mechanical heart valves  and antiphospholipid syndromes which should use 2.5 - 3.5.          Lymph #  0.8(L)        Lymph%  8.4(L)        Magnesium  1.1(L)        MCH  32.7(H)        MCHC  32.2        MCV  101  Comment:  Results confirmed, test repeated(H)        Mono #  0.5        Mono%  5.4        MPV  10.8        nRBC  0        Phosphorus  3.0          3.0        Platelets  165  Comment:  Results confirmed, test repeated        POCT Glucose   108  214(H)     Potassium 3.8 3.8  3.2(L)        3.8  3.2(L)      Total Protein 5.4(L)   5.0(L)      Protime  10.9        RBC  2.08(L)        RDW  17.3(H)        Sodium 140 139  141      WBC  9.14                    10/22/17  1640 10/22/17  1151 10/22/17  1146      Immature Granulocytes        Immature Grans (Abs)        Albumin        Alkaline Phosphatase        ALT        Anion Gap        AST        Baso #        Basophil%        Total Bilirubin        BUN, Bld        Calcium        Chloride        CO2        Creatinine        Differential Method        eGFR if         eGFR if non         Eos #        Eosinophil%        Glucose        Gran #        Gran%        Hematocrit        Hemoglobin        Coumadin Monitoring INR        Lymph #        Lymph%        Magnesium        MCH        MCHC        MCV        Mono #        Mono%        MPV        nRBC        Phosphorus        Platelets        POCT Glucose  218(H)      Potassium 5.2(H)  5.3(H)     Total Protein        Protime        RBC         RDW        Sodium        WBC            All pertinent labs from the last 24 hours have been reviewed.    Significant Diagnostics:  CT: No results found in the last 24 hours.  MRI: No results found in the last 24 hours.  I have reviewed all pertinent imaging results/findings within the past 24 hours.

## 2017-10-23 NOTE — PLAN OF CARE
Problem: SLP Goal  Goal: SLP Goal  Speech Therapy Short Term Goals  Goals expected to be met by 10/28:  1. Pt will tolerate pudding thick liquids and dental soft diet with no overt s/s aspiration.   2. Given clinician model, pt will complete dysphagia exercises x10 each in order to strengthen the swallowing musculature.   3. Pt will participate in repeat MBSS when deemed appropriate by SLP/ MD.        Outcome: Ongoing (interventions implemented as appropriate)  Recommend ongoing dental soft diet with pudding thick liquids (4oz: ~1.5 packets thickener). Pt requires assistance to thicken liquids. Continue current SLP POC.     DUGLAS Barriga, CCC-SLP  [phone]  10/23/2017

## 2017-10-23 NOTE — CONSULTS
Single lumen 18G x 8 cm midline placed left basilic vein. Max dwell date 11/21/17, Lot#[id number] .  Needle advanced into the vessel under real time ultrasound guidance.  Image recorded and saved.

## 2017-10-23 NOTE — PT/OT/SLP PROGRESS
Physical Therapy  Treatment    Donta Cline   MRN: [id number]   Admitting Diagnosis: Lumbar stenosis    PT Received On: 10/23/17  PT Start Time: 1000     PT Stop Time: 1030    PT Total Time (min): 30 min       Billable Minutes:  Therapeutic Activity 20 and Therapeutic Exercise 10    Treatment Type: Treatment  PT/PTA: PT             General Precautions: Standard, fall, aspiration  Orthopedic Precautions: N/A   Braces: LSO    Do you have any cultural, spiritual, Adventism conflicts, given your current situation?: none stated    Subjective:  Communicated with nsg prior to session.      Pain/Comfort  Pain Rating 1: 9/10  Location - Side 1: Right  Location 1: hip  Pain Addressed 1: Reposition, Distraction  Pain Rating Post-Intervention 1: 9/10    Objective:   Patient found with: blood pressure cuff, pulse ox (continuous), SCD    Functional Mobility:  Bed Mobility:   Supine to Sit: Moderate Assistance    Transfers:  Sit <> Stand Assistance: Moderate Assistance (x3 trials from bedside, x 1 trial from bedside chair)  Sit <> Stand Assistive Device: No Assistive Device, Rolling Walker  Bed <> Chair Technique: Stand Pivot  Bed <> Chair Assistance: Minimum Assistance  Bed <> Chair Assistive Device: No Assistive Device        Balance:   Static Sit: Pt sat EOB for 10 minutes with (B) UE support and SBA  Dynamic Sit: Pt required CGA while PT donned LSO brace at bedside; req (B) UE support and firm surface  Static Standing: Pt stood for 3 trials at bedside for 10 secs each trial; unable to completely extend knees and trunk requiring cueing for this. Deficits remained with RW usage attempt.  Dynamic Standing: Pt performed squat pivot transfer to bedside chair with MIN A ; req cueing to move hips during transfer    Therapeutic Activities and Exercises:  THERAPEUTIC EXERCISE  Pt performed therapeutic exercise seated for 15 reps BLE   - strengthening: LAQ, marching   - stretching: heel chord, hamstring    EDUCATION  Pt educated pt on  incr OOB activity including sitting in bedside chair majority of day and amb with nsg and PCT.   Educated pt on being appropriate to transfer with nsg and PCT; safe to transfer with 1-2 person assistance.  **Mobility Technician appropriate to perform: out of bed, up to chair, back to bed, bed exercises, PROM  Updated white board with appropriate PT information; notified nsg   Pt verbalized agreement.     AM-PAC 6 CLICK MOBILITY  How much help from another person does this patient currently need?   1 = Unable, Total/Dependent Assistance  2 = A lot, Maximum/Moderate Assistance  3 = A little, Minimum/Contact Guard/Supervision  4 = None, Modified [place]/Independent    Turning over in bed (including adjusting bedclothes, sheets and blankets)?: 2  Sitting down on and standing up from a chair with arms (e.g., wheelchair, bedside commode, etc.): 2  Moving from lying on back to sitting on the side of the bed?: 2  Moving to and from a bed to a chair (including a wheelchair)?: 2  Need to walk in hospital room?: 1  Climbing 3-5 steps with a railing?: 1  Total Score: 10    AM-PAC Raw Score CMS G-Code Modifier Level of Impairment Assistance   6 % Total / Unable   7 - 9 CM 80 - 100% Maximal Assist   10 - 14 CL 60 - 80% Moderate Assist   15 - 19 CK 40 - 60% Moderate Assist   20 - 22 CJ 20 - 40% Minimal Assist   23 CI 1-20% SBA / CGA   24 CH 0% Independent/ Mod I     Patient left up in chair with all lines intact and call button in reach.    Assessment:  Donta Cline is a 85 y.o. male with a medical diagnosis of Lumbar stenosis and presents with improvement with progression with mobility requiring less assistance for static and dynamic sitting balance. Pt with decr flexibility in hamstrings limiting ability to fully stand and extend knees. Pt remains appropriate for continued skilled services and discharge to postacute location to address the below deficits and improve pt return to PLOF.        Rehab identified  problem list/impairments: Rehab identified problem list/impairments: weakness, impaired endurance, impaired functional mobilty, gait instability, decreased lower extremity function, decreased coordination, decreased safety awareness, pain, impaired cardiopulmonary response to activity    Rehab potential is good.    Activity tolerance: Good    Discharge recommendations: Discharge Facility/Level Of Care Needs: nursing facility, skilled ([facility])     Barriers to discharge: Barriers to Discharge: Inaccessible home environment, Decreased caregiver support    Equipment recommendations: Equipment Needed After Discharge:  (TBD pending progress)     GOALS:    Physical Therapy Goals        Problem: Physical Therapy Goal    Goal Priority Disciplines Outcome Goal Variances Interventions   Physical Therapy Goal     PT/OT, PT Ongoing (interventions implemented as appropriate)     Description:  Revised Goals to be met by: 10/31/2017    Patient will increase functional independence with mobility by performin. Supine to sit with Minimum Assistance  2. Sit to supine with Minimum Assistance  3. Sit to stand transfer with Minimum Assistance using AD or No AD  4. Bed to chair transfer with Minimum Assistance using AD or No AD  5. Gait x50 feet with Minimum Assistance using AD or No AD  6. Pt will stand for 1 minute with RW usage and Contact Guard Assistance.  7. Pt will perform (B) LE therapeutic exercise x 20 reps to assist c/ improving muscular strength and endurance.                        PLAN:    Patient to be seen 5 x/week  to address the above listed problems via gait training, therapeutic activities, therapeutic exercises, neuromuscular re-education  Plan of Care expires: 17  Plan of Care reviewed with: patient, daughter         Cheli Stewart, PT, DPT  10/23/2017

## 2017-10-23 NOTE — ASSESSMENT & PLAN NOTE
86 y/o M with multiple active medical comorbidities, including recent NSTEMI, severe aortic stenosis, ESRD with HD MWF, and aspiration pneumonia s/p L3-5 TLIF    Neuro stable  Cont neuro checks  CV- goal normotension.   Hold ASA. Holding heparin gtt.   Pulm- on RA.  FENGI- goal eunatremia, ADAT.   Heme/ID- afebrile. Hgb/hct declined, transfuse towards goal of Hb 8  ppx- BRAIN/SCD's/SQH, sqh. Gi ppx.     dispo-  Cont ICU care. PTOT. Medical management per primary.

## 2017-10-23 NOTE — PROGRESS NOTES
Notified Bridget Ramirez of pt's Cr 3.2 and Mg 1.1. Also reported pt's H/H. Per orders nephrology will take a look at kidney enzymes and electrolytes. Will put in for 1 unit of PRBC's now.

## 2017-10-23 NOTE — ASSESSMENT & PLAN NOTE
Theron Cline is a 85 year old white male with past medical history of bladder CA in which he is in remission (was treated with chemotherapy), CAD, A-fib (treated with warfarin) and ESRD on Hd. Patient was transfered from [facility] where he presented with worsening right leg and back pain, general weakness. In hospital presented with increase troponin levels, diagnosed with NSTEMI, increase in INR where was treated with FFP and Vit K, flash pulmonary edema, started on Bi-PAP and aspirated pneumonia (treated with Zosyn).     Consulted for dialysis treatment: iHD MWF, [place] [facility] [place], Rt lower arm AVF, duration 3.5, followed by Dr. Mathur, EDW 90 kg and had his last dialysis on Monday.     - Monitoring in NCC   - hemodynamically improvinge post op- Laminectomy L3-L4 on [place] attempting weaning looks successful with midrange onboard.  - will attempt to wean vasopressors of and may start midodrine per NCC  - Tolerated well HD tody will reassess am  - Continue with rosuvastatin.

## 2017-10-23 NOTE — PROGRESS NOTES
[facility]  Nephrology  Progress Note    Patient Name: Donta Cline  MRN: [id number]  Admission Date: 10/10/2017  Hospital Length of Stay: 13 days  Attending Provider: Sylvia Byrne MD   Primary Care Physician: ZAID Richardson Iii, MD  Principal Problem:Lumbar stenosis    Subjective:     HPI: Theron Cline is a 85 year old white male with past medical history of bladder CA in which he is in remission (was treated with chemotherapy), CAD, A-fib (treated with warfarin) and ESRD on Hd. Patient was transfered from [facility] where he presented with worsening right leg and back pain, general weakness. In hospital presented with increase troponin levels, diagnosed with NSTEMI, increase in INR where was treated with FFP and Vit K, flash pulmonary edema, started on Bi-PAP and aspirated pneumonia (treated with Zosyn). 2 D echo revealed severe aortic stenosis with preserved EF and Cardiology evaluated patient and did not feel C warranted at this time but recommended outpatient follow-up with his regular Cardiologist and likely need for outpatient C to evaluate his aortic stenosis and coronary anatomy. Additionally, pt was evaluated by NSx who felt he was not a candidate for surgery. Patient was seen and evaluated by Neurosurgery at [place] (Dr. James) who noted Ct scan of lumbar spine showed moderate to severe acquired spinal stenosis and bilateral foraminal encroachment at L3-L4. Patient is here for second opionion from [facility].    Consulted for dialysis treatment: iHD MWF, dialyzed [facility], Rt lower arm AVF, duration 3.5, followed by Dr. Mathur, EDW 90 kg and had his last dialysis on Monday.     Interval History:   NAEON, Tolerated HD yesterday but require minimal Norpei support to complete treatment. Net gain 589 cc/24hrs. UF was 500 ml. Hemodynamically improving. Oxygenation stable on RA    Review of patient's allergies indicates:   Allergen Reactions     Darvocet a500  [propoxyphene n-acetaminophen]      Other reaction(s): Unknown    Morphine      Other reaction(s): Unknown     Current Facility-Administered Medications   Medication Frequency    0.9%  NaCl infusion (for blood administration) Q24H PRN    0.9%  NaCl infusion (for blood administration) Q24H PRN    0.9%  NaCl infusion (for blood administration) Q24H PRN    0.9%  NaCl infusion (for blood administration) Q24H PRN    0.9%  NaCl infusion PRN    0.9%  NaCl infusion Once    0.9%  NaCl infusion PRN    0.9%  NaCl infusion PRN    0.9%  NaCl infusion Once    acetaminophen tablet 650 mg Q8H PRN    albuterol-ipratropium 2.5mg-0.5mg/3mL nebulizer solution 3 mL Q4H PRN    atorvastatin tablet 80 mg Daily    calcium gluconate 1 g in dextrose 5 % 100 mL IVPB (premix) Q10 Min PRN    dextrose 50% injection 12.5 g PRN    dextrose 50% injection 25 g PRN    diazePAM injection 2 mg Q6H PRN    fentanyl 12 mcg/hr 1 patch Q72H    glucagon (human recombinant) injection 1 mg PRN    glucose chewable tablet 16 g PRN    glucose chewable tablet 24 g PRN    heparin (porcine) injection 5,000 Units Q8H    hydrocodone-acetaminophen 10-325mg per tablet 1 tablet Q6H PRN    insulin aspart pen 0-5 Units QID (AC + HS) PRN    insulin detemir pen 18 Units QHS    metoprolol tartrate (LOPRESSOR) tablet 25 mg BID    midodrine tablet 10 mg TID    nitroGLYCERIN SL tablet 0.3 mg Q5 Min PRN    pantoprazole EC tablet 40 mg Nightly    PHENYLephrine (MYNOR-SYNEPHRINE) 40 mg in sodium chloride 0.9% 250ml (titrating) (premix) Continuous    ramelteon tablet 8 mg Nightly PRN    senna-docusate 8.6-50 mg per tablet 1 tablet BID    sertraline tablet 100 mg QHS    vit b cmplx 3-fa-vit c-biotin 1- mg-mg-mcg per tablet 1 tablet Daily       Objective:     Vital Signs (Most Recent):  Temp: 98.6 °F (37 °C) (10/23/17 1100)  Pulse: 74 (10/23/17 1245)  Resp: (!) 23 (10/23/17 1245)  BP: 112/60 (10/23/17 1245)  SpO2: 100 % (10/23/17  1245)  O2 Device (Oxygen Therapy): room air (10/23/17 0800) Vital Signs (24h Range):  Temp:  [98.2 °F (36.8 °C)-98.6 °F (37 °C)] 98.6 °F (37 °C)  Pulse:  [73-89] 74  Resp:  [18-26] 23  SpO2:  [91 %-100 %] 100 %  BP: ()/(44-86) 112/60     Weight: 80 kg (176 lb 5.9 oz) (10/17/17 1300)  Body mass index is 28.47 kg/m².  Body surface area is 1.93 meters squared.    I/O last 3 completed shifts:  In: 1789.9 [P.O.:240; I.V.:449.9; Blood:600; Other:500]  Out: 890 [Urine:60; Drains:330; Other:500]    Physical Exam   Constitutional: He is oriented to person, place, and time. He appears well-developed and well-nourished.   HENT:   Head: Normocephalic.   Nose: Nose normal.   Mouth/Throat: Oropharynx is clear and moist.   Eyes: No scleral icterus.   Neck: Neck supple. No JVD present. No tracheal deviation present. No thyromegaly present.   Cardiovascular: Normal rate, regular rhythm and normal heart sounds.  Exam reveals no gallop and no friction rub.    Pulmonary/Chest: Effort normal and breath sounds normal. No respiratory distress. He has no wheezes. He has no rales.   Abdominal: Soft. Bowel sounds are normal. He exhibits no distension and no mass. There is no tenderness. There is no rebound and no guarding.   Musculoskeletal: He exhibits no edema.   Right forearm AVF with good thrill and no bruits   Lymphadenopathy:     He has no cervical adenopathy.   Neurological: He is alert and oriented to person, place, and time. He exhibits normal muscle tone.   Negative asterixis    Skin: Skin is warm and dry. No rash noted. No erythema.   Vitals reviewed.      Significant Labs:  ABGs:     Recent Labs  Lab 10/20/17  1651   PH 7.352   PCO2 40.7   HCO3 22.5*   POCSATURATED 100   BE -3     BMP:     Recent Labs  Lab 10/23/17  0136 10/23/17  0824    67*    104   CO2 27 28   BUN 18 20   CREATININE 3.2* 3.5*   CALCIUM 8.4* 8.8   MG 1.1*  --      CBC:     Recent Labs  Lab 10/23/17  0136   WBC 9.14   RBC 2.08*   HGB 6.8*    HCT 21.1*      *   MCH 32.7*   MCHC 32.2     CMP:     Recent Labs  Lab 10/23/17  0824   GLU 67*   CALCIUM 8.8   ALBUMIN 2.2*   PROT 5.4*      K 3.8   CO2 28      BUN 20   CREATININE 3.5*   ALKPHOS 108   ALT <5*   AST 29   BILITOT 0.8     All labs within the past 24 hours have been reviewed.       Assessment/Plan:     ESRD (end stage renal disease) on dialysis    Theron Cline is a 85 year old white male with past medical history of bladder CA in which he is in remission (was treated with chemotherapy), CAD, A-fib (treated with warfarin) and ESRD on Hd. Patient was transfered from [facility] where he presented with worsening right leg and back pain, general weakness. In hospital presented with increase troponin levels, diagnosed with NSTEMI, increase in INR where was treated with FFP and Vit K, flash pulmonary edema, started on Bi-PAP and aspirated pneumonia (treated with Zosyn).     Consulted for dialysis treatment: iHD MWF, dialyzed in [facility], Rt lower arm AVF, duration 3.5, followed by Dr. Mathur, EDW 90 kg and had his last dialysis on Monday.     - Monitoring in [facility] maybe step down today if BP stable  - hemodynamically improvinge post op- Laminectomy L3-L4 on Norepi off since yesterday.   - Increase Midodrine to 10 mg TID  - No need for HD today will plan for tomorrow  - Continue with rosuvastatin.            Thank you for your consult. I will follow-up with patient. Please contact us if you have any additional questions.    Alex Mann MD  Nephrology  [facility]    ATTENDING PHYSICIAN ATTESTATION  I have personally interviewed and examined the patient. I thoroughly reviewed the demographic, clinical, laboratorial and imaging information available in medical records. I agree with the assessment and recommendations provided by the subspecialty resident. Dr. Mann was under my supervision.

## 2017-10-23 NOTE — ASSESSMENT & PLAN NOTE
-dialysis MWF  -nephrology following - last dialyzed 10/22  - Cr 3.5 and K 3.8 on 10/23 s/p dialysis  - CMP daily  -unable to tolerate PO or rectal kayexalate  -insulin, D50, calcium gluconate admin  - Central line currently day 18 - remove after dialysis 10/24 if patient does not require pressors during dialysis

## 2017-10-23 NOTE — ASSESSMENT & PLAN NOTE
- weaned off phenylephrine 10/22 initially, then required transiently during dialysis 10/22, now off  - midodrine increased to 10mg TID 10/23  - continue to monitor BP

## 2017-10-23 NOTE — PROGRESS NOTES
[facility]  Neurosurgery  Progress Note    Subjective:     History of Present Illness: Mr. 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     Patient was originally seen at [facility] on 10/5/17 due to worsening low back pain and inability to ambulate with decline in function over past 5 weeks. There, patient was noted to have elevated troponin so transferred to [facility] for Cardiology evaluation. Patient was diagnosed with NSTEMI and treated medically. Patient was found to have elevated INR and no stents could be placed.  His INR was attempted to be revered with 4U of FFP and vit K but patient went into flash pulm edema.  At this time, 2 D echo revealed severe aortic stenosis with preserved EF and Cardiology evaluated patient and did not feel LHC warranted at this time but recommended outpatient follow-up with his regular Cardiologist and likely need for outpatient LHC to evaluate his aortic stenosis and coronary anatomy.      During this admission, patient was having delirium and developed aspiration PNA (placed on IV Zosyn). Additionally, pt was evaluated by NSx who felt he was not a candidate for surgery. Patient was seen and evaluated by Neurosurgery at [place] (Dr. James) who noted Ct scan of lumbar spine showed moderate to severe acquired spinal stenosis and bilateral foraminal encroachment at L3-L4. Patient is here for second opionion from NSx.    Post-Op  Info:  Procedure(s) (LRB):  L2-L4 spinal fusion, . L3-L4 Interbody fusion, L2-L3 laminectomy (N/A)   3 Days Post-Op     Facility-Administered Medications Prior to Admission   Medication Dose Route Frequency Provider Last Rate Last Dose    [DISCONTINUED] albuterol sulfate nebulizer solution 2.5 mg  2.5 mg Nebulization Q4H PRN Homeyar MALCOLM Donovan MD   2.5 mg at 12/09/13 1400     Prescriptions Prior to Admission   Medication Sig Dispense Refill Last Dose    alprazolam (XANAX) 0.25 MG tablet Take 0.25 mg by mouth daily as needed for Anxiety.   0 Past Week at Unknown time    aspirin (ECOTRIN) 81 MG EC tablet 3 days a week (Patient taking differently: Take 81 mg by mouth every Mon, Wed, Fri. )  0 10/3/2017 at Unknown time    coenzyme Q10 200 mg capsule Take 200 mg by mouth once daily.   10/3/2017 at Unknown time    gabapentin (NEURONTIN) 300 MG capsule Take 300 mg by mouth every evening.       hydrocodone-acetaminophen 10-325mg (NORCO)  mg Tab Take 1 tablet by mouth every 6 (six) hours as needed for Pain.   0 10/3/2017 at Unknown time    insulin glargine (LANTUS) 100 unit/mL injection Inject 20 Units into the skin At bedtime.    10/3/2017 at Unknown time    insulin lispro (HUMALOG) 100 unit/mL injection Inject into the skin 3 (three) times daily as needed for High Blood Sugar.       metoprolol succinate (TOPROL-XL) 50 MG 24 hr tablet take 1 tablet by mouth once daily (Patient taking differently: Take one tablet by mouth daily on Monday, Wednesday and Friday) 30 tablet 11 10/19/2017 at 0835    NITROSTAT 0.4 mg SL tablet place 1 tablet under the tongue if needed every 5 minutes for chest pain for 3 doses IF NO RELIEF AFTER 3RD DOSE CALL PRESCRIBER . 100 tablet 6 Unknown at Unknown time    pantoprazole (PROTONIX) 40 MG tablet Take 1 tablet by mouth nightly.  0 10/3/2017 at Unknown time    ANKIT-AZ RX 1- mg-mg-mcg Tab Take 1 tablet by mouth once daily.  0 10/3/2017 at Unknown time     rosuvastatin (CRESTOR) 40 MG Tab Take 1/2 a tab a day (Patient taking differently: Take 40 mg by mouth every evening. ) 30 tablet 6 Past Week at Unknown time    sertraline (ZOLOFT) 100 MG tablet Take 1 tablet by mouth every evening.  0 10/3/2017 at Unknown time    warfarin (COUMADIN) 4 MG tablet take 1 tablet by mouth once daily 35 tablet 3 10/3/2017 at Unknown time       Review of patient's allergies indicates:   Allergen Reactions    Morphine Other (See Comments)     Other reaction(s): severe abdominal pain    Darvocet a500  [propoxyphene n-acetaminophen]      Other reaction(s): Unknown       Past Medical History:   Diagnosis Date    Bladder cancer     Chronic diastolic heart failure 8/21/2012    3/13: AOSAT: 98, FICKCI: 2.41, FICKCO: 5.18 PAPRES: 34/16 (23), PASAT: 65, PVR: 1.74 PWPRES: 18/18 (14), RA PRES: 14/13 (12), RV: 40/0, 10     Chronic hip pain, right 10/4/2017    Coronary artery disease involving native coronary artery of native heart without angina pectoris     Dyslipidemia     Encounter for blood transfusion     ESRD (end stage renal disease) on dialysis 6/9/2014    Essential hypertension     Hypercholesteremia 8/21/2012    Long-term (current) use of anticoagulants 10/9/2015    NSTEMI (non-ST elevated myocardial infarction) 10/4/2017    Paroxysmal atrial fibrillation 10/8/2015    Prostate cancer     Severe aortic stenosis 10/14/2014    Type 2 diabetes mellitus with chronic kidney disease on chronic dialysis, with long-term current use of insulin 12/15/2013    Ventricular tachycardia     monomorphic     Past Surgical History:   Procedure Laterality Date    BACK SURGERY      laminectomy x2    COLON SURGERY      EXTENSIVE PROSTATE SURGER      Prostatectomy, Radical    JOINT REPLACEMENT      left hip     Family History     None        Social History Main Topics    Smoking status: Former Smoker     Packs/day: 3.00     Years: 40.00     Quit date: 1/1/1980    Smokeless tobacco:  Former User    Alcohol use No      Comment: a few drinks    Drug use: Unknown    Sexual activity: Not on file     Review of Systems    Objective:     Weight: 80 kg (176 lb 5.9 oz)  Body mass index is 28.47 kg/m².  Vital Signs (Most Recent):  Temp: 98.3 °F (36.8 °C) (10/22/17 2305)  Pulse: 78 (10/23/17 0205)  Resp: (!) 26 (10/23/17 0205)  BP: (!) 102/53 (10/23/17 0205)  SpO2: 100 % (10/23/17 0205) Vital Signs (24h Range):  Temp:  [97.9 °F (36.6 °C)-98.5 °F (36.9 °C)] 98.3 °F (36.8 °C)  Pulse:  [73-89] 78  Resp:  [18-84] 26  SpO2:  [98 %-100 %] 100 %  BP: ()/(44-92) 102/53  Arterial Line BP: ()/(18-45) 108/39                           Closed/Suction Drain 10/20/17 1647 Right;Superior Back Accordion 10 Fr. (Active)   Site Description Healing 10/21/2017 11:00 AM   Dressing Type No dressing 10/21/2017 11:00 AM   Drainage Serosanguineous 10/21/2017 11:00 AM   Status To bulb suction 10/21/2017 11:00 AM   Output (mL) 80 mL 10/21/2017  6:00 AM            Hemodialysis AV Fistula Right forearm (Active)   Needle Size 15ga 10/18/2017 12:15 PM   Site Assessment Clean;Dry;Intact 10/21/2017 11:00 AM   Patency Present;Thrill;Bruit 10/21/2017 11:00 AM   Status Deaccessed 10/21/2017 11:00 AM   Flows Good 10/16/2017  7:50 PM   Dressing Intervention New dressing 10/16/2017 12:30 PM   Dressing Status Clean;Dry;Intact 10/19/2017  9:25 AM   Site Condition No complications 10/21/2017 11:00 AM   Dressing Open to air (None) 10/21/2017 11:00 AM   Drainage Description Serosanguineous 10/11/2017 11:22 AM       Physical Exam:    Constitutional: He appears well-developed and well-nourished.     Cardiovascular: Normal rate.     Abdominal: Soft.     Psych/Behavior: He is alert. He is oriented to person, place, and time. He has a normal mood and affect.     Neurological:   AAOx3, NAD, speech fluent  PERRL, EOMI FS TM  BUCIO 4+/5 - pain limited  SILT  Dressing c/d/i         Significant Labs:    Recent Labs  Lab 10/22/17  0200   10/22/17  0803  10/22/17  1640 10/22/17  2013 10/23/17  0136   GLU  --   < > 114*  --   --  99 102   NA  --   < > 140  --   --  141 139   K 5.4*  < > 5.0  5.0  < > 5.2* 3.2*  3.2* 3.8  3.8   CL  --   < > 109  --   --  105 103   CO2  --   < > 17*  --   --  24 27   BUN  --   < > 42*  --   --  16 18   CREATININE  --   < > 6.5*  --   --  2.5* 3.2*   CALCIUM  --   < > 9.3  --   --  7.7* 8.4*   MG 1.6  --   --   --   --   --  1.1*   < > = values in this interval not displayed.    Recent Labs  Lab 10/22/17  0200 10/23/17  0136   WBC 14.19* 9.14   HGB 6.8* 6.8*   HCT 21.4* 21.1*    165       Recent Labs  Lab 10/22/17  0200 10/23/17  0136   INR 1.2 1.0     Microbiology Results (last 7 days)     ** No results found for the last 168 hours. **        ABGs: No results for input(s): PH, PCO2, PO2, HCO3, POCSATURATED, BE in the last 48 hours.  Cardiac markers: No results for input(s): CKMB, CPKMB, TROPONINT, TROPONINI, MYOGLOBIN in the last 48 hours.  CMP:   Recent Labs  Lab 10/21/17  2000  10/22/17  0803  10/22/17  1640 10/22/17  2013 10/23/17  0136   *  < > 114*  --   --  99 102   CALCIUM 9.1  < > 9.3  --   --  7.7* 8.4*   ALBUMIN 2.2*  < > 2.2*  --   --  2.0* 2.1*   PROT 5.4*  --  5.5*  --   --  5.0*  --      < > 140  --   --  141 139   K 5.1  5.1  < > 5.0  5.0  < > 5.2* 3.2*  3.2* 3.8  3.8   CO2 17*  < > 17*  --   --  24 27     < > 109  --   --  105 103   BUN 39*  < > 42*  --   --  16 18   CREATININE 6.3*  < > 6.5*  --   --  2.5* 3.2*   ALKPHOS 116  --  110  --   --  99  --    ALT 9*  --  <5*  --   --  <5*  --    AST 34  --  26  --   --  25  --    BILITOT 0.3  --  0.3  --   --  0.6  --    < > = values in this interval not displayed.  CRP: No results for input(s): CRP in the last 48 hours.  ESR: No results for input(s): POCESR, ERYTHROCYTES in the last 48 hours.  LFTs:   Recent Labs  Lab 10/21/17  2000  10/22/17  0803 10/22/17  2013 10/23/17  0136   ALT 9*  --  <5* <5*  --    AST 34  --  26 25   --    ALKPHOS 116  --  110 99  --    BILITOT 0.3  --  0.3 0.6  --    PROT 5.4*  --  5.5* 5.0*  --    ALBUMIN 2.2*  < > 2.2* 2.0* 2.1*   < > = values in this interval not displayed.  Procalcitonin: No results for input(s): PROCAL in the last 48 hours.    All pertinent labs from the last 24 hours have been reviewed.    Significant Diagnostics:  No new diagnostics at this time    Assessment/Plan:     Lumbar back pain with radiculopathy affecting right lower extremity    86 y/o M with multiple active medical comorbidities, including recent NSTEMI, severe aortic stenosis, ESRD with HD MWF, and aspiration pneumonia s/p L3-5 TLIF    Neuro stable  Cont neuro checks  CV- goal normotension. Wean pressor for normal MAP's.   ASA restarted. Holding heparin gtt.   Pulm- on RA.  FENGI- goal eunatremia, ADAT.   Heme/ID- afebrile. Hgb/hct declined, so 1uPRBCs transfused  ppx- BRAIN/SCD's/SQH, sqh. Gi ppx.     dispo-  Cont ICU care. Wean pressor. PTOT. Medical management per primary.              Kain Cannon MD  Neurosurgery  [facility]

## 2017-10-23 NOTE — PLAN OF CARE
10/23/17 1154   Discharge Reassessment   Assessment Type Discharge Planning Reassessment   Provided patient/caregiver education on the expected discharge date and the discharge plan No   Do you have any problems affording any of your prescribed medications? No   Discharge Plan A Skilled Nursing Facility   Discharge Plan B Skilled Nursing Facility   Patient choice form signed by patient/caregiver No   Can the patient answer the patient profile reliably? Yes, cognitively intact   How does the patient rate their overall health at the present time? Fair   Describe the patient's ability to walk at the present time. Walks with the help of equipment   How often would a person be available to care for the patient? Whenever needed   Number of comorbid conditions (as recorded on the chart) Two   During the past month, has the patient often been bothered by feeling down, depressed or hopeless? Yes   During the past month, has the patient often been bothered by little interest or pleasure in doing things? Yes     Discharge Facility/Level Of Care Needs: nursing facility, skilled     Marcia Monreal RN/JAMAL  [phone]  [facility]

## 2017-10-23 NOTE — PLAN OF CARE
Problem: Physical Therapy Goal  Goal: Physical Therapy Goal  Revised Goals to be met by: 10/31/2017    Patient will increase functional independence with mobility by performin. Supine to sit with Minimum Assistance  2. Sit to supine with Minimum Assistance  3. Sit to stand transfer with Minimum Assistance using AD or No AD  4. Bed to chair transfer with Minimum Assistance using AD or No AD  5. Gait x50 feet with Minimum Assistance using AD or No AD  6. Pt will stand for 1 minute with RW usage and Contact Guard Assistance.  7. Pt will perform (B) LE therapeutic exercise x 20 reps to assist c/ improving muscular strength and endurance.      Outcome: Ongoing (interventions implemented as appropriate)  Goals added to address current deficits. Goals appropriate currently.    Cheli Stewart, PT, DPT  10/23/2017

## 2017-10-23 NOTE — PROGRESS NOTES
[facility]  Neurosurgery  Progress Note    Subjective:     History of Present Illness: Mr. 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     Patient was originally seen at [facility] on 10/5/17 due to worsening low back pain and inability to ambulate with decline in function over past 5 weeks. There, patient was noted to have elevated troponin so transferred to [facility] for Cardiology evaluation. Patient was diagnosed with NSTEMI and treated medically. Patient was found to have elevated INR and no stents could be placed.  His INR was attempted to be revered with 4U of FFP and vit K but patient went into flash pulm edema.  At this time, 2 D echo revealed severe aortic stenosis with preserved EF and Cardiology evaluated patient and did not feel LHC warranted at this time but recommended outpatient follow-up with his regular Cardiologist and likely need for outpatient LHC to evaluate his aortic stenosis and coronary anatomy.      During this admission, patient was having delirium and developed aspiration PNA (placed on IV Zosyn). Additionally, pt was evaluated by [facility] who felt he was not a candidate for surgery. Patient was seen and evaluated by Neurosurgery at [place] (Dr. James) who noted Ct scan of lumbar spine showed moderate to severe acquired spinal stenosis and bilateral foraminal encroachment at L3-L4. Patient is here for second opionion from [facility].    Post-Op  Info:  Procedure(s) (LRB):  L2-L4 spinal fusion, . L3-L4 Interbody fusion, L2-L3 laminectomy (N/A)   3 Days Post-Op     Interval History: Pressor weaned    Medications:  Continuous Infusions:   phenylephrine Stopped (10/22/17 2310)     Scheduled Meds:   sodium chloride 0.9%   Intravenous Once    sodium chloride 0.9%   Intravenous Once    atorvastatin  80 mg Oral Daily    epoetin preeti (PROCRIT) injection  10,000 Units Subcutaneous Once    fentanyl  1 patch Transdermal Q72H    heparin (porcine)  5,000 Units Subcutaneous Q8H    insulin detemir  18 Units Subcutaneous QHS    magnesium sulfate IVPB  2 g Intravenous Once    metoprolol tartrate  25 mg Oral BID    midodrine  10 mg Oral TID    pantoprazole  40 mg Oral Nightly    senna-docusate 8.6-50 mg  1 tablet Oral BID    sertraline  100 mg Oral QHS    vit b cmplx 3-fa-vit c-biotin 1- mg-mg-mcg  1 tablet Oral Daily     PRN Meds:sodium chloride, sodium chloride, sodium chloride, sodium chloride, sodium chloride 0.9%, sodium chloride 0.9%, sodium chloride 0.9%, acetaminophen, albuterol-ipratropium 2.5mg-0.5mg/3mL, [COMPLETED] calcium gluconate IVPB **AND** calcium gluconate IVPB, dextrose 50%, dextrose 50%, diazePAM, glucagon (human recombinant), glucose, glucose, hydrocodone-acetaminophen 10-325mg, insulin aspart, nitroGLYCERIN, ramelteon     Review of Systems  Objective:     Weight: 80 kg (176 lb 5.9 oz)  Body mass index is 28.47 kg/m².  Vital Signs (Most Recent):  Temp: 98.5 °F (36.9 °C) (10/23/17 0700)  Pulse: 85 (10/23/17 0800)  Resp: (!) 25 (10/23/17 0800)  BP: (!) 113/58 (10/23/17 0800)  SpO2: 100 % (10/23/17 0800) Vital Signs (24h Range):  Temp:  [97.9 °F (36.6 °C)-98.5 °F (36.9 °C)] 98.5 °F (36.9 °C)  Pulse:  [73-89] 85  Resp:  [18-30] 25  SpO2:  [98 %-100 %] 100 %  BP: ()/(44-86) 113/58  Arterial Line BP: ()/(32-41) 108/39                           Closed/Suction Drain 10/20/17 1647 Right;Superior Back Accordion 10 Fr. (Active)    Site Description Healing 10/23/2017  7:00 AM   Dressing Type No dressing 10/23/2017  7:00 AM   Drainage Serosanguineous 10/23/2017  7:00 AM   Status To bulb suction 10/23/2017  7:00 AM   Output (mL) 50 mL 10/23/2017  6:22 AM            Hemodialysis AV Fistula Right forearm (Active)   Needle Size 15ga 10/22/2017  5:00 PM   Site Assessment Clean;Dry;Intact 10/23/2017  7:00 AM   Patency Present;Thrill;Bruit 10/23/2017  7:00 AM   Status Deaccessed 10/23/2017  7:00 AM   Flows Good 10/23/2017  3:05 AM   Dressing Intervention New dressing 10/22/2017  9:05 PM   Dressing Status Dry;Intact;Clean 10/22/2017  9:05 PM   Site Condition No complications 10/23/2017  7:00 AM   Dressing Open to air (None) 10/23/2017  7:00 AM   Drainage Description Serosanguineous 10/11/2017 11:22 AM       Physical Exam:  Vitals reviewed.    Constitutional: He appears well-developed and well-nourished. He is not diaphoretic. No distress.     Eyes: Pupils are equal, round, and reactive to light. EOM are normal.     Cardiovascular: Normal rate.     Abdominal: Soft.     Psych/Behavior: He is alert. He has a normal mood and affect.     Neurological:        Cranial nerves: Cranial nerve(s) II, III, IV, V, VI, VII, VIII, IX, X, XI and XII are intact.   AOx1 (self)  4/5 L HF/EHL, otherwise 5/5       Significant Labs:    Recent Labs  Lab 10/22/17  0200  10/22/17  2013 10/23/17  0136 10/23/17  0824   GLU  --   < > 99 102 67*   NA  --   < > 141 139 140   K 5.4*  < > 3.2*  3.2* 3.8  3.8 3.8   CL  --   < > 105 103 104   CO2  --   < > 24 27 28   BUN  --   < > 16 18 20   CREATININE  --   < > 2.5* 3.2* 3.5*   CALCIUM  --   < > 7.7* 8.4* 8.8   MG 1.6  --   --  1.1*  --    < > = values in this interval not displayed.    Recent Labs  Lab 10/22/17  0200 10/23/17  0136   WBC 14.19* 9.14   HGB 6.8* 6.8*   HCT 21.4* 21.1*    165       Recent Labs  Lab 10/22/17  0200 10/23/17  0136   INR 1.2 1.0     Microbiology Results (last 7 days)     ** No results found for  the last 168 hours. **        Recent Lab Results       10/23/17  0824 10/23/17  0136 10/22/17  2024 10/22/17  2013 10/22/17  1640      Immature Granulocytes  0.8(H)        Immature Grans (Abs)  0.07(H)        Albumin 2.2(L) 2.1(L)  2.0(L)      Alkaline Phosphatase 108   99      ALT <5(L)   <5(L)      Anion Gap 8 9  12      AST 29   25      Baso #  0.02        Basophil%  0.2        Total Bilirubin 0.8  Comment:  For infants and newborns, interpretation of results should be based  on gestational age, weight and in agreement with clinical  observations.  Premature Infant recommended reference ranges:  Up to 24 hours.............<8.0 mg/dL  Up to 48 hours............<12.0 mg/dL  3-5 days..................<15.0 mg/dL  6-29 days.................<15.0 mg/dL     0.6  Comment:  For infants and newborns, interpretation of results should be based  on gestational age, weight and in agreement with clinical  observations.  Premature Infant recommended reference ranges:  Up to 24 hours.............<8.0 mg/dL  Up to 48 hours............<12.0 mg/dL  3-5 days..................<15.0 mg/dL  6-29 days.................<15.0 mg/dL        BUN, Bld 20 18  16      Calcium 8.8 8.4(L)  7.7(L)      Chloride 104 103  105      CO2 28 27  24      Creatinine 3.5(H) 3.2(H)  2.5(H)      Differential Method  Automated        eGFR if  17.4(A) 19.4(A)  26.1(A)      eGFR if non  15.0  Comment:  Calculation used to obtain the estimated glomerular filtration  rate (eGFR) is the CKD-EPI equation. Since race is unknown   in our information system, the eGFR values for   -American and Non--American patients are given   for each creatinine result.  (A) 16.7  Comment:  Calculation used to obtain the estimated glomerular filtration  rate (eGFR) is the CKD-EPI equation. Since race is unknown   in our information system, the eGFR values for   -American and Non--American patients are given   for each  creatinine result.  (A)  22.6  Comment:  Calculation used to obtain the estimated glomerular filtration  rate (eGFR) is the CKD-EPI equation. Since race is unknown   in our information system, the eGFR values for   -American and Non--American patients are given   for each creatinine result.  (A)      Eos #  0.1        Eosinophil%  1.2        Glucose 67(L) 102  99      Gran #  7.7        Gran%  84.0(H)        Hematocrit  21.1(L)        Hemoglobin  6.8(L)        Coumadin Monitoring INR  1.0  Comment:  Coumadin Therapy:  2.0 - 3.0 for INR for all indicators except mechanical heart valves  and antiphospholipid syndromes which should use 2.5 - 3.5.          Lymph #  0.8(L)        Lymph%  8.4(L)        Magnesium  1.1(L)        MCH  32.7(H)        MCHC  32.2        MCV  101  Comment:  Results confirmed, test repeated(H)        Mono #  0.5        Mono%  5.4        MPV  10.8        nRBC  0        Phosphorus  3.0          3.0        Platelets  165  Comment:  Results confirmed, test repeated        POCT Glucose   108  214(H)     Potassium 3.8 3.8  3.2(L)        3.8  3.2(L)      Total Protein 5.4(L)   5.0(L)      Protime  10.9        RBC  2.08(L)        RDW  17.3(H)        Sodium 140 139  141      WBC  9.14                    10/22/17  1640 10/22/17  1151 10/22/17  1146      Immature Granulocytes        Immature Grans (Abs)        Albumin        Alkaline Phosphatase        ALT        Anion Gap        AST        Baso #        Basophil%        Total Bilirubin        BUN, Bld        Calcium        Chloride        CO2        Creatinine        Differential Method        eGFR if         eGFR if non         Eos #        Eosinophil%        Glucose        Gran #        Gran%        Hematocrit        Hemoglobin        Coumadin Monitoring INR        Lymph #        Lymph%        Magnesium        MCH        MCHC        MCV        Mono #        Mono%        MPV        nRBC        Phosphorus         Platelets        POCT Glucose  218(H)      Potassium 5.2(H)  5.3(H)     Total Protein        Protime        RBC        RDW        Sodium        WBC            All pertinent labs from the last 24 hours have been reviewed.    Significant Diagnostics:  CT: No results found in the last 24 hours.  MRI: No results found in the last 24 hours.  I have reviewed all pertinent imaging results/findings within the past 24 hours.    Assessment/Plan:     Lumbar back pain with radiculopathy affecting right lower extremity    84 y/o M with multiple active medical comorbidities, including recent NSTEMI, severe aortic stenosis, ESRD with HD MWF, and aspiration pneumonia s/p L3-5 TLIF    Neuro stable  Cont neuro checks  CV- goal normotension.   Hold ASA. Holding heparin gtt.   Pulm- on RA.  FENGI- goal eunatremia, ADAT.   Heme/ID- afebrile. Hgb/hct declined, transfuse towards goal of Hb 8  ppx- BRAIN/SCD's/SQH, sqh. Gi ppx.     dispo-  Cont ICU care. PTOT. Medical management per primary.              Shirlene Selby MD  Neurosurgery  [facility]

## 2017-10-23 NOTE — SUBJECTIVE & OBJECTIVE
Interval History:   NAEON, Tolerated HD yesterday but require minimal Norpei support to complete treatment. Net gain 589 cc/24hrs. UF was 500 ml. Hemodynamically improving. Oxygenation stable on RA    Review of patient's allergies indicates:   Allergen Reactions    Darvocet a500  [propoxyphene n-acetaminophen]      Other reaction(s): Unknown    Morphine      Other reaction(s): Unknown     Current Facility-Administered Medications   Medication Frequency    0.9%  NaCl infusion (for blood administration) Q24H PRN    0.9%  NaCl infusion (for blood administration) Q24H PRN    0.9%  NaCl infusion (for blood administration) Q24H PRN    0.9%  NaCl infusion (for blood administration) Q24H PRN    0.9%  NaCl infusion PRN    0.9%  NaCl infusion Once    0.9%  NaCl infusion PRN    0.9%  NaCl infusion PRN    0.9%  NaCl infusion Once    acetaminophen tablet 650 mg Q8H PRN    albuterol-ipratropium 2.5mg-0.5mg/3mL nebulizer solution 3 mL Q4H PRN    atorvastatin tablet 80 mg Daily    calcium gluconate 1 g in dextrose 5 % 100 mL IVPB (premix) Q10 Min PRN    dextrose 50% injection 12.5 g PRN    dextrose 50% injection 25 g PRN    diazePAM injection 2 mg Q6H PRN    fentanyl 12 mcg/hr 1 patch Q72H    glucagon (human recombinant) injection 1 mg PRN    glucose chewable tablet 16 g PRN    glucose chewable tablet 24 g PRN    heparin (porcine) injection 5,000 Units Q8H    hydrocodone-acetaminophen 10-325mg per tablet 1 tablet Q6H PRN    insulin aspart pen 0-5 Units QID (AC + HS) PRN    insulin detemir pen 18 Units QHS    metoprolol tartrate (LOPRESSOR) tablet 25 mg BID    midodrine tablet 10 mg TID    nitroGLYCERIN SL tablet 0.3 mg Q5 Min PRN    pantoprazole EC tablet 40 mg Nightly    PHENYLephrine (MYNOR-SYNEPHRINE) 40 mg in sodium chloride 0.9% 250ml (titrating) (premix) Continuous    ramelteon tablet 8 mg Nightly PRN    senna-docusate 8.6-50 mg per tablet 1 tablet BID    sertraline tablet 100 mg QHS    vit b  cmplx 3-fa-vit c-biotin 1- mg-mg-mcg per tablet 1 tablet Daily       Objective:     Vital Signs (Most Recent):  Temp: 98.6 °F (37 °C) (10/23/17 1100)  Pulse: 74 (10/23/17 1245)  Resp: (!) 23 (10/23/17 1245)  BP: 112/60 (10/23/17 1245)  SpO2: 100 % (10/23/17 1245)  O2 Device (Oxygen Therapy): room air (10/23/17 0800) Vital Signs (24h Range):  Temp:  [98.2 °F (36.8 °C)-98.6 °F (37 °C)] 98.6 °F (37 °C)  Pulse:  [73-89] 74  Resp:  [18-26] 23  SpO2:  [91 %-100 %] 100 %  BP: ()/(44-86) 112/60     Weight: 80 kg (176 lb 5.9 oz) (10/17/17 1300)  Body mass index is 28.47 kg/m².  Body surface area is 1.93 meters squared.    I/O last 3 completed shifts:  In: 1789.9 [P.O.:240; I.V.:449.9; Blood:600; Other:500]  Out: 890 [Urine:60; Drains:330; Other:500]    Physical Exam   Constitutional: He is oriented to person, place, and time. He appears well-developed and well-nourished.   HENT:   Head: Normocephalic.   Nose: Nose normal.   Mouth/Throat: Oropharynx is clear and moist.   Eyes: No scleral icterus.   Neck: Neck supple. No JVD present. No tracheal deviation present. No thyromegaly present.   Cardiovascular: Normal rate, regular rhythm and normal heart sounds.  Exam reveals no gallop and no friction rub.    Pulmonary/Chest: Effort normal and breath sounds normal. No respiratory distress. He has no wheezes. He has no rales.   Abdominal: Soft. Bowel sounds are normal. He exhibits no distension and no mass. There is no tenderness. There is no rebound and no guarding.   Musculoskeletal: He exhibits no edema.   Right forearm AVF with good thrill and no bruits   Lymphadenopathy:     He has no cervical adenopathy.   Neurological: He is alert and oriented to person, place, and time. He exhibits normal muscle tone.   Negative asterixis    Skin: Skin is warm and dry. No rash noted. No erythema.   Vitals reviewed.      Significant Labs:  ABGs:     Recent Labs  Lab 10/20/17  1651   PH 7.352   PCO2 40.7   HCO3 22.5*   POCSATURATED  100   BE -3     BMP:     Recent Labs  Lab 10/23/17  0136 10/23/17  0824    67*    104   CO2 27 28   BUN 18 20   CREATININE 3.2* 3.5*   CALCIUM 8.4* 8.8   MG 1.1*  --      CBC:     Recent Labs  Lab 10/23/17  0136   WBC 9.14   RBC 2.08*   HGB 6.8*   HCT 21.1*      *   MCH 32.7*   MCHC 32.2     CMP:     Recent Labs  Lab 10/23/17  0824   GLU 67*   CALCIUM 8.8   ALBUMIN 2.2*   PROT 5.4*      K 3.8   CO2 28      BUN 20   CREATININE 3.5*   ALKPHOS 108   ALT <5*   AST 29   BILITOT 0.8     All labs within the past 24 hours have been reviewed.

## 2017-10-23 NOTE — PT/OT/SLP PROGRESS
"Speech Language Pathology  Treatment    Donta Cline   MRN: [id number]   [id number] A    Admitting Diagnosis: Lumbar stenosis    Diet recommendations: Solid Diet Level: Dental Soft  Liquid Diet Level: Pudding Thick   · Thicken all liquids to pudding thick with 4oz liquid to 2 packets Resource ThickenUp thickener  · Encourage double swallows per bolus  · Crush PO meds in pureed  · NO straws  · Fully awake and alert for PO intake  · Fully upright position for PO intake  · Small bites/ sips  · Slow rate of eating/ drinking  · 1 bite/ sip @ a time  · Refrain from talking prior to swallow completion  · Remain upright for 20-30 min post PO intake    SLP Treatment Date: 10/23/17  Speech Start Time: 1118     Speech Stop Time: 1150     Speech Total (min): 32 min       TREATMENT BILLABLE MINUTES:  Treatment Swallowing Dysfunction 23 and Seld Care/Home Management Training 9    Has the patient been evaluated by SLP for swallowing? : Yes  Keep patient NPO?: No   General Precautions: Standard, aspiration, pudding thick, fall  Current Respiratory Status:  (room air)       Subjective:  "What kind of other juice do you have?"    Pain/Comfort  Pain Rating 1: 9/10  Location 1: back  Pain Addressed 1: Distraction  Pain Rating Post-Intervention 1: 9/10    Objective:   Patient found with:  (back brace)  Pt awake upon entry, sitting fully upright in b/s chair with daughter at b/s. Pt observed with cup sips pudding thick apple juice x~11 intermittently between dysphagia exercises. No overt s/s aspiration observed. Thinner liquid consistencies not attempted this date 2/2 pt with silent aspiration across thin/ nectar thick/ honey thick trials provided during MBS. Given consistent clinician model and verbal prompting, pt completed le exercise x10 with good ability. During effortful swallow exercise, pt with inconsistent ability to trigger cued volitional swallow either in isolation or as double swallow following cup sip pudding thick apple " juice. Because pt with difficulty following 3-step command to complete supraglottic exercise, supraglottic swallow attempted however pt with ongoing difficulty characterized by that observed during effortful swallow exercise. Given consistent clinician model, pt complete BOT /k/ and /g/ x10 each with good ability. Pt and daughter educated re: aspiration precautions listed above and thickening. White board updated. No further questions.     Assessment:  Donta Cline is a 85 y.o. male with a medical diagnosis of Lumbar stenosis and presents with pharyngeal dysphagia.     Discharge recommendations: Discharge Facility/Level Of Care Needs: nursing facility, skilled     Goals:    SLP Goals        Problem: SLP Goal    Goal Priority Disciplines Outcome   SLP Goal     SLP Ongoing (interventions implemented as appropriate)   Description:  Speech Therapy Short Term Goals  Goals expected to be met by 10/28:  1. Pt will tolerate pudding thick liquids and dental soft diet with no overt s/s aspiration.   2. Given clinician model, pt will complete dysphagia exercises x10 each in order to strengthen the swallowing musculature.   3. Pt will participate in repeat MBSS when deemed appropriate by SLP/ MD.                          Plan:   Patient to be seen Therapy Frequency: 5 x/week   Plan of Care expires: 11/19/17  Plan of Care reviewed with: patient, daughter  SLP Follow-up?: Yes       DGULAS Barriga, CCC-SLP  [phone]  10/23/2017

## 2017-10-23 NOTE — ASSESSMENT & PLAN NOTE
-issue at OSH- plan for cath initially and never received due to issues with pulm edema  -RCA stenosis, low risk per cards can have outpatient LHC with intervention after discharge  -was on hep ggt - Holding heparin gtt post-op per neurosurgery  - aspirin 81mg daily likely to re-start 10/25 per neurosurgery if no signs of bleeding

## 2017-10-23 NOTE — PROGRESS NOTES
Dialysis completed. Needles removed from right forearm fistula with pressure held to sites for 7 minutes each with hemostasis achieved. Gauze and tape to sites. Patient dialyzed for 3 hours with no net  fluid removal per orders. Tolerated treatment with stable vital signs. Report given to HECTOR Ontiveros.

## 2017-10-23 NOTE — SUBJECTIVE & OBJECTIVE
Interval History:  Underwent hemodialysis 10/22 and required phenylephrine drip at 0.5mcg/kg/min for several hours, weaned off overnight. Midodrine increased to 10mg TID today. Potassium 3.8 and creatinie 3.5 today. Hg 6.8 today and receiving 1u pRBC. Pain moderately well controlled and requiring Norco 10mg-325 q6. Remains afebrile.    Review of Systems    Constitutional: Denies fevers or chills.  Pulmonary: Denies shortness of breath or cough.  Cardiology: Denies chest pain or palpitations.  GI: Denies abdominal pain or constipation.  Neurologic: Denies new weakness or paresthesias.    Objective:     Vitals:  Temp: 98.5 °F (36.9 °C) (10/23/17 0700)  Pulse: 85 (10/23/17 0800)  Resp: (!) 25 (10/23/17 0800)  BP: (!) 113/58 (10/23/17 0800)  SpO2: 100 % (10/23/17 0800)    Temp:  [97.9 °F (36.6 °C)-98.5 °F (36.9 °C)] 98.5 °F (36.9 °C)  Pulse:  [73-89] 85  Resp:  [18-30] 25  SpO2:  [98 %-100 %] 100 %  BP: ()/(44-86) 113/58  Arterial Line BP: ()/(32-41) 108/39              10/22 0701 - 10/23 0700  In: 1309.9 [P.O.:120; I.V.:89.9]  Out: 690 [Drains:190]    Physical Exam  Constitutional: Well-nourished and -developed. No apparent distress.   Eyes: Conjunctiva clear, anicteric. Lids no lesions.  Head/Ears/Nose/Mouth/Throat/Neck: Moist mucous membranes. External ears, nose atraumatic.   Cardiovascular: Regular rhythm. Grade 2 systolic murmur over RUSB. No edema.  Respiratory: Comfortable respirations. Clear to auscultation.  Gastrointestinal: No hernia. Soft, nondistended, nontender. + bowel sounds.     Neurologic:  -GCS [id number]  -Awake, alert. Speech fluent. Follows commands.  -Cranial nerves II-XII grossly intact   -Motor 5/5 bilateral upper extremities, 4/5 proximal LE likely effort related  -Sensation to light touch intact throughout     Medications:  Continuous  phenylephrine Last Rate: Stopped (10/22/17 2310)   Scheduled  sodium chloride 0.9%  Once   sodium chloride 0.9%  Once   atorvastatin 80 mg Daily    epoetin preeti (PROCRIT) injection 10,000 Units Once   fentanyl 1 patch Q72H   heparin (porcine) 5,000 Units Q8H   insulin detemir 18 Units QHS   magnesium sulfate IVPB 2 g Once   metoprolol tartrate 25 mg BID   midodrine 10 mg TID   pantoprazole 40 mg Nightly   senna-docusate 8.6-50 mg 1 tablet BID   sertraline 100 mg QHS   vit b cmplx 3-fa-vit c-biotin 1- mg-mg-mcg 1 tablet Daily   PRN  sodium chloride  Q24H PRN   sodium chloride  Q24H PRN   sodium chloride  Q24H PRN   sodium chloride  Q24H PRN   sodium chloride 0.9%  PRN   sodium chloride 0.9%  PRN   sodium chloride 0.9%  PRN   acetaminophen 650 mg Q8H PRN   albuterol-ipratropium 2.5mg-0.5mg/3mL 3 mL Q4H PRN   calcium gluconate IVPB 1 g Q10 Min PRN   dextrose 50% 12.5 g PRN   dextrose 50% 25 g PRN   diazePAM 2 mg Q6H PRN   glucagon (human recombinant) 1 mg PRN   glucose 16 g PRN   glucose 24 g PRN   hydrocodone-acetaminophen 10-325mg 1 tablet Q6H PRN   insulin aspart 0-5 Units QID (AC + HS) PRN   nitroGLYCERIN 0.3 mg Q5 Min PRN   ramelteon 8 mg Nightly PRN     Labs:  Component      Latest Ref Rng & Units 10/23/2017 10/23/2017 10/23/2017           8:24 AM  1:36 AM  1:36 AM   Sodium      136 - 145 mmol/L 140 139    Potassium      3.5 - 5.1 mmol/L 3.8 3.8 3.8   Chloride      95 - 110 mmol/L 104 103    CO2      23 - 29 mmol/L 28 27    Glucose      70 - 110 mg/dL 67 (L) 102    BUN, Bld      8 - 23 mg/dL 20 18    Creatinine      0.5 - 1.4 mg/dL 3.5 (H) 3.2 (H)    Calcium      8.7 - 10.5 mg/dL 8.8 8.4 (L)    Total Protein      6.0 - 8.4 g/dL 5.4 (L)     Albumin      3.5 - 5.2 g/dL 2.2 (L) 2.1 (L)    Total Bilirubin      0.1 - 1.0 mg/dL 0.8     Alkaline Phosphatase      55 - 135 U/L 108     AST      10 - 40 U/L 29     ALT      10 - 44 U/L <5 (L)     Anion Gap      8 - 16 mmol/L 8 9    eGFR if African American      >60 mL/min/1.73 m:2 17.4 (A) 19.4 (A)    eGFR if non African American      >60 mL/min/1.73 m:2 15.0 (A) 16.7 (A)    Phosphorus      2.7 - 4.5 mg/dL  3.0  3.0   Protime      9.0 - 12.5 sec   10.9   Coumadin Monitoring INR      0.8 - 1.2   1.0   Magnesium      1.6 - 2.6 mg/dL   1.1 (L)     Component      Latest Ref Rng & Units 10/23/2017   WBC      3.90 - 12.70 K/uL 9.14   RBC      4.60 - 6.20 M/uL 2.08 (L)   Hemoglobin      14.0 - 18.0 g/dL 6.8 (L)   Hematocrit      40.0 - 54.0 % 21.1 (L)   MCV      82 - 98 fL 101 (H)   MCH      27.0 - 31.0 pg 32.7 (H)   MCHC      32.0 - 36.0 g/dL 32.2   RDW      11.5 - 14.5 % 17.3 (H)   Platelets      150 - 350 K/uL 165   MPV      9.2 - 12.9 fL 10.8   Immature Granulocytes      0.0 - 0.5 % 0.8 (H)   Gran #      1.8 - 7.7 K/uL 7.7   Immature Grans (Abs)      0.00 - 0.04 K/uL 0.07 (H)   Lymph #      1.0 - 4.8 K/uL 0.8 (L)   Mono #      0.3 - 1.0 K/uL 0.5   Eos #      0.0 - 0.5 K/uL 0.1   Baso #      0.00 - 0.20 K/uL 0.02   nRBC      0 /100 WBC 0   Gran%      38.0 - 73.0 % 84.0 (H)   Lymph%      18.0 - 48.0 % 8.4 (L)   Mono%      4.0 - 15.0 % 5.4   Eosinophil%      0.0 - 8.0 % 1.2   Basophil%      0.0 - 1.9 % 0.2   Differential Method       Automated     Imaging:  None new

## 2017-10-23 NOTE — ASSESSMENT & PLAN NOTE
Hemoglobin 6.8 on 10/22 s/p 1u RBC and Hg 6.8 10/23 . Hg 8.4 10/24.   - Epoetin 10,000 units x1 10/23  - nephrology following

## 2017-10-24 NOTE — SUBJECTIVE & OBJECTIVE
Interval History: Drain removed    Medications:  Continuous Infusions:   phenylephrine Stopped (10/22/17 2310)     Scheduled Meds:   sodium chloride 0.9%   Intravenous Once    sodium chloride 0.9%   Intravenous Once    sodium chloride 0.9%   Intravenous Once    atorvastatin  80 mg Oral Daily    heparin (porcine)  5,000 Units Subcutaneous Q8H    insulin detemir  18 Units Subcutaneous QHS    metoprolol tartrate  25 mg Oral BID    midodrine  10 mg Oral TID    pantoprazole  40 mg Oral Nightly    senna-docusate 8.6-50 mg  1 tablet Oral BID    sertraline  100 mg Oral QHS    vit b cmplx 3-fa-vit c-biotin 1- mg-mg-mcg  1 tablet Oral Daily     PRN Meds:sodium chloride, sodium chloride, sodium chloride, sodium chloride, sodium chloride 0.9%, sodium chloride 0.9%, sodium chloride 0.9%, sodium chloride 0.9%, acetaminophen, albuterol-ipratropium 2.5mg-0.5mg/3mL, [COMPLETED] calcium gluconate IVPB **AND** calcium gluconate IVPB, dextrose 50%, dextrose 50%, diazePAM, glucagon (human recombinant), glucose, glucose, hydrocodone-acetaminophen 10-325mg, insulin aspart, nitroGLYCERIN, ramelteon     Review of Systems  Objective:     Weight: 80 kg (176 lb 5.9 oz)  Body mass index is 28.47 kg/m².  Vital Signs (Most Recent):  Temp: 98.3 °F (36.8 °C) (10/24/17 1500)  Pulse: 98 (10/24/17 1600)  Resp: (!) 32 (10/24/17 1600)  BP: (!) 141/50 (10/24/17 1600)  SpO2: 100 % (10/24/17 1600) Vital Signs (24h Range):  Temp:  [98.2 °F (36.8 °C)-99.1 °F (37.3 °C)] 98.3 °F (36.8 °C)  Pulse:  [] 98  Resp:  [15-32] 32  SpO2:  [79 %-100 %] 100 %  BP: ()/(42-95) 141/50       Date 10/24/17 0700 - 10/25/17 0659   Shift 0985-2101 0097-6327 8947-9084 24 Hour Total   I  N  T  A  K  E   P.O. 120 120  240    Other 750   750    Shift Total  (mL/kg) 870  (10.9) 120  (1.5)  990  (12.4)   O  U  T  P  U  T   Urine  (mL/kg/hr) 50  (0.1)   50    Other 3000   3000    Shift Total  (mL/kg) 3050  (38.1)   3050  (38.1)   Weight (kg) 80 80 80 80                         Hemodialysis AV Fistula Right forearm (Active)   Needle Size 15ga 10/24/2017 11:35 AM   Site Assessment Clean;Dry;Intact 10/24/2017  3:00 PM   Patency Present;Thrill;Bruit 10/24/2017  3:00 PM   Status Deaccessed 10/24/2017  3:00 PM   Flows Good 10/24/2017 11:44 AM   Dressing Intervention Removed 10/24/2017 11:35 AM   Dressing Status Clean;Dry;Intact 10/24/2017 11:35 AM   Site Condition No complications 10/24/2017  3:00 PM   Dressing Open to air (None) 10/24/2017  3:00 PM   Drainage Description Serosanguineous 10/11/2017 11:22 AM       Physical Exam:  Vitals reviewed.    Constitutional: He appears well-developed and well-nourished. He is not diaphoretic. No distress.     Eyes: Pupils are equal, round, and reactive to light. EOM are normal.     Cardiovascular: Normal rate.     Abdominal: Soft.     Psych/Behavior: He is alert. He is oriented to person, place, and time. He has a normal mood and affect.     Neurological:        Sensory: There is no sensory deficit in the trunk. There is no sensory deficit in the extremities.        Cranial nerves: Cranial nerve(s) II, III, IV, V, VI, VII, VIII, IX, X, XI and XII are intact.   4/5 L HF/EHL         Significant Labs:    Recent Labs  Lab 10/23/17  0136 10/23/17  0824 10/24/17  0225    67* 105  105    140 140  140   K 3.8  3.8 3.8 4.1  4.1    104 104  104   CO2 27 28 26  26   BUN 18 20 28*  28*   CREATININE 3.2* 3.5* 4.0*  4.0*   CALCIUM 8.4* 8.8 9.4  9.4   MG 1.1*  --  1.8       Recent Labs  Lab 10/23/17  0136 10/23/17  1725 10/24/17  0225   WBC 9.14  --  9.68   HGB 6.8* 8.1* 8.4*   HCT 21.1*  --  26.0*     --  175       Recent Labs  Lab 10/23/17  0136   INR 1.0     Microbiology Results (last 7 days)     ** No results found for the last 168 hours. **        Recent Lab Results       10/24/17  0225 10/23/17  2129 10/23/17  1725      Immature Granulocytes 1.0(H)       Immature Grans (Abs) 0.10(H)       Albumin 2.3(L)         2.3(L)       Alkaline Phosphatase 114       ALT <5(L)       Anion Gap 10        10       AST 30       Baso # 0.02       Basophil% 0.2       Total Bilirubin 0.6  Comment:  For infants and newborns, interpretation of results should be based  on gestational age, weight and in agreement with clinical  observations.  Premature Infant recommended reference ranges:  Up to 24 hours.............<8.0 mg/dL  Up to 48 hours............<12.0 mg/dL  3-5 days..................<15.0 mg/dL  6-29 days.................<15.0 mg/dL         BUN, Bld 28(H)        28(H)       Calcium 9.4        9.4       Chloride 104        104       CO2 26        26       Creatinine 4.0(H)        4.0(H)       Differential Method Automated       eGFR if  14.8(A)        14.8(A)       eGFR if non  12.8  Comment:  Calculation used to obtain the estimated glomerular filtration  rate (eGFR) is the CKD-EPI equation. Since race is unknown   in our information system, the eGFR values for   -American and Non--American patients are given   for each creatinine result.  (A)        12.8  Comment:  Calculation used to obtain the estimated glomerular filtration  rate (eGFR) is the CKD-EPI equation. Since race is unknown   in our information system, the eGFR values for   -American and Non--American patients are given   for each creatinine result.  (A)       Eos # 0.2       Eosinophil% 1.5       Glucose 105        105       Gran # 7.9(H)       Gran% 81.6(H)       Hematocrit 26.0(L)       Hemoglobin 8.4(L)  8.1(L)     Lymph # 1.0       Lymph% 10.1(L)       Magnesium 1.8       MCH 32.4(H)       MCHC 32.3       (H)       Mono # 0.5       Mono% 5.6       MPV 10.6       nRBC 0       Phosphorus 3.2        3.2       Platelets 175       POCT Glucose  156(H)      Potassium 4.1        4.1       Total Protein 5.8(L)       RBC 2.59(L)       RDW 19.1(H)       Sodium 140        140       WBC 9.68           All  pertinent labs from the last 24 hours have been reviewed.    Significant Diagnostics:  CT: No results found in the last 24 hours.  MRI: No results found in the last 24 hours.  I have reviewed all pertinent imaging results/findings within the past 24 hours.

## 2017-10-24 NOTE — CONSULTS
Thank you for the consult but Gen Medicine consults do not see patient's while in ICU. Please call MICU if there are active medical problems while patient remains in ICU. Once stepped down to the floor, please re-consult us if there are any active medical problems.

## 2017-10-24 NOTE — ASSESSMENT & PLAN NOTE
-nephrology following - last dialyzed 10/22 and undergoing dialysis 10/24  - Cr 4 10/24  - CMP daily  - unable to tolerate PO or rectal kayexalate  - insulin, D50, calcium gluconate admin  - Central line currently day 19 - remove after dialysis 10/24 if patient does not require pressors during dialysis

## 2017-10-24 NOTE — PROGRESS NOTES
Notified Raven RT to see if prn duo nebs can be done, pt has consistent cough and having trouble with secretions. Per RT will be there shortly to administer pt's duo nebs.

## 2017-10-24 NOTE — SUBJECTIVE & OBJECTIVE
Interval History:   NAEON, Off IV vasopressors > 24 hrs. Net gain 277 cc/24hrs. UF target is 1000 ml. Hemodynamically improving. Oxygenation stable on RA. HD today    Review of patient's allergies indicates:   Allergen Reactions    Darvocet a500  [propoxyphene n-acetaminophen]      Other reaction(s): Unknown    Morphine      Other reaction(s): Unknown     Current Facility-Administered Medications   Medication Frequency    0.9%  NaCl infusion (for blood administration) Q24H PRN    0.9%  NaCl infusion (for blood administration) Q24H PRN    0.9%  NaCl infusion (for blood administration) Q24H PRN    0.9%  NaCl infusion (for blood administration) Q24H PRN    0.9%  NaCl infusion PRN    0.9%  NaCl infusion Once    0.9%  NaCl infusion PRN    0.9%  NaCl infusion PRN    0.9%  NaCl infusion Once    0.9%  NaCl infusion PRN    0.9%  NaCl infusion Once    acetaminophen tablet 650 mg Q8H PRN    albuterol-ipratropium 2.5mg-0.5mg/3mL nebulizer solution 3 mL Q4H PRN    atorvastatin tablet 80 mg Daily    calcium gluconate 1 g in dextrose 5 % 100 mL IVPB (premix) Q10 Min PRN    dextrose 50% injection 12.5 g PRN    dextrose 50% injection 25 g PRN    diazePAM injection 2 mg Q6H PRN    glucagon (human recombinant) injection 1 mg PRN    glucose chewable tablet 16 g PRN    glucose chewable tablet 24 g PRN    heparin (porcine) injection 5,000 Units Q8H    hydrocodone-acetaminophen 10-325mg per tablet 1 tablet Q6H PRN    insulin aspart pen 0-5 Units QID (AC + HS) PRN    insulin detemir pen 18 Units QHS    metoprolol tartrate (LOPRESSOR) tablet 25 mg BID    midodrine tablet 10 mg TID    nitroGLYCERIN SL tablet 0.3 mg Q5 Min PRN    pantoprazole EC tablet 40 mg Nightly    PHENYLephrine (MYNOR-SYNEPHRINE) 40 mg in sodium chloride 0.9% 250ml (titrating) (premix) Continuous    ramelteon tablet 8 mg Nightly PRN    senna-docusate 8.6-50 mg per tablet 1 tablet BID    sertraline tablet 100 mg QHS    vit b cmplx  3-fa-vit c-biotin 1- mg-mg-mcg per tablet 1 tablet Daily       Objective:     Vital Signs (Most Recent):  Temp: 98.5 °F (36.9 °C) (10/24/17 1135)  Pulse: 90 (10/24/17 1245)  Resp: (!) 29 (10/24/17 1245)  BP: (!) 142/64 (10/24/17 1245)  SpO2: 100 % (10/24/17 1215)  O2 Device (Oxygen Therapy): room air (10/24/17 1245) Vital Signs (24h Range):  Temp:  [98.2 °F (36.8 °C)-99.1 °F (37.3 °C)] 98.5 °F (36.9 °C)  Pulse:  [72-90] 90  Resp:  [19-31] 29  SpO2:  [79 %-100 %] 100 %  BP: ()/(42-95) 142/64     Weight: 80 kg (176 lb 5.9 oz) (10/17/17 1300)  Body mass index is 28.47 kg/m².  Body surface area is 1.93 meters squared.    I/O last 3 completed shifts:  In: 1204.9 [P.O.:360; I.V.:94.9; Blood:250; Other:500]  Out: 763 [Urine:105; Drains:158; Other:500]    Physical Exam   Constitutional: He is oriented to person, place, and time. He appears well-developed and well-nourished.   HENT:   Head: Normocephalic.   Nose: Nose normal.   Mouth/Throat: Oropharynx is clear and moist.   Eyes: No scleral icterus.   Neck: Neck supple. No JVD present. No tracheal deviation present. No thyromegaly present.   Cardiovascular: Normal rate, regular rhythm and normal heart sounds.  Exam reveals no gallop and no friction rub.    Pulmonary/Chest: Effort normal and breath sounds normal. No respiratory distress. He has no wheezes. He has no rales.   Abdominal: Soft. Bowel sounds are normal. He exhibits no distension and no mass. There is no tenderness. There is no rebound and no guarding.   Musculoskeletal: He exhibits no edema.   Right forearm AVF with good thrill and no bruits   Lymphadenopathy:     He has no cervical adenopathy.   Neurological: He is alert and oriented to person, place, and time. He exhibits normal muscle tone.   Negative asterixis    Skin: Skin is warm and dry. No rash noted. No erythema.   Vitals reviewed.      Significant Labs:  ABGs:     Recent Labs  Lab 10/20/17  1651   PH 7.352   PCO2 40.7   HCO3 22.5*    POCSATURATED 100   BE -3     BMP:     Recent Labs  Lab 10/24/17  0225     105     104   CO2 26  26   BUN 28*  28*   CREATININE 4.0*  4.0*   CALCIUM 9.4  9.4   MG 1.8     CBC:     Recent Labs  Lab 10/24/17  0225   WBC 9.68   RBC 2.59*   HGB 8.4*   HCT 26.0*      *   MCH 32.4*   MCHC 32.3     CMP:     Recent Labs  Lab 10/24/17  0225     105   CALCIUM 9.4  9.4   ALBUMIN 2.3*  2.3*   PROT 5.8*     140   K 4.1  4.1   CO2 26  26     104   BUN 28*  28*   CREATININE 4.0*  4.0*   ALKPHOS 114   ALT <5*   AST 30   BILITOT 0.6     All labs within the past 24 hours have been reviewed.

## 2017-10-24 NOTE — PLAN OF CARE
WAN spoke with Rafia with [facility] regarding this Pt referral. She reported that the MD for the next two weeks for PMR that makes the approvals is already this Pt MD (Dr. Aquiles Miranda). They are going through the referral and will complete the assessment asap. They will likely accept this Pt when he is ready and will not need a prior auth.    Urszula Flores, RHONDA  Neurocritical Care   [facility]  [id number]

## 2017-10-24 NOTE — PLAN OF CARE
Problem: SLP Goal  Goal: SLP Goal  Speech Therapy Short Term Goals  Goals expected to be met by 10/28:  1. Pt will tolerate pudding thick liquids and dental soft diet with no overt s/s aspiration.   2. Given clinician model, pt will complete dysphagia exercises x10 each in order to strengthen the swallowing musculature.   3. Pt will participate in repeat MBSS when deemed appropriate by SLP/ MD.        Outcome: Ongoing (interventions implemented as appropriate)  Recommend ongoing dental soft diet and pudding thick liquids. Pt requires assistance to thicken liquids. Continue current SLP POC. Goals remain appropriate.     DUGLAS Barriga, CCC-SLP  [phone]  10/24/2017

## 2017-10-24 NOTE — SUBJECTIVE & OBJECTIVE
Interval History:  No acute events. Undergoing dialysis today. Boarding for neurosurgery. Internal medicine consulted.    Review of Systems  Constitutional: Denies fevers or chills.  Pulmonary: Denies shortness of breath or cough.  Cardiology: Denies chest pain or palpitations.  GI: Denies abdominal pain or constipation.  Neurologic: Denies new weakness,  headache, or paresthesias.    Objective:     Vitals:  Temp: 98.5 °F (36.9 °C) (10/24/17 1135)  Pulse: 72 (10/24/17 1144)  Resp: (!) 22 (10/24/17 1144)  BP: (!) 130/57 (10/24/17 1144)  SpO2: 100 % (10/24/17 1144)    Temp:  [98.2 °F (36.8 °C)-99.1 °F (37.3 °C)] 98.5 °F (36.9 °C)  Pulse:  [72-89] 72  Resp:  [19-31] 22  SpO2:  [79 %-100 %] 100 %  BP: ()/(42-95) 130/57            10/23 0701 - 10/24 0700  In: 410 [P.O.:360; I.V.:50]  Out: 133 [Urine:75; Drains:58]    Physical Exam  Constitutional: Well-nourished and -developed. No apparent distress.   Eyes: Conjunctiva clear, anicteric. Lids no lesions.  Head/Ears/Nose/Mouth/Throat/Neck: Moist mucous membranes. External ears, nose atraumatic.   Cardiovascular: Regular rhythm. Grade 2 systolic murmur over RUSB. No edema. Dialysis in process.  Respiratory: Comfortable respirations. Clear to auscultation.  Gastrointestinal: No hernia. Soft, nondistended, nontender. + bowel sounds.     Neurologic:  -GCS E4V5M6  -Awake, alert. Speech fluent. Follows commands.  -Cranial nerves II-XII grossly intact   -Motor 5/5 bilateral upper extremities, 4/5 proximal LE likely effort related  -Sensation to light touch intact throughout     Medications:  Continuous  phenylephrine Last Rate: Stopped (10/22/17 2310)   Scheduled  sodium chloride 0.9%  Once   sodium chloride 0.9%  Once   sodium chloride 0.9%  Once   atorvastatin 80 mg Daily   heparin (porcine) 5,000 Units Q8H   insulin detemir 18 Units QHS   metoprolol tartrate 25 mg BID   midodrine 10 mg TID   pantoprazole 40 mg Nightly   senna-docusate 8.6-50 mg 1 tablet BID   sertraline  100 mg QHS   vit b cmplx 3-fa-vit c-biotin 1- mg-mg-mcg 1 tablet Daily   PRN  sodium chloride  Q24H PRN   sodium chloride  Q24H PRN   sodium chloride  Q24H PRN   sodium chloride  Q24H PRN   sodium chloride 0.9%  PRN   sodium chloride 0.9%  PRN   sodium chloride 0.9%  PRN   sodium chloride 0.9%  PRN   acetaminophen 650 mg Q8H PRN   albuterol-ipratropium 2.5mg-0.5mg/3mL 3 mL Q4H PRN   calcium gluconate IVPB 1 g Q10 Min PRN   dextrose 50% 12.5 g PRN   dextrose 50% 25 g PRN   diazePAM 2 mg Q6H PRN   glucagon (human recombinant) 1 mg PRN   glucose 16 g PRN   glucose 24 g PRN   hydrocodone-acetaminophen 10-325mg 1 tablet Q6H PRN   insulin aspart 0-5 Units QID (AC + HS) PRN   nitroGLYCERIN 0.3 mg Q5 Min PRN   ramelteon 8 mg Nightly PRN     Labs:  Component      Latest Ref Rng & Units 10/24/2017 10/24/2017 10/24/2017           2:25 AM  2:25 AM  2:25 AM   WBC      3.90 - 12.70 K/uL   9.68   RBC      4.60 - 6.20 M/uL   2.59 (L)   Hemoglobin      14.0 - 18.0 g/dL   8.4 (L)   Hematocrit      40.0 - 54.0 %   26.0 (L)   MCV      82 - 98 fL   100 (H)   MCH      27.0 - 31.0 pg   32.4 (H)   MCHC      32.0 - 36.0 g/dL   32.3   RDW      11.5 - 14.5 %   19.1 (H)   Platelets      150 - 350 K/uL   175   MPV      9.2 - 12.9 fL   10.6   Immature Granulocytes      0.0 - 0.5 %   1.0 (H)   Gran #      1.8 - 7.7 K/uL   7.9 (H)   Immature Grans (Abs)      0.00 - 0.04 K/uL   0.10 (H)   Lymph #      1.0 - 4.8 K/uL   1.0   Mono #      0.3 - 1.0 K/uL   0.5   Eos #      0.0 - 0.5 K/uL   0.2   Baso #      0.00 - 0.20 K/uL   0.02   nRBC      0 /100 WBC   0   Gran%      38.0 - 73.0 %   81.6 (H)   Lymph%      18.0 - 48.0 %   10.1 (L)   Mono%      4.0 - 15.0 %   5.6   Eosinophil%      0.0 - 8.0 %   1.5   Basophil%      0.0 - 1.9 %   0.2   Differential Method         Automated   Sodium      136 - 145 mmol/L 140 140    Potassium      3.5 - 5.1 mmol/L 4.1 4.1    Chloride      95 - 110 mmol/L 104 104    CO2      23 - 29 mmol/L 26 26    Glucose      70  - 110 mg/dL 105 105    BUN, Bld      8 - 23 mg/dL 28 (H) 28 (H)    Creatinine      0.5 - 1.4 mg/dL 4.0 (H) 4.0 (H)    Calcium      8.7 - 10.5 mg/dL 9.4 9.4    Total Protein      6.0 - 8.4 g/dL 5.8 (L)     Albumin      3.5 - 5.2 g/dL 2.3 (L) 2.3 (L)    Total Bilirubin      0.1 - 1.0 mg/dL 0.6     Alkaline Phosphatase      55 - 135 U/L 114     AST      10 - 40 U/L 30     ALT      10 - 44 U/L <5 (L)     Anion Gap      8 - 16 mmol/L 10 10    eGFR if African American      >60 mL/min/1.73 m:2 14.8 (A) 14.8 (A)    eGFR if non African American      >60 mL/min/1.73 m:2 12.8 (A) 12.8 (A)    Phosphorus      2.7 - 4.5 mg/dL  3.2 3.2   Magnesium      1.6 - 2.6 mg/dL   1.8     Imaging:  Xray lumbar spine 10/23:   There is postoperative change the lumbar spine, right double-J ureteric stent, and a left DARIAN in place good alignment no complication.  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  No hardware failure is seen.  No fracture dislocation bone destruction seen.

## 2017-10-24 NOTE — TELEPHONE ENCOUNTER
Spoke with Nicola from [facility], he wanted to speak with Dr. Sands regarding pt. Told him he'll be in office tomorrow.    ----- Message from Abel Cisneros sent at 10/24/2017 10:50 AM CDT -----  Contact: DOLORES Parekh -Nicola  Called regarding orders that were received for pt. Nicola can be reached @ [phone].

## 2017-10-24 NOTE — PROGRESS NOTES
Spoke with Pepito from phlebotomy concerning multiple attempts made to draw INR.  Per Peptio will come to redraw PT-INR later this am.

## 2017-10-24 NOTE — PROGRESS NOTES
[facility]  Neurosurgery  Progress Note    Subjective:     History of Present Illness: Mr. 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     Patient was originally seen at [facility] on 10/5/17 due to worsening low back pain and inability to ambulate with decline in function over past 5 weeks. There, patient was noted to have elevated troponin so transferred to [facility] for Cardiology evaluation. Patient was diagnosed with NSTEMI and treated medically. Patient was found to have elevated INR and no stents could be placed.  His INR was attempted to be revered with 4U of FFP and vit K but patient went into flash pulm edema.  At this time, 2 D echo revealed severe aortic stenosis with preserved EF and Cardiology evaluated patient and did not feel LHC warranted at this time but recommended outpatient follow-up with his regular Cardiologist and likely need for outpatient LHC to evaluate his aortic stenosis and coronary anatomy.      During this admission, patient was having delirium and developed aspiration PNA (placed on IV Zosyn). Additionally, pt was evaluated by NSx who felt he was not a candidate for surgery. Patient was seen and evaluated by Neurosurgery at [place] (Dr. James) who noted Ct scan of lumbar spine showed moderate to severe acquired spinal stenosis and bilateral foraminal encroachment at L3-L4. Patient is here for second opionion from NSx.    Post-Op  Info:  Procedure(s) (LRB):  L2-L4 spinal fusion, . L3-L4 Interbody fusion, L2-L3 laminectomy (N/A)   4 Days Post-Op     Interval History: Drain removed    Medications:  Continuous Infusions:   phenylephrine Stopped (10/22/17 2310)     Scheduled Meds:   sodium chloride 0.9%   Intravenous Once    sodium chloride 0.9%   Intravenous Once    sodium chloride 0.9%   Intravenous Once    atorvastatin  80 mg Oral Daily    heparin (porcine)  5,000 Units Subcutaneous Q8H    insulin detemir  18 Units Subcutaneous QHS    metoprolol tartrate  25 mg Oral BID    midodrine  10 mg Oral TID    pantoprazole  40 mg Oral Nightly    senna-docusate 8.6-50 mg  1 tablet Oral BID    sertraline  100 mg Oral QHS    vit b cmplx 3-fa-vit c-biotin 1- mg-mg-mcg  1 tablet Oral Daily     PRN Meds:sodium chloride, sodium chloride, sodium chloride, sodium chloride, sodium chloride 0.9%, sodium chloride 0.9%, sodium chloride 0.9%, sodium chloride 0.9%, acetaminophen, albuterol-ipratropium 2.5mg-0.5mg/3mL, [COMPLETED] calcium gluconate IVPB **AND** calcium gluconate IVPB, dextrose 50%, dextrose 50%, diazePAM, glucagon (human recombinant), glucose, glucose, hydrocodone-acetaminophen 10-325mg, insulin aspart, nitroGLYCERIN, ramelteon     Review of Systems  Objective:     Weight: 80 kg (176 lb 5.9 oz)  Body mass index is 28.47 kg/m².  Vital Signs (Most Recent):  Temp: 98.3 °F (36.8 °C) (10/24/17 1500)  Pulse: 98 (10/24/17 1600)  Resp: (!) 32 (10/24/17 1600)  BP: (!) 141/50 (10/24/17 1600)  SpO2: 100 % (10/24/17 1600) Vital Signs (24h Range):  Temp:  [98.2 °F (36.8 °C)-99.1 °F (37.3 °C)] 98.3 °F (36.8 °C)  Pulse:  [] 98  Resp:  [15-32] 32  SpO2:  [79 %-100 %] 100 %  BP: ()/(42-95) 141/50       Date 10/24/17 0700 - 10/25/17 0659   Shift 8642-6013 4743-0504 1203-9590 24 Hour Total   I  N  T  A  K  E   P.O. 120 120  240    Other 750   750    Shift Total  (mL/kg) 870  (10.9) 120  (1.5)  990  (12.4)   O  U  T  P  U  T    Urine  (mL/kg/hr) 50  (0.1)   50    Other 3000   3000    Shift Total  (mL/kg) 3050  (38.1)   3050  (38.1)   Weight (kg) 80 80 80 80                        Hemodialysis AV Fistula Right forearm (Active)   Needle Size 15ga 10/24/2017 11:35 AM   Site Assessment Clean;Dry;Intact 10/24/2017  3:00 PM   Patency Present;Thrill;Bruit 10/24/2017  3:00 PM   Status Deaccessed 10/24/2017  3:00 PM   Flows Good 10/24/2017 11:44 AM   Dressing Intervention Removed 10/24/2017 11:35 AM   Dressing Status Clean;Dry;Intact 10/24/2017 11:35 AM   Site Condition No complications 10/24/2017  3:00 PM   Dressing Open to air (None) 10/24/2017  3:00 PM   Drainage Description Serosanguineous 10/11/2017 11:22 AM       Physical Exam:  Vitals reviewed.    Constitutional: He appears well-developed and well-nourished. He is not diaphoretic. No distress.     Eyes: Pupils are equal, round, and reactive to light. EOM are normal.     Cardiovascular: Normal rate.     Abdominal: Soft.     Psych/Behavior: He is alert. He is oriented to person, place, and time. He has a normal mood and affect.     Neurological:        Sensory: There is no sensory deficit in the trunk. There is no sensory deficit in the extremities.        Cranial nerves: Cranial nerve(s) II, III, IV, V, VI, VII, VIII, IX, X, XI and XII are intact.   4/5 L HF/EHL         Significant Labs:    Recent Labs  Lab 10/23/17  0136 10/23/17  0824 10/24/17  0225    67* 105  105    140 140  140   K 3.8  3.8 3.8 4.1  4.1    104 104  104   CO2 27 28 26  26   BUN 18 20 28*  28*   CREATININE 3.2* 3.5* 4.0*  4.0*   CALCIUM 8.4* 8.8 9.4  9.4   MG 1.1*  --  1.8       Recent Labs  Lab 10/23/17  0136 10/23/17  1725 10/24/17  0225   WBC 9.14  --  9.68   HGB 6.8* 8.1* 8.4*   HCT 21.1*  --  26.0*     --  175       Recent Labs  Lab 10/23/17  0136   INR 1.0     Microbiology Results (last 7 days)     ** No results found for the last 168 hours. **        Recent Lab Results        10/24/17  0225 10/23/17  2129 10/23/17  1725      Immature Granulocytes 1.0(H)       Immature Grans (Abs) 0.10(H)       Albumin 2.3(L)        2.3(L)       Alkaline Phosphatase 114       ALT <5(L)       Anion Gap 10        10       AST 30       Baso # 0.02       Basophil% 0.2       Total Bilirubin 0.6  Comment:  For infants and newborns, interpretation of results should be based  on gestational age, weight and in agreement with clinical  observations.  Premature Infant recommended reference ranges:  Up to 24 hours.............<8.0 mg/dL  Up to 48 hours............<12.0 mg/dL  3-5 days..................<15.0 mg/dL  6-29 days.................<15.0 mg/dL         BUN, Bld 28(H)        28(H)       Calcium 9.4        9.4       Chloride 104        104       CO2 26        26       Creatinine 4.0(H)        4.0(H)       Differential Method Automated       eGFR if  14.8(A)        14.8(A)       eGFR if non  12.8  Comment:  Calculation used to obtain the estimated glomerular filtration  rate (eGFR) is the CKD-EPI equation. Since race is unknown   in our information system, the eGFR values for   -American and Non--American patients are given   for each creatinine result.  (A)        12.8  Comment:  Calculation used to obtain the estimated glomerular filtration  rate (eGFR) is the CKD-EPI equation. Since race is unknown   in our information system, the eGFR values for   -American and Non--American patients are given   for each creatinine result.  (A)       Eos # 0.2       Eosinophil% 1.5       Glucose 105        105       Gran # 7.9(H)       Gran% 81.6(H)       Hematocrit 26.0(L)       Hemoglobin 8.4(L)  8.1(L)     Lymph # 1.0       Lymph% 10.1(L)       Magnesium 1.8       MCH 32.4(H)       MCHC 32.3       (H)       Mono # 0.5       Mono% 5.6       MPV 10.6       nRBC 0       Phosphorus 3.2        3.2       Platelets 175       POCT Glucose  156(H)      Potassium 4.1         4.1       Total Protein 5.8(L)       RBC 2.59(L)       RDW 19.1(H)       Sodium 140        140       WBC 9.68           All pertinent labs from the last 24 hours have been reviewed.    Significant Diagnostics:  CT: No results found in the last 24 hours.  MRI: No results found in the last 24 hours.  I have reviewed all pertinent imaging results/findings within the past 24 hours.    Assessment/Plan:     Lumbar back pain with radiculopathy affecting right lower extremity    84 y/o M with multiple active medical comorbidities, including recent NSTEMI, severe aortic stenosis, ESRD with HD MWF, and aspiration pneumonia s/p L3-5 TLIF    Neuro stable  Cont neuro checks  CV- goal normotension.   Okay to resume ASA today  Pulm- on RA.  FENGI- goal eunatremia, ADAT.   Heme/ID- afebrile. Transfuse towards goal of Hb 8  ppx- BRAIN/SCD's/SQH, sqh. Gi ppx.     dispo-  TTF w/Hospital medicine primary            Shirlene Selby MD  Neurosurgery  [facility]

## 2017-10-24 NOTE — PLAN OF CARE
ICU Attending Note  Neurocritical Care    Lifts arms, legs.    -stop fentanyl patch  -atorvastatin  -metoprolol  -midodrine  -HD per Nephrology  -detemir  -heparin, pantoprazole  -transfer to floor on Neruosurgery with Medicine consult

## 2017-10-24 NOTE — ASSESSMENT & PLAN NOTE
-POD 4 s/p L3-4 laminectomy 10/20  -NSGY following  -pain well controlled on Norco  q6 prn   - d/c Fentanyl patch

## 2017-10-24 NOTE — PT/OT/SLP PROGRESS
"Occupational Therapy  Treatment    Donta Cline   MRN: [id number]   Admitting Diagnosis: Lumbar stenosis    OT Date of Treatment: 10/24/17   OT Start Time: 1505  OT Stop Time: 1600  OT Total Time (min): 55 min    Billable Minutes:  Self Care/Home Management 15, Therapeutic Activity 25 and Therapeutic Exercise 15    General Precautions: Standard, aspiration, fall, pudding thick  Orthopedic Precautions: N/A  Braces: TLSO         Subjective:  Communicated with nurse prior to session.  "I'm feeling weak.  I'm trying."    Pain/Comfort  Pain Rating 1: 8/10  Location - Orientation 1: lower  Location 1: back  Pain Addressed 1: Reposition, Distraction, Cessation of Activity, Nurse notified  Pain Rating Post-Intervention 1: 8/10    Objective:  Patient found with: telemetry, blood pressure cuff, peripheral IV, TLSO, pulse ox (continuous)     Functional Mobility:  Bed Mobility:  Rolling/Turning to Left: Supervision, With side rail  Scooting/Bridging: Minimum Assistance  Supine to Sit: WIth side rail (pt able to slowly lower LEs but needed assist to lift trunk)    Transfers:   Sit <> Stand Assistance: Moderate Assistance  Sit <> Stand Assistive Device: Rolling Walker  Bed <> Chair Technique: Stand Pivot  Bed <> Chair Transfer Assistance: Minimum Assistance  Bed <> Chair Assistive Device: Rolling Walker    Functional Ambulation: Pt able to take a few steps from bed to recliner using RW and min assist for support    Activities of Daily Living:  Feeding Level of Assistance: Set-up Assistance (set up to prep food and drink)    UE Dressing Level of Assistance: Minimum assistance, Total assistance (Min A to [facility] gown as robe to bring around back - total assist to [facility])    LE Dressing Level of Assistance: Moderate assistance (pt needed A to doff socks but able to deja socks using sock aide with cues)         Balance:   Static Sit: FAIR+: Able to take MINIMAL challenges from all directions  Dynamic Sit: FAIR+: Maintains " "balance through MINIMAL excursions of active trunk motion  Static Stand: POOR+: Needs MINIMAL assist to maintain  Dynamic stand: POOR: N/A    Therapeutic Activities and Exercises:  · Pt completed ADLs and func mobility activities for tx session as noted above  Pt completed 1 set of 12 reps of B UE AROM exercises in order to work towards increasing his UB strength/endurance to assist with mobility and self care skills.  Pt completed the following exercises: shoulder flex/ext, elbow flex/ext, forearm sup/pro, and hand pumps.  Pt educated to perform exercises 3x daily to improve strength/endurance  · Education provided to pt and his daughter regarding donning TLSO - wearing schedule is to be on at all times when OOB  · Education provided to pt and his daughter regarding spinal precautions when brace not on - educated on equipment needs for LB dressing to prevent breaking spinal precautions  · Pt was familiar using sock aide but needed cues to adjust socks correctly once on sock aide  · OT held discussion and answered questions regarding IPR expectations  · Whiteboard updated  · Pt educated on role of OT and POC      AM-PAC 6 CLICK ADL   How much help from another person does this patient currently need?   1 = Unable, Total/Dependent Assistance  2 = A lot, Maximum/Moderate Assistance  3 = A little, Minimum/Contact Guard/Supervision  4 = None, Modified [place]/Independent    Putting on and taking off regular lower body clothing? : 2  Bathing (including washing, rinsing, drying)?: 2  Toileting, which includes using toilet, bedpan, or urinal? : 2  Putting on and taking off regular upper body clothing?: 2 (to include TLSO)  Taking care of personal grooming such as brushing teeth?: 3  Eating meals?: 3  Total Score: 14     AM-PAC Raw Score CMS "G-Code Modifier Level of Impairment Assistance   6 % Total / Unable   7 - 8 CM 80 - 100% Maximal Assist   9-13 CL 60 - 80% Moderate Assist   14 - 19 CK 40 - 60% Moderate " Assist   20 - 22 CJ 20 - 40% Minimal Assist   23 CI 1-20% SBA / CGA   24 CH 0% Independent/ Mod I       Patient left up in chair with all lines intact, call button in reach and daughter present    ASSESSMENT:  Donta Cline is a 85 y.o. male with a medical diagnosis of Lumbar stenosis and presents with deficits as noted below.  Pt was agreeable to OT and was noted to make progress towards his goals in therapy.  Pt's goals remain appropriate at this time. Pt noted with decreased strength/endurnace and pain, all of which served as barriers towards his functional independence.  He will continue to benefit from skilled OT services in order to assist him with increasing his safety and level of independence with self care and mobility tasks.  Pt is a good candidate for IPR in order to meet his post acute therapy needs.  He is eager and willing to participate in therapy with frequent rest breaks in order to return to his prior level of function/independence.       Rehab identified problem list/impairments: Rehab identified problem list/impairments: weakness, impaired endurance, impaired self care skills, impaired functional mobilty, gait instability, impaired balance, decreased coordination, decreased lower extremity function, pain, decreased safety awareness, impaired coordination, impaired skin, edema    Rehab potential is good.    Activity tolerance: Fair - limited by pain and seen following dialysis    Discharge recommendations: Discharge Facility/Level Of Care Needs: rehabilitation facility     Barriers to discharge: Barriers to Discharge: Inaccessible home environment, Decreased caregiver support    Equipment recommendations:  (TBD)     GOALS:    Occupational Therapy Goals        Problem: Occupational Therapy Goal    Goal Priority Disciplines Outcome Interventions   Occupational Therapy Goal     OT, PT/OT Ongoing (interventions implemented as appropriate)    Description:  Goals to be met by: 7 days  Patient will  increase functional independence with ADLs by performing:    LE Dressing with Stand-by Assistance and Assistive Devices as needed.  Grooming while standing at sink with Stand-by Assistance.  [place] [facility] [place] Minimal Assistance for hygiene and clothing management.   Supine to sit with Modified [place].  Toilet transfer [place] [facility] with SBA using RW.                        Plan:  Patient to be seen 5 x/week to address the above listed problems via self-care/home management, therapeutic activities, therapeutic exercises, neuromuscular re-education  Plan of Care expires: 11/10/17  Plan of Care reviewed with: patient, daughter         Wendy Unger, OT  10/24/2017

## 2017-10-24 NOTE — ASSESSMENT & PLAN NOTE
Theron Cline is a 85 year old white male with past medical history of bladder CA in which he is in remission (was treated with chemotherapy), CAD, A-fib (treated with warfarin) and ESRD on Hd. Patient was transfered from [facility] where he presented with worsening right leg and back pain, general weakness. In hospital presented with increase troponin levels, diagnosed with NSTEMI, increase in INR where was treated with FFP and Vit K, flash pulmonary edema, started on Bi-PAP and aspirated pneumonia (treated with Zosyn).     Consulted for dialysis treatment: iHD MWF, dialyzed in [facility] theo, Rt lower arm AVF, duration 3.5, followed by Dr. Mathur, EDW 90 kg and had his last dialysis on Monday.     - Monitoring in [facility] but patient is boarded awaiting Bed  - hemodynamically improvinge post op- Laminectomy L3-L4 on off Norepi> 36 hrs  - Cont Midodrine to 10 mg TID  - HD x 3 hrs for metabolic learance and volume management  - Continue with rosuvastatin.

## 2017-10-24 NOTE — PLAN OF CARE
Problem: Patient Care Overview  Goal: Plan of Care Review  Outcome: Ongoing (interventions implemented as appropriate)   10/24/17 0435   Coping/Psychosocial   Plan Of Care Reviewed With patient;daughter     POC reviewed with pt at 0430. Pt verbalized understanding. Questions and concerns addressed. No acute events overnight. Prn tylenol given for pain and duo nebs administered. Pt progressing toward goals. Will continue to monitor. See flowsheets for full assessment and VS info     Goal: Individualization & Mutuality  Outcome: Ongoing (interventions implemented as appropriate)   10/17/17 1300 10/20/17 0754   Mutuality/Individual Preferences   What Anxieties, Fears, Concerns, or Questions Do You Have About Your Care? none --    What Information Would Help Us Give You More Personalized Care? --  keep updated   Individualization   Patient Specific Preferences --  keep informed

## 2017-10-24 NOTE — PROGRESS NOTES
"  [facility]  Adult Nutrition  Consult Note    SUMMARY     Recommendations    Recommendation/Intervention:   Continue current diet with texture changes per SLP recommendations.     RD to monitor.      Goals: Pt to continue tolerating 50% of meals  Nutrition Goal Status: new  Communication of RD Recs: reviewed with RN    Reason for Assessment    Reason for Assessment: length of stay  Diagnosis: other (see comments) (s/p laminectomy)  Relevent Medical History: ESRD, T2DM, CAD, a fib         General Information Comments: Pt s/p laminectomy. Transferred to [facility] after surgery.     Nutrition Discharge Planning: adequate po intake for optimal nutrition    Nutrition Prescription Ordered    Current Diet Order: dental soft  Nutrition Order Comments: pudding thick liquids     Evaluation of Received Nutrients/Fluid Intake        I/O: +I/O, low UOP       % Intake of Estimated Energy Needs: 50 - 75 %  % Meal Intake: 50%     Nutrition Risk Screen     Nutrition Risk Screen: no indicators present    Nutrition/Diet History       Typical Food/Fluid Intake: Pt's diet advanced. Consuming 50% of meals.  Food Preferences: No Mandaeism/cultural preferences at this time.        Factors Affecting Nutritional Intake: other (see comments) (None at this time)                Labs/Tests/Procedures/Meds       Pertinent Labs Reviewed: reviewed  Pertinent Labs Comments: BUN 28, Cr 4.0, K 4.1  Pertinent Medications Reviewed: reviewed  Pertinent Medications Comments: epoetin, heparin, insulin    Physical Findings    Overall Physical Appearance: nourished, overweight        Skin: incision    Anthropometrics    Temp: 98.5 °F (36.9 °C)     Height: 5' 6" (167.6 cm)  Weight Method: Bed Scale  Weight: 80 kg (176 lb 5.9 oz)  Ideal Body Weight (IBW), Male: 142 lb     % Ideal Body Weight, Male (lb): 124.2 lb     BMI (Calculated): 28.5  BMI Grade: 25 - 29.9 - overweight                            Estimated/Assessed Needs    Weight Used For " Calorie Calculations: 80 kg (176 lb 5.9 oz)      Energy Calorie Requirements (kcal): 1783  Energy Need Method: Wysox-St Jeor (PAL 1.25)        RMR (Wysox-St. Jeor Equation): 1427.75        Weight Used For Protein Calculations: 80 kg (176 lb 5.9 oz)  Protein Requirements: 80-96g (1.0-1.2g/kg)    Fluid Requirements (mL): 1 mL/kcal or per MD  RDA Method (mL): 1783               Assessment and Plan    No nutrition diagnosis at this time.      Monitor and Evaluation    Food and Nutrient Intake: energy intake, food and beverage intake  Food and Nutrient Adminstration: diet order        Anthropometric Measurements: weight, weight change, body mass index  Biochemical Data, Medical Tests and Procedures: electrolyte and renal panel, gastrointestinal profile, glucose/endocrine profile, inflammatory profile, lipid profile  Nutrition-Focused Physical Findings: overall appearance    Nutrition Risk    Level of Risk: other (see comments) (f/u 1x/week)    Nutrition Follow-Up    RD Follow-up?: Yes

## 2017-10-24 NOTE — PLAN OF CARE
Problem: Patient Care Overview  Goal: Plan of Care Review  Outcome: Ongoing (interventions implemented as appropriate)  Nutrition assessment completed. Please see RD note for details.    Recommendation/Intervention:   Continue current diet with texture changes per SLP recommendations.      RD to monitor.        Goals: Pt to continue tolerating 50% of meals  Nutrition Goal Status: new  Communication of RD Recs: reviewed with RN

## 2017-10-24 NOTE — ASSESSMENT & PLAN NOTE
- holding home coumadin post-operatively under neurosurgery clearance  - aspirin held post-op and will likely re-started 10/25 per neurosurgery  - metoprolol 25mg BID

## 2017-10-24 NOTE — PROGRESS NOTES
Hemodialysis x 3 hours complete. 2.25 liter net fluid removal. Blood returned without difficulty. Needles removed and pressure held x 10 minutes until hemostasis achieved. Gauze pressure dressing applied and secured with tape. Positive bruit/thrill noted.

## 2017-10-24 NOTE — PLAN OF CARE
Problem: Occupational Therapy Goal  Goal: Occupational Therapy Goal  Goals to be met by: 7 days  Patient will increase functional independence with ADLs by performing:    LE Dressing with Stand-by Assistance and Assistive Devices as needed.  Grooming while standing at sink with Stand-by Assistance.  [place] [facility] [place] Minimal Assistance for hygiene and clothing management.   Supine to sit with Modified [place].  Toilet transfer to [facility] with SBA using RW.       Outcome: Ongoing (interventions implemented as appropriate)    Pt was agreeable to OT and was noted to make progress towards his goals in therapy.  Pt's goals remain appropriate at this time.  He will continue to benefit from skilled OT services in order to assist him with increasing his safety and level of independence with self care and mobility tasks.     Wendy Unger, OT  10/24/2017

## 2017-10-24 NOTE — ASSESSMENT & PLAN NOTE
84 y/o M with multiple active medical comorbidities, including recent NSTEMI, severe aortic stenosis, ESRD with HD MWF, and aspiration pneumonia s/p L3-5 TLIF    Neuro stable  Cont neuro checks  CV- goal normotension.   Okay to resume ASA today  Pulm- on RA.  FENGI- goal eunatremia, ADAT.   Heme/ID- afebrile. Transfuse towards goal of Hb 8  ppx- BRAIN/SCD's/SQH, sqh. Gi ppx.     dispo-  TTF w/Hospital medicine primary

## 2017-10-24 NOTE — PT/OT/SLP PROGRESS
"Speech Language Pathology  Treatment    Donta Cline   MRN: [id number]   [id number] A    Admitting Diagnosis: Lumbar stenosis    Diet recommendations: Solid Diet Level: Dental Soft  Liquid Diet Level: Pudding Thick   · Thicken all liquids to pudding thick with 4oz liquid to 2 packets Resource ThickenUp thickener  · Encourage double swallows per bolus  · Crush PO meds in pureed  · NO straws  · Fully awake and alert for PO intake  · Fully upright position for PO intake  · Small bites/ sips  · Slow rate of eating/ drinking  · 1 bite/ sip @ a time  · Refrain from talking prior to swallow completion  · Remain upright for 20-30 min post PO intake    SLP Treatment Date: 10/24/17  Speech Start Time: 1413     Speech Stop Time: 1444     Speech Total (min): 31 min       TREATMENT BILLABLE MINUTES:  Treatment Swallowing Dysfunction 23 and Seld Care/Home Management Training 8    Has the patient been evaluated by SLP for swallowing? : Yes  Keep patient NPO?: No   General Precautions: Standard, aspiration, fall, pudding thick  Current Respiratory Status:  (Room air)       Subjective:  "That's good." Pt re: SLP feedback upon observation of improved performance during dysphagia exercises compared to yesterday's SLP session.     Pain/Comfort  Pain Rating 1: 0/10  Pain Rating Post-Intervention 1: 0/10    Objective:   Pt awake and alert upon entry with daughter at b/s. HOB raised. Pt with improved ability to complete dysphagia exercises, as well as improved ability to complete cued consecutive swallows req'd for dysphagia exercises. Given consistent clinician model and verbal cueing, pt completed the following dysphagia exercises x10 each with good ability: effortful swallow, supraglottic, le, BOT /k/ and /g/. Pt provided with multiple 1/2 tsp pudding thick orange juice intermittently throughout dysphagia exercises in order to maintain oral cavity moisture for volitional swallows. Subtle throat clearing observed x2-3 post PO intake, " however uncertain if related to PO intake vs spontaneous, as results of MBSS indicated pt coughing during study in the absence of penetration/ aspiration. Pt provided with PO trials tsp thin water x1 and cup sip thin water x1 with strained voice and delayed cough noted, as well as cup sip nectar thick water x1 with delayed cough elicited. Unable to determine if delayed cough with nectar thick water cup sip x1 2/2 dec'd tolerance of nectar thick consistency vs residual coughing from thin water. Although pt remains with overt s/s aspiration, slight improvement with thin liquids evident compared to results of MBSS revealing silent aspiration with thin liquids. Pt and daughter educated re: independent practice of dysphagia exercises x10 each, 2x/ day if seen by SLP/ 3x/ day if not seen by SLP, as well as aspiration precautions listed above and thickening liquids. White board current. No further questions.     Assessment:  Donta Cline is a 85 y.o. male with a medical diagnosis of Lumbar stenosis and presents with dysphagia.     Discharge recommendations: Discharge Facility/Level Of Care Needs: nursing facility, skilled     Goals:    SLP Goals        Problem: SLP Goal    Goal Priority Disciplines Outcome   SLP Goal     SLP Ongoing (interventions implemented as appropriate)   Description:  Speech Therapy Short Term Goals  Goals expected to be met by 10/28:  1. Pt will tolerate pudding thick liquids and dental soft diet with no overt s/s aspiration.   2. Given clinician model, pt will complete dysphagia exercises x10 each in order to strengthen the swallowing musculature.   3. Pt will participate in repeat MBSS when deemed appropriate by SLP/ MD.                          Plan:   Patient to be seen Therapy Frequency: 5 x/week   Plan of Care expires: 11/19/17  Plan of Care reviewed with: patient, daughter  SLP Follow-up?: Yes            DUGLAS Barriga, CCC-SLP  [phone]  10/24/2017

## 2017-10-24 NOTE — PROGRESS NOTES
[facility]  Nephrology  Progress Note    Patient Name: Donta Cline  MRN: [id number]  Admission Date: 10/10/2017  Hospital Length of Stay: 14 days  Attending Provider: Sylvia Byrne MD   Primary Care Physician: ZAID Richardson Iii, MD  Principal Problem:Lumbar stenosis    Subjective:     HPI: Theron Cline is a 85 year old white male with past medical history of bladder CA in which he is in remission (was treated with chemotherapy), CAD, A-fib (treated with warfarin) and ESRD on Hd. Patient was transfered from [facility] where he presented with worsening right leg and back pain, general weakness. In hospital presented with increase troponin levels, diagnosed with NSTEMI, increase in INR where was treated with FFP and Vit K, flash pulmonary edema, started on Bi-PAP and aspirated pneumonia (treated with Zosyn). 2 D echo revealed severe aortic stenosis with preserved EF and Cardiology evaluated patient and did not feel C warranted at this time but recommended outpatient follow-up with his regular Cardiologist and likely need for outpatient C to evaluate his aortic stenosis and coronary anatomy. Additionally, pt was evaluated by NSx who felt he was not a candidate for surgery. Patient was seen and evaluated by Neurosurgery at [place] (Dr. James) who noted Ct scan of lumbar spine showed moderate to severe acquired spinal stenosis and bilateral foraminal encroachment at L3-L4. Patient is here for second opionion from [facility].    Consulted for dialysis treatment: iHD MWF, dialyzed in [facility], Rt lower arm AVF, duration 3.5, followed by Dr. Mathur, EDW 90 kg and had his last dialysis on Monday.     Interval History:   NAEON, Off IV vasopressors > 24 hrs. Net gain 277 cc/24hrs. UF target is 1000 ml. Hemodynamically improving. Oxygenation stable on RA. HD today    Review of patient's allergies indicates:   Allergen Reactions    Darvocet a500  [propoxyphene  n-acetaminophen]      Other reaction(s): Unknown    Morphine      Other reaction(s): Unknown     Current Facility-Administered Medications   Medication Frequency    0.9%  NaCl infusion (for blood administration) Q24H PRN    0.9%  NaCl infusion (for blood administration) Q24H PRN    0.9%  NaCl infusion (for blood administration) Q24H PRN    0.9%  NaCl infusion (for blood administration) Q24H PRN    0.9%  NaCl infusion PRN    0.9%  NaCl infusion Once    0.9%  NaCl infusion PRN    0.9%  NaCl infusion PRN    0.9%  NaCl infusion Once    0.9%  NaCl infusion PRN    0.9%  NaCl infusion Once    acetaminophen tablet 650 mg Q8H PRN    albuterol-ipratropium 2.5mg-0.5mg/3mL nebulizer solution 3 mL Q4H PRN    atorvastatin tablet 80 mg Daily    calcium gluconate 1 g in dextrose 5 % 100 mL IVPB (premix) Q10 Min PRN    dextrose 50% injection 12.5 g PRN    dextrose 50% injection 25 g PRN    diazePAM injection 2 mg Q6H PRN    glucagon (human recombinant) injection 1 mg PRN    glucose chewable tablet 16 g PRN    glucose chewable tablet 24 g PRN    heparin (porcine) injection 5,000 Units Q8H    hydrocodone-acetaminophen 10-325mg per tablet 1 tablet Q6H PRN    insulin aspart pen 0-5 Units QID (AC + HS) PRN    insulin detemir pen 18 Units QHS    metoprolol tartrate (LOPRESSOR) tablet 25 mg BID    midodrine tablet 10 mg TID    nitroGLYCERIN SL tablet 0.3 mg Q5 Min PRN    pantoprazole EC tablet 40 mg Nightly    PHENYLephrine (MYNOR-SYNEPHRINE) 40 mg in sodium chloride 0.9% 250ml (titrating) (premix) Continuous    ramelteon tablet 8 mg Nightly PRN    senna-docusate 8.6-50 mg per tablet 1 tablet BID    sertraline tablet 100 mg QHS    vit b cmplx 3-fa-vit c-biotin 1- mg-mg-mcg per tablet 1 tablet Daily       Objective:     Vital Signs (Most Recent):  Temp: 98.5 °F (36.9 °C) (10/24/17 1135)  Pulse: 90 (10/24/17 1245)  Resp: (!) 29 (10/24/17 1245)  BP: (!) 142/64 (10/24/17 1245)  SpO2: 100 % (10/24/17  1215)  O2 Device (Oxygen Therapy): room air (10/24/17 1245) Vital Signs (24h Range):  Temp:  [98.2 °F (36.8 °C)-99.1 °F (37.3 °C)] 98.5 °F (36.9 °C)  Pulse:  [72-90] 90  Resp:  [19-31] 29  SpO2:  [79 %-100 %] 100 %  BP: ()/(42-95) 142/64     Weight: 80 kg (176 lb 5.9 oz) (10/17/17 1300)  Body mass index is 28.47 kg/m².  Body surface area is 1.93 meters squared.    I/O last 3 completed shifts:  In: 1204.9 [P.O.:360; I.V.:94.9; Blood:250; Other:500]  Out: 763 [Urine:105; Drains:158; Other:500]    Physical Exam   Constitutional: He is oriented to person, place, and time. He appears well-developed and well-nourished.   HENT:   Head: Normocephalic.   Nose: Nose normal.   Mouth/Throat: Oropharynx is clear and moist.   Eyes: No scleral icterus.   Neck: Neck supple. No JVD present. No tracheal deviation present. No thyromegaly present.   Cardiovascular: Normal rate, regular rhythm and normal heart sounds.  Exam reveals no gallop and no friction rub.    Pulmonary/Chest: Effort normal and breath sounds normal. No respiratory distress. He has no wheezes. He has no rales.   Abdominal: Soft. Bowel sounds are normal. He exhibits no distension and no mass. There is no tenderness. There is no rebound and no guarding.   Musculoskeletal: He exhibits no edema.   Right forearm AVF with good thrill and no bruits   Lymphadenopathy:     He has no cervical adenopathy.   Neurological: He is alert and oriented to person, place, and time. He exhibits normal muscle tone.   Negative asterixis    Skin: Skin is warm and dry. No rash noted. No erythema.   Vitals reviewed.      Significant Labs:  ABGs:     Recent Labs  Lab 10/20/17  1651   PH 7.352   PCO2 40.7   HCO3 22.5*   POCSATURATED 100   BE -3     BMP:     Recent Labs  Lab 10/24/17  0225     105     104   CO2 26  26   BUN 28*  28*   CREATININE 4.0*  4.0*   CALCIUM 9.4  9.4   MG 1.8     CBC:     Recent Labs  Lab 10/24/17  0225   WBC 9.68   RBC 2.59*   HGB 8.4*   HCT  26.0*      *   MCH 32.4*   MCHC 32.3     CMP:     Recent Labs  Lab 10/24/17  0225     105   CALCIUM 9.4  9.4   ALBUMIN 2.3*  2.3*   PROT 5.8*     140   K 4.1  4.1   CO2 26  26     104   BUN 28*  28*   CREATININE 4.0*  4.0*   ALKPHOS 114   ALT <5*   AST 30   BILITOT 0.6     All labs within the past 24 hours have been reviewed.       Assessment/Plan:     ESRD (end stage renal disease) on dialysis    Theron Cline is a 85 year old white male with past medical history of bladder CA in which he is in remission (was treated with chemotherapy), CAD, A-fib (treated with warfarin) and ESRD on Hd. Patient was transfered from [facility] where he presented with worsening right leg and back pain, general weakness. In hospital presented with increase troponin levels, diagnosed with NSTEMI, increase in INR where was treated with FFP and Vit K, flash pulmonary edema, started on Bi-PAP and aspirated pneumonia (treated with Zosyn).     Consulted for dialysis treatment: iHD MWF, dialyzed in [facility], Rt lower arm AVF, duration 3.5, followed by Dr. Mathur, EDW 90 kg and had his last dialysis on Monday.     - Monitoring in [facility] but patient is boarded awaiting Bed  - hemodynamically improvinge post op- Laminectomy L3-L4 on off Norepi> 36 hrs  - Cont Midodrine to 10 mg TID  - HD x 3 hrs for metabolic learance and volume management  - Continue with rosuvastatin.            Thank you for your consult. I will follow-up with patient. Please contact us if you have any additional questions.    Alex Mann MD  Nephrology  [facility]    ATTENDING PHYSICIAN ATTESTATION  I have personally interviewed and examined the patient. I thoroughly reviewed the demographic, clinical, laboratorial and imaging information available in medical records. I agree with the assessment and recommendations provided by the subspecialty resident. Dr. Mann was under my  supervision.

## 2017-10-24 NOTE — PROGRESS NOTES
Spoke with Bridget Cornejo regarding pt's peg site. Reported peg is without a bumper and site has crust and yellow-white drainage. Pt has had low grade temp between 99 and 100 degrees since 10/23. Precut T slit gauze place around site. Per orders ok and will have day team and wound care assess it.

## 2017-10-24 NOTE — PROGRESS NOTES
[facility]  Neurocritical Care  Progress Note    Admit Date: 10/10/2017  Service Date: 10/24/2017  Length of Stay: 14    Subjective:     Chief Complaint: Lumbar stenosis    History of Present Illness: Mr. Cline is a 85yoM with multiple active medical comorbidities, including recent NSTEMI with flash pulmonary edema, subsequent L heart cath, severe aortic stenosis, ESRD with HD MWF, T2DM, aspiration pneumonia, and previous back surgeries. Pt presents to [facility] s/p L3-4 laminectomy. Pt became hypotensive in OR requiring phenylephrine to maintain MAP > 70. Pt admitted to [facility] for higher level of care.     Hospital Course: 10/21: admit to [facility] s/p L3-4 laminectomy, on phenylephrine ggt, Cr 6.0, K 5.5, nephrology consulted, insulin, D50, and calcium gluconate admin  10/22: Patient weaned off phenylephrine drip this morning. Remains afebrile. Cr 6.5, K 5.3; Hg 6.8 and s/p 1u pRBC. Underwent hemodialysis and required phenylephrine drip at 0.5mcg/kg/min for several hours, weaned off overnight.  10/23: Midodrine increased to 10mg TID. Potassium 3.8 and creatinie 3.5 today. Hg 6.8 today and receiving 1u pRBC. Pain moderately well controlled and requiring Norco 10mg-325 q6. Remains afebrile.  10/24: Underwent dialysis       Interval History:  No acute events. Undergoing dialysis today. Boarding for neurosurgery. Internal medicine consulted.    Review of Systems  Constitutional: Denies fevers or chills.  Pulmonary: Denies shortness of breath or cough.  Cardiology: Denies chest pain or palpitations.  GI: Denies abdominal pain or constipation.  Neurologic: Denies new weakness,  headache, or paresthesias.    Objective:     Vitals:  Temp: 98.5 °F (36.9 °C) (10/24/17 1135)  Pulse: 72 (10/24/17 1144)  Resp: (!) 22 (10/24/17 1144)  BP: (!) 130/57 (10/24/17 1144)  SpO2: 100 % (10/24/17 1144)    Temp:  [98.2 °F (36.8 °C)-99.1 °F (37.3 °C)] 98.5 °F (36.9 °C)  Pulse:  [72-89] 72  Resp:  [19-31] 22  SpO2:  [79 %-100 %] 100  %  BP: ()/(42-95) 130/57            10/23 0701 - 10/24 0700  In: 410 [P.O.:360; I.V.:50]  Out: 133 [Urine:75; Drains:58]    Physical Exam  Constitutional: Well-nourished and -developed. No apparent distress.   Eyes: Conjunctiva clear, anicteric. Lids no lesions.  Head/Ears/Nose/Mouth/Throat/Neck: Moist mucous membranes. External ears, nose atraumatic.   Cardiovascular: Regular rhythm. Grade 2 systolic murmur over RUSB. No edema. Dialysis in process.  Respiratory: Comfortable respirations. Clear to auscultation.  Gastrointestinal: No hernia. Soft, nondistended, nontender. + bowel sounds.     Neurologic:  -GCS [id number]  -Awake, alert. Speech fluent. Follows commands.  -Cranial nerves II-XII grossly intact   -Motor 5/5 bilateral upper extremities, 4/5 proximal LE likely effort related  -Sensation to light touch intact throughout     Medications:  Continuous  phenylephrine Last Rate: Stopped (10/22/17 2310)   Scheduled  sodium chloride 0.9%  Once   sodium chloride 0.9%  Once   sodium chloride 0.9%  Once   atorvastatin 80 mg Daily   heparin (porcine) 5,000 Units Q8H   insulin detemir 18 Units QHS   metoprolol tartrate 25 mg BID   midodrine 10 mg TID   pantoprazole 40 mg Nightly   senna-docusate 8.6-50 mg 1 tablet BID   sertraline 100 mg QHS   vit b cmplx 3-fa-vit c-biotin 1- mg-mg-mcg 1 tablet Daily   PRN  sodium chloride  Q24H PRN   sodium chloride  Q24H PRN   sodium chloride  Q24H PRN   sodium chloride  Q24H PRN   sodium chloride 0.9%  PRN   sodium chloride 0.9%  PRN   sodium chloride 0.9%  PRN   sodium chloride 0.9%  PRN   acetaminophen 650 mg Q8H PRN   albuterol-ipratropium 2.5mg-0.5mg/3mL 3 mL Q4H PRN   calcium gluconate IVPB 1 g Q10 Min PRN   dextrose 50% 12.5 g PRN   dextrose 50% 25 g PRN   diazePAM 2 mg Q6H PRN   glucagon (human recombinant) 1 mg PRN   glucose 16 g PRN   glucose 24 g PRN   hydrocodone-acetaminophen 10-325mg 1 tablet Q6H PRN   insulin aspart 0-5 Units QID (AC + HS) PRN   nitroGLYCERIN  0.3 mg Q5 Min PRN   ramelteon 8 mg Nightly PRN     Labs:  Component      Latest Ref Rng & Units 10/24/2017 10/24/2017 10/24/2017           2:25 AM  2:25 AM  2:25 AM   WBC      3.90 - 12.70 K/uL   9.68   RBC      4.60 - 6.20 M/uL   2.59 (L)   Hemoglobin      14.0 - 18.0 g/dL   8.4 (L)   Hematocrit      40.0 - 54.0 %   26.0 (L)   MCV      82 - 98 fL   100 (H)   MCH      27.0 - 31.0 pg   32.4 (H)   MCHC      32.0 - 36.0 g/dL   32.3   RDW      11.5 - 14.5 %   19.1 (H)   Platelets      150 - 350 K/uL   175   MPV      9.2 - 12.9 fL   10.6   Immature Granulocytes      0.0 - 0.5 %   1.0 (H)   Gran #      1.8 - 7.7 K/uL   7.9 (H)   Immature Grans (Abs)      0.00 - 0.04 K/uL   0.10 (H)   Lymph #      1.0 - 4.8 K/uL   1.0   Mono #      0.3 - 1.0 K/uL   0.5   Eos #      0.0 - 0.5 K/uL   0.2   Baso #      0.00 - 0.20 K/uL   0.02   nRBC      0 /100 WBC   0   Gran%      38.0 - 73.0 %   81.6 (H)   Lymph%      18.0 - 48.0 %   10.1 (L)   Mono%      4.0 - 15.0 %   5.6   Eosinophil%      0.0 - 8.0 %   1.5   Basophil%      0.0 - 1.9 %   0.2   Differential Method         Automated   Sodium      136 - 145 mmol/L 140 140    Potassium      3.5 - 5.1 mmol/L 4.1 4.1    Chloride      95 - 110 mmol/L 104 104    CO2      23 - 29 mmol/L 26 26    Glucose      70 - 110 mg/dL 105 105    BUN, Bld      8 - 23 mg/dL 28 (H) 28 (H)    Creatinine      0.5 - 1.4 mg/dL 4.0 (H) 4.0 (H)    Calcium      8.7 - 10.5 mg/dL 9.4 9.4    Total Protein      6.0 - 8.4 g/dL 5.8 (L)     Albumin      3.5 - 5.2 g/dL 2.3 (L) 2.3 (L)    Total Bilirubin      0.1 - 1.0 mg/dL 0.6     Alkaline Phosphatase      55 - 135 U/L 114     AST      10 - 40 U/L 30     ALT      10 - 44 U/L <5 (L)     Anion Gap      8 - 16 mmol/L 10 10    eGFR if African American      >60 mL/min/1.73 m:2 14.8 (A) 14.8 (A)    eGFR if non African American      >60 mL/min/1.73 m:2 12.8 (A) 12.8 (A)    Phosphorus      2.7 - 4.5 mg/dL  3.2 3.2   Magnesium      1.6 - 2.6 mg/dL   1.8     Imaging:  Xray lumbar  "spine 10/23:   There is postoperative change the lumbar spine, right double-J ureteric stent, and a left DARIAN in place good alignment no complication.  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  No hardware failure is seen.  No fracture dislocation bone destruction seen.    Assessment/Plan:     Neuro   Spinal stenosis of lumbar region without neurogenic claudication    -POD 4 s/p L3-4 laminectomy 10/20  -NSGY following  -pain well controlled on Norco  q6 prn   - d/c Fentanyl patch          Pulmonary   Aspiration pneumonia    -AF, no abx  -post op leukocytosis   -completed course of unasyn  -2/2 opiate overuse @ OSH        Cardiac/Vascular   Elevated troponin    See "NSTEMI"        NSTEMI (non-ST elevated myocardial infarction)    -issue at OSH- plan for cath initially and never received due to issues with pulm edema  -RCA stenosis, low risk per cards can have outpatient LHC with intervention after discharge  -was on hep ggt - Holding heparin gtt post-op per neurosurgery  - aspirin 81mg daily likely to re-start 10/25 per neurosurgery if no signs of bleeding        Paroxysmal atrial fibrillation    - holding home coumadin post-operatively under neurosurgery clearance  - aspirin held post-op and will likely re-started 10/25 per neurosurgery  - metoprolol 25mg BID        Essential hypertension    -no antihypertensives at this time due to postprocedural hypotention          Coronary artery disease involving native coronary artery of native heart without angina pectoris    Coronary stenosis > 50% on cardiac cath 10/16/17        Chronic diastolic heart failure    - most recent echo on 10/12/17 showed pulmonary hypertension with undetermined diastolic function          Renal/   Anemia in chronic kidney disease, on chronic dialysis    Hemoglobin 6.8 on 10/22 s/p 1u RBC and Hg 6.8 10/23 . Hg 8.4 10/24.   - Epoetin 10,000 units x1 10/23  - nephrology following        ESRD (end stage renal " disease) on dialysis    -nephrology following - last dialyzed 10/22 and undergoing dialysis 10/24  - Cr 4 10/24  - CMP daily  - unable to tolerate PO or rectal kayexalate  - insulin, D50, calcium gluconate admin  - Central line currently day 19 - remove after dialysis 10/24 if patient does not require pressors during dialysis         Endocrine   Type 2 diabetes mellitus with chronic kidney disease on chronic dialysis, with long-term current use of insulin    -SSI   -18U Detemir qhs         Other   Postprocedural hypotension    - weaned off phenylephrine 10/22 initially, then required transiently during dialysis 10/22, now off  - midodrine increased to 10mg TID 10/23  - continue to monitor BP            Prophylaxis:  Venous Thromboembolism: chemical  Stress Ulcer: PPI  Ventilator Pneumonia: not applicable     Activity Orders          Up ad sandra starting at 10/11 0002        Full Code    Shiraz Phillips MD  Neurocritical Care  [facility]

## 2017-10-25 PROBLEM — Z98.890 S/P SPINAL SURGERY: Status: ACTIVE | Noted: 2017-01-01

## 2017-10-25 NOTE — ASSESSMENT & PLAN NOTE
84 y/o M with multiple active medical comorbidities, including recent NSTEMI, severe aortic stenosis, ESRD with HD MWF, and aspiration pneumonia s/p L3-5 TLIF    Neuro stable  Cont neuro checks  CV- goal normotension.   Okay to resume ASA today  Pulm- on RA.  FENGI- goal eunatremia, ADAT.   Heme/ID- afebrile. Transfuse towards goal of Hb 8  ppx- BRAIN/SCD's/SQH, sqh. Gi ppx.     dispo-  Accepted for TTF w/Hospital medicine primary

## 2017-10-25 NOTE — PLAN OF CARE
Hospital Medicine Consult Service  Case discussed with Dr. Wells, Neurosurgery. Patient accepted to hospital medicine service and [facility] medicine to assume care of patient tomorrow morning at 7:00 am if patient is out of Neuro ICU if patient is still in Neuro ICU then Neuro ICU team is responsible for patient's care. Patient to be placed on IMT list this evening and be reassigned to hospital medicine service for the am if out of Neuro ICU. Please see Neuro ICU stepdown note for full details on patient's hospital course.     TREVOR SILVERIO MD  Attending Staff Physician   Hospital Medicine  Pager: [phone]  Spectralink: [phone]

## 2017-10-25 NOTE — PLAN OF CARE
Problem: SLP Goal  Goal: SLP Goal  Speech Therapy Short Term Goals  Goals expected to be met by 10/28:  1. Pt will tolerate pudding thick liquids and dental soft diet with no overt s/s aspiration.   2. Given clinician model, pt will complete dysphagia exercises x10 each in order to strengthen the swallowing musculature.   3. Pt will participate in repeat MBSS when deemed appropriate by SLP/ MD.        Outcome: Ongoing (interventions implemented as appropriate)  Recommend ongoing dental soft diet and pudding thick liquids. All liquids must be thickened to pudding thick consistency: 4oz:1.5-2oz thickener. Pt requires assistance to thicken liquids. Speech/ language/ cognitive evaluation complete. See note for details.     DUGLAS Barriga, CCC-SLP  [phone]  10/25/2017

## 2017-10-25 NOTE — PROVIDER TRANSFER
Neuro Critical Care Transfer of Care note    Date of Admit: 10/10/2017  Date of Transfer / Stepdown: 10/25/2017    Brief History of Present Illness:      Donta Cline is a 85 y.o. male who  has a past medical history of Bladder cancer; Chronic diastolic heart failure (8/21/2012); Chronic hip pain, right (10/4/2017); Coronary artery disease involving native coronary artery of native heart without angina pectoris; Dyslipidemia; Encounter for blood transfusion; ESRD (end stage renal disease) on dialysis (6/9/2014); Essential hypertension; Hypercholesteremia (8/21/2012); Long-term (current) use of anticoagulants (10/9/2015); NSTEMI (non-ST elevated myocardial infarction) (10/4/2017); Paroxysmal atrial fibrillation (10/8/2015); Prostate cancer; Severe aortic stenosis (10/14/2014); Type 2 diabetes mellitus with chronic kidney disease on chronic dialysis, with long-term current use of insulin (12/15/2013); and Ventricular tachycardia.. The patient presented to [facility] on 10/10/2017     Mr. Cline is a 85yoM with multiple active medical comorbidities, including recent NSTEMI with flash pulmonary edema, subsequent L heart cath, severe aortic stenosis, ESRD with HD MWF, T2DM, aspiration pneumonia, and previous back surgeries. Pt presents to [facility] s/p L3-4 laminectomy. Pt became hypotensive in OR requiring phenylephrine to maintain MAP > 70. Pt admitted to [facility] for higher level of care.     Hospital Course By Problem with Pertinent Findings:     Hospital Course: 10/21: admit to [facility] s/p L3-4 laminectomy, on phenylephrine ggt, Cr 6.0, K 5.5, nephrology consulted, insulin, D50, and calcium gluconate admin  10/22: Patient weaned off phenylephrine drip this morning. Remains afebrile. Cr 6.5, K 5.3; Hg 6.8 and s/p 1u pRBC. Underwent hemodialysis and required phenylephrine drip at 0.5mcg/kg/min for several hours, weaned off overnight.  10/23: Midodrine increased to 10mg TID. Potassium 3.8 and creatinie 3.5 today. Hg 6.8  "today and receiving 1u pRBC. Pain moderately well controlled and requiring Norco 10mg-325 q6. Remains afebrile.  10/24: Underwent dialysis      Neuro   Spinal stenosis of lumbar region without neurogenic claudication     -POD 5 s/p L3-4 laminectomy 10/20  -NSGY following  -pain well controlled on Norco  q6 prn              Pulmonary   Aspiration pneumonia     -AF, no abx  -post op leukocytosis   -completed course of unasyn          Cardiac/Vascular   Elevated troponin     See "NSTEMI"       NSTEMI (non-ST elevated myocardial infarction)     - issue at OSH- plan for cath initially and never received due to issues with pulm edema  - RCA stenosis, low risk per cards can have outpatient LHC with intervention after discharge  - was on hep ggt - Holding heparin gtt post-op per neurosurgery  - aspirin 81mg daily likely to re-start per neurosurgery if no signs of bleeding       Paroxysmal atrial fibrillation     - holding home coumadin post-operatively under neurosurgery clearance  - aspirin held post-op and will likely re-started 10/25 per neurosurgery  - metoprolol 25mg BID       Essential hypertension     -no antihypertensives at this time due to postprocedural hypotention          Coronary artery disease involving native coronary artery of native heart without angina pectoris     Coronary stenosis > 50% on cardiac cath 10/16/17       Chronic diastolic heart failure     - most recent echo on 10/12/17 showed pulmonary hypertension with undetermined diastolic function          Renal/   ESRD (end stage renal disease) on dialysis     -nephrology following - last dialyzed 10/24 - tolerated without complication          Endocrine   Type 2 diabetes mellitus with chronic kidney disease on chronic dialysis, with long-term current use of insulin     -SSI   -decrease long acting secondary to hypOglycemia           Prophylaxis:  Venous Thromboembolism: mechanical chemical  Stress Ulcer: NA  Ventilator Pneumonia: not " applicable       Consultants and Procedures:     Consultants:  NSGY, Nephrology    Procedures:    L2-L4 spinal fusion, . L3-L4 Interbody fusion, L2-L3 laminectomy    Transfer Information:     Diet:  Dental soft    Physical Activity:  Up ad sandra    To Do / Pending Studies / Follow ups:      Jemal Gonzales  Neuro Crtical Care        Discussed stepdown to Internal Medicine with [facility]  at 1536PM via extension [phone]. Patient has been accepted and assigned to [facility].

## 2017-10-25 NOTE — PLAN OF CARE
"Daughter at bedside, gave pt thin OJ, educated on importance of thickening liquids. Verbalized understanding but States "most of the time I abide by the rules and thicken liquids but sometimes I just give him what he wants."  "

## 2017-10-25 NOTE — PLAN OF CARE
Problem: Patient Care Overview  Goal: Plan of Care Review  Outcome: Ongoing (interventions implemented as appropriate)  POC reviewed with pt and family at 1400. Pt verbalized understanding. Questions and concerns addressed. No acute events today. Pt out of bed with back brace x 3 with meals. Transfer orders in. Pt progressing toward goals. Will continue to monitor. See flowsheets for full assessment and VS info.

## 2017-10-25 NOTE — PLAN OF CARE
Problem: Patient Care Overview  Goal: Plan of Care Review  Outcome: Ongoing (interventions implemented as appropriate)   10/25/17 0748   Coping/Psychosocial   Plan Of Care Reviewed With patient;daughter     POC reviewed with pt and pt's daughter at 0630. Pt verbalized understanding. Questions and concerns addressed. Pt progressing toward goals. Will continue to monitor. See flowsheets for full assessment and VS info   Goal: Individualization & Mutuality  Outcome: Ongoing (interventions implemented as appropriate)   10/17/17 1300 10/20/17 0754   Mutuality/Individual Preferences   What Anxieties, Fears, Concerns, or Questions Do You Have About Your Care? none --    What Information Would Help Us Give You More Personalized Care? --  keep updated   Individualization   Patient Specific Preferences --  keep informed

## 2017-10-25 NOTE — ASSESSMENT & PLAN NOTE
- issue at OSH- plan for cath initially and never received due to issues with pulm edema  - RCA stenosis, low risk per cards can have outpatient LHC with intervention after discharge  - was on hep ggt - Holding heparin gtt post-op per neurosurgery  - aspirin 81mg daily likely to re-start per neurosurgery if no signs of bleeding

## 2017-10-25 NOTE — SUBJECTIVE & OBJECTIVE
Interval History:  No acute events. Underwent dialysis yesterday without complication.    Review of Systems    Constitutional: Denies fevers or chills.  Pulmonary: Denies shortness of breath or cough.  Cardiology: Denies chest pain or palpitations.  GI: Denies abdominal pain or constipation.  Neurologic: Denies new weakness,  headache, or paresthesias.    Objective:     Vitals:  Temp: 98.5 °F (36.9 °C) (10/25/17 1105)  Pulse: 78 (10/25/17 1405)  Resp: 16 (10/25/17 1405)  BP: (!) 139/55 (10/25/17 1405)  SpO2: 100 % (10/25/17 1405)    Temp:  [97.9 °F (36.6 °C)-98.9 °F (37.2 °C)] 98.5 °F (36.9 °C)  Pulse:  [72-98] 78  Resp:  [16-32] 16  SpO2:  [97 %-100 %] 100 %  BP: ()/(44-99) 139/55            10/24 0701 - 10/25 0700  In: 990 [P.O.:240]  Out: 3050 [Urine:50]    Physical Exam    Constitutional: Well-nourished and -developed. No apparent distress.   Eyes: Conjunctiva clear, anicteric. Lids no lesions.  Head/Ears/Nose/Mouth/Throat/Neck: Moist mucous membranes. External ears, nose atraumatic.   Cardiovascular: Regular rhythm. Grade 2 systolic murmur over RUSB. No edema. Dialysis in process.  Respiratory: Comfortable respirations. Clear to auscultation.  Gastrointestinal: No hernia. Soft, nondistended, nontender. + bowel sounds.     Neurologic:  -GCS [id number]  -Awake, alert. Speech fluent. Follows commands.  -Cranial nerves II-XII grossly intact   -Motor 5/5 bilateral upper extremities, 4/5 proximal LE likely effort related  -Sensation to light touch intact throughout     Medications:  Continuous Scheduled    sodium chloride 0.9%  Once   sodium chloride 0.9%  Once   sodium chloride 0.9%  Once   atorvastatin 80 mg Daily   heparin (porcine) 5,000 Units Q8H   insulin detemir 14 Units QHS   metoprolol tartrate 25 mg BID   midodrine 10 mg TID   pantoprazole 40 mg Nightly   senna-docusate 8.6-50 mg 1 tablet BID   sertraline 100 mg QHS   vit b cmplx 3-fa-vit c-biotin 1- mg-mg-mcg 1 tablet Daily   PRN    sodium chloride   Q24H PRN   sodium chloride  Q24H PRN   sodium chloride  Q24H PRN   sodium chloride  Q24H PRN   sodium chloride 0.9%  PRN   sodium chloride 0.9%  PRN   sodium chloride 0.9%  PRN   sodium chloride 0.9%  PRN   acetaminophen 650 mg Q8H PRN   albuterol-ipratropium 2.5mg-0.5mg/3mL 3 mL Q4H PRN   calcium gluconate IVPB 1 g Q10 Min PRN   dextrose 50% 12.5 g PRN   dextrose 50% 25 g PRN   diazePAM 2 mg Q6H PRN   glucagon (human recombinant) 1 mg PRN   glucose 16 g PRN   glucose 24 g PRN   hydrocodone-acetaminophen 10-325mg 1 tablet Q6H PRN   insulin aspart 0-5 Units QID (AC + HS) PRN   nitroGLYCERIN 0.3 mg Q5 Min PRN   ramelteon 8 mg Nightly PRN       Pulse: 78 (10/25/17 1405)  Resp: 16 (10/25/17 1405)  BP: (!) 139/55 (10/25/17 1405)  SpO2: 100 % (10/25/17 1405)                10/24 0701 - 10/25 0700  In: 990 [P.O.:240]  Out: 3050 [Urine:50]       15  10/24 0701 - 10/25 0700  In: 990 [P.O.:240]  Out: 3050 [Urine:50]     Recent Labs  Lab 10/25/17  0510   PH 7.437   PCO2 34.2*   PO2 81   BE -1     Recent Labs  Lab 10/25/17  0129      K 4.2   *   CO2 22*   BUN 15   CREATININE 2.7*   CALCIUM 9.5   MG 1.8   PHOS 2.4*     Recent Labs  Lab 10/25/17  0129   WBC 10.09   HGB 8.1*      INR 1.1     Recent Labs  Lab 10/25/17  0129   PROT 5.8*   ALBUMIN 2.2*   AST 35   ALT 5*   ALKPHOS 119   BILITOT 0.4     Recent Labs      10/25/17   0522  10/25/17   0610  10/25/17   0719   POCTGLUCOSE  70  79  118*     Continuous Scheduled  sodium chloride 0.9%  Once   sodium chloride 0.9%  Once   sodium chloride 0.9%  Once   atorvastatin 80 mg Daily   heparin (porcine) 5,000 Units Q8H   insulin detemir 14 Units QHS   metoprolol tartrate 25 mg BID   midodrine 10 mg TID   pantoprazole 40 mg Nightly   senna-docusate 8.6-50 mg 1 tablet BID   sertraline 100 mg QHS   vit b cmplx 3-fa-vit c-biotin 1- mg-mg-mcg 1 tablet Daily   PRN  sodium chloride  Q24H PRN   sodium chloride  Q24H PRN   sodium chloride  Q24H PRN   sodium chloride   Q24H PRN   sodium chloride 0.9%  PRN   sodium chloride 0.9%  PRN   sodium chloride 0.9%  PRN   sodium chloride 0.9%  PRN   acetaminophen 650 mg Q8H PRN   albuterol-ipratropium 2.5mg-0.5mg/3mL 3 mL Q4H PRN   calcium gluconate IVPB 1 g Q10 Min PRN   dextrose 50% 12.5 g PRN   dextrose 50% 25 g PRN   diazePAM 2 mg Q6H PRN   glucagon (human recombinant) 1 mg PRN   glucose 16 g PRN   glucose 24 g PRN   hydrocodone-acetaminophen 10-325mg 1 tablet Q6H PRN   insulin aspart 0-5 Units QID (AC + HS) PRN   nitroGLYCERIN 0.3 mg Q5 Min PRN   ramelteon 8 mg Nightly PRN           Lines/Drains/Airways     Drain                 Hemodialysis AV Fistula Right forearm -- days          Peripheral Intravenous Line                 Midline Catheter Insertion/Assessment  - Single Lumen 10/23/17 1500 Left basilic vein (medial side of arm) 18g x 8cm 2 days

## 2017-10-25 NOTE — PLAN OF CARE
ICU Attending Note  Neurocritical Care    HD yesterday. Agitation overnight.    -midodrine  -HD per Nephrology  -ADAT  -transfer to floor on the accepting team
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
## 2017-10-25 NOTE — SUBJECTIVE & OBJECTIVE
Interval History: symptomatic hypoglycemic episode overnight     Medications:  Continuous Infusions:   Scheduled Meds:   sodium chloride 0.9%   Intravenous Once    sodium chloride 0.9%   Intravenous Once    sodium chloride 0.9%   Intravenous Once    atorvastatin  80 mg Oral Daily    heparin (porcine)  5,000 Units Subcutaneous Q8H    insulin detemir  14 Units Subcutaneous QHS    metoprolol tartrate  25 mg Oral BID    midodrine  10 mg Oral TID    pantoprazole  40 mg Oral Nightly    senna-docusate 8.6-50 mg  1 tablet Oral BID    sertraline  100 mg Oral QHS    vit b cmplx 3-fa-vit c-biotin 1- mg-mg-mcg  1 tablet Oral Daily     PRN Meds:sodium chloride, sodium chloride, sodium chloride, sodium chloride 0.9%, sodium chloride 0.9%, sodium chloride 0.9%, sodium chloride 0.9%, acetaminophen, albuterol-ipratropium 2.5mg-0.5mg/3mL, [COMPLETED] calcium gluconate IVPB **AND** calcium gluconate IVPB, dextrose 50%, dextrose 50%, diazePAM, glucagon (human recombinant), glucose, glucose, hydrocodone-acetaminophen 10-325mg, insulin aspart, nitroGLYCERIN, ramelteon     Review of Systems  Objective:     Weight: 80 kg (176 lb 5.9 oz)  Body mass index is 28.47 kg/m².  Vital Signs (Most Recent):  Temp: 98.4 °F (36.9 °C) (10/25/17 1505)  Pulse: 84 (10/25/17 1505)  Resp: 16 (10/25/17 1505)  BP: (!) 149/111 (10/25/17 1505)  SpO2: 100 % (10/25/17 1505) Vital Signs (24h Range):  Temp:  [97.9 °F (36.6 °C)-98.9 °F (37.2 °C)] 98.4 °F (36.9 °C)  Pulse:  [72-96] 84  Resp:  [16-29] 16  SpO2:  [97 %-100 %] 100 %  BP: ()/() 149/111       Date 10/25/17 0700 - 10/26/17 0659   Shift 2024-1171 1778-1507 4740-9073 24 Hour Total   I  N  T  A  K  E   P.O. 550   550    Shift Total  (mL/kg) 550  (6.9)   550  (6.9)   O  U  T  P  U  T   Urine  (mL/kg/hr) 60  (0.1)   60    Shift Total  (mL/kg) 60  (0.8)   60  (0.8)   Weight (kg) 80 80 80 80                        Hemodialysis AV Fistula Right forearm (Active)   Needle Size 15ga  10/24/2017 11:35 AM   Site Assessment Clean;Dry;Intact;No redness;No swelling;No warmth;No drainage 10/25/2017  3:05 PM   Patency Present 10/25/2017  3:05 PM   Status Deaccessed 10/25/2017  3:05 PM   Flows Good 10/24/2017 11:44 AM   Dressing Intervention Removed 10/24/2017 11:35 AM   Dressing Status Clean;Dry;Intact 10/25/2017  3:05 PM   Site Condition No complications 10/25/2017  3:05 PM   Dressing Open to air (None) 10/25/2017  3:05 PM   Drainage Description Serosanguineous 10/11/2017 11:22 AM       Physical Exam:  Vitals reviewed.    Constitutional: He appears well-developed and well-nourished.     Eyes: Pupils are equal, round, and reactive to light. EOM are normal.     Cardiovascular: Normal rate.     Abdominal: Soft.     Psych/Behavior: He is alert. He is oriented to person, place, and time. He has a normal mood and affect.     Neurological:        Cranial nerves: Cranial nerve(s) II, III, IV, V, VI, VII, VIII, IX, X, XI and XII are intact.   4/5 L EHL, otherwise 5/5 throughout  Resolving L2-3 distribution pain B/L       Significant Labs:    Recent Labs  Lab 10/24/17  0225 10/25/17  0129     105 141*     140 143   K 4.1  4.1 4.2     104 112*   CO2 26  26 22*   BUN 28*  28* 15   CREATININE 4.0*  4.0* 2.7*   CALCIUM 9.4  9.4 9.5   MG 1.8 1.8       Recent Labs  Lab 10/24/17  0225 10/25/17  0129   WBC 9.68 10.09   HGB 8.4* 8.1*   HCT 26.0* 25.3*    162       Recent Labs  Lab 10/25/17  0129   INR 1.1     Microbiology Results (last 7 days)     ** No results found for the last 168 hours. **        Recent Lab Results       10/25/17  1733 10/25/17  1225 10/25/17  0719 10/25/17  0610 10/25/17  0522      Immature Granulocytes          Immature Grans (Abs)          Albumin          Alkaline Phosphatase          Allens Test          ALT          Anion Gap          AST          Baso #          Basophil%          Total Bilirubin          Site          BUN, Bld          Calcium           Chloride          CO2          Creatinine          DelSys          Differential Method          eGFR if           eGFR if non           Eos #          Eosinophil%          Glucose          Gran #          Gran%          Hematocrit          Hemoglobin          Coumadin Monitoring INR          Lymph #          Lymph%          Magnesium          MCH          MCHC          MCV          Mode          Mono #          Mono%          MPV          nRBC          Phosphorus          Platelets          POC BE          POC HCO3          POC PCO2          POC PH          POC PO2          POC SATURATED O2          POC TCO2          POCT Glucose 152(H) 155(H) 118(H) 79 70     Potassium          Total Protein          Protime          RBC          RDW          Sample          Sodium          Sp02          WBC                      10/25/17  0510 10/25/17  0456 10/25/17  0129 10/24/17  2158      Immature Granulocytes   0.6(H)      Immature Grans (Abs)   0.06(H)      Albumin   2.2(L)      Alkaline Phosphatase   119      Allens Test N/A        ALT   5(L)      Anion Gap   9      AST   35      Baso #   0.01      Basophil%   0.1      Total Bilirubin   0.4  Comment:  For infants and newborns, interpretation of results should be based  on gestational age, weight and in agreement with clinical  observations.  Premature Infant recommended reference ranges:  Up to 24 hours.............<8.0 mg/dL  Up to 48 hours............<12.0 mg/dL  3-5 days..................<15.0 mg/dL  6-29 days.................<15.0 mg/dL        Site LR        BUN, Bld   15      Calcium   9.5      Chloride   112(H)      CO2   22(L)      Creatinine   2.7(H)      DelSys Room Air        Differential Method   Automated      eGFR if    23.8(A)      eGFR if non    20.6  Comment:  Calculation used to obtain the estimated glomerular filtration  rate (eGFR) is the CKD-EPI equation. Since race is unknown   in our  information system, the eGFR values for   -American and Non--American patients are given   for each creatinine result.  (A)      Eos #   0.1      Eosinophil%   0.7      Glucose   141(H)      Gran #   8.5(H)      Gran%   83.9(H)      Hematocrit   25.3(L)      Hemoglobin   8.1(L)      Coumadin Monitoring INR   1.1  Comment:  Coumadin Therapy:  2.0 - 3.0 for INR for all indicators except mechanical heart valves  and antiphospholipid syndromes which should use 2.5 - 3.5.        Lymph #   0.9(L)      Lymph%   8.7(L)      Magnesium   1.8      MCH   32.9(H)      MCHC   32.0      MCV   103(H)      Mode SPONT        Mono #   0.6      Mono%   6.0      MPV   11.1      nRBC   0      Phosphorus   2.4(L)      Platelets   162      POC BE -1        POC HCO3 23.1(L)        POC PCO2 34.2(L)        POC PH 7.437        POC PO2 81        POC SATURATED O2 96        POC TCO2 24        POCT Glucose  61(L)  225(H)     Potassium   4.2  Comment:  *Slightly Hemolyzed      Total Protein   5.8(L)      Protime   11.2      RBC   2.46(L)      RDW   19.1(H)      Sample ARTERIAL        Sodium   143      Sp02 100        WBC   10.09          All pertinent labs from the last 24 hours have been reviewed.    Significant Diagnostics:  CT: No results found in the last 24 hours.  MRI: No results found in the last 24 hours.  I have reviewed all pertinent imaging results/findings within the past 24 hours.

## 2017-10-25 NOTE — PLAN OF CARE
WAN met with Rafia with [facility] at [facility]. She reported she has completed her assessment of the Pt and spoken with family at bedside. She reported that once he ready, they will have a bed. Reviewed notes from MD team and she reported once Pt does step down, updates will be appreciated especially with regard to the est dc date.    Urszula Flores LMSW  Neurocritical Care   [facility]  [id number]

## 2017-10-25 NOTE — PT/OT/SLP EVAL
Speech Language Pathology Evaluation &  Dysphagia Treatment    Donta Cline   MRN: [id number]   [id number] A    Admitting Diagnosis: Lumbar stenosis    Diet recommendations: Solid Diet Level: Dental Soft  Liquid Diet Level: Pudding Thick   Thicken all liquids to pudding thick with 4oz liquid to 2 packets Resource ThickenUp thickener  Encourage double swallows per bolus  · Crush PO meds in pureed  · NO straws  · Fully awake and alert for PO intake  · Fully upright position for PO intake  · Small bites/ sips  · Slow rate of eating/ drinking  · 1 bite/ sip @ a time  · Refrain from talking prior to swallow completion  · Remain upright for 20-30 min post PO intake    SLP Treatment Date: 10/25/17  Speech Start Time: 1052     Speech Stop Time: 1144     Speech Total (min): 52 min       TREATMENT BILLABLE MINUTES:  Eval 29  and Treatment Swallowing Dysfunction 23    Diagnosis: Lumbar stenosis    Past Medical History:   Diagnosis Date    Bladder cancer     Chronic diastolic heart failure 8/21/2012    3/13: AOSAT: 98, FICKCI: 2.41, FICKCO: 5.18 PAPRES: 34/16 (23), PASAT: 65, PVR: 1.74 PWPRES: 18/18 (14), RA PRES: 14/13 (12), RV: 40/0, 10     Chronic hip pain, right 10/4/2017    Coronary artery disease involving native coronary artery of native heart without angina pectoris     Dyslipidemia     Encounter for blood transfusion     ESRD (end stage renal disease) on dialysis 6/9/2014    Essential hypertension     Hypercholesteremia 8/21/2012    Long-term (current) use of anticoagulants 10/9/2015    NSTEMI (non-ST elevated myocardial infarction) 10/4/2017    Paroxysmal atrial fibrillation 10/8/2015    Prostate cancer     Severe aortic stenosis 10/14/2014    Type 2 diabetes mellitus with chronic kidney disease on chronic dialysis, with long-term current use of insulin 12/15/2013    Ventricular tachycardia     monomorphic     Past Surgical History:   Procedure Laterality Date    BACK SURGERY      laminectomy x2     "COLON SURGERY      EXTENSIVE PROSTATE SURGER      Prostatectomy, Radical    JOINT REPLACEMENT      left hip       Has the patient been evaluated by SLP for swallowing? : Yes  Keep patient NPO?: No   General Precautions: Standard, aspiration, fall, pudding thick    Current Respiratory Status:  (Room air)     Social Hx: Per pt and his daughter, prior to this hospitalization: Living ind'ly. Driving. Not responsible for finances.     Occupational/hobbies/homemaking: Pt pt and his daughter, retired at age 55 as . Prior to career as , taught math.     Subjective:  "Only thing that could make it better is if it were thin." Pt re: desire for thin water.     Pain/Comfort  Pain Rating 1: 0/10  Pain Rating Post-Intervention 1: 0/10    Objective:        Oral Musculature Evaluation  Oral Musculature: WFL  Dentition: present and adequate  Mucosal Quality: adequate, sticky  Mandibular Strength and Mobility: WNL  Oral Labial Strength and Mobility: WNL  Lingual Strength and Mobility: WFL  Velar Elevation: WFL  Buccal Strength and Mobility: WFL  Volitional Cough: adequate  Volitional Swallow: able to demonstrate with some effort  Voice Prior to PO Intake: clear     Cognitive Status:  Behavioral Observations: alert, appropriate and cooperative-  Memory and Orientation: Oriented to year and month. Unable to orient to place. Answered 3/3 long term memory q's. Recalled series of 5, 1-digit numbers ind'ly. Unable to recall series of 4 unrelated words.   Attention: Occasionally required redirection to task.   Problem Solving: WFL.   Pragmatics: WFL  Executive Function: WFL    Auditory Comprehension: Answered 7/7 complex y/n q's. Followed 3/4 complex directions ind'ly, and 4/4 given repetition.     Verbal Expression: Answered 5/6 responsive naming q's ind'ly. Generated 8 items in a category within 1 minute during divergent categorization task (WFL=15-20).     Motor Speech: No evidence of dysarthria or " apraxia.     Voice: WFL    Reading: WFL    Writing: WFL    Visual-Spatial: During clock drawing task: 1st attempt= numbers in a [place] however did not fill entire clock, with outer edge of numbers on left side of clock in the middle of the [place] provided. 2nd attempt= omitted 2, 11 directly below 12, and 12 written twice- in its appropriate position, as well as in middle of clock. Numbers more centered however still did not fill the entirety of the [place] provided. 3rd attempt= Pt pointed to location of each number then told clinician which number to write. 9/12 numbers placed correctly with cueing req'd in order to correctly place remaining 3 (7, 8, 9).     Additional Treatment:    Pt awake and alert upon entry with daughter at b/s. Upon entry, pt's daughter reported pt coughing with pudding thick liquids. Clinician's observation of milk daughter reported to have just given pt revealed milk to be of thin-nectar thick consistency. Extensive education provided re: thickening liquids to pudding thick consistency, as well as demonstration of pudding thick consistency provided. Results of MBSS re-iterated. Pt observed with 1/2 tsp pudding thick water x ~6. No overt s/s aspiration observed.     Of note, during speech/ language/ cognitive evaluation pt observed to have spontaneous wet vocal quality x1 and wet, congested cough x3.      Results from speech/ language/ cognitive evaluation reviewed with pt and daughter: disoriented to place, repetition with inconsistent success req'd during immediate memory tasks, difficulty with divergent categorization task, inability to complete 1/7 responsive naming cues, and cueing req'd to complete visual spatial task. Clinician educated pt and daughter on the effects of age-related changes, lethargy, anesthesia, dec'd nutrition and hydration per daughter's report, pain, and lengthy hospitalization on cognitive-linguistic changes. Despite clinician explanation, daughter insisting pt  receive cognitive-linguistic therapy in order to address higher level cognitive-linguistic skills.     White board updated. No further questions.     FIM:  Social Interaction: 4 Minimal direction--The patient interacts appropriately 75 to 90% of the time.   Problem Solvin Modified [place]--In most situations, the patient recognizes a present problem, and with only mild difficulty makes appropriate decisions, initiates and carries out a sequence of steps to solve complex problems, or requires more than a   Comprehension: 6 Modified [place]--In most situations, the patient understands readily or with only mild dificulty complex or abstract directions and conversation.  The patient does not require prompting, though (s)he may require a hearing or visual aid, other x   Expression: 5 Standby Prompting--The patient expresses basic daily needs and ideas more than 90% of the time.  Requires prompting (e.g., frequent repetition) less than 10% of the time to be understood.   Memory: 5 Supervision-The patient requires prompting (e.g., cueing, repetition, reminders) only under stressful or unfamiliar conditions, but no more than 10% of the time.     Assessment:  Donta Cline is a 85 y.o. male with a SLP diagnosis of dysphagia and near baseline cognitive-linguistic skills.     Do you have any cultural, spiritual, Yarsani conflicts, given your current situation?: no    Discharge recommendations: Discharge Facility/Level Of Care Needs: nursing facility, skilled     Goals:    SLP Goals        Problem: SLP Goal    Goal Priority Disciplines Outcome   SLP Goal     SLP Ongoing (interventions implemented as appropriate)   Description:  Speech Therapy Short Term Goals  Goals expected to be met by :  1. Pt will tolerate pudding thick liquids and dental soft diet with no overt s/s aspiration.   2. Given clinician model, pt will complete dysphagia exercises x10 each in order to strengthen the swallowing musculature.    3. Pt will participate in repeat MBSS when deemed appropriate by SLP/ MD.   4. Pt will participate in ongoing assessment of immediate memory skills, cognitive flexibility skills (including category exclusion and inclusion, sequencing, reverse recall, compare/contrast), higher level naming skills (including divergent categorization, word deductions, word relations), and visual spatial skills, in order to determine if further cognitive-linguistic therapy warranted.                           Plan:   Patient to be seen Therapy Frequency: 5 x/week   Plan of Care expires: 11/19/17  Plan of Care reviewed with: patient, daughter  SLP Follow-up?: Yes       DUGLAS Barriga, CCC-SLP  [phone]  10/25/2017

## 2017-10-25 NOTE — PLAN OF CARE
Problem: SLP Goal  Goal: SLP Goal  Speech Therapy Short Term Goals  Goals expected to be met by 11/1:  1. Pt will tolerate pudding thick liquids and dental soft diet with no overt s/s aspiration.   2. Given clinician model, pt will complete dysphagia exercises x10 each in order to strengthen the swallowing musculature.   3. Pt will participate in repeat MBSS when deemed appropriate by SLP/ MD.   4. Pt will participate in ongoing assessment of immediate memory skills, cognitive flexibility skills (including category exclusion and inclusion, sequencing, reverse recall, compare/contrast), higher level naming skills (including divergent categorization, word deductions, word relations), and visual spatial skills, in order to determine if further cognitive-linguistic therapy warranted.        Outcome: Ongoing (interventions implemented as appropriate)  Recommend ongoing dental soft diet and pudding thick liquids. All liquids must be thickened to pudding thick consistency: 4oz:1.5-2oz thickener. Pt requires assistance to thicken liquids. Speech/ language/ cognitive evaluation complete. See note for details.     DUGLAS Barriga, CCC-SLP  [phone]  10/25/2017

## 2017-10-25 NOTE — ANESTHESIA POSTPROCEDURE EVALUATION
"Anesthesia Post Evaluation    Patient: Donta Cline    Procedure(s) Performed: Procedure(s) (LRB):  L2-L4 spinal fusion, . L3-L4 Interbody fusion, L2-L3 laminectomy (N/A)    Final Anesthesia Type: general  Patient location during evaluation: ICU  Patient participation: Yes- Able to Participate  Level of consciousness: awake and alert  Post-procedure vital signs: reviewed and stable  Pain management: adequate  Airway patency: patent  PONV status at discharge: No PONV  Anesthetic complications: no      Cardiovascular status: blood pressure returned to baseline  Respiratory status: unassisted  Hydration status: euvolemic  Follow-up not needed.        Visit Vitals  BP (!) 129/56   Pulse 96   Temp 37.2 °C (98.9 °F) (Oral)   Resp 18   Ht 5' 6" (1.676 m)   Wt 80 kg (176 lb 5.9 oz)   SpO2 100%   BMI 28.47 kg/m²       Pain/Renato Score: Pain Assessment Performed: Yes (10/25/2017  9:05 AM)  Presence of Pain: denies (10/25/2017  9:05 AM)  Pain Rating Prior to Med Admin: 4 (10/24/2017  9:59 PM)      "

## 2017-10-25 NOTE — PROGRESS NOTES
[facility]  Neurocritical Care  Progress Note    Admit Date: 10/10/2017  Service Date: 10/25/2017  Length of Stay: 15    Subjective:     Chief Complaint: Lumbar stenosis    History of Present Illness: Mr. Cline is a 85yoM with multiple active medical comorbidities, including recent NSTEMI with flash pulmonary edema, subsequent L heart cath, severe aortic stenosis, ESRD with HD MWF, T2DM, aspiration pneumonia, and previous back surgeries. Pt presents to [facility] s/p L3-4 laminectomy. Pt became hypotensive in OR requiring phenylephrine to maintain MAP > 70. Pt admitted to [facility] for higher level of care.     Hospital Course: 10/21: admit to [facility] s/p L3-4 laminectomy, on phenylephrine ggt, Cr 6.0, K 5.5, nephrology consulted, insulin, D50, and calcium gluconate admin  10/22: Patient weaned off phenylephrine drip this morning. Remains afebrile. Cr 6.5, K 5.3; Hg 6.8 and s/p 1u pRBC. Underwent hemodialysis and required phenylephrine drip at 0.5mcg/kg/min for several hours, weaned off overnight.  10/23: Midodrine increased to 10mg TID. Potassium 3.8 and creatinie 3.5 today. Hg 6.8 today and receiving 1u pRBC. Pain moderately well controlled and requiring Norco 10mg-325 q6. Remains afebrile.  10/24: Underwent dialysis       Interval History:  No acute events. Underwent dialysis yesterday without complication.    Review of Systems    Constitutional: Denies fevers or chills.  Pulmonary: Denies shortness of breath or cough.  Cardiology: Denies chest pain or palpitations.  GI: Denies abdominal pain or constipation.  Neurologic: Denies new weakness,  headache, or paresthesias.    Objective:     Vitals:  Temp: 98.5 °F (36.9 °C) (10/25/17 1105)  Pulse: 78 (10/25/17 1405)  Resp: 16 (10/25/17 1405)  BP: (!) 139/55 (10/25/17 1405)  SpO2: 100 % (10/25/17 1405)    Temp:  [97.9 °F (36.6 °C)-98.9 °F (37.2 °C)] 98.5 °F (36.9 °C)  Pulse:  [72-98] 78  Resp:  [16-32] 16  SpO2:  [97 %-100 %] 100 %  BP: ()/(44-99) 139/55             10/24 0701 - 10/25 0700  In: 990 [P.O.:240]  Out: 3050 [Urine:50]    Physical Exam    Constitutional: Well-nourished and -developed. No apparent distress.   Eyes: Conjunctiva clear, anicteric. Lids no lesions.  Head/Ears/Nose/Mouth/Throat/Neck: Moist mucous membranes. External ears, nose atraumatic.   Cardiovascular: Regular rhythm. Grade 2 systolic murmur over RUSB. No edema. Dialysis in process.  Respiratory: Comfortable respirations. Clear to auscultation.  Gastrointestinal: No hernia. Soft, nondistended, nontender. + bowel sounds.     Neurologic:  -GCS [id number]  -Awake, alert. Speech fluent. Follows commands.  -Cranial nerves II-XII grossly intact   -Motor 5/5 bilateral upper extremities, 4/5 proximal LE likely effort related  -Sensation to light touch intact throughout     Medications:  Continuous Scheduled    sodium chloride 0.9%  Once   sodium chloride 0.9%  Once   sodium chloride 0.9%  Once   atorvastatin 80 mg Daily   heparin (porcine) 5,000 Units Q8H   insulin detemir 14 Units QHS   metoprolol tartrate 25 mg BID   midodrine 10 mg TID   pantoprazole 40 mg Nightly   senna-docusate 8.6-50 mg 1 tablet BID   sertraline 100 mg QHS   vit b cmplx 3-fa-vit c-biotin 1- mg-mg-mcg 1 tablet Daily   PRN    sodium chloride  Q24H PRN   sodium chloride  Q24H PRN   sodium chloride  Q24H PRN   sodium chloride  Q24H PRN   sodium chloride 0.9%  PRN   sodium chloride 0.9%  PRN   sodium chloride 0.9%  PRN   sodium chloride 0.9%  PRN   acetaminophen 650 mg Q8H PRN   albuterol-ipratropium 2.5mg-0.5mg/3mL 3 mL Q4H PRN   calcium gluconate IVPB 1 g Q10 Min PRN   dextrose 50% 12.5 g PRN   dextrose 50% 25 g PRN   diazePAM 2 mg Q6H PRN   glucagon (human recombinant) 1 mg PRN   glucose 16 g PRN   glucose 24 g PRN   hydrocodone-acetaminophen 10-325mg 1 tablet Q6H PRN   insulin aspart 0-5 Units QID (AC + HS) PRN   nitroGLYCERIN 0.3 mg Q5 Min PRN   ramelteon 8 mg Nightly PRN       Pulse: 78 (10/25/17 1405)  Resp: 16 (10/25/17  1405)  BP: (!) 139/55 (10/25/17 1405)  SpO2: 100 % (10/25/17 1405)                10/24 0701 - 10/25 0700  In: 990 [P.O.:240]  Out: 3050 [Urine:50]       15  10/24 0701 - 10/25 0700  In: 990 [P.O.:240]  Out: 3050 [Urine:50]     Recent Labs  Lab 10/25/17  0510   PH 7.437   PCO2 34.2*   PO2 81   BE -1     Recent Labs  Lab 10/25/17  0129      K 4.2   *   CO2 22*   BUN 15   CREATININE 2.7*   CALCIUM 9.5   MG 1.8   PHOS 2.4*     Recent Labs  Lab 10/25/17  0129   WBC 10.09   HGB 8.1*      INR 1.1     Recent Labs  Lab 10/25/17  0129   PROT 5.8*   ALBUMIN 2.2*   AST 35   ALT 5*   ALKPHOS 119   BILITOT 0.4     Recent Labs      10/25/17   0522  10/25/17   0610  10/25/17   0719   POCTGLUCOSE  70  79  118*     Continuous Scheduled  sodium chloride 0.9%  Once   sodium chloride 0.9%  Once   sodium chloride 0.9%  Once   atorvastatin 80 mg Daily   heparin (porcine) 5,000 Units Q8H   insulin detemir 14 Units QHS   metoprolol tartrate 25 mg BID   midodrine 10 mg TID   pantoprazole 40 mg Nightly   senna-docusate 8.6-50 mg 1 tablet BID   sertraline 100 mg QHS   vit b cmplx 3-fa-vit c-biotin 1- mg-mg-mcg 1 tablet Daily   PRN  sodium chloride  Q24H PRN   sodium chloride  Q24H PRN   sodium chloride  Q24H PRN   sodium chloride  Q24H PRN   sodium chloride 0.9%  PRN   sodium chloride 0.9%  PRN   sodium chloride 0.9%  PRN   sodium chloride 0.9%  PRN   acetaminophen 650 mg Q8H PRN   albuterol-ipratropium 2.5mg-0.5mg/3mL 3 mL Q4H PRN   calcium gluconate IVPB 1 g Q10 Min PRN   dextrose 50% 12.5 g PRN   dextrose 50% 25 g PRN   diazePAM 2 mg Q6H PRN   glucagon (human recombinant) 1 mg PRN   glucose 16 g PRN   glucose 24 g PRN   hydrocodone-acetaminophen 10-325mg 1 tablet Q6H PRN   insulin aspart 0-5 Units QID (AC + HS) PRN   nitroGLYCERIN 0.3 mg Q5 Min PRN   ramelteon 8 mg Nightly PRN           Lines/Drains/Airways     Drain                 Hemodialysis AV Fistula Right forearm -- days          Peripheral Intravenous Line   "               Midline Catheter Insertion/Assessment  - Single Lumen 10/23/17 1500 Left basilic vein (medial side of arm) 18g x 8cm 2 days                        Assessment/Plan:     Neuro   Spinal stenosis of lumbar region without neurogenic claudication    -POD 5 s/p L3-4 laminectomy 10/20  -NSGY following  -pain well controlled on Norco  q6 prn             Pulmonary   Aspiration pneumonia    -AF, no abx  -post op leukocytosis   -completed course of unasyn          Cardiac/Vascular   Elevated troponin    See "NSTEMI"        NSTEMI (non-ST elevated myocardial infarction)    - issue at OSH- plan for cath initially and never received due to issues with pulm edema  - RCA stenosis, low risk per cards can have outpatient LHC with intervention after discharge  - was on hep ggt - Holding heparin gtt post-op per neurosurgery  - aspirin 81mg daily likely to re-start per neurosurgery if no signs of bleeding        Paroxysmal atrial fibrillation    - holding home coumadin post-operatively under neurosurgery clearance  - aspirin held post-op and will likely re-started 10/25 per neurosurgery  - metoprolol 25mg BID        Essential hypertension    -no antihypertensives at this time due to postprocedural hypotention          Coronary artery disease involving native coronary artery of native heart without angina pectoris    Coronary stenosis > 50% on cardiac cath 10/16/17        Chronic diastolic heart failure    - most recent echo on 10/12/17 showed pulmonary hypertension with undetermined diastolic function          Renal/   ESRD (end stage renal disease) on dialysis    -nephrology following - last dialyzed 10/24 - tolerated without complication           Endocrine   Type 2 diabetes mellitus with chronic kidney disease on chronic dialysis, with long-term current use of insulin    -SSI   -decrease long acting secondary to hypOglycemia            Prophylaxis:  Venous Thromboembolism: mechanical chemical  Stress Ulcer: " NA  Ventilator Pneumonia: not applicable     Activity Orders          Up ad sandra starting at 10/11 0002        Full Code    Jemal Gonzales MD  Neurocritical Care  [facility]    *Addendum*  Discussed stepdown to Internal Medicine with [facility]  at 1536PM via extension [phone]. Patient has been accepted and assigned to [facility].

## 2017-10-25 NOTE — PROGRESS NOTES
[facility]  Neurosurgery  Progress Note    Subjective:     History of Present Illness: Mr. 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     Patient was originally seen at [facility] on 10/5/17 due to worsening low back pain and inability to ambulate with decline in function over past 5 weeks. There, patient was noted to have elevated troponin so transferred to [facility] for Cardiology evaluation. Patient was diagnosed with NSTEMI and treated medically. Patient was found to have elevated INR and no stents could be placed.  His INR was attempted to be revered with 4U of FFP and vit K but patient went into flash pulm edema.  At this time, 2 D echo revealed severe aortic stenosis with preserved EF and Cardiology evaluated patient and did not feel LHC warranted at this time but recommended outpatient follow-up with his regular Cardiologist and likely need for outpatient LHC to evaluate his aortic stenosis and coronary anatomy.      During this admission, patient was having delirium and developed aspiration PNA (placed on IV Zosyn). Additionally, pt was evaluated by NSx who felt he was not a candidate for surgery. Patient was seen and evaluated by Neurosurgery at [place] (Dr. James) who noted Ct scan of lumbar spine showed moderate to severe acquired spinal stenosis and bilateral foraminal encroachment at L3-L4. Patient is here for second opionion from NSx.    Post-Op  Info:  Procedure(s) (LRB):  L2-L4 spinal fusion, . L3-L4 Interbody fusion, L2-L3 laminectomy (N/A)   5 Days Post-Op     Interval History: symptomatic hypoglycemic episode overnight     Medications:  Continuous Infusions:   Scheduled Meds:   sodium chloride 0.9%   Intravenous Once    sodium chloride 0.9%   Intravenous Once    sodium chloride 0.9%   Intravenous Once    atorvastatin  80 mg Oral Daily    heparin (porcine)  5,000 Units Subcutaneous Q8H    insulin detemir  14 Units Subcutaneous QHS    metoprolol tartrate  25 mg Oral BID    midodrine  10 mg Oral TID    pantoprazole  40 mg Oral Nightly    senna-docusate 8.6-50 mg  1 tablet Oral BID    sertraline  100 mg Oral QHS    vit b cmplx 3-fa-vit c-biotin 1- mg-mg-mcg  1 tablet Oral Daily     PRN Meds:sodium chloride, sodium chloride, sodium chloride, sodium chloride 0.9%, sodium chloride 0.9%, sodium chloride 0.9%, sodium chloride 0.9%, acetaminophen, albuterol-ipratropium 2.5mg-0.5mg/3mL, [COMPLETED] calcium gluconate IVPB **AND** calcium gluconate IVPB, dextrose 50%, dextrose 50%, diazePAM, glucagon (human recombinant), glucose, glucose, hydrocodone-acetaminophen 10-325mg, insulin aspart, nitroGLYCERIN, ramelteon     Review of Systems  Objective:     Weight: 80 kg (176 lb 5.9 oz)  Body mass index is 28.47 kg/m².  Vital Signs (Most Recent):  Temp: 98.4 °F (36.9 °C) (10/25/17 1505)  Pulse: 84 (10/25/17 1505)  Resp: 16 (10/25/17 1505)  BP: (!) 149/111 (10/25/17 1505)  SpO2: 100 % (10/25/17 1505) Vital Signs (24h Range):  Temp:  [97.9 °F (36.6 °C)-98.9 °F (37.2 °C)] 98.4 °F (36.9 °C)  Pulse:  [72-96] 84  Resp:  [16-29] 16  SpO2:  [97 %-100 %] 100 %  BP: ()/() 149/111       Date 10/25/17 0700 - 10/26/17 0659   Shift 0449-88290529 1112-8364 7970-0659 24 Hour Total   I  N  T  A  K  E   P.O. 550   550    Shift Total  (mL/kg) 550  (6.9)   550  (6.9)   O  U  T  P  U  T   Urine  (mL/kg/hr) 60  (0.1)   60    Shift Total  (mL/kg) 60  (0.8)   60  (0.8)    Weight (kg) 80 80 80 80                        Hemodialysis AV Fistula Right forearm (Active)   Needle Size 15ga 10/24/2017 11:35 AM   Site Assessment Clean;Dry;Intact;No redness;No swelling;No warmth;No drainage 10/25/2017  3:05 PM   Patency Present 10/25/2017  3:05 PM   Status Deaccessed 10/25/2017  3:05 PM   Flows Good 10/24/2017 11:44 AM   Dressing Intervention Removed 10/24/2017 11:35 AM   Dressing Status Clean;Dry;Intact 10/25/2017  3:05 PM   Site Condition No complications 10/25/2017  3:05 PM   Dressing Open to air (None) 10/25/2017  3:05 PM   Drainage Description Serosanguineous 10/11/2017 11:22 AM       Physical Exam:  Vitals reviewed.    Constitutional: He appears well-developed and well-nourished.     Eyes: Pupils are equal, round, and reactive to light. EOM are normal.     Cardiovascular: Normal rate.     Abdominal: Soft.     Psych/Behavior: He is alert. He is oriented to person, place, and time. He has a normal mood and affect.     Neurological:        Cranial nerves: Cranial nerve(s) II, III, IV, V, VI, VII, VIII, IX, X, XI and XII are intact.   4/5 L EHL, otherwise 5/5 throughout  Resolving L2-3 distribution pain B/L       Significant Labs:    Recent Labs  Lab 10/24/17  0225 10/25/17  0129     105 141*     140 143   K 4.1  4.1 4.2     104 112*   CO2 26  26 22*   BUN 28*  28* 15   CREATININE 4.0*  4.0* 2.7*   CALCIUM 9.4  9.4 9.5   MG 1.8 1.8       Recent Labs  Lab 10/24/17  0225 10/25/17  0129   WBC 9.68 10.09   HGB 8.4* 8.1*   HCT 26.0* 25.3*    162       Recent Labs  Lab 10/25/17  0129   INR 1.1     Microbiology Results (last 7 days)     ** No results found for the last 168 hours. **        Recent Lab Results       10/25/17  1733 10/25/17  1225 10/25/17  0719 10/25/17  0610 10/25/17  0522      Immature Granulocytes          Immature Grans (Abs)          Albumin          Alkaline Phosphatase          Allens Test          ALT          Anion Gap          AST           Baso #          Basophil%          Total Bilirubin          Site          BUN, Bld          Calcium          Chloride          CO2          Creatinine          DelSys          Differential Method          eGFR if           eGFR if non           Eos #          Eosinophil%          Glucose          Gran #          Gran%          Hematocrit          Hemoglobin          Coumadin Monitoring INR          Lymph #          Lymph%          Magnesium          MCH          MCHC          MCV          Mode          Mono #          Mono%          MPV          nRBC          Phosphorus          Platelets          POC BE          POC HCO3          POC PCO2          POC PH          POC PO2          POC SATURATED O2          POC TCO2          POCT Glucose 152(H) 155(H) 118(H) 79 70     Potassium          Total Protein          Protime          RBC          RDW          Sample          Sodium          Sp02          WBC                      10/25/17  0510 10/25/17  0456 10/25/17  0129 10/24/17  2158      Immature Granulocytes   0.6(H)      Immature Grans (Abs)   0.06(H)      Albumin   2.2(L)      Alkaline Phosphatase   119      Allens Test N/A        ALT   5(L)      Anion Gap   9      AST   35      Baso #   0.01      Basophil%   0.1      Total Bilirubin   0.4  Comment:  For infants and newborns, interpretation of results should be based  on gestational age, weight and in agreement with clinical  observations.  Premature Infant recommended reference ranges:  Up to 24 hours.............<8.0 mg/dL  Up to 48 hours............<12.0 mg/dL  3-5 days..................<15.0 mg/dL  6-29 days.................<15.0 mg/dL        Site LR        BUN, Bld   15      Calcium   9.5      Chloride   112(H)      CO2   22(L)      Creatinine   2.7(H)      DelSys Room Air        Differential Method   Automated      eGFR if    23.8(A)      eGFR if non    20.6  Comment:  Calculation used to obtain  the estimated glomerular filtration  rate (eGFR) is the CKD-EPI equation. Since race is unknown   in our information system, the eGFR values for   -American and Non--American patients are given   for each creatinine result.  (A)      Eos #   0.1      Eosinophil%   0.7      Glucose   141(H)      Gran #   8.5(H)      Gran%   83.9(H)      Hematocrit   25.3(L)      Hemoglobin   8.1(L)      Coumadin Monitoring INR   1.1  Comment:  Coumadin Therapy:  2.0 - 3.0 for INR for all indicators except mechanical heart valves  and antiphospholipid syndromes which should use 2.5 - 3.5.        Lymph #   0.9(L)      Lymph%   8.7(L)      Magnesium   1.8      MCH   32.9(H)      MCHC   32.0      MCV   103(H)      Mode SPONT        Mono #   0.6      Mono%   6.0      MPV   11.1      nRBC   0      Phosphorus   2.4(L)      Platelets   162      POC BE -1        POC HCO3 23.1(L)        POC PCO2 34.2(L)        POC PH 7.437        POC PO2 81        POC SATURATED O2 96        POC TCO2 24        POCT Glucose  61(L)  225(H)     Potassium   4.2  Comment:  *Slightly Hemolyzed      Total Protein   5.8(L)      Protime   11.2      RBC   2.46(L)      RDW   19.1(H)      Sample ARTERIAL        Sodium   143      Sp02 100        WBC   10.09          All pertinent labs from the last 24 hours have been reviewed.    Significant Diagnostics:  CT: No results found in the last 24 hours.  MRI: No results found in the last 24 hours.  I have reviewed all pertinent imaging results/findings within the past 24 hours.    Assessment/Plan:     Lumbar back pain with radiculopathy affecting right lower extremity    86 y/o M with multiple active medical comorbidities, including recent NSTEMI, severe aortic stenosis, ESRD with HD MWF, and aspiration pneumonia s/p L3-5 TLIF    Neuro stable  Cont neuro checks  CV- goal normotension.   Okay to resume ASA today  Pulm- on RA.  FENGI- goal eunatremia, ADAT.   Heme/ID- afebrile. Transfuse towards goal of Hb 8  ppx-  BRAIN/SCD's/SQH, sqh. Gi ppx.     dispo-  Accepted for TTF w/Hospital medicine primary            Shirlene Selby MD  Neurosurgery  [facility]

## 2017-10-26 NOTE — PROGRESS NOTES
[facility]  Neurosurgery  Progress Note    Subjective:     History of Present Illness: Mr. 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     Patient was originally seen at [facility] on 10/5/17 due to worsening low back pain and inability to ambulate with decline in function over past 5 weeks. There, patient was noted to have elevated troponin so transferred to [facility] for Cardiology evaluation. Patient was diagnosed with NSTEMI and treated medically. Patient was found to have elevated INR and no stents could be placed.  His INR was attempted to be revered with 4U of FFP and vit K but patient went into flash pulm edema.  At this time, 2 D echo revealed severe aortic stenosis with preserved EF and Cardiology evaluated patient and did not feel LHC warranted at this time but recommended outpatient follow-up with his regular Cardiologist and likely need for outpatient LHC to evaluate his aortic stenosis and coronary anatomy.      During this admission, patient was having delirium and developed aspiration PNA (placed on IV Zosyn). Additionally, pt was evaluated by NSx who felt he was not a candidate for surgery. Patient was seen and evaluated by Neurosurgery at [place] (Dr. James) who noted Ct scan of lumbar spine showed moderate to severe acquired spinal stenosis and bilateral foraminal encroachment at L3-L4. Patient is here for second opionion from NSx.    Post-Op  Info:  Procedure(s) (LRB):  L2-L4 spinal fusion, . L3-L4 Interbody fusion, L2-L3 laminectomy (N/A)   6 Days Post-Op     Interval History: no AE. AFVSS. OOB, +ambulate. +void/BM.     Medications:  Continuous Infusions:   Scheduled Meds:   sodium chloride 0.9%   Intravenous Once    sodium chloride 0.9%   Intravenous Once    atorvastatin  80 mg Oral Daily    heparin (porcine)  5,000 Units Subcutaneous Q8H    insulin detemir  14 Units Subcutaneous QHS    metoprolol tartrate  25 mg Oral BID    midodrine  10 mg Oral TID    pantoprazole  40 mg Oral Nightly    senna-docusate 8.6-50 mg  1 tablet Oral BID    sertraline  100 mg Oral QHS    vit b cmplx 3-fa-vit c-biotin 1- mg-mg-mcg  1 tablet Oral Daily     PRN Meds:sodium chloride 0.9%, acetaminophen, albuterol-ipratropium 2.5mg-0.5mg/3mL, dextrose 50%, dextrose 50%, diazePAM, glucagon (human recombinant), glucose, glucose, hydrocodone-acetaminophen 10-325mg, insulin aspart, ramelteon     Review of Systems  Objective:     Weight: 80 kg (176 lb 5.9 oz)  Body mass index is 28.47 kg/m².  Vital Signs (Most Recent):  Temp: 98.4 °F (36.9 °C) (10/25/17 2300)  Pulse: 94 (10/26/17 0705)  Resp: 18 (10/26/17 0705)  BP: (!) 99/54 (10/26/17 0705)  SpO2: 97 % (10/26/17 0705) Vital Signs (24h Range):  Temp:  [97.8 °F (36.6 °C)-98.4 °F (36.9 °C)] 98.4 °F (36.9 °C)  Pulse:  [] 94  Resp:  [16-37] 18  SpO2:  [91 %-100 %] 97 %  BP: ()/() 99/54       Date 10/26/17 0700 - 10/27/17 0659   Shift 9210-6499 4706-1467 8979-0173 24 Hour Total   I  N  T  A  K  E   I.V.  (mL/kg) 0  (0)   0  (0)    Shift Total  (mL/kg) 0  (0)   0  (0)   O  U  T  P  U  T   Shift Total  (mL/kg)       Weight (kg) 80 80 80 80                        Hemodialysis AV Fistula Right forearm (Active)   Needle Size 15ga 10/24/2017 11:35 AM   Site Assessment Clean;Dry;Intact;No redness;No swelling;No warmth;No drainage 10/26/2017  7:05 AM   Patency Present 10/26/2017  7:05 AM   Status Deaccessed  10/26/2017  7:05 AM   Flows Good 10/24/2017 11:44 AM   Dressing Intervention Removed 10/24/2017 11:35 AM   Dressing Status Clean;Dry;Intact 10/26/2017  7:05 AM   Site Condition No complications 10/26/2017  7:05 AM   Dressing Open to air (None) 10/26/2017  7:05 AM   Drainage Description Serosanguineous 10/11/2017 11:22 AM       Physical Exam:    Constitutional: He appears well-developed.     Cardiovascular: Normal rate and regular rhythm.     Abdominal: Soft.     Psych/Behavior: He is alert. He is oriented to person, place, and time. He has a normal mood and affect.     Neurological:   AAOx3, NAD, speech fluent  PERRL, EOMI FS TM  BUCIO 5/5  SILT  No drift.   Inc- c/d/i       Significant Labs:    Recent Labs  Lab 10/25/17  0129 10/26/17  0337 10/26/17  0615   * 109  109 114*    141  141 142   K 4.2 4.3  4.3 4.2   * 109  109 109   CO2 22* 21*  21* 25   BUN 15 21  21 21   CREATININE 2.7* 3.1*  3.1* 3.0*   CALCIUM 9.5 9.7  9.7 9.5   MG 1.8 1.8  --        Recent Labs  Lab 10/25/17  0129 10/26/17  0337   WBC 10.09 9.39   HGB 8.1* 8.8*   HCT 25.3* 28.6*    168       Recent Labs  Lab 10/25/17  0129 10/26/17  0337   INR 1.1 1.0     Microbiology Results (last 7 days)     ** No results found for the last 168 hours. **        ABGs:   Recent Labs  Lab 10/25/17  0510   PH 7.437   PCO2 34.2*   PO2 81   HCO3 23.1*   POCSATURATED 96   BE -1     Cardiac markers: No results for input(s): CKMB, CPKMB, TROPONINT, TROPONINI, MYOGLOBIN in the last 48 hours.  CMP:   Recent Labs  Lab 10/25/17  0129 10/26/17  0337 10/26/17  0615   * 109  109 114*   CALCIUM 9.5 9.7  9.7 9.5   ALBUMIN 2.2* 2.4*  2.4* 2.3*   PROT 5.8* 6.0  --     141  141 142   K 4.2 4.3  4.3 4.2   CO2 22* 21*  21* 25   * 109  109 109   BUN 15 21  21 21   CREATININE 2.7* 3.1*  3.1* 3.0*   ALKPHOS 119 119  --    ALT 5* 8*  --    AST 35 33  --    BILITOT 0.4 0.5  --      CRP: No results for input(s): CRP in the last 48  hours.  ESR: No results for input(s): POCESR, ERYTHROCYTES in the last 48 hours.  LFTs:   Recent Labs  Lab 10/25/17  0129 10/26/17  0337 10/26/17  0615   ALT 5* 8*  --    AST 35 33  --    ALKPHOS 119 119  --    BILITOT 0.4 0.5  --    PROT 5.8* 6.0  --    ALBUMIN 2.2* 2.4*  2.4* 2.3*     Procalcitonin: No results for input(s): PROCAL in the last 48 hours.  Recent Lab Results       10/26/17  0615 10/26/17  0337 10/26/17  0152 10/25/17  2113 10/25/17  1733      Immature Granulocytes  0.6(H)        Immature Grans (Abs)  0.06(H)        Albumin 2.3(L) 2.4(L)          2.4(L)        Alkaline Phosphatase  119        ALT  8(L)        Anion Gap 8 11          11        AST  33        Baso #  0.03        Basophil%  0.3        Total Bilirubin  0.5  Comment:  For infants and newborns, interpretation of results should be based  on gestational age, weight and in agreement with clinical  observations.  Premature Infant recommended reference ranges:  Up to 24 hours.............<8.0 mg/dL  Up to 48 hours............<12.0 mg/dL  3-5 days..................<15.0 mg/dL  6-29 days.................<15.0 mg/dL          BUN, Bld 21 21          21        Calcium 9.5 9.7          9.7        Chloride 109 109          109        CO2 25 21(L)          21(L)        Creatinine 3.0(H) 3.1(H)          3.1(H)        Differential Method  Automated        eGFR if  20.9(A) 20.1(A)          20.1(A)        eGFR if non  18.1  Comment:  Calculation used to obtain the estimated glomerular filtration  rate (eGFR) is the CKD-EPI equation. Since race is unknown   in our information system, the eGFR values for   -American and Non--American patients are given   for each creatinine result.  (A) 17.4  Comment:  Calculation used to obtain the estimated glomerular filtration  rate (eGFR) is the CKD-EPI equation. Since race is unknown   in our information system, the eGFR values for   -American and  Non--American patients are given   for each creatinine result.  (A)          17.4  Comment:  Calculation used to obtain the estimated glomerular filtration  rate (eGFR) is the CKD-EPI equation. Since race is unknown   in our information system, the eGFR values for   -American and Non--American patients are given   for each creatinine result.  (A)        Eos #  0.2        Eosinophil%  2.0        Glucose 114(H) 109          109        Gran #  7.4        Gran%  79.2(H)        Group & Rh  O NEG        Hematocrit  28.6(L)        Hemoglobin  8.8(L)        INDIRECT CLEO  NEG        Coumadin Monitoring INR  1.0  Comment:  Coumadin Therapy:  2.0 - 3.0 for INR for all indicators except mechanical heart valves  and antiphospholipid syndromes which should use 2.5 - 3.5.          Lymph #  1.1        Lymph%  11.8(L)        Magnesium  1.8        MCH  32.0(H)        MCHC  30.8(L)        MCV  104(H)        Mono #  0.6        Mono%  6.1        MPV  10.8        nRBC  0        Phosphorus 2.6(L) 2.6(L)          2.6(L)        Platelets  168        POCT Glucose   123(H) 292(H) 152(H)     Potassium 4.2 4.3          4.3        Total Protein  6.0        Protime  10.7        RBC  2.75(L)        RDW  19.3(H)        Sodium 142 141          141        WBC  9.39                    10/25/17  1225      Immature Granulocytes      Immature Grans (Abs)      Albumin      Alkaline Phosphatase      ALT      Anion Gap      AST      Baso #      Basophil%      Total Bilirubin      BUN, Bld      Calcium      Chloride      CO2      Creatinine      Differential Method      eGFR if       eGFR if non       Eos #      Eosinophil%      Glucose      Gran #      Gran%      Group & Rh      Hematocrit      Hemoglobin      INDIRECT CLEO      Coumadin Monitoring INR      Lymph #      Lymph%      Magnesium      MCH      MCHC      MCV      Mono #      Mono%      MPV      nRBC      Phosphorus      Platelets      POCT  Glucose 155(H)     Potassium      Total Protein      Protime      RBC      RDW      Sodium      WBC          All pertinent labs from the last 24 hours have been reviewed.    Significant Diagnostics:      Assessment/Plan:     Lumbar back pain with radiculopathy affecting right lower extremity    84 y/o M with multiple active medical comorbidities, including recent NSTEMI, severe aortic stenosis, ESRD with HD MWF, and aspiration pneumonia s/p L3-5 TLIF POD#6    Neuro stable  Cont neuro checks  CV- goal normotension  Pulm- on RA.   FENGI- goal eunatremia, ADAT.   Renal- ESRD. MWF dialysis.   Heme/ID- afebrile. Hgb/hct stable 8.8/28.6 (8.1/25.8). Pt needs to resume Coumadin POD#10 & needs coumadin clinic follow up.   ppx- BRAIN/SCD's, sqh. Gi ppx.     dispo-  Accepted for TTF w/ Hospital medicine primary. Pending placement Rehab. Ok to go to rehab from [facility] if possible for arrangements today.             Willie Hoang MD  Neurosurgery  [facility]

## 2017-10-26 NOTE — PLAN OF CARE
Problem: SLP Goal  Goal: SLP Goal  Speech Therapy Short Term Goals  Goals expected to be met by 11/1:  1. Pt will tolerate pudding thick liquids and dental soft diet with no overt s/s aspiration.   2. Given clinician model, pt will complete dysphagia exercises x10 each in order to strengthen the swallowing musculature.   3. Pt will participate in repeat MBSS when deemed appropriate by SLP/ MD.   4. Pt will participate in ongoing assessment of immediate memory skills, cognitive flexibility skills (including category exclusion and inclusion, sequencing, reverse recall, compare/contrast), higher level naming skills (including divergent categorization, word deductions, word relations), and visual spatial skills, in order to determine if further cognitive-linguistic therapy warranted.         Outcome: Ongoing (interventions implemented as appropriate)  Pt participated well.  Goals remain appropriate.  Cont ST per POC.    Gissel Mcdaniel CCC-SLP  10/26/2017

## 2017-10-26 NOTE — PLAN OF CARE
"Notified NCC Team MD repeated requests asking nurse "please kill me, if you do anything today please kill me." BS WNL and VS WNL. No new orders at this time. Will continue to monitor.    "

## 2017-10-26 NOTE — DISCHARGE INSTRUCTIONS
Please follow ONLY the instructions that are checked below.    Activity Restrictions:  [x]  Return to work will be determined on an individual basis.  [x]  No lifting greater than 10 pounds.  [x]  Avoid bending and twisting the area of your surgery more than 45 degrees from neutral position in any direction.  [x]  No driving or operating machinery:  [x]  until cleared by your surgeon.  [x]  while taking narcotic pain medications or muscle relaxants.  []  No cervical collar, soft collar, or lumbar brace required.  []  Wear your brace at all times. You may be given an extra brace or soft collar to wear when showering.  []  Wear your brace at all times except when flat in bed.  []  Wear brace for comfort only.  [x]  Increase ambulation over the next 2 weeks so that you are walking 2 miles per day at 2 weeks post-operatively.  [x]  Walk on paved surfaces only. It is okay to walk up and down stairs while holding onto a side rail.  [x]  No sexual activity for 2-3 weeks.    Discharge Medication/Follow-up:  [x]  Please refer to discharge medication reconciliation form.  [x]  Do not take ANY non-steroidal anti-inflammatory drugs (NSAIDS), including the following: ibuprofen, naprosyn, Aleve, Advil, Indocin, Mobic, or Celebrex for:  []  4 weeks  []  8 weeks  []  6 months  []  Prescriptions for appropriate medication will be given upon discharge.   []  Pain control:             []  Muscle relaxer:            [x]  Take docusate (Colace 100 mg): take one capsule a day as needed for constipation. You can get this over the counter.  [x]  Follow-up appointment:  [x]  10-14 days post-op for wound check by physician assistant/nurse (no later than 11/3/2017)  []  4-6 weeks with MD:  []  with x-rays  []  without x-rays  []  An appointment will be mailed to you.    Wound Care:  []  Remove dressing or bandaid in    days.  [x]  No bandage required. Keep your incision open to the air.  [x]  You may shower on the 2nd day after your surgery.  Have the force of water hit you opposite from the incision. Pat the incision dry after your shower; do not scrub the incision.  []  You cannot take a bath until 8 weeks after surgery.  []  Apply bacitracin to incision twice a day for    more days.    Call your doctor or go to the Emergency Room for any signs of infection, including: increased redness, drainage, pain, or fever (temperature ?101.5 for 24 hours). Call your doctor or go to the Emergency Room if there are any localized neurological changes; problems with speech, vision, numbness, tingling, weakness, or severe headache; or for other concerns.    Special Instructions:  [x]  No use of tobacco products.  [x]  Diet: Please eat a regular diet as tolerated.  []  Other diet:              Specific physician instructions:           Physicians need 3 days' notice for pain medicine to be refilled. Pain medicine will only be refilled between 8 AM and 5 PM, Monday through Friday, due to Food and Drug Administration regulation of documentation.    If you have any questions about this form, please call [phone].    Form No. 39765 (Revised 10/31/2013)

## 2017-10-26 NOTE — PROGRESS NOTES
HD treatment completed 2L of fluid removed pt tolerated well. Both lumens of a RFA fistula removed and pressure held until hemostasis achieved dressing applied. Report given to HECTOR Polanco .

## 2017-10-26 NOTE — PLAN OF CARE
Problem: Physical Therapy Goal  Goal: Physical Therapy Goal  Revised Goals to be met by: 10/31/2017    Patient will increase functional independence with mobility by performin. Supine to sit with Minimum Assistance  2. Sit to supine with Minimum Assistance  3. Sit to stand transfer with Minimum Assistance using AD or No AD MET  Revised: Sit to stand transfer with Davi By Assistance using RW.   4. Bed to chair transfer with Minimum Assistance using AD or No AD  5. Gait x50 feet with Minimum Assistance using AD or No AD  6. Pt will stand for 1 minute with RW usage and Contact Guard Assistance.  7. Pt will perform (B) LE therapeutic exercise x 20 reps to assist c/ improving muscular strength and endurance.       Outcome: Ongoing (interventions implemented as appropriate)  One goal met and revised on today. Goals remain appropriate.    Cheli Stewart, PT, DPT  10/26/2017

## 2017-10-26 NOTE — PLAN OF CARE
Notified by nursing staff of patient's reported suicidal ideations. Upon interview with the patient he states that he has no intention of harming himself or harming others. He states that he was tired of being in the same position and took drastic measures to get the nurse to move him. His family was seated next to him and confirmed the above.

## 2017-10-26 NOTE — SUBJECTIVE & OBJECTIVE
Interval History: no AE. AFVSS. OOB, +ambulate. +void/BM.     Medications:  Continuous Infusions:   Scheduled Meds:   sodium chloride 0.9%   Intravenous Once    sodium chloride 0.9%   Intravenous Once    atorvastatin  80 mg Oral Daily    heparin (porcine)  5,000 Units Subcutaneous Q8H    insulin detemir  14 Units Subcutaneous QHS    metoprolol tartrate  25 mg Oral BID    midodrine  10 mg Oral TID    pantoprazole  40 mg Oral Nightly    senna-docusate 8.6-50 mg  1 tablet Oral BID    sertraline  100 mg Oral QHS    vit b cmplx 3-fa-vit c-biotin 1- mg-mg-mcg  1 tablet Oral Daily     PRN Meds:sodium chloride 0.9%, acetaminophen, albuterol-ipratropium 2.5mg-0.5mg/3mL, dextrose 50%, dextrose 50%, diazePAM, glucagon (human recombinant), glucose, glucose, hydrocodone-acetaminophen 10-325mg, insulin aspart, ramelteon     Review of Systems  Objective:     Weight: 80 kg (176 lb 5.9 oz)  Body mass index is 28.47 kg/m².  Vital Signs (Most Recent):  Temp: 98.4 °F (36.9 °C) (10/25/17 2300)  Pulse: 94 (10/26/17 0705)  Resp: 18 (10/26/17 0705)  BP: (!) 99/54 (10/26/17 0705)  SpO2: 97 % (10/26/17 0705) Vital Signs (24h Range):  Temp:  [97.8 °F (36.6 °C)-98.4 °F (36.9 °C)] 98.4 °F (36.9 °C)  Pulse:  [] 94  Resp:  [16-37] 18  SpO2:  [91 %-100 %] 97 %  BP: ()/() 99/54       Date 10/26/17 0700 - 10/27/17 0659   Shift 6697-2270 0631-4148 3878-5638 24 Hour Total   I  N  T  A  K  E   I.V.  (mL/kg) 0  (0)   0  (0)    Shift Total  (mL/kg) 0  (0)   0  (0)   O  U  T  P  U  T   Shift Total  (mL/kg)       Weight (kg) 80 80 80 80                        Hemodialysis AV Fistula Right forearm (Active)   Needle Size 15ga 10/24/2017 11:35 AM   Site Assessment Clean;Dry;Intact;No redness;No swelling;No warmth;No drainage 10/26/2017  7:05 AM   Patency Present 10/26/2017  7:05 AM   Status Deaccessed 10/26/2017  7:05 AM   Flows Good 10/24/2017 11:44 AM   Dressing Intervention Removed 10/24/2017 11:35 AM   Dressing Status  Clean;Dry;Intact 10/26/2017  7:05 AM   Site Condition No complications 10/26/2017  7:05 AM   Dressing Open to air (None) 10/26/2017  7:05 AM   Drainage Description Serosanguineous 10/11/2017 11:22 AM       Physical Exam:    Constitutional: He appears well-developed.     Cardiovascular: Normal rate and regular rhythm.     Abdominal: Soft.     Psych/Behavior: He is alert. He is oriented to person, place, and time. He has a normal mood and affect.     Neurological:   AAOx3, NAD, speech fluent  PERRL, EOMI FS TM  BUCIO 5/5  SILT  No drift.   Inc- c/d/i       Significant Labs:    Recent Labs  Lab 10/25/17  0129 10/26/17  0337 10/26/17  0615   * 109  109 114*    141  141 142   K 4.2 4.3  4.3 4.2   * 109  109 109   CO2 22* 21*  21* 25   BUN 15 21  21 21   CREATININE 2.7* 3.1*  3.1* 3.0*   CALCIUM 9.5 9.7  9.7 9.5   MG 1.8 1.8  --        Recent Labs  Lab 10/25/17  0129 10/26/17  0337   WBC 10.09 9.39   HGB 8.1* 8.8*   HCT 25.3* 28.6*    168       Recent Labs  Lab 10/25/17  0129 10/26/17  0337   INR 1.1 1.0     Microbiology Results (last 7 days)     ** No results found for the last 168 hours. **        ABGs:   Recent Labs  Lab 10/25/17  0510   PH 7.437   PCO2 34.2*   PO2 81   HCO3 23.1*   POCSATURATED 96   BE -1     Cardiac markers: No results for input(s): CKMB, CPKMB, TROPONINT, TROPONINI, MYOGLOBIN in the last 48 hours.  CMP:   Recent Labs  Lab 10/25/17  0129 10/26/17  0337 10/26/17  0615   * 109  109 114*   CALCIUM 9.5 9.7  9.7 9.5   ALBUMIN 2.2* 2.4*  2.4* 2.3*   PROT 5.8* 6.0  --     141  141 142   K 4.2 4.3  4.3 4.2   CO2 22* 21*  21* 25   * 109  109 109   BUN 15 21  21 21   CREATININE 2.7* 3.1*  3.1* 3.0*   ALKPHOS 119 119  --    ALT 5* 8*  --    AST 35 33  --    BILITOT 0.4 0.5  --      CRP: No results for input(s): CRP in the last 48 hours.  ESR: No results for input(s): POCESR, ERYTHROCYTES in the last 48 hours.  LFTs:   Recent Labs  Lab 10/25/17  0129  10/26/17  0337 10/26/17  0615   ALT 5* 8*  --    AST 35 33  --    ALKPHOS 119 119  --    BILITOT 0.4 0.5  --    PROT 5.8* 6.0  --    ALBUMIN 2.2* 2.4*  2.4* 2.3*     Procalcitonin: No results for input(s): PROCAL in the last 48 hours.  Recent Lab Results       10/26/17  0615 10/26/17  0337 10/26/17  0152 10/25/17  2113 10/25/17  1733      Immature Granulocytes  0.6(H)        Immature Grans (Abs)  0.06(H)        Albumin 2.3(L) 2.4(L)          2.4(L)        Alkaline Phosphatase  119        ALT  8(L)        Anion Gap 8 11          11        AST  33        Baso #  0.03        Basophil%  0.3        Total Bilirubin  0.5  Comment:  For infants and newborns, interpretation of results should be based  on gestational age, weight and in agreement with clinical  observations.  Premature Infant recommended reference ranges:  Up to 24 hours.............<8.0 mg/dL  Up to 48 hours............<12.0 mg/dL  3-5 days..................<15.0 mg/dL  6-29 days.................<15.0 mg/dL          BUN, Bld 21 21          21        Calcium 9.5 9.7          9.7        Chloride 109 109          109        CO2 25 21(L)          21(L)        Creatinine 3.0(H) 3.1(H)          3.1(H)        Differential Method  Automated        eGFR if  20.9(A) 20.1(A)          20.1(A)        eGFR if non  18.1  Comment:  Calculation used to obtain the estimated glomerular filtration  rate (eGFR) is the CKD-EPI equation. Since race is unknown   in our information system, the eGFR values for   -American and Non--American patients are given   for each creatinine result.  (A) 17.4  Comment:  Calculation used to obtain the estimated glomerular filtration  rate (eGFR) is the CKD-EPI equation. Since race is unknown   in our information system, the eGFR values for   -American and Non--American patients are given   for each creatinine result.  (A)          17.4  Comment:  Calculation used to obtain the estimated  glomerular filtration  rate (eGFR) is the CKD-EPI equation. Since race is unknown   in our information system, the eGFR values for   -American and Non--American patients are given   for each creatinine result.  (A)        Eos #  0.2        Eosinophil%  2.0        Glucose 114(H) 109          109        Gran #  7.4        Gran%  79.2(H)        Group & Rh  O NEG        Hematocrit  28.6(L)        Hemoglobin  8.8(L)        INDIRECT CLEO  NEG        Coumadin Monitoring INR  1.0  Comment:  Coumadin Therapy:  2.0 - 3.0 for INR for all indicators except mechanical heart valves  and antiphospholipid syndromes which should use 2.5 - 3.5.          Lymph #  1.1        Lymph%  11.8(L)        Magnesium  1.8        MCH  32.0(H)        MCHC  30.8(L)        MCV  104(H)        Mono #  0.6        Mono%  6.1        MPV  10.8        nRBC  0        Phosphorus 2.6(L) 2.6(L)          2.6(L)        Platelets  168        POCT Glucose   123(H) 292(H) 152(H)     Potassium 4.2 4.3          4.3        Total Protein  6.0        Protime  10.7        RBC  2.75(L)        RDW  19.3(H)        Sodium 142 141          141        WBC  9.39                    10/25/17  1225      Immature Granulocytes      Immature Grans (Abs)      Albumin      Alkaline Phosphatase      ALT      Anion Gap      AST      Baso #      Basophil%      Total Bilirubin      BUN, Bld      Calcium      Chloride      CO2      Creatinine      Differential Method      eGFR if       eGFR if non       Eos #      Eosinophil%      Glucose      Gran #      Gran%      Group & Rh      Hematocrit      Hemoglobin      INDIRECT CLEO      Coumadin Monitoring INR      Lymph #      Lymph%      Magnesium      MCH      MCHC      MCV      Mono #      Mono%      MPV      nRBC      Phosphorus      Platelets      POCT Glucose 155(H)     Potassium      Total Protein      Protime      RBC      RDW      Sodium      WBC          All pertinent labs from the  last 24 hours have been reviewed.    Significant Diagnostics:

## 2017-10-26 NOTE — PLAN OF CARE
ICU Attending Note  Neurocritical Care    -midodrine, metoprolol  -HD per Nephrology, likely today  -heparin, pantoprazole prophylaxis  -discharge to SNF today or early tomorrow morning

## 2017-10-26 NOTE — PROGRESS NOTES
Report received from HECTOR Polanco. Pt arrived from floor AAOx4 accompanied by his daughter. Acute dialysis initiated via RFA fistula without difficulty.

## 2017-10-26 NOTE — ASSESSMENT & PLAN NOTE
86 y/o M with multiple active medical comorbidities, including recent NSTEMI, severe aortic stenosis, ESRD with HD MWF, and aspiration pneumonia s/p L3-5 TLIF POD#6    Neuro stable  Cont neuro checks  CV- goal normotension  Pulm- on RA.   FENGI- goal eunatremia, ADAT.   Renal- ESRD. MWF dialysis.   Heme/ID- afebrile. Hgb/hct stable 8.8/28.6 (8.1/25.8). Pt needs to resume Coumadin POD#10 & needs coumadin clinic follow up.   ppx- BRAIN/SCD's, sqh. Gi ppx.     dispo-  Accepted for TTF w/ Hospital medicine primary. Pending placement Rehab. Ok to go to rehab from [facility] if possible for arrangements today.

## 2017-10-26 NOTE — ASSESSMENT & PLAN NOTE
-nephrology following - last dialyzed 10/24 - tolerated without complication  -will dialyze 10/27 at rehab facility per nephrology

## 2017-10-26 NOTE — ASSESSMENT & PLAN NOTE
- hold home coumadin until post-operatively day 10 per neurosurgery  - aspirin held post-op  - metoprolol 25mg BID

## 2017-10-26 NOTE — PT/OT/SLP PROGRESS
"Speech Language Pathology  Treatment    Donta Cline   MRN: [id number]   Admitting Diagnosis: Lumbar stenosis    Diet recommendations: Solid Diet Level: Dental Soft  Liquid Diet Level: Pudding Thick Feed only when awake/alert, HOB to 90 degrees, Small bites/sips, 1 bite/sip at a time, Double swallow with each bite/sip, Meds crushed in puree and Assistance with thickening liquids    SLP Treatment Date: 10/26/17  Speech Start Time: 1105     Speech Stop Time: 1135     Speech Total (min): 30 min       TREATMENT BILLABLE MINUTES:  Speech Therapy Individual 15 and Treatment Swallowing Dysfunction 15    Has the patient been evaluated by SLP for swallowing? : Yes  Keep patient NPO?: No   General Precautions: Standard, aspiration, fall, pudding thick  Current Respiratory Status:  (Room air)       Subjective:  "Thats not thick enough?"  Pt's daughter asked when SLP indicated that bedside liquids were not adequately thickened to pudding consistency    Pain/Comfort  Pain Rating 1: 0/10  Pain Rating Post-Intervention 1: 0/10    Objective:   Patient found with: TLSO, telemetry, SCD, peripheral IV, pulse ox (continuous)    Pt was seen seated upright in a bedside chair upon SLP presentation.  He was noted w/ liquids not adequately thickened in a cup at bedside.  Education was provided to pt and daughter re: proper liquid thickening (1.5 packets of thickener to 4 oz liquid), risk of penetration/silent aspiration, s/s of penetration/aspiration, aspiration precautions and SLP POC.  They indicated fair understanding.  Pt completed dysphagia ex's x5 reps for glottal /g, k/, falsetto /i/, le maneuver, and effortful swallows given min cues.  He was offered and accepted tsp bites of pudding thick liquids x5 which he tolerated well using cued double swallows.      Pt completed word fluency tasks w/ recall of 10, 7, and 7 targets in 3 separate 60 sec tasks given min-mod cues. Pt noted w/ perseveration of targets from one category to " another requiring intermittent redirection.  Pt's daughter at bedside reported that he lived indep PTA w/ no known cognitive impairment.  Education was provided to pt and daughter re: benefit of cogntive-linguistic tx and importance of memory and safety for safe and indep return home and community.  They indicated good understanding.    FIM:                                 Assessment:  Donta Cline is a 85 y.o. male with a medical diagnosis of Lumbar stenosis and presents with dysphagia and cognitive-linguistic impairment.    Discharge recommendations: Discharge Facility/Level Of Care Needs: nursing facility, skilled     Goals:    SLP Goals        Problem: SLP Goal    Goal Priority Disciplines Outcome   SLP Goal     SLP Ongoing (interventions implemented as appropriate)   Description:  Speech Therapy Short Term Goals  Goals expected to be met by 11/1:  1. Pt will tolerate pudding thick liquids and dental soft diet with no overt s/s aspiration.   2. Given clinician model, pt will complete dysphagia exercises x10 each in order to strengthen the swallowing musculature.   3. Pt will participate in repeat MBSS when deemed appropriate by SLP/ MD.   4. Pt will participate in ongoing assessment of immediate memory skills, cognitive flexibility skills (including category exclusion and inclusion, sequencing, reverse recall, compare/contrast), higher level naming skills (including divergent categorization, word deductions, word relations), and visual spatial skills, in order to determine if further cognitive-linguistic therapy warranted.                           Plan:   Patient to be seen Therapy Frequency: 5 x/week   Plan of Care expires: 11/19/17  Plan of Care reviewed with: patient, daughter  SLP Follow-up?: Yes              Gissel Mcdaniel CCC-SLP  10/26/2017

## 2017-10-26 NOTE — ASSESSMENT & PLAN NOTE
After Visit Summary   10/11/2017    Farzana Hughes    MRN: [id number]           Patient Information     Date Of Birth          1957        Visit Information        Provider Department      10/11/2017 11:20 AM Oly Winston APRN CNP [facility]        Care Instructions          Thank you for choosing [facility] (formerly [facility]).  You may be receiving a survey in the mail from [facility] regarding your visit today.  Please take a few minutes to complete and return the survey to let us know how we are doing.      If you have questions or concerns, please contact us via Novitaz or you can contact your care team at [phone].    Our Clinic hours are:  Monday 6:40 am  to 7:00 pm  Tuesday -Friday 6:40 am to 5:00 pm    The [place] outpatient lab hours are:  Monday - Friday 6:10 am to 4:45 pm  Saturdays 7:00 am to 11:00 am  Appointments are required, call [phone]    If you have clinical questions after hours or would like to schedule an appointment,  call the clinic at [phone].          Follow-ups after your visit        Your next 10 appointments already scheduled     Oct 26, 2017  1:00 PM CDT   Return Visit with Jonh Calderon DPM   [facility] ([facility])    [address]  [facility] [place] [id number] [phone]            Apr 03, 2018  1:20 PM CDT   SHORT with DREW Gardner CNP   [facility]    [address] [address] [phone]              Who to contact     If you have questions or need follow up information about today's clinic visit or your schedule please contact [facility] directly at [phone].  Normal or non-critical lab and imaging results will be communicated to you by MyChart, letter or phone within 4 business days after the clinic has received the results. If you do not hear from us within 7 days, please contact  -POD 6 s/p L3-4 laminectomy 10/20  -NSGY following  -pain well controlled on Norco  q6 prn   -plan to discharge to [facility] tomorrow AM       the clinic through Exerscript or phone. If you have a critical or abnormal lab result, we will notify you by phone as soon as possible.  Submit refill requests through Aeromot or call your pharmacy and they will forward the refill request to us. Please allow 3 business days for your refill to be completed.          Additional Information About Your Visit        [employer] Information     [employer] gives you secure access to your electronic health record. If you see a primary care provider, you can also send messages to your care team and make appointments. If you have questions, please call your primary care clinic.  If you do not have a primary care provider, please call [phone] and they will assist you.        Care EveryWhere ID     This is your Care EveryWhere ID. This could be used by other organizations to access your [facility] medical records  [id number]        Your Vitals Were     Pulse Temperature Pulse Oximetry BMI (Body Mass Index)          82 98.2  F (36.8  C) (Tympanic) 97% 35.95 kg/m2         Blood Pressure from Last 3 Encounters:   10/11/17 111/73   09/11/17 127/80   08/29/17 117/71    Weight from Last 3 Encounters:   10/11/17 178 lb (80.7 kg)   09/11/17 177 lb 11.2 oz (80.6 kg)   08/29/17 174 lb (78.9 kg)              Today, you had the following     No orders found for display       Primary Care Provider Office Phone # Fax #    DREW Gardner [facility] [phone] [address] [address]        Equal Access to Services     MABLE ALBARADO AH: Hadii aad ku hadasho Soomaali, waaxda luqadaha, qaybta kaalmada adeegyada, ciarra weller . So [facility] [place] [facility] [phone].    ATENCIÓN: Si habla español, tiene a coughlin disposición servicios gratuitos de asistencia lingüística. Llame al [phone].    We comply with applicable federal civil rights laws and [place] laws. We do not discriminate on the basis of race, color, national origin, age, disability, sex, sexual  orientation, or gender identity.            Thank you!     Thank you for choosing [facility]  for your care. Our goal is always to provide you with excellent care. Hearing back from our patients is one way we can continue to improve our services. Please take a few minutes to complete the written survey that you may receive in the mail after your visit with us. Thank you!             Your Updated Medication List - Protect others around you: Learn how to safely use, store and throw away your medicines at www.disposemymeds.org.          This list is accurate as of: 10/11/17 11:29 AM.  Always use your most recent med list.                   Brand Name Dispense Instructions for use Diagnosis    * ACCU-CHEK ELLA Kit     1 kit    1 Device daily.    Family history of diabetes mellitus       * blood glucose monitoring meter device kit    no brand specified    1 kit    Use to test blood sugars two times daily or as directed.    Type 2 diabetes, diet controlled (H)       * blood glucose monitoring meter device kit     1 kit    Use to test blood sugars two times daily or as directed.    Type 2 diabetes, diet controlled (H)       albuterol 108 (90 BASE) MCG/ACT Inhaler    PROAIR HFA    1 Inhaler    Inhale 2 puffs into the lungs every 4 hours as needed    Cough, Acute bronchospasm       ASPIRIN NOT PRESCRIBED    INTENTIONAL    0 each    Antiplatelet medication not prescribed intentionally due to Allergy        atorvastatin 40 MG tablet    LIPITOR    90 tablet    Take 1 tablet (40 mg) by mouth daily    Hyperlipidemia LDL goal <130       beclomethasone 40 MCG/ACT Inhaler    QVAR    1 Inhaler    Inhale 2 puffs into the lungs 2 times daily    Chronic cough       * blood glucose monitoring test strip    DARLENE CONTOUR    1 Box    Use to test blood sugars two times daily or as directed.    Type 2 diabetes, diet controlled (H)       * blood glucose monitoring test strip    no brand specified    1 Box    180 strips by In  Vitro route 2 times daily    Type 2 diabetes, diet controlled (H)       cetirizine-psuedoePHEDrine 5-120 MG per 12 hr tablet    zyrTEC-D    90 tablet    Take 1 tablet by mouth 2 times daily    Moderate persistent asthma, uncomplicated       doxepin 10 MG capsule    SINEquan    90 capsule    Take 1 capsule (10 mg) by mouth At Bedtime    Excoriation, Itching       fluticasone 50 MCG/ACT spray    FLONASE    16 g    Spray 2 sprays into both nostrils daily    Moderate persistent asthma, uncomplicated       GABAPENTIN PO      Take 100 mg by mouth        Garlic 1000 MG Caps      1 tablet twice daily        levalbuterol 1.25 MG/3ML neb solution    XOPENEX    270 mL    Take 3 mLs (1.25 mg) by nebulization every 4 hours as needed    Seasonal allergies       lisinopril 20 MG tablet    PRINIVIL/ZESTRIL    45 tablet    Take 0.5 tablets (10 mg) by mouth daily    Hypertension goal BP (blood pressure) < 130/80       LORazepam 1 MG tablet    ATIVAN    2 tablet    Take 1 tablet 30 minutes prior to MRI. Take another 1/2-1 tablet just prior to procedure if needed.  Do not operate a vehicle after taking this medication    Cervicalgia, Migraine without aura and without status migrainosus, not intractable, Occipital pain       montelukast 10 MG tablet    SINGULAIR    90 tablet    TAKE ONE TABLET BY MOUTH AT BEDTIME    Moderate persistent asthma, uncomplicated       omeprazole 40 MG capsule    priLOSEC    90 capsule    TAKE 1 CAPSULE (40 MG) BY MOUTH DAILY. TAKE 30 TO 60 MINUTES BEFORE A MEAL    Gastroesophageal reflux disease without esophagitis       * ONE TOUCH LANCETS Misc     1 Box    90 Devices 2 times daily    Type 2 diabetes, diet controlled (H)       * blood glucose monitoring lancets     1 Box    Use to test blood sugar two times daily or as directed.    Type 2 diabetes, diet controlled (H)       order for DME     1 each    BP cuff, brand as covered by insurance.  Dx: HTN    Benign hypertension       * order for DME     1 Units     Crutches        * order for DME     1 Device    Trilok Ankle Brace    Sprain of right ankle, unspecified ligament, subsequent encounter       * order for DME     1 Units    Equipment being ordered: Hinged knee brace    Arthralgia of right lower leg, Knee injury, right, subsequent encounter       * order for DME     1 Device    CAM walker - short    Extensor tendinitis of foot, Right foot pain       * order for DME     1 Device    Hinged knee brace - medium    Knee injury, right, subsequent encounter, Patellofemoral stress syndrome of right knee, Chondromalacia of patella, right       scopolamine 1.5 MG patch 72 hr    TRANSDERM-SCOP    4 patch    Place onto the skin every 72 hours    Travel sickness, sequela       topiramate 25 MG tablet    TOPAMAX    120 tablet    Take 2 tablets (50 mg) by mouth 2 times daily    Migraine without aura and without status migrainosus, not intractable       * Notice:  This list has 12 medication(s) that are the same as other medications prescribed for you. Read the directions carefully, and ask your doctor or other care provider to review them with you.

## 2017-10-26 NOTE — PLAN OF CARE
[facility] that the orders for the facility transfer are in. She reviewed them and reported all they need will be tomorrow's MAR when he leaves and for the RN to call and report at 7:30am.  WAN advised by RN that the discharge orders are also in and she is working on making sure he gets HD today. She will report to night RN to report to day RN that she has to call at 7:30am tomorrow. WAN set up transportation with [employer] ([id number]) for tomorrow 8:00am.     WAN attempted to meet with family at bedside. Pt wife with Pt at HD. RN advised family is aware of transfer for tomorrow.     Urszula Flores, RHONDA  Neurocritical Care   [facility]  [id number]

## 2017-10-26 NOTE — PLAN OF CARE
[facility]    FACILITY TRANSFER ORDERS      Patient Name: Donta Cline  YOB: 1932    PCP: ZAID Richardson Iii, MD   PCP Address: [address] [place] [address] / [place] [address]  PCP Phone Number: [phone]  PCP Fax: [phone]    Encounter Date: 10/26/2017    Admit to: [facility]    Vital Signs:  Routine    Diagnoses:   Active Hospital Problems    Diagnosis  POA    *Lumbar stenosis [M48.061]  Yes    S/P spinal surgery [Z98.890]  Not Applicable    Anemia in chronic kidney disease, on chronic dialysis [N18.6, D63.1, Z99.2]  Not Applicable    Postprocedural hypotension [I95.81]  Unknown    Spinal stenosis of lumbar region without neurogenic claudication [M48.061]  Yes    Aspiration pneumonia [J69.0]  Yes    Lumbar back pain with radiculopathy affecting right lower extremity [M54.17]  Yes    Elevated troponin [R74.8]  Yes    NSTEMI (non-ST elevated myocardial infarction) [I21.4]  Yes    Chronic hip pain, right [M25.551, G89.29]  Yes    Long-term (current) use of anticoagulants [Z79.01]  Not Applicable    Paroxysmal atrial fibrillation [I48.0]  Yes    Severe aortic stenosis [I35.0]  Yes    ESRD (end stage renal disease) on dialysis [N18.6, Z99.2]  Not Applicable    Type 2 diabetes mellitus with chronic kidney disease on chronic dialysis, with long-term current use of insulin [E11.22, N18.6, Z99.2, Z79.4]  Not Applicable    Essential hypertension [I10]  Yes    Coronary artery disease involving native coronary artery of native heart without angina pectoris [I25.10]  Yes    Chronic diastolic heart failure [I50.32]  Yes     Chronic     3/13: AOSAT: 98, FICKCI: 2.41, FICKCO: 5.18  PAPRES: 34/16 (23), PASAT: 65, PVR: 1.74  PWPRES: 18/18 (14), RA PRES: 14/13 (12), RV: 40/0, 10        Resolved Hospital Problems    Diagnosis Date Resolved POA    BRBPR (bright red blood per rectum) [K62.5] 10/23/2017 Yes    Aspiration pneumonia [J69.0] 10/15/2017 Yes    Hypokalemia  [E87.6] 10/23/2017 Yes    Weakness generalized [R53.1] 10/23/2017 Yes    Hypercholesteremia [E78.00] 10/23/2017 Yes     Chronic       Allergies:  Review of patient's allergies indicates:   Allergen Reactions    Morphine Other (See Comments)     Other reaction(s): severe abdominal pain    Darvocet a500  [propoxyphene n-acetaminophen]      Other reaction(s): Unknown       Diet: regular diet    Activities: Activity as tolerated    Nursing: none     Labs: CBC and CMP Daily for 3 days     CONSULTS:    Physical Therapy to evaluate and treat. , Occupational Therapy to evaluate and treat. and Speech Therapy to evaluate and treat for Language, Swallowing and Cognition.    MISCELLANEOUS CARE:  Resume Dialysis Friday 10/27/17.   Resume Coumadin POD 10.  Follow up in 2 weeks with NSGY.    WOUND CARE ORDERS  None    Medications: Review discharge medications with patient and family and provide education.      Current Discharge Medication List      START taking these medications    Details   atorvastatin (LIPITOR) 80 MG tablet Take 1 tablet (80 mg total) by mouth once daily.  Qty: 90 tablet, Refills: 0      insulin detemir (LEVEMIR FLEXTOUCH) 100 unit/mL (3 mL) SubQ InPn pen Inject 14 Units into the skin every evening.  Qty: 4.2 mL, Refills: 0      metoprolol tartrate (LOPRESSOR) 25 MG tablet Take 1 tablet (25 mg total) by mouth 2 (two) times daily.  Qty: 60 tablet, Refills: 0      midodrine (PROAMATINE) 10 MG tablet Take 1 tablet (10 mg total) by mouth 3 (three) times daily.  Qty: 90 tablet, Refills: 0      ramelteon (ROZEREM) 8 mg tablet Take 1 tablet (8 mg total) by mouth nightly as needed for Insomnia.  Refills: 0      senna-docusate 8.6-50 mg (PERICOLACE) 8.6-50 mg per tablet Take 1 tablet by mouth 2 (two) times daily.         CONTINUE these medications which have CHANGED    Details   pantoprazole (PROTONIX) 40 MG tablet Take 1 tablet (40 mg total) by mouth nightly.  Qty: 30 tablet, Refills: 0      vit b cmplx 3-fa-vit  c-biotin 1- mg-mg-mcg (ANKIT-AZ RX) 1- mg-mg-mcg Tab Take 1 tablet by mouth once daily.  Refills: 0         CONTINUE these medications which have NOT CHANGED    Details   coenzyme Q10 200 mg capsule Take 200 mg by mouth once daily.      gabapentin (NEURONTIN) 300 MG capsule Take 300 mg by mouth every evening.      hydrocodone-acetaminophen 10-325mg (NORCO)  mg Tab Take 1 tablet by mouth every 6 (six) hours as needed for Pain.   Refills: 0      NITROSTAT 0.4 mg SL tablet place 1 tablet under the tongue if needed every 5 minutes for chest pain for 3 doses IF NO RELIEF AFTER 3RD DOSE CALL PRESCRIBER .  Qty: 100 tablet, Refills: 6      sertraline (ZOLOFT) 100 MG tablet Take 1 tablet by mouth every evening.  Refills: 0         STOP taking these medications       alprazolam (XANAX) 0.25 MG tablet Comments:   Reason for Stopping:         aspirin (ECOTRIN) 81 MG EC tablet Comments:   Reason for Stopping:         insulin glargine (LANTUS) 100 unit/mL injection Comments:   Reason for Stopping:         insulin lispro (HUMALOG) 100 unit/mL injection Comments:   Reason for Stopping:         metoprolol succinate (TOPROL-XL) 50 MG 24 hr tablet Comments:   Reason for Stopping:         rosuvastatin (CRESTOR) 40 MG Tab Comments:   Reason for Stopping:         warfarin (COUMADIN) 4 MG tablet Comments:   Reason for Stopping:         temazepam (RESTORIL) 30 mg capsule Comments:   Reason for Stopping:                    _________________________________  Aiyana Sanchez PA-C  10/26/2017

## 2017-10-26 NOTE — PLAN OF CARE
10/26/17 1529   Discharge Reassessment   Assessment Type Discharge Planning Reassessment   Provided patient/caregiver education on the expected discharge date and the discharge plan Yes   Do you have any problems affording any of your prescribed medications? No   Discharge Plan A Rehab   Discharge Plan B Rehab   Can the patient answer the patient profile reliably? Yes, cognitively intact   How does the patient rate their overall health at the present time? Fair   Describe the patient's ability to walk at the present time. Walks with the help of equipment   How often would a person be available to care for the patient? Whenever needed   Number of comorbid conditions (as recorded on the chart) Two   During the past month, has the patient often been bothered by feeling down, depressed or hopeless? Yes   During the past month, has the patient often been bothered by little interest or pleasure in doing things? Yes       Patient awaiting rehab placement tomorrow    Iris Daly RN, CCRN-K, [facility]  Neuro-Critical Care   [phone]

## 2017-10-26 NOTE — PLAN OF CARE
WAN advised by MD that the Pt may be ready today to go to rehab. SW will see if the Pt can be accepted over at [facility] today.    WAN spoke with Rafia with [facility] ([phone]) regarding this Pt transfer. Reviewed HD schedule. Pt should have HD today. That being the case, she asked if we could arrange the transfer for tomorrow am and place all the orders/set it up for him to leave today. The transport would be set up for 10am tomorrow morning if she can get the orders today and start everything on their end.     WAN advised MD team of the above.    WAN advised by MD team that Pt gets HD tomorrow. WAN contacted Rafia to see if she might take the Pt today due to Pt needing the HD tomorrow instead. They have 3 Pt already coming today and she has to make sure that the bed would be available and that they can add him to the schedule for the HD tomorrow. She will let SW know asap if they can take the Pt today.    WAN advised by Rafia that they cannot take the Pt today. But can have him arrive there at 9am if the orders are in and the RN calls to report in the am before he leaves. SW to set up transport and send her the orders. To advise the RN to report for the morning RN to call and report to: [phone].     WAN advised MD team. He will get HD today here since he is not leaving until tomorrow AM. Advised Rafia.     Urszula Flores, RHONDA  Neurocritical Care   [facility]  [phone]

## 2017-10-26 NOTE — SUBJECTIVE & OBJECTIVE
Interval History:  Overnight pt stated he wanted to harm himself per nurse. He states he only said that so the nurse would turn him in his bed. He denies suicidal thoughts, wanting to harm himself or others.     Review of Systems  Review of symptoms  Constitutional: Denies fevers or chills.  Pulmonary: Denies shortness of breath or cough.  Cardiology: Denies chest pain or palpitations.  GI: Denies abdominal pain or constipation.  Neurologic: Denies new weakness,  headache, or paresthesias.    Objective:     Vitals:  Temp: 98.5 °F (36.9 °C) (10/26/17 1105)  Pulse: (!) (P) 111 (10/26/17 1700)  Resp: 18 (10/26/17 1405)  BP: (P) 107/63 (10/26/17 1700)  SpO2: 98 % (10/26/17 1305)    Temp:  [97.8 °F (36.6 °C)-98.5 °F (36.9 °C)] 98.5 °F (36.9 °C)  Pulse:  [] (P) 111  Resp:  [18-38] 18  SpO2:  [91 %-100 %] 98 %  BP: ()/() (P) 107/63              10/25 0701 - 10/26 0700  In: 990 [P.O.:950; I.V.:40]  Out: 60 [Urine:60]    Physical Exam   Physical Exam:  GA: Alert, comfortable, no acute distress.   HEENT: No scleral icterus or JVD.   Pulmonary: Clear to auscultation A/P/L. No wheezing, crackles, or rhonchi.  Cardiac: RRR S1 & S2 w/o rubs/murmurs/gallops.   Abdominal: Bowel sounds present x 4. No appreciable hepatosplenomegaly.  Skin: No jaundice, rashes, or visible lesions.  Neuro:  --GCS: E4 V5 M6  --Mental Status: Awake, alert, oriented x3. Follows commands  --CN II-XII grossly intact.   --Pupils 3mm, PERRL.   --Corneal reflex, gag, cough intact.  --LUE strength: 5/5  --RUE strength: 5/5  --LLE strength: 5/5  --RLE strength: 5/5    Unable to test coordination, gait    Medications:  Continuous Scheduled  sodium chloride 0.9%  Once   sodium chloride 0.9%  Once   atorvastatin 80 mg Daily   heparin (porcine) 5,000 Units Q8H   insulin detemir 14 Units QHS   metoprolol tartrate 25 mg BID   midodrine 10 mg TID   pantoprazole 40 mg Nightly   phenylephrine HCl in 0.9% NaCl     senna-docusate 8.6-50 mg 1 tablet BID    sertraline 100 mg QHS   vit b cmplx 3-fa-vit c-biotin 1- mg-mg-mcg 1 tablet Daily   PRN  sodium chloride 0.9%  PRN   acetaminophen 650 mg Q8H PRN   albuterol-ipratropium 2.5mg-0.5mg/3mL 3 mL Q4H PRN   dextrose 50% 12.5 g PRN   dextrose 50% 25 g PRN   diazePAM 2 mg Q6H PRN   glucagon (human recombinant) 1 mg PRN   glucose 16 g PRN   glucose 24 g PRN   hydrocodone-acetaminophen 10-325mg 1 tablet Q6H PRN   insulin aspart 0-5 Units QID (AC + HS) PRN   ramelteon 8 mg Nightly PRN     Today I personally reviewed pertinent medications, lines/drains/airways, imaging, cardiology, lab results,

## 2017-10-26 NOTE — PT/OT/SLP PROGRESS
Occupational Therapy      Donta Cline  MRN: [id number]    Patient not seen today secondary to Unavailable (Comment). Pt out for dialysis when OT attempted this PM.   Will follow up.     PIYUSH Blevins  10/26/2017

## 2017-10-26 NOTE — PLAN OF CARE
Bed alarm set off. Upon entering the room daughter trying to get pt out of bed by herself. Notified pt and daughter pt is a fall risk and to notify RN when trying to get out of bed for safety. RN assist in putting back brace and pt ambulated to chair with assist RNx1.     Pt out of bed. Daughter at bedside. Pt seems to be in a better mood than earlier this morning. States he is ready to eat. No  complaints of pain at this time. Dietary called x2. Food will be delivered shortly.

## 2017-10-26 NOTE — PROGRESS NOTES
[facility]  Neurocritical Care  Progress Note    Admit Date: 10/10/2017  Service Date: 10/26/2017  Length of Stay: 16    Subjective:     Chief Complaint: Lumbar stenosis    History of Present Illness: Mr. Cline is a 85yoM with multiple active medical comorbidities, including recent NSTEMI with flash pulmonary edema, subsequent L heart cath, severe aortic stenosis, ESRD with HD MWF, T2DM, aspiration pneumonia, and previous back surgeries. Pt presents to [facility] s/p L3-4 laminectomy. Pt became hypotensive in OR requiring phenylephrine to maintain MAP > 70. Pt admitted to [facility] for higher level of care.     Hospital Course: 10/21: admit to [facility] s/p L3-4 laminectomy, on phenylephrine ggt, Cr 6.0, K 5.5, nephrology consulted, insulin, D50, and calcium gluconate admin  10/22: Patient weaned off phenylephrine drip this morning. Remains afebrile. Cr 6.5, K 5.3; Hg 6.8 and s/p 1u pRBC. Underwent hemodialysis and required phenylephrine drip at 0.5mcg/kg/min for several hours, weaned off overnight.  10/23: Midodrine increased to 10mg TID. Potassium 3.8 and creatinie 3.5 today. Hg 6.8 today and receiving 1u pRBC. Pain moderately well controlled and requiring Norco 10mg-325 q6. Remains afebrile.  10/24: Underwent dialysis  10/26: Discharge to [facility] rehab facility.       Interval History:  Overnight pt stated he wanted to harm himself per nurse. He states he only said that so the nurse would turn him in his bed. He denies suicidal thoughts, wanting to harm himself or others.     Review of Systems  Review of symptoms  Constitutional: Denies fevers or chills.  Pulmonary: Denies shortness of breath or cough.  Cardiology: Denies chest pain or palpitations.  GI: Denies abdominal pain or constipation.  Neurologic: Denies new weakness,  headache, or paresthesias.    Objective:     Vitals:  Temp: 98.5 °F (36.9 °C) (10/26/17 1105)  Pulse: (!) (P) 111 (10/26/17 1700)  Resp: 18 (10/26/17 1405)  BP: (P) 107/63 (10/26/17  1700)  SpO2: 98 % (10/26/17 1305)    Temp:  [97.8 °F (36.6 °C)-98.5 °F (36.9 °C)] 98.5 °F (36.9 °C)  Pulse:  [] (P) 111  Resp:  [18-38] 18  SpO2:  [91 %-100 %] 98 %  BP: ()/() (P) 107/63              10/25 0701 - 10/26 0700  In: 990 [P.O.:950; I.V.:40]  Out: 60 [Urine:60]    Physical Exam   Physical Exam:  GA: Alert, comfortable, no acute distress.   HEENT: No scleral icterus or JVD.   Pulmonary: Clear to auscultation A/P/L. No wheezing, crackles, or rhonchi.  Cardiac: RRR S1 & S2 w/o rubs/murmurs/gallops.   Abdominal: Bowel sounds present x 4. No appreciable hepatosplenomegaly.  Skin: No jaundice, rashes, or visible lesions.  Neuro:  --GCS: E4 V5 M6  --Mental Status: Awake, alert, oriented x3. Follows commands  --CN II-XII grossly intact.   --Pupils 3mm, PERRL.   --Corneal reflex, gag, cough intact.  --LUE strength: 5/5  --RUE strength: 5/5  --LLE strength: 5/5  --RLE strength: 5/5    Unable to test coordination, gait    Medications:  Continuous Scheduled  sodium chloride 0.9%  Once   sodium chloride 0.9%  Once   atorvastatin 80 mg Daily   heparin (porcine) 5,000 Units Q8H   insulin detemir 14 Units QHS   metoprolol tartrate 25 mg BID   midodrine 10 mg TID   pantoprazole 40 mg Nightly   phenylephrine HCl in 0.9% NaCl     senna-docusate 8.6-50 mg 1 tablet BID   sertraline 100 mg QHS   vit b cmplx 3-fa-vit c-biotin 1- mg-mg-mcg 1 tablet Daily   PRN  sodium chloride 0.9%  PRN   acetaminophen 650 mg Q8H PRN   albuterol-ipratropium 2.5mg-0.5mg/3mL 3 mL Q4H PRN   dextrose 50% 12.5 g PRN   dextrose 50% 25 g PRN   diazePAM 2 mg Q6H PRN   glucagon (human recombinant) 1 mg PRN   glucose 16 g PRN   glucose 24 g PRN   hydrocodone-acetaminophen 10-325mg 1 tablet Q6H PRN   insulin aspart 0-5 Units QID (AC + HS) PRN   ramelteon 8 mg Nightly PRN     Today I personally reviewed pertinent medications, lines/drains/airways, imaging, cardiology, lab results,    Assessment/Plan:     Neuro   Spinal stenosis of  lumbar region without neurogenic claudication    -POD 6 s/p L3-4 laminectomy 10/20  -NSGY following  -pain well controlled on Norco  q6 prn   -plan to discharge to [facility] facility tomorrow AM            Pulmonary   Aspiration pneumonia    -AF, no abx  -post op leukocytosis   -completed course of unasyn          Cardiac/Vascular   NSTEMI (non-ST elevated myocardial infarction)    - issue at OSH- plan for cath initially and never received due to issues with pulm edema  - RCA stenosis, low risk per cards can have outpatient LHC with intervention after discharge  - was on hep ggt - Holding heparin gtt post-op per neurosurgery  - aspirin 81mg daily held post op        Paroxysmal atrial fibrillation    - hold home coumadin until post-operatively day 10 per neurosurgery  - aspirin held post-op  - metoprolol 25mg BID        Severe aortic stenosis    -hx of        Essential hypertension    - metoprolol 25 mg BID  - midodrine 10 mg TID          Coronary artery disease involving native coronary artery of native heart without angina pectoris    Coronary stenosis > 50% on cardiac cath 10/16/17  atorvostatin 80 mg daily        Chronic diastolic heart failure    - most recent echo on 10/12/17 showed pulmonary hypertension with undetermined diastolic function          Renal/   ESRD (end stage renal disease) on dialysis    -nephrology following - last dialyzed 10/24 - tolerated without complication  -will dialyze 10/27 at rehab facility per nephrology           Endocrine   Type 2 diabetes mellitus with chronic kidney disease on chronic dialysis, with long-term current use of insulin    -SSI   -decrease long acting secondary to hypOglycemia  - detemir 14 u nightly            Prophylaxis:  Venous Thromboembolism: chemical  Stress Ulcer: PPI  Ventilator Pneumonia: not applicable     Activity Orders          Up ad sandra starting at 10/11 0002        Full Code    MIGEL RuizC  Neurocritical Care  [facility]  [place]

## 2017-10-26 NOTE — PLAN OF CARE
Problem: Patient Care Overview  Goal: Plan of Care Review  Outcome: Ongoing (interventions implemented as appropriate)  POC reviewed with pt at 0200. Pt verbalized understanding. Questions and concerns addressed. No acute events overnight. Pt progressing toward goals. Will continue to monitor. See flowsheets for full assessment and VS info

## 2017-10-27 NOTE — PLAN OF CARE
SW sent today's MAR to Rehab. Pt is leaving today at 8:00am.    Urszula Flores LMSW  Neurocritical Care   [facility]  [id number]

## 2017-10-27 NOTE — DISCHARGE SUMMARY
Discharge Summary  Critical Care    Admit Date: 10/10/2017    Discharge Date: 10/27/2017    LOS: 17    Principle Diagnosis: Lumbar stenosis    Secondary Diagnoses:   Active Hospital Problems    Diagnosis  POA    *Lumbar stenosis [M48.061]  Yes    S/P spinal surgery [Z98.890]  Not Applicable    Anemia in chronic kidney disease, on chronic dialysis [N18.6, D63.1, Z99.2]  Not Applicable    Postprocedural hypotension [I95.81]  Unknown    Spinal stenosis of lumbar region without neurogenic claudication [M48.061]  Yes    Aspiration pneumonia [J69.0]  Yes    Lumbar back pain with radiculopathy affecting right lower extremity [M54.17]  Yes    Elevated troponin [R74.8]  Yes    NSTEMI (non-ST elevated myocardial infarction) [I21.4]  Yes    Chronic hip pain, right [M25.551, G89.29]  Yes    Long-term (current) use of anticoagulants [Z79.01]  Not Applicable    Paroxysmal atrial fibrillation [I48.0]  Yes    Severe aortic stenosis [I35.0]  Yes    ESRD (end stage renal disease) on dialysis [N18.6, Z99.2]  Not Applicable    Type 2 diabetes mellitus with chronic kidney disease on chronic dialysis, with long-term current use of insulin [E11.22, N18.6, Z99.2, Z79.4]  Not Applicable    Essential hypertension [I10]  Yes    Coronary artery disease involving native coronary artery of native heart without angina pectoris [I25.10]  Yes    Chronic diastolic heart failure [I50.32]  Yes     Chronic     3/13: AOSAT: 98, FICKCI: 2.41, FICKCO: 5.18  PAPRES: 34/16 (23), PASAT: 65, PVR: 1.74  PWPRES: 18/18 (14), RA PRES: 14/13 (12), RV: 40/0, 10        Resolved Hospital Problems    Diagnosis Date Resolved POA    BRBPR (bright red blood per rectum) [K62.5] 10/23/2017 Yes    Aspiration pneumonia [J69.0] 10/15/2017 Yes    Hypokalemia [E87.6] 10/23/2017 Yes    Weakness generalized [R53.1] 10/23/2017 Yes    Hypercholesteremia [E78.00] 10/23/2017 Yes     Chronic        HPI:  Mr. Cline is a 85yoM with multiple active medical  comorbidities, including recent NSTEMI with flash pulmonary edema, subsequent L heart cath, severe aortic stenosis, ESRD with HD MWF, T2DM, aspiration pneumonia, and previous back surgeries. Pt presents to [facility] s/p L3-4 laminectomy. Pt became hypotensive in OR requiring phenylephrine to maintain MAP > 70. Pt admitted to [facility] for higher level of care.     Hospital/ICU Course:  10/21: admit to [facility] s/p L3-4 laminectomy, on phenylephrine ggt, Cr 6.0, K 5.5, nephrology consulted, insulin, D50, and calcium gluconate admin  10/22: Patient weaned off phenylephrine drip this morning. Remains afebrile. Cr 6.5, K 5.3; Hg 6.8 and s/p 1u pRBC. Underwent hemodialysis and required phenylephrine drip at 0.5mcg/kg/min for several hours, weaned off overnight.  10/23: Midodrine increased to 10mg TID. Potassium 3.8 and creatinie 3.5 today. Hg 6.8 today and receiving 1u pRBC. Pain moderately well controlled and requiring Norco 10mg-325 q6. Remains afebrile.  10/24: Underwent dialysis  10/26: Discharge to [facility] rehab facility.       Significant Imaging:  Imaging Results          X-Ray Lumbar Spine Ap And Lateral (Final result)  Result time 10/23/17 14:11:26    Final result by Jhony Ramos III, MD (10/23/17 14:11:26)                 Impression:     Chronic change.      Electronically signed by: JHONY RAMOS MD  Date:     10/23/17  Time:    14:11              Narrative:    2 views: There is postoperative change the lumbar spine, right double-J ureteric stent, and a left DARIAN in place good alignment no complication.  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  No hardware failure is seen.  No fracture dislocation bone destruction seen.                        MRI Lumbar Spine Without Contrast (Final result)  Result time 10/12/17 03:01:06    Final result by Don Reno MD (10/12/17 03:01:06)                 Impression:        Suspected postsurgical changes of L3-L5 laminectomies.    Multilevel degenerative  changes most pronounced at L3-L4 noting 6 mm of anterolisthesis of L3 on L4, a large circumferential disc bulge with a large superimposed right paracentral disc extrusion and severe bilateral facet hypertrophy contributing to near-complete obliteration of the spinal canal and severe bilateral neural foraminal narrowing.  Note is made of high signal intensity within the intervening disc which corresponds to gas noted on recent CT.    Small dependent right-sided pleural effusion.  Atrophic native kidneys with suspected bilateral cortical cysts.      Electronically signed by: RAIN BOONE MD  Date:     10/12/17  Time:    03:01              Narrative:    Technique: Routine lumbar spine MRI performed without contrast.    Comparison: CT 10/04/2017.    Findings:    There are suspected postsurgical changes of L3-S1 posterior laminectomies.    There are 6 mm of anterolisthesis of L3 on L4 with resulting uncovering of the intervertebral disc.  No spondylolysis.  There is high density within the L3-L4 intervertebral disc which corresponds with gas noted on recent CT.  There is mild associated vertebral endplate edema, likely degenerative in nature.  No acute fractures.  No marrow signal abnormality to suggest an infiltrative process.    The distal spinal cord is normal in contour and signal.  Conus medullaris terminates at L1.  The cauda equina is not well evaluated due to motion artifact and severe spinal canal stenosis at L3-L4.    The kidneys are atrophic and demonstrate multiple cortical T2 hyperintense lesions, incompletely characterized but presumably cysts.  There is prominent fatty infiltration of the posterior paraspinal musculature.  SI joints are symmetric.  There is a dependent right-sided pleural effusion.    T12-L1: No spinal canal stenosis or neural foraminal narrowing.    L1-L2: There is a small right paracentral broad-based disc bulge.  No spinal canal stenosis or neural foraminal narrowing.    L2-L3: There  is a small circumferential disc bulge and mild bilateral facet hypertrophy.  No spinal canal stenosis or neural foraminal narrowing.    L3-L4: There are 6 mm of anterolisthesis of L3 on L4 with resulting uncovering of the intervertebral disc which demonstrates is a large circumferential bulge with a large superimposed right paracentral extrusion and annular fissure.  There is severe bilateral facet hypertrophy and severe bilateral ligamentum flavum buckling.  Findings contribute to near complete obliteration of the spinal canal and severe bilateral neural foraminal narrowing.    L4-L5: There is obliteration of the intervertebral discs.  There is mild bilateral facet hypertrophy.  Findings contribute to mild bilateral neural foraminal narrowing.  No spinal canal stenosis.    L5-S1: There is a small circumferential disc bulge with a small posterior annular fissure.  No spinal canal stenosis or neural foraminal narrowing.                             Chest X-ray, PA And Lateral (Final result)  Result time 10/11/17 11:22:02    Final result by Quique Esparza MD (10/11/17 11:22:02)                 Impression:        Significant improvement in the bibasilar pulmonary infiltrates since the previous study.      Electronically signed by: QUIQUE ESPARZA MD  Date:     10/11/17  Time:    11:22              Narrative:    History: Aspiration pneumonia.    Procedure: Chest 2 views    Findings:    The examination is compared with the previous studies most recent on 10/7/2017.    Significant clearing of the right perihilar and right lower lobe pulmonary infiltrates since the previous study.  There is also significant clearing of the left basilar infiltrates.  Minimal residual coarse markings in the left base.  Apices are clear.    Heart is within normal limits.    The position of the left subclavian catheter remains without significant change from the previous study.    Heart size is within normal limits.  There is atherosclerosis of  the aortic arch.                                Significant Laboratory Data:  Recent Results (from the past 336 hour(s))   Basic metabolic panel    Collection Time: 10/20/17  5:56 PM   Result Value Ref Range    Sodium 137 136 - 145 mmol/L    Potassium 4.3 3.5 - 5.1 mmol/L    Chloride 106 95 - 110 mmol/L    CO2 19 (L) 23 - 29 mmol/L    BUN, Bld 28 (H) 8 - 23 mg/dL    Creatinine 5.3 (H) 0.5 - 1.4 mg/dL    Calcium 8.6 (L) 8.7 - 10.5 mg/dL    Anion Gap 12 8 - 16 mmol/L     Recent Results (from the past 336 hour(s))   CBC with Automated Differential    Collection Time: 10/27/17  4:01 AM   Result Value Ref Range    WBC 9.12 3.90 - 12.70 K/uL    Hemoglobin 8.6 (L) 14.0 - 18.0 g/dL    Hematocrit 28.4 (L) 40.0 - 54.0 %    Platelets 181 150 - 350 K/uL   CBC with Automated Differential    Collection Time: 10/26/17  3:37 AM   Result Value Ref Range    WBC 9.39 3.90 - 12.70 K/uL    Hemoglobin 8.8 (L) 14.0 - 18.0 g/dL    Hematocrit 28.6 (L) 40.0 - 54.0 %    Platelets 168 150 - 350 K/uL   CBC with Automated Differential    Collection Time: 10/25/17  1:29 AM   Result Value Ref Range    WBC 10.09 3.90 - 12.70 K/uL    Hemoglobin 8.1 (L) 14.0 - 18.0 g/dL    Hematocrit 25.3 (L) 40.0 - 54.0 %    Platelets 162 150 - 350 K/uL     Lab Results   Component Value Date    HGBA1C 7.7 (H) 05/11/2011     Microbiology Results (last 7 days)     ** No results found for the last 168 hours. **            Consultations:  IP CONSULT TO NEPHROLOGY  IP CONSULT TO CARDIOLOGY  IP CONSULT TO PHYSICAL MEDICINE REHAB  IP CONSULT TO INTERVENTIONAL CARDIOLOGY  IP CONSULT TO INTERVENTIONAL CARDIOLOGY  IP CONSULT TO NEPHROLOGY  IP CONSULT TO MIDLINE TEAM  IP CONSULT TO HOSPITAL MEDICINE    Procedures:  Procedure(s) (LRB):  L2-L4 spinal fusion, . L3-L4 Interbody fusion, L2-L3 laminectomy (N/A) by Malachi Sands DO.    Discharge Medications:   Donta Cline   Home Medication Instructions GLORY:[id number]    Printed on:10/27/17 1342   Medication Information                       atorvastatin (LIPITOR) 80 MG tablet  Take 1 tablet (80 mg total) by mouth once daily.             coenzyme Q10 200 mg capsule  Take 200 mg by mouth once daily.             gabapentin (NEURONTIN) 300 MG capsule  Take 300 mg by mouth every evening.             hydrocodone-acetaminophen 10-325mg (NORCO)  mg Tab  Take 1 tablet by mouth every 6 (six) hours as needed for Pain.              insulin detemir (LEVEMIR FLEXTOUCH) 100 unit/mL (3 mL) SubQ InPn pen  Inject 14 Units into the skin every evening.             metoprolol tartrate (LOPRESSOR) 25 MG tablet  Take 1 tablet (25 mg total) by mouth 2 (two) times daily.             midodrine (PROAMATINE) 10 MG tablet  Take 1 tablet (10 mg total) by mouth 3 (three) times daily.             NITROSTAT 0.4 mg SL tablet  place 1 tablet under the tongue if needed every 5 minutes for chest pain for 3 doses IF NO RELIEF AFTER 3RD DOSE CALL PRESCRIBER .             pantoprazole (PROTONIX) 40 MG tablet  Take 1 tablet (40 mg total) by mouth nightly.             ramelteon (ROZEREM) 8 mg tablet  Take 1 tablet (8 mg total) by mouth nightly as needed for Insomnia.             senna-docusate 8.6-50 mg (PERICOLACE) 8.6-50 mg per tablet  Take 1 tablet by mouth 2 (two) times daily.             sertraline (ZOLOFT) 100 MG tablet  Take 1 tablet by mouth every evening.             vit b cmplx 3-fa-vit c-biotin 1- mg-mg-mcg (ANKIT-AZ RX) 1- mg-mg-mcg Tab  Take 1 tablet by mouth once daily.               Diet: regular diet    Level of Activity: As tolerated.    Follow up Plan:  1) Follow up with primary care physician in 1-2 weeks.   2) Resume Dialysis at transfer facility 10/27  3) Resume Coumadin post op day 10  4) Follow up in 2 weeks with Neurosurgery    Studies Pending: None    Discharge Disposition:  Patient was discharged to [facility] facility in stable condition.    This discharge took more than 30 minutes to complete.

## 2017-10-27 NOTE — PT/OT/SLP PROGRESS
Physical Therapy  Treatment    Donta Cline   MRN: [id number]   Admitting Diagnosis: Lumbar stenosis    PT Received On: 10/26/17   PT Start Time: 1140     PT Stop Time: 1208    PT Total Time (min): 28 min       Billable Minutes:  Therapeutic Activity 15 and Therapeutic Exercise 13    Treatment Type: Treatment  PT/PTA: PT             General Precautions: Standard, aspiration, fall  Orthopedic Precautions: N/A   Braces:      Do you have any cultural, spiritual, Adventist conflicts, given your current situation?: none stated    Subjective:  Communicated with nsg prior to session.  Pt agreeable to treatment session.  *Reported no pain in beginning of session.     Objective:    Pt found seated in bedside chair with dgt at bedside; blood pressure cuff, peripheral IV, telemetry, and pulse ox on    Functional Mobility:  Bed Mobility:    Did not perform    Transfers:   Sit to Stand Transfer: CGA for 3 trials from bedside chair,MIN A for 2 trials from bedside chair with RW usage    Gait:    Pt ambulated 10 ft x 3 trials in hospital room; limited due to lines. Incr trunk flexion and shortened step and stride length demonstrated. Req MIN A for performance.    Balance:   Static Sit: GOOD-: Takes MODERATE challenges from all directions but inconsistently  Dynamic Sit: GOOD-: Maintains balance through MODERATE excursions of active trunk movement,     Static Stand: FAIR+: Takes MINIMAL challenges from all directions  Dynamic stand: FAIR+: Needs CLOSE SUPERVISION during gait and is able to right self with minor LOB     Therapeutic Activities and Exercises:  THERAPEUTIC EXERCISE  Pt performed therapeutic exercise seated for 20 reps (B) LE   - strengthening: LAQ, seated marching, AP   - stretching: heel cord, hamstring       AM-PAC 6 CLICK MOBILITY  How much help from another person does this patient currently need?   1 = Unable, Total/Dependent Assistance  2 = A lot, Maximum/Moderate Assistance  3 = A little, Minimum/Contact  Guard/Supervision  4 = None, Modified [place]/Independent         AM-PAC Raw Score CMS G-Code Modifier Level of Impairment Assistance   6 % Total / Unable   7 - 9 CM 80 - 100% Maximal Assist   10 - 14 CL 60 - 80% Moderate Assist   15 - 19 CK 40 - 60% Moderate Assist   20 - 22 CJ 20 - 40% Minimal Assist   23 CI 1-20% SBA / CGA   24 CH 0% Independent/ Mod I     Patient left up in chair with all lines intact, call button in reach and dgt present.    Assessment:  Donta Cline is a 85 y.o. male with a medical diagnosis of Lumbar stenosis and presents with improvement with gait distance ambulated and req less assistance c/ standing transfers on today. Appropriate for discharge to IP Rehab to address current deficits.     Rehab identified problem list/impairments: Rehab identified problem list/impairments: weakness, impaired endurance, impaired functional mobilty, gait instability, decreased lower extremity function, decreased coordination, decreased safety awareness, pain, impaired cardiopulmonary response to activity    Rehab potential is good.    Activity tolerance: Fair    Discharge recommendations: Discharge Facility/Level Of Care Needs: nursing facility, skilled ( SNF)     Barriers to discharge: Barriers to Discharge: Inaccessible home environment, Decreased caregiver support    Equipment recommendations: Equipment Needed After Discharge:  (TBD pending progress)     GOALS:    Physical Therapy Goals        Problem: Physical Therapy Goal    Goal Priority Disciplines Outcome Goal Variances Interventions   Physical Therapy Goal     PT/OT, PT Ongoing (interventions implemented as appropriate)     Description:  Revised Goals to be met by: 10/31/2017    Patient will increase functional independence with mobility by performin. Supine to sit with Minimum Assistance  2. Sit to supine with Minimum Assistance  3. Sit to stand transfer with Minimum Assistance using AD or No AD MET  Revised: Sit to stand  transfer with Davi By Assistance using RW.   4. Bed to chair transfer with Minimum Assistance using AD or No AD  5. Gait x50 feet with Minimum Assistance using AD or No AD  6. Pt will stand for 1 minute with RW usage and Contact Guard Assistance.  7. Pt will perform (B) LE therapeutic exercise x 20 reps to assist c/ improving muscular strength and endurance.                         PLAN:    Patient to be seen 5 x/week  to address the above listed problems via gait training, therapeutic activities, therapeutic exercises, neuromuscular re-education  Plan of Care expires: 11/20/17  Plan of Care reviewed with: patient, daughter         Cheli Stewart, PT  10/27/2017

## 2017-10-27 NOTE — PLAN OF CARE
Problem: Patient Care Overview  Goal: Plan of Care Review  Outcome: Ongoing (interventions implemented as appropriate)  POC reviewed with pt and family at 1400. Pt verbalized understanding. Questions and concerns addressed. No acute events today. Pt progressing toward goals. HD completed today.  Discharge orders in for tomorrow (see orders) Will continue to monitor. See flowsheets for full assessment and VS info.

## 2017-10-27 NOTE — PLAN OF CARE
Problem: Diabetes, Type 2 (Adult)  Intervention: Support/Optimize Psychosocial Response to Condition  POC reviewed with pt and daughter at 2000. Pt verbalized understanding. Questions and concerns addressed. No acute events overnight. Pt progressing toward goals. Will continue to monitor. See flowsheets for full assessment and VS info

## 2017-10-27 NOTE — PROGRESS NOTES
[facility]  Nephrology  Progress Note    Patient Name: Donta Cline  MRN: [id number]  Admission Date: 10/10/2017  Hospital Length of Stay: 16 days  Attending Provider: Sylvia Byrne MD   Primary Care Physician: ZAID Richardson Iii, MD  Principal Problem:Lumbar stenosis    Subjective:     HPI: Theron Cline is a 85 year old white male with past medical history of bladder CA in which he is in remission (was treated with chemotherapy), CAD, A-fib (treated with warfarin) and ESRD on Hd. Patient was transfered from [facility] where he presented with worsening right leg and back pain, general weakness. In hospital presented with increase troponin levels, diagnosed with NSTEMI, increase in INR where was treated with FFP and Vit K, flash pulmonary edema, started on Bi-PAP and aspirated pneumonia (treated with Zosyn). 2 D echo revealed severe aortic stenosis with preserved EF and Cardiology evaluated patient and did not feel LHC warranted at this time but recommended outpatient follow-up with his regular Cardiologist and likely need for outpatient C to evaluate his aortic stenosis and coronary anatomy. Additionally, pt was evaluated by NSx who felt he was not a candidate for surgery. Patient was seen and evaluated by Neurosurgery at [place] (Dr. James) who noted Ct scan of lumbar spine showed moderate to severe acquired spinal stenosis and bilateral foraminal encroachment at L3-L4. Patient is here for second opionion from [facility].    Consulted for dialysis treatment: iHD MWF, dialyzed in [facility], Rt lower arm AVF, duration 3.5, followed by Dr. Mathur, EDW 90 kg and had his last dialysis on Monday.     Interval History:   NAEON, Seen while on HD tolerating well with no evidence of SOB, palpitations, CP , N/V or musc cramps. Net gain 930 cc/24hrs. Oxygenation stable on RA.    Review of patient's allergies indicates:   Allergen Reactions    Darvocet a500  [propoxyphene  n-acetaminophen]      Other reaction(s): Unknown    Morphine      Other reaction(s): Unknown     Current Facility-Administered Medications   Medication Frequency    0.9%  NaCl infusion Once    0.9%  NaCl infusion PRN    0.9%  NaCl infusion Once    acetaminophen tablet 650 mg Q8H PRN    albuterol-ipratropium 2.5mg-0.5mg/3mL nebulizer solution 3 mL Q4H PRN    atorvastatin tablet 80 mg Daily    dextrose 50% injection 12.5 g PRN    dextrose 50% injection 25 g PRN    diazePAM injection 2 mg Q6H PRN    glucagon (human recombinant) injection 1 mg PRN    glucose chewable tablet 16 g PRN    glucose chewable tablet 24 g PRN    heparin (porcine) injection 5,000 Units Q8H    hydrocodone-acetaminophen 10-325mg per tablet 1 tablet Q6H PRN    insulin aspart pen 0-5 Units QID (AC + HS) PRN    insulin detemir pen 14 Units QHS    metoprolol tartrate (LOPRESSOR) tablet 25 mg BID    midodrine tablet 10 mg TID    pantoprazole EC tablet 40 mg Nightly    phenylephrine HCl in 0.9% NaCl 1 mg/10 mL (100 mcg/mL) syringe     ramelteon tablet 8 mg Nightly PRN    senna-docusate 8.6-50 mg per tablet 1 tablet BID    sertraline tablet 100 mg QHS    vit b cmplx 3-fa-vit c-biotin 1- mg-mg-mcg per tablet 1 tablet Daily       Objective:     Vital Signs (Most Recent):  Temp: 98.4 °F (36.9 °C) (10/26/17 1730)  Pulse: 106 (10/26/17 1921)  Resp: (!) 26 (10/26/17 1921)  BP: (!) 141/63 (10/26/17 1901)  SpO2: 100 % (10/26/17 1921)  O2 Device (Oxygen Therapy): room air (10/26/17 1921) Vital Signs (24h Range):  Temp:  [98.4 °F (36.9 °C)-98.5 °F (36.9 °C)] 98.4 °F (36.9 °C)  Pulse:  [] 106  Resp:  [18-38] 26  SpO2:  [91 %-100 %] 100 %  BP: ()/() 141/63     Weight: 80 kg (176 lb 5.9 oz) (10/17/17 1300)  Body mass index is 28.47 kg/m².  Body surface area is 1.93 meters squared.    I/O last 3 completed shifts:  In: 2090 [P.O.:1450; I.V.:40; Other:600]  Out: 2460 [Urine:60; Other:2400]    Physical Exam    Constitutional: He is oriented to person, place, and time. He appears well-developed and well-nourished.   HENT:   Head: Normocephalic.   Nose: Nose normal.   Mouth/Throat: Oropharynx is clear and moist.   Eyes: No scleral icterus.   Neck: Neck supple. No JVD present. No tracheal deviation present. No thyromegaly present.   Cardiovascular: Normal rate, regular rhythm and normal heart sounds.  Exam reveals no gallop and no friction rub.    Pulmonary/Chest: Effort normal and breath sounds normal. No respiratory distress. He has no wheezes. He has no rales.   Abdominal: Soft. Bowel sounds are normal. He exhibits no distension and no mass. There is no tenderness. There is no rebound and no guarding.   Musculoskeletal: He exhibits no edema.   Right forearm AVF with good thrill and no bruits   Lymphadenopathy:     He has no cervical adenopathy.   Neurological: He is alert and oriented to person, place, and time. He exhibits normal muscle tone.   Negative asterixis    Skin: Skin is warm and dry. No rash noted. No erythema.   Vitals reviewed.      Significant Labs:  ABGs:     Recent Labs  Lab 10/25/17  0510   PH 7.437   PCO2 34.2*   HCO3 23.1*   POCSATURATED 96   BE -1     BMP:     Recent Labs  Lab 10/26/17  0337 10/26/17  0615     109 114*     109 109   CO2 21*  21* 25   BUN 21  21 21   CREATININE 3.1*  3.1* 3.0*   CALCIUM 9.7  9.7 9.5   MG 1.8  --      CBC:     Recent Labs  Lab 10/26/17  0337   WBC 9.39   RBC 2.75*   HGB 8.8*   HCT 28.6*      *   MCH 32.0*   MCHC 30.8*     CMP:     Recent Labs  Lab 10/26/17  0337 10/26/17  0615     109 114*   CALCIUM 9.7  9.7 9.5   ALBUMIN 2.4*  2.4* 2.3*   PROT 6.0  --      141 142   K 4.3  4.3 4.2   CO2 21*  21* 25     109 109   BUN 21  21 21   CREATININE 3.1*  3.1* 3.0*   ALKPHOS 119  --    ALT 8*  --    AST 33  --    BILITOT 0.5  --      All labs within the past 24 hours have been reviewed.       Assessment/Plan:     ESRD  (end stage renal disease) on dialysis    Theron Cline is a 85 year old white male with past medical history of bladder CA in which he is in remission (was treated with chemotherapy), CAD, A-fib (treated with warfarin) and ESRD on HD MWF. Patient was transfered from [facility] where he presented with worsening right leg and back pain, general weakness. In hospital presented with increase troponin levels, diagnosed with NSTEMI, increase in INR where was treated with FFP and Vit K, flash pulmonary edema, started on Bi-PAP and aspirated pneumonia (treated with Zosyn).     Consulted for dialysis treatment: iHD MWF, dialyzed in [facility], Rt lower arm AVF, duration 3.5, followed by Dr. Mathur, EDW 90 kg and had his last dialysis on Monday.     - Plan for transfer to [facility] in am  - hemodynamically improvinge post op- Laminectomy L3-L4 on off Norepi> 36 hrs  - Cont Midodrine to 10 mg TID will monitor if needs weaning since BP a little higher  - HD x 3 hrs for metabolic clearance and volume management  - Continue with rosuvastatin.            Thank you for your consult. I will follow-up with patient. Please contact us if you have any additional questions.    Alex Mann MD  Nephrology  [facility]    ATTENDING PHYSICIAN ATTESTATION  I have personally interviewed and examined the patient. I thoroughly reviewed the demographic, clinical, laboratorial and imaging information available in medical records. I agree with the assessment and recommendations provided by the subspecialty resident. Dr. Mann was under my supervision.

## 2017-10-27 NOTE — SUBJECTIVE & OBJECTIVE
Interval History:   DESIRAE, Seen while on HD tolerating well with no evidence of SOB, palpitations, CP , N/V or musc cramps. Net gain 930 cc/24hrs. Oxygenation stable on RA.    Review of patient's allergies indicates:   Allergen Reactions    Darvocet a500  [propoxyphene n-acetaminophen]      Other reaction(s): Unknown    Morphine      Other reaction(s): Unknown     Current Facility-Administered Medications   Medication Frequency    0.9%  NaCl infusion Once    0.9%  NaCl infusion PRN    0.9%  NaCl infusion Once    acetaminophen tablet 650 mg Q8H PRN    albuterol-ipratropium 2.5mg-0.5mg/3mL nebulizer solution 3 mL Q4H PRN    atorvastatin tablet 80 mg Daily    dextrose 50% injection 12.5 g PRN    dextrose 50% injection 25 g PRN    diazePAM injection 2 mg Q6H PRN    glucagon (human recombinant) injection 1 mg PRN    glucose chewable tablet 16 g PRN    glucose chewable tablet 24 g PRN    heparin (porcine) injection 5,000 Units Q8H    hydrocodone-acetaminophen 10-325mg per tablet 1 tablet Q6H PRN    insulin aspart pen 0-5 Units QID (AC + HS) PRN    insulin detemir pen 14 Units QHS    metoprolol tartrate (LOPRESSOR) tablet 25 mg BID    midodrine tablet 10 mg TID    pantoprazole EC tablet 40 mg Nightly    phenylephrine HCl in 0.9% NaCl 1 mg/10 mL (100 mcg/mL) syringe     ramelteon tablet 8 mg Nightly PRN    senna-docusate 8.6-50 mg per tablet 1 tablet BID    sertraline tablet 100 mg QHS    vit b cmplx 3-fa-vit c-biotin 1- mg-mg-mcg per tablet 1 tablet Daily       Objective:     Vital Signs (Most Recent):  Temp: 98.4 °F (36.9 °C) (10/26/17 1730)  Pulse: 106 (10/26/17 1921)  Resp: (!) 26 (10/26/17 1921)  BP: (!) 141/63 (10/26/17 1901)  SpO2: 100 % (10/26/17 1921)  O2 Device (Oxygen Therapy): room air (10/26/17 1921) Vital Signs (24h Range):  Temp:  [98.4 °F (36.9 °C)-98.5 °F (36.9 °C)] 98.4 °F (36.9 °C)  Pulse:  [] 106  Resp:  [18-38] 26  SpO2:  [91 %-100 %] 100 %  BP: ()/()  141/63     Weight: 80 kg (176 lb 5.9 oz) (10/17/17 1300)  Body mass index is 28.47 kg/m².  Body surface area is 1.93 meters squared.    I/O last 3 completed shifts:  In: 2090 [P.O.:1450; I.V.:40; Other:600]  Out: 2460 [Urine:60; Other:2400]    Physical Exam   Constitutional: He is oriented to person, place, and time. He appears well-developed and well-nourished.   HENT:   Head: Normocephalic.   Nose: Nose normal.   Mouth/Throat: Oropharynx is clear and moist.   Eyes: No scleral icterus.   Neck: Neck supple. No JVD present. No tracheal deviation present. No thyromegaly present.   Cardiovascular: Normal rate, regular rhythm and normal heart sounds.  Exam reveals no gallop and no friction rub.    Pulmonary/Chest: Effort normal and breath sounds normal. No respiratory distress. He has no wheezes. He has no rales.   Abdominal: Soft. Bowel sounds are normal. He exhibits no distension and no mass. There is no tenderness. There is no rebound and no guarding.   Musculoskeletal: He exhibits no edema.   Right forearm AVF with good thrill and no bruits   Lymphadenopathy:     He has no cervical adenopathy.   Neurological: He is alert and oriented to person, place, and time. He exhibits normal muscle tone.   Negative asterixis    Skin: Skin is warm and dry. No rash noted. No erythema.   Vitals reviewed.      Significant Labs:  ABGs:     Recent Labs  Lab 10/25/17  0510   PH 7.437   PCO2 34.2*   HCO3 23.1*   POCSATURATED 96   BE -1     BMP:     Recent Labs  Lab 10/26/17  0337 10/26/17  0615     109 114*     109 109   CO2 21*  21* 25   BUN 21  21 21   CREATININE 3.1*  3.1* 3.0*   CALCIUM 9.7  9.7 9.5   MG 1.8  --      CBC:     Recent Labs  Lab 10/26/17  0337   WBC 9.39   RBC 2.75*   HGB 8.8*   HCT 28.6*      *   MCH 32.0*   MCHC 30.8*     CMP:     Recent Labs  Lab 10/26/17  0337 10/26/17  0615     109 114*   CALCIUM 9.7  9.7 9.5   ALBUMIN 2.4*  2.4* 2.3*   PROT 6.0  --      141 142    K 4.3  4.3 4.2   CO2 21*  21* 25     109 109   BUN 21  21 21   CREATININE 3.1*  3.1* 3.0*   ALKPHOS 119  --    ALT 8*  --    AST 33  --    BILITOT 0.5  --      All labs within the past 24 hours have been reviewed.

## 2017-10-27 NOTE — NURSING
Pt picked up by [employer] via wheelchair.  MAR and face sheet sent with patient. Report called to nurse at [facility].

## 2017-10-27 NOTE — PLAN OF CARE
10/27/17 1135   Final Note   Assessment Type Final Discharge Note   Discharge Disposition Rehab   Discharge plans and expectations educations in teach back method with documentation complete? Yes   Right Care Referral Info   Post Acute Recommendation IRF   Referral Type Inpatient Rehab   Facility Name [facility]     Follow-up Information     J Artemio Richardson Iii, MD In 1 week.    Specialty:  Family Medicine  Contact information:  [address] [facility] [address]  [phone]             Malachi Sands DO In 1 week.    Specialty:  Neurosurgery  Why:  For wound re-check  Contact information:  [address]  [address]  [facility] [id number] [phone]             [facility].    Why:  Rehab  Contact information:  [address] [facility] [id number] [phone]                   Iris Daly RN, CCRN-K, [facility]  Neuro-Critical Care   [phone]

## 2017-10-27 NOTE — ASSESSMENT & PLAN NOTE
Theron Cline is a 85 year old white male with past medical history of bladder CA in which he is in remission (was treated with chemotherapy), CAD, A-fib (treated with warfarin) and ESRD on HD MWF. Patient was transfered from [facility] where he presented with worsening right leg and back pain, general weakness. In hospital presented with increase troponin levels, diagnosed with NSTEMI, increase in INR where was treated with FFP and Vit K, flash pulmonary edema, started on Bi-PAP and aspirated pneumonia (treated with Zosyn).     Consulted for dialysis treatment: iHD MWF, dialyzed in [facility],  lower arm AVF, duration 3.5, followed by Dr. Mathur, EDW 90 kg and had his last dialysis on Monday.     - Plan for transfer to [facility] in am  - hemodynamically improvinge post op- Laminectomy L3-L4 on off Norepi> 36 hrs  - Cont Midodrine to 10 mg TID will monitor if needs weaning since BP a little higher  - HD x 3 hrs for metabolic clearance and volume management  - Continue with rosuvastatin.

## 2017-11-06 NOTE — PT/OT/SLP DISCHARGE
Occupational Therapy Discharge Summary    Donta Cline  MRN: [id number]   Lumbar stenosis   Patient Discharged from acute Occupational Therapy on 10/27/17.  Please refer to prior OT note dated on 10/24/17 for functional status.     Assessment:   Patient appropriate for care in another setting.  GOALS:    Occupational Therapy Goals        Problem: Occupational Therapy Goal    Goal Priority Disciplines Outcome Interventions   Occupational Therapy Goal     OT, PT/OT Ongoing (interventions implemented as appropriate)    Description:  Goals to be met by: 7 days  Patient will increase functional independence with ADLs by performing:    LE Dressing with Stand-by Assistance and Assistive Devices as needed.  Grooming while standing at sink with Stand-by Assistance.  Toileting from [facility] with Minimal Assistance for hygiene and clothing management.   Supine to sit with Modified [place].  Toilet transfer to [facility] with SBA using RW.                      Reasons for Discontinuation of Therapy Services  Transfer to alternate level of care.      Plan:  Patient Discharged to: Inpatient Rehab.

## 2017-11-07 NOTE — PT/OT/SLP DISCHARGE
Physical Therapy Discharge Summary    Donta Cline  MRN: [id number]   Lumbar stenosis   Patient Discharged from acute Physical Therapy on 10/27/2017.  Please refer to prior PT noted date on 10/27/2017 for functional status.     Assessment:   Patient appropriate for care in another setting.  GOALS:    Physical Therapy Goals        Problem: Physical Therapy Goal    Goal Priority Disciplines Outcome Goal Variances Interventions   Physical Therapy Goal     PT/OT, PT Ongoing (interventions implemented as appropriate)     Description:  Revised Goals to be met by: 10/31/2017    Patient will increase functional independence with mobility by performin. Supine to sit with Minimum Assistance  2. Sit to supine with Minimum Assistance  3. Sit to stand transfer with Minimum Assistance using AD or No AD MET  Revised: Sit to stand transfer with [place] By Assistance using RW.   4. Bed to chair transfer with Minimum Assistance using AD or No AD  5. Gait x50 feet with Minimum Assistance using AD or No AD  6. Pt will stand for 1 minute with RW usage and Contact Guard Assistance.  7. Pt will perform (B) LE therapeutic exercise x 20 reps to assist c/ improving muscular strength and endurance.                       Reasons for Discontinuation of Therapy Services  Transfer to alternate level of care.      Plan:  Patient Discharged to: Inpatient Rehab   Cheli Stewart, PT, DPT  2017  .

## 2017-11-14 PROBLEM — E86.0 DEHYDRATION: Status: ACTIVE | Noted: 2017-01-01

## 2017-11-14 NOTE — ED NOTES
Patient resting in stretcher and is in NAD at this time. Pt is awake and alert, VSS, respirations even and unlabored. Family at bedside. Pt and family updated on POC. Bed low and locked with side rails up x2, call bell in pt reach.

## 2017-11-14 NOTE — ED TRIAGE NOTES
Fusion on lumbar spine on October 21, sent to [facility] for rehab.  Pt began complaining about lower back pain since Friday.  Unable to ambulate now, was walking for 50 feet prior to today.  Pain radiates down right leg more than left but is having bilateral leg pain.  Pt is also more altered for the past month per the daughter.   Pt is currently aaox4.

## 2017-11-14 NOTE — ED NOTES
Patient resting in stretcher and is in NAD at this time. Pt is awake and alert, VSS, respirations even and unlabored. Family at bedside. Pt and family updated on POC. Bed low and locked with side rails up x2, call bell in pt reach.    Patient identifiers verified and correct for Donta Cline.    LOC: The patient is awake, alert and oriented x 4. Pt is speaking appropriately, no slurred speech.  APPEARANCE: Patient resting and in no acute distress. Pt is clean and well groomed. No JVD visible.   SKIN: Skin is warm dry and intact, and color is consistent with ethnicity. No tenting observed and capillary refill <3 seconds. No clubbing noted to nail beds. No breakdown or brusing visible and mucus membranes moist and acyanotic. x2 lidocaine patches noted to pt back on arrival, x1 dated 11/13/17 removed, x1 dated 11/14/17 left in place. Fentanyl patch noted to left upper chest on arrival, left in place.  MUSCULOSKELETAL: Full range of motion present in all extremities. Hand  equal and strong +5 bilaterally - lower extremity mobility moderately impaired due to pt pain. Pt c/o pain to lower back and bilateral hips, pain more prominent to left hip, reports pain radiates down both legs, reports numbness to bilateral groin regions, denies tingling - peripheral pulses present equal and strong.  RESPIRATORY: Airway is open and patent. Respirations-unlabored, regular rate, equal bilaterally on inspiration and expiration. No accessory muscle use noted. Lungs clear to auscultation in all fields bilaterally anterior and posterior.   CARDIAC: Patient tachycardic.  No peripheral edema noted, and patient has no c/o chest pain.  ABDOMEN: Firm, rounded, nontender. Normoactive bowel sounds X4 quadrants.   NEUROLOGIC: Eyes open spontaneously and facial expression symmetrical. Pt behavior appropriate to situation, and pt follows commands.  Pt reports sensation present in all extremities when touched with a finger. PERRLA  : No  complaints of frequency, burning, urgency or blood in the urine.

## 2017-11-14 NOTE — ED PROVIDER NOTES
Encounter Date: 11/14/2017    SCRIBE #1 NOTE: I, Gwen Aleman, am scribing for, and in the presence of,  Dr. Flores. I have scribed the following portions of the note - the Resident attestation.       History     Chief Complaint   Patient presents with    Extremity Weakness     pt had back surgery in October, pt was seen at [facility] today and sent to ED for further evaluation new onset of not being able to walk.     Mr Cline is a 86 yo male with hx of back surgery (lumbar fusion, 10/17), HFpEF, NSTEMI, bladder/prostate cancer, DM2, dialysis, aortic stenosis, CAD, on dialysis (Last session Monday) here for weakness.     The daughter is at bedside and provides story. The patient recently had back surgery, and after his discharge, he was undergoing physical therapy at inpatient rehab, and was progressing well. Last Friday, he transferred to SNF facility because it was thought he would benefit from more extensive physical therapy. The day of his transfer, he was complaining of pain with walking, and due to this, physical therapy held therapy. The following day, he continued to complain about pain and was unable to do any therapy, and was unable to walk or move like he was doing previously.     The daughter brought patient to ED due to concern of his severe back pain and inability to perform activities as he was doing previously. She had discussed with Dr. Saul, and he recommended the patient come to ED and obtain imaging for spine.     The patient undergoes          Review of patient's allergies indicates:   Allergen Reactions    Morphine Other (See Comments)     Other reaction(s): severe abdominal pain    Darvocet a500  [propoxyphene n-acetaminophen]      Other reaction(s): Unknown     Past Medical History:   Diagnosis Date    Bladder cancer     Chronic diastolic heart failure 8/21/2012    3/13: AOSAT: 98, FICKCI: 2.41, FICKCO: 5.18 PAPRES: 34/16 (23), PASAT: 65, PVR: 1.74 PWPRES: 18/18 (14), RA PRES:  14/13 (12), RV: 40/0, 10     Chronic hip pain, right 10/4/2017    Coronary artery disease involving native coronary artery of native heart without angina pectoris     Dyslipidemia     Encounter for blood transfusion     ESRD (end stage renal disease) on dialysis 6/9/2014    Essential hypertension     Hypercholesteremia 8/21/2012    Long-term (current) use of anticoagulants 10/9/2015    NSTEMI (non-ST elevated myocardial infarction) 10/4/2017    Paroxysmal atrial fibrillation 10/8/2015    Prostate cancer     Severe aortic stenosis 10/14/2014    Type 2 diabetes mellitus with chronic kidney disease on chronic dialysis, with long-term current use of insulin 12/15/2013    Ventricular tachycardia     monomorphic     Past Surgical History:   Procedure Laterality Date    BACK SURGERY      laminectomy x2    COLON SURGERY      EXTENSIVE PROSTATE SURGER      Prostatectomy, Radical    JOINT REPLACEMENT      left hip     History reviewed. No pertinent family history.  Social History   Substance Use Topics    Smoking status: Former Smoker     Packs/day: 3.00     Years: 40.00     Quit date: 1/1/1980    Smokeless tobacco: Former User    Alcohol use No      Comment: a few drinks     Review of Systems   Constitutional: Negative for activity change, appetite change, chills, diaphoresis and fever.   HENT: Positive for congestion. Negative for sinus pain, sinus pressure, sneezing and sore throat.    Eyes: Negative for pain and visual disturbance.   Respiratory: Positive for cough. Negative for choking, chest tightness and shortness of breath.    Cardiovascular: Negative for chest pain, palpitations and leg swelling.   Gastrointestinal: Negative for abdominal distention, abdominal pain, anal bleeding, blood in stool, constipation, diarrhea, nausea and vomiting.   Endocrine: Negative for polyuria.   Genitourinary: Negative for dysuria, frequency, hematuria and urgency.   Musculoskeletal: Negative for back pain.    Skin: Negative for rash.   Neurological: Negative for dizziness, seizures, weakness, light-headedness, numbness and headaches.   Hematological: Does not bruise/bleed easily.       Physical Exam     Initial Vitals [11/14/17 1410]   BP Pulse Resp Temp SpO2   136/81 110 20 97.9 °F (36.6 °C) 99 %      MAP       99.33         Physical Exam    Constitutional: He appears well-developed and well-nourished. He is not diaphoretic. No distress.   HENT:   Head: Normocephalic and atraumatic.   Eyes: EOM are normal. Pupils are equal, round, and reactive to light. Right eye exhibits no discharge. Left eye exhibits no discharge.   Neck: Normal range of motion. No JVD present.   Cardiovascular: Normal rate and regular rhythm.   No murmur heard.  Pulmonary/Chest: Breath sounds normal. No respiratory distress.   Abdominal: Soft. Bowel sounds are normal. He exhibits no distension.   Musculoskeletal: Normal range of motion.   Neurological: He is alert and oriented to person, place, and time. No cranial nerve deficit.   Alert and oriented to person and place, but not time  No saddle anesthesia  No bruises, cuts or ulcerations. No deformities of spine.   No focal neurological deficits. No neck stiffness, no meningeal signs.   Passive ROM of right and left hip flexion to 90 degrees and further motion is limited by pain in the left hip only.   Active and passive trunk flexion with pain in left hip.      Skin: Skin is warm and dry. No rash noted. No erythema. No pallor.   Psychiatric: He has a normal mood and affect.         ED Course   Procedures  Labs Reviewed - No data to display  EKG Readings: (Independently Interpreted)   , A fib with RVR          Medical Decision Making:   History:   Old Medical Records: I decided to obtain old medical records.  Initial Assessment:   Mr Cline is a 84 yo male with recent back surgery in 10/17, complaining of pain in the lumbar spine while walking and with movements of the hip. He will require  additional CT imaging of spine, and will have neurosurgery re-assess for final disposition. Will rule out infection as well.   Differential Diagnosis:   Lumbar spine instability  Abscess, discitis  Disc herniation  UTI, pneumonia    Independently Interpreted Test(s):   I have ordered and independently interpreted X-rays - see prior notes.  I have ordered and independently interpreted EKG Reading(s) - see prior notes  Clinical Tests:   Lab Tests: Ordered and Reviewed  Radiological Study: Ordered and Reviewed  Medical Tests: Ordered and Reviewed  ED Management:  CT non contrast- will consult neurosurgery once imaging has resulted  Fluids 1 L  CBC, CMP, U/A, ESR, CRP            Scribe Attestation:   Scribe #1: I performed the above scribed service and the documentation accurately describes the services I performed. I attest to the accuracy of the note.    Attending Attestation:   Physician Attestation Statement for Resident:  As the supervising MD   Physician Attestation Statement: I have personally seen and examined this patient.   I agree with the above history. -: Pt has lumbar back pain. He had surgery in October. He was advised by physician to come to ED today. PMHx of aortic stenosis and heart failure.   As the supervising MD I agree with the above PE.   -: EF of 55. No urinary symptoms, no neuro deficits. Good ROM. No fever or chills.   As the supervising MD I agree with the above treatment, course, plan, and disposition.  I have reviewed and agree with the residents interpretation of the following: lab data, x-rays, CT scans and EKG.  I have reviewed the following: old records at this facility.                    ED Course      Clinical Impression:   The primary encounter diagnosis was lumbar back pain.   Disposition:   Disposition: Placed in Observation  Condition: Serious         Sameer Perera MD  Resident  11/15/17 0632       Sameer Perera MD  Resident  11/15/17 0633       Maldonado Boudreaux,  MD  12/14/17 2013

## 2017-11-15 PROBLEM — N39.0 UTI (URINARY TRACT INFECTION): Status: ACTIVE | Noted: 2017-01-01

## 2017-11-15 PROBLEM — M54.9 BACK PAIN: Status: ACTIVE | Noted: 2017-01-01

## 2017-11-15 PROBLEM — M54.17 L-S RADICULOPATHY: Status: ACTIVE | Noted: 2017-01-01

## 2017-11-15 PROBLEM — M54.50 ACUTE BILATERAL LOW BACK PAIN: Status: ACTIVE | Noted: 2017-01-01

## 2017-11-15 PROBLEM — R82.81 PYURIA: Status: ACTIVE | Noted: 2017-01-01

## 2017-11-15 PROBLEM — Z98.1 STATUS POST LUMBAR SPINAL FUSION: Status: ACTIVE | Noted: 2017-01-01

## 2017-11-15 PROBLEM — R79.1 SUPRATHERAPEUTIC INR: Status: ACTIVE | Noted: 2017-01-01

## 2017-11-15 NOTE — CONSULTS
[facility]  Neurosurgery  Consult Note    Consults  Subjective:       History of Present Illness: Donta Cline is 85 y.o. male with PMH ESRD on HD MWF, CAD, NSTEMI, bladder/prostate cancer, DMII, aortic stenosis and recent L2-L4 fusion 10/20/17 who presents to [facility] with complaint of worsened back pain and functional decline since last Friday 11/3. There is no family in the room. The patient is mildy confused and thus a poor historian so most of the history obtained from the medical record. He was discharged to a rehab after the last admission and progressing well so transferred to a skilled nursing facility. He was able to walk 50ft on his own until last week when he had acute worsening of his low back pain and LE weakness. He has been unable to weight bear since that time. He also complains of abdominal pain and bilateral hip pain. He reports some BLE pain but is unable to characterize it. Denies BB dysfunction or saddle anesthesia. There is no record of fever or trauma. ESR/CRP are elevated. INR 3.8, on coumadin.     Prescriptions Prior to Admission   Medication Sig Dispense Refill Last Dose    amitriptyline (ELAVIL) 25 MG tablet Take 25 mg by mouth nightly as needed for Insomnia.       aspirin (ECOTRIN) 81 MG EC tablet Take 81 mg by mouth once daily.       bisacodyl (DULCOLAX) 10 mg Supp Place 10 mg rectally daily as needed.       celecoxib (CELEBREX) 200 MG capsule Take 200 mg by mouth 2 (two) times daily.       hydrocodone-acetaminophen 10-325mg (NORCO)  mg Tab Take 1 tablet by mouth every 6 (six) hours as needed for Pain.   0 11/14/2017    insulin detemir (LEVEMIR FLEXTOUCH) 100 unit/mL (3 mL) SubQ InPn pen Inject 14 Units into the skin every evening. 4.2 mL 0 11/13/2017    midodrine (PROAMATINE) 10 MG tablet Take 1 tablet (10 mg total) by mouth 3 (three) times daily. 90 tablet 0 11/14/2017    ondansetron (ZOFRAN) 4 MG tablet Take 4 mg by mouth every 8 (eight) hours as  needed for Nausea.       pantoprazole (PROTONIX) 40 MG tablet Take 1 tablet (40 mg total) by mouth nightly. 30 tablet 0 11/14/2017    sertraline (ZOLOFT) 100 MG tablet Take 1 tablet by mouth every evening.  0 11/13/2017    atorvastatin (LIPITOR) 80 MG tablet Take 1 tablet (80 mg total) by mouth once daily. 90 tablet 0     coenzyme Q10 200 mg capsule Take 200 mg by mouth once daily.   10/3/2017 at Unknown time    gabapentin (NEURONTIN) 300 MG capsule Take 300 mg by mouth every evening.       metoprolol tartrate (LOPRESSOR) 25 MG tablet Take 1 tablet (25 mg total) by mouth 2 (two) times daily. 60 tablet 0     NITROSTAT 0.4 mg SL tablet place 1 tablet under the tongue if needed every 5 minutes for chest pain for 3 doses IF NO RELIEF AFTER 3RD DOSE CALL PRESCRIBER . 100 tablet 6 Unknown at Unknown time    ramelteon (ROZEREM) 8 mg tablet Take 1 tablet (8 mg total) by mouth nightly as needed for Insomnia.  0     senna-docusate 8.6-50 mg (PERICOLACE) 8.6-50 mg per tablet Take 1 tablet by mouth 2 (two) times daily.       vit b cmplx 3-fa-vit c-biotin 1- mg-mg-mcg (ANKIT-AZ RX) 1- mg-mg-mcg Tab Take 1 tablet by mouth once daily.  0        Review of patient's allergies indicates:   Allergen Reactions    Morphine Other (See Comments)     Other reaction(s): severe abdominal pain    Darvocet a500  [propoxyphene n-acetaminophen]      Other reaction(s): Unknown       Past Medical History:   Diagnosis Date    Bladder cancer     Chronic diastolic heart failure 8/21/2012    3/13: AOSAT: 98, FICKCI: 2.41, FICKCO: 5.18 PAPRES: 34/16 (23), PASAT: 65, PVR: 1.74 PWPRES: 18/18 (14), RA PRES: 14/13 (12), RV: 40/0, 10     Chronic hip pain, right 10/4/2017    Coronary artery disease involving native coronary artery of native heart without angina pectoris     Dyslipidemia     Encounter for blood transfusion     ESRD (end stage renal disease) on dialysis 6/9/2014    Essential hypertension      Hypercholesteremia 8/21/2012    Long-term (current) use of anticoagulants 10/9/2015    NSTEMI (non-ST elevated myocardial infarction) 10/4/2017    Paroxysmal atrial fibrillation 10/8/2015    Prostate cancer     Severe aortic stenosis 10/14/2014    Type 2 diabetes mellitus with chronic kidney disease on chronic dialysis, with long-term current use of insulin 12/15/2013    Ventricular tachycardia     monomorphic     Past Surgical History:   Procedure Laterality Date    BACK SURGERY      laminectomy x2    COLON SURGERY      EXTENSIVE PROSTATE SURGER      Prostatectomy, Radical    JOINT REPLACEMENT      left hip     Family History     None        Social History Main Topics    Smoking status: Former Smoker     Packs/day: 3.00     Years: 40.00     Quit date: 1/1/1980    Smokeless tobacco: Former User    Alcohol use No      Comment: a few drinks    Drug use: Unknown    Sexual activity: Not on file     Review of Systems   Constitutional: Positive for activity change. Negative for diaphoresis, fatigue, fever and unexpected weight change.   Eyes: Negative for visual disturbance.   Respiratory: Negative for cough, shortness of breath and wheezing.    Cardiovascular: Negative for chest pain and palpitations.   Gastrointestinal: Positive for abdominal pain. Negative for abdominal distention, blood in stool, constipation, diarrhea, nausea and vomiting.   Genitourinary: Negative for difficulty urinating, enuresis, frequency and urgency.   Musculoskeletal: Positive for back pain. Negative for gait problem, joint swelling, myalgias, neck pain and neck stiffness.   Skin: Negative for color change, rash and wound.   Neurological: Positive for weakness. Negative for dizziness, seizures, facial asymmetry, speech difficulty, light-headedness, numbness and headaches.   Hematological: Does not bruise/bleed easily.   Psychiatric/Behavioral: Negative for agitation, behavioral problems, dysphoric mood and hallucinations. The  patient is not nervous/anxious.      Objective:     Weight: 97.5 kg (215 lb)  Body mass index is 34.7 kg/m².  Vital Signs (Most Recent):  Temp: 97.8 °F (36.6 °C) (11/15/17 1223)  Pulse: 103 (11/15/17 1223)  Resp: 20 (11/15/17 1223)  BP: (!) 128/59 (11/15/17 1223)  SpO2: 98 % (11/15/17 1223) Vital Signs (24h Range):  Temp:  [97.4 °F (36.3 °C)-97.9 °F (36.6 °C)] 97.8 °F (36.6 °C)  Pulse:  [] 103  Resp:  [16-20] 20  SpO2:  [95 %-100 %] 98 %  BP: (107-148)/(51-81) 128/59        Neurosurgery Physical Exam  General: well developed, well nourished, no distress.   Head: normocephalic, atraumatic  Neurologic: Alert and oriented to person and place only. Thought content appropriate. Pt resistant to the exam  GCS: Motor: 6/Verbal: 5/Eyes: 4 GCS Total: 15  Mental Status: Awake, Alert, Oriented x2  Cranial nerves: face symmetric, tongue midline, CN II-XII grossly intact.   Eyes: pupils equal, round, reactive to light with accomodation, EOMI.   Sensory: intact to light touch throughout  Motor Strength: Moves all extremities spontaneously with good tone. No abnormal movements seen. LE exam effort and pain limited    Strength  Deltoids Triceps Biceps Wrist Extension Wrist Flexion Hand    Upper: R 5/5 5/5 5/5 5/5 5/5 5/5    L 5/5 5/5 5/5 5/5 5/5 5/5     Iliopsoas Quadriceps Knee  Flexion Tibialis  anterior Gastro- cnemius EHL   Lower: R 3/5 1/5 5/5 5/5 5/5 5/5    L 3/5 1/5 5/5 5/5 5/5 5/5     DTR's - 2 + and symmetric in UE and LE  Pronator Drift: not assessed  Finger-to-nose: not assessed  Funez: absent  Clonus: absent  Babinski: absent  Pulses: 2+ and symmetric radial and dorsalis pedis. No lower extremity edema  Straight leg raise: negative  Abdomen: soft, mildly tender in both lower quadrants  Pulm: normal respiratory effort   Incision is well healed except for small area at the inferior edge with granulation tissue and mild erythema. No drainage      Significant Labs:    Recent Labs  Lab 11/14/17  1534  11/15/17  0514   * 66*    142   K 4.0 4.0   CL 96 98   CO2 31* 31*   BUN 31* 36*   CREATININE 4.0* 4.4*   CALCIUM 9.6 9.8       Recent Labs  Lab 11/14/17  1534   WBC 9.82   HGB 9.8*   HCT 32.0*          Recent Labs  Lab 11/15/17  0514   INR 3.8*     Microbiology Results (last 7 days)     Procedure Component Value Units Date/Time    Urine culture [[id number]] Collected:  11/15/17 0126    Order Status:  No result Specimen:  Urine Updated:  11/15/17 0126        All pertinent labs from the last 24 hours have been reviewed.    Significant Diagnostics:  CT lumbar spine 11/14: reviewed    Prior L2-L4 instrumented lumbar fusion with interbody disc spacer at L3-4 and decompressive posterior laminectomies. No fracture identified, noting the inferior endplate of L4 is indistinct. Hardware appears intact. Artifact from adjacent pedicle screw and lack of IV contrast limits evaluation.    MRI lumbar spine 11/14: reviewed    Ill-defined heterogeneous fluid collection encircling L1 spinous process demonstrating fluid levels on the right of L1 spinous process measuring 4.6 x 2.1 cm. Significant edema is noted at the subcutaneous soft tissue of the operative site.    Assessment/Plan:     * Back pain    85 y.o. male s/p L2-4 fusion 10/20 for lumbar stenosis who presents with acute worsening of his LBP and LE weakness    -Patient with HF and KE weakness on exam, although he his exam is effort/pain limited  -MRI with fluid collection in the operative bed. Hardware appears stable  -Plan for OR tomorrow for washout pending INR corrrection. Case booked  -INR 3.8 today, please reverse. Hold ASA and coumadin  -NPO at midnight  -ESR 92/, f/u procalcitonin  -Afebrile, no leukocytosis  -H/H stable  -Q4h neuro checks  -PT/OT   -Pain control per primary team  -Please call NSGy with any change in neuro exam                 Moon Dong PA-C  Neurosurgery  [facility]

## 2017-11-15 NOTE — NURSING
Pt transfer to [facility].  Disoriented to place, time, situation. HR tachycardic 110 beats/min other VSS on 2 L NC.  No complaints of pain.  Daughter at bedside.  Fall precautions in place.  Oriented to room and unit.

## 2017-11-15 NOTE — ASSESSMENT & PLAN NOTE
HD MWF 3.5 hrs duration via LFA AVF at [facility]. Unsure EDW or residual function.   -Lytes and acid base stable.   -HD today. Dialysate will be adjusted to current labs. UF 2 L. Will review outpatient flowsheet.     Anemia  -hgb 9.8. No ZHEN for now.     BMD  -No binders for now. High cCa levels. Avoid any Ca based supplements or Ca containing binders.

## 2017-11-15 NOTE — PLAN OF CARE
Problem: Patient Care Overview  Goal: Plan of Care Review  Outcome: Ongoing (interventions implemented as appropriate)  Pt with periods of lucidity, oriented x 4, and periods of confusion, disoriented to place, time, situation.  VSS on room air.  Pt c/o pain, back spasms to lower back.  Unable to assess pain on 0-10 scale.  Given PRN tylenol x 1.  Full relief obtained.  No opioid meds given pt's orientation.    Held scheduled heparin subcutaneous, pending pt's INR.  Evening .  Pt kept NPO after 00:00.  Pt with lidocaine patch to lower back, fentanyl patch to R shoulder.  Lidocaine patch removed (applied 11/14) at shift change.    Daughter at bedside.  Pt scheduled for HD today.

## 2017-11-15 NOTE — PT/OT/SLP PROGRESS
Speech Language Pathology      Donta Cline  MRN: [id number]    Orders received for SLP evaluation.  Patient not seen today secondary to dialysis.  SLP attempts to see patient; however nurse informs SLP that Patient is off floor for afternoon for Dialysis.  ST to re-attempt assessment next service day, 11/16/17. Thank you.    YUMIKO Fields., [facility]-SLP  Speech-Language Pathology  Pager: [phone]  11/15/2017

## 2017-11-15 NOTE — HPI
"Donta Cline is a 85 y.o. male who presents with PMHx of ESRD with dialysis MWF, HFpEF, NSTEMI, bladder/prostate cancer, DM II, aortic stenosis and CAD for evaluation of back pain s/p lumbar fusion/laminectomy (10/20/17). No family at bedside. Difficult to obtain HPI as speech garbled, however patient endorses progressive lower extremity weakness with difficulty ambulating for the past 4-5 days. His back pain is without radiation. Patient is now unable to ambulate independently. Denies trauma and urinary bowel/bladder incontinence, fever.    Per EDMD:  "The daughter brought patient to ED due to concern of his severe back pain and inability to perform activities as he was doing previously. She had discussed with Dr. Saul, and he recommended the patient come to ED and obtain imaging for spine."    Imaging:    Ct Lumbar Spine Without Contrast    Result Date: 11/14/2017  Comparison: MRI 10/12/17. Findings: Routine CT lumbar spine protocol performed without IV contrast. Prior L2-L4 instrumented lumbar fusion with interbody disc spacer at L3-4. Decompressive posterior laminectomies at from L2-3 through L5-S1. No evidence of hardware failure. No fracture identified, noting the inferior endplate of L4 is indistinct. Infection or postsurgical fluid collection not excluded, noting limited evaluation without IV contrast and artifact from adjacent pedicle screws. There is partial fusion of the right L5-S1 facet. The SI joints are unremarkable. There is extensive calcified atherosclerotic disease. Atrophic kidneys. Partially imaged right renal stent noted. No hydronephrosis. Small renal lesions, too small to characterize without IV contrast.      Mri Lumbar Spine Without Contrast    Result Date: 11/14/2017  MRI LUMBAR SPINE TECHNIQUE: MRI lumbar spine was performed without contrast. There is an ill-defined heterogeneous collection encircling L1 spinous process demonstrating fluid levels on the right. This may represents a " seroma or hematoma postoperatively however abscess cannot be excluded. This collection is best seen on series 11 image 4.

## 2017-11-15 NOTE — ASSESSMENT & PLAN NOTE
- S/p lumbar fusion 10/20/2017. S/p IPR, SNF, now back home with with inability to care for himself. Presents with c/o lower back pain and progressive lower extremity weakness, patient now unable to ambulate. Unclear baseline functional status  - MRI reviewed, concerning for seroma vs hematoma, cannot rule out infectious process. AFVSS, no WBC, low suspicion for infectious process at this time.  - Supportive care, PT/OT  - Neurosurgery recommendations pending

## 2017-11-15 NOTE — PLAN OF CARE
Problem: Fall Risk (Adult)  Goal: Identify Related Risk Factors and Signs and Symptoms  Related risk factors and signs and symptoms are identified upon initiation of Human Response Clinical Practice Guideline (CPG)   Outcome: Ongoing (interventions implemented as appropriate)  Pt at high risk for falls. Reviewed POC with pt and daughter, Wendy Jenkins. Pt and daughter verbalized understanding.

## 2017-11-15 NOTE — HPI
Donta Cline is 85 y.o. male with PMH ESRD on HD MWF, CAD, NSTEMI, bladder/prostate cancer, DMII, aortic stenosis and recent L2-L4 fusion 10/20/17 who presents to [facility] with complaint of worsened back pain and functional decline since last Friday 11/3. There is no family in the room. The patient is mildy confused and thus a poor historian so most of the history obtained from the medical record. He was discharged to a rehab after the last admission and progressing well so transferred to a skilled nursing facility. He was able to walk 50ft on his own until last week when he had acute worsening of his low back pain and LE weakness. He has been unable to weight bear since that time. He also complains of abdominal pain and bilateral hip pain. He reports some BLE pain but is unable to characterize it. Denies BB dysfunction or saddle anesthesia. There is no record of fever or trauma. ESR/CRP are elevated. INR 3.8, on coumadin.

## 2017-11-15 NOTE — ASSESSMENT & PLAN NOTE
- in setting of ESRD and oliguria  - no urinary complaints, no WBC, AFVSS, lactate negative,   - will hold on starting abx at this time

## 2017-11-15 NOTE — PLAN OF CARE
Problem: Physical Therapy Goal  Goal: Physical Therapy Goal  Goals to be met by: 2017     Patient will increase functional independence with mobility by performin. Supine to sit with MInimal Assistance  2. Sit to supine with MInimal Assistance  3. Sit to stand transfer with Moderate Assistance  4. Bed to chair transfer with Moderate Assistance  5. Gait  x 10 feet with Maximum Assistance using Rolling Walker.   6. Lower extremity exercise program x15 reps per handout, with assistance as needed    Outcome: Ongoing (interventions implemented as appropriate)  Pt evaluation complete.     ENRIQUE PARMAR, PT  11/15/2017

## 2017-11-15 NOTE — ASSESSMENT & PLAN NOTE
- last echo 10/2017 with pulmonary HTN and undetermined diastolic function, EF 55  - appears compensated, CXR without edema, BNP elevated, but below baseline

## 2017-11-15 NOTE — SUBJECTIVE & OBJECTIVE
Prescriptions Prior to Admission   Medication Sig Dispense Refill Last Dose    amitriptyline (ELAVIL) 25 MG tablet Take 25 mg by mouth nightly as needed for Insomnia.       aspirin (ECOTRIN) 81 MG EC tablet Take 81 mg by mouth once daily.       bisacodyl (DULCOLAX) 10 mg Supp Place 10 mg rectally daily as needed.       celecoxib (CELEBREX) 200 MG capsule Take 200 mg by mouth 2 (two) times daily.       hydrocodone-acetaminophen 10-325mg (NORCO)  mg Tab Take 1 tablet by mouth every 6 (six) hours as needed for Pain.   0 11/14/2017    insulin detemir (LEVEMIR FLEXTOUCH) 100 unit/mL (3 mL) SubQ InPn pen Inject 14 Units into the skin every evening. 4.2 mL 0 11/13/2017    midodrine (PROAMATINE) 10 MG tablet Take 1 tablet (10 mg total) by mouth 3 (three) times daily. 90 tablet 0 11/14/2017    ondansetron (ZOFRAN) 4 MG tablet Take 4 mg by mouth every 8 (eight) hours as needed for Nausea.       pantoprazole (PROTONIX) 40 MG tablet Take 1 tablet (40 mg total) by mouth nightly. 30 tablet 0 11/14/2017    sertraline (ZOLOFT) 100 MG tablet Take 1 tablet by mouth every evening.  0 11/13/2017    atorvastatin (LIPITOR) 80 MG tablet Take 1 tablet (80 mg total) by mouth once daily. 90 tablet 0     coenzyme Q10 200 mg capsule Take 200 mg by mouth once daily.   10/3/2017 at Unknown time    gabapentin (NEURONTIN) 300 MG capsule Take 300 mg by mouth every evening.       metoprolol tartrate (LOPRESSOR) 25 MG tablet Take 1 tablet (25 mg total) by mouth 2 (two) times daily. 60 tablet 0     NITROSTAT 0.4 mg SL tablet place 1 tablet under the tongue if needed every 5 minutes for chest pain for 3 doses IF NO RELIEF AFTER 3RD DOSE CALL PRESCRIBER . 100 tablet 6 Unknown at Unknown time    ramelteon (ROZEREM) 8 mg tablet Take 1 tablet (8 mg total) by mouth nightly as needed for Insomnia.  0     senna-docusate 8.6-50 mg (PERICOLACE) 8.6-50 mg per tablet Take 1 tablet by mouth 2 (two) times daily.       vit b cmplx  3-fa-vit c-biotin 1- mg-mg-mcg (ANKIT-AZ RX) 1- mg-mg-mcg Tab Take 1 tablet by mouth once daily.  0        Review of patient's allergies indicates:   Allergen Reactions    Morphine Other (See Comments)     Other reaction(s): severe abdominal pain    Darvocet a500  [propoxyphene n-acetaminophen]      Other reaction(s): Unknown       Past Medical History:   Diagnosis Date    Bladder cancer     Chronic diastolic heart failure 8/21/2012    3/13: AOSAT: 98, FICKCI: 2.41, FICKCO: 5.18 PAPRES: 34/16 (23), PASAT: 65, PVR: 1.74 PWPRES: 18/18 (14), RA PRES: 14/13 (12), RV: 40/0, 10     Chronic hip pain, right 10/4/2017    Coronary artery disease involving native coronary artery of native heart without angina pectoris     Dyslipidemia     Encounter for blood transfusion     ESRD (end stage renal disease) on dialysis 6/9/2014    Essential hypertension     Hypercholesteremia 8/21/2012    Long-term (current) use of anticoagulants 10/9/2015    NSTEMI (non-ST elevated myocardial infarction) 10/4/2017    Paroxysmal atrial fibrillation 10/8/2015    Prostate cancer     Severe aortic stenosis 10/14/2014    Type 2 diabetes mellitus with chronic kidney disease on chronic dialysis, with long-term current use of insulin 12/15/2013    Ventricular tachycardia     monomorphic     Past Surgical History:   Procedure Laterality Date    BACK SURGERY      laminectomy x2    COLON SURGERY      EXTENSIVE PROSTATE SURGER      Prostatectomy, Radical    JOINT REPLACEMENT      left hip     Family History     None        Social History Main Topics    Smoking status: Former Smoker     Packs/day: 3.00     Years: 40.00     Quit date: 1/1/1980    Smokeless tobacco: Former User    Alcohol use No      Comment: a few drinks    Drug use: Unknown    Sexual activity: Not on file     Review of Systems   Constitutional: Positive for activity change. Negative for diaphoresis, fatigue, fever and unexpected weight change.   Eyes:  Negative for visual disturbance.   Respiratory: Negative for cough, shortness of breath and wheezing.    Cardiovascular: Negative for chest pain and palpitations.   Gastrointestinal: Positive for abdominal pain. Negative for abdominal distention, blood in stool, constipation, diarrhea, nausea and vomiting.   Genitourinary: Negative for difficulty urinating, enuresis, frequency and urgency.   Musculoskeletal: Positive for back pain. Negative for gait problem, joint swelling, myalgias, neck pain and neck stiffness.   Skin: Negative for color change, rash and wound.   Neurological: Positive for weakness. Negative for dizziness, seizures, facial asymmetry, speech difficulty, light-headedness, numbness and headaches.   Hematological: Does not bruise/bleed easily.   Psychiatric/Behavioral: Negative for agitation, behavioral problems, dysphoric mood and hallucinations. The patient is not nervous/anxious.      Objective:     Weight: 97.5 kg (215 lb)  Body mass index is 34.7 kg/m².  Vital Signs (Most Recent):  Temp: 97.8 °F (36.6 °C) (11/15/17 1223)  Pulse: 103 (11/15/17 1223)  Resp: 20 (11/15/17 1223)  BP: (!) 128/59 (11/15/17 1223)  SpO2: 98 % (11/15/17 1223) Vital Signs (24h Range):  Temp:  [97.4 °F (36.3 °C)-97.9 °F (36.6 °C)] 97.8 °F (36.6 °C)  Pulse:  [] 103  Resp:  [16-20] 20  SpO2:  [95 %-100 %] 98 %  BP: (107-148)/(51-81) 128/59        Neurosurgery Physical Exam  General: well developed, well nourished, no distress.   Head: normocephalic, atraumatic  Neurologic: Alert and oriented to person and place only. Thought content appropriate. Pt resistant to the exam  GCS: Motor: 6/Verbal: 5/Eyes: 4 GCS Total: 15  Mental Status: Awake, Alert, Oriented x2  Cranial nerves: face symmetric, tongue midline, CN II-XII grossly intact.   Eyes: pupils equal, round, reactive to light with accomodation, EOMI.   Sensory: intact to light touch throughout  Motor Strength: Moves all extremities spontaneously with good tone. No  abnormal movements seen. LE exam effort and pain limited    Strength  Deltoids Triceps Biceps Wrist Extension Wrist Flexion Hand    Upper: R 5/5 5/5 5/5 5/5 5/5 5/5    L 5/5 5/5 5/5 5/5 5/5 5/5     Iliopsoas Quadriceps Knee  Flexion Tibialis  anterior Gastro- cnemius EHL   Lower: R 3/5 1/5 5/5 5/5 5/5 5/5    L 3/5 1/5 5/5 5/5 5/5 5/5     DTR's - 2 + and symmetric in UE and LE  Pronator Drift: not assessed  Finger-to-nose: not assessed  Funez: absent  Clonus: absent  Babinski: absent  Pulses: 2+ and symmetric radial and dorsalis pedis. No lower extremity edema  Straight leg raise: negative  Abdomen: soft, mildly tender in both lower quadrants  Pulm: normal respiratory effort   Incision is well healed except for small area at the inferior edge with granulation tissue and mild erythema. No drainage      Significant Labs:    Recent Labs  Lab 11/14/17  1534 11/15/17  0514   * 66*    142   K 4.0 4.0   CL 96 98   CO2 31* 31*   BUN 31* 36*   CREATININE 4.0* 4.4*   CALCIUM 9.6 9.8       Recent Labs  Lab 11/14/17  1534   WBC 9.82   HGB 9.8*   HCT 32.0*          Recent Labs  Lab 11/15/17  0514   INR 3.8*     Microbiology Results (last 7 days)     Procedure Component Value Units Date/Time    Urine culture [[id number]] Collected:  11/15/17 0126    Order Status:  No result Specimen:  Urine Updated:  11/15/17 0126        All pertinent labs from the last 24 hours have been reviewed.    Significant Diagnostics:  CT lumbar spine 11/14: reviewed    Prior L2-L4 instrumented lumbar fusion with interbody disc spacer at L3-4 and decompressive posterior laminectomies. No fracture identified, noting the inferior endplate of L4 is indistinct. Hardware appears intact. Artifact from adjacent pedicle screw and lack of IV contrast limits evaluation.    MRI lumbar spine 11/14: reviewed    Ill-defined heterogeneous fluid collection encircling L1 spinous process demonstrating fluid levels on the right of L1 spinous process  measuring 4.6 x 2.1 cm. Significant edema is noted at the subcutaneous soft tissue of the operative site.

## 2017-11-15 NOTE — NURSING
Contacted IM-L.  Pt with productive, good cough, coarse lung sounds.  Requesting breathing tx PRN.      Pt on 2 L O2 NC.  Requesting PRN order for O2.

## 2017-11-15 NOTE — PLAN OF CARE
Problem: Occupational Therapy Goal  Goal: Occupational Therapy Goal  Goals to be met by: 11/22     Patient will increase functional independence with ADLs by performing:    Bed mobility with demo understanding for spinal precautions with min(A).  Stand Pivot transfer to multiple surfaces (chair, wheelchair, bedside commode) with Moderate Assistance.   Donning LSO with Minimal Assistance while seated EOB.  Donning socks with sock aid with minimal assistance.   Functional dynamic sitting task ~8 min duration while seated EOB with CGA for postural control.   UE endurance HEP with set up and assistance as needed.     Outcome: Ongoing (interventions implemented as appropriate)  POC set for 4x/w in acute setting. Rec SNF for d/c planning. PIYUSH Seaman 11/15/2017

## 2017-11-15 NOTE — PROGRESS NOTES
[facility]  Nephrology  Progress Note    Patient Name: Donta Cline  MRN: [id number]  Admission Date: 11/14/2017  Hospital Length of Stay: 0 days  Attending Provider: Gurmeet Alonso MD   Primary Care Physician: ZAID Richardson Iii, MD  Principal Problem:Back pain    Subjective:     HPI: 86 yo male with significant history for hypertension, hyperlipidemia, remote smoker, PAF, Severe AS, DMT2, CAD sp NSTEMI, dcognitive impairment, recent sp lumbar fusion/laminectomy 10/20/17, and ESRD x 2 years who is admitted for lower back pain associated with BLE pain/spasm since discharge to [facility] on 11/10 from inpatient rehab at [facility]. Patient confuse.Daughter Wendy at bedside reports patient had acute decline on 11/11 as patient went from walking 50 to 100 ft and minimal assist with ADL's to not able to hold self up or walk. MRI lumbar showed fluid collection at L1. Neurosurgery consulted. Nephrology consult for hemodialysis. HD MWF 3.5 hrs duration via LFA AVF at [facility]. Some residual but not sure how much. Unclear of EDW. Last HD 11/13. CXR bilateral basilar atelectasis otherwise clear.       Past Medical History:   Diagnosis Date    Bladder cancer     Chronic diastolic heart failure 8/21/2012    3/13: AOSAT: 98, FICKCI: 2.41, FICKCO: 5.18 PAPRES: 34/16 (23), PASAT: 65, PVR: 1.74 PWPRES: 18/18 (14), RA PRES: 14/13 (12), RV: 40/0, 10     Chronic hip pain, right 10/4/2017    Coronary artery disease involving native coronary artery of native heart without angina pectoris     Dyslipidemia     Encounter for blood transfusion     ESRD (end stage renal disease) on dialysis 6/9/2014    Essential hypertension     Hypercholesteremia 8/21/2012    Long-term (current) use of anticoagulants 10/9/2015    NSTEMI (non-ST elevated myocardial infarction) 10/4/2017    Paroxysmal atrial fibrillation 10/8/2015    Prostate cancer     Severe aortic stenosis 10/14/2014     Type 2 diabetes mellitus with chronic kidney disease on chronic dialysis, with long-term current use of insulin 12/15/2013    Ventricular tachycardia     monomorphic       Past Surgical History:   Procedure Laterality Date    BACK SURGERY      laminectomy x2    COLON SURGERY      EXTENSIVE PROSTATE SURGER      Prostatectomy, Radical    JOINT REPLACEMENT      left hip       Review of patient's allergies indicates:   Allergen Reactions    Morphine Other (See Comments)     Other reaction(s): severe abdominal pain    Darvocet a500  [propoxyphene n-acetaminophen]      Other reaction(s): Unknown     Current Facility-Administered Medications   Medication Frequency    0.9%  NaCl infusion PRN    0.9%  NaCl infusion Once    acetaminophen tablet 650 mg Q8H PRN    albuterol nebulizer solution 2.5 mg Q4H PRN    aspirin EC tablet 81 mg Daily    atorvastatin tablet 80 mg Daily    dextrose 50% injection 12.5 g PRN    dextrose 50% injection 25 g PRN    glucagon (human recombinant) injection 1 mg PRN    glucose chewable tablet 16 g PRN    glucose chewable tablet 24 g PRN    heparin (porcine) injection 5,000 Units Q8H    hydrocodone-acetaminophen 10-325mg per tablet 1 tablet Q6H PRN    insulin aspart pen 0-5 Units QID (AC + HS) PRN    insulin detemir pen 10 Units QHS    metoprolol tartrate (LOPRESSOR) tablet 25 mg BID    midodrine tablet 10 mg TID    ondansetron disintegrating tablet 8 mg Q8H PRN    pantoprazole EC tablet 40 mg Nightly    polyethylene glycol packet 17 g BID    ramelteon tablet 8 mg Nightly PRN    senna-docusate 8.6-50 mg per tablet 1 tablet BID    sertraline tablet 100 mg QHS    sodium chloride 0.9% flush 3 mL PRN    [START ON 11/16/2017] warfarin (COUMADIN) tablet 10 mg Every Tues, Thurs, Sat     Family History     None        Social History Main Topics    Smoking status: Former Smoker     Packs/day: 3.00     Years: 40.00     Quit date: 1/1/1980    Smokeless tobacco: Former User     Alcohol use No      Comment: a few drinks    Drug use: Unknown    Sexual activity: Not on file     Review of Systems   Unable to perform ROS: Dementia   Constitutional: Positive for activity change. Negative for chills, fatigue and fever.   Cardiovascular: Negative.    Gastrointestinal: Positive for abdominal distention. Negative for abdominal pain, constipation, diarrhea and rectal pain.   Genitourinary:        See above   Musculoskeletal: Positive for arthralgias, back pain and myalgias.   Neurological: Positive for weakness.     Objective:     Vital Signs (Most Recent):  Temp: 97.8 °F (36.6 °C) (11/15/17 0815)  Pulse: 81 (11/15/17 0815)  Resp: 16 (11/15/17 0815)  BP: (!) 107/53 (11/15/17 0815)  SpO2: 96 % (11/15/17 0815)  O2 Device (Oxygen Therapy):  (O2 order changed to PRN per Gurmeet Alonso) (11/15/17 0859) Vital Signs (24h Range):  Temp:  [97.4 °F (36.3 °C)-97.9 °F (36.6 °C)] 97.8 °F (36.6 °C)  Pulse:  [] 81  Resp:  [16-20] 16  SpO2:  [95 %-100 %] 96 %  BP: (107-148)/(51-81) 107/53     Weight: 97.5 kg (215 lb) (11/14/17 1410)  Body mass index is 34.7 kg/m².  Body surface area is 2.13 meters squared.    I/O last 3 completed shifts:  In: 770 [P.O.:270; IV Piggyback:500]  Out: 100 [Urine:100]    Physical Exam   Constitutional: He appears well-developed and well-nourished.   confuse   Eyes: EOM are normal.   Neck: Neck supple.   Cardiovascular: An irregularly irregular rhythm present.   Murmur heard.  Pulmonary/Chest: Effort normal.   Diminished at bases otherwise clear   Abdominal: Soft. Bowel sounds are normal. There is no tenderness.   Musculoskeletal:   Lower back TTP.    Neurological: He is alert.   Oriented to self and place not time or situation. Not able to left legs due to pain? Difficult exam due to mental status and cooperation.    Skin: Skin is warm and dry.   LFA AVF bruit and thrill       Significant Labs:  All labs within the past 24 hours have been reviewed.    Significant  Imaging:  Labs: Reviewed    Assessment/Plan:     ESRD (end stage renal disease) on dialysis    HD MWF 3.5 hrs duration via LFA AVF at [facility]. Unsure EDW or residual function.   -Lytes and acid base stable.   -HD today. Dialysate will be adjusted to current labs. UF 2 L. Will review outpatient flowsheet.     Anemia  -hgb 9.8. No ZHEN for now.     BMD  -No binders for now. High cCa levels. Avoid any Ca based supplements or Ca containing binders.                   Thank you for your consult. I will follow-up with patient. Please contact us if you have any additional questions.    Rosa Ramirez NP  Nephrology  [facility]

## 2017-11-15 NOTE — HOSPITAL COURSE
In discussion with daughter who is at bedside today (11/15) patient was discharged after surgical procedure to an inpatient rehab and he reports that he was doing well at the rehab but reached a plateau in his activity/functional level.  She states patient was walking 50 feet with minimal assist, able to toilet with minimal assistance.  Patient was transferred to SNF and since transfer, last Friday (11/10) patient requiring max assistance to stand and reported inability to bear weight secondary to pain, and since this time patient has been bed bound.     She also notes that patient with poor appetite as speech therapy has recommended puree nectar thick diet with Nepro shakes, reports that pt is losing weight and some increased abdominal girth.     Overnight patient admitted due to concerns of worsening pain and inability to ambulate, patient underwent MRI and CT imaging of the lumbar spine.  He has intact fusion, no spinal canal stenosis or cord compression noted, concern for a fluid collection encircling L1 spinous process, post op L2-L4 fusion changes and s/p laminectomy L3-L4.  Discussed imaging findings with attending neurosurgeon Dr. Sands who performed pts operation and he reported that fluid collection appears outside of range where instrumentation was.  Patient with no reported falls, workup today reveals elevated CRP >150, ESR 92, has as supratherapeutic INR 3.8    Informed plan will be to take patient to OR for washout and exploration to evaluate for hematoma vs infection.     11/16 - 11/21   Patient  required total of 6 units of FFP in order to correct his supratherapeutic INR on day of surgery, when taken to the OR patient was found with a hematoma in the wound bed (see op not for details)      Patient taken for operative intervention and in discussion with neurosurgeon Dr. Sands, he reports that patient with well healed wound and surgical fusion was intact, when cut into the dura and in subdural region  there was area of hematoma that was evacuated (full op report pending in EMR)  Cultures sent to rule out infection but clinical impression was not an appearance of an infected operative site.  Dr. Sands noted that degree of patient's pain incongruent with surgical findings.     Post operatively patient converted from sinus rhythm to Afib with RVR, his hemoglobin downtrended and and he required PRBC transfusion.     There was difficulty with hypotension and exacerbation of RVR with HD sessions this week.  Post-operatively patient remained with severe back pain and repeat PT/OT was ordered but no significant improvement in his functional status.  Initial Neurosurgery impressions were that size and location of hematoma did not correspond to severity of his symptoms but as his hospital course advanced, supposition from neurosurgery team that patient may have had some type of permanent damage when the hematoma initially formed.  All infectious workup both surgical and pan culture on admission was negative and antibiotic therapy while patient had surgical drains in was continued Cefazolin for wound infection prophylaxis, this was discontinued when surgical drains were removed.     CPAP initiated for hypoxic respiratory failure secondary to concerns of FFP/IV fluid and PRBC administration resulting in volume overload.  CPAP has remained as a nightly therapy patient doing well with this.   Week of 11/22 - 11/27    Pt had increasing delirium and wbc elevated thus started on ceftriaxone based on dirty UA. DDx includes aspiration PNA as pt with cough.  Repeat bout of respiratory failure, hypoxic and also hypotensive, concerning for sepsis.  He was started on Vanc and Cefepime for HCAP coverage, patient with worsening delirium during the week and infectious workup undertaken revealed Candida in sputum and urine.      ID was consulted and patient started on 10 day course of fluconazole for candidiasis, empiric antibiotics were  discontinued.     For worsening delirium his pain management regimen was tapered as he had been on fentanyl patches on admission and he was tapered to lyrica, APAP, lidocaine patches and PRN tramadol.      Week of 11/28     Patient had ongoing hypoxia, and waxing/waning delirium, he was reported to be fully alert and oriented on 11/27 but noted to wax and wane.  Over the course of the week, mental status declined his oral intake decreased.  Repeat Speech asessments concerning for increased aspiration and recommendation that Pureed with Honey Thick would be safest but still with a high aspiration risk.  Due to fluctuations in mental status, nursing often not comfortable offering oral intake.  Patient has complained of thirst and asking for water.     His WBC also began rising, repeat blood cultures, lactic acid levels have been non-revelatory.  ID was reconsulted and patient was started on Moxifloxacin and have added back intermittent dosages of Vancomycin when appropriate.     Updated daughter (Wendy) regarding his condition this week, given rising WBC cound, repeat infectious workup with cultures and obtain Pan CTA chest to eval lungs and rule out PE as recd by ID and also CT abd/pelvis, triple phase to characterize liver lesion and check for signs of infection.     (12/2) - Patient is NPO, mental status wax and wanes, but has been mostly depressed and patient is mostly lethargic, he complained of unbearable pain this AM and onetime dose of dilaudid given, but patient has been persistently lethargic in the afternoon and evening following this.  Some hypotension today, and IV Albumin ordered.  Daughter reports very sensitive to IV Opiates and often they result in hypotension.     Updated Quynh about patient's status, she would like to pursue antibiotic therapy, ok with NPO, and CT scans to determine for a reversible etiology, she is POA but if these studies do not reveal reversible cause and patient does not  improve, may require arranging a family meeting next week.  CTA Chest, Triple Phase CT Abd/Pelvis obtained and most concerning for new findings of numerous pulmonary nodules concerning for metastatic disease.  Patient with a history of bladder cancer that was treated and reported in remission, daughter Wendy reports that with a non[facility] pulmonlogist patient underwent CT in 2010-11 approx and was reported with no lesions.  Pulmonlogist had advised Wendy that if bladder cancer metastasized, first location is likely the lung.       12/3 - CTA Chest and triple phase abd ct obtained, returned with results most significant of Numerous pulmonary nodules concerning for metastatic cancer, left lower lobe pneumonia, diffuse atherosclerosis, abdominal granulomatous disease in liver and spleen.  Extensive stool in colon, no acute abnormality on CT of the Bladder or Prostate.  He has a history of bladder cancer that was previously treated.     I spoke with Wendy late this evening and she states a few years ago under the care of a private pulmonologist after cancer was considered in remission that he had a chest CT that was reported as normal.  She also states previously was told that if bladder cancer metastasized first site of spread is commonly the lung.  She inquired if patient needs CNS imaging to rule out brain metastases.  As patient is ESRD and would require MRI with contrast, will have to organize with both radiology and nephrology to have patient dialyzed after undergoing an MRI Brain with contrast.     Starting vancomycin for pneumonia on CT scan, and patient has been NPO due to mental status, on IV intermittent doses of metoprolol for rate control of afib with RVR.     12/4 - Patient seen this AM, daughter, DEBRA Nguyen is at bedside.  She reports she discussed results of CT scan with patient and states that she's not sure if he fully understands.  On basic orientation questions, patient stated he was at [facility] in the  [place], later stated he was at [facility] on [address].     12/5 - pt w/ improved mental status this am but appears to wax/wane per report from daughter at bedside. Plan was for IR biopsy of lesions but after discussion, daughter believes that pt would not want to continue at current level of function and discomfort. He has had recent discussions about discontinuing out-pt Hd. Pt denies pain today.

## 2017-11-15 NOTE — SUBJECTIVE & OBJECTIVE
Past Medical History:   Diagnosis Date    Bladder cancer     Chronic diastolic heart failure 8/21/2012    3/13: AOSAT: 98, FICKCI: 2.41, FICKCO: 5.18 PAPRES: 34/16 (23), PASAT: 65, PVR: 1.74 PWPRES: 18/18 (14), RA PRES: 14/13 (12), RV: 40/0, 10     Chronic hip pain, right 10/4/2017    Coronary artery disease involving native coronary artery of native heart without angina pectoris     Dyslipidemia     Encounter for blood transfusion     ESRD (end stage renal disease) on dialysis 6/9/2014    Essential hypertension     Hypercholesteremia 8/21/2012    Long-term (current) use of anticoagulants 10/9/2015    NSTEMI (non-ST elevated myocardial infarction) 10/4/2017    Paroxysmal atrial fibrillation 10/8/2015    Prostate cancer     Severe aortic stenosis 10/14/2014    Type 2 diabetes mellitus with chronic kidney disease on chronic dialysis, with long-term current use of insulin 12/15/2013    Ventricular tachycardia     monomorphic       Past Surgical History:   Procedure Laterality Date    BACK SURGERY      laminectomy x2    COLON SURGERY      EXTENSIVE PROSTATE SURGER      Prostatectomy, Radical    JOINT REPLACEMENT      left hip       Review of patient's allergies indicates:   Allergen Reactions    Morphine Other (See Comments)     Other reaction(s): severe abdominal pain    Darvocet a500  [propoxyphene n-acetaminophen]      Other reaction(s): Unknown     Current Facility-Administered Medications   Medication Frequency    0.9%  NaCl infusion PRN    0.9%  NaCl infusion Once    acetaminophen tablet 650 mg Q8H PRN    albuterol nebulizer solution 2.5 mg Q4H PRN    aspirin EC tablet 81 mg Daily    atorvastatin tablet 80 mg Daily    dextrose 50% injection 12.5 g PRN    dextrose 50% injection 25 g PRN    glucagon (human recombinant) injection 1 mg PRN    glucose chewable tablet 16 g PRN    glucose chewable tablet 24 g PRN    heparin (porcine) injection 5,000 Units Q8H     hydrocodone-acetaminophen 10-325mg per tablet 1 tablet Q6H PRN    insulin aspart pen 0-5 Units QID (AC + HS) PRN    insulin detemir pen 10 Units QHS    metoprolol tartrate (LOPRESSOR) tablet 25 mg BID    midodrine tablet 10 mg TID    ondansetron disintegrating tablet 8 mg Q8H PRN    pantoprazole EC tablet 40 mg Nightly    polyethylene glycol packet 17 g BID    ramelteon tablet 8 mg Nightly PRN    senna-docusate 8.6-50 mg per tablet 1 tablet BID    sertraline tablet 100 mg QHS    sodium chloride 0.9% flush 3 mL PRN    [START ON 11/16/2017] warfarin (COUMADIN) tablet 10 mg Every Tues, Thurs, Sat     Family History     None        Social History Main Topics    Smoking status: Former Smoker     Packs/day: 3.00     Years: 40.00     Quit date: 1/1/1980    Smokeless tobacco: Former User    Alcohol use No      Comment: a few drinks    Drug use: Unknown    Sexual activity: Not on file     Review of Systems   Unable to perform ROS: Dementia   Constitutional: Positive for activity change. Negative for chills, fatigue and fever.   Cardiovascular: Negative.    Gastrointestinal: Positive for abdominal distention. Negative for abdominal pain, constipation, diarrhea and rectal pain.   Genitourinary:        See above   Musculoskeletal: Positive for arthralgias, back pain and myalgias.   Neurological: Positive for weakness.     Objective:     Vital Signs (Most Recent):  Temp: 97.8 °F (36.6 °C) (11/15/17 0815)  Pulse: 81 (11/15/17 0815)  Resp: 16 (11/15/17 0815)  BP: (!) 107/53 (11/15/17 0815)  SpO2: 96 % (11/15/17 0815)  O2 Device (Oxygen Therapy):  (O2 order changed to PRN per Gumreet Alonso) (11/15/17 0859) Vital Signs (24h Range):  Temp:  [97.4 °F (36.3 °C)-97.9 °F (36.6 °C)] 97.8 °F (36.6 °C)  Pulse:  [] 81  Resp:  [16-20] 16  SpO2:  [95 %-100 %] 96 %  BP: (107-148)/(51-81) 107/53     Weight: 97.5 kg (215 lb) (11/14/17 1410)  Body mass index is 34.7 kg/m².  Body surface area is 2.13 meters squared.    I/O  last 3 completed shifts:  In: 770 [P.O.:270; IV Piggyback:500]  Out: 100 [Urine:100]    Physical Exam   Constitutional: He appears well-developed and well-nourished.   confuse   Eyes: EOM are normal.   Neck: Neck supple.   Cardiovascular: An irregularly irregular rhythm present.   Murmur heard.  Pulmonary/Chest: Effort normal.   Diminished at bases otherwise clear   Abdominal: Soft. Bowel sounds are normal. There is no tenderness.   Musculoskeletal:   Lower back TTP.    Neurological: He is alert.   Oriented to self and place not time or situation. Not able to left legs due to pain? Difficult exam due to mental status and cooperation.    Skin: Skin is warm and dry.   LFA AVF bruit and thrill       Significant Labs:  All labs within the past 24 hours have been reviewed.    Significant Imaging:  Labs: Reviewed

## 2017-11-15 NOTE — H&P
"[facility] Hospital Medicine  History & Physical    Patient Name: Donta Cline  MRN: [id number]  Admission Date: 11/14/2017  Attending Physician: Gurmeet Alonso MD   Primary Care Provider: ZAID Richardson Iii, MD    [facility] Medicine Team: Networked reference to record PCT  Theo Newman PA-C     Patient information was obtained from patient, past medical records and ER records.     Subjective:     Principal Problem:Lumbar back pain with radiculopathy affecting right lower extremity    Chief Complaint:   Chief Complaint   Patient presents with    Extremity Weakness     pt had back surgery in October, pt was seen at [facility] today and sent to ED for further evaluation new onset of not being able to walk.        HPI: Donta Cline is a 85 y.o. male who presents with PMHx of ESRD with dialysis MWF, HFpEF, NSTEMI, bladder/prostate cancer, DM II, aortic stenosis and CAD for evaluation of back pain s/p lumbar fusion/laminectomy (10/20/17). No family at bedside. Difficult to obtain HPI as speech garbled, however patient endorses progressive lower extremity weakness with difficulty ambulating for the past 4-5 days. His back pain is without radiation. Patient is now unable to ambulate independently. Denies trauma and urinary bowel/bladder incontinence, fever.    Per EDMD:  "The daughter brought patient to ED due to concern of his severe back pain and inability to perform activities as he was doing previously. She had discussed with Dr. Saul, and he recommended the patient come to ED and obtain imaging for spine."    Imaging:    Ct Lumbar Spine Without Contrast    Result Date: 11/14/2017  Comparison: MRI 10/12/17. Findings: Routine CT lumbar spine protocol performed without IV contrast. Prior L2-L4 instrumented lumbar fusion with interbody disc spacer at L3-4. Decompressive posterior laminectomies at from L2-3 through L5-S1. No evidence of hardware failure. No fracture identified, noting the " inferior endplate of L4 is indistinct. Infection or postsurgical fluid collection not excluded, noting limited evaluation without IV contrast and artifact from adjacent pedicle screws. There is partial fusion of the right L5-S1 facet. The SI joints are unremarkable. There is extensive calcified atherosclerotic disease. Atrophic kidneys. Partially imaged right renal stent noted. No hydronephrosis. Small renal lesions, too small to characterize without IV contrast.      Mri Lumbar Spine Without Contrast    Result Date: 11/14/2017  MRI LUMBAR SPINE TECHNIQUE: MRI lumbar spine was performed without contrast. There is an ill-defined heterogeneous collection encircling L1 spinous process demonstrating fluid levels on the right. This may represents a seroma or hematoma postoperatively however abscess cannot be excluded. This collection is best seen on series 11 image 4.    Past Medical History:   Diagnosis Date    Bladder cancer     Chronic diastolic heart failure 8/21/2012    3/13: AOSAT: 98, FICKCI: 2.41, FICKCO: 5.18 PAPRES: 34/16 (23), PASAT: 65, PVR: 1.74 PWPRES: 18/18 (14), RA PRES: 14/13 (12), RV: 40/0, 10     Chronic hip pain, right 10/4/2017    Coronary artery disease involving native coronary artery of native heart without angina pectoris     Dyslipidemia     Encounter for blood transfusion     ESRD (end stage renal disease) on dialysis 6/9/2014    Essential hypertension     Hypercholesteremia 8/21/2012    Long-term (current) use of anticoagulants 10/9/2015    NSTEMI (non-ST elevated myocardial infarction) 10/4/2017    Paroxysmal atrial fibrillation 10/8/2015    Prostate cancer     Severe aortic stenosis 10/14/2014    Type 2 diabetes mellitus with chronic kidney disease on chronic dialysis, with long-term current use of insulin 12/15/2013    Ventricular tachycardia     monomorphic       Past Surgical History:   Procedure Laterality Date    BACK SURGERY      laminectomy x2    COLON SURGERY       EXTENSIVE PROSTATE SURGER      Prostatectomy, Radical    JOINT REPLACEMENT      left hip       Review of patient's allergies indicates:   Allergen Reactions    Morphine Other (See Comments)     Other reaction(s): severe abdominal pain    Darvocet a500  [propoxyphene n-acetaminophen]      Other reaction(s): Unknown       No current facility-administered medications on file prior to encounter.      Current Outpatient Prescriptions on File Prior to Encounter   Medication Sig    hydrocodone-acetaminophen 10-325mg (NORCO)  mg Tab Take 1 tablet by mouth every 6 (six) hours as needed for Pain.     insulin detemir (LEVEMIR FLEXTOUCH) 100 unit/mL (3 mL) SubQ InPn pen Inject 14 Units into the skin every evening.    midodrine (PROAMATINE) 10 MG tablet Take 1 tablet (10 mg total) by mouth 3 (three) times daily.    pantoprazole (PROTONIX) 40 MG tablet Take 1 tablet (40 mg total) by mouth nightly.    sertraline (ZOLOFT) 100 MG tablet Take 1 tablet by mouth every evening.    atorvastatin (LIPITOR) 80 MG tablet Take 1 tablet (80 mg total) by mouth once daily.    coenzyme Q10 200 mg capsule Take 200 mg by mouth once daily.    gabapentin (NEURONTIN) 300 MG capsule Take 300 mg by mouth every evening.    metoprolol tartrate (LOPRESSOR) 25 MG tablet Take 1 tablet (25 mg total) by mouth 2 (two) times daily.    NITROSTAT 0.4 mg SL tablet place 1 tablet under the tongue if needed every 5 minutes for chest pain for 3 doses IF NO RELIEF AFTER 3RD DOSE CALL PRESCRIBER .    ramelteon (ROZEREM) 8 mg tablet Take 1 tablet (8 mg total) by mouth nightly as needed for Insomnia.    senna-docusate 8.6-50 mg (PERICOLACE) 8.6-50 mg per tablet Take 1 tablet by mouth 2 (two) times daily.    vit b cmplx 3-fa-vit c-biotin 1- mg-mg-mcg (ANKIT-AZ RX) 1- mg-mg-mcg Tab Take 1 tablet by mouth once daily.     Family History     None        Social History Main Topics    Smoking status: Former Smoker     Packs/day: 3.00      Years: 40.00     Quit date: 1/1/1980    Smokeless tobacco: Former User    Alcohol use No      Comment: a few drinks    Drug use: Unknown    Sexual activity: Not on file     Review of Systems   Constitutional: Positive for activity change. Negative for chills and fatigue.   HENT: Negative for sore throat and trouble swallowing.    Eyes: Negative for photophobia and visual disturbance.   Respiratory: Positive for cough. Negative for chest tightness and shortness of breath.    Cardiovascular: Negative for chest pain.   Gastrointestinal: Negative for abdominal pain, constipation (LBM yesterday ) and nausea.   Genitourinary: Negative for difficulty urinating, dysuria and urgency.   Musculoskeletal: Positive for back pain and gait problem. Negative for myalgias.   Skin: Negative.    Neurological: Positive for speech difficulty and weakness. Negative for dizziness, syncope, numbness and headaches.   Psychiatric/Behavioral: Negative for confusion and suicidal ideas. The patient is not nervous/anxious.      Objective:     Vital Signs (Most Recent):  Temp: 97.8 °F (36.6 °C) (11/14/17 2106)  Pulse: 84 (11/15/17 0019)  Resp: 16 (11/15/17 0019)  BP: 120/69 (11/14/17 2106)  SpO2: 100 % (11/15/17 0019) Vital Signs (24h Range):  Temp:  [97.8 °F (36.6 °C)-97.9 °F (36.6 °C)] 97.8 °F (36.6 °C)  Pulse:  [] 84  Resp:  [16-20] 16  SpO2:  [95 %-100 %] 100 %  BP: (120-148)/(63-81) 120/69     Weight: 97.5 kg (215 lb)  Body mass index is 34.7 kg/m².    Physical Exam   Constitutional: He is oriented to person, place, and time. He appears well-developed. No distress.   HENT:   Head: Normocephalic and atraumatic.   Eyes: EOM are normal. Pupils are equal, round, and reactive to light.   Neck: Normal range of motion. No tracheal deviation present.   Cardiovascular: Normal rate and normal heart sounds.    Pulmonary/Chest: Effort normal. No respiratory distress. He has wheezes.   Coughing on exam    Abdominal: Soft. Bowel sounds are  normal. He exhibits no distension. There is no tenderness.   Musculoskeletal: Normal range of motion.   Neurological: He is alert and oriented to person, place, and time. No cranial nerve deficit or sensory deficit.   4/5 bilateral hip flexion , 1/5 knee flexion/extension, bilateral plantar/dorsiflexion 5/5, sensation grossly intact to all extremities.    Skin: Skin is warm and dry. Capillary refill takes 2 to 3 seconds.   Psychiatric: He has a normal mood and affect. His behavior is normal. Judgment and thought content normal.   Nursing note and vitals reviewed.       Significant Labs: All pertinent labs within the past 24 hours have been reviewed.    Significant Imaging: I have reviewed all pertinent imaging results/findings within the past 24 hours.    Assessment/Plan:     * Lumbar back pain with radiculopathy affecting right lower extremity    - S/p lumbar fusion 10/20/2017. S/p IPR, SNF, now recently back with with inability to care for himself. Presents with c/o lower back pain and progressive lower extremity weakness, patient now unable to ambulate. Unclear baseline functional status  - MRI reviewed, concerning for seroma vs hematoma, cannot rule out infectious process   - Supportive care, PT/OT  - Neurosurgery recommendations pending        Paroxysmal atrial fibrillation    - continue metoprolol, tele  - daily INR, check now  - continue warfarin 7.5 mg QOD, 10 mg QOD        Severe aortic stenosis    - history of        ESRD (end stage renal disease) on dialysis    - nephrology consulted for volume management  - midodrine TID        Type 2 diabetes mellitus with chronic kidney disease on chronic dialysis, with long-term current use of insulin    - continue home insulin        Coronary artery disease involving native coronary artery of native heart without angina pectoris    - no CP, SOB, anginal equivalents  - continue asa, statin  - EKG without ischemic changes        Chronic diastolic heart failure    -  last echo 10/2017 with pulmonary HTN and undetermined diastolic function, EF 55  - appears compensated, CXR without edema, BNP elevated, but below baseline          VTE Risk Mitigation         Ordered     warfarin (COUMADIN) tablet 10 mg  Every Tues, Thurs, Sat     Route:  Oral        11/15/17 0059     warfarin tablet 7.5 mg  Every Mon, Wed, Fri     Route:  Oral        11/15/17 0059     heparin (porcine) injection 5,000 Units  Every 8 hours     Route:  Subcutaneous        11/14/17 2044     Medium Risk of VTE  Once      11/14/17 2044     Place sequential compression device  Until discontinued      11/14/17 1907     Place BRAIN hose  Until discontinued      11/14/17 1907             Thoe Newman PA-C  Department of Hospital Medicine   [facility]

## 2017-11-15 NOTE — ASSESSMENT & PLAN NOTE
85 y.o. male s/p L2-4 fusion 10/20 for lumbar stenosis who presents with acute worsening of his LBP and LE weakness    -Patient with HF and KE weakness on exam, although he his exam is effort/pain limited  -MRI with fluid collection in the operative bed. Hardware appears stable  -May consider flexion extension films and possible IR drainage of collection. Final plan pending staff discussion  -INR 3.8 today. Hold ASA and coumadin  -ESR 92/, f/u procalcitonin  -Afebrile, no leukocytosis  -H/H stable  -Q4h neuro checks  -PT/OT   -Pain control per primary team  -Please call [facility]Gy with any change in neuro exam

## 2017-11-15 NOTE — CONSULTS
Warfarin Consult     Current warfarin order: 7.5 mg Q M/W/F and 10 mg Q T/Th/Sat    Indication: Afib     INR goal: 2-3     Unclear of most current outpatient warfarin dose. Patient last seen in Coumadin clinic on 8/28/2017 (warfarin dose in clinic was 6 mg on Tuesday and 4 mg all other days). Since September has been following on INR levels with outpatient PCP.     Current INR: 3.8     Recommendation: Hold today's dose. Pharmacy will follow up.     For questions, please contact the pharmacist: Karina Aguiar, Pharm.D   Phone: [phone]

## 2017-11-15 NOTE — SUBJECTIVE & OBJECTIVE
Past Medical History:   Diagnosis Date    Bladder cancer     Chronic diastolic heart failure 8/21/2012    3/13: AOSAT: 98, FICKCI: 2.41, FICKCO: 5.18 PAPRES: 34/16 (23), PASAT: 65, PVR: 1.74 PWPRES: 18/18 (14), RA PRES: 14/13 (12), RV: 40/0, 10     Chronic hip pain, right 10/4/2017    Coronary artery disease involving native coronary artery of native heart without angina pectoris     Dyslipidemia     Encounter for blood transfusion     ESRD (end stage renal disease) on dialysis 6/9/2014    Essential hypertension     Hypercholesteremia 8/21/2012    Long-term (current) use of anticoagulants 10/9/2015    NSTEMI (non-ST elevated myocardial infarction) 10/4/2017    Paroxysmal atrial fibrillation 10/8/2015    Prostate cancer     Severe aortic stenosis 10/14/2014    Type 2 diabetes mellitus with chronic kidney disease on chronic dialysis, with long-term current use of insulin 12/15/2013    Ventricular tachycardia     monomorphic       Past Surgical History:   Procedure Laterality Date    BACK SURGERY      laminectomy x2    COLON SURGERY      EXTENSIVE PROSTATE SURGER      Prostatectomy, Radical    JOINT REPLACEMENT      left hip       Review of patient's allergies indicates:   Allergen Reactions    Morphine Other (See Comments)     Other reaction(s): severe abdominal pain    Darvocet a500  [propoxyphene n-acetaminophen]      Other reaction(s): Unknown       No current facility-administered medications on file prior to encounter.      Current Outpatient Prescriptions on File Prior to Encounter   Medication Sig    hydrocodone-acetaminophen 10-325mg (NORCO)  mg Tab Take 1 tablet by mouth every 6 (six) hours as needed for Pain.     insulin detemir (LEVEMIR FLEXTOUCH) 100 unit/mL (3 mL) SubQ InPn pen Inject 14 Units into the skin every evening.    midodrine (PROAMATINE) 10 MG tablet Take 1 tablet (10 mg total) by mouth 3 (three) times daily.    pantoprazole (PROTONIX) 40 MG tablet Take 1 tablet  (40 mg total) by mouth nightly.    sertraline (ZOLOFT) 100 MG tablet Take 1 tablet by mouth every evening.    atorvastatin (LIPITOR) 80 MG tablet Take 1 tablet (80 mg total) by mouth once daily.    coenzyme Q10 200 mg capsule Take 200 mg by mouth once daily.    gabapentin (NEURONTIN) 300 MG capsule Take 300 mg by mouth every evening.    metoprolol tartrate (LOPRESSOR) 25 MG tablet Take 1 tablet (25 mg total) by mouth 2 (two) times daily.    NITROSTAT 0.4 mg SL tablet place 1 tablet under the tongue if needed every 5 minutes for chest pain for 3 doses IF NO RELIEF AFTER 3RD DOSE CALL PRESCRIBER .    ramelteon (ROZEREM) 8 mg tablet Take 1 tablet (8 mg total) by mouth nightly as needed for Insomnia.    senna-docusate 8.6-50 mg (PERICOLACE) 8.6-50 mg per tablet Take 1 tablet by mouth 2 (two) times daily.    vit b cmplx 3-fa-vit c-biotin 1- mg-mg-mcg (ANKIT-AZ RX) 1- mg-mg-mcg Tab Take 1 tablet by mouth once daily.     Family History     None        Social History Main Topics    Smoking status: Former Smoker     Packs/day: 3.00     Years: 40.00     Quit date: 1/1/1980    Smokeless tobacco: Former User    Alcohol use No      Comment: a few drinks    Drug use: Unknown    Sexual activity: Not on file     Review of Systems   Constitutional: Positive for activity change. Negative for chills and fatigue.   HENT: Negative for sore throat and trouble swallowing.    Eyes: Negative for photophobia and visual disturbance.   Respiratory: Positive for cough. Negative for chest tightness and shortness of breath.    Cardiovascular: Negative for chest pain.   Gastrointestinal: Negative for abdominal pain, constipation (LBM yesterday ) and nausea.   Genitourinary: Negative for difficulty urinating, dysuria and urgency.   Musculoskeletal: Positive for back pain and gait problem. Negative for myalgias.   Skin: Negative.    Neurological: Positive for speech difficulty and weakness. Negative for dizziness,  syncope, numbness and headaches.   Psychiatric/Behavioral: Negative for confusion and suicidal ideas. The patient is not nervous/anxious.      Objective:     Vital Signs (Most Recent):  Temp: 97.8 °F (36.6 °C) (11/14/17 2106)  Pulse: 84 (11/15/17 0019)  Resp: 16 (11/15/17 0019)  BP: 120/69 (11/14/17 2106)  SpO2: 100 % (11/15/17 0019) Vital Signs (24h Range):  Temp:  [97.8 °F (36.6 °C)-97.9 °F (36.6 °C)] 97.8 °F (36.6 °C)  Pulse:  [] 84  Resp:  [16-20] 16  SpO2:  [95 %-100 %] 100 %  BP: (120-148)/(63-81) 120/69     Weight: 97.5 kg (215 lb)  Body mass index is 34.7 kg/m².    Physical Exam   Constitutional: He is oriented to person, place, and time. He appears well-developed. No distress.   HENT:   Head: Normocephalic and atraumatic.   Eyes: EOM are normal. Pupils are equal, round, and reactive to light.   Neck: Normal range of motion. No tracheal deviation present.   Cardiovascular: Normal rate and normal heart sounds.    Pulmonary/Chest: Effort normal. No respiratory distress. He has wheezes.   Coughing on exam    Abdominal: Soft. Bowel sounds are normal. He exhibits no distension. There is no tenderness.   Musculoskeletal: Normal range of motion.   Neurological: He is alert and oriented to person, place, and time. No cranial nerve deficit or sensory deficit.   4/5 bilateral hip flexion , 1/5 knee flexion/extension, bilateral plantar/dorsiflexion 5/5, sensation grossly intact to all extremities.    Skin: Skin is warm and dry. Capillary refill takes 2 to 3 seconds.   Psychiatric: He has a normal mood and affect. His behavior is normal. Judgment and thought content normal.   Nursing note and vitals reviewed.       Significant Labs: All pertinent labs within the past 24 hours have been reviewed.    Significant Imaging: I have reviewed all pertinent imaging results/findings within the past 24 hours.

## 2017-11-15 NOTE — PT/OT/SLP EVAL
Physical Therapy  Evaluation    Donta Cline   MRN: [id number]   Admitting Diagnosis: Back pain    PT Received On: 11/15/17  PT Start Time: 1336     PT Stop Time: 1403    PT Total Time (min): 27 min       Billable Minutes:  Evaluation 27    Diagnosis: Back pain     History: personal factors and/or comorbidities that impact the plan of care: psh back sx, L DARIAN  Evaluation of Body Systems: cardiovascular/pulmonary, integumentary, musculoskeletal, neuromuscular, cognition/communication  Functional Outcome Tools: AMPAC, ROM, MMT  Clinical Presentation: evolving  Evaluation Complexity Level: Moderate      Past Medical History:   Diagnosis Date    Bladder cancer     Chronic diastolic heart failure 8/21/2012    3/13: AOSAT: 98, FICKCI: 2.41, FICKCO: 5.18 PAPRES: 34/16 (23), PASAT: 65, PVR: 1.74 PWPRES: 18/18 (14), RA PRES: 14/13 (12), RV: 40/0, 10     Chronic hip pain, right 10/4/2017    Coronary artery disease involving native coronary artery of native heart without angina pectoris     Dyslipidemia     Encounter for blood transfusion     ESRD (end stage renal disease) on dialysis 6/9/2014    Essential hypertension     Hypercholesteremia 8/21/2012    Long-term (current) use of anticoagulants 10/9/2015    NSTEMI (non-ST elevated myocardial infarction) 10/4/2017    Paroxysmal atrial fibrillation 10/8/2015    Prostate cancer     Severe aortic stenosis 10/14/2014    Type 2 diabetes mellitus with chronic kidney disease on chronic dialysis, with long-term current use of insulin 12/15/2013    Ventricular tachycardia     monomorphic      Past Surgical History:   Procedure Laterality Date    BACK SURGERY      laminectomy x2    COLON SURGERY      EXTENSIVE PROSTATE SURGER      Prostatectomy, Radical    JOINT REPLACEMENT      left hip       Referring physician: PRISCA Alonso  Date referred to PT: 11/14/2017    General Precautions: Standard, fall  Orthopedic Precautions:  (spinal )   Braces: TLSO            Patient  History:  Lives With: alone  Living Arrangements: house  Living Environment Comment: Pt transferred from SNF. Pt daughter reports prior to first admit, pt lived alone in 1-story house with threshold CORTES. Pt was mod (I) with amb ADLs. Pt then transferred to [facility]hab then SNF. Pt daughter reports pt gradually declined in mobility.   Equipment Currently Used at Home: bedside commode, rollator, cane, straight, shower chair  DME owned (not currently used): none    Previous Level of Function:  Ambulation Skills: needs assist  Transfer Skills: needs assist  ADL Skills: needs assist    Subjective:  Communicated with RN prior to session.  Pt and daughter agreeable to therapy session.   Chief Complaint: back pain  Patient goals: return to PLOF    Pain/Comfort  Pain Rating 1: 6/10  Location - Side 1: Bilateral  Location - Orientation 1: generalized  Location 1: back  Pain Addressed 1: Reposition, Distraction  Pain Rating Post-Intervention 1: 6/10      Objective:   Patient found with: telemetry     Cognitive Exam:  Oriented to: Person, Place, Time and Situation    Follows Commands/attention: Follows multistep  commands  Communication: clear/fluent  Safety awareness/insight to disability: intact    Physical Exam:  Postural examination/scapula alignment: Rounded shoulder    Skin integrity: Visible skin intact  Edema: None noted B LE    Sensation:   Intact  light/touch B LE    Lower Extremity Range of Motion:  Right Lower Extremity: WFL  Left Lower Extremity: WFL    Lower Extremity Strength:  Right Lower Extremity: gross 3+/5  Left Lower Extremity: gross 3+/5     Gross motor coordination: WFL    Functional Mobility:  Bed Mobility:  Supine to Sit: Maximum Assistance    Transfers:  Sit <> Stand Assistance: Maximum Assistance  Sit <> Stand Assistive Device: No Assistive Device  Bed <> Chair Technique: Squat Pivot  Bed <> Chair Assistance: Maximum Assistance  Bed <> Chair Assistive Device: No Assistive Device    Gait:   Gait Distance:  unable to perform    Balance:   Static Sit: FAIR: Maintains without assist, but unable to take any challenges   Dynamic Sit: FAIR: Cannot move trunk without losing balance  Static Stand: 0: Needs MAXIMAL assist to maintain   Dynamic stand: 0: N/A    Therapeutic Activities and Exercises:  Pt and daughter educated on role of PT/POC.  Pt required max A to don TLSO.  Pt will required assist x2 for transfers to bedside chair.     PT returned to room with OT.  Pt required total A (assist x2) with squat pivot transfer from chair to EOB.     AM-PAC 6 CLICK MOBILITY  How much help from another person does this patient currently need?   1 = Unable, Total/Dependent Assistance  2 = A lot, Maximum/Moderate Assistance  3 = A little, Minimum/Contact Guard/Supervision  4 = None, Modified Barnum/Independent    Turning over in bed (including adjusting bedclothes, sheets and blankets)?: 2  Sitting down on and standing up from a chair with arms (e.g., wheelchair, bedside commode, etc.): 2  Moving from lying on back to sitting on the side of the bed?: 2  Moving to and from a bed to a chair (including a wheelchair)?: 2  Need to walk in hospital room?: 1  Climbing 3-5 steps with a railing?: 1  Total Score: 10     AM-PAC Raw Score CMS G-Code Modifier Level of Impairment Assistance   6 % Total / Unable   7 - 9 CM 80 - 100% Maximal Assist   10 - 14 CL 60 - 80% Moderate Assist   15 - 19 CK 40 - 60% Moderate Assist   20 - 22 CJ 20 - 40% Minimal Assist   23 CI 1-20% SBA / CGA   24 CH 0% Independent/ Mod I     Patient left up in chair with all lines intact, call button in reach, RN notified and daughter present.    Assessment:   Donta Cline is a 85 y.o. male with a medical diagnosis of Back pain and presents with decreased strength, endurance, balance and overall functional mobility. Pt performed bed mobility max A and transfers max/total A. Pt will benefit from skilled PT to improve deficits and increase overall functional  mobility.     Rehab identified problem list/impairments: Rehab identified problem list/impairments: weakness, gait instability, decreased lower extremity function, impaired endurance, impaired balance, impaired functional mobilty, pain, decreased safety awareness    Rehab potential is good.    Activity tolerance: Good    Discharge recommendations: Discharge Facility/Level Of Care Needs: nursing facility, skilled     Barriers to discharge: Barriers to Discharge: Decreased caregiver support (pt requires increased assist at this time)    Equipment recommendations: Equipment Needed After Discharge: other (see comments) (TBD)     GOALS:    Physical Therapy Goals        Problem: Physical Therapy Goal    Goal Priority Disciplines Outcome Goal Variances Interventions   Physical Therapy Goal     PT/OT, PT Ongoing (interventions implemented as appropriate)     Description:  Goals to be met by: 2017     Patient will increase functional independence with mobility by performin. Supine to sit with MInimal Assistance  2. Sit to supine with MInimal Assistance  3. Sit to stand transfer with Moderate Assistance  4. Bed to chair transfer with Moderate Assistance  5. Gait  x 10 feet with Maximum Assistance using Rolling Walker.   6. Lower extremity exercise program x15 reps per handout, with assistance as needed                      PLAN:    Patient to be seen 4 x/week to address the above listed problems via gait training, therapeutic activities, therapeutic exercises, neuromuscular re-education  Plan of Care expires: 12/15/17  Plan of Care reviewed with: patient, daughter          ENRIQUE PARMAR, PT  11/15/2017

## 2017-11-15 NOTE — PLAN OF CARE
Met with pt's daughter, Claudia Cisneros, at the bedside     11/15/17 1013   Discharge Assessment   Assessment Type Discharge Planning Assessment   Confirmed/corrected address and phone number on facesheet? Yes   Assessment information obtained from? Patient   Expected Length of Stay (days) 4   Communicated expected length of stay with patient/caregiver yes   Prior to hospitilization cognitive status: (sleeping. spoke w daughter claudia, at )   Prior to hospitalization functional status: Needs Assistance;Wheelchair Bound   Current cognitive status: (spoke w pt's daughterClaudia)   Current Functional Status: Wheelchair Bound  (per Claudia Cisneros-daughter)   Facility Arrived From: [facility]   Lives With alone   Able to Return to Prior Arrangements no   Is patient able to care for self after discharge? No   Who are your caregiver(s) and their phone number(s)? claudia cisneros at   [phone]   [facility]    Patient's perception of discharge disposition skilled nursing facility   Readmission Within The Last 30 Days no previous admission in last 30 days   Patient currently being followed by outpatient case management? No   Patient currently receives any other outside agency services? No   Equipment Currently Used at Home bedside commode;rollator;cane, straight;shower chair   Do you have any problems affording any of your prescribed medications? No   Is the patient taking medications as prescribed? yes   Does the patient have transportation home? Yes   Transportation Available family or friend will provide   Dialysis [place] and Scheduled days [place] [facility] [place]   Does the patient receive services at the Coumadin Clinic? No   Discharge Plan A Skilled Nursing Facility   Discharge Plan B Skilled Nursing Facility   Patient/Family In Agreement With Plan yes   Readmission Questionnaire   Have you felt down, depressed, or hopeless? 0   . Plan is to return back to [facility] to finish  up/complete therapy when pt is med stable.  NS consulted.  Per Wendy, pt is s/p Spinal surgery on Oct 21st done per Dr Malachi Sands.  [facility] dialysis- F

## 2017-11-16 PROBLEM — T81.40XA POSTOPERATIVE INFECTION: Status: ACTIVE | Noted: 2017-01-01

## 2017-11-16 PROBLEM — M54.9 BACK PAIN: Status: ACTIVE | Noted: 2017-01-01

## 2017-11-16 NOTE — SUBJECTIVE & OBJECTIVE
Interval History: NAEON. Patient received dialysis yesterday. He remains confused today but denies new complaints. Continues to complain of low back pain and BLE pain/weakness. Daughter at the bedside.     Medications:  Continuous Infusions:   albumin human 25%      midodrine       Scheduled Meds:   atorvastatin  80 mg Oral Daily    fentaNYL  1 patch Transdermal Q72H    insulin detemir  5 Units Subcutaneous QHS    metoprolol tartrate  25 mg Oral BID    pantoprazole  40 mg Oral Nightly    polyethylene glycol  17 g Oral BID    senna-docusate 8.6-50 mg  1 tablet Oral BID    sertraline  100 mg Oral QHS     PRN Meds:sodium chloride 0.9%, acetaminophen, albumin human 25%, albuterol sulfate, dextrose 50%, dextrose 50%, glucagon (human recombinant), glucose, glucose, hydrocodone-acetaminophen 10-325mg, insulin aspart, midodrine, ondansetron, ramelteon, sodium chloride 0.9%     Review of Systems  Objective:     Weight: 97.5 kg (215 lb)  Body mass index is 39.32 kg/m².  Vital Signs (Most Recent):  Temp: 98.8 °F (37.1 °C) (11/16/17 1040)  Pulse: 86 (11/16/17 1040)  Resp: 17 (11/16/17 1040)  BP: (!) 124/58 (11/16/17 1040)  SpO2: (!) 92 % (11/16/17 1040) Vital Signs (24h Range):  Temp:  [97.7 °F (36.5 °C)-98.9 °F (37.2 °C)] 98.8 °F (37.1 °C)  Pulse:  [] 86  Resp:  [17-20] 17  SpO2:  [92 %-98 %] 92 %  BP: ()/(34-81) 124/58                           Hemodialysis AV Fistula Right forearm (Active)   Needle Size 15ga 11/15/2017  4:00 PM   Site Assessment Ecchymotic 11/15/2017  6:45 PM   Patency Present;Thrill;Bruit 11/15/2017  6:45 PM   Status Deaccessed 11/15/2017  6:45 PM   Flows Good 11/15/2017  6:45 PM   Dressing Intervention New dressing 11/15/2017  6:45 PM   Dressing Status Clean;Dry;Intact 11/15/2017  6:45 PM   Site Condition Other (Comment) 11/15/2017  6:45 PM   Dressing Gauze 11/15/2017  6:45 PM       Neurosurgery Physical Exam  General: well developed, well nourished, no distress.   Head:  normocephalic, atraumatic  Neurologic: Alert and oriented to person and place only. Thought content appropriate. Pt resistant to the exam  GCS: Motor: 6/Verbal: 5/Eyes: 4 GCS Total: 15  Mental Status: Awake, Alert, Oriented x2  Cranial nerves: face symmetric, tongue midline, CN II-XII grossly intact.   Eyes: pupils equal, round, reactive to light with accomodation, EOMI.   Sensory: intact to light touch throughout  Motor Strength: Moves all extremities spontaneously with good tone. No abnormal movements seen. LE exam effort and pain limited     Strength   Deltoids Triceps Biceps Wrist Extension Wrist Flexion Hand    Upper: R 5/5 5/5 5/5 5/5 5/5 5/5     L 5/5 5/5 5/5 5/5 5/5 5/5       Iliopsoas Quadriceps Knee  Flexion Tibialis  anterior Gastro- cnemius EHL   Lower: R 3/5 1/5 5/5 5/5 5/5 5/5     L 3/5 1/5 5/5 5/5 5/5 5/5      DTR's - 2 + and symmetric in UE and LE  Pronator Drift: not assessed  Finger-to-nose: not assessed  Funez: absent  Clonus: absent  Babinski: absent   Pulses: 2+ and symmetric radial and dorsalis pedis. No lower extremity edema  Straight leg raise: negative  Abdomen: soft, mildly tender in both lower quadrants  Pulm: normal respiratory effort   Incision is well healed except for small area at the inferior edge with granulation tissue and mild erythema. No drainage       Significant Labs:    Recent Labs  Lab 11/14/17  1534 11/15/17  0514 11/16/17  0507   * 66* 132*    142 141   K 4.0 4.0 4.6   CL 96 98 105   CO2 31* 31* 24   BUN 31* 36* 22   CREATININE 4.0* 4.4* 3.1*   CALCIUM 9.6 9.8 9.7       Recent Labs  Lab 11/14/17  1534   WBC 9.82   HGB 9.8*   HCT 32.0*          Recent Labs  Lab 11/15/17  0514 11/16/17  0507   INR 3.8* 3.8*  3.8*   APTT  --  40.9*     Microbiology Results (last 7 days)     Procedure Component Value Units Date/Time    Gram stain [[id number]] Collected:  11/15/17 0126    Order Status:  Completed Specimen:  Urine Updated:  11/16/17 0643     Gram Stain  Result Moderate WBC's      Rare yeast    Narrative:       Preferred Collection Type->Urine, Clean Catch    Urine culture [[id number]] Collected:  11/15/17 0126    Order Status:  Completed Specimen:  Urine Updated:  11/16/17 0532     Urine Culture, Routine Multiple organisms isolated. None in predominance.  Repeat if     Urine Culture, Routine clinically necessary.    Narrative:       Preferred Collection Type->Urine, Clean Catch    Gram stain [[id number]]     Order Status:  Completed Specimen:  Urine from Urine     Gram stain [[id number]]     Order Status:  Canceled Specimen:  Urine from Urine         All pertinent labs from the last 24 hours have been reviewed.    Significant Diagnostics:  None new

## 2017-11-16 NOTE — PLAN OF CARE
Problem: Physical Therapy Goal  Goal: Physical Therapy Goal  Goals to be met by: 2017     Patient will increase functional independence with mobility by performin. Supine to sit with MInimal Assistance  2. Sit to supine with MInimal Assistance  3. Sit to stand transfer with Moderate Assistance  4. Bed to chair transfer with Moderate Assistance  5. Gait  x 10 feet with Maximum Assistance using Rolling Walker.   6. Lower extremity exercise program x15 reps per handout, with assistance as needed           Goals remain appropriate.     Renay Jeter, PTA.  2017

## 2017-11-16 NOTE — PROGRESS NOTES
"[facility] Hospital Medicine  Progress Note    Patient Name: Donta Cline  MRN: [id number]  Patient Class: IP- Inpatient   Admission Date: 11/14/2017  Length of Stay: 0 days  Attending Physician: Gurmeet Alonso MD  Primary Care Provider: ZAID Richardson Iii, MD    [facility] Medicine Team: [facility] Gurmeet Alonso MD    Subjective:     Principal Problem:Acute bilateral low back pain    HPI:  Donta Cline is a 85 y.o. male who presents with PMHx of ESRD with dialysis MWF, HFpEF, NSTEMI, bladder/prostate cancer, DM II, aortic stenosis and CAD for evaluation of back pain s/p lumbar fusion/laminectomy (10/20/17). No family at bedside. Difficult to obtain HPI as speech garbled, however patient endorses progressive lower extremity weakness with difficulty ambulating for the past 4-5 days. His back pain is without radiation. Patient is now unable to ambulate independently. Denies trauma and urinary bowel/bladder incontinence, fever.    Per EDMD:  "The daughter brought patient to ED due to concern of his severe back pain and inability to perform activities as he was doing previously. She had discussed with Dr. Saul, and he recommended the patient come to ED and obtain imaging for spine."    Imaging:    Ct Lumbar Spine Without Contrast    Result Date: 11/14/2017  Comparison: MRI 10/12/17. Findings: Routine CT lumbar spine protocol performed without IV contrast. Prior L2-L4 instrumented lumbar fusion with interbody disc spacer at L3-4. Decompressive posterior laminectomies at from L2-3 through L5-S1. No evidence of hardware failure. No fracture identified, noting the inferior endplate of L4 is indistinct. Infection or postsurgical fluid collection not excluded, noting limited evaluation without IV contrast and artifact from adjacent pedicle screws. There is partial fusion of the right L5-S1 facet. The SI joints are unremarkable. There is extensive calcified atherosclerotic disease. Atrophic " kidneys. Partially imaged right renal stent noted. No hydronephrosis. Small renal lesions, too small to characterize without IV contrast.      Mri Lumbar Spine Without Contrast    Result Date: 11/14/2017  MRI LUMBAR SPINE TECHNIQUE: MRI lumbar spine was performed without contrast. There is an ill-defined heterogeneous collection encircling L1 spinous process demonstrating fluid levels on the right. This may represents a seroma or hematoma postoperatively however abscess cannot be excluded. This collection is best seen on series 11 image 4.    Hospital Course:  In discussion with daughter who is at bedside today (11/15) patient was discharged after surgical procedure to an inpatient rehab and he reports that he was doing well at the rehab but reached a plateau in his activity/functional level.  She states patient was walking 50 feet with minimal assist, able to toilet with minimal assistance.  Patient was transferred to SNF and since transfer, last Friday (11/10) patient requiring max assistance to stand and reported inability to bear weight secondary to pain, and since this time patient has been bed bound.     She also notes that patient with poor appetite as speech therapy has recommended puree nectar thick diet with Nepro shakes, reports that pt is losing weight and some increased abdominal girth.     Overnight patient admitted due to concerns of worsening pain and inability to ambulate, patient underwent MRI and CT imaging of the lumbar spine.  He has intact fusion, no spinal canal stenosis or cord compression noted, concern for a fluid collection encircling L1 spinous process, post op L2-L4 fusion changes and s/p laminectomy L3-L4.  Discussed imaging findings with attending neurosurgeon Dr. Sands who performed pts operation and he reported that fluid collection appears outside of range where instrumentation was.  Patient with no reported falls, workup today reveals elevated CRP >150, ESR 92, has as  supratherapeutic INR 3.8    Informed plan will be to take patient to OR for washout and exploration to evaluate for hematoma vs infection.     Interval History: as above,  Patient with Fentanyl patch on left shoulder    Review of Systems   Constitutional: Positive for chills. Negative for diaphoresis, fatigue and fever.   HENT: Negative for rhinorrhea, sinus pain, sinus pressure, sneezing and sore throat.    Eyes: Negative for visual disturbance.   Respiratory: Negative for cough, choking, chest tightness and shortness of breath.    Cardiovascular: Negative for chest pain, palpitations and leg swelling.   Gastrointestinal: Negative for constipation, diarrhea and nausea.        Denies bowel incontinence   Genitourinary: Negative for dysuria.        Denies new urinary incontinence, daughter reports present prior to surgery   Musculoskeletal: Positive for back pain and gait problem. Negative for joint swelling, myalgias and neck pain.   Neurological: Negative for dizziness, facial asymmetry, light-headedness and headaches.   Psychiatric/Behavioral: Negative for agitation and behavioral problems.     Objective:     Vital Signs (Most Recent):  Temp: 97.7 °F (36.5 °C) (11/15/17 1545)  Pulse: (!) 118 (11/15/17 1715)  Resp: 20 (11/15/17 1223)  BP: (!) 93/46 (11/15/17 1715)  SpO2: 98 % (11/15/17 1223) Vital Signs (24h Range):  Temp:  [97.4 °F (36.3 °C)-97.8 °F (36.6 °C)] 97.7 °F (36.5 °C)  Pulse:  [] 118  Resp:  [16-20] 20  SpO2:  [95 %-100 %] 98 %  BP: ()/(46-81) 93/46     Weight: 97.5 kg (215 lb)  Body mass index is 34.7 kg/m².    Intake/Output Summary (Last 24 hours) at 11/15/17 1814  Last data filed at 11/15/17 0534   Gross per 24 hour   Intake              770 ml   Output              100 ml   Net              670 ml      Physical Exam   Constitutional: He appears well-developed and well-nourished.   HENT:   Mouth/Throat: Oropharynx is clear and moist.   Eyes: Conjunctivae are normal. No scleral icterus.    Cardiovascular: Normal rate, regular rhythm, normal heart sounds and intact distal pulses.    No murmur heard.  Pulmonary/Chest: Effort normal and breath sounds normal. No respiratory distress. He has no wheezes. He has no rales.   Abdominal: Soft. Bowel sounds are normal.   Mild distension, soft, tympanic to percussion, no fluid wave noted, no bulging flanks, or abdominal collateral vessels   Musculoskeletal:   RUE forearm AV fistula with palpable thrill.     LLE - 3/5 hip flexor strength, with passive hip flexion pain @ 30-40 degrees,  4/5 knee flex/ext, 5/5 dorsi/plantar flex    RLE - 2/5 hip flexor strenth, passive flexion to 10 degrees causing pain, 4 - /5 knee flex/ext, 5/5 dorsi/plantar flexion   Lymphadenopathy:     He has no cervical adenopathy.   Neurological:   Alert, oriented to self, birthdate, hospital, city, state, year, with prompting able to state month       Significant Labs:   CBC:   Recent Labs  Lab 11/14/17  1534   WBC 9.82   HGB 9.8*   HCT 32.0*        CMP:   Recent Labs  Lab 11/14/17  1534 11/15/17  0514    142   K 4.0 4.0   CL 96 98   CO2 31* 31*   * 66*   BUN 31* 36*   CREATININE 4.0* 4.4*   CALCIUM 9.6 9.8   PROT 6.5  --    ALBUMIN 2.4* 2.3*   BILITOT 0.6  --    ALKPHOS 193*  --    AST 70*  --    ALT 33  --    ANIONGAP 12 13   EGFRNONAA 12.8* 11.4*     Lactic Acid:   Recent Labs  Lab 11/14/17  1537   LACTATE 1.6     Urine Studies:   Recent Labs  Lab 11/14/17  2239   COLORU Radha   APPEARANCEUA Cloudy*   PHUR 7.0   SPECGRAV 1.010   PROTEINUA 2+*   GLUCUA Negative   KETONESU Negative   BILIRUBINUA Negative   OCCULTUA 3+*   NITRITE Negative   UROBILINOGEN Negative   LEUKOCYTESUR 3+*   RBCUA 42*   WBCUA >100*   BACTERIA Few*   HYALINECASTS 0       Significant Imaging: I have reviewed all pertinent imaging results/findings within the past 24 hours.    Assessment/Plan:      * Acute bilateral low back pain, post-lumbar spinal fusion    - S/p lumbar fusion 10/20/2017. Presents  with c/o lower back pain and progressive lower extremity weakness, patient now unable to ambulate. Unclear baseline functional status  - MRI reviewed, concerning for seroma vs hematoma, cannot rule out infectious process. Neurosurgery consulted today, patient with elevated CRP/ESR and they informed me this evening of plans to take patient tomorrow afternoon to OR for washout  - Supportive care, PT/OT  - continue fentanyl patches and norco prn        ESRD (end stage renal disease) on dialysis    - nephrology consulted for volume management  -they will use midodrine PRN if needed during dialysis, nephrology fellow to change orders.   -nephrology recommend avoiding calcium containing products, pt with elevated corrected calcium        Status post lumbar spinal fusion    -neurosurgery consultation as above          Pyuria    - in setting of ESRD and oliguria  - no urinary complaints, no WBC, AFVSS, lactate negative,   - will hold on starting abx at this time  - Urine culture pending and patient with many prior UA with similar appearance.         Supratherapeutic INR    INR is 3.8, daughter reports at [facility] patient receiving 7.5mg and 10mg of warfarin, higher that prior dosage per coumadin clinic notes of 4mg but this was in August.     -Hold coumadin  -Due to plans for operative intervention, Neurosurgery has ordered type and screen and pt will require FFP tomorrow - will order hold of 4 units, giving Vitamin K 2.5mg po tonight for warfarin reversal, goal INR per neurosurgery <1.4           Chronic diastolic heart failure    - last echo 10/2017 with pulmonary HTN and undetermined diastolic function, EF 55  - appears compensated, CXR without edema, BNP elevated, but below baseline  -continue metoprolol        Coronary artery disease involving native coronary artery of native heart without angina pectoris    - no CP, SOB, anginal equivalents  - continue asa, statin  - EKG without ischemic changes        Type 2 diabetes  mellitus with chronic kidney disease on chronic dialysis, with long-term current use of insulin    - reduce basal insulin dose as pt will be NPO at midnight and case planned for afternoon        Severe aortic stenosis    - history of        Paroxysmal atrial fibrillation    - continue metoprolol, tele  - daily INR, check now  -hold warfarin          VTE Risk Mitigation         Ordered     heparin (porcine) injection 5,000 Units  Every 8 hours     Route:  Subcutaneous        11/14/17 2044     Medium Risk of VTE  Once      11/14/17 2044     Place sequential compression device  Until discontinued      11/14/17 1907     Place BRAIN hose  Until discontinued      11/14/17 1907              Gurmeet Alonso MD  Department of Hospital Medicine   [facility]

## 2017-11-16 NOTE — ASSESSMENT & PLAN NOTE
- nephrology consulted for volume management  -they will use midodrine PRN if needed during dialysis, nephrology fellow to change orders.   -nephrology recommend avoiding calcium containing products, pt with elevated corrected calcium

## 2017-11-16 NOTE — PT/OT/SLP PROGRESS
Speech Language Pathology      Donta Cilne  MRN: [id number]    SLP attempts to see Patient this am.  Patient not seen this am per nurse hold. Nurse explains Patient NPO for procedure.  SLP re-attempted later service day and nurse confirms patient still NPO for procedure and still on floor awaiting procedure.   SLP will re-attempt bedside swallow study 11/17/18. Thank you.    YUMIKO Fields., [facility]-SLP  Speech-Language Pathology  Pager: [phone]  11/16/2017

## 2017-11-16 NOTE — HOSPITAL COURSE
11/16: LE weakness stable. Plan for OR today for washout. Patient remains confused today. INR 3.1, HM to reverse prior to surgery   11/17: POD1 s/p washout. Hematoma found in surgical bed. Neuro stable on exam. Drains kept. Hgb 6.7  From 7.9  (98.8 on 11/14). INR 2.0.  Atrial fibrillation -140 with hypotension SBP 88 this am.  ml bolus given 's and SBP 98. Pt receiving 1 unit in dialysis this afternoon.   11/18: doing well postop, NAEON.  11/19: stable, NAEON.   11/20: drains removed  11/22: pt delirious, neuro stable. SNF pending   11/23: NAEON. Delirium continues  11/24: NAEON  11/25: No acute events; stable overnight  11/26: Patient stable overnight; patient appears to have yeast infection in groin creases, will follow  reccs  11/27: Respiratory distress over the weekend with associated hypotension and tachypnea. Improvement in VS following 250cc bolus, increase in O2, and deep suctioning. HM considering aspiration pna as high on ddx considering recent delirium. Abx broadened to vanc and cefepime. Awake, alert, oriented today.

## 2017-11-16 NOTE — PT/OT/SLP PROGRESS
"Physical Therapy  Treatment    Donta Cline   MRN: [id number]   Admitting Diagnosis: Acute bilateral low back pain    PT Received On: 11/16/17  PT Start Time: 1337     PT Stop Time: 1355    PT Total Time (min): 18 min       Billable Minutes:  Therapeutic Activity 18    Treatment Type: Treatment  PT/PTA: PTA     PTA Visit Number: 1       General Precautions: Standard, aspiration, diabetic, fall, nectar thick, pureed diet (Delirium precautions)  Orthopedic Precautions:  (spinal)   Braces: LSO         Subjective:  Communicated with RN (Alysia) prior to session.  Pt agreeable to PT session  "I'm supposed to have surgery today to remove an abscess on my back"    Pain/Comfort  Pain Rating 1: 0/10  Location - Side 1: Left  Location - Orientation 1: generalized  Location 1: leg ("My leg is burning" while sitting at the EOB)  Pain Addressed 1: Reposition, Distraction  Pain Rating Post-Intervention 1:  (rating not provided)    Objective:   Patient found with: bed alarm, telemetry (sup in bed with daughter present in the room)        FUNCTIONAL MOBILITY    Bed Mobility (with vc's for sequencing and safe technique of functional mobility):        Rolling to the L with mod A no use of bedrail       Sup > sit at the EOB with max A from L side lying        Sit > sup with max A       Scooting hips to the EOB upon sitting with max A x3 scoots       Scooting hips along the EOB to the L requiring total A x1 scoots       Pt was dependent of 2 helpers to scoot to HOB via drawsheet x1 scoot(s)    Pt requires total A to don LSO while seated at the EOB    Transfers         NP 2* pt declined stating "They don't want me to put any weight on my legs"      THERAPEUTIC ACTIVITIES     SITTING (8-9 min)       Pt tolerates sitting at the EOB with SBA for balance with B UE support vc's for upright posture       And appropriate weight shift.      Education:  Education provided to pt regarding: importance and benefits of performing functional " mobility.      Whiteboard updated with correct mobility information. RN/PCT notified.  Pt apropriate to sit at the EOB with therapy ONLY at this time.     AM-PAC 6 CLICK MOBILITY  How much help from another person does this patient currently need?   1 = Unable, Total/Dependent Assistance  2 = A lot, Maximum/Moderate Assistance  3 = A little, Minimum/Contact Guard/Supervision  4 = None, Modified [place]/Independent    Turning over in bed (including adjusting bedclothes, sheets and blankets)?: 2  Sitting down on and standing up from a chair with arms (e.g., wheelchair, bedside commode, etc.): 1  Moving from lying on back to sitting on the side of the bed?: 2  Moving to and from a bed to a chair (including a wheelchair)?: 1  Need to walk in hospital room?: 1  Climbing 3-5 steps with a railing?: 1  Total Score: 8    AM-PAC Raw Score CMS G-Code Modifier Level of Impairment Assistance   6 % Total / Unable   7 - 9 CM 80 - 100% Maximal Assist   10 - 14 CL 60 - 80% Moderate Assist   15 - 19 CK 40 - 60% Moderate Assist   20 - 22 CJ 20 - 40% Minimal Assist   23 CI 1-20% SBA / CGA   24 CH 0% Independent/ Mod I     Patient left supine with all lines intact, call button in reach, bed alarm on and RN notified.    Assessment:  Donta Cline is a 85 y.o. male with a medical diagnosis of Acute bilateral low back pain and presents with generalized weakness, pain and limited endurance requiring significant assistance for bed mob and scooting along the EOB.  Pt unable to perform sit > stand transfers 2* pain and decreased B LE's control.  Pt will cont to benefit from skilled PT intervention to address deficits and improve functional mobility.     Rehab identified problem list/impairments: Rehab identified problem list/impairments: weakness, impaired endurance, impaired sensation, impaired self care skills, impaired functional mobilty, impaired balance, decreased lower extremity function, pain, impaired fine motor,  impaired skin, edema    Rehab potential is good.    Activity tolerance: Fair    Discharge recommendations: Discharge Facility/Level Of Care Needs: nursing facility, skilled     Barriers to discharge: Barriers to Discharge: Decreased caregiver support    Equipment recommendations: Equipment Needed After Discharge:  (TBD)     GOALS:    Physical Therapy Goals        Problem: Physical Therapy Goal    Goal Priority Disciplines Outcome Goal Variances Interventions   Physical Therapy Goal     PT/OT, PT Ongoing (interventions implemented as appropriate)     Description:  Goals to be met by: 2017     Patient will increase functional independence with mobility by performin. Supine to sit with MInimal Assistance  2. Sit to supine with MInimal Assistance  3. Sit to stand transfer with Moderate Assistance  4. Bed to chair transfer with Moderate Assistance  5. Gait  x 10 feet with Maximum Assistance using Rolling Walker.   6. Lower extremity exercise program x15 reps per handout, with assistance as needed                      PLAN:    Patient to be seen 4 x/week  to address the above listed problems via gait training, therapeutic activities, therapeutic exercises, neuromuscular re-education  Plan of Care expires: 12/15/17  Plan of Care reviewed with: patient         Renay Jeter, PTA  2017

## 2017-11-16 NOTE — ASSESSMENT & PLAN NOTE
INR is 3.8, daughter reports at [facility] patient receiving 7.5mg and 10mg of warfarin, higher that prior dosage per coumadin clinic notes of 4mg but this was in August.     -Hold coumadin  -Due to plans for operative intervention, Neurosurgery has ordered type and screen and pt will require FFP tomorrow - will order hold of 4 units, giving Vitamin K 2.5mg po tonight for warfarin reversal, goal INR per neurosurgery <1.4

## 2017-11-16 NOTE — PT/OT/SLP PROGRESS
Occupational Therapy      Donta DENIS Marlyn  MRN: [id number]    Patient not seen today secondary to polite refusal as pt is going for surgery today. Will follow-up for re-eval.    PIYUSH Conway  11/16/2017  Pager: [phone]

## 2017-11-16 NOTE — PROGRESS NOTES
2:30 hr hd completed,unable to make 3hrs due to late arrival to [facility], net fluid [id number], unable to reach target goal due marginal b/ps during the last hour of treatment despite admin of albumin and midodrine in early part of the tx,  AVF deaccessed, pressure held to the site 14 mins and 10 mins, A/V respectively, hemostasis achieved, bruit/thrill present post tx.  Report given to TRUDY Coelho RN.

## 2017-11-16 NOTE — ANESTHESIA PREPROCEDURE EVALUATION
[facility]  Anesthesia Pre-Operative Evaluation         Patient Name: Donta Cline  YOB: 1932  MRN: [id number]    SUBJECTIVE:     Pre-operative evaluation for Procedure(s) (LRB):  REVISION-WOUND--lumbar washout (N/A )     11/15/2017    Donta Cline is a 85 y.o. 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     Patient is getting vitamin K tonight for help with correction of INR.    Cath report 10/16/17  Patient has a right dominant coronary artery.        - Left Main Coronary Artery:             The LM has luminal irregularities. There is NATHANIEL 3 flow.     - Left Anterior Descending Artery:             The LAD has luminal irregularities. There is NATHANIEL 3 flow.     - Left Circumflex Artery:             The LCX has luminal irregularities. There is NATHANIEL 3 flow.     - Right Coronary Artery:             The ostial RCA has a 75% stenosis. There is NATHANIEL 3 flow.             The mid RCA has a 50% stenosis. There is NATHANIEL 3 flow.     - Common Femoral Artery:             The right CFA is normal.    LDA:   HD fistula R forearm  Midline L basilic  PIV 18G L AC      Prev airway:  Present Prior to Hospital Arrival?: No; Placement Date: 10/20/17; Placement Time: 1237; Method of Intubation: Direct laryngoscopy; Inserted by: Anesthesia Resident; Airway Device: Endotracheal Tube; Mask Ventilation: Easy; Intubated: Postinduction; Blade: Sanchez #2; Airway Device Size: 8.0; Style: Cuffed; Cuff Inflation: Minimal occlusive pressure; Placement Verified By: Auscultation,  Capnometry; Grade: Grade I; Complicating Factors: None; Intubation Findings: Positive EtCO2, Bilateral breath sounds, Atraumatic/Condition of teeth unchanged; Securment: Lips; Complications: None; Breath Sounds: Equal Bilateral; Insertion Attempts: 1; Removal Date: 10/20/17;  Removal Time: 1738    Drips:         Patient Active Problem List   Diagnosis    Chronic diastolic heart failure    Coronary artery disease involving native coronary artery of native heart without angina pectoris    Essential hypertension    Generalized weakness    Uncontrolled hypertension    Type 2 diabetes mellitus with chronic kidney disease on chronic dialysis, with long-term current use of insulin    ESRD (end stage renal disease) on dialysis    Severe aortic stenosis    Paroxysmal atrial fibrillation    Long-term (current) use of anticoagulants    Chronic hip pain, right    NSTEMI (non-ST elevated myocardial infarction)    Coumadin toxicity    Urinary tract infection    Lumbar stenosis    Back pain    Elevated troponin    Aspiration pneumonia    Spinal stenosis of lumbar region without neurogenic claudication    Postprocedural hypotension    Anemia in chronic kidney disease, on chronic dialysis    S/P spinal surgery    Dehydration    UTI (urinary tract infection)    Status post lumbar spinal fusion    Supratherapeutic INR    L-S radiculopathy       Review of patient's allergies indicates:   Allergen Reactions    Darvocet a500  [propoxyphene n-acetaminophen]      Other reaction(s): Unknown    Morphine      Other reaction(s): Unknown       Current Inpatient Medications:      Current Facility-Administered Medications on File Prior to Visit   Medication Dose Route Frequency Provider Last Rate Last Dose    0.9%  NaCl infusion   Intravenous PRN Rsoa Ramirez NP        0.9%  NaCl infusion   Intravenous Once Rosa Ramirez  mL/hr at 11/15/17 1627 300 mL at 11/15/17 1627    acetaminophen tablet 650 mg   650 mg Oral Q8H PRN Hamlet Jackson MD   650 mg at 11/15/17 0524    albumin human 25% bottle 25 g  25 g Intravenous Continuous PRN Rosa Ramirez NP   25 g at 11/15/17 1710    albuterol nebulizer solution 2.5 mg  2.5 mg Nebulization Q4H PRN Theo Newman PA-C   2.5 mg at 11/15/17 0542    aspirin EC tablet 81 mg  81 mg Oral Daily Hamlet Jackson MD   81 mg at 11/15/17 1026    atorvastatin tablet 80 mg  80 mg Oral Daily Hamlet Jackson MD   80 mg at 11/15/17 1026    [START ON 11/16/2017] cefazolin (ANCEF) 2 gram in dextrose 5% 50 mL IVPB (premix)  2 g Intravenous Once Moon Dong PA-C        dextrose 50% injection 12.5 g  12.5 g Intravenous PRN Hamlet Jackson MD        dextrose 50% injection 25 g  25 g Intravenous PRN Hamlet Jackson MD        fentaNYL 12 mcg/hr 1 patch  1 patch Transdermal Q72H Gurmeet Alonso MD        glucagon (human recombinant) injection 1 mg  1 mg Intramuscular PRN Hamlet Jackson MD        glucose chewable tablet 16 g  16 g Oral PRN Hamlet Jackson MD   16 g at 11/15/17 1249    glucose chewable tablet 24 g  24 g Oral PRN Hamlet Jackson MD        heparin (porcine) injection 5,000 Units  5,000 Units Subcutaneous Q8H Hamlet Jackson MD   5,000 Units at 11/15/17 1452    hydrocodone-acetaminophen 10-325mg per tablet 1 tablet  1 tablet Oral Q6H PRN Hamlet Jackson MD   1 tablet at 11/15/17 1452    insulin aspart pen 0-5 Units  0-5 Units Subcutaneous QID (AC + HS) PRN Hamlet Jackson MD        insulin detemir pen 5 Units  5 Units Subcutaneous QHS Gurmeet Alonso MD        metoprolol tartrate (LOPRESSOR) tablet 25 mg  25 mg Oral BID Hamlet Jackson MD   25 mg at 11/14/17 2221    midodrine tablet 10 mg  10 mg Oral Continuous PRN Rosa Ramirez NP   10 mg at 11/15/17 1708    ondansetron disintegrating tablet 8 mg  8 mg Oral Q8H PRN Hamlet Jackson MD        pantoprazole EC tablet 40 mg  40 mg Oral Nightly Theo Newman PA-C   40 mg at 11/15/17 0005     phytonadione tablet 2.5 mg  2.5 mg Oral Once Gurmeet Alonso MD        polyethylene glycol packet 17 g  17 g Oral BID Theo Newman PA-C        ramelteon tablet 8 mg  8 mg Oral Nightly PRN Theo Newman PA-C        senna-docusate 8.6-50 mg per tablet 1 tablet  1 tablet Oral BID Theo Newman PA-C   1 tablet at 11/15/17 1026    sertraline tablet 100 mg  100 mg Oral QHS Theo Newman PA-C   100 mg at 11/15/17 0005    sodium chloride 0.9% flush 3 mL  3 mL Intravenous PRN Hamlet Jackson MD        [START ON 11/16/2017] warfarin (COUMADIN) tablet 10 mg  10 mg Oral Every Tues, Thurs, Sat Theo Newman PA-C         Current Outpatient Prescriptions on File Prior to Visit   Medication Sig Dispense Refill    amitriptyline (ELAVIL) 25 MG tablet Take 25 mg by mouth nightly as needed for Insomnia.      aspirin (ECOTRIN) 81 MG EC tablet Take 81 mg by mouth once daily.      atorvastatin (LIPITOR) 80 MG tablet Take 1 tablet (80 mg total) by mouth once daily. 90 tablet 0    bisacodyl (DULCOLAX) 10 mg Supp Place 10 mg rectally daily as needed.      celecoxib (CELEBREX) 200 MG capsule Take 200 mg by mouth 2 (two) times daily.      coenzyme Q10 200 mg capsule Take 200 mg by mouth once daily.      gabapentin (NEURONTIN) 300 MG capsule Take 300 mg by mouth every evening.      hydrocodone-acetaminophen 10-325mg (NORCO)  mg Tab Take 1 tablet by mouth every 6 (six) hours as needed for Pain.   0    insulin detemir (LEVEMIR FLEXTOUCH) 100 unit/mL (3 mL) SubQ InPn pen Inject 14 Units into the skin every evening. 4.2 mL 0    metoprolol tartrate (LOPRESSOR) 25 MG tablet Take 1 tablet (25 mg total) by mouth 2 (two) times daily. 60 tablet 0    midodrine (PROAMATINE) 10 MG tablet Take 1 tablet (10 mg total) by mouth 3 (three) times daily. 90 tablet 0    NITROSTAT 0.4 mg SL tablet place 1 tablet under the tongue if needed every 5 minutes for chest pain for 3 doses IF NO RELIEF AFTER 3RD DOSE CALL PRESCRIBER . 965  tablet 6    ondansetron (ZOFRAN) 4 MG tablet Take 4 mg by mouth every 8 (eight) hours as needed for Nausea.      pantoprazole (PROTONIX) 40 MG tablet Take 1 tablet (40 mg total) by mouth nightly. 30 tablet 0    ramelteon (ROZEREM) 8 mg tablet Take 1 tablet (8 mg total) by mouth nightly as needed for Insomnia.  0    senna-docusate 8.6-50 mg (PERICOLACE) 8.6-50 mg per tablet Take 1 tablet by mouth 2 (two) times daily.      sertraline (ZOLOFT) 100 MG tablet Take 1 tablet by mouth every evening.  0    vit b cmplx 3-fa-vit c-biotin 1- mg-mg-mcg (ANKIT-AZ RX) 1- mg-mg-mcg Tab Take 1 tablet by mouth once daily.  0       Past Surgical History:   Procedure Laterality Date    BACK SURGERY      laminectomy x2    COLON SURGERY      EXTENSIVE PROSTATE SURGER      Prostatectomy, Radical    JOINT REPLACEMENT      left hip       Social History     Social History    Marital status:      Spouse name: N/A    Number of children: N/A    Years of education: N/A     Occupational History    Not on file.     Social History Main Topics    Smoking status: Former Smoker     Packs/day: 3.00     Years: 40.00     Quit date: 1/1/1980    Smokeless tobacco: Former User    Alcohol use No      Comment: a few drinks    Drug use: Unknown    Sexual activity: Not on file     Other Topics Concern    Not on file     Social History Narrative    No narrative on file       OBJECTIVE:     Vital Signs Range (Last 24H):  Temp:  [36.3 °C (97.4 °F)-36.6 °C (97.8 °F)]   Pulse:  []   Resp:  [16-20]   BP: ()/(46-81)   SpO2:  [95 %-100 %]       CBC:   Recent Labs      11/14/17   1534   WBC  9.82   RBC  3.11*   HGB  9.8*   HCT  32.0*   PLT  283   MCV  103*   MCH  31.5*   MCHC  30.6*       CMP:   Recent Labs      11/14/17   1534  11/15/17   0514   NA  139  142   K  4.0  4.0   CL  96  98   CO2  31*  31*   BUN  31*  36*   CREATININE  4.0*  4.4*   GLU  164*  66*   PHOS   --   4.1   CALCIUM  9.6  9.8   ALBUMIN  2.4*  2.3*    PROT  6.5   --    ALKPHOS  193*   --    ALT  33   --    AST  70*   --    BILITOT  0.6   --        INR:  Recent Labs      11/15/17   0514   INR  3.8*       Diagnostic Studies: No relevant studies.    EK17:  Vent. Rate : 106 BPM     Atrial Rate : 091 BPM     P-R Int : 000 ms          QRS Dur : 084 ms      QT Int : 366 ms       P-R-T Axes : 000 047 099 degrees     QTc Int : 486 ms    Atrial fibrillation with rapid ventricular response  ST and T wave abnormality, consider anterior ischemia  Abnormal ECG  When compared with ECG of 21-OCT-2017 03:34,  RSR' pattern in V1 is no longer Present  ST-t wave changes More prominent than previously  Confirmed by SINDHU GALEANA MD ([id number]) on 11/15/2017 10:49:43 AM    2D ECHO:  Results for orders placed or performed during the hospital encounter of 10/10/17   2D echo with color flow doppler   Result Value Ref Range    EF 55 55 - 65    Aortic Valve Regurgitation TRIVIAL     Est. PA Systolic Pressure 43.09 (A)     Tricuspid Valve Regurgitation TRIVIAL      Cath report 10/16/17  Patient has a right dominant coronary artery.        - Left Main Coronary Artery:             The LM has luminal irregularities. There is NATHANIEL 3 flow.     - Left Anterior Descending Artery:             The LAD has luminal irregularities. There is NATHANIEL 3 flow.     - Left Circumflex Artery:             The LCX has luminal irregularities. There is NATHANIEL 3 flow.     - Right Coronary Artery:             The ostial RCA has a 75% stenosis. There is NATHANIEL 3 flow.             The mid RCA has a 50% stenosis. There is NATHANIEL 3 flow.     - Common Femoral Artery:             The right CFA is normal.    ASSESSMENT/PLAN:         Pre-op Assessment    I have reviewed the Patient Summary Reports.     I have reviewed the Nursing Notes.   I have reviewed the Medications.     Review of Systems  Anesthesia Hx:  History of prior surgery of interest to airway management or planning: Denies Family Hx of Anesthesia complications.    Denies Personal Hx of Anesthesia complications.   Hematology/Oncology:         -- Anemia (H/H 9/30): Current/Recent Cancer. (bladder)   EENT/Dental:EENT/Dental Normal   Cardiovascular:   Hypertension Past MI CAD  Dysrhythmias atrial fibrillation Aortic stenosis  HFpEF 55%   Pulmonary:   Elevated PA pressure 43   Renal/:   Chronic Renal Disease, ESRD    Musculoskeletal:  Spine Disorders:    Neurological:   Neuromuscular Disease,    Endocrine:   Diabetes, type 2, using insulin        Physical Exam  General:  Well nourished    Airway/Jaw/Neck:  Airway Findings: Mouth Opening: Normal Tongue: Normal  General Airway Assessment: Adult, Average  Mallampati: III  Improves to II with phonation.  TM Distance: Normal, at least 6 cm      Dental:  Dental Findings: Periodontal disease, Severe, Upper Dentures   Chest/Lungs:  Chest/Lungs Clear    Heart/Vascular:  Heart Findings: Normal Heart murmur: negative    Abdomen:  Abdomen Findings: Normal    Musculoskeletal:  Musculoskeletal Findings: Normal   Skin:  Skin Findings: Normal    Mental Status:  Mental Status Findings:  Cooperative, Alert and Oriented  Moaning in pain and holding right leg       Anesthesia Plan  Type of Anesthesia, risks & benefits discussed:  Anesthesia Type:  general  Patient's Preference:   Intra-op Monitoring Plan: standard ASA monitors  Intra-op Monitoring Plan Comments:   Post Op Pain Control Plan: multimodal analgesia, IV/PO Opioids PRN and per primary service following discharge from PACU  Post Op Pain Control Plan Comments:   Induction:   IV  Beta Blocker:  Patient is on a Beta-Blocker and has received one dose within the past 24 hours (No further documentation required).       Informed Consent: Patient understands risks and agrees with Anesthesia plan.  Questions answered. Anesthesia consent signed with patient.  ASA Score: 4     Day of Surgery Review of History & Physical: I have interviewed and examined the patient. I have reviewed the patient's H&P  dated:  There are no significant changes.  H&P update referred to the surgeon.         Ready For Surgery From Anesthesia Perspective.

## 2017-11-16 NOTE — NURSING
Pt daughter states that pt has fentanyl patch to right shoulder that is due to be changed tomorrow (11/16). Fentanyl patch due time changed to 11/16 for 9am. Will put fentanyl patch in lock box in med room.

## 2017-11-16 NOTE — ASSESSMENT & PLAN NOTE
- last echo 10/2017 with pulmonary HTN and undetermined diastolic function, EF 55  - appears compensated, CXR without edema, BNP elevated, but below baseline  -continue metoprolol

## 2017-11-16 NOTE — PROGRESS NOTES
[facility]  Neurosurgery  Progress Note    Subjective:     History of Present Illness: Donta Cline is 85 y.o. male with PMH ESRD on HD MWF, CAD, NSTEMI, bladder/prostate cancer, DMII, aortic stenosis and recent L2-L4 fusion 10/20/17 who [place] [facility] [place] complaint of worsened back pain and functional decline since last Friday 11/3. There is no family in the room. The patient is mildy confused and thus a poor historian so most of the history obtained from the medical record. He was discharged to a rehab after the last admission and progressing well so transferred to a skilled nursing facility. He was able to walk 50ft on his own until last week when he had acute worsening of his low back pain and LE weakness. He has been unable to weight bear since that time. He also complains of abdominal pain and bilateral hip pain. He reports some BLE pain but is unable to characterize it. Denies BB dysfunction or saddle anesthesia. There is no record of fever or trauma. ESR/CRP are elevated. INR 3.8, on coumadin.     Post-Op Info:  Procedure(s) (LRB):  REVISION-WOUND--lumbar washout (N/A)         Interval History: NAEON. Patient received dialysis yesterday. He remains confused today but denies new complaints. Continues to complain of low back pain and BLE pain/weakness. Daughter at the bedside.     Medications:  Continuous Infusions:   albumin human 25%      midodrine       Scheduled Meds:   atorvastatin  80 mg Oral Daily    fentaNYL  1 patch Transdermal Q72H    insulin detemir  5 Units Subcutaneous QHS    metoprolol tartrate  25 mg Oral BID    pantoprazole  40 mg Oral Nightly    polyethylene glycol  17 g Oral BID    senna-docusate 8.6-50 mg  1 tablet Oral BID    sertraline  100 mg Oral QHS     PRN Meds:sodium chloride 0.9%, acetaminophen, albumin human 25%, albuterol sulfate, dextrose 50%, dextrose 50%, glucagon (human recombinant), glucose, glucose, hydrocodone-acetaminophen 10-325mg,  insulin aspart, midodrine, ondansetron, ramelteon, sodium chloride 0.9%     Review of Systems  Objective:     Weight: 97.5 kg (215 lb)  Body mass index is 39.32 kg/m².  Vital Signs (Most Recent):  Temp: 98.8 °F (37.1 °C) (11/16/17 1040)  Pulse: 86 (11/16/17 1040)  Resp: 17 (11/16/17 1040)  BP: (!) 124/58 (11/16/17 1040)  SpO2: (!) 92 % (11/16/17 1040) Vital Signs (24h Range):  Temp:  [97.7 °F (36.5 °C)-98.9 °F (37.2 °C)] 98.8 °F (37.1 °C)  Pulse:  [] 86  Resp:  [17-20] 17  SpO2:  [92 %-98 %] 92 %  BP: ()/(34-81) 124/58                           Hemodialysis AV Fistula Right forearm (Active)   Needle Size 15ga 11/15/2017  4:00 PM   Site Assessment Ecchymotic 11/15/2017  6:45 PM   Patency Present;Thrill;Bruit 11/15/2017  6:45 PM   Status Deaccessed 11/15/2017  6:45 PM   Flows Good 11/15/2017  6:45 PM   Dressing Intervention New dressing 11/15/2017  6:45 PM   Dressing Status Clean;Dry;Intact 11/15/2017  6:45 PM   Site Condition Other (Comment) 11/15/2017  6:45 PM   Dressing Gauze 11/15/2017  6:45 PM       Neurosurgery Physical Exam  General: well developed, well nourished, no distress.   Head: normocephalic, atraumatic  Neurologic: Alert and oriented to person and place only. Thought content appropriate. Pt resistant to the exam  GCS: Motor: 6/Verbal: 5/Eyes: 4 GCS Total: 15  Mental Status: Awake, Alert, Oriented x2  Cranial nerves: face symmetric, tongue midline, CN II-XII grossly intact.   Eyes: pupils equal, round, reactive to light with accomodation, EOMI.   Sensory: intact to light touch throughout  Motor Strength: Moves all extremities spontaneously with good tone. No abnormal movements seen. LE exam effort and pain limited     Strength   Deltoids Triceps Biceps Wrist Extension Wrist Flexion Hand    Upper: R 5/5 5/5 5/5 5/5 5/5 5/5     L 5/5 5/5 5/5 5/5 5/5 5/5       Iliopsoas Quadriceps Knee  Flexion Tibialis  anterior Gastro- cnemius EHL   Lower: R 3/5 1/5 5/5 5/5 5/5 5/5     L 3/5 1/5 5/5 5/5  5/5 5/5      DTR's - 2 + and symmetric in UE and LE  Pronator Drift: not assessed  Finger-to-nose: not assessed  Funez: absent  Clonus: absent  Babinski: absent   Pulses: 2+ and symmetric radial and dorsalis pedis. No lower extremity edema  Straight leg raise: negative  Abdomen: soft, mildly tender in both lower quadrants  Pulm: normal respiratory effort   Incision is well healed except for small area at the inferior edge with granulation tissue and mild erythema. No drainage       Significant Labs:    Recent Labs  Lab 11/14/17  1534 11/15/17  0514 11/16/17  0507   * 66* 132*    142 141   K 4.0 4.0 4.6   CL 96 98 105   CO2 31* 31* 24   BUN 31* 36* 22   CREATININE 4.0* 4.4* 3.1*   CALCIUM 9.6 9.8 9.7       Recent Labs  Lab 11/14/17  1534   WBC 9.82   HGB 9.8*   HCT 32.0*          Recent Labs  Lab 11/15/17  0514 11/16/17  0507   INR 3.8* 3.8*  3.8*   APTT  --  40.9*     Microbiology Results (last 7 days)     Procedure Component Value Units Date/Time    Gram stain [[id number]] Collected:  11/15/17 0126    Order Status:  Completed Specimen:  Urine Updated:  11/16/17 0643     Gram Stain Result Moderate WBC's      Rare yeast    Narrative:       Preferred Collection Type->Urine, Clean Catch    Urine culture [[id number]] Collected:  11/15/17 0126    Order Status:  Completed Specimen:  Urine Updated:  11/16/17 0532     Urine Culture, Routine Multiple organisms isolated. None in predominance.  Repeat if     Urine Culture, Routine clinically necessary.    Narrative:       Preferred Collection Type->Urine, Clean Catch    Gram stain [[id number]]     Order Status:  Completed Specimen:  Urine from Urine     Gram stain [[id number]]     Order Status:  Canceled Specimen:  Urine from Urine         All pertinent labs from the last 24 hours have been reviewed.    Significant Diagnostics:  None new    Assessment/Plan:     * Acute bilateral low back pain, post-lumbar spinal fusion    85 y.o. male s/p L2-4 fusion 10/20  for lumbar stenosis who presents with acute worsening of his LBP and LE weakness    -Patient with HF and KE weakness on exam, although he his exam is effort/pain limited  -Plan for OR today for washout pending INR. INR 3.1 this am. Primary team to reverse  -Consents in the chart, pre-op labs ordered   -Hold ASA and coumadin  -ESR 92/, Procalcitonin 1.09  -Afebrile, no leukocytosis  -H/H stable  -Q4h neuro checks  -PT/OT   -Pain control per primary team  -Please call NSGY with any change in neuro exam               Moon Dong PA-C  Neurosurgery  [facility]

## 2017-11-16 NOTE — ASSESSMENT & PLAN NOTE
- S/p lumbar fusion 10/20/2017. Presents with c/o lower back pain and progressive lower extremity weakness, patient now unable to ambulate. Unclear baseline functional status  - MRI reviewed, concerning for seroma vs hematoma, cannot rule out infectious process. Neurosurgery consulted today, patient with elevated CRP/ESR and they informed me this evening of plans to take patient tomorrow afternoon to OR for washout  - Supportive care, PT/OT  - continue fentanyl patches and norco prn

## 2017-11-16 NOTE — SUBJECTIVE & OBJECTIVE
Interval History: as above,  Patient with Fentanyl patch on left shoulder    Review of Systems   Constitutional: Positive for chills. Negative for diaphoresis, fatigue and fever.   HENT: Negative for rhinorrhea, sinus pain, sinus pressure, sneezing and sore throat.    Eyes: Negative for visual disturbance.   Respiratory: Negative for cough, choking, chest tightness and shortness of breath.    Cardiovascular: Negative for chest pain, palpitations and leg swelling.   Gastrointestinal: Negative for constipation, diarrhea and nausea.        Denies bowel incontinence   Genitourinary: Negative for dysuria.        Denies new urinary incontinence, daughter reports present prior to surgery   Musculoskeletal: Positive for back pain and gait problem. Negative for joint swelling, myalgias and neck pain.   Neurological: Negative for dizziness, facial asymmetry, light-headedness and headaches.   Psychiatric/Behavioral: Negative for agitation and behavioral problems.     Objective:     Vital Signs (Most Recent):  Temp: 97.7 °F (36.5 °C) (11/15/17 1545)  Pulse: (!) 118 (11/15/17 1715)  Resp: 20 (11/15/17 1223)  BP: (!) 93/46 (11/15/17 1715)  SpO2: 98 % (11/15/17 1223) Vital Signs (24h Range):  Temp:  [97.4 °F (36.3 °C)-97.8 °F (36.6 °C)] 97.7 °F (36.5 °C)  Pulse:  [] 118  Resp:  [16-20] 20  SpO2:  [95 %-100 %] 98 %  BP: ()/(46-81) 93/46     Weight: 97.5 kg (215 lb)  Body mass index is 34.7 kg/m².    Intake/Output Summary (Last 24 hours) at 11/15/17 1814  Last data filed at 11/15/17 0534   Gross per 24 hour   Intake              770 ml   Output              100 ml   Net              670 ml      Physical Exam   Constitutional: He appears well-developed and well-nourished.   HENT:   Mouth/Throat: Oropharynx is clear and moist.   Eyes: Conjunctivae are normal. No scleral icterus.   Cardiovascular: Normal rate, regular rhythm, normal heart sounds and intact distal pulses.    No murmur heard.  Pulmonary/Chest: Effort normal  and breath sounds normal. No respiratory distress. He has no wheezes. He has no rales.   Abdominal: Soft. Bowel sounds are normal.   Mild distension, soft, tympanic to percussion, no fluid wave noted, no bulging flanks, or abdominal collateral vessels   Musculoskeletal:   RUE forearm AV fistula with palpable thrill.     LLE - 3/5 hip flexor strength, with passive hip flexion pain @ 30-40 degrees,  4/5 knee flex/ext, 5/5 dorsi/plantar flex    RLE - 2/5 hip flexor strenth, passive flexion to 10 degrees causing pain, 4 - /5 knee flex/ext, 5/5 dorsi/plantar flexion   Lymphadenopathy:     He has no cervical adenopathy.   Neurological:   Alert, oriented to self, birthdate, hospital, city, state, year, with prompting able to state month       Significant Labs:   CBC:   Recent Labs  Lab 11/14/17  1534   WBC 9.82   HGB 9.8*   HCT 32.0*        CMP:   Recent Labs  Lab 11/14/17  1534 11/15/17  0514    142   K 4.0 4.0   CL 96 98   CO2 31* 31*   * 66*   BUN 31* 36*   CREATININE 4.0* 4.4*   CALCIUM 9.6 9.8   PROT 6.5  --    ALBUMIN 2.4* 2.3*   BILITOT 0.6  --    ALKPHOS 193*  --    AST 70*  --    ALT 33  --    ANIONGAP 12 13   EGFRNONAA 12.8* 11.4*     Lactic Acid:   Recent Labs  Lab 11/14/17  1537   LACTATE 1.6     Urine Studies:   Recent Labs  Lab 11/14/17  2239   COLORU Radha   APPEARANCEUA Cloudy*   PHUR 7.0   SPECGRAV 1.010   PROTEINUA 2+*   GLUCUA Negative   KETONESU Negative   BILIRUBINUA Negative   OCCULTUA 3+*   NITRITE Negative   UROBILINOGEN Negative   LEUKOCYTESUR 3+*   RBCUA 42*   WBCUA >100*   BACTERIA Few*   HYALINECASTS 0       Significant Imaging: I have reviewed all pertinent imaging results/findings within the past 24 hours.

## 2017-11-16 NOTE — ASSESSMENT & PLAN NOTE
85 y.o. male s/p L2-4 fusion 10/20 for lumbar stenosis who presents with acute worsening of his LBP and LE weakness    -Patient with HF and KE weakness on exam, although he his exam is effort/pain limited  -Plan for OR today for washout pending INR. INR 3.1 this am. Primary team to reverse  -Consents in the chart, pre-op labs ordered   -Hold ASA and coumadin  -ESR 92/, Procalcitonin 1.09  -Afebrile, no leukocytosis  -H/H stable  -Q4h neuro checks  -PT/OT   -Pain control per primary team  -Please call NSGY with any change in neuro exam

## 2017-11-16 NOTE — ASSESSMENT & PLAN NOTE
- in setting of ESRD and oliguria  - no urinary complaints, no WBC, AFVSS, lactate negative,   - will hold on starting abx at this time  - Urine culture pending and patient with many prior UA with similar appearance.

## 2017-11-17 PROBLEM — D62 ACUTE BLOOD LOSS ANEMIA: Status: ACTIVE | Noted: 2017-01-01

## 2017-11-17 PROBLEM — T14.8XXA HEMATOMA: Status: ACTIVE | Noted: 2017-01-01

## 2017-11-17 NOTE — ASSESSMENT & PLAN NOTE
INR is 3.8, daughter reports at  patient receiving 7.5mg and 10mg of warfarin, higher that prior dosage per coumadin clinic notes of 4mg but this was in August.     -Hold coumadin/anti-platelet  -s/p vitamin K 2.5mg and receiving FFP today - s/p total 3units in am and inr was 2.4, 3 additional units ordered for this afternoon/evening. Neurosurgery to administer dose of Plt as well

## 2017-11-17 NOTE — PROGRESS NOTES
Pt left the floor for dialysis on stretcher with transporter . Pt is awake ,alert and oriented to self only . Stable V/S. Report given to nurse Romero in dialysis . Pt to receive 1 unit of blood in dialysis .

## 2017-11-17 NOTE — PROGRESS NOTES
[facility]  Nephrology  Progress Note    Patient Name: Donta Cline  MRN: [id number]  Admission Date: 11/14/2017  Hospital Length of Stay: 2 days  Attending Provider: Gurmeet Alonso MD   Primary Care Physician: ZAID Richardson Iii, MD  Principal Problem:Acute bilateral low back pain    Subjective:     HPI: 84 yo male with significant history for hypertension, hyperlipidemia, remote smoker, PAF, Severe AS, DMT2, CAD sp NSTEMI, dcognitive impairment, recent sp lumbar fusion/laminectomy 10/20/17, and ESRD x 2 years who is admitted for lower back pain associated with BLE pain/spasm since discharge to [facility] on 11/10 from inpatient rehab at [facility]. Patient confuse.Daughter Wendy at bedside reports patient had acute decline on 11/11 as patient went from walking 50 to 100 ft and minimal assist with ADL's to not able to hold self up or walk. MRI lumbar showed fluid collection at L1. Neurosurgery consulted. Nephrology consult for hemodialysis. HD MWF 3.5 hrs duration via LFA AVF at [facility]. Some residual but not sure how much. Unclear of EDW. Last HD 11/13. CXR bilateral basilar atelectasis otherwise clear.       Interval History: Sp lumbar wound wash out and evaluation of hematoma on 11/17. Hgb 6.7  From 7.9  (98.8 on 11/14). INR 2.0.  Atrial fibrillation -140 with hypotension SBP 88 this am.  ml bolus given 's and SBP 98.     Review of patient's allergies indicates:   Allergen Reactions    Morphine Other (See Comments)     Other reaction(s): severe abdominal pain    Darvocet a500  [propoxyphene n-acetaminophen]      Other reaction(s): Unknown     Current Facility-Administered Medications   Medication Frequency    0.9%  NaCl infusion (for blood administration) Q24H PRN    acetaminophen tablet 650 mg Q8H PRN    albumin human 25% bottle 25 g Continuous PRN    albuterol nebulizer solution 2.5 mg Q4H PRN    aluminum-magnesium  hydroxide-simethicone 200-200-20 mg/5 mL suspension 30 mL Q4H PRN    atorvastatin tablet 80 mg Daily    bisacodyl suppository 10 mg Daily    dextrose 50% injection 12.5 g PRN    dextrose 50% injection 25 g PRN    fentaNYL 12 mcg/hr 1 patch Q72H    fentaNYL injection 50 mcg Q1H PRN    glucagon (human recombinant) injection 1 mg PRN    glucose chewable tablet 16 g PRN    glucose chewable tablet 24 g PRN    hydrocodone-acetaminophen 10-325mg per tablet 1 tablet Q6H PRN    insulin aspart pen 0-5 Units QID (AC + HS) PRN    insulin detemir pen 5 Units QHS    metoprolol tartrate (LOPRESSOR) tablet 25 mg BID    midodrine tablet 10 mg Continuous PRN    mupirocin 2 % ointment 1 g BID    ondansetron disintegrating tablet 8 mg Q8H PRN    ondansetron disintegrating tablet 8 mg Q6H PRN    pantoprazole EC tablet 40 mg Nightly    polyethylene glycol packet 17 g BID    promethazine (PHENERGAN) 6.25 mg in dextrose 5 % 50 mL IVPB Q6H PRN    ramelteon tablet 8 mg Nightly PRN    senna-docusate 8.6-50 mg per tablet 1 tablet BID    senna-docusate 8.6-50 mg per tablet 2 tablet Nightly PRN    sertraline tablet 100 mg QHS    sodium chloride 0.9% flush 3 mL PRN       Objective:     Vital Signs (Most Recent):  Temp: 98.9 °F (37.2 °C) (11/17/17 0751)  Pulse: (!) 118 (11/17/17 0914)  Resp: 20 (11/17/17 0751)  BP: (!) 98/51 (11/17/17 0914)  SpO2: 95 % (11/17/17 0751)  O2 Device (Oxygen Therapy): room air (11/17/17 0751) Vital Signs (24h Range):  Temp:  [97.7 °F (36.5 °C)-98.9 °F (37.2 °C)] 98.9 °F (37.2 °C)  Pulse:  [] 118  Resp:  [16-20] 20  SpO2:  [90 %-100 %] 95 %  BP: ()/(40-96) 98/51     Weight: 97.5 kg (215 lb) (11/15/17 2030)  Body mass index is 39.32 kg/m².  Body surface area is 2.07 meters squared.    I/O last 3 completed shifts:  In: 2982.5 [I.V.:600; Blood:2382.5]  Out: 150 [Urine:150]    Physical Exam   Constitutional: He appears well-developed and well-nourished.   confuse   Eyes: EOM are normal.    Neck: Neck supple.   Cardiovascular: An irregularly irregular rhythm present.   Murmur heard.  Pulmonary/Chest: Effort normal.   Crackles throughout   Abdominal: Soft. Bowel sounds are normal. There is no tenderness.   Musculoskeletal:   2 back drain with blood.    Neurological: He is alert.   Oriented to self and place not time or situation.    Skin: Skin is warm and dry.   LFA AVF bruit and thrill       Significant Labs:  All labs within the past 24 hours have been reviewed.     Significant Imaging:  Labs: Reviewed    Assessment/Plan:     ESRD (end stage renal disease) on dialysis    HD MWF 3.5 hrs duration via LFA AVF at [facility]. Unsure EDW or residual function.   -Lytes and acid base stable. Net positive 2.8 L over 24 hrs.   -HD today and aim for 3 L net UF. Dialysate will be adjusted to current labs    Anemia of CKD and acute blood loss  -Sp Lumbar wash out and hematoma evaluation on 11/16  -hgb 9.8>7.9>6.7 post surgery. 2 units PRBC to be transfused. Difficult situation as hypotension and uncontrolled heart rate also lung sound inspiratory crackles through out >L. Obtain CXR-if stable, would transfuse blood prior to dialysis this afternoon.   -Start ZHEN with HD. Obtain iron studies.      BMD  -No binders for now. High cCa levels. Avoid any Ca based supplements or Ca containing binders.                   Thank you for your consult. I will follow-up with patient. Please contact us if you have any additional questions.    Rosa Ramirez NP  Nephrology  [facility]

## 2017-11-17 NOTE — ASSESSMENT & PLAN NOTE
- in setting of ESRD and oliguria  - urine culture shows many organisms - none in predominance, no antimicrobial treatment necessary

## 2017-11-17 NOTE — ASSESSMENT & PLAN NOTE
- nephrology consulted for volume management  -they will use midodrine PRN if needed during dialysis, nephrology fellow to change orders.   -nephrology recommend avoiding calcium containing products, pt with elevated corrected calcium  -Patient receiving repeated FFP and plan for platelets before surgery, discussed with Hemodialysis unit and patient is already on morning schedule as he will need additional volume clearance.

## 2017-11-17 NOTE — NURSING
HECTOR Kulkarni contacted coverage for MT L, patient is tachycardic ranging from 130s-140s. A stat EKG will be ordered, and it will be determined from there what shall take place. Patient is asymptomatic, will continue to monitor.

## 2017-11-17 NOTE — SUBJECTIVE & OBJECTIVE
Interval History: Sp lumbar wound wash out and evaluation of hematoma on 11/17. Hgb 6.7  From 7.9  (98.8 on 11/14). INR 2.0.  Atrial fibrillation -140 with hypotension SBP 88 this am.  ml bolus given 's and SBP 98.     Review of patient's allergies indicates:   Allergen Reactions    Morphine Other (See Comments)     Other reaction(s): severe abdominal pain    Darvocet a500  [propoxyphene n-acetaminophen]      Other reaction(s): Unknown     Current Facility-Administered Medications   Medication Frequency    0.9%  NaCl infusion (for blood administration) Q24H PRN    acetaminophen tablet 650 mg Q8H PRN    albumin human 25% bottle 25 g Continuous PRN    albuterol nebulizer solution 2.5 mg Q4H PRN    aluminum-magnesium hydroxide-simethicone 200-200-20 mg/5 mL suspension 30 mL Q4H PRN    atorvastatin tablet 80 mg Daily    bisacodyl suppository 10 mg Daily    dextrose 50% injection 12.5 g PRN    dextrose 50% injection 25 g PRN    fentaNYL 12 mcg/hr 1 patch Q72H    fentaNYL injection 50 mcg Q1H PRN    glucagon (human recombinant) injection 1 mg PRN    glucose chewable tablet 16 g PRN    glucose chewable tablet 24 g PRN    hydrocodone-acetaminophen 10-325mg per tablet 1 tablet Q6H PRN    insulin aspart pen 0-5 Units QID (AC + HS) PRN    insulin detemir pen 5 Units QHS    metoprolol tartrate (LOPRESSOR) tablet 25 mg BID    midodrine tablet 10 mg Continuous PRN    mupirocin 2 % ointment 1 g BID    ondansetron disintegrating tablet 8 mg Q8H PRN    ondansetron disintegrating tablet 8 mg Q6H PRN    pantoprazole EC tablet 40 mg Nightly    polyethylene glycol packet 17 g BID    promethazine (PHENERGAN) 6.25 mg in dextrose 5 % 50 mL IVPB Q6H PRN    ramelteon tablet 8 mg Nightly PRN    senna-docusate 8.6-50 mg per tablet 1 tablet BID    senna-docusate 8.6-50 mg per tablet 2 tablet Nightly PRN    sertraline tablet 100 mg QHS    sodium chloride 0.9% flush 3 mL PRN       Objective:      Vital Signs (Most Recent):  Temp: 98.9 °F (37.2 °C) (11/17/17 0751)  Pulse: (!) 118 (11/17/17 0914)  Resp: 20 (11/17/17 0751)  BP: (!) 98/51 (11/17/17 0914)  SpO2: 95 % (11/17/17 0751)  O2 Device (Oxygen Therapy): room air (11/17/17 0751) Vital Signs (24h Range):  Temp:  [97.7 °F (36.5 °C)-98.9 °F (37.2 °C)] 98.9 °F (37.2 °C)  Pulse:  [] 118  Resp:  [16-20] 20  SpO2:  [90 %-100 %] 95 %  BP: ()/(40-96) 98/51     Weight: 97.5 kg (215 lb) (11/15/17 2030)  Body mass index is 39.32 kg/m².  Body surface area is 2.07 meters squared.    I/O last 3 completed shifts:  In: 2982.5 [I.V.:600; Blood:2382.5]  Out: 150 [Urine:150]    Physical Exam   Constitutional: He appears well-developed and well-nourished.   confuse   Eyes: EOM are normal.   Neck: Neck supple.   Cardiovascular: An irregularly irregular rhythm present.   Murmur heard.  Pulmonary/Chest: Effort normal.   Crackles throughout   Abdominal: Soft. Bowel sounds are normal. There is no tenderness.   Musculoskeletal:   2 back drain with blood.    Neurological: He is alert.   Oriented to self and place not time or situation.    Skin: Skin is warm and dry.   LFA AVF bruit and thrill       Significant Labs:  All labs within the past 24 hours have been reviewed.     Significant Imaging:  Labs: Reviewed

## 2017-11-17 NOTE — ASSESSMENT & PLAN NOTE
85 y.o. male s/p L2-4 fusion 10/20 for lumbar stenosis who presents with acute worsening of his LBP and LE weakness. POD1 s/p washout and hematoma evacuation    -Patient neuro stable post-op  -Drain output 100, 25 since surgery. Will keep in place for now   -Continue to hold ASA and coumadin  -ESR 92/, Procalcitonin 1.09  -Afebrile, no leukocytosis  -HD per nephrology. hgb 9.8>7.9>6.7 post surgery. 2 units PRBC to be transfused today.   -Q4h neuro checks  -PT/OT/OOB. Therapy recommending SNF   -Pain control per primary team  -Recommend delirium precautions   -TEDs/SCDs/SQH for DVT prophylaxis   -Coordination of care and medical management per primary team   -Please call NSGY with any change in neuro exam

## 2017-11-17 NOTE — PT/OT/SLP PROGRESS
Physical Therapy  Treatment    Donta Cline   MRN: [id number]   Admitting Diagnosis: Acute bilateral low back pain    PT Received On: 11/17/17  PT Start Time: 1035     PT Stop Time: 1059    PT Total Time (min): 24 min       Billable Minutes:  Therapeutic Activity 24    Treatment Type: Treatment  PT/PTA: PT     PTA Visit Number: 0       General Precautions: Standard, fall  Orthopedic Precautions: N/A   Braces: LSO         Subjective:  Communicated with RN prior to session.  Pt reports that he is agreeable to therapy this morning    Pain/Comfort  Pain Rating 1: 6/10  Location - Side 1: Bilateral  Location - Orientation 1: generalized  Location 1: back  Pain Addressed 1: Reposition, Distraction  Pain Rating Post-Intervention 1: 6/10    Objective:   Patient found with: hemovac, oxygen, peripheral IV, telemetry (LSO)    Functional Mobility:  Bed Mobility:   Supine to Sit: Maximum Assistance (with VC for log roll technique)    Transfers:  Bed <> Chair Technique: Stand Pivot  Bed <> Chair Assistance: Total Assistance  Bed <> Chair Assistive Device: No Assistive Device    Balance:   Static Sit: FAIR: Maintains without assist, but unable to take any challenges   Dynamic Sit: FAIR: Cannot move trunk without losing balance  Static Stand: 0: Needs MAXIMAL assist to maintain   Dynamic stand: 0: N/A     Therapeutic Activities and Exercises:  Pt sat EOB 8 min with CGA/Min A for balance  Donning of LSO with Total A    Seated ankle pumps x 10 B     Pt educated on   Role of PT  POC    AM-PAC 6 CLICK MOBILITY  How much help from another person does this patient currently need?   1 = Unable, Total/Dependent Assistance  2 = A lot, Maximum/Moderate Assistance  3 = A little, Minimum/Contact Guard/Supervision  4 = None, Modified [place]/Independent    Turning over in bed (including adjusting bedclothes, sheets and blankets)?: 2  Sitting down on and standing up from a chair with arms (e.g., wheelchair, bedside commode, etc.): 2  Moving  from lying on back to sitting on the side of the bed?: 2  Moving to and from a bed to a chair (including a wheelchair)?: 2  Need to walk in hospital room?: 1  Climbing 3-5 steps with a railing?: 1  Total Score: 10    AM-PAC Raw Score CMS G-Code Modifier Level of Impairment Assistance   6 % Total / Unable   7 - 9 CM 80 - 100% Maximal Assist   10 - 14 CL 60 - 80% Moderate Assist   15 - 19 CK 40 - 60% Moderate Assist   20 - 22 CJ 20 - 40% Minimal Assist   23 CI 1-20% SBA / CGA   24 CH 0% Independent/ Mod I     Patient left up in chair with all lines intact, call button in reach, RN notified and MD and family present.    Assessment:  Donta Cline is a 85 y.o. male with a medical diagnosis of Acute bilateral low back pain and presents with pain, LE weakness, impaired balance, and decreased endurance. He sat EOB requiring CGA/Min A for balance with c/o back pain and Total A to don LSO. He required Total A for stand pivot transfer from EOB to bedside chair with c/o back and knee pain. He would benefit from continued skilled physical therapy to address pain, weakness, and functional mobility..    Rehab identified problem list/impairments: Rehab identified problem list/impairments: pain, weakness, gait instability, impaired balance, impaired endurance, impaired self care skills    Rehab potential is good.    Activity tolerance: Fair    Discharge recommendations: Discharge Facility/Level Of Care Needs: nursing facility, skilled     Barriers to discharge: Barriers to Discharge: Decreased caregiver support    Equipment recommendations: Equipment Needed After Discharge: other (see comments) (TBD next level of care)     GOALS:    Physical Therapy Goals        Problem: Physical Therapy Goal    Goal Priority Disciplines Outcome Goal Variances Interventions   Physical Therapy Goal     PT/OT, PT Ongoing (interventions implemented as appropriate)     Description:  Goals to be met by: 11/25/2017     Patient will increase  functional independence with mobility by performin. Supine to sit with MInimal Assistance  2. Sit to supine with MInimal Assistance  3. Sit to stand transfer with Moderate Assistance  4. Bed to chair transfer with Moderate Assistance  5. Gait  x 10 feet with Maximum Assistance using Rolling Walker.   6. Lower extremity exercise program x15 reps per handout, with assistance as needed                      PLAN:    Patient to be seen 4 x/week  to address the above listed problems via therapeutic activities, therapeutic exercises, wheelchair management/training  Plan of Care expires: 12/15/17  Plan of Care reviewed with: patient         Xavier Yusuf, SPT  2017

## 2017-11-17 NOTE — PT/OT/SLP EVAL
Speech Language Pathology  Evaluation    Donta Cline   MRN: [id number]   Admitting Diagnosis: Acute bilateral low back pain    Diet recommendations: Solid Diet Level: Puree  Liquid Diet Level: Nectar Thick Feed only when awake/alert, No straws, Small bites/sips, 1 bite/sip at a time, Check for pocketing/oral residue, Meds crushed in puree, Eliminate distractions, Assistance with meals and Assistance with thickening liquidsEncourage double swallows per bolus    SLP Treatment Date: 11/17/17  Speech Start Time: 1138     Speech Stop Time: 1206     Speech Total (min): 28 min       TREATMENT BILLABLE MINUTES:  Eval Swallow and Oral Function 13 and [facility] Care/Home Management Training 15    Diagnosis: Acute bilateral low back pain  The primary encounter diagnosis was Back pain. Diagnoses of Dehydration, Back pain, unspecified back location, unspecified back pain laterality, unspecified chronicity, Chronic diastolic heart failure, Coronary artery disease involving native coronary artery of native heart without angina pectoris, ESRD (end stage renal disease) on dialysis, Lumbar back pain with radiculopathy affecting right lower extremity, Paroxysmal atrial fibrillation, Severe aortic stenosis, Type 2 diabetes mellitus with chronic kidney disease on chronic dialysis, with long-term current use of insulin, Acute cystitis without hematuria, Status post lumbar spinal fusion, L-S radiculopathy, Acute bilateral low back pain, Supratherapeutic INR, Postoperative infection, initial encounter, and Tachycardia were also pertinent to this visit.      Past Medical History:   Diagnosis Date    Bladder cancer     Chronic diastolic heart failure 8/21/2012    3/13: AOSAT: 98, FICKCI: 2.41, FICKCO: 5.18 PAPRES: 34/16 (23), PASAT: 65, PVR: 1.74 PWPRES: 18/18 (14), RA PRES: 14/13 (12), RV: 40/0, 10     Chronic hip pain, right 10/4/2017    Coronary artery disease involving native coronary artery of native heart without angina pectoris      "Dyslipidemia     Encounter for blood transfusion     ESRD (end stage renal disease) on dialysis 6/9/2014    Essential hypertension     Hypercholesteremia 8/21/2012    Long-term (current) use of anticoagulants 10/9/2015    NSTEMI (non-ST elevated myocardial infarction) 10/4/2017    Paroxysmal atrial fibrillation 10/8/2015    Prostate cancer     Severe aortic stenosis 10/14/2014    Type 2 diabetes mellitus with chronic kidney disease on chronic dialysis, with long-term current use of insulin 12/15/2013    Ventricular tachycardia     monomorphic     Past Surgical History:   Procedure Laterality Date    BACK SURGERY      laminectomy x2    COLON SURGERY      EXTENSIVE PROSTATE SURGER      Prostatectomy, Radical    JOINT REPLACEMENT      left hip       Has the patient been evaluated by SLP for swallowing? : Yes  Keep patient NPO?: No   General Precautions: Standard, aspiration, fall        Prior MBSS 10/12/17: Pt with moderate-severe oropharyngeal phase dysphagia with aspiration of thin and nectar thick liquids, and penetration with concern for aspiration of honey thick liquids. No penetration/ aspiration with pureed and solid consistencies.     11/14/17: Stable chronic findings, no acute cardiopulmonary process.    Prior diet: Puree, nectar-thickened     Subjective:  SLP reviewed Pt with nurse and MD, pt cleared for trials of advanced textures  Pt presents calm ,fatigued  He explains, "I just want water"  Patient's daughter explains, "He probably had about 3 cups of water earlier, non-thickened, because he was begging for it and I just had to."     Pain/Comfort  Pain Rating 1: 6/10  Location - Side 1: Bilateral  Location - Orientation 1: generalized  Location 1: back  Pain Addressed 1: Distraction, Other (see comments), Nurse notified (MD in room during session )  Pain Rating Post-Intervention 1: 6/10    Objective:   Patient found with: peripheral IV, hemovac    Oral Musculature Evaluation  Dentition: " present and adequate  Mucosal Quality: dry  Mandibular Strength and Mobility: flaccid  Lingual Strength and Mobility: WFL  Buccal Strength and Mobility: decreased tone  Volitional Cough: present, productive   Volitional Swallow: elicited, delayed initiation fo swallow upon palpation of the larynx      Bedside Swallow Eval:  Consistencies Assessed: Thin liquids cup edge sip, Nectar thick liquids cup edge sips x3 and Puree 1/2 teaspoon bite, full teaspoon bite  Oral Phase: dry mouth  Pharyngeal Phase: coughing/choking and wet vocal quality after swallow  Pt with h/o Dysphagia with most recent MBSS completed 10/12/17.  TOoday patient presents with increased fatigue. Pt with productive cough prior to PO trials. Daughter at bedside explains Patient thirsty, has been NPO ~ 3 days. Patient's daughter explains Patient was receiving clear liquid diet, had glasses of water this am. Patient with delayed coughing on trial of thin liquids. Patient with delayed throat clears with trial of puree. Dr. Alonso at bedside during assessment and reviews findings. REC: Puree diet with nectar-thickened liquids, medications crushed in puree, with strict 1:1 assistance for utilization of double swallows and strict aspiration precautions.  ST to continue to follow.     Additional Treatment:  SLP educates Patient and Patient's daughter on SLP role, S/S aspiration, aspiration precautions, thickener guidelines. Patient's daughter asking for Boost supplements/ nutritional shakes. SLP v/u dietary request for Patient and MD notified. Patient not able to demonstrate understanding of SLP education while daughter verbalizes understanding. No further questions noted. Whiteboard updated. Nurse notified of findings.     FIM:  Social Interaction: 3 Moderate Direction--The patient interacts appropriately 50 to 74% of the time.           Assessment:  Donta Cline is a 85 y.o. male with a medical diagnosis of Acute bilateral low back pain and  presents with Oropharyngeal Dysphagia. Patient requires strict 1:1 assistance with all meals and should only eat when awake and alert.     Do you have any cultural, spiritual, Shinto conflicts, given your current situation?: none noted      Discharge recommendations: Discharge Facility/Level Of Care Needs: nursing facility, skilled     Goals:    SLP Goals        Problem: SLP Goal    Goal Priority Disciplines Outcome   SLP Goal     SLP Ongoing (interventions implemented as appropriate)   Description:  Speech Language Pathology Goals  Goals expected to be met by 12/24/17  1. Pt will tolerate puree diet without overt S/S aspiration, Supervision  2. Pt will tolerate nectar-thickened liquids without overt S/S aspiration, Supervision  3. Pt will complete dysphagia exercises x10 ea. , MIN A, to improve BOT and laryngeal elevation  4. Educate pt and family on S/S aspiration and aspiration precautions                          Plan:   Patient to be seen Therapy Frequency: 5 x/week   Plan of Care expires: 12/17/17  Plan of Care reviewed with: patient, daughter  SLP Follow-up?: Yes         SLP G-Codes  Functional Assessment Tool Used: NOMS  Score: 3  Functional Limitations: Swallowing  Swallow Current Status (): CL  Swallow Goal Status (): LYNDSAY Meza, CCC-SLP  Speech-Language Pathology  Pager: [phone]  11/17/2017

## 2017-11-17 NOTE — NURSING TRANSFER
Nursing Transfer Note      11/17/2017     Transfer To: [address]    Transfer via bed    Transfer with cardiac monitoring    Transported by RN x2    Chart send with patient: Yes    Notified: unable to notify    Patient reassessed at: 11/17/2017 0115

## 2017-11-17 NOTE — PROGRESS NOTES
Pt's Hr in 130s , BP 88/40 manual , pt asymptomatic . Arouse to voice . Dr. Alonso notified about the pt's BP and that there is order for metoprolol IV scheduled for pt , he order to hold the metoprolol for now and do stat CBC and he said he will order some fluid for the pt . Will follow orders and keep monitoring the pt .

## 2017-11-17 NOTE — ASSESSMENT & PLAN NOTE
- S/p lumbar fusion 10/20/2017. Presents with c/o lower back pain and progressive lower extremity weakness, patient now unable to ambulate. Unclear baseline functional status  - MRI reviewed, concerning for seroma vs hematoma, cannot rule out infectious process. Plan for neurosurgery to take pt for washout today  - Supportive care, PT/OT  - continue fentanyl patches and norco prn

## 2017-11-17 NOTE — CARE UPDATE
Patient taken for OR late overnight, brief op notes states pt with hematoma on findings    CBC overnight with downtrending Hgb, last INR 1.7.     Earlier today pt converted from sinus rhythm to atrial fibrillation with RVR - rates 120-130s on telemetry, BP this AM 88/40    Pt received 250cc saline bolus @ 0100    At bedside pt awake, arousable denies any acute dyspnea, chest pain or palpitations, reports pain is stable, has a surgical drain at bedside with bloody discharge.  Lying supine in bed, HR tachycardic, irregular, systolic murmur present, lateral exam, some basilar crackles present.     Plan  -d/c order for IV metoprolol  -Give 250cc IVF bolus now   >asked RN to recheck BP after and if improved will give oral  metoprolol dosage  -CBC stat in lab now pending.   -AM renal function  Panel and INR not received by lab - orders for stat  -Family states pt code status should be DNR - will change  -Transfer to stepdown level of care.   -HD was supposed to go this AM is on hold for now, obtain portable AP cxr.   >with plasma & PLT pt received yesterday intake 2.9L '      10:41 - pts hgb is 6.7, inr 2.0 today, order placed for 1unit PRBC to be administered during HD this afternoon, discussed with HD.         Gurmeet Alonso M.D.  Attending Physician  [facility] Medicine Dept.  Pager: [phone]  Spectralink -x [phone]

## 2017-11-17 NOTE — BRIEF OP NOTE
[facility]  Neurosurgery  Operative Note    SUMMARY      Date of Procedure: 11/16/2017     Procedure: Procedure(s) (LRB):  REVISION-WOUND--lumbar washout (N/A)  And evacuation of hematoma    Surgeon(s) and Role:     * Malachi Sands DO - Primary    Assisting Surgeon: Shirlene Selby    Pre-Operative Diagnosis: Back pain [M54.9]  Status post lumbar spinal fusion [Z98.1]  Back pain, unspecified back location, unspecified back pain laterality, unspecified chronicity [M54.9]    Post-Operative Diagnosis: Post-Op Diagnosis Codes:     * Back pain [M54.9]     * Status post lumbar spinal fusion [Z98.1]     * Back pain, unspecified back location, unspecified back pain laterality, unspecified chronicity [M54.9]    Anesthesia: General    Technical Procedures Used: wound exploration w/evacuation of hematoma and washout    Description of the Findings of the Procedure: see full op note    Complications: No    Estimated Blood Loss (EBL): * No values recorded between 11/16/2017  9:56 PM and 11/17/2017 12:07 AM *           Specimens:   Specimen (12h ago through future)    None           Implants: * No implants in log *           Condition: Good    Disposition: PACU - hemodynamically stable.    Attestation: I was present and scrubbed for the entire procedure.

## 2017-11-17 NOTE — PLAN OF CARE
Problem: Physical Therapy Goal  Goal: Physical Therapy Goal  Goals to be met by: 2017     Patient will increase functional independence with mobility by performin. Supine to sit with MInimal Assistance  2. Sit to supine with MInimal Assistance  3. Sit to stand transfer with Moderate Assistance  4. Bed to chair transfer with Moderate Assistance  5. Gait  x 10 feet with Maximum Assistance using Rolling Walker.   6. Lower extremity exercise program x15 reps per handout, with assistance as needed     Outcome: Ongoing (interventions implemented as appropriate)  PT goals remain appropriate for this pt at this time.  Xvaier Yusuf, SPT  2017

## 2017-11-17 NOTE — NURSING
HECTOR Kulkarni contacted per surgery, an update on patient's current health status given. Patient's family at bedside, patient appears more alert and orientated, but remains confused. Will continue to monitor.

## 2017-11-17 NOTE — PLAN OF CARE
Problem: Occupational Therapy Goal  Goal: Occupational Therapy Goal  Goals to be met by: 11/22     Patient will increase functional independence with ADLs by performing:    Bed mobility with demo understanding for spinal precautions with min(A).  Stand Pivot transfer to multiple surfaces (chair, wheelchair, bedside commode) with Moderate Assistance.   Donning LSO with Minimal Assistance while seated EOB.  Donning socks with sock aid with minimal assistance.   Functional dynamic sitting task ~8 min duration while seated EOB with CGA for postural control.   UE endurance HEP with set up and assistance as needed.      Outcome: Ongoing (interventions implemented as appropriate)  Goals remain appropriate. Cont POC.    PIYUSH Conway  11/17/2017  Pager: [phone]

## 2017-11-17 NOTE — ASSESSMENT & PLAN NOTE
HD MWF 3.5 hrs duration via LFA AVF at [facility]. Unsure EDW or residual function.   -Lytes and acid base stable. Net positive 2.8 L over 24 hrs.   -HD today and aim for 3 L net UF. Dialysate will be adjusted to current labs    Anemia of CKD and acute blood loss  -Sp Lumbar wash out and hematoma evaluation on 11/16  -hgb 9.8>7.9>6.7 post surgery. 2 units PRBC to be transfused. Difficult situation as hypotension and uncontrolled heart rate also lung sound inspiratory crackles through out >L. Obtain CXR-if stable, would transfuse blood prior to dialysis this afternoon.   -Start ZHEN with HD. Obtain iron studies.      BMD  -No binders for now. High cCa levels. Avoid any Ca based supplements or Ca containing binders.

## 2017-11-17 NOTE — PROGRESS NOTES
[facility]  Neurosurgery  Progress Note    Subjective:     History of Present Illness: Donta Cline is 85 y.o. male with PMH ESRD on HD MWF, CAD, NSTEMI, bladder/prostate cancer, DMII, aortic stenosis and recent L2-L4 fusion 10/20/17 who presents to [facility] with complaint of worsened back pain and functional decline since last Friday 11/3. There is no family in the room. The patient is mildy confused and thus a poor historian so most of the history obtained from the medical record. He was discharged to a rehab after the last admission and progressing well so transferred to a skilled nursing facility. He was able to walk 50ft on his own until last week when he had acute worsening of his low back pain and LE weakness. He has been unable to weight bear since that time. He also complains of abdominal pain and bilateral hip pain. He reports some BLE pain but is unable to characterize it. Denies BB dysfunction or saddle anesthesia. There is no record of fever or trauma. ESR/CRP are elevated. INR 3.8, on coumadin.     Post-Op Info:  Procedure(s) (LRB):  REVISION-WOUND--lumbar washout (N/A)   1 Day Post-Op     Interval History: POD1 s/p washout. Hematoma found in surgical bed. Neuro stable on exam. Drains kept. Hgb 6.7  From 7.9  (98.8 on 11/14). INR 2.0.  Afib with -140 with hypotension overnight. SBP 88 this am.  ml bolus given 's and SBP 98. Pt receiving 1 unit in dialysis this afternoon.     Medications:  Continuous Infusions:   albumin human 25%      midodrine       Scheduled Meds:   atorvastatin  80 mg Oral Daily    bisacodyl  10 mg Rectal Daily    fentaNYL  1 patch Transdermal Q72H    insulin detemir  5 Units Subcutaneous QHS    metoprolol tartrate  25 mg Oral BID    mupirocin  1 g Nasal BID    pantoprazole  40 mg Oral Nightly    polyethylene glycol  17 g Oral BID    senna-docusate 8.6-50 mg  1 tablet Oral BID    sertraline  100 mg Oral QHS     PRN Meds:sodium  chloride, acetaminophen, albumin human 25%, albuterol sulfate, aluminum-magnesium hydroxide-simethicone, dextrose 50%, dextrose 50%, glucagon (human recombinant), glucose, glucose, hydrocodone-acetaminophen 10-325mg, insulin aspart, midodrine, ondansetron, ondansetron, promethazine (PHENERGAN) IVPB, ramelteon, senna-docusate 8.6-50 mg, sodium chloride 0.9%     Review of Systems  Objective:     Weight: 97.5 kg (215 lb)  Body mass index is 39.32 kg/m².  Vital Signs (Most Recent):  Temp: 97.8 °F (36.6 °C) (11/17/17 1515)  Pulse: 95 (11/17/17 1545)  Resp: 16 (11/17/17 1515)  BP: (!) 105/57 (11/17/17 1545)  SpO2: 95 % (11/17/17 0751) Vital Signs (24h Range):  Temp:  [97.7 °F (36.5 °C)-98.9 °F (37.2 °C)] 97.8 °F (36.6 °C)  Pulse:  [] 95  Resp:  [16-20] 16  SpO2:  [90 %-100 %] 95 %  BP: ()/(31-96) 105/57       Date 11/17/17 0700 - 11/18/17 0659   Shift 1163-2896 1572-3044 1605-7634 24 Hour Total   I  N  T  A  K  E   P.O. 300   300    Blood 250 250  500    Shift Total  (mL/kg) 550  (5.6) 250  (2.6)  800  (8.2)   O  U  T  P  U  T   Drains 125   125    Shift Total  (mL/kg) 125  (1.3)   125  (1.3)   Weight (kg) 97.5 97.5 97.5 97.5                        Closed/Suction Drain 11/16/17 2320 Posterior Back  (Active)   Site Description Unable to view 11/17/2017  7:51 AM   Dressing Type [place] 11/17/2017  7:51 AM   Dressing Status Biopatch in place;Clean;Dry 11/17/2017  7:51 AM   Dressing Intervention Dressing reinforced 11/17/2017  7:51 AM   Drainage Bloody 11/17/2017  7:51 AM   Status To bulb suction 11/17/2017  7:51 AM   Output (mL) 100 mL 11/17/2017 10:38 AM            Closed/Suction Drain 11/16/17 2321 Posterior Back Other (Comment) (Active)   Site Description Unable to view 11/17/2017  7:51 AM   Dressing Type [place] 11/17/2017  7:51 AM   Dressing Status Biopatch in place;Clean;Dry 11/17/2017  7:51 AM   Dressing Intervention Dressing reinforced 11/17/2017  7:51 AM   Drainage Bloody 11/17/2017  7:51 AM    Status To bulb suction 11/17/2017  7:51 AM   Output (mL) 25 mL 11/17/2017 10:38 AM            Hemodialysis AV Fistula Right forearm (Active)   Needle Size 15ga 11/17/2017  2:10 PM   Site Assessment Clean;Dry;Intact;No redness;No swelling 11/17/2017  2:10 PM   Patency Present;Thrill;Bruit 11/17/2017  2:10 PM   Status Accessed 11/17/2017  2:10 PM   Flows Good 11/17/2017  2:10 PM   Dressing Intervention New dressing 11/17/2017  2:10 PM   Dressing Status Clean;Dry;Intact 11/17/2017  2:10 PM   Site Condition No complications 11/17/2017  2:10 PM   Dressing Open to air (None) 11/17/2017  1:15 AM       Neurosurgery Physical Exam  General: well developed, well nourished, no distress.   Head: normocephalic, atraumatic  Neurologic: Pt confused. Alert and oriented to person and place only. Thought content appropriate. Pt resistant to the exam  GCS: Motor: 6/Verbal: 4/Eyes: 4 GCS Total: 14  Mental Status: Awake, Alert, Oriented x2  Cranial nerves: face symmetric, tongue midline, CN II-XII grossly intact.   Eyes: pupils equal, round, reactive to light with accomodation, EOMI.   Sensory: intact to light touch throughout  Motor Strength: Moves all extremities spontaneously with good tone. No abnormal movements seen. LE exam effort and pain limited     Strength   Deltoids Triceps Biceps Wrist Extension Wrist Flexion Hand    Upper: R 5/5 5/5 5/5 5/5 5/5 5/5     L 5/5 5/5 5/5 5/5 5/5 5/5       Iliopsoas Quadriceps Knee  Flexion Tibialis  anterior Gastro- cnemius EHL   Lower: R 3/5 1/5 5/5 5/5 5/5 5/5     L 3/5 1/5 5/5 5/5 5/5 5/5      DTR's - 2 + and symmetric in UE and LE  Pronator Drift: not assessed  Finger-to-nose: not assessed  Funez: absent  Clonus: absent  Babinski: absent   Pulses: 2+ and symmetric radial and dorsalis pedis. No lower extremity edema  Bandage clean dry and intact, 2 HV drains in place    Significant Labs:    Recent Labs  Lab 11/16/17  0507 11/17/17  0927   * 143*    143   K 4.6 4.5     104   CO2 24 26   BUN 22 36*   CREATININE 3.1* 4.2*   CALCIUM 9.7 9.3   MG  --  1.8       Recent Labs  Lab 11/16/17  2333 11/17/17  0818   WBC 15.62* 10.59   HGB 7.9* 6.7*   HCT 25.9* 22.8*   * 257       Recent Labs  Lab 11/16/17  0507  11/16/17  1855 11/16/17  2333 11/17/17  0927   INR 3.8*  3.8*  < > 1.5* 1.9* 2.0*   APTT 40.9*  --   --   --  31.6   < > = values in this interval not displayed.  Microbiology Results (last 7 days)     Procedure Component Value Units Date/Time    AFB Culture & Smear [[id number]] Collected:  11/16/17 2218    Order Status:  Completed Specimen:  Wound from Back Updated:  11/17/17 1353     AFB CULTURE STAIN No acid fast bacilli seen.    Narrative:       2.deep lumbar spine    AFB Culture & Smear [[id number]] Collected:  11/16/17 2218    Order Status:  Completed Specimen:  Wound from Back Updated:  11/17/17 1353     AFB CULTURE STAIN No acid fast bacilli seen.    Narrative:       1.superficial lumbar spine    Fungus culture [[id number]] Collected:  11/16/17 2218    Order Status:  Completed Specimen:  Wound from Back Updated:  11/17/17 0742     Fungus (Mycology) Culture Culture in progress    Narrative:       1.superficial lumbar spine    Fungus culture [[id number]] Collected:  11/16/17 2218    Order Status:  Completed Specimen:  Wound from Back Updated:  11/17/17 0742     Fungus (Mycology) Culture Culture in progress    Narrative:       2.deep lumbar spine    Gram stain [[id number]] Collected:  11/16/17 2218    Order Status:  Completed Specimen:  Wound from Back Updated:  11/17/17 0039     Gram Stain Result Rare WBC's      No organisms seen    Narrative:       2.deep lumbar spine    Gram stain [[id number]] Collected:  11/16/17 2218    Order Status:  Completed Specimen:  Wound from Back Updated:  11/16/17 2357     Gram Stain Result No WBC's      No organisms seen    Narrative:       1.superficial lumbar spine    Culture, Anaerobic [[id number]] Collected:  11/16/17 2218    Order Status:   Sent Specimen:  Wound from Back Updated:  11/16/17 2234    Aerobic culture [[id number]] Collected:  11/16/17 2218    Order Status:  Sent Specimen:  Wound from Back Updated:  11/16/17 2234    Culture, Anaerobic [[id number]] Collected:  11/16/17 2218    Order Status:  Sent Specimen:  Wound from Back Updated:  11/16/17 2232    Aerobic culture [[id number]] Collected:  11/16/17 2218    Order Status:  Sent Specimen:  Wound from Back Updated:  11/16/17 2232    Gram stain [[id number]] Collected:  11/15/17 0126    Order Status:  Completed Specimen:  Urine Updated:  11/16/17 0643     Gram Stain Result Moderate WBC's      Rare yeast    Narrative:       Preferred Collection Type->Urine, Clean Catch    Urine culture [[id number]] Collected:  11/15/17 0126    Order Status:  Completed Specimen:  Urine Updated:  11/16/17 0532     Urine Culture, Routine Multiple organisms isolated. None in predominance.  Repeat if     Urine Culture, Routine clinically necessary.    Narrative:       Preferred Collection Type->Urine, Clean Catch    Gram stain [[id number]]     Order Status:  Completed Specimen:  Urine from Urine     Gram stain [[id number]]     Order Status:  Canceled Specimen:  Urine from Urine         All pertinent labs from the last 24 hours have been reviewed.    Significant Diagnostics:  None new     Assessment/Plan:     * Acute bilateral low back pain, post-lumbar spinal fusion    85 y.o. male s/p L2-4 fusion 10/20 for lumbar stenosis who presents with acute worsening of his LBP and LE weakness. POD1 s/p washout and hematoma evacuation    -Patient neuro stable post-op  -Drain output 100, 25 since surgery. Will keep in place for now   -Continue to hold ASA and coumadin  -ESR 92/, Procalcitonin 1.09  -Afebrile, no leukocytosis  -HD per nephrology. hgb 9.8>7.9>6.7 post surgery. 2 units PRBC to be transfused today.   -Q4h neuro checks  -PT/OT/OOB. Therapy recommending SNF   -Pain control per primary team  -Recommend delirium  precautions   -TEDs/SCDs/SQH for DVT prophylaxis   -Coordination of care and medical management per primary team   -Please call NSGY with any change in neuro exam               Moon Dong PA-C  Neurosurgery  [facility]

## 2017-11-17 NOTE — SUBJECTIVE & OBJECTIVE
Interval History: as above,    Review of Systems   Constitutional: Positive for chills. Negative for diaphoresis, fatigue and fever.   HENT: Negative for rhinorrhea, sinus pain, sinus pressure, sneezing and sore throat.    Eyes: Negative for visual disturbance.   Respiratory: Negative for cough, choking, chest tightness and shortness of breath.    Cardiovascular: Negative for chest pain, palpitations and leg swelling.   Gastrointestinal: Negative for constipation, diarrhea and nausea.        Denies bowel incontinence   Genitourinary: Negative for dysuria.        Denies new urinary incontinence, daughter reports present prior to surgery   Musculoskeletal: Positive for back pain and gait problem. Negative for joint swelling, myalgias and neck pain.   Neurological: Negative for dizziness, facial asymmetry, light-headedness and headaches.   Psychiatric/Behavioral: Negative for agitation and behavioral problems.     Objective:     Vital Signs (Most Recent):  Temp: 98 °F (36.7 °C) (11/16/17 1808)  Pulse: 99 (11/16/17 1808)  Resp: 16 (11/16/17 1747)  BP: 131/63 (11/16/17 1808)  SpO2: (!) 90 % (11/16/17 1808) Vital Signs (24h Range):  Temp:  [97.8 °F (36.6 °C)-98.9 °F (37.2 °C)] 98 °F (36.7 °C)  Pulse:  [] 99  Resp:  [16-20] 16  SpO2:  [90 %-98 %] 90 %  BP: ()/(42-83) 131/63     Weight: 97.5 kg (215 lb)  Body mass index is 39.32 kg/m².    Intake/Output Summary (Last 24 hours) at 11/16/17 1824  Last data filed at 11/16/17 1320   Gross per 24 hour   Intake              600 ml   Output             2100 ml   Net            -1500 ml      Physical Exam   Constitutional: He appears well-developed and well-nourished.   HENT:   Mouth/Throat: Oropharynx is clear and moist.   Eyes: Conjunctivae are normal. No scleral icterus.   Cardiovascular: Normal rate, regular rhythm, normal heart sounds and intact distal pulses.    No murmur heard.  Pulmonary/Chest: Effort normal and breath sounds normal. No respiratory distress. He  has no wheezes. He has no rales.   Abdominal: Soft. Bowel sounds are normal.   Mild distension, soft, tympanic to percussion, no fluid wave noted, no bulging flanks, or abdominal collateral vessels   Musculoskeletal:   RUE forearm AV fistula with palpable thrill.     LLE - 3/5 hip flexor strength, with passive hip flexion pain @ 30-40 degrees,  4/5 knee flex/ext, 5/5 dorsi/plantar flex    RLE - 2/5 hip flexor strenth, passive flexion to 10 degrees causing pain, 4 - /5 knee flex/ext, 5/5 dorsi/plantar flexion   Lymphadenopathy:     He has no cervical adenopathy.   Neurological:   Alert, oriented to self, birthdate, hospital, city, state, year, with prompting able to state month       Significant Labs:   CBC: No results for input(s): WBC, HGB, HCT, PLT in the last 48 hours.  CMP:     Recent Labs  Lab 11/15/17  0514 11/16/17  0507    141   K 4.0 4.6   CL 98 105   CO2 31* 24   GLU 66* 132*   BUN 36* 22   CREATININE 4.4* 3.1*   CALCIUM 9.8 9.7   ALBUMIN 2.3* 2.6*   ANIONGAP 13 12   EGFRNONAA 11.4* 17.4*     Lactic Acid: No results for input(s): LACTATE in the last 48 hours.  Urine Studies:     Recent Labs  Lab 11/14/17  2239   COLORU Radha   APPEARANCEUA Cloudy*   PHUR 7.0   SPECGRAV 1.010   PROTEINUA 2+*   GLUCUA Negative   KETONESU Negative   BILIRUBINUA Negative   OCCULTUA 3+*   NITRITE Negative   UROBILINOGEN Negative   LEUKOCYTESUR 3+*   RBCUA 42*   WBCUA >100*   BACTERIA Few*   HYALINECASTS 0       Significant Imaging: I have reviewed all pertinent imaging results/findings within the past 24 hours.

## 2017-11-17 NOTE — PROGRESS NOTES
"[facility] Hospital Medicine  Progress Note    Patient Name: Donta Cline  MRN: [id number]  Patient Class: IP- Inpatient   Admission Date: 11/14/2017  Length of Stay: 1 days  Attending Physician: Gurmeet Alonso MD  Primary Care Provider: ZAID Richardson Iii, MD    [facility] Medicine Team: [facility] MED L Gurmeet Alonso MD    Subjective:     Principal Problem:Acute bilateral low back pain    HPI:  Donta Cline is a 85 y.o. male who presents with PMHx of ESRD with dialysis MWF, HFpEF, NSTEMI, bladder/prostate cancer, DM II, aortic stenosis and CAD for evaluation of back pain s/p lumbar fusion/laminectomy (10/20/17). No family at bedside. Difficult to obtain HPI as speech garbled, however patient endorses progressive lower extremity weakness with difficulty ambulating for the past 4-5 days. His back pain is without radiation. Patient is now unable to ambulate independently. Denies trauma and urinary bowel/bladder incontinence, fever.    Per EDMD:  "The daughter brought patient to ED due to concern of his severe back pain and inability to perform activities as he was doing previously. She had discussed with Dr. Saul, and he recommended the patient come to ED and obtain imaging for spine."    Imaging:    Ct Lumbar Spine Without Contrast    Result Date: 11/14/2017  Comparison: MRI 10/12/17. Findings: Routine CT lumbar spine protocol performed without IV contrast. Prior L2-L4 instrumented lumbar fusion with interbody disc spacer at L3-4. Decompressive posterior laminectomies at from L2-3 through L5-S1. No evidence of hardware failure. No fracture identified, noting the inferior endplate of L4 is indistinct. Infection or postsurgical fluid collection not excluded, noting limited evaluation without IV contrast and artifact from adjacent pedicle screws. There is partial fusion of the right L5-S1 facet. The SI joints are unremarkable. There is extensive calcified atherosclerotic disease. Atrophic " kidneys. Partially imaged right renal stent noted. No hydronephrosis. Small renal lesions, too small to characterize without IV contrast.      Mri Lumbar Spine Without Contrast    Result Date: 11/14/2017  MRI LUMBAR SPINE TECHNIQUE: MRI lumbar spine was performed without contrast. There is an ill-defined heterogeneous collection encircling L1 spinous process demonstrating fluid levels on the right. This may represents a seroma or hematoma postoperatively however abscess cannot be excluded. This collection is best seen on series 11 image 4.    Hospital Course:  In discussion with daughter who is at bedside today (11/15) patient was discharged after surgical procedure to an inpatient rehab and he reports that he was doing well at the rehab but reached a plateau in his activity/functional level.  She states patient was walking 50 feet with minimal assist, able to toilet with minimal assistance.  Patient was transferred to SNF and since transfer, last Friday (11/10) patient requiring max assistance to stand and reported inability to bear weight secondary to pain, and since this time patient has been bed bound.     She also notes that patient with poor appetite as speech therapy has recommended puree nectar thick diet with Nepro shakes, reports that pt is losing weight and some increased abdominal girth.     Overnight patient admitted due to concerns of worsening pain and inability to ambulate, patient underwent MRI and CT imaging of the lumbar spine.  He has intact fusion, no spinal canal stenosis or cord compression noted, concern for a fluid collection encircling L1 spinous process, post op L2-L4 fusion changes and s/p laminectomy L3-L4.  Discussed imaging findings with attending neurosurgeon Dr. Sands who performed pts operation and he reported that fluid collection appears outside of range where instrumentation was.  Patient with no reported falls, workup today reveals elevated CRP >150, ESR 92, has as  supratherapeutic INR 3.8    Informed plan will be to take patient to OR for washout and exploration to evaluate for hematoma vs infection.     11/16 - Patient without acute events overnight, patient states he and his daughter are going to [facility] today.  Daughter has provided consent for surgery and blood products, patient is receiving serial doses of FFP with intermittent INR checks with plan for surgery this afternoon if INR <1.4.  Daughter reports that patient w/o complaints of pain when lying in bed, but if he is moved/turned causes exacerbation of his pain.     Interval History: as above,    Review of Systems   Constitutional: Positive for chills. Negative for diaphoresis, fatigue and fever.   HENT: Negative for rhinorrhea, sinus pain, sinus pressure, sneezing and sore throat.    Eyes: Negative for visual disturbance.   Respiratory: Negative for cough, choking, chest tightness and shortness of breath.    Cardiovascular: Negative for chest pain, palpitations and leg swelling.   Gastrointestinal: Negative for constipation, diarrhea and nausea.        Denies bowel incontinence   Genitourinary: Negative for dysuria.        Denies new urinary incontinence, daughter reports present prior to surgery   Musculoskeletal: Positive for back pain and gait problem. Negative for joint swelling, myalgias and neck pain.   Neurological: Negative for dizziness, facial asymmetry, light-headedness and headaches.   Psychiatric/Behavioral: Negative for agitation and behavioral problems.     Objective:     Vital Signs (Most Recent):  Temp: 98 °F (36.7 °C) (11/16/17 1808)  Pulse: 99 (11/16/17 1808)  Resp: 16 (11/16/17 1747)  BP: 131/63 (11/16/17 1808)  SpO2: (!) 90 % (11/16/17 1808) Vital Signs (24h Range):  Temp:  [97.8 °F (36.6 °C)-98.9 °F (37.2 °C)] 98 °F (36.7 °C)  Pulse:  [] 99  Resp:  [16-20] 16  SpO2:  [90 %-98 %] 90 %  BP: ()/(42-83) 131/63     Weight: 97.5 kg (215 lb)  Body mass index is 39.32  kg/m².    Intake/Output Summary (Last 24 hours) at 11/16/17 1824  Last data filed at 11/16/17 1320   Gross per 24 hour   Intake              600 ml   Output             2100 ml   Net            -1500 ml      Physical Exam   Constitutional: He appears well-developed and well-nourished.   HENT:   Mouth/Throat: Oropharynx is clear and moist.   Eyes: Conjunctivae are normal. No scleral icterus.   Cardiovascular: Normal rate, regular rhythm, normal heart sounds and intact distal pulses.    No murmur heard.  Pulmonary/Chest: Effort normal and breath sounds normal. No respiratory distress. He has no wheezes. He has no rales.   Abdominal: Soft. Bowel sounds are normal.   Mild distension, soft, tympanic to percussion, no fluid wave noted, no bulging flanks, or abdominal collateral vessels   Musculoskeletal:   RUE forearm AV fistula with palpable thrill.     LLE - 3/5 hip flexor strength, with passive hip flexion pain @ 30-40 degrees,  4/5 knee flex/ext, 5/5 dorsi/plantar flex    RLE - 2/5 hip flexor strenth, passive flexion to 10 degrees causing pain, 4 - /5 knee flex/ext, 5/5 dorsi/plantar flexion   Lymphadenopathy:     He has no cervical adenopathy.   Neurological:   Alert, oriented to self, birthdate, hospital, city, state, year, with prompting able to state month       Significant Labs:   CBC: No results for input(s): WBC, HGB, HCT, PLT in the last 48 hours.  CMP:     Recent Labs  Lab 11/15/17  0514 11/16/17  0507    141   K 4.0 4.6   CL 98 105   CO2 31* 24   GLU 66* 132*   BUN 36* 22   CREATININE 4.4* 3.1*   CALCIUM 9.8 9.7   ALBUMIN 2.3* 2.6*   ANIONGAP 13 12   EGFRNONAA 11.4* 17.4*     Lactic Acid: No results for input(s): LACTATE in the last 48 hours.  Urine Studies:     Recent Labs  Lab 11/14/17 2239   COLORU Radha   APPEARANCEUA Cloudy*   PHUR 7.0   SPECGRAV 1.010   PROTEINUA 2+*   GLUCUA Negative   KETONESU Negative   BILIRUBINUA Negative   OCCULTUA 3+*   NITRITE Negative   UROBILINOGEN Negative    LEUKOCYTESUR 3+*   RBCUA 42*   WBCUA >100*   BACTERIA Few*   HYALINECASTS 0       Significant Imaging: I have reviewed all pertinent imaging results/findings within the past 24 hours.    Assessment/Plan:      * Acute bilateral low back pain, post-lumbar spinal fusion    - S/p lumbar fusion 10/20/2017. Presents with c/o lower back pain and progressive lower extremity weakness, patient now unable to ambulate. Unclear baseline functional status  - MRI reviewed, concerning for seroma vs hematoma, cannot rule out infectious process. Plan for neurosurgery to take pt for washout today  - Supportive care, PT/OT  - continue fentanyl patches and norco prn        ESRD (end stage renal disease) on dialysis    - nephrology consulted for volume management  -they will use midodrine PRN if needed during dialysis, nephrology fellow to change orders.   -nephrology recommend avoiding calcium containing products, pt with elevated corrected calcium  -Patient receiving repeated FFP and plan for platelets before surgery, discussed with Hemodialysis unit and patient is already on morning schedule as he will need additional volume clearance.         Status post lumbar spinal fusion    -neurosurgery consultation as above          Pyuria    - in setting of ESRD and oliguria  - urine culture shows many organisms - none in predominance, no antimicrobial treatment necessary        Supratherapeutic INR    INR is 3.8, daughter reports at SNF patient receiving 7.5mg and 10mg of warfarin, higher that prior dosage per coumadin clinic notes of 4mg but this was in August.     -Hold coumadin/anti-platelet  -s/p vitamin K 2.5mg and receiving FFP today - s/p total 3units in am and inr was 2.4, 3 additional units ordered for this afternoon/evening. Neurosurgery to administer dose of Plt as well        Chronic diastolic heart failure    - last echo 10/2017 with pulmonary HTN and undetermined diastolic function, EF 55  - appears compensated, CXR without  edema, BNP elevated, but below baseline  -continue metoprolol        Coronary artery disease involving native coronary artery of native heart without angina pectoris    - no CP, SOB, anginal equivalents  - continue asa, statin  - EKG without ischemic changes        Type 2 diabetes mellitus with chronic kidney disease on chronic dialysis, with long-term current use of insulin    - reduce basal insulin dose as pt will be NPO at midnight and case planned for afternoon        Severe aortic stenosis    - history of        Paroxysmal atrial fibrillation    - continue metoprolol, tele  - daily INR, check now  -hold warfarin          VTE Risk Mitigation         Ordered     Medium Risk of VTE  Once      11/14/17 2044     Place sequential compression device  Until discontinued      11/14/17 1907     Place BRAIN hose  Until discontinued      11/14/17 1907              Gurmeet Alonso MD  Department of Hospital Medicine   [facility]

## 2017-11-17 NOTE — SUBJECTIVE & OBJECTIVE
Interval History: POD1 s/p washout. Hematoma found in surgical bed. Neuro stable on exam. Drains kept. Hgb 6.7  From 7.9  (98.8 on 11/14). INR 2.0.  Afib with -140 with hypotension overnight. SBP 88 this am.  ml bolus given 's and SBP 98. Pt receiving 1 unit in dialysis this afternoon.     Medications:  Continuous Infusions:   albumin human 25%      midodrine       Scheduled Meds:   atorvastatin  80 mg Oral Daily    bisacodyl  10 mg Rectal Daily    fentaNYL  1 patch Transdermal Q72H    insulin detemir  5 Units Subcutaneous QHS    metoprolol tartrate  25 mg Oral BID    mupirocin  1 g Nasal BID    pantoprazole  40 mg Oral Nightly    polyethylene glycol  17 g Oral BID    senna-docusate 8.6-50 mg  1 tablet Oral BID    sertraline  100 mg Oral QHS     PRN Meds:sodium chloride, acetaminophen, albumin human 25%, albuterol sulfate, aluminum-magnesium hydroxide-simethicone, dextrose 50%, dextrose 50%, glucagon (human recombinant), glucose, glucose, hydrocodone-acetaminophen 10-325mg, insulin aspart, midodrine, ondansetron, ondansetron, promethazine (PHENERGAN) IVPB, ramelteon, senna-docusate 8.6-50 mg, sodium chloride 0.9%     Review of Systems  Objective:     Weight: 97.5 kg (215 lb)  Body mass index is 39.32 kg/m².  Vital Signs (Most Recent):  Temp: 97.8 °F (36.6 °C) (11/17/17 1515)  Pulse: 95 (11/17/17 1545)  Resp: 16 (11/17/17 1515)  BP: (!) 105/57 (11/17/17 1545)  SpO2: 95 % (11/17/17 0751) Vital Signs (24h Range):  Temp:  [97.7 °F (36.5 °C)-98.9 °F (37.2 °C)] 97.8 °F (36.6 °C)  Pulse:  [] 95  Resp:  [16-20] 16  SpO2:  [90 %-100 %] 95 %  BP: ()/(31-96) 105/57       Date 11/17/17 0700 - 11/18/17 0659   Shift [id number] 8730-0659 24 Hour Total   I  N  T  A  K  E   P.O. 300   300    Blood 250 250  500    Shift Total  (mL/kg) 550  (5.6) 250  (2.6)  800  (8.2)   O  U  T  P  U  T   Drains 125   125    Shift Total  (mL/kg) 125  (1.3)   125  (1.3)   Weight (kg) 97.5 97.5 97.5  97.5                        Closed/Suction Drain 11/16/17 2320 Posterior Back  (Active)   Site Description Unable to view 11/17/2017  7:51 AM   Dressing Type [place] 11/17/2017  7:51 AM   Dressing Status Biopatch in place;Clean;Dry 11/17/2017  7:51 AM   Dressing Intervention Dressing reinforced 11/17/2017  7:51 AM   Drainage Bloody 11/17/2017  7:51 AM   Status To bulb suction 11/17/2017  7:51 AM   Output (mL) 100 mL 11/17/2017 10:38 AM            Closed/Suction Drain 11/16/17 2321 Posterior Back Other (Comment) (Active)   Site Description Unable to view 11/17/2017  7:51 AM   Dressing Type [place] 11/17/2017  7:51 AM   Dressing Status Biopatch in place;Clean;Dry 11/17/2017  7:51 AM   Dressing Intervention Dressing reinforced 11/17/2017  7:51 AM   Drainage Bloody 11/17/2017  7:51 AM   Status To bulb suction 11/17/2017  7:51 AM   Output (mL) 25 mL 11/17/2017 10:38 AM            Hemodialysis AV Fistula Right forearm (Active)   Needle Size 15ga 11/17/2017  2:10 PM   Site Assessment Clean;Dry;Intact;No redness;No swelling 11/17/2017  2:10 PM   Patency Present;Thrill;Bruit 11/17/2017  2:10 PM   Status Accessed 11/17/2017  2:10 PM   Flows Good 11/17/2017  2:10 PM   Dressing Intervention New dressing 11/17/2017  2:10 PM   Dressing Status Clean;Dry;Intact 11/17/2017  2:10 PM   Site Condition No complications 11/17/2017  2:10 PM   Dressing Open to air (None) 11/17/2017  1:15 AM       Neurosurgery Physical Exam  General: well developed, well nourished, no distress.   Head: normocephalic, atraumatic  Neurologic: Pt confused. Alert and oriented to person and place only. Thought content appropriate. Pt resistant to the exam  GCS: Motor: 6/Verbal: 4/Eyes: 4 GCS Total: 14  Mental Status: Awake, Alert, Oriented x2  Cranial nerves: face symmetric, tongue midline, CN II-XII grossly intact.   Eyes: pupils equal, round, reactive to light with accomodation, EOMI.   Sensory: intact to light touch throughout  Motor Strength: Moves  all extremities spontaneously with good tone. No abnormal movements seen. LE exam effort and pain limited     Strength   Deltoids Triceps Biceps Wrist Extension Wrist Flexion Hand    Upper: R 5/5 5/5 5/5 5/5 5/5 5/5     L 5/5 5/5 5/5 5/5 5/5 5/5       Iliopsoas Quadriceps Knee  Flexion Tibialis  anterior Gastro- cnemius EHL   Lower: R 3/5 1/5 5/5 5/5 5/5 5/5     L 3/5 1/5 5/5 5/5 5/5 5/5      DTR's - 2 + and symmetric in UE and LE  Pronator Drift: not assessed  Finger-to-nose: not assessed  Funez: absent  Clonus: absent  Babinski: absent   Pulses: 2+ and symmetric radial and dorsalis pedis. No lower extremity edema  Bandage clean dry and intact, 2 HV drains in place    Significant Labs:    Recent Labs  Lab 11/16/17  0507 11/17/17  0927   * 143*    143   K 4.6 4.5    104   CO2 24 26   BUN 22 36*   CREATININE 3.1* 4.2*   CALCIUM 9.7 9.3   MG  --  1.8       Recent Labs  Lab 11/16/17  2333 11/17/17  0818   WBC 15.62* 10.59   HGB 7.9* 6.7*   HCT 25.9* 22.8*   * 257       Recent Labs  Lab 11/16/17  0507  11/16/17  1855 11/16/17  2333 11/17/17  0927   INR 3.8*  3.8*  < > 1.5* 1.9* 2.0*   APTT 40.9*  --   --   --  31.6   < > = values in this interval not displayed.  Microbiology Results (last 7 days)     Procedure Component Value Units Date/Time    AFB Culture & Smear [[id number]] Collected:  11/16/17 2218    Order Status:  Completed Specimen:  Wound from Back Updated:  11/17/17 1353     AFB CULTURE STAIN No acid fast bacilli seen.    Narrative:       2.deep lumbar spine    AFB Culture & Smear [[id number]] Collected:  11/16/17 2218    Order Status:  Completed Specimen:  Wound from Back Updated:  11/17/17 1353     AFB CULTURE STAIN No acid fast bacilli seen.    Narrative:       1.superficial lumbar spine    Fungus culture [[id number]] Collected:  11/16/17 2218    Order Status:  Completed Specimen:  Wound from Back Updated:  11/17/17 0742     Fungus (Mycology) Culture Culture in progress     Narrative:       1.superficial lumbar spine    Fungus culture [[id number]] Collected:  11/16/17 2218    Order Status:  Completed Specimen:  Wound from Back Updated:  11/17/17 0742     Fungus (Mycology) Culture Culture in progress    Narrative:       2.deep lumbar spine    Gram stain [[id number]] Collected:  11/16/17 2218    Order Status:  Completed Specimen:  Wound from Back Updated:  11/17/17 0039     Gram Stain Result Rare WBC's      No organisms seen    Narrative:       2.deep lumbar spine    Gram stain [[id number]] Collected:  11/16/17 2218    Order Status:  Completed Specimen:  Wound from Back Updated:  11/16/17 2357     Gram Stain Result No WBC's      No organisms seen    Narrative:       1.superficial lumbar spine    Culture, Anaerobic [[id number]] Collected:  11/16/17 2218    Order Status:  Sent Specimen:  Wound from Back Updated:  11/16/17 2234    Aerobic culture [[id number]] Collected:  11/16/17 2218    Order Status:  Sent Specimen:  Wound from Back Updated:  11/16/17 2234    Culture, Anaerobic [[id number]] Collected:  11/16/17 2218    Order Status:  Sent Specimen:  Wound from Back Updated:  11/16/17 2232    Aerobic culture [[id number]] Collected:  11/16/17 2218    Order Status:  Sent Specimen:  Wound from Back Updated:  11/16/17 2232    Gram stain [[id number]] Collected:  11/15/17 0126    Order Status:  Completed Specimen:  Urine Updated:  11/16/17 0643     Gram Stain Result Moderate WBC's      Rare yeast    Narrative:       Preferred Collection Type->Urine, Clean Catch    Urine culture [[id number]] Collected:  11/15/17 0126    Order Status:  Completed Specimen:  Urine Updated:  11/16/17 0532     Urine Culture, Routine Multiple organisms isolated. None in predominance.  Repeat if     Urine Culture, Routine clinically necessary.    Narrative:       Preferred Collection Type->Urine, Clean Catch    Gram stain [[id number]]     Order Status:  Completed Specimen:  Urine from Urine     Gram stain [[id number]]     Order  Status:  Canceled Specimen:  Urine from Urine         All pertinent labs from the last 24 hours have been reviewed.    Significant Diagnostics:  None new

## 2017-11-17 NOTE — TRANSFER OF CARE
"Anesthesia Transfer of Care Note    Patient: Dnota Cline    Procedure(s) Performed: Procedure(s) (LRB):  REVISION-WOUND--lumbar washout (N/A)    Patient location: PACU    Anesthesia Type: general    Transport from OR: Transported from OR on 6-10 L/min O2 by face mask with adequate spontaneous ventilation    Post pain: adequate analgesia    Post assessment: no apparent anesthetic complications    Post vital signs: stable    Level of consciousness: awake    Nausea/Vomiting: no nausea/vomiting    Complications: none    Transfer of care protocol was followed      Last vitals:   Visit Vitals  BP (!) 143/96   Pulse 105   Temp 36.7 °C (98.1 °F) (Temporal)   Resp 18   Ht 5' 2" (1.575 m)   Wt 97.5 kg (215 lb)   SpO2 100%   BMI 39.32 kg/m²     "

## 2017-11-17 NOTE — PLAN OF CARE
Problem: SLP Goal  Goal: SLP Goal  Speech Language Pathology Goals  Goals expected to be met by 12/24/17  1. Pt will tolerate puree diet without overt S/S aspiration, Supervision  2. Pt will tolerate nectar-thickened liquids without overt S/S aspiration, Supervision  3. Pt will complete dysphagia exercises x10 ea. , MIN A, to improve BOT and laryngeal elevation  4. Educate pt and family on S/S aspiration and aspiration precautions       Outcome: Ongoing (interventions implemented as appropriate)  Bedside Swallow Study completed. See report for full details. REC: Puree diet with nectar-thickened liquids, small bites/sips, double swallows, no straws, ONLY WHEN AWAKE AND ALERT WITH 1:1 assistance, and strict aspiration precautions. Please continue to monitor for signs and symptoms of aspiration and discontinue oral feeding should you notice any of the following: watery eyes, reddened facial area, wet vocal quality, increased work of breathing, change in respiratory status, increased congestion, coughing, fever, etc.  Findings reviewed with Patient, daughter, Dr. Alonso and nurse.  Thank you.    YUMIKO Fields., [facility]-SLP  Speech-Language Pathology  Pager: [phone]  11/17/2017

## 2017-11-17 NOTE — PROGRESS NOTES
Report received from HECTOR Alvarez. Pt arrived from floor AAOx4 accompanied by family. -120 Dr. Ramirez notified will come to see pt before accessing site. Abumin and 10mg of midodrine given. Maintenance dialysis initiated via Rfa fistula without difficulty Dr. Ramirez at bedside. 1U of PRBC released and given as ordered.

## 2017-11-17 NOTE — PT/OT/SLP PROGRESS
"Occupational Therapy  Treatment    Donta Cline   MRN: [id number]   Admitting Diagnosis: Acute bilateral low back pain    OT Date of Treatment: 11/17/17   OT Start Time: 1108  OT Stop Time: 1140  OT Total Time (min): 32 min    Billable Minutes:  Therapeutic Activity 25 minutes    General Precautions: Standard, fall    Subjective:  Communicated with RN prior to session.  "I just can't do it babe."  Pain/Comfort  Pain Rating 1: 8/10  Location - Orientation 1: generalized  Location 1: back  Pain Addressed 1: Pre-medicate for activity, Reposition, Cessation of Activity  Pain Rating Post-Intervention 1: 7/10    Objective:  Patient found with: hemovac, oxygen, peripheral IV, pt found UIC after PT and agreeable to therapy.     Functional Mobility:  Bed Mobility:   Max Ax2 sit to supine; CGA scoot to HOB.    Transfers:  Sit <> Stand Assistance: Total Assistance - pt found UIC after PT; chair is much too low for patient to even scoot to the edge, and pt required Total Ax3 to perform stand pivot t/f from chair to EOB (tech blocking L knee and assist at trunk, daughter blocking R knee and assist at trunk, OT behind pt to assist at the hips and perform safe pivot to EOB). Pt required Max Ax2 to return to supine, but able to scoot to HOB with CGA at LEs and use of B side rails.   Sit <> Stand Assistive Device: No Assistive Device  Bed <> Chair Technique: Stand Pivot  Bed <> Chair Transfer Assistance: Total Assistance  Bed <> Chair Assistive Device: No Assistive Device    Functional Ambulation: did not occur    Activities of Daily Living:    LE Dressing Level of Assistance: Total assistance (socks)  Grooming Position: Seated, bedside chair  Grooming Level of Assistance: Supervision (setup to brush teeth)    Balance:   Static Sit: GOOD: Takes MODERATE challenges from all directions  Dynamic Sit: FAIR+: Maintains balance through MINIMAL excursions of active trunk motion  Static Stand: 0: Needs MAXIMAL assist to maintain   Dynamic " "stand: 0: N/A    Therapeutic Activities and Exercises:  Pt ed re OT role and POC. Pt brushed teeth with Setup SBA. Pt required Total Ax3    AM-PAC 6 CLICK ADL   How much help from another person does this patient currently need?   1 = Unable, Total/Dependent Assistance  2 = A lot, Maximum/Moderate Assistance  3 = A little, Minimum/Contact Guard/Supervision  4 = None, Modified [place]/Independent    Putting on and taking off regular lower body clothing? : 1  Bathing (including washing, rinsing, drying)?: 1  Toileting, which includes using toilet, bedpan, or urinal? : 1  Putting on and taking off regular upper body clothing?: 1  Taking care of personal grooming such as brushing teeth?: 3  Eating meals?: 3  Total Score: 10     AM-PAC Raw Score CMS "G-Code Modifier Level of Impairment Assistance   6 % Total / Unable   7 - 8 CM 80 - 100% Maximal Assist   9-13 CL 60 - 80% Moderate Assist   14 - 19 CK 40 - 60% Moderate Assist   20 - 22 CJ 20 - 40% Minimal Assist   23 CI 1-20% SBA / CGA   24 CH 0% Independent/ Mod I       Patient left HOB elevated with all lines intact, call button in reach and daughter present    ASSESSMENT:  Donta Cline is a 85 y.o. male with a medical diagnosis of Acute bilateral low back pain and presents with the impairments listed below. Pt is pleasant and participates well, but is significantly limited by pain and weakness. Pt was able to transfer from bed to chair with PT, with Total A, but required Total Ax3 to transfer out of the chair 2/2 low position and increased pain after sitting in the chair for less than an hour. Pt is able to perform seated supported self care such are grooming and feeding, but requiring Max/Total A for all other self care tasks, and for mobility. Pt would benefit from cont OT to improve the deficits listed below.    Rehab identified problem list/impairments: Rehab identified problem list/impairments: weakness, impaired endurance, impaired self care skills, " impaired functional mobilty, gait instability, impaired balance, decreased lower extremity function, pain, decreased ROM, orthopedic precautions    Rehab potential is good.    Activity tolerance: Fair    Discharge recommendations: Discharge Facility/Level Of Care Needs: nursing facility, skilled     Barriers to discharge: Barriers to Discharge: Decreased caregiver support    Equipment recommendations:  (TBD at next level of care)     GOALS:    Occupational Therapy Goals        Problem: Occupational Therapy Goal    Goal Priority Disciplines Outcome Interventions   Occupational Therapy Goal     OT, PT/OT Ongoing (interventions implemented as appropriate)    Description:  Goals to be met by: 11/22     Patient will increase functional independence with ADLs by performing:    Bed mobility with demo understanding for spinal precautions with min(A).  Stand Pivot transfer to multiple surfaces (chair, wheelchair, bedside commode) with Moderate Assistance.   Donning LSO with Minimal Assistance while seated EOB.  Donning socks with sock aid with minimal assistance.   Functional dynamic sitting task ~8 min duration while seated EOB with CGA for postural control.   UE endurance HEP with set up and assistance as needed.                       Plan:  Patient to be seen 4 x/week to address the above listed problems via self-care/home management, therapeutic activities, therapeutic exercises, neuromuscular re-education  Plan of Care expires: 12/14/17  Plan of Care reviewed with: patient, daughter    JOSE ConwayROSAURA  11/17/2017  Pager: [phone]

## 2017-11-18 NOTE — ASSESSMENT & PLAN NOTE
-INR is 2.0, pt with surgical wound bed hematoma  -Hold coumadin/anti-platelet  -s/p vitamin K 2.5mg 11/16, and 6units FFP  -

## 2017-11-18 NOTE — PROGRESS NOTES
3.5hr HD treatment completed no fluid removed due to low bp pt even. 1U of PRBC. Albumin and midodrine x 2doses given during treatment with Dr. Ramirez at bedside during most of the treatment. Both needles of a RFA fistula removed and pressure held until hemostasis achieved dressing applied. Report given to HECTOR Alvarez.

## 2017-11-18 NOTE — ASSESSMENT & PLAN NOTE
- S/p lumbar fusion 10/20/2017. Presents with c/o lower back pain and progressive lower extremity weakness, patient now unable to ambulate. Unclear baseline functional status  - MRI reviewed, concerning for seroma vs hematoma, neurosurgery took pt for washout POD1 and evacuation of hematoma, giving 1unit PRBC tonight   -F/u any neurosurgery post-op recs  - Supportive care, PT/OT  - continue fentanyl patches and norco prn

## 2017-11-18 NOTE — PT/OT/SLP PROGRESS
"Occupational Therapy  Treatment    Donta Cline   MRN: [id number]   Admitting Diagnosis: Acute bilateral low back pain    OT Date of Treatment: 11/18/17   OT Start Time: 0955  OT Stop Time: 1018  OT Total Time (min): 23 min    Billable Minutes:  Self Care/Home Management 8 minutes and Therapeutic Activity 15 minutes    General Precautions: Standard, aspiration, fall  Orthopedic Precautions:  spinal  Braces: LSO    Subjective:  Communicated with RN prior to session.  "I can't do it babe."  Pain/Comfort  Pain Rating 1: 5/10  Location 1: back  Pain Addressed 1: Pre-medicate for activity, Reposition, Cessation of Activity  Pain Rating Post-Intervention 1:  (did not rate)    Objective:  Patient found with: hemovac, peripheral IV, pt found supine in bed and agreeable to minimal therapy.     Functional Mobility:  Bed Mobility:  Rolling/Turning to Left: Maximum assistance  Rolling/Turning Right: Maximum assistance  Scooting/Bridging: Maximum Assistance    Transfers:  Sit <> Stand Assistance: Activity did not occur (polite refusal)    Functional Ambulation: did not occur    Activities of Daily Living:    Grooming Position:  (in chair position in bed)  Grooming Level of Assistance: Supervision (setup to brush teeth)  Toileting Where Assessed: Bed level  Toileting Level of Assistance: Maximum assistance (pt in chair position in bed, unable to position urinal, required Max A, but able to adjust his body to allow OT to place urinal)    Balance:   Not assessed today.    Therapeutic Activities and Exercises:  Pt ed re OT role and POC. Pt able to brush teeth in supported upright position with setup S. Pt tolerated upright position fair during movement, and well statically. Pt performed 2x10 QSs with AAROM as pt has very limited AROM and muscle contractions. Pt required max A to use urinal (could not void).    AM-PAC 6 CLICK ADL   How much help from another person does this patient currently need?   1 = Unable, Total/Dependent " "Assistance  2 = A lot, Maximum/Moderate Assistance  3 = A little, Minimum/Contact Guard/Supervision  4 = None, Modified [place]/Independent    Putting on and taking off regular lower body clothing? : 1  Bathing (including washing, rinsing, drying)?: 1  Toileting, which includes using toilet, bedpan, or urinal? : 1  Putting on and taking off regular upper body clothing?: 1  Taking care of personal grooming such as brushing teeth?: 3  Eating meals?: 3  Total Score: 10     AM-PAC Raw Score CMS "G-Code Modifier Level of Impairment Assistance   6 % Total / Unable   7 - 8 CM 80 - 100% Maximal Assist   9-13 CL 60 - 80% Moderate Assist   14 - 19 CK 40 - 60% Moderate Assist   20 - 22 CJ 20 - 40% Minimal Assist   23 CI 1-20% SBA / CGA   24 CH 0% Independent/ Mod I       Patient left with bed in chair position with all lines intact, call button in reach and caregiver present    ASSESSMENT:  Donta Cline is a 85 y.o. male with a medical diagnosis of Acute bilateral low back pain and presents with the impairments listed below. Pt participates well, but was limited by increased pain today and decreased muscular contractions of LEs. Pt politely asked to remain in bed, but was willing to perform self care and bed exercises. Pt requiring increased assistance for self care tasks requiring ROM to lower body. Pt able to perform quad sets, but with minimal muscular contraction. Pt would benefit from cont OT to improve the deficits listed below.    Rehab identified problem list/impairments: Rehab identified problem list/impairments: weakness, impaired endurance, impaired self care skills, impaired functional mobilty, gait instability, impaired balance, decreased lower extremity function, pain, impaired joint extensibility, orthopedic precautions    Rehab potential is good.    Activity tolerance: Fair    Discharge recommendations: Discharge Facility/Level Of Care Needs: nursing facility, skilled     Barriers to discharge: " Barriers to Discharge: Decreased caregiver support    Equipment recommendations:  (TBD)     GOALS:    Occupational Therapy Goals        Problem: Occupational Therapy Goal    Goal Priority Disciplines Outcome Interventions   Occupational Therapy Goal     OT, PT/OT Ongoing (interventions implemented as appropriate)    Description:  Goals to be met by: 11/22     Patient will increase functional independence with ADLs by performing:    Bed mobility with demo understanding for spinal precautions with min(A).  Stand Pivot transfer to multiple surfaces (chair, wheelchair, bedside commode) with Moderate Assistance.   Donning LSO with Minimal Assistance while seated EOB.  Donning socks with sock aid with minimal assistance.   Functional dynamic sitting task ~8 min duration while seated EOB with CGA for postural control.   UE endurance HEP with set up and assistance as needed.                       Plan:  Patient to be seen 4 x/week to address the above listed problems via self-care/home management, therapeutic activities, therapeutic exercises, neuromuscular re-education  Plan of Care expires: 12/14/17  Plan of Care reviewed with: patient, caregiver    PIYUSH Conway  11/18/2017  Pager: [phone]

## 2017-11-18 NOTE — PROGRESS NOTES
Ultrafiltration treatment completed.  Needles removed from left forearm fistula with pressure for 10 minutes with hemostasis achieved. Dressing applied. Ran 3 hours with net fluid removal of 2.3 liters. Blood pressure remained stable during treatment. Patient pulse became more elevated during treatment, increased from a-fib  low 100's to 113-130's. Dr. Alonso visited patient during treament and was made aware of patient's heart rate and rhythm. Patient heart rate decreased to 100-119.  once treatment was completed.   Patient voiced no complaints of chest pain, dizziness or shortness of breath. Patient received one unit of blood during treatment. Tolerated well. Report called to HECTOR Larry.

## 2017-11-18 NOTE — ASSESSMENT & PLAN NOTE
- nephrology consulted for volume management  -they will use midodrine PRN if needed during dialysis, nephrology fellow to change orders.   -Due to hypotension today patient kept even in HD, received 1unit PRBC during dialysis, may have to discuss if patient can be taken over the weekend for additional volume clearance.   -on supplemental o2 this am, and family concern about volume intake and need for supportive care, reports that pt required CPAP and had volume overload with prior FFP transfusion

## 2017-11-18 NOTE — PROGRESS NOTES
[facility]  Neurosurgery  Progress Note    Subjective:     History of Present Illness: Donta Cline is 85 y.o. male with PMH ESRD on HD MWF, CAD, NSTEMI, bladder/prostate cancer, DMII, aortic stenosis and recent L2-L4 fusion 10/20/17 [place] [facility] [place] of worsened back pain and functional decline since last Friday 11/3. There is no family in the room. The patient is mildy confused and thus a poor historian so most of the history obtained from the medical record. He was discharged to a rehab after the last admission and progressing well so transferred to a skilled nursing facility. He was able to walk 50ft on his own until last week when he had acute worsening of his low back pain and LE weakness. He has been unable to weight bear since that time. He also complains of abdominal pain and bilateral hip pain. He reports some BLE pain but is unable to characterize it. Denies BB dysfunction or saddle anesthesia. There is no record of fever or trauma. ESR/CRP are elevated. INR 3.8, on coumadin.     Post-Op Info:  Procedure(s) (LRB):  REVISION-WOUND--lumbar washout (N/A)   2 Days Post-Op     Interval History: POD1 s/p washout. Hematoma found in surgical bed. Neuro stable on exam. Drains kept. Hgb 6.7  From 7.9  (98.8 on 11/14). INR 2.0.  Afib with -140 with hypotension overnight. SBP 88 this am.  ml bolus given 's and SBP 98. Pt receiving 1 unit in dialysis this afternoon.     Medications:  Continuous Infusions:   albumin human 25%      midodrine       Scheduled Meds:   sodium chloride 0.9%   Intravenous Once    sodium chloride 0.9%   Intravenous Once    atorvastatin  80 mg Oral Daily    bisacodyl  10 mg Rectal Daily    [START ON 11/20/2017] ceFAZolin (ANCEF) IVPB  2 g Intravenous Q48H    fentaNYL  1 patch Transdermal Q72H    insulin detemir  5 Units Subcutaneous QHS    metoprolol tartrate  25 mg Oral BID    mupirocin  1 g Nasal BID    pantoprazole  40 mg  Oral Nightly    polyethylene glycol  17 g Oral BID    senna-docusate 8.6-50 mg  1 tablet Oral BID    sertraline  100 mg Oral QHS     PRN Meds:sodium chloride, sodium chloride, sodium chloride 0.9%, sodium chloride 0.9%, acetaminophen, albumin human 25%, albuterol sulfate, aluminum-magnesium hydroxide-simethicone, dextrose 50%, dextrose 50%, glucagon (human recombinant), glucose, glucose, hydrocodone-acetaminophen 10-325mg, insulin aspart, midodrine, ondansetron, ondansetron, promethazine (PHENERGAN) IVPB, ramelteon, senna-docusate 8.6-50 mg, sodium chloride 0.9%     Review of Systems    Objective:     Weight: 97.5 kg (215 lb)  Body mass index is 39.32 kg/m².  Vital Signs (Most Recent):  Temp: 97.8 °F (36.6 °C) (11/18/17 1137)  Pulse: 86 (11/18/17 1137)  Resp: 18 (11/18/17 1137)  BP: (!) 103/52 (11/18/17 1137)  SpO2: (!) 94 % (11/18/17 1137) Vital Signs (24h Range):  Temp:  [97.5 °F (36.4 °C)-98 °F (36.7 °C)] 97.8 °F (36.6 °C)  Pulse:  [] 86  Resp:  [16-20] 18  SpO2:  [94 %-96 %] 94 %  BP: ()/(31-77) 103/52       Date 11/18/17 0700 - 11/19/17 0659   Shift 1822-9464 7168-6512 9342-7119 24 Hour Total   I  N  T  A  K  E   P.O. 240   240    Shift Total  (mL/kg) 240  (2.5)   240  (2.5)   O  U  T  P  U  T   Shift Total  (mL/kg)       Weight (kg) 97.5 97.5 97.5 97.5                        Closed/Suction Drain 11/16/17 2320 Posterior Back  (Active)   Site Description Unable to view 11/17/2017  7:51 AM   Dressing Type [place] 11/17/2017  7:51 AM   Dressing Status Biopatch in place;Clean;Dry 11/17/2017  7:51 AM   Dressing Intervention Dressing reinforced 11/17/2017  7:51 AM   Drainage Bloody 11/17/2017  7:51 AM   Status To bulb suction 11/17/2017  7:51 AM   Output (mL) 100 mL 11/17/2017 10:38 AM            Closed/Suction Drain 11/16/17 2321 Posterior Back Other (Comment) (Active)   Site Description Unable to view 11/17/2017  7:51 AM   Dressing Type [place] 11/17/2017  7:51 AM   Dressing Status Biopatch  in place;Clean;Dry 11/17/2017  7:51 AM   Dressing Intervention Dressing reinforced 11/17/2017  7:51 AM   Drainage Bloody 11/17/2017  7:51 AM   Status To bulb suction 11/17/2017  7:51 AM   Output (mL) 25 mL 11/17/2017 10:38 AM            Hemodialysis AV Fistula Right forearm (Active)   Needle Size 15ga 11/17/2017  2:10 PM   Site Assessment Clean;Dry;Intact;No redness;No swelling 11/17/2017  2:10 PM   Patency Present;Thrill;Bruit 11/17/2017  2:10 PM   Status Accessed 11/17/2017  2:10 PM   Flows Good 11/17/2017  2:10 PM   Dressing Intervention New dressing 11/17/2017  2:10 PM   Dressing Status Clean;Dry;Intact 11/17/2017  2:10 PM   Site Condition No complications 11/17/2017  2:10 PM   Dressing Open to air (None) 11/17/2017  1:15 AM       Neurosurgery Physical Exam    General: well developed, well nourished, no distress.   Head: normocephalic, atraumatic  Neurologic: Pt confused. Alert and oriented to person and place only. Thought content appropriate. Pt resistant to the exam  GCS: Motor: 6/Verbal: 4/Eyes: 4 GCS Total: 14  Mental Status: Awake, Alert, Oriented x2  Cranial nerves: face symmetric, tongue midline, CN II-XII grossly intact.   Eyes: pupils equal, round, reactive to light with accomodation, EOMI.   Sensory: intact to light touch throughout  Motor Strength: Moves all extremities spontaneously with good tone. No abnormal movements seen. LE exam effort and pain limited     Strength   Deltoids Triceps Biceps Wrist Extension Wrist Flexion Hand    Upper: R 5/5 5/5 5/5 5/5 5/5 5/5     L 5/5 5/5 5/5 5/5 5/5 5/5       Iliopsoas Quadriceps Knee  Flexion Tibialis  anterior Gastro- cnemius EHL   Lower: R 3/5 1/5 5/5 5/5 5/5 5/5     L 3/5 1/5 5/5 5/5 5/5 5/5      Bandage clean dry and intact, 2 HV drains in place    Significant Labs:    Recent Labs  Lab 11/17/17  0927 11/18/17  0513   * 131*    140   K 4.5 3.6    104   CO2 26 20*   BUN 36* 21   CREATININE 4.2* 2.8*   CALCIUM 9.3 9.3   MG 1.8  --         Recent Labs  Lab 11/16/17  2333 11/17/17 0818 11/17/17  1952 11/18/17  0513   WBC 15.62* 10.59  --  11.96   HGB 7.9* 6.7* 6.8* 7.0*   HCT 25.9* 22.8* 22.4* 23.3*   * 257  --  228       Recent Labs  Lab 11/16/17  2333 11/17/17  0927 11/18/17  0513   INR 1.9* 2.0* 2.0*   APTT  --  31.6  --      Microbiology Results (last 7 days)     Procedure Component Value Units Date/Time    AFB Culture & Smear [[id number]] Collected:  11/16/17 2218    Order Status:  Completed Specimen:  Wound from Back Updated:  11/18/17 0927     AFB Culture & Smear Culture in progress     AFB CULTURE STAIN No acid fast bacilli seen.    Narrative:       2.deep lumbar spine    AFB Culture & Smear [[id number]] Collected:  11/16/17 2218    Order Status:  Completed Specimen:  Wound from Back Updated:  11/18/17 0927     AFB Culture & Smear Culture in progress     AFB CULTURE STAIN No acid fast bacilli seen.    Narrative:       1.superficial lumbar spine    Aerobic culture [[id number]] Collected:  11/16/17 2218    Order Status:  Completed Specimen:  Wound from Back Updated:  11/18/17 0711     Aerobic Bacterial Culture No growth    Narrative:       1.superficial lumbar spine    Aerobic culture [[id number]] Collected:  11/16/17 2218    Order Status:  Completed Specimen:  Wound from Back Updated:  11/18/17 0711     Aerobic Bacterial Culture No growth    Narrative:       2.deep lumbar spine    Fungus culture [[id number]] Collected:  11/16/17 2218    Order Status:  Completed Specimen:  Wound from Back Updated:  11/17/17 0742     Fungus (Mycology) Culture Culture in progress    Narrative:       1.superficial lumbar spine    Fungus culture [[id number]] Collected:  11/16/17 2218    Order Status:  Completed Specimen:  Wound from Back Updated:  11/17/17 0742     Fungus (Mycology) Culture Culture in progress    Narrative:       2.deep lumbar spine    Gram stain [[id number]] Collected:  11/16/17 2218    Order Status:  Completed Specimen:  Wound from Back  Updated:  11/17/17 0039     Gram Stain Result Rare WBC's      No organisms seen    Narrative:       2.deep lumbar spine    Gram stain [[id number]] Collected:  11/16/17 2218    Order Status:  Completed Specimen:  Wound from Back Updated:  11/16/17 2357     Gram Stain Result No WBC's      No organisms seen    Narrative:       1.superficial lumbar spine    Culture, Anaerobic [[id number]] Collected:  11/16/17 2218    Order Status:  Sent Specimen:  Wound from Back Updated:  11/16/17 2234    Culture, Anaerobic [[id number]] Collected:  11/16/17 2218    Order Status:  Sent Specimen:  Wound from Back Updated:  11/16/17 2232    Gram stain [[id number]] Collected:  11/15/17 0126    Order Status:  Completed Specimen:  Urine Updated:  11/16/17 0643     Gram Stain Result Moderate WBC's      Rare yeast    Narrative:       Preferred Collection Type->Urine, Clean Catch    Urine culture [[id number]] Collected:  11/15/17 0126    Order Status:  Completed Specimen:  Urine Updated:  11/16/17 0532     Urine Culture, Routine Multiple organisms isolated. None in predominance.  Repeat if     Urine Culture, Routine clinically necessary.    Narrative:       Preferred Collection Type->Urine, Clean Catch    Gram stain [[id number]]     Order Status:  Completed Specimen:  Urine from Urine     Gram stain [[id number]]     Order Status:  Canceled Specimen:  Urine from Urine         All pertinent labs from the last 24 hours have been reviewed.    Significant Diagnostics:  None new     Assessment/Plan:     * Acute bilateral low back pain, post-lumbar spinal fusion    85 y.o. male s/p L2-4 fusion 10/20 for lumbar stenosis who presents with acute worsening of his LBP and LE weakness. POD2 s/p washout and hematoma evacuation    -Patient neuro stable post-op  -Drains to remain in place, ABx while in.  -Continue to hold ASA and coumadin  -ESR 92/, Procalcitonin 1.09  -Afebrile, no leukocytosis  - Transfusion for anemia per primary team.  -HD per nephrology.    -Q4h neuro checks  -PT/OT/OOB. Therapy recommending SNF   -Pain control per primary team  -Recommend delirium precautions   -TEDs/SCDs/SQH for DVT prophylaxis   -Coordination of care and medical management per primary team   -Please call NSGY with any change in neuro exam               David Lira MD  Neurosurgery  [facility]

## 2017-11-18 NOTE — PROGRESS NOTES
[facility] H[facility]ODIALYSIS NOTE    Donta Cline is a 85 y.o. male currently on hemodialysis for removal of volume.      Labs have been reviewed and the dialysate bath has been adjusted.    There are no symptoms of hypotension, chest pain, dyspnea, nausea or vomiting.    Labs:        Recent Labs  Lab 11/16/17  0507 11/17/17  0927 11/18/17  0513    143 140   K 4.6 4.5 3.6    104 104   CO2 24 26 20*   BUN 22 36* 21   CREATININE 3.1* 4.2* 2.8*   CALCIUM 9.7 9.3 9.3   PHOS 3.7 4.8* 3.6         Recent Labs  Lab 11/16/17  2333 11/17/17  0818 11/17/17  1952 11/18/17  0513   WBC 15.62* 10.59  --  11.96   HGB 7.9* 6.7* 6.8* 7.0*   HCT 25.9* 22.8* 22.4* 23.3*   * 257  --  228       Assessment/Plan:    UF only for volume management.   PRBC transfusion with HD.

## 2017-11-18 NOTE — PLAN OF CARE
Problem: Diabetes, Type 2 (Adult)  Goal: Signs and Symptoms of Listed Potential Problems Will be Absent, Minimized or Managed (Diabetes, Type 2)  Signs and symptoms of listed potential problems will be absent, minimized or managed by discharge/transition of care (reference Diabetes, Type 2 (Adult) CPG).   Outcome: Ongoing (interventions implemented as appropriate)   11/18/17 1501   Diabetes, Type 2   Problems Assessed (Type 2 Diabetes) all   Problems Present (Type 2 Diabetes) none   CBG monitored before meals. Patient not requiring Insulin coverage based on blood glucose result.    Problem: Fall Risk (Adult)  Goal: Absence of Falls  Patient will demonstrate the desired outcomes by discharge/transition of care.   Outcome: Ongoing (interventions implemented as appropriate)   11/18/17 1501   Fall Risk (Adult)   Absence of Falls making progress toward outcome   Fall precautions maintained at all times.    Problem: Infection, Risk/Actual (Adult)  Goal: Infection Prevention/Resolution  Patient will demonstrate the desired outcomes by discharge/transition of care.   Outcome: Ongoing (interventions implemented as appropriate)   11/18/17 1501   Infection, Risk/Actual (Adult)   Infection Prevention/Resolution making progress toward outcome   Afebrile. Bach incision covered with [place] dressing. Hemovac drains x 2 patent and intact, draining serosanguineous drainage.    Problem: Pressure Ulcer Risk (Дмитрий Scale) (Adult,Obstetrics,Pediatric)  Goal: Skin Integrity  Patient will demonstrate the desired outcomes by discharge/transition of care.   Outcome: Ongoing (interventions implemented as appropriate)   11/18/17 1501   Pressure Ulcer Risk (Дмитрий Scale) (Adult,Obstetrics,Pediatric)   Skin Integrity making progress toward outcome   Skin kept clean and dry. No skin breakdown noted.

## 2017-11-18 NOTE — ASSESSMENT & PLAN NOTE
-presumed secondary to supratherapeutic INR and occurred post-operatively  -giving 1unit PRBC  -trend CBC and Coags, transfuse if <7  -will have to monitor hemodynamic/respiratory status

## 2017-11-18 NOTE — ASSESSMENT & PLAN NOTE
- last echo 10/2017 with pulmonary HTN and undetermined diastolic function, EF 55  - -continue metoprolol  -Repeat CXR to assess for edema, also check VBG to monitor for hypercapnia

## 2017-11-18 NOTE — PROGRESS NOTES
"[facility] Hospital Medicine  Progress Note    Patient Name: Donta Cline  MRN: [id number]  Patient Class: IP- Inpatient   Admission Date: 11/14/2017  Length of Stay: 2 days  Attending Physician: Gurmeet Alonso MD  Primary Care Provider: ZAID Richardson Iii, MD    [facility] Medicine Team: [facility] MED L Gurmeet Alonso MD    Subjective:     Principal Problem:Acute bilateral low back pain    HPI:  Donta Cline is a 85 y.o. male who presents with PMHx of ESRD with dialysis MWF, HFpEF, NSTEMI, bladder/prostate cancer, DM II, aortic stenosis and CAD for evaluation of back pain s/p lumbar fusion/laminectomy (10/20/17). No family at bedside. Difficult to obtain HPI as speech garbled, however patient endorses progressive lower extremity weakness with difficulty ambulating for the past 4-5 days. His back pain is without radiation. Patient is now unable to ambulate independently. Denies trauma and urinary bowel/bladder incontinence, fever.    Per EDMD:  "The daughter brought patient to ED due to concern of his severe back pain and inability to perform activities as he was doing previously. She had discussed with Dr. Saul, and he recommended the patient come to ED and obtain imaging for spine."    Imaging:    Ct Lumbar Spine Without Contrast    Result Date: 11/14/2017  Comparison: MRI 10/12/17. Findings: Routine CT lumbar spine protocol performed without IV contrast. Prior L2-L4 instrumented lumbar fusion with interbody disc spacer at L3-4. Decompressive posterior laminectomies at from L2-3 through L5-S1. No evidence of hardware failure. No fracture identified, noting the inferior endplate of L4 is indistinct. Infection or postsurgical fluid collection not excluded, noting limited evaluation without IV contrast and artifact from adjacent pedicle screws. There is partial fusion of the right L5-S1 facet. The SI joints are unremarkable. There is extensive calcified atherosclerotic disease. Atrophic " kidneys. Partially imaged right renal stent noted. No hydronephrosis. Small renal lesions, too small to characterize without IV contrast.      Mri Lumbar Spine Without Contrast    Result Date: 11/14/2017  MRI LUMBAR SPINE TECHNIQUE: MRI lumbar spine was performed without contrast. There is an ill-defined heterogeneous collection encircling L1 spinous process demonstrating fluid levels on the right. This may represents a seroma or hematoma postoperatively however abscess cannot be excluded. This collection is best seen on series 11 image 4.    Hospital Course:  In discussion with daughter who is at bedside today (11/15) patient was discharged after surgical procedure to an inpatient rehab and he reports that he was doing well at the rehab but reached a plateau in his activity/functional level.  She states patient was walking 50 feet with minimal assist, able to toilet with minimal assistance.  Patient was transferred to SNF and since transfer, last Friday (11/10) patient requiring max assistance to stand and reported inability to bear weight secondary to pain, and since this time patient has been bed bound.     She also notes that patient with poor appetite as speech therapy has recommended puree nectar thick diet with Nepro shakes, reports that pt is losing weight and some increased abdominal girth.     Overnight patient admitted due to concerns of worsening pain and inability to ambulate, patient underwent MRI and CT imaging of the lumbar spine.  He has intact fusion, no spinal canal stenosis or cord compression noted, concern for a fluid collection encircling L1 spinous process, post op L2-L4 fusion changes and s/p laminectomy L3-L4.  Discussed imaging findings with attending neurosurgeon Dr. Sands who performed pts operation and he reported that fluid collection appears outside of range where instrumentation was.  Patient with no reported falls, workup today reveals elevated CRP >150, ESR 92, has as  supratherapeutic INR 3.8    Informed plan will be to take patient to OR for washout and exploration to evaluate for hematoma vs infection.     11/16 - Patient without acute events overnight, patient states he and his daughter are going to [place] today.  Daughter has provided consent for surgery and blood products, patient is receiving serial doses of FFP with intermittent INR checks with plan for surgery this afternoon if INR <1.4.  Daughter reports that patient w/o complaints of pain when lying in bed, but if he is moved/turned causes exacerbation of his pain.     Patient taken for operative intervention and in discussion with neurosurgeon Dr. Sands, he reports that patient with well healed wound and surgical fusion was intact, when cut into the dura and in subdural region there was area of hematoma that was evacuated (full op report pending in EMR)  Cultures sent to rule out infection but clinical impression was not an appearance of an infected operative site.  Dr. Sands noted that degree of patient's pain incongruent with surgical findings.     Post-oepratively in the morning patient coverted to afib with RVR and this morning with borderline BP, his Hgb downtrending since admission and was 6.7 post-op.  Patient received intermittent fluid boluses early AM of 11/17 but also with intake of 3+L prior to surgery due to need for high amount of FFP (6units).  Patient will receive 1 unit PRBC during Hemodialysis today, giving midodrine as well.     Interval History:patient is arousable, no acute complaints, denies any acute pain, is overall somnolent, with poor attention    Evaluated by speech therapy earlier and patient with difficulty maintaining attention.       Review of Systems   Constitutional: Negative for chills, diaphoresis, fatigue and fever.   HENT: Negative for rhinorrhea, sinus pain, sinus pressure, sneezing and sore throat.    Eyes: Negative for visual disturbance.   Respiratory: Negative for cough,  choking, chest tightness and shortness of breath.    Cardiovascular: Negative for chest pain, palpitations and leg swelling.   Gastrointestinal: Negative for constipation, diarrhea and nausea.        Denies bowel incontinence   Genitourinary: Negative for dysuria.        Denies new urinary incontinence, daughter reports present prior to surgery   Musculoskeletal: Positive for back pain and gait problem. Negative for joint swelling, myalgias and neck pain.   Neurological: Negative for dizziness, facial asymmetry, light-headedness and headaches.   Psychiatric/Behavioral: Negative for agitation and behavioral problems.     Objective:     Vital Signs (Most Recent):  Temp: 97.5 °F (36.4 °C) (11/17/17 1836)  Pulse: 106 (11/17/17 1900)  Resp: 20 (11/17/17 1836)  BP: (!) 107/59 (11/17/17 1836)  SpO2:  (unable to obtain ) (11/17/17 1836) Vital Signs (24h Range):  Temp:  [97.5 °F (36.4 °C)-98.9 °F (37.2 °C)] 97.5 °F (36.4 °C)  Pulse:  [] 106  Resp:  [16-20] 20  SpO2:  [95 %-100 %] 95 %  BP: ()/(31-96) 107/59     Weight: 97.5 kg (215 lb)  Body mass index is 39.32 kg/m².    Intake/Output Summary (Last 24 hours) at 11/17/17 1927  Last data filed at 11/17/17 1845   Gross per 24 hour   Intake             3670 ml   Output             1047 ml   Net             2623 ml      Physical Exam   Constitutional: He appears well-developed.   somnolent   HENT:   Mouth/Throat: Oropharynx is clear and moist.   Eyes: Conjunctivae are normal. No scleral icterus.   Cardiovascular: Normal rate, regular rhythm, normal heart sounds and intact distal pulses.    No murmur heard.  Pulmonary/Chest: Effort normal and breath sounds normal. No respiratory distress. He has no wheezes. He has no rales.   Abdominal: Soft. Bowel sounds are normal.   Mild distension, soft, tympanic to percussion, no fluid wave noted, no bulging flanks, or abdominal collateral vessels   Musculoskeletal:   RUE forearm AV fistula with palpable thrill.     Surgical  drains with sanguinous drainage   Lymphadenopathy:     He has no cervical adenopathy.   Neurological:   Alert, oriented to self, hospital   Skin: Skin is warm. There is pallor.       Significant Labs:   CBC:     Recent Labs  Lab 11/16/17  2333 11/17/17  0818   WBC 15.62* 10.59   HGB 7.9* 6.7*   HCT 25.9* 22.8*   * 257     CMP:     Recent Labs  Lab 11/16/17  0507 11/17/17  0927    143   K 4.6 4.5    104   CO2 24 26   * 143*   BUN 22 36*   CREATININE 3.1* 4.2*   CALCIUM 9.7 9.3   ALBUMIN 2.6* 2.6*   ANIONGAP 12 13   EGFRNONAA 17.4* 12.1*     Lactic Acid:     Recent Labs  Lab 11/17/17  0927   LACTATE 1.7     Urine Studies:   No results for input(s): COLORU, APPEARANCEUA, PHUR, SPECGRAV, PROTEINUA, GLUCUA, KETONESU, BILIRUBINUA, OCCULTUA, NITRITE, UROBILINOGEN, LEUKOCYTESUR, RBCUA, WBCUA, BACTERIA, SQUAMEPITHEL, HYALINECASTS in the last 48 hours.    Invalid input(s): WRIGHTSUR    Significant Imaging: I have reviewed all pertinent imaging results/findings within the past 24 hours.    Assessment/Plan:      * Acute bilateral low back pain, post-lumbar spinal fusion    - S/p lumbar fusion 10/20/2017. Presents with c/o lower back pain and progressive lower extremity weakness, patient now unable to ambulate. Unclear baseline functional status  - MRI reviewed, concerning for seroma vs hematoma, neurosurgery took pt for washout POD1 and evacuation of hematoma, giving 1unit PRBC tonight   -F/u any neurosurgery post-op recs  - Supportive care, PT/OT  - continue fentanyl patches and norco prn        Hematoma    As above  -f/u surgical recs for management of post-op drains          ESRD (end stage renal disease) on dialysis    - nephrology consulted for volume management  -they will use midodrine PRN if needed during dialysis, nephrology fellow to change orders.   -Due to hypotension today patient kept even in HD, received 1unit PRBC during dialysis, may have to discuss if patient can be taken over the  weekend for additional volume clearance.   -on supplemental o2 this am, and family concern about volume intake and need for supportive care, reports that pt required CPAP and had volume overload with prior FFP transfusion        Acute blood loss anemia    -presumed secondary to supratherapeutic INR and occurred post-operatively  -giving 1unit PRBC  -trend CBC and Coags, transfuse if <7  -will have to monitor hemodynamic/respiratory status          Supratherapeutic INR    -INR is 2.0, pt with surgical wound bed hematoma  -Hold coumadin/anti-platelet  -s/p vitamin K 2.5mg 11/16, and 6units FFP  -        Status post lumbar spinal fusion    -neurosurgery consultation as above          Pyuria    - in setting of ESRD and oliguria  - urine culture shows many organisms - none in predominance, no antimicrobial treatment necessary        Chronic diastolic heart failure    - last echo 10/2017 with pulmonary HTN and undetermined diastolic function, EF 55  - -continue metoprolol  -Repeat CXR to assess for edema, also check VBG to monitor for hypercapnia        Coronary artery disease involving native coronary artery of native heart without angina pectoris    - no CP, SOB, anginal equivalents  - continue asa, statin  - EKG without ischemic changes        Type 2 diabetes mellitus with chronic kidney disease on chronic dialysis, with long-term current use of insulin    - reduce basal insulin dose as pt will be NPO at midnight and case planned for afternoon        Severe aortic stenosis    - history of        Paroxysmal atrial fibrillation    -converted to atrial fibrillation early AM, having borderline BP, keeping oral metoprolol, likely exacerbated by anemia, potential surgical stressor   -current afib with rvr likely contributing to hypotension  -moving to Stepdown level of Care.           VTE Risk Mitigation         Ordered     Medium Risk of VTE  Once      11/17/17 0011     Place sequential compression device  Until  discontinued      11/14/17 1907     Place BRAIN hose  Until discontinued      11/14/17 1907          CODE STATUS CHANGED TO DNR AFTER DISCUSSION WITH DAUGHTER AT BEDSIDE, THIS WAS PREVIOUSLY DESIRED CODE STATUS SINCE PT HAS BEEN AT NURSING HOME.     Gurmeet Alonso MD  Department of Hospital Medicine   [facility]

## 2017-11-18 NOTE — ASSESSMENT & PLAN NOTE
-converted to atrial fibrillation early AM, having borderline BP, keeping oral metoprolol, likely exacerbated by anemia, potential surgical stressor   -current afib with rvr likely contributing to hypotension  -moving to Stepdown level of Care.

## 2017-11-18 NOTE — SUBJECTIVE & OBJECTIVE
Interval History:patient is arousable, no acute complaints, denies any acute pain, is overall somnolent, with poor attention    Evaluated by speech therapy earlier and patient with difficulty maintaining attention.       Review of Systems   Constitutional: Negative for chills, diaphoresis, fatigue and fever.   HENT: Negative for rhinorrhea, sinus pain, sinus pressure, sneezing and sore throat.    Eyes: Negative for visual disturbance.   Respiratory: Negative for cough, choking, chest tightness and shortness of breath.    Cardiovascular: Negative for chest pain, palpitations and leg swelling.   Gastrointestinal: Negative for constipation, diarrhea and nausea.        Denies bowel incontinence   Genitourinary: Negative for dysuria.        Denies new urinary incontinence, daughter reports present prior to surgery   Musculoskeletal: Positive for back pain and gait problem. Negative for joint swelling, myalgias and neck pain.   Neurological: Negative for dizziness, facial asymmetry, light-headedness and headaches.   Psychiatric/Behavioral: Negative for agitation and behavioral problems.     Objective:     Vital Signs (Most Recent):  Temp: 97.5 °F (36.4 °C) (11/17/17 1836)  Pulse: 106 (11/17/17 1900)  Resp: 20 (11/17/17 1836)  BP: (!) 107/59 (11/17/17 1836)  SpO2:  (unable to obtain ) (11/17/17 1836) Vital Signs (24h Range):  Temp:  [97.5 °F (36.4 °C)-98.9 °F (37.2 °C)] 97.5 °F (36.4 °C)  Pulse:  [] 106  Resp:  [16-20] 20  SpO2:  [95 %-100 %] 95 %  BP: ()/(31-96) 107/59     Weight: 97.5 kg (215 lb)  Body mass index is 39.32 kg/m².    Intake/Output Summary (Last 24 hours) at 11/17/17 1927  Last data filed at 11/17/17 1845   Gross per 24 hour   Intake             3670 ml   Output             1047 ml   Net             2623 ml      Physical Exam   Constitutional: He appears well-developed.   somnolent   HENT:   Mouth/Throat: Oropharynx is clear and moist.   Eyes: Conjunctivae are normal. No scleral icterus.    Cardiovascular: Normal rate, regular rhythm, normal heart sounds and intact distal pulses.    No murmur heard.  Pulmonary/Chest: Effort normal and breath sounds normal. No respiratory distress. He has no wheezes. He has no rales.   Abdominal: Soft. Bowel sounds are normal.   Mild distension, soft, tympanic to percussion, no fluid wave noted, no bulging flanks, or abdominal collateral vessels   Musculoskeletal:   RUE forearm AV fistula with palpable thrill.     Surgical drains with sanguinous drainage   Lymphadenopathy:     He has no cervical adenopathy.   Neurological:   Alert, oriented to self, hospital   Skin: Skin is warm. There is pallor.       Significant Labs:   CBC:     Recent Labs  Lab 11/16/17  2333 11/17/17  0818   WBC 15.62* 10.59   HGB 7.9* 6.7*   HCT 25.9* 22.8*   * 257     CMP:     Recent Labs  Lab 11/16/17  0507 11/17/17  0927    143   K 4.6 4.5    104   CO2 24 26   * 143*   BUN 22 36*   CREATININE 3.1* 4.2*   CALCIUM 9.7 9.3   ALBUMIN 2.6* 2.6*   ANIONGAP 12 13   EGFRNONAA 17.4* 12.1*     Lactic Acid:     Recent Labs  Lab 11/17/17  0927   LACTATE 1.7     Urine Studies:   No results for input(s): COLORU, APPEARANCEUA, PHUR, SPECGRAV, PROTEINUA, GLUCUA, KETONESU, BILIRUBINUA, OCCULTUA, NITRITE, UROBILINOGEN, LEUKOCYTESUR, RBCUA, WBCUA, BACTERIA, SQUAMEPITHEL, HYALINECASTS in the last 48 hours.    Invalid input(s): WRIGHTSUROSAURA    Significant Imaging: I have reviewed all pertinent imaging results/findings within the past 24 hours.

## 2017-11-18 NOTE — PLAN OF CARE
Problem: Patient Care Overview  Goal: Plan of Care Review  Outcome: Ongoing (interventions implemented as appropriate)  Patient asleep since transfer to unit from 9th floor although is easily arousable and nods appropriately when asked questions. Currently on CPAP with O2 sats 95%. Pt is afebrile at this time. Proper hand hygiene performed before and after pt care activities. Dressing to back CDI with no drainage present. Sacrum pink but blanchable. Patient repositioned in bed. Afib with HR in 90s on telemetry monitor. Resting comfortably in bed at this time.

## 2017-11-18 NOTE — ANESTHESIA POSTPROCEDURE EVALUATION
"Anesthesia Post Evaluation    Patient: Donta Cline    Procedure(s) Performed: Procedure(s) (LRB):  REVISION-WOUND--lumbar washout (N/A)    Final Anesthesia Type: general  Patient location during evaluation: floor  Patient participation: Yes- Able to Participate  Level of consciousness: awake and alert  Post-procedure vital signs: reviewed and stable  Pain management: adequate  Airway patency: patent  PONV status at discharge: No PONV  Anesthetic complications: no      Cardiovascular status: stable  Respiratory status: unassisted and spontaneous ventilation  Hydration status: euvolemic  Follow-up not needed.        Visit Vitals  BP (!) 115/56   Pulse (!) 112   Temp 36.7 °C (98 °F) (Axillary)   Resp 20   Ht 5' 2" (1.575 m)   Wt 97.5 kg (215 lb)   SpO2 95%   BMI 39.32 kg/m²       Pain/Renato Score: Pain Assessment Performed: Yes (11/17/2017 10:00 PM)  Presence of Pain: denies (11/17/2017 10:00 PM)  Pain Rating Prior to Med Admin: 7 (11/17/2017 10:00 PM)  Pain Rating Post Med Admin: 0 (11/17/2017 10:00 PM)  Renato Score: 9 (11/17/2017  1:15 AM)      "

## 2017-11-18 NOTE — PROGRESS NOTES
Patient received from floor in own bed with report from HECTOR Larry.   Maintenance ultrafiltration treatment began per orders via 15 gauge needles to right forearm fistula.

## 2017-11-18 NOTE — PLAN OF CARE
Problem: Patient Care Overview  Goal: Plan of Care Review  Outcome: Ongoing (interventions implemented as appropriate)  Plan of care reviewed with pt and his daughter . Pt is sleepy during the day arouse to voice . Stable V/S. Pt has a low BP in the morning 88/40 and MD notified  , N/s bolus 250 cc  given and BP comes up to 95/54 . Dialysis done today and pt received 1 unit of blood in dialysis . Pt will be transferred to the 10 th floor.

## 2017-11-18 NOTE — PLAN OF CARE
Problem: Occupational Therapy Goal  Goal: Occupational Therapy Goal  Goals to be met by: 11/22     Patient will increase functional independence with ADLs by performing:    Bed mobility with demo understanding for spinal precautions with min(A).  Stand Pivot transfer to multiple surfaces (chair, wheelchair, bedside commode) with Moderate Assistance.   Donning LSO with Minimal Assistance while seated EOB.  Donning socks with sock aid with minimal assistance.   Functional dynamic sitting task ~8 min duration while seated EOB with CGA for postural control.   UE endurance HEP with set up and assistance as needed.      Outcome: Ongoing (interventions implemented as appropriate)  Goals remain appropriate. Cont POC.    PIYUSH Conway  11/18/2017  Pager: [phone]

## 2017-11-18 NOTE — SUBJECTIVE & OBJECTIVE
Interval History: POD1 s/p washout. Hematoma found in surgical bed. Neuro stable on exam. Drains kept. Hgb 6.7  From 7.9  (98.8 on 11/14). INR 2.0.  Afib with -140 with hypotension overnight. SBP 88 this am.  ml bolus given 's and SBP 98. Pt receiving 1 unit in dialysis this afternoon.     Medications:  Continuous Infusions:   albumin human 25%      midodrine       Scheduled Meds:   sodium chloride 0.9%   Intravenous Once    sodium chloride 0.9%   Intravenous Once    atorvastatin  80 mg Oral Daily    bisacodyl  10 mg Rectal Daily    [START ON 11/20/2017] ceFAZolin (ANCEF) IVPB  2 g Intravenous Q48H    fentaNYL  1 patch Transdermal Q72H    insulin detemir  5 Units Subcutaneous QHS    metoprolol tartrate  25 mg Oral BID    mupirocin  1 g Nasal BID    pantoprazole  40 mg Oral Nightly    polyethylene glycol  17 g Oral BID    senna-docusate 8.6-50 mg  1 tablet Oral BID    sertraline  100 mg Oral QHS     PRN Meds:sodium chloride, sodium chloride, sodium chloride 0.9%, sodium chloride 0.9%, acetaminophen, albumin human 25%, albuterol sulfate, aluminum-magnesium hydroxide-simethicone, dextrose 50%, dextrose 50%, glucagon (human recombinant), glucose, glucose, hydrocodone-acetaminophen 10-325mg, insulin aspart, midodrine, ondansetron, ondansetron, promethazine (PHENERGAN) IVPB, ramelteon, senna-docusate 8.6-50 mg, sodium chloride 0.9%     Review of Systems    Objective:     Weight: 97.5 kg (215 lb)  Body mass index is 39.32 kg/m².  Vital Signs (Most Recent):  Temp: 97.8 °F (36.6 °C) (11/18/17 1137)  Pulse: 86 (11/18/17 1137)  Resp: 18 (11/18/17 1137)  BP: (!) 103/52 (11/18/17 1137)  SpO2: (!) 94 % (11/18/17 1137) Vital Signs (24h Range):  Temp:  [97.5 °F (36.4 °C)-98 °F (36.7 °C)] 97.8 °F (36.6 °C)  Pulse:  [] 86  Resp:  [16-20] 18  SpO2:  [94 %-96 %] 94 %  BP: ()/(31-77) 103/52       Date 11/18/17 0700 - 11/19/17 0659   Shift 8916-5834 5734-9407 9895-9175 24 Hour Total    I  N  T  A  K  E   P.O. 240   240    Shift Total  (mL/kg) 240  (2.5)   240  (2.5)   O  U  T  P  U  T   Shift Total  (mL/kg)       Weight (kg) 97.5 97.5 97.5 97.5                        Closed/Suction Drain 11/16/17 2320 Posterior Back  (Active)   Site Description Unable to view 11/17/2017  7:51 AM   Dressing Type [place] 11/17/2017  7:51 AM   Dressing Status Biopatch in place;Clean;Dry 11/17/2017  7:51 AM   Dressing Intervention Dressing reinforced 11/17/2017  7:51 AM   Drainage Bloody 11/17/2017  7:51 AM   Status To bulb suction 11/17/2017  7:51 AM   Output (mL) 100 mL 11/17/2017 10:38 AM            Closed/Suction Drain 11/16/17 2321 Posterior Back Other (Comment) (Active)   Site Description Unable to view 11/17/2017  7:51 AM   Dressing Type [place] 11/17/2017  7:51 AM   Dressing Status Biopatch in place;Clean;Dry 11/17/2017  7:51 AM   Dressing Intervention Dressing reinforced 11/17/2017  7:51 AM   Drainage Bloody 11/17/2017  7:51 AM   Status To bulb suction 11/17/2017  7:51 AM   Output (mL) 25 mL 11/17/2017 10:38 AM            Hemodialysis AV Fistula Right forearm (Active)   Needle Size 15ga 11/17/2017  2:10 PM   Site Assessment Clean;Dry;Intact;No redness;No swelling 11/17/2017  2:10 PM   Patency Present;Thrill;Bruit 11/17/2017  2:10 PM   Status Accessed 11/17/2017  2:10 PM   Flows Good 11/17/2017  2:10 PM   Dressing Intervention New dressing 11/17/2017  2:10 PM   Dressing Status Clean;Dry;Intact 11/17/2017  2:10 PM   Site Condition No complications 11/17/2017  2:10 PM   Dressing Open to air (None) 11/17/2017  1:15 AM       Neurosurgery Physical Exam    General: well developed, well nourished, no distress.   Head: normocephalic, atraumatic  Neurologic: Pt confused. Alert and oriented to person and place only. Thought content appropriate. Pt resistant to the exam  GCS: Motor: 6/Verbal: 4/Eyes: 4 GCS Total: 14  Mental Status: Awake, Alert, Oriented x2  Cranial nerves: face symmetric, tongue midline, CN  II-XII grossly intact.   Eyes: pupils equal, round, reactive to light with accomodation, EOMI.   Sensory: intact to light touch throughout  Motor Strength: Moves all extremities spontaneously with good tone. No abnormal movements seen. LE exam effort and pain limited     Strength   Deltoids Triceps Biceps Wrist Extension Wrist Flexion Hand    Upper: R 5/5 5/5 5/5 5/5 5/5 5/5     L 5/5 5/5 5/5 5/5 5/5 5/5       Iliopsoas Quadriceps Knee  Flexion Tibialis  anterior Gastro- cnemius EHL   Lower: R 3/5 1/5 5/5 5/5 5/5 5/5     L 3/5 1/5 5/5 5/5 5/5 5/5      Bandage clean dry and intact, 2 HV drains in place    Significant Labs:    Recent Labs  Lab 11/17/17 0927 11/18/17  0513   * 131*    140   K 4.5 3.6    104   CO2 26 20*   BUN 36* 21   CREATININE 4.2* 2.8*   CALCIUM 9.3 9.3   MG 1.8  --        Recent Labs  Lab 11/16/17  2333 11/17/17 0818 11/17/17 1952 11/18/17  0513   WBC 15.62* 10.59  --  11.96   HGB 7.9* 6.7* 6.8* 7.0*   HCT 25.9* 22.8* 22.4* 23.3*   * 257  --  228       Recent Labs  Lab 11/16/17  2333 11/17/17 0927 11/18/17  0513   INR 1.9* 2.0* 2.0*   APTT  --  31.6  --      Microbiology Results (last 7 days)     Procedure Component Value Units Date/Time    AFB Culture & Smear [[id number]] Collected:  11/16/17 2218    Order Status:  Completed Specimen:  Wound from Back Updated:  11/18/17 0927     AFB Culture & Smear Culture in progress     AFB CULTURE STAIN No acid fast bacilli seen.    Narrative:       2.deep lumbar spine    AFB Culture & Smear [[id number]] Collected:  11/16/17 2218    Order Status:  Completed Specimen:  Wound from Back Updated:  11/18/17 0927     AFB Culture & Smear Culture in progress     AFB CULTURE STAIN No acid fast bacilli seen.    Narrative:       1.superficial lumbar spine    Aerobic culture [[id number]] Collected:  11/16/17 2213    Order Status:  Completed Specimen:  Wound from Back Updated:  11/18/17 0711     Aerobic Bacterial Culture No growth     Narrative:       1.superficial lumbar spine    Aerobic culture [[id number]] Collected:  11/16/17 2218    Order Status:  Completed Specimen:  Wound from Back Updated:  11/18/17 0711     Aerobic Bacterial Culture No growth    Narrative:       2.deep lumbar spine    Fungus culture [[id number]] Collected:  11/16/17 2218    Order Status:  Completed Specimen:  Wound from Back Updated:  11/17/17 0742     Fungus (Mycology) Culture Culture in progress    Narrative:       1.superficial lumbar spine    Fungus culture [[id number]] Collected:  11/16/17 2218    Order Status:  Completed Specimen:  Wound from Back Updated:  11/17/17 0742     Fungus (Mycology) Culture Culture in progress    Narrative:       2.deep lumbar spine    Gram stain [[id number]] Collected:  11/16/17 2218    Order Status:  Completed Specimen:  Wound from Back Updated:  11/17/17 0039     Gram Stain Result Rare WBC's      No organisms seen    Narrative:       2.deep lumbar spine    Gram stain [[id number]] Collected:  11/16/17 2218    Order Status:  Completed Specimen:  Wound from Back Updated:  11/16/17 2357     Gram Stain Result No WBC's      No organisms seen    Narrative:       1.superficial lumbar spine    Culture, Anaerobic [[id number]] Collected:  11/16/17 2218    Order Status:  Sent Specimen:  Wound from Back Updated:  11/16/17 2234    Culture, Anaerobic [[id number]] Collected:  11/16/17 2218    Order Status:  Sent Specimen:  Wound from Back Updated:  11/16/17 2232    Gram stain [[id number]] Collected:  11/15/17 0126    Order Status:  Completed Specimen:  Urine Updated:  11/16/17 0643     Gram Stain Result Moderate WBC's      Rare yeast    Narrative:       Preferred Collection Type->Urine, Clean Catch    Urine culture [[id number]] Collected:  11/15/17 0126    Order Status:  Completed Specimen:  Urine Updated:  11/16/17 0532     Urine Culture, Routine Multiple organisms isolated. None in predominance.  Repeat if     Urine Culture, Routine clinically necessary.     Narrative:       Preferred Collection Type->Urine, Clean Catch    Gram stain [[id number]]     Order Status:  Completed Specimen:  Urine from Urine     Gram stain [[id number]]     Order Status:  Canceled Specimen:  Urine from Urine         All pertinent labs from the last 24 hours have been reviewed.    Significant Diagnostics:  None new

## 2017-11-18 NOTE — NURSING
HECTOR Kulkarni gave report to Wendy YOUNG on the 10th floor. Report given on patient's current health status. Patient is currently resting comfortably, will continue to monitor.

## 2017-11-18 NOTE — ASSESSMENT & PLAN NOTE
85 y.o. male s/p L2-4 fusion 10/20 for lumbar stenosis who presents with acute worsening of his LBP and LE weakness. POD2 s/p washout and hematoma evacuation    -Patient neuro stable post-op  -Drains to remain in place, ABx while in.  -Continue to hold ASA and coumadin  -ESR 92/, Procalcitonin 1.09  -Afebrile, no leukocytosis  -HD per nephrology.   -Q4h neuro checks  -PT/OT/OOB. Therapy recommending SNF   -Pain control per primary team  -Recommend delirium precautions   -TEDs/SCDs/SQH for DVT prophylaxis   -Coordination of care and medical management per primary team   -Please call NSGY with any change in neuro exam

## 2017-11-19 NOTE — SUBJECTIVE & OBJECTIVE
Interval History:  As above      Review of Systems   Constitutional: Negative for chills, diaphoresis, fatigue and fever.   HENT: Negative for rhinorrhea, sinus pain, sinus pressure, sneezing and sore throat.    Eyes: Negative for visual disturbance.   Respiratory: Negative for cough, choking, chest tightness and shortness of breath.    Cardiovascular: Negative for chest pain, palpitations and leg swelling.   Gastrointestinal: Negative for constipation, diarrhea and nausea.        Denies bowel incontinence   Genitourinary: Negative for dysuria.        Denies new urinary incontinence, daughter reports present prior to surgery   Musculoskeletal: Positive for back pain and gait problem. Negative for joint swelling, myalgias and neck pain.   Neurological: Negative for dizziness, facial asymmetry, light-headedness and headaches.   Psychiatric/Behavioral: Negative for agitation and behavioral problems.     Objective:     Vital Signs (Most Recent):  Temp: 97.9 °F (36.6 °C) (11/18/17 1700)  Pulse: (!) 116 (11/18/17 1800)  Resp: 18 (11/18/17 1700)  BP: 113/66 (11/18/17 1700)  SpO2: 95 % (11/18/17 1700) Vital Signs (24h Range):  Temp:  [97.5 °F (36.4 °C)-98.6 °F (37 °C)] 97.9 °F (36.6 °C)  Pulse:  [] 116  Resp:  [18-20] 18  SpO2:  [94 %-96 %] 95 %  BP: ()/(36-77) 113/66     Weight: 97.5 kg (215 lb)  Body mass index is 39.32 kg/m².    Intake/Output Summary (Last 24 hours) at 11/18/17 1817  Last data filed at 11/18/17 1700   Gross per 24 hour   Intake             1810 ml   Output             3680 ml   Net            -1870 ml      Physical Exam   Constitutional: He appears well-developed.   somnolent   HENT:   Mouth/Throat: Oropharynx is clear and moist.   Eyes: Conjunctivae are normal. No scleral icterus.   Cardiovascular: Normal heart sounds and intact distal pulses.    No murmur heard.  Irregular rhythm, tachycardic   Pulmonary/Chest: Effort normal. No respiratory distress. He has no wheezes. He has rales.    Abdominal: Soft. Bowel sounds are normal.   Mild distension, soft, tympanic to percussion, no fluid wave noted, no bulging flanks, or abdominal collateral vessels   Musculoskeletal:   RUE forearm AV fistula with palpable thrill.     Surgical drains with sanguinous drainage   Lymphadenopathy:     He has no cervical adenopathy.   Neurological:   Alert, oriented to self, hospital   Skin: Skin is warm. There is pallor.       Significant Labs:   CBC:     Recent Labs  Lab 11/16/17  2333 11/17/17  0818 11/17/17 1952 11/18/17  0513   WBC 15.62* 10.59  --  11.96   HGB 7.9* 6.7* 6.8* 7.0*   HCT 25.9* 22.8* 22.4* 23.3*   * 257  --  228     CMP:     Recent Labs  Lab 11/17/17  0927 11/18/17  0513    140   K 4.5 3.6    104   CO2 26 20*   * 131*   BUN 36* 21   CREATININE 4.2* 2.8*   CALCIUM 9.3 9.3   ALBUMIN 2.6* 2.7*   ANIONGAP 13 16   EGFRNONAA 12.1* 19.7*     Lactic Acid:     Recent Labs  Lab 11/17/17 0927   LACTATE 1.7     Urine Studies:   No results for input(s): COLORU, APPEARANCEUA, PHUR, SPECGRAV, PROTEINUA, GLUCUA, KETONESU, BILIRUBINUA, OCCULTUA, NITRITE, UROBILINOGEN, LEUKOCYTESUR, RBCUA, WBCUA, BACTERIA, SQUAMEPITHEL, HYALINECASTS in the last 48 hours.    Invalid input(s): WRIGHTSUR    Significant Imaging: I have reviewed all pertinent imaging results/findings within the past 24 hours.

## 2017-11-19 NOTE — ASSESSMENT & PLAN NOTE
- nephrology consulted for volume management  -they will use midodrine PRN if needed during dialysis, nephrology fellow to change orders.   -s/p HD Friday and UF saturday  -

## 2017-11-19 NOTE — PROGRESS NOTES
"[facility] Hospital Medicine  Progress Note    Patient Name: Donta Cline  MRN: [id number]  Patient Class: IP- Inpatient   Admission Date: 11/14/2017  Length of Stay: 3 days  Attending Physician: Gurmeet Alonso MD  Primary Care Provider: ZAID Richardson Iii, MD    [facility] Team: [facility] [place] [facility] [place] Gurmeet Alonso MD    Subjective:     Principal Problem:Acute bilateral low back pain    HPI:  Donta Cline is a 85 y.o. male who presents with PMHx of ESRD with dialysis MWF, HFpEF, NSTEMI, bladder/prostate cancer, DM II, aortic stenosis and CAD for evaluation of back pain s/p lumbar fusion/laminectomy (10/20/17). No family at bedside. Difficult to obtain HPI as speech garbled, however patient endorses progressive lower extremity weakness with difficulty ambulating for the past 4-5 days. His back pain is without radiation. Patient is now unable to ambulate independently. Denies trauma and urinary bowel/bladder incontinence, fever.    Per EDMD:  "The daughter brought patient to ED due to concern of his severe back pain and inability to perform activities as he was doing previously. She had discussed with Dr. Saul, and he recommended the patient come to ED and obtain imaging for spine."    Imaging:    Ct Lumbar Spine Without Contrast    Result Date: 11/14/2017  Comparison: MRI 10/12/17. Findings: Routine CT lumbar spine protocol performed without IV contrast. Prior L2-L4 instrumented lumbar fusion with interbody disc spacer at L3-4. Decompressive posterior laminectomies at from L2-3 through L5-S1. No evidence of hardware failure. No fracture identified, noting the inferior endplate of L4 is indistinct. Infection or postsurgical fluid collection not excluded, noting limited evaluation without IV contrast and artifact from adjacent pedicle screws. There is partial fusion of the right L5-S1 facet. The SI joints are unremarkable. There is extensive calcified atherosclerotic disease. Atrophic " kidneys. Partially imaged right renal stent noted. No hydronephrosis. Small renal lesions, too small to characterize without IV contrast.      Mri Lumbar Spine Without Contrast    Result Date: 11/14/2017  MRI LUMBAR SPINE TECHNIQUE: MRI lumbar spine was performed without contrast. There is an ill-defined heterogeneous collection encircling L1 spinous process demonstrating fluid levels on the right. This may represents a seroma or hematoma postoperatively however abscess cannot be excluded. This collection is best seen on series 11 image 4.    Hospital Course:  In discussion with daughter who is at bedside today (11/15) patient was discharged after surgical procedure to an inpatient rehab and he reports that he was doing well at the rehab but reached a plateau in his activity/functional level.  She states patient was walking 50 feet with minimal assist, able to toilet with minimal assistance.  Patient was transferred to SNF and since transfer, last Friday (11/10) patient requiring max assistance to stand and reported inability to bear weight secondary to pain, and since this time patient has been bed bound.     She also notes that patient with poor appetite as speech therapy has recommended puree nectar thick diet with Nepro shakes, reports that pt is losing weight and some increased abdominal girth.     Overnight patient admitted due to concerns of worsening pain and inability to ambulate, patient underwent MRI and CT imaging of the lumbar spine.  He has intact fusion, no spinal canal stenosis or cord compression noted, concern for a fluid collection encircling L1 spinous process, post op L2-L4 fusion changes and s/p laminectomy L3-L4.  Discussed imaging findings with attending neurosurgeon Dr. Sands who performed pts operation and he reported that fluid collection appears outside of range where instrumentation was.  Patient with no reported falls, workup today reveals elevated CRP >150, ESR 92, has as  supratherapeutic INR 3.8    Informed plan will be to take patient to OR for washout and exploration to evaluate for hematoma vs infection.     11/16 - Patient without acute events overnight, patient states he and his daughter are going to [place] today.  Daughter has provided consent for surgery and blood products, patient is receiving serial doses of FFP with intermittent INR checks with plan for surgery this afternoon if INR <1.4.  Daughter reports that patient w/o complaints of pain when lying in bed, but if he is moved/turned causes exacerbation of his pain.     Patient taken for operative intervention and in discussion with neurosurgeon Dr. Sands, he reports that patient with well healed wound and surgical fusion was intact, when cut into the dura and in subdural region there was area of hematoma that was evacuated (full op report pending in EMR)  Cultures sent to rule out infection but clinical impression was not an appearance of an infected operative site.  Dr. Sands noted that degree of patient's pain incongruent with surgical findings.     Post-oepratively in the morning patient coverted to afib with RVR and this morning with borderline BP, his Hgb downtrending since admission and was 6.7 post-op.  Patient received intermittent fluid boluses early AM of 11/17 but also with intake of 3+L prior to surgery due to need for high amount of FFP (6units).  Patient will receive 1 unit PRBC during Hemodialysis today, giving midodrine as well.     11/18 - Overnight initiated CPAP for patient due to concerns for pulmonary edema, blood gas with some hypercapnia but ph 7.3,  Discussed with Nephrology patient to receive UF today, and will give another 1unit PRBC due to hgb 6.8-7.0.  Pt reports slept well with cpap, still has back pain with movement and on examination.  Culture data from surgery remains negative - neurosurgery has ordered cefazolin, will renally dose this.     Interval History:  As above      Review of  Systems   Constitutional: Negative for chills, diaphoresis, fatigue and fever.   HENT: Negative for rhinorrhea, sinus pain, sinus pressure, sneezing and sore throat.    Eyes: Negative for visual disturbance.   Respiratory: Negative for cough, choking, chest tightness and shortness of breath.    Cardiovascular: Negative for chest pain, palpitations and leg swelling.   Gastrointestinal: Negative for constipation, diarrhea and nausea.        Denies bowel incontinence   Genitourinary: Negative for dysuria.        Denies new urinary incontinence, daughter reports present prior to surgery   Musculoskeletal: Positive for back pain and gait problem. Negative for joint swelling, myalgias and neck pain.   Neurological: Negative for dizziness, facial asymmetry, light-headedness and headaches.   Psychiatric/Behavioral: Negative for agitation and behavioral problems.     Objective:     Vital Signs (Most Recent):  Temp: 97.9 °F (36.6 °C) (11/18/17 1700)  Pulse: (!) 116 (11/18/17 1800)  Resp: 18 (11/18/17 1700)  BP: 113/66 (11/18/17 1700)  SpO2: 95 % (11/18/17 1700) Vital Signs (24h Range):  Temp:  [97.5 °F (36.4 °C)-98.6 °F (37 °C)] 97.9 °F (36.6 °C)  Pulse:  [] 116  Resp:  [18-20] 18  SpO2:  [94 %-96 %] 95 %  BP: ()/(36-77) 113/66     Weight: 97.5 kg (215 lb)  Body mass index is 39.32 kg/m².    Intake/Output Summary (Last 24 hours) at 11/18/17 1817  Last data filed at 11/18/17 1700   Gross per 24 hour   Intake             1810 ml   Output             3680 ml   Net            -1870 ml      Physical Exam   Constitutional: He appears well-developed.   somnolent   HENT:   Mouth/Throat: Oropharynx is clear and moist.   Eyes: Conjunctivae are normal. No scleral icterus.   Cardiovascular: Normal heart sounds and intact distal pulses.    No murmur heard.  Irregular rhythm, tachycardic   Pulmonary/Chest: Effort normal. No respiratory distress. He has no wheezes. He has rales.   Abdominal: Soft. Bowel sounds are normal.   Mild  distension, soft, tympanic to percussion, no fluid wave noted, no bulging flanks, or abdominal collateral vessels   Musculoskeletal:   RUE forearm AV fistula with palpable thrill.     Surgical drains with sanguinous drainage   Lymphadenopathy:     He has no cervical adenopathy.   Neurological:   Alert, oriented to self, hospital   Skin: Skin is warm. There is pallor.       Significant Labs:   CBC:     Recent Labs  Lab 11/16/17  2333 11/17/17  0818 11/17/17  1952 11/18/17  0513   WBC 15.62* 10.59  --  11.96   HGB 7.9* 6.7* 6.8* 7.0*   HCT 25.9* 22.8* 22.4* 23.3*   * 257  --  228     CMP:     Recent Labs  Lab 11/17/17  0927 11/18/17  0513    140   K 4.5 3.6    104   CO2 26 20*   * 131*   BUN 36* 21   CREATININE 4.2* 2.8*   CALCIUM 9.3 9.3   ALBUMIN 2.6* 2.7*   ANIONGAP 13 16   EGFRNONAA 12.1* 19.7*     Lactic Acid:     Recent Labs  Lab 11/17/17  0927   LACTATE 1.7     Urine Studies:   No results for input(s): COLORU, APPEARANCEUA, PHUR, SPECGRAV, PROTEINUA, GLUCUA, KETONESU, BILIRUBINUA, OCCULTUA, NITRITE, UROBILINOGEN, LEUKOCYTESUR, RBCUA, WBCUA, BACTERIA, SQUAMEPITHEL, HYALINECASTS in the last 48 hours.    Invalid input(s): WRIGHTSUR    Significant Imaging: I have reviewed all pertinent imaging results/findings within the past 24 hours.    Assessment/Plan:      * Acute bilateral low back pain, post-lumbar spinal fusion    - MRI reviewed, concerning for seroma vs hematoma, neurosurgery took pt for washout POD1 and evacuation of hematoma,  -Hgb dropping since admit - s/p 1unit pRBC, giving 2nd unit PRBC with HD  -F/u any neurosurgery post-op recs  - Supportive care, PT/OT  - continue fentanyl patches and norco prn  - neurosurgery ordered cefazolin for pt - renallydosed to q48hrs        Hematoma    As above  -f/u surgical recs for management of post-op drains          Supratherapeutic INR    -INR is 2.0, pt with surgical wound bed hematoma  -Hold coumadin/anti-platelet  -s/p vitamin K 2.5mg  11/16, and 6units FFP  -        Paroxysmal atrial fibrillation    -in stepdown bed  -Still in Afib rhythm wise  -continue metoprolol, BP more stable in last 24hrs   -given reversal of anticoagulation prior to going into afib, he is probably not a candidate for amiodarone therapy, if rate control is poor will consider consulting cardiology for further assisitance, potential pati/cardioversion if afib causing proonged clinical instability?         ESRD (end stage renal disease) on dialysis    - nephrology consulted for volume management  -they will use midodrine PRN if needed during dialysis, nephrology fellow to change orders.   -UF & 1unit prbcs today  -        Acute blood loss anemia    -presumed secondary to supratherapeutic INR and occurred post-operatively  -giving 1unit PRBC in hd   -trend CBC and Coags, transfuse if <7, will have to monitor hemodynamic/respiratory status and weigh risk benefit of further blood product transfusion          Pyuria    - in setting of ESRD and oliguria  - urine culture shows many organisms - none in predominance, no antimicrobial treatment necessary        Chronic diastolic heart failure    - last echo 10/2017 with pulmonary HTN and undetermined diastolic function, EF 55  - -continue metoprolol  -Repeat CXR to assess for edema, also check VBG to monitor for hypercapnia        Status post lumbar spinal fusion    -neurosurgery consultation as above          Coronary artery disease involving native coronary artery of native heart without angina pectoris    - no CP, SOB, anginal equivalents  - continue asa, statin  - EKG without ischemic changes        Type 2 diabetes mellitus with chronic kidney disease on chronic dialysis, with long-term current use of insulin    - reduced basal insulin dose         Severe aortic stenosis    - history of          VTE Risk Mitigation         Ordered     Medium Risk of VTE  Once      11/17/17 0011     Place sequential compression device  Until  discontinued      11/14/17 1907     Place BRAIN hose  Until discontinued      11/14/17 1907              Gurmeet Alonso MD  Department of Hospital Medicine   [facility]

## 2017-11-19 NOTE — PROGRESS NOTES
"[facility] Hospital Medicine  Progress Note    Patient Name: Donta Cline  MRN: [id number]  Patient Class: IP- Inpatient   Admission Date: 11/14/2017  Length of Stay: 4 days  Attending Physician: Gurmeet Alonso MD  Primary Care Provider: ZAID Richardson Iii, MD    [facility] Medicine Team: [facility]OSP MED L Gurmeet Alonso MD    Subjective:     Principal Problem:Acute bilateral low back pain    HPI:  Donta Cline is a 85 y.o. male who presents with PMHx of ESRD with dialysis MWF, HFpEF, NSTEMI, bladder/prostate cancer, DM II, aortic stenosis and CAD for evaluation of back pain s/p lumbar fusion/laminectomy (10/20/17). No family at bedside. Difficult to obtain HPI as speech garbled, however patient endorses progressive lower extremity weakness with difficulty ambulating for the past 4-5 days. His back pain is without radiation. Patient is now unable to ambulate independently. Denies trauma and urinary bowel/bladder incontinence, fever.    Per EDMD:  "The daughter brought patient to ED due to concern of his severe back pain and inability to perform activities as he was doing previously. She had discussed with Dr. Saul, and he recommended the patient come to ED and obtain imaging for spine."    Imaging:    Ct Lumbar Spine Without Contrast    Result Date: 11/14/2017  Comparison: MRI 10/12/17. Findings: Routine CT lumbar spine protocol performed without IV contrast. Prior L2-L4 instrumented lumbar fusion with interbody disc spacer at L3-4. Decompressive posterior laminectomies at from L2-3 through L5-S1. No evidence of hardware failure. No fracture identified, noting the inferior endplate of L4 is indistinct. Infection or postsurgical fluid collection not excluded, noting limited evaluation without IV contrast and artifact from adjacent pedicle screws. There is partial fusion of the right L5-S1 facet. The SI joints are unremarkable. There is extensive calcified atherosclerotic disease. Atrophic " kidneys. Partially imaged right renal stent noted. No hydronephrosis. Small renal lesions, too small to characterize without IV contrast.      Mri Lumbar Spine Without Contrast    Result Date: 11/14/2017  MRI LUMBAR SPINE TECHNIQUE: MRI lumbar spine was performed without contrast. There is an ill-defined heterogeneous collection encircling L1 spinous process demonstrating fluid levels on the right. This may represents a seroma or hematoma postoperatively however abscess cannot be excluded. This collection is best seen on series 11 image 4.    Hospital Course:  In discussion with daughter who is at bedside today (11/15) patient was discharged after surgical procedure to an inpatient rehab and he reports that he was doing well at the rehab but reached a plateau in his activity/functional level.  She states patient was walking 50 feet with minimal assist, able to toilet with minimal assistance.  Patient was transferred to SNF and since transfer, last Friday (11/10) patient requiring max assistance to stand and reported inability to bear weight secondary to pain, and since this time patient has been bed bound.     She also notes that patient with poor appetite as speech therapy has recommended puree nectar thick diet with Nepro shakes, reports that pt is losing weight and some increased abdominal girth.     Overnight patient admitted due to concerns of worsening pain and inability to ambulate, patient underwent MRI and CT imaging of the lumbar spine.  He has intact fusion, no spinal canal stenosis or cord compression noted, concern for a fluid collection encircling L1 spinous process, post op L2-L4 fusion changes and s/p laminectomy L3-L4.  Discussed imaging findings with attending neurosurgeon Dr. Sands who performed pts operation and he reported that fluid collection appears outside of range where instrumentation was.  Patient with no reported falls, workup today reveals elevated CRP >150, ESR 92, has as  supratherapeutic INR 3.8    Informed plan will be to take patient to OR for washout and exploration to evaluate for hematoma vs infection.     11/16 - Patient without acute events overnight, patient states he and his daughter are going to [place] today.  Daughter has provided consent for surgery and blood products, patient is receiving serial doses of FFP with intermittent INR checks with plan for surgery this afternoon if INR <1.4.  Daughter reports that patient w/o complaints of pain when lying in bed, but if he is moved/turned causes exacerbation of his pain.     Patient taken for operative intervention and in discussion with neurosurgeon Dr. Sands, he reports that patient with well healed wound and surgical fusion was intact, when cut into the dura and in subdural region there was area of hematoma that was evacuated (full op report pending in EMR)  Cultures sent to rule out infection but clinical impression was not an appearance of an infected operative site.  Dr. Sands noted that degree of patient's pain incongruent with surgical findings.     Post-oepratively in the morning patient coverted to afib with RVR and this morning with borderline BP, his Hgb downtrending since admission and was 6.7 post-op.  Patient received intermittent fluid boluses early AM of 11/17 but also with intake of 3+L prior to surgery due to need for high amount of FFP (6units).  Patient will receive 1 unit PRBC during Hemodialysis today, giving midodrine as well.     11/18 - Overnight initiated CPAP for patient due to concerns for pulmonary edema, blood gas with some hypercapnia but ph 7.3,  Discussed with Nephrology patient to receive UF today, and will give another 1unit PRBC due to hgb 6.8-7.0.  Pt reports slept well with cpap, still has back pain with movement and on examination.  Culture data from surgery remains negative - neurosurgery has ordered cefazolin, will renally dose this.     11/19 - Patient seen this AM, awake, alert,  reporting still having back pain and reporting frequent cough today, coughing during interview.  S/p UF by nephrology yesterday and negative -1.4L total for the day and s/p 2nd unit PRBC and hgb is stable at 8.6, remains in afib and blood pressures are stable.      Interval History:  As above      Review of Systems   Constitutional: Negative for chills, diaphoresis, fatigue and fever.   HENT: Negative for rhinorrhea, sinus pain, sinus pressure, sneezing and sore throat.    Eyes: Negative for visual disturbance.   Respiratory: Negative for cough, choking, chest tightness and shortness of breath.    Cardiovascular: Negative for chest pain, palpitations and leg swelling.   Gastrointestinal: Negative for constipation, diarrhea and nausea.        Denies bowel incontinence   Genitourinary: Negative for dysuria.        Denies new urinary incontinence, daughter reports present prior to surgery   Musculoskeletal: Positive for back pain and gait problem. Negative for joint swelling, myalgias and neck pain.   Neurological: Negative for dizziness, facial asymmetry, light-headedness and headaches.   Psychiatric/Behavioral: Negative for agitation and behavioral problems.     Objective:     Vital Signs (Most Recent):  Temp: 97.4 °F (36.3 °C) (11/19/17 1140)  Pulse: (!) 114 (11/19/17 1140)  Resp: 20 (11/19/17 1140)  BP: 112/60 (11/19/17 1140)  SpO2: 98 % (11/19/17 1140) Vital Signs (24h Range):  Temp:  [96.7 °F (35.9 °C)-98.5 °F (36.9 °C)] 97.4 °F (36.3 °C)  Pulse:  [] 114  Resp:  [12-20] 20  SpO2:  [93 %-99 %] 98 %  BP: ()/(36-76) 112/60     Weight: 97.5 kg (215 lb)  Body mass index is 39.32 kg/m².    Intake/Output Summary (Last 24 hours) at 11/19/17 1407  Last data filed at 11/19/17 1300   Gross per 24 hour   Intake             2380 ml   Output             3635 ml   Net            -1255 ml      Physical Exam   Constitutional: He appears well-developed.   somnolent   HENT:   Mouth/Throat: Oropharynx is clear and moist.    Eyes: Conjunctivae are normal. No scleral icterus.   Cardiovascular: Normal heart sounds and intact distal pulses.    No murmur heard.  Irregular rhythm, tachycardic   Pulmonary/Chest: Effort normal. No respiratory distress. He has no wheezes. He has rales.   Abdominal: Soft. Bowel sounds are normal.   Mild distension, soft, tympanic to percussion, no fluid wave noted, no bulging flanks, or abdominal collateral vessels   Musculoskeletal:   RUE forearm AV fistula with palpable thrill.     Surgical drains with sanguinous drainage   Lymphadenopathy:     He has no cervical adenopathy.   Neurological:   Alert, oriented to self, hospital   Skin: Skin is warm. There is pallor.       Significant Labs:   CBC:     Recent Labs  Lab 11/17/17 1952 11/18/17  0513 11/19/17  0359   WBC  --  11.96 13.00*   HGB 6.8* 7.0* 8.6*   HCT 22.4* 23.3* 27.4*   PLT  --  228 258     CMP:     Recent Labs  Lab 11/18/17  0513 11/19/17  0359    140   K 3.6 3.4*    105   CO2 20* 23   * 115*   BUN 21 34*   CREATININE 2.8* 3.7*   CALCIUM 9.3 9.7   ALBUMIN 2.7* 2.5*   ANIONGAP 16 12   EGFRNONAA 19.7* 14.0*     Microbiology Results (last 7 days)     Procedure Component Value Units Date/Time    AFB Culture & Smear [[id number]] Collected:  11/16/17 2218    Order Status:  Completed Specimen:  Wound from Back Updated:  11/18/17 0927     AFB Culture & Smear Culture in progress     AFB CULTURE STAIN No acid fast bacilli seen.    Narrative:       2.deep lumbar spine    AFB Culture & Smear [[id number]] Collected:  11/16/17 2218    Order Status:  Completed Specimen:  Wound from Back Updated:  11/18/17 0927     AFB Culture & Smear Culture in progress     AFB CULTURE STAIN No acid fast bacilli seen.    Narrative:       1.superficial lumbar spine    Aerobic culture [[id number]] Collected:  11/16/17 2218    Order Status:  Completed Specimen:  Wound from Back Updated:  11/18/17 0711     Aerobic Bacterial Culture No growth    Narrative:        1.superficial lumbar spine    Aerobic culture [[id number]] Collected:  11/16/17 2218    Order Status:  Completed Specimen:  Wound from Back Updated:  11/18/17 0711     Aerobic Bacterial Culture No growth    Narrative:       2.deep lumbar spine    Fungus culture [[id number]] Collected:  11/16/17 2218    Order Status:  Completed Specimen:  Wound from Back Updated:  11/17/17 0742     Fungus (Mycology) Culture Culture in progress    Narrative:       1.superficial lumbar spine    Fungus culture [[id number]] Collected:  11/16/17 2218    Order Status:  Completed Specimen:  Wound from Back Updated:  11/17/17 0742     Fungus (Mycology) Culture Culture in progress    Narrative:       2.deep lumbar spine    Gram stain [[id number]] Collected:  11/16/17 2218    Order Status:  Completed Specimen:  Wound from Back Updated:  11/17/17 0039     Gram Stain Result Rare WBC's      No organisms seen    Narrative:       2.deep lumbar spine    Gram stain [[id number]] Collected:  11/16/17 2218    Order Status:  Completed Specimen:  Wound from Back Updated:  11/16/17 2357     Gram Stain Result No WBC's      No organisms seen    Narrative:       1.superficial lumbar spine    Culture, Anaerobic [[id number]] Collected:  11/16/17 2218    Order Status:  Sent Specimen:  Wound from Back Updated:  11/16/17 2234    Culture, Anaerobic [[id number]] Collected:  11/16/17 2218    Order Status:  Sent Specimen:  Wound from Back Updated:  11/16/17 2232    Gram stain [[id number]] Collected:  11/15/17 0126    Order Status:  Completed Specimen:  Urine Updated:  11/16/17 0643     Gram Stain Result Moderate WBC's      Rare yeast    Narrative:       Preferred Collection Type->Urine, Clean Catch    Urine culture [[id number]] Collected:  11/15/17 0126    Order Status:  Completed Specimen:  Urine Updated:  11/16/17 0532     Urine Culture, Routine Multiple organisms isolated. None in predominance.  Repeat if     Urine Culture, Routine clinically necessary.    Narrative:        Preferred Collection Type->Urine, Clean Catch    Gram stain [[id number]]     Order Status:  Completed Specimen:  Urine from Urine     Gram stain [[id number]]     Order Status:  Canceled Specimen:  Urine from Urine             Significant Imaging: I have reviewed all pertinent imaging results/findings within the past 24 hours.    Assessment/Plan:      * Acute bilateral low back pain, post-lumbar spinal fusion    - neurosurgery took pt for washout POD2 and evacuation of hematoma,  -Hgb dropping since admit - s/p 2unit pRBC, and extra session of UF yesterday  -Patient with 2 surgical drains in, Rn notes reducing drainage.  Discussed with on call neurosurgery and will continue Cefazolin as abx prophylaxis while drains are in placed, renally dosed now for every 48 hours.   - Supportive care, PT/OT  - continue fentanyl patches and norco prn          Hematoma    As above  -f/u surgical recs for management of post-op drains          Supratherapeutic INR    -INR is 1.9, pt with surgical wound bed hematoma  -Hold coumadin/anti-platelet  -s/p vitamin K 2.5mg 11/16, and 6units FFP  -        Paroxysmal atrial fibrillation    -in stepdown bed  -Still in Afib rhythm wise  -continue metoprolol, BP more stable in last 24hrs   -given reversal of anticoagulation prior to going into afib, he is probably not a candidate for amiodarone therapy, if rate control is poor will consider consulting cardiology for further assisitance, potential pati/cardioversion if afib causing proonged clinical instability?  -         ESRD (end stage renal disease) on dialysis    - nephrology consulted for volume management  -they will use midodrine PRN if needed during dialysis, nephrology fellow to change orders.   -s/p HD Friday and UF saturday  -        Acute blood loss anemia    -presumed secondary to supratherapeutic INR and occurred post-operatively  -s/p 2units PRBC  -trend CBC and Coags, transfuse if <7, will have to monitor hemodynamic/respiratory  status and weigh risk benefit of further blood product transfusion          Pyuria    - in setting of ESRD and oliguria  - urine culture shows many organisms - none in predominance, no antimicrobial treatment necessary        Chronic diastolic heart failure    - last echo 10/2017 with pulmonary HTN and undetermined diastolic function, EF 55  - -continue metoprolol  -Repeat CXR to assess for edema, also check VBG to monitor for hypercapnia        Status post lumbar spinal fusion    -neurosurgery consultation as above          Coronary artery disease involving native coronary artery of native heart without angina pectoris    - no CP, SOB, anginal equivalents  - continue asa, statin  - EKG without ischemic changes        Type 2 diabetes mellitus with chronic kidney disease on chronic dialysis, with long-term current use of insulin    - reduced basal insulin dose         Severe aortic stenosis    - history of          VTE Risk Mitigation         Ordered     Medium Risk of VTE  Once      11/17/17 0011     Place sequential compression device  Until discontinued      11/14/17 1907     Place BRAIN hose  Until discontinued      11/14/17 1907              Gurmeet Alonso MD  Department of Hospital Medicine   [facility]

## 2017-11-19 NOTE — SUBJECTIVE & OBJECTIVE
Interval History:  As above      Review of Systems   Constitutional: Negative for chills, diaphoresis, fatigue and fever.   HENT: Negative for rhinorrhea, sinus pain, sinus pressure, sneezing and sore throat.    Eyes: Negative for visual disturbance.   Respiratory: Negative for cough, choking, chest tightness and shortness of breath.    Cardiovascular: Negative for chest pain, palpitations and leg swelling.   Gastrointestinal: Negative for constipation, diarrhea and nausea.        Denies bowel incontinence   Genitourinary: Negative for dysuria.        Denies new urinary incontinence, daughter reports present prior to surgery   Musculoskeletal: Positive for back pain and gait problem. Negative for joint swelling, myalgias and neck pain.   Neurological: Negative for dizziness, facial asymmetry, light-headedness and headaches.   Psychiatric/Behavioral: Negative for agitation and behavioral problems.     Objective:     Vital Signs (Most Recent):  Temp: 97.4 °F (36.3 °C) (11/19/17 1140)  Pulse: (!) 114 (11/19/17 1140)  Resp: 20 (11/19/17 1140)  BP: 112/60 (11/19/17 1140)  SpO2: 98 % (11/19/17 1140) Vital Signs (24h Range):  Temp:  [96.7 °F (35.9 °C)-98.5 °F (36.9 °C)] 97.4 °F (36.3 °C)  Pulse:  [] 114  Resp:  [12-20] 20  SpO2:  [93 %-99 %] 98 %  BP: ()/(36-76) 112/60     Weight: 97.5 kg (215 lb)  Body mass index is 39.32 kg/m².    Intake/Output Summary (Last 24 hours) at 11/19/17 1407  Last data filed at 11/19/17 1300   Gross per 24 hour   Intake             2380 ml   Output             3635 ml   Net            -1255 ml      Physical Exam   Constitutional: He appears well-developed.   somnolent   HENT:   Mouth/Throat: Oropharynx is clear and moist.   Eyes: Conjunctivae are normal. No scleral icterus.   Cardiovascular: Normal heart sounds and intact distal pulses.    No murmur heard.  Irregular rhythm, tachycardic   Pulmonary/Chest: Effort normal. No respiratory distress. He has no wheezes. He has rales.    Abdominal: Soft. Bowel sounds are normal.   Mild distension, soft, tympanic to percussion, no fluid wave noted, no bulging flanks, or abdominal collateral vessels   Musculoskeletal:   RUE forearm AV fistula with palpable thrill.     Surgical drains with sanguinous drainage   Lymphadenopathy:     He has no cervical adenopathy.   Neurological:   Alert, oriented to self, hospital   Skin: Skin is warm. There is pallor.       Significant Labs:   CBC:     Recent Labs  Lab 11/17/17 1952 11/18/17 0513 11/19/17 0359   WBC  --  11.96 13.00*   HGB 6.8* 7.0* 8.6*   HCT 22.4* 23.3* 27.4*   PLT  --  228 258     CMP:     Recent Labs  Lab 11/18/17 0513 11/19/17 0359    140   K 3.6 3.4*    105   CO2 20* 23   * 115*   BUN 21 34*   CREATININE 2.8* 3.7*   CALCIUM 9.3 9.7   ALBUMIN 2.7* 2.5*   ANIONGAP 16 12   EGFRNONAA 19.7* 14.0*     Microbiology Results (last 7 days)     Procedure Component Value Units Date/Time    AFB Culture & Smear [[id number]] Collected:  11/16/17 2218    Order Status:  Completed Specimen:  Wound from Back Updated:  11/18/17 0927     AFB Culture & Smear Culture in progress     AFB CULTURE STAIN No acid fast bacilli seen.    Narrative:       2.deep lumbar spine    AFB Culture & Smear [[id number]] Collected:  11/16/17 2218    Order Status:  Completed Specimen:  Wound from Back Updated:  11/18/17 0927     AFB Culture & Smear Culture in progress     AFB CULTURE STAIN No acid fast bacilli seen.    Narrative:       1.superficial lumbar spine    Aerobic culture [[id number]] Collected:  11/16/17 2218    Order Status:  Completed Specimen:  Wound from Back Updated:  11/18/17 0711     Aerobic Bacterial Culture No growth    Narrative:       1.superficial lumbar spine    Aerobic culture [[id number]] Collected:  11/16/17 2218    Order Status:  Completed Specimen:  Wound from Back Updated:  11/18/17 0711     Aerobic Bacterial Culture No growth    Narrative:       2.deep lumbar spine    Fungus culture  [[id number]] Collected:  11/16/17 2218    Order Status:  Completed Specimen:  Wound from Back Updated:  11/17/17 0742     Fungus (Mycology) Culture Culture in progress    Narrative:       1.superficial lumbar spine    Fungus culture [[id number]] Collected:  11/16/17 2218    Order Status:  Completed Specimen:  Wound from Back Updated:  11/17/17 0742     Fungus (Mycology) Culture Culture in progress    Narrative:       2.deep lumbar spine    Gram stain [[id number]] Collected:  11/16/17 2218    Order Status:  Completed Specimen:  Wound from Back Updated:  11/17/17 0039     Gram Stain Result Rare WBC's      No organisms seen    Narrative:       2.deep lumbar spine    Gram stain [[id number]] Collected:  11/16/17 2218    Order Status:  Completed Specimen:  Wound from Back Updated:  11/16/17 2357     Gram Stain Result No WBC's      No organisms seen    Narrative:       1.superficial lumbar spine    Culture, Anaerobic [[id number]] Collected:  11/16/17 2218    Order Status:  Sent Specimen:  Wound from Back Updated:  11/16/17 2234    Culture, Anaerobic [[id number]] Collected:  11/16/17 2218    Order Status:  Sent Specimen:  Wound from Back Updated:  11/16/17 2232    Gram stain [[id number]] Collected:  11/15/17 0126    Order Status:  Completed Specimen:  Urine Updated:  11/16/17 0643     Gram Stain Result Moderate WBC's      Rare yeast    Narrative:       Preferred Collection Type->Urine, Clean Catch    Urine culture [[id number]] Collected:  11/15/17 0126    Order Status:  Completed Specimen:  Urine Updated:  11/16/17 0532     Urine Culture, Routine Multiple organisms isolated. None in predominance.  Repeat if     Urine Culture, Routine clinically necessary.    Narrative:       Preferred Collection Type->Urine, Clean Catch    Gram stain [[id number]]     Order Status:  Completed Specimen:  Urine from Urine     Gram stain [[id number]]     Order Status:  Canceled Specimen:  Urine from Urine             Significant Imaging: I have reviewed  all pertinent imaging results/findings within the past 24 hours.

## 2017-11-19 NOTE — ASSESSMENT & PLAN NOTE
- MRI reviewed, concerning for seroma vs hematoma, neurosurgery took pt for washout POD1 and evacuation of hematoma,  -Hgb dropping since admit - s/p 1unit pRBC, giving 2nd unit PRBC with HD  -F/u any neurosurgery post-op recs  - Supportive care, PT/OT  - continue fentanyl patches and norco prn  - neurosurgery ordered cefazolin for pt - renallydosed to q48hrs

## 2017-11-19 NOTE — ASSESSMENT & PLAN NOTE
-presumed secondary to supratherapeutic INR and occurred post-operatively  -giving 1unit PRBC in hd   -trend CBC and Coags, transfuse if <7, will have to monitor hemodynamic/respiratory status and weigh risk benefit of further blood product transfusion

## 2017-11-19 NOTE — ASSESSMENT & PLAN NOTE
-in stepdown bed  -Still in Afib rhythm wise  -continue metoprolol, BP more stable in last 24hrs   -given reversal of anticoagulation prior to going into afib, he is probably not a candidate for amiodarone therapy, if rate control is poor will consider consulting cardiology for further assisitance, potential pati/cardioversion if afib causing proonged clinical instability?

## 2017-11-19 NOTE — ASSESSMENT & PLAN NOTE
- nephrology consulted for volume management  -they will use midodrine PRN if needed during dialysis, nephrology fellow to change orders.   -UF & 1unit prbcs today  -

## 2017-11-19 NOTE — ASSESSMENT & PLAN NOTE
-presumed secondary to supratherapeutic INR and occurred post-operatively  -s/p 2units PRBC  -trend CBC and Coags, transfuse if <7, will have to monitor hemodynamic/respiratory status and weigh risk benefit of further blood product transfusion

## 2017-11-19 NOTE — PLAN OF CARE
Problem: Patient Care Overview  Goal: Plan of Care Review  Outcome: Ongoing (interventions implemented as appropriate)  Patient resting in bed. Medicated with Norco for pain control. Assisted to turn/ reposition to a comfortable position. Skin kept clean and dry.  CBG monitored before meals, patient did not require Insulin coverage.  Back dressing is dry and intact. Hemovac drains x 2 patent and intact.  Fall precautions maintained.

## 2017-11-19 NOTE — ASSESSMENT & PLAN NOTE
-in stepdown bed  -Still in Afib rhythm wise  -continue metoprolol, BP more stable in last 24hrs   -given reversal of anticoagulation prior to going into afib, he is probably not a candidate for amiodarone therapy, if rate control is poor will consider consulting cardiology for further assisitance, potential pati/cardioversion if afib causing proonged clinical instability?  -

## 2017-11-19 NOTE — ASSESSMENT & PLAN NOTE
- neurosurgery took pt for washout POD2 and evacuation of hematoma,  -Hgb dropping since admit - s/p 2unit pRBC, and extra session of UF yesterday  -Patient with 2 surgical drains in, Rn notes reducing drainage.  Discussed with on call neurosurgery and will continue Cefazolin as abx prophylaxis while drains are in placed, renally dosed now for every 48 hours.   - Supportive care, PT/OT  - continue fentanyl patches and norco prn

## 2017-11-19 NOTE — OP NOTE
Date of Operation: 11/16/17    Pre-Operative Diagnosis:   1. Possible epidural abscess  2. S/p L3-4 TLIF and L2-5 laminectomies    Post-Operative Diagnosis:   1. Epidural hematoma 2/2 supratherapeutic INR on coumadin  2. S/p L3-4 TLIF and L2-5 laminectomies    Operation Titles:  1. Wound exploration  2. Evacuation of epidural spinal hematoma    Surgeon: Malachi Sands D.O.    Assistant:  Shirlene Hughes MD    Anesthesia: General     Level of involvement of attending surgeon: Full     Indications:   85-year-old male who recently underwent an open L3-4 TLIF and L2-L5 laminectomies.  Postoperatively he did very well and was transferred to a rehabilitation facility where he was continuing to improve in his strength and endurance.  One week prior to presentation he suddenly began complaining of severe low back and leg pain and was unable to ambulate effectively.  He was transferred to [facility] for higher level of care and was found to have severe low back and leg pain with bilateral lower extremity weakness, some of which was due to poor effort and pain limited.  The patient takes Coumadin chronically for A. fib and was found to have a supratherapeutic INR at 3.9.  The patient also had a significantly elevated ESR and a CRP, however was afebrile and did not have any leukocytosis.  Although he had a UA suspicious for a UTI, given the severe pain and elevated ESR and CRP, the decision was made to take the patient for a wound exploration and washout of a possible infection/abscess.  Neuro imaging was unrevealing.  Once the patient's INR was reversed and he was taken for surgery.    The risks, benefits and alternative options were discussed with the patient and his daughter. The risks which include but are not limited to bleeding, infection, death, CSF leak, nerve root injury, bowel and bladder or sexual dysfunction, weakness, numbness, paralysis, hardware complications, stroke, hardware migration,  pseudo-arthrosis, chronic pain, and chronic deformity were discussed. Both understood these risks and wished to proceed.     Procedure In Detail:   The patient was correctly identified and taken to the operating room where the anesthesia team administered general endotracheal anesthesia.  The patient was placed in the prone position onto a Koffi spine table.  His lumbar incision which was well healed with the exception of a small area of granulation tissue at the inferior edge, was identified free washed with alcohol scrub and then prepped and draped in the typical sterile fashion.    The incision was opened sharply with a 10 blade scalpel followed by combination of dissection using the Bovie cautery and blunt dissection using Nance retractors.  Self-retaining retractors were placed for greater exposure and hemostasis hemostasis was obtained using bipolar and Bovie cautery.  Using counter acting Nance retraction the wound was bluntly dissected down to the L2 spinous process and the previous surgical site.  The wound was well-healed and there was no signs of infection or purulent material.  At the deepest aspect of the wound close to the thecal sac a pocket was entered upon which chronic appearing hematoma shot out under high pressure.  Further opening this pocket revealed chronic appearing epidural hematoma with fibrinous clot.  The thecal sac appeared to be compressed in the areas of the previous laminectomies.  There was significant scar tissue and fusion mass.  The area was decompressed by removing the previously placed auto and allograft that was free and encroaching upon the open laminectomy areas.  Once it was felt that adequate decompression was obtained the wound was then closed in layered fashion first with 0 Vicryl in the deep fascial layers followed by 2-0 Vicryl's in the dermal layer, and finally staples on the skin.  It should be noted that 2 epidural Hemovac drains were placed.    Drains: Epidural  Hemovacs ×2    Estimated Blood Loss:  500 ml    Needle/Sponge count: Correct     Anesthesia: General    Prognosis: Fair    Condition: Stable    Wound: Clean

## 2017-11-20 NOTE — PROGRESS NOTES
[facility]  Nephrology  Progress Note    Patient Name: Donta Cline  MRN: [id number]  Admission Date: 11/14/2017  Hospital Length of Stay: 5 days  Attending Provider: Gurmeet Alonso MD   Primary Care Physician: ZAID Richardson Iii, MD  Principal Problem:Acute bilateral low back pain    Subjective:     HPI: 86 yo male with significant history for hypertension, hyperlipidemia, remote smoker, PAF, Severe AS, DMT2, CAD sp NSTEMI, dcognitive impairment, recent sp lumbar fusion/laminectomy 10/20/17, and ESRD x 2 years who is admitted for lower back pain associated with BLE pain/spasm since discharge to [facility] on 11/10 from inpatient rehab at [facility]. Patient confuse.Daughter Wendy at bedside reports patient had acute decline on 11/11 as patient went from walking 50 to 100 ft and minimal assist with ADL's to not able to hold self up or walk. MRI lumbar showed fluid collection at L1. Neurosurgery consulted. Nephrology consult for hemodialysis. HD MWF 3.5 hrs duration via LFA AVF at [facility]. Some residual but not sure how much. Unclear of EDW. Last HD 11/13. CXR bilateral basilar atelectasis otherwise clear.       Interval History:   Seen on HD this afternoon, tolerating well.  Dialysate adjusted to current labs.    Review of patient's allergies indicates:   Allergen Reactions    Morphine Other (See Comments)     Other reaction(s): severe abdominal pain    Darvocet a500  [propoxyphene n-acetaminophen]      Other reaction(s): Unknown     Current Facility-Administered Medications   Medication Frequency    0.9%  NaCl infusion (for blood administration) Q24H PRN    0.9%  NaCl infusion PRN    0.9%  NaCl infusion Once    0.9%  NaCl infusion PRN    0.9%  NaCl infusion Once    acetaminophen tablet 650 mg Q8H PRN    albumin human 25% bottle 25 g Continuous PRN    albuterol nebulizer solution 2.5 mg Q4H PRN    aluminum-magnesium hydroxide-simethicone 200-200-20  mg/5 mL suspension 30 mL Q4H PRN    atorvastatin tablet 80 mg Daily    benzonatate capsule 100 mg TID PRN    bisacodyl suppository 10 mg Daily    dextromethorphan-guaifenesin  mg/5 ml liquid 10 mL Q6H    dextrose 50% injection 12.5 g PRN    dextrose 50% injection 25 g PRN    fentaNYL 12 mcg/hr 1 patch Q72H    glucagon (human recombinant) injection 1 mg PRN    glucose chewable tablet 16 g PRN    glucose chewable tablet 24 g PRN    hydrocodone-acetaminophen 10-325mg per tablet 1 tablet Q6H PRN    insulin aspart pen 0-5 Units QID (AC + HS) PRN    insulin detemir pen 8 Units QHS    metoprolol tartrate (LOPRESSOR) tablet 25 mg TID    midodrine tablet 10 mg Continuous PRN    mupirocin 2 % ointment 1 g BID    ondansetron disintegrating tablet 8 mg Q8H PRN    ondansetron disintegrating tablet 8 mg Q6H PRN    pantoprazole EC tablet 40 mg Nightly    polyethylene glycol packet 17 g BID    promethazine (PHENERGAN) 6.25 mg in dextrose 5 % 50 mL IVPB Q6H PRN    ramelteon tablet 8 mg Nightly PRN    senna-docusate 8.6-50 mg per tablet 1 tablet BID    senna-docusate 8.6-50 mg per tablet 2 tablet Nightly PRN    sertraline tablet 100 mg QHS    sodium chloride 0.9% flush 3 mL PRN       Objective:     Vital Signs (Most Recent):  Temp: 98.4 °F (36.9 °C) (11/20/17 1533)  Pulse: (!) 128 (11/20/17 1533)  Resp: 18 (11/20/17 1533)  BP: (!) 91/51 (11/20/17 1533)  SpO2: (!) 94 % (11/20/17 1533)  O2 Device (Oxygen Therapy): room air (11/20/17 1533) Vital Signs (24h Range):  Temp:  [97.2 °F (36.2 °C)-98.4 °F (36.9 °C)] 98.4 °F (36.9 °C)  Pulse:  [] 128  Resp:  [16-18] 18  SpO2:  [94 %-100 %] 94 %  BP: ()/(50-78) 91/51     Weight: 97.5 kg (215 lb) (11/15/17 2030)  Body mass index is 39.32 kg/m².  Body surface area is 2.07 meters squared.    I/O last 3 completed shifts:  In: 1410 [P.O.:1410]  Out: 245 [Urine:100; Drains:145]    Physical Exam   Constitutional: He appears well-developed and well-nourished.    Eyes: EOM are normal.   Neck: Neck supple.   Cardiovascular: An irregularly irregular rhythm present.   Murmur heard.  Pulmonary/Chest: Effort normal. No respiratory distress. He has no wheezes. He has rales.   Abdominal: Soft. Bowel sounds are normal. There is no tenderness.   Neurological: He is alert.   Oriented to self and place not time or situation.    Skin: Skin is warm and dry.   LFA AVF bruit and thrill       Significant Labs:  CBC:   Recent Labs  Lab 11/20/17  0643   WBC 8.96   RBC 3.05*   HGB 9.2*   HCT 29.8*      MCV 98   MCH 30.2   MCHC 30.9*     CMP:   Recent Labs  Lab 11/14/17  1534  11/20/17  0643   *  < > 164*   CALCIUM 9.6  < > 9.7   ALBUMIN 2.4*  < > 2.5*   PROT 6.5  --   --      < > 135*   K 4.0  < > 3.6   CO2 31*  < > 23   CL 96  < > 100   BUN 31*  < > 46*   CREATININE 4.0*  < > 4.8*   ALKPHOS 193*  --   --    ALT 33  --   --    AST 70*  --   --    BILITOT 0.6  --   --    < > = values in this interval not displayed.         Assessment/Plan:     ESRD (end stage renal disease) on dialysis    HD MWF 3.5 hrs duration via LFA AVF at [facility]. Unsure EDW or residual function.   -HD treatment today for metabolic clearance/volume management.  BPs labile, UF goal of 1L as tolerated.  Albumin/midodrine as needed for BP support.    Anemia of CKD and acute blood loss  H/H low/stable.  ZHEN with HD      BMD  -Phos WNL.  No need for binders at this time.                [place], ADILSON  Nephrology  [facility]  Pager:  [phone]

## 2017-11-20 NOTE — ASSESSMENT & PLAN NOTE
85 y.o. male s/p L2-4 fusion 10/20 for lumbar stenosis who presents with acute worsening of his LBP and LE weakness. POD3 s/p washout and hematoma evacuation    -Patient neuro stable post-op  -Drains to remain in place, ABx while in.  -Continue to hold ASA and coumadin  -HD per nephrology.   -Q4h neuro checks  -PT/OT/OOB. Therapy recommending SNF   -Pain control per primary team  -Recommend delirium precautions   -TEDs/SCDs/SQH for DVT prophylaxis   -Coordination of care and medical management per primary team   -Please call NSGY with any change in neuro exam

## 2017-11-20 NOTE — PLAN OF CARE
Problem: Patient Care Overview  Goal: Plan of Care Review  PT disoriented to place, situation, and time, vs stable, iv access flushes well,both drains removed, dressing applied,  dialysis scheduled today, c/o pain to incision, ambulated to chair with pt, PT stated pt should only ambulate with physical therapist present, no bm today, appetite poor, diet advanced to dental soft, no falls or complications throughout shift, will continue to monitor    Problem: Pain, Acute (Adult)  Intervention: Monitor/Manage Analgesia  Pt was given norco q6 for pain, heat packs applied to left leg,

## 2017-11-20 NOTE — SUBJECTIVE & OBJECTIVE
Interval History: drains removed    Medications:  Continuous Infusions:   albumin human 25%      midodrine       Scheduled Meds:   sodium chloride 0.9%   Intravenous Once    atorvastatin  80 mg Oral Daily    bisacodyl  10 mg Rectal Daily    ceFAZolin (ANCEF) IVPB  2 g Intravenous Q48H    dextromethorphan-guaifenesin  mg/5 ml  10 mL Oral Q6H    fentaNYL  1 patch Transdermal Q72H    insulin detemir  8 Units Subcutaneous QHS    metoprolol tartrate  25 mg Oral TID    mupirocin  1 g Nasal BID    pantoprazole  40 mg Oral Nightly    polyethylene glycol  17 g Oral BID    senna-docusate 8.6-50 mg  1 tablet Oral BID    sertraline  100 mg Oral QHS     PRN Meds:sodium chloride, sodium chloride 0.9%, sodium chloride 0.9%, acetaminophen, albumin human 25%, albuterol sulfate, aluminum-magnesium hydroxide-simethicone, benzonatate, dextrose 50%, dextrose 50%, glucagon (human recombinant), glucose, glucose, hydrocodone-acetaminophen 10-325mg, insulin aspart, midodrine, ondansetron, ondansetron, promethazine (PHENERGAN) IVPB, ramelteon, senna-docusate 8.6-50 mg, sodium chloride 0.9%     Review of Systems  Objective:     Weight: 97.5 kg (215 lb)  Body mass index is 39.32 kg/m².  Vital Signs (Most Recent):  Temp: 97.5 °F (36.4 °C) (11/20/17 0733)  Pulse: 87 (11/20/17 0733)  Resp: 16 (11/20/17 0733)  BP: (!) 106/57 (11/20/17 0733)  SpO2: 97 % (11/20/17 0733) Vital Signs (24h Range):  Temp:  [97.2 °F (36.2 °C)-98.2 °F (36.8 °C)] 97.5 °F (36.4 °C)  Pulse:  [] 87  Resp:  [16-20] 16  SpO2:  [95 %-100 %] 97 %  BP: (105-153)/(50-78) 106/57                           Closed/Suction Drain 11/16/17 2320 Posterior Back  (Active)   Site Description Unable to view 11/20/2017  9:12 AM   Dressing Type [place] 11/19/2017  8:00 PM   Dressing Status Clean;Dry;Intact 11/20/2017  9:12 AM   Dressing Intervention Dressing reinforced 11/19/2017  8:00 PM   Drainage Serosanguineous 11/20/2017  9:12 AM   Status Other (Comment)  11/19/2017  8:00 PM   Output (mL) 30 mL 11/20/2017  6:00 AM            Closed/Suction Drain 11/16/17 2321 Posterior Back Other (Comment) (Active)   Site Description Unable to view 11/19/2017  8:00 PM   Dressing Type [place] 11/19/2017  8:00 PM   Dressing Status Clean;Dry;Intact 11/20/2017  9:12 AM   Dressing Intervention Dressing reinforced 11/19/2017  8:00 PM   Drainage Serosanguineous 11/20/2017  9:12 AM   Status Other (Comment) 11/19/2017  8:00 PM   Output (mL) 15 mL 11/20/2017  6:00 AM            Hemodialysis AV Fistula Right forearm (Active)   Needle Size 15ga 11/18/2017  2:10 PM   Site Assessment Clean;Dry;Intact 11/20/2017  9:12 AM   Patency Present;Thrill;Bruit 11/20/2017  9:12 AM   Status Deaccessed 11/19/2017  8:00 PM   Flows Good 11/18/2017  2:10 PM   Dressing Intervention New dressing 11/18/2017  5:00 PM   Dressing Status Clean;Dry;Intact 11/20/2017  9:12 AM   Site Condition No complications 11/20/2017  9:12 AM   Dressing Gauze 11/20/2017  9:12 AM       Physical Exam:  Vitals reviewed.    Constitutional: He appears well-developed and well-nourished. He is not diaphoretic. No distress.     Eyes: Pupils are equal, round, and reactive to light. EOM are normal.     Cardiovascular: Normal rate.     Abdominal: Soft.     Psych/Behavior: He is alert. He is oriented to person, place, and time. He has a normal mood and affect.     Neurological:        Cranial nerves: Cranial nerve(s) II, III, IV, V, VI, VII, VIII, IX, X, XI and XII are intact.   E4V5M6  AOx3  FCx4  BLE: HF 4/5, KE 2/5, KF 4/5, PF/DF 5/5  BUE 5/5       Significant Labs:    Recent Labs  Lab 11/19/17  0359 11/20/17  0643   * 164*    135*   K 3.4* 3.6    100   CO2 23 23   BUN 34* 46*   CREATININE 3.7* 4.8*   CALCIUM 9.7 9.7       Recent Labs  Lab 11/19/17 0359 11/20/17  0643   WBC 13.00* 8.96   HGB 8.6* 9.2*   HCT 27.4* 29.8*    301       Recent Labs  Lab 11/19/17 0359 11/20/17  0643   INR 1.9* 1.4*     Microbiology  Results (last 7 days)     Procedure Component Value Units Date/Time    Aerobic culture [[id number]] Collected:  11/16/17 2218    Order Status:  Completed Specimen:  Wound from Back Updated:  11/20/17 0904     Aerobic Bacterial Culture No growth    Narrative:       2.deep lumbar spine    Aerobic culture [[id number]] Collected:  11/16/17 2218    Order Status:  Completed Specimen:  Wound from Back Updated:  11/20/17 0904     Aerobic Bacterial Culture No growth    Narrative:       1.superficial lumbar spine    AFB Culture & Smear [[id number]] Collected:  11/16/17 2218    Order Status:  Completed Specimen:  Wound from Back Updated:  11/18/17 0927     AFB Culture & Smear Culture in progress     AFB CULTURE STAIN No acid fast bacilli seen.    Narrative:       2.deep lumbar spine    AFB Culture & Smear [[id number]] Collected:  11/16/17 2218    Order Status:  Completed Specimen:  Wound from Back Updated:  11/18/17 0927     AFB Culture & Smear Culture in progress     AFB CULTURE STAIN No acid fast bacilli seen.    Narrative:       1.superficial lumbar spine    Fungus culture [[id number]] Collected:  11/16/17 2218    Order Status:  Completed Specimen:  Wound from Back Updated:  11/17/17 0742     Fungus (Mycology) Culture Culture in progress    Narrative:       1.superficial lumbar spine    Fungus culture [[id number]] Collected:  11/16/17 2218    Order Status:  Completed Specimen:  Wound from Back Updated:  11/17/17 0742     Fungus (Mycology) Culture Culture in progress    Narrative:       2.deep lumbar spine    Gram stain [[id number]] Collected:  11/16/17 2218    Order Status:  Completed Specimen:  Wound from Back Updated:  11/17/17 0039     Gram Stain Result Rare WBC's      No organisms seen    Narrative:       2.deep lumbar spine    Gram stain [[id number]] Collected:  11/16/17 2218    Order Status:  Completed Specimen:  Wound from Back Updated:  11/16/17 2357     Gram Stain Result No WBC's      No organisms seen    Narrative:        1.superficial lumbar spine    Culture, Anaerobic [[id number]] Collected:  11/16/17 2218    Order Status:  Sent Specimen:  Wound from Back Updated:  11/16/17 2234    Culture, Anaerobic [[id number]] Collected:  11/16/17 2218    Order Status:  Sent Specimen:  Wound from Back Updated:  11/16/17 2232    Gram stain [[id number]] Collected:  11/15/17 0126    Order Status:  Completed Specimen:  Urine Updated:  11/16/17 0643     Gram Stain Result Moderate WBC's      Rare yeast    Narrative:       Preferred Collection Type->Urine, Clean Catch    Urine culture [[id number]] Collected:  11/15/17 0126    Order Status:  Completed Specimen:  Urine Updated:  11/16/17 0532     Urine Culture, Routine Multiple organisms isolated. None in predominance.  Repeat if     Urine Culture, Routine clinically necessary.    Narrative:       Preferred Collection Type->Urine, Clean Catch    Gram stain [[id number]]     Order Status:  Completed Specimen:  Urine from Urine     Gram stain [[id number]]     Order Status:  Canceled Specimen:  Urine from Urine         Recent Lab Results       11/20/17  0643 11/19/17  2153 11/19/17  1644 11/19/17  1208      Immature Granulocytes 0.8(H)        Immature Grans (Abs) 0.07(H)        Albumin 2.5(L)        Anion Gap 12        Baso # 0.03        Basophil% 0.3        BUN, Bld 46(H)        Calcium 9.7        Chloride 100        CO2 23        Creatinine 4.8(H)        Differential Method Automated        eGFR if  11.9(A)        eGFR if non  10.3  Comment:  Calculation used to obtain the estimated glomerular filtration  rate (eGFR) is the CKD-EPI equation.   (A)        Eos # 0.2        Eosinophil% 2.5        Glucose 164(H)        Gran # 7.3        Gran% 81.1(H)        Hematocrit 29.8(L)        Hemoglobin 9.2(L)        Coumadin Monitoring INR 1.4  Comment:  Coumadin Therapy:  2.0 - 3.0 for INR for all indicators except mechanical heart valves  and antiphospholipid syndromes which should use 2.5 -  3.5.  (H)        Lymph # 0.8(L)        Lymph% 9.3(L)        MCH 30.2        MCHC 30.9(L)        MCV 98        Mono # 0.5        Mono% 6.0        MPV 10.5        nRBC 0        Phosphorus 2.9        Platelets 301        POCT Glucose  185(H) 225(H) 200(H)     Potassium 3.6        Protime 14.2(H)        RBC 3.05(L)        RDW 18.1(H)        Sodium 135(L)        WBC 8.96            All pertinent labs from the last 24 hours have been reviewed.    Significant Diagnostics:  CT: No results found in the last 24 hours.  MRI: No results found in the last 24 hours.  I have reviewed all pertinent imaging results/findings within the past 24 hours.

## 2017-11-20 NOTE — PROGRESS NOTES
One hour into dialysis treatment patient developed afib with RVR. Received 300cc NS bolus with improvement in HR from 160 to 120. BP stable. HR starting to rise again.   Will not restart treatment. Patient currently on RA and without hyperK or metabolic acidosis.  Dialysis nurse to call primary team for further recommendations.  Will f/u labs and volume status in AM and reassess need for RRT at that time.       Maryann Capellan, PGY-5  Nephrology Fellow  [facility]  Pager: [phone]

## 2017-11-20 NOTE — PT/OT/SLP PROGRESS
Speech Language Pathology  Treatment    Donta Cline   MRN: [id number]   Admitting Diagnosis: Acute bilateral low back pain    Diet recommendations: Solid Diet Level: Dental Soft  Liquid Diet Level: Nectar Thick Feed only when awake/alert, No straws, Small bites/sips, 1 bite/sip at a time, Check for pocketing/oral residue, Meds crushed in puree, Eliminate distractions, Assistance with meals and Assistance with thickening liquids, Encourage double swallows per bolus  · To achieve nectar thick liquid: 4 oz of any liquid to 1 pack of ThickenUp Clear or 6 oz of any liquid to 1 pack of ThickenUp  · No ice cream, jello, or ice.  · Avoid mixed consistencies (soup, cereal with milk, juicy fruits).      SLP Treatment Date: 11/20/17  Speech Start Time: 0954     Speech Stop Time: 1013     Speech Total (min): 19 min       TREATMENT BILLABLE MINUTES:  Treatment Swallowing Dysfunction 19    Has the patient been evaluated by SLP for swallowing? : Yes  Keep patient NPO?: No   General Precautions: Standard, aspiration, fall, nectar thick          Subjective:  Upon SLP entry, pt found sleeping with daughter present at bedside.     Pain/Comfort  Pain Rating 1: 0/10  Pain Rating Post-Intervention 1: 0/10    Objective:   Patient found with: blood pressure cuff, telemetry  Pt easily roused and agreed to participate in tx. Thin liquids were present on pt's bedside table with straws. Daughter reported that thin liquids were present when she arrived this morning, and she suspected that they were brought in by pt's nurse. SLP emphasized importance of thickening pt's liquids to ensure safety with liquid intake, and instructions for thickening were reviewed. Pt's voice presented with slight wet and gravely quality prior to po trials. Pt's daughter reports pt coughing at baseline. Trials of nectar thick liquid administered by SLP via tsp x3, and self-administered by pt via sequential cup sip x2. Double swallows performed indptly by pt across  consistencies. Pt tolerated small quantities given via tsp without s/s of airway compromise, however, when given the cup, pt was observed to take large sips. Pt exhibited immediate cough x2 in response to nectar thick liquids via cups sip. Coughing response was strong and productive. SLP cued pt to take small sips one at a time. Pt observed to modify behavior slightly in response to cues, however, pt will require supervision and cuing to regulate rate of liquid intake. 1/2 hortensia cracker was administered to assess pt's tolerance of regular textures. Prolonged mastication with mild buccal and lingual residue noted. Residue cleared with voluntary lingual sweep and liquid wash. SLP provided education re: new diet recommendations, continued nectar thick recommendation, aspiration risk, s/s of aspiration, thickening instructions, swallowing precautions, need for assistance with meal, and SLP POC. Pt and daughter verbalized understanding of all discussed. White board up to date.       SPoke with daughter at BS after session who reported pt with recent MBS that showed silent aspiration of thin liquid (no Epic documentation other than MBS from 10/12). Daughter reported increased arousal and less confusion today.  Daughter also reported pt not eating puree diet. Dental soft diet should be executed with caution. MBS 10/12 recommended solid consistency as dental soft. Recs dw Dr. Alonso. No further questions.      Assessment:  Donta Cline is a 85 y.o. male with a medical diagnosis of Acute bilateral low back pain and presents with oropharyngeal dysphagia. Pt with continued progress towards goals, improved arousal, and improved diet tolerance.     Discharge recommendations: Discharge Facility/Level Of Care Needs: nursing facility, skilled     Goals:    SLP Goals        Problem: SLP Goal    Goal Priority Disciplines Outcome   SLP Goal     SLP Ongoing (interventions implemented as appropriate)   Description:  Speech  Language Pathology Goals  Speech Language Pathology Goals  Goals expected to be met by 11/27/17  1. Pt will tolerate dental soft diet without overt S/S aspiration, Supervision  2. Pt will tolerate nectar-thickened liquids without overt S/S aspiration, Supervision  3. Pt will complete dysphagia exercises x10 ea. , MIN A, to improve BOT and laryngeal elevation  4. Educate pt and family on S/S aspiration and aspiration precautions     Goals expected to be met by 11/24/17  1. Pt will tolerate puree diet without overt S/S aspiration, Supervision- met   2. Pt will tolerate nectar-thickened liquids without overt S/S aspiration, Supervision -ongoing  3. Pt will complete dysphagia exercises x10 ea. , MIN A, to improve BOT and laryngeal elevation -ongoing  4. Educate pt and family on S/S aspiration and aspiration precautions -ongoing                        Plan:   Patient to be seen Therapy Frequency: 5 x/week   Plan of Care expires: 12/17/17  Plan of Care reviewed with: patient, daughter  SLP Follow-up?: Yes            Nimisha Robles M.A. CCC-SLP  Speech Language Pathologist  [phone]  11/20/2017

## 2017-11-20 NOTE — PLAN OF CARE
Problem: Physical Therapy Goal  Goal: Physical Therapy Goal  Goals to be met by: 2017     Patient will increase functional independence with mobility by performin. Supine to sit with MInimal Assistance  2. Sit to supine with MInimal Assistance  3. Sit to stand transfer with Moderate Assistance  4. Bed to chair transfer with Moderate Assistance  5. Gait  x 10 feet with Maximum Assistance using Rolling Walker.   6. Lower extremity exercise program x15 reps per handout, with assistance as needed     Outcome: Ongoing (interventions implemented as appropriate)  Goals remain appropriate to improve functional mobility.    Edgar Wright III, DPT, PT  2017

## 2017-11-20 NOTE — SUBJECTIVE & OBJECTIVE
Interval History: NAEON.    Medications:  Continuous Infusions:   albumin human 25%      midodrine       Scheduled Meds:   sodium chloride 0.9%   Intravenous Once    atorvastatin  80 mg Oral Daily    bisacodyl  10 mg Rectal Daily    [START ON 11/20/2017] ceFAZolin (ANCEF) IVPB  2 g Intravenous Q48H    dextromethorphan-guaifenesin  mg/5 ml  10 mL Oral Q6H    fentaNYL  1 patch Transdermal Q72H    insulin detemir  5 Units Subcutaneous QHS    metoprolol tartrate  25 mg Oral TID    mupirocin  1 g Nasal BID    pantoprazole  40 mg Oral Nightly    polyethylene glycol  17 g Oral BID    senna-docusate 8.6-50 mg  1 tablet Oral BID    sertraline  100 mg Oral QHS     PRN Meds:sodium chloride, sodium chloride 0.9%, sodium chloride 0.9%, acetaminophen, albumin human 25%, albuterol sulfate, aluminum-magnesium hydroxide-simethicone, benzonatate, dextrose 50%, dextrose 50%, glucagon (human recombinant), glucose, glucose, hydrocodone-acetaminophen 10-325mg, insulin aspart, midodrine, ondansetron, ondansetron, promethazine (PHENERGAN) IVPB, ramelteon, senna-docusate 8.6-50 mg, sodium chloride 0.9%     Review of Systems    Objective:     Weight: 97.5 kg (215 lb)  Body mass index is 39.32 kg/m².  Vital Signs (Most Recent):  Temp: 98 °F (36.7 °C) (11/19/17 2017)  Pulse: 92 (11/19/17 2324)  Resp: 16 (11/19/17 2017)  BP: 113/78 (11/19/17 2017)  SpO2: 95 % (11/19/17 2020) Vital Signs (24h Range):  Temp:  [97.2 °F (36.2 °C)-98 °F (36.7 °C)] 98 °F (36.7 °C)  Pulse:  [] 92  Resp:  [12-20] 16  SpO2:  [95 %-99 %] 95 %  BP: (103-153)/(58-78) 113/78       Date 11/19/17 0700 - 11/20/17 0659   Shift 9403-4309 2046-9659 5307-3173 24 Hour Total   I  N  T  A  K  E   P.O. 480 480  960    Shift Total  (mL/kg) 480  (4.9) 480  (4.9)  960  (9.8)   O  U  T  P  U  T   Urine  (mL/kg/hr) 50  (0.1) 50  (0.1)  100    Drains  70  70    Shift Total  (mL/kg) 50  (0.5) 120  (1.2)  170  (1.7)   Weight (kg) 97.5 97.5 97.5 97.5             Closed/Suction Drain 11/16/17 2320 Posterior Back  (Active)   Site Description Unable to view 11/17/2017  7:51 AM   Dressing Type [place] 11/17/2017  7:51 AM   Dressing Status Biopatch in place;Clean;Dry 11/17/2017  7:51 AM   Dressing Intervention Dressing reinforced 11/17/2017  7:51 AM   Drainage Bloody 11/17/2017  7:51 AM   Status To bulb suction 11/17/2017  7:51 AM   Output (mL) 100 mL 11/17/2017 10:38 AM            Closed/Suction Drain 11/16/17 2321 Posterior Back Other (Comment) (Active)   Site Description Unable to view 11/17/2017  7:51 AM   Dressing Type [place] 11/17/2017  7:51 AM   Dressing Status Biopatch in place;Clean;Dry 11/17/2017  7:51 AM   Dressing Intervention Dressing reinforced 11/17/2017  7:51 AM   Drainage Bloody 11/17/2017  7:51 AM   Status To bulb suction 11/17/2017  7:51 AM   Output (mL) 25 mL 11/17/2017 10:38 AM            Hemodialysis AV Fistula Right forearm (Active)   Needle Size 15ga 11/17/2017  2:10 PM   Site Assessment Clean;Dry;Intact;No redness;No swelling 11/17/2017  2:10 PM   Patency Present;Thrill;Bruit 11/17/2017  2:10 PM   Status Accessed 11/17/2017  2:10 PM   Flows Good 11/17/2017  2:10 PM   Dressing Intervention New dressing 11/17/2017  2:10 PM   Dressing Status Clean;Dry;Intact 11/17/2017  2:10 PM   Site Condition No complications 11/17/2017  2:10 PM   Dressing Open to air (None) 11/17/2017  1:15 AM       Neurosurgery Physical Exam    General: well developed, well nourished, no distress.   Head: normocephalic, atraumatic  Neurologic: Pt confused. Alert and oriented to person and place only. Thought content appropriate. Pt resistant to the exam  GCS: Motor: 6/Verbal: 4/Eyes: 4 GCS Total: 14  Mental Status: Awake, Alert, Oriented x2  Cranial nerves: face symmetric, tongue midline, CN II-XII grossly intact.   Eyes: pupils equal, round, reactive to light with accomodation, EOMI.   Sensory: intact to light touch throughout  Motor Strength: Moves all extremities  spontaneously with good tone. No abnormal movements seen. LE exam effort and pain limited     Strength   Deltoids Triceps Biceps Wrist Extension Wrist Flexion Hand    Upper: R 5/5 5/5 5/5 5/5 5/5 5/5     L 5/5 5/5 5/5 5/5 5/5 5/5       Iliopsoas Quadriceps Knee  Flexion Tibialis  anterior Gastro- cnemius EHL   Lower: R 3/5 1/5 5/5 5/5 5/5 5/5     L 3/5 1/5 5/5 5/5 5/5 5/5      Bandage clean dry and intact, 2 HV drains in place    Significant Labs:    Recent Labs  Lab 11/18/17 0513 11/19/17  0359   * 115*    140   K 3.6 3.4*    105   CO2 20* 23   BUN 21 34*   CREATININE 2.8* 3.7*   CALCIUM 9.3 9.7       Recent Labs  Lab 11/18/17  0513 11/19/17  0359   WBC 11.96 13.00*   HGB 7.0* 8.6*   HCT 23.3* 27.4*    258       Recent Labs  Lab 11/18/17 0513 11/19/17  0359   INR 2.0* 1.9*     Microbiology Results (last 7 days)     Procedure Component Value Units Date/Time    AFB Culture & Smear [[id number]] Collected:  11/16/17 2218    Order Status:  Completed Specimen:  Wound from Back Updated:  11/18/17 0927     AFB Culture & Smear Culture in progress     AFB CULTURE STAIN No acid fast bacilli seen.    Narrative:       2.deep lumbar spine    AFB Culture & Smear [[id number]] Collected:  11/16/17 2218    Order Status:  Completed Specimen:  Wound from Back Updated:  11/18/17 0927     AFB Culture & Smear Culture in progress     AFB CULTURE STAIN No acid fast bacilli seen.    Narrative:       1.superficial lumbar spine    Aerobic culture [[id number]] Collected:  11/16/17 2218    Order Status:  Completed Specimen:  Wound from Back Updated:  11/18/17 0711     Aerobic Bacterial Culture No growth    Narrative:       1.superficial lumbar spine    Aerobic culture [[id number]] Collected:  11/16/17 2218    Order Status:  Completed Specimen:  Wound from Back Updated:  11/18/17 0711     Aerobic Bacterial Culture No growth    Narrative:       2.deep lumbar spine    Fungus culture [[id number]] Collected:  11/16/17 2509     Order Status:  Completed Specimen:  Wound from Back Updated:  11/17/17 0742     Fungus (Mycology) Culture Culture in progress    Narrative:       1.superficial lumbar spine    Fungus culture [[id number]] Collected:  11/16/17 2218    Order Status:  Completed Specimen:  Wound from Back Updated:  11/17/17 0742     Fungus (Mycology) Culture Culture in progress    Narrative:       2.deep lumbar spine    Gram stain [[id number]] Collected:  11/16/17 2218    Order Status:  Completed Specimen:  Wound from Back Updated:  11/17/17 0039     Gram Stain Result Rare WBC's      No organisms seen    Narrative:       2.deep lumbar spine    Gram stain [[id number]] Collected:  11/16/17 2218    Order Status:  Completed Specimen:  Wound from Back Updated:  11/16/17 2357     Gram Stain Result No WBC's      No organisms seen    Narrative:       1.superficial lumbar spine    Culture, Anaerobic [[id number]] Collected:  11/16/17 2218    Order Status:  Sent Specimen:  Wound from Back Updated:  11/16/17 2234    Culture, Anaerobic [[id number]] Collected:  11/16/17 2218    Order Status:  Sent Specimen:  Wound from Back Updated:  11/16/17 2232    Gram stain [[id number]] Collected:  11/15/17 0126    Order Status:  Completed Specimen:  Urine Updated:  11/16/17 0643     Gram Stain Result Moderate WBC's      Rare yeast    Narrative:       Preferred Collection Type->Urine, Clean Catch    Urine culture [[id number]] Collected:  11/15/17 0126    Order Status:  Completed Specimen:  Urine Updated:  11/16/17 0532     Urine Culture, Routine Multiple organisms isolated. None in predominance.  Repeat if     Urine Culture, Routine clinically necessary.    Narrative:       Preferred Collection Type->Urine, Clean Catch    Gram stain [[id number]]     Order Status:  Completed Specimen:  Urine from Urine     Gram stain [[id number]]     Order Status:  Canceled Specimen:  Urine from Urine         All pertinent labs from the last 24 hours have been reviewed.    Significant  Diagnostics:  None new

## 2017-11-20 NOTE — PLAN OF CARE
Problem: Patient Care Overview  Goal: Plan of Care Review  Outcome: Ongoing (interventions implemented as appropriate)  Patient AAOx2, orientation varying during shift. Mostly oriented to self and place, disoriented to time and situation. VSS, no acute distress during shift. Frequent reorienting provided. Antibiotics started during shift. Drain output monitored. PRN pain medication given for back pain. Bed low and locked, call light and possessions within reach, remains free of falls, will continue to monitor.

## 2017-11-20 NOTE — ADDENDUM NOTE
Addendum  created 11/20/17 1129 by Joaquin Perez MD    Anesthesia Intra Blocks edited, Sign clinical note

## 2017-11-20 NOTE — PROGRESS NOTES
[facility]  Neurosurgery  Progress Note    Subjective:     History of Present Illness: Donta Cline is 85 y.o. male with PMH ESRD on HD MWF, CAD, NSTEMI, bladder/prostate cancer, DMII, aortic stenosis and recent L2-L4 fusion 10/20/17 who presents to [facility] with complaint of worsened back pain and functional decline since last Friday 11/3. There is no family in the room. The patient is mildy confused and thus a poor historian so most of the history obtained from the medical record. He was discharged to a rehab after the last admission and progressing well so transferred to a skilled nursing facility. He was able to walk 50ft on his own until last week when he had acute worsening of his low back pain and LE weakness. He has been unable to weight bear since that time. He also complains of abdominal pain and bilateral hip pain. He reports some BLE pain but is unable to characterize it. Denies BB dysfunction or saddle anesthesia. There is no record of fever or trauma. ESR/CRP are elevated. INR 3.8, on coumadin.     Post-Op Info:  Procedure(s) (LRB):  REVISION-WOUND--lumbar washout (N/A)   4 Days Post-Op     Interval History: drains removed    Medications:  Continuous Infusions:   albumin human 25%      midodrine       Scheduled Meds:   sodium chloride 0.9%   Intravenous Once    atorvastatin  80 mg Oral Daily    bisacodyl  10 mg Rectal Daily    ceFAZolin (ANCEF) IVPB  2 g Intravenous Q48H    dextromethorphan-guaifenesin  mg/5 ml  10 mL Oral Q6H    fentaNYL  1 patch Transdermal Q72H    insulin detemir  8 Units Subcutaneous QHS    metoprolol tartrate  25 mg Oral TID    mupirocin  1 g Nasal BID    pantoprazole  40 mg Oral Nightly    polyethylene glycol  17 g Oral BID    senna-docusate 8.6-50 mg  1 tablet Oral BID    sertraline  100 mg Oral QHS     PRN Meds:sodium chloride, sodium chloride 0.9%, sodium chloride 0.9%, acetaminophen, albumin human 25%, albuterol sulfate,  aluminum-magnesium hydroxide-simethicone, benzonatate, dextrose 50%, dextrose 50%, glucagon (human recombinant), glucose, glucose, hydrocodone-acetaminophen 10-325mg, insulin aspart, midodrine, ondansetron, ondansetron, promethazine (PHENERGAN) IVPB, ramelteon, senna-docusate 8.6-50 mg, sodium chloride 0.9%     Review of Systems  Objective:     Weight: 97.5 kg (215 lb)  Body mass index is 39.32 kg/m².  Vital Signs (Most Recent):  Temp: 97.5 °F (36.4 °C) (11/20/17 0733)  Pulse: 87 (11/20/17 0733)  Resp: 16 (11/20/17 0733)  BP: (!) 106/57 (11/20/17 0733)  SpO2: 97 % (11/20/17 0733) Vital Signs (24h Range):  Temp:  [97.2 °F (36.2 °C)-98.2 °F (36.8 °C)] 97.5 °F (36.4 °C)  Pulse:  [] 87  Resp:  [16-20] 16  SpO2:  [95 %-100 %] 97 %  BP: (105-153)/(50-78) 106/57                           Closed/Suction Drain 11/16/17 2320 Posterior Back  (Active)   Site Description Unable to view 11/20/2017  9:12 AM   Dressing Type [facility] 11/19/2017  8:00 PM   Dressing Status Clean;Dry;Intact 11/20/2017  9:12 AM   Dressing Intervention Dressing reinforced 11/19/2017  8:00 PM   Drainage Serosanguineous 11/20/2017  9:12 AM   Status Other (Comment) 11/19/2017  8:00 PM   Output (mL) 30 mL 11/20/2017  6:00 AM            Closed/Suction Drain 11/16/17 2321 Posterior Back Other (Comment) (Active)   Site Description Unable to view 11/19/2017  8:00 PM   Dressing Type [facility] 11/19/2017  8:00 PM   Dressing Status Clean;Dry;Intact 11/20/2017  9:12 AM   Dressing Intervention Dressing reinforced 11/19/2017  8:00 PM   Drainage Serosanguineous 11/20/2017  9:12 AM   Status Other (Comment) 11/19/2017  8:00 PM   Output (mL) 15 mL 11/20/2017  6:00 AM            Hemodialysis AV Fistula Right forearm (Active)   Needle Size 15ga 11/18/2017  2:10 PM   Site Assessment Clean;Dry;Intact 11/20/2017  9:12 AM   Patency Present;Thrill;Bruit 11/20/2017  9:12 AM   Status Deaccessed 11/19/2017  8:00 PM   Flows Good 11/18/2017  2:10 PM   Dressing  Intervention New dressing 11/18/2017  5:00 PM   Dressing Status Clean;Dry;Intact 11/20/2017  9:12 AM   Site Condition No complications 11/20/2017  9:12 AM   Dressing Gauze 11/20/2017  9:12 AM       Physical Exam:  Vitals reviewed.    Constitutional: He appears well-developed and well-nourished. He is not diaphoretic. No distress.     Eyes: Pupils are equal, round, and reactive to light. EOM are normal.     Cardiovascular: Normal rate.     Abdominal: Soft.     Psych/Behavior: He is alert. He is oriented to person, place, and time. He has a normal mood and affect.     Neurological:        Cranial nerves: Cranial nerve(s) II, III, IV, V, VI, VII, VIII, IX, X, XI and XII are intact.   E4V5M6  AOx3  FCx4  BLE: HF 4/5, KE 2/5, KF 4/5, PF/DF 5/5  BUE 5/5       Significant Labs:    Recent Labs  Lab 11/19/17 0359 11/20/17  0643   * 164*    135*   K 3.4* 3.6    100   CO2 23 23   BUN 34* 46*   CREATININE 3.7* 4.8*   CALCIUM 9.7 9.7       Recent Labs  Lab 11/19/17  0359 11/20/17  0643   WBC 13.00* 8.96   HGB 8.6* 9.2*   HCT 27.4* 29.8*    301       Recent Labs  Lab 11/19/17  0359 11/20/17  0643   INR 1.9* 1.4*     Microbiology Results (last 7 days)     Procedure Component Value Units Date/Time    Aerobic culture [[id number]] Collected:  11/16/17 2218    Order Status:  Completed Specimen:  Wound from Back Updated:  11/20/17 0904     Aerobic Bacterial Culture No growth    Narrative:       2.deep lumbar spine    Aerobic culture [[id number]] Collected:  11/16/17 2218    Order Status:  Completed Specimen:  Wound from Back Updated:  11/20/17 0904     Aerobic Bacterial Culture No growth    Narrative:       1.superficial lumbar spine    AFB Culture & Smear [[id number]] Collected:  11/16/17 2218    Order Status:  Completed Specimen:  Wound from Back Updated:  11/18/17 0927     AFB Culture & Smear Culture in progress     AFB CULTURE STAIN No acid fast bacilli seen.    Narrative:       2.deep lumbar spine    AFB  Culture & Smear [[id number]] Collected:  11/16/17 2218    Order Status:  Completed Specimen:  Wound from Back Updated:  11/18/17 0927     AFB Culture & Smear Culture in progress     AFB CULTURE STAIN No acid fast bacilli seen.    Narrative:       1.superficial lumbar spine    Fungus culture [[id number]] Collected:  11/16/17 2218    Order Status:  Completed Specimen:  Wound from Back Updated:  11/17/17 0742     Fungus (Mycology) Culture Culture in progress    Narrative:       1.superficial lumbar spine    Fungus culture [[id number]] Collected:  11/16/17 2218    Order Status:  Completed Specimen:  Wound from Back Updated:  11/17/17 0742     Fungus (Mycology) Culture Culture in progress    Narrative:       2.deep lumbar spine    Gram stain [[id number]] Collected:  11/16/17 2218    Order Status:  Completed Specimen:  Wound from Back Updated:  11/17/17 0039     Gram Stain Result Rare WBC's      No organisms seen    Narrative:       2.deep lumbar spine    Gram stain [[id number]] Collected:  11/16/17 2218    Order Status:  Completed Specimen:  Wound from Back Updated:  11/16/17 2357     Gram Stain Result No WBC's      No organisms seen    Narrative:       1.superficial lumbar spine    Culture, Anaerobic [[id number]] Collected:  11/16/17 2218    Order Status:  Sent Specimen:  Wound from Back Updated:  11/16/17 2234    Culture, Anaerobic [[id number]] Collected:  11/16/17 2218    Order Status:  Sent Specimen:  Wound from Back Updated:  11/16/17 2232    Gram stain [[id number]] Collected:  11/15/17 0126    Order Status:  Completed Specimen:  Urine Updated:  11/16/17 0643     Gram Stain Result Moderate WBC's      Rare yeast    Narrative:       Preferred Collection Type->Urine, Clean Catch    Urine culture [[id number]] Collected:  11/15/17 0126    Order Status:  Completed Specimen:  Urine Updated:  11/16/17 0532     Urine Culture, Routine Multiple organisms isolated. None in predominance.  Repeat if     Urine Culture, Routine clinically  necessary.    Narrative:       Preferred Collection Type->Urine, Clean Catch    Gram stain [[id number]]     Order Status:  Completed Specimen:  Urine from Urine     Gram stain [[id number]]     Order Status:  Canceled Specimen:  Urine from Urine         Recent Lab Results       11/20/17  0643 11/19/17  2153 11/19/17  1644 11/19/17  1208      Immature Granulocytes 0.8(H)        Immature Grans (Abs) 0.07(H)        Albumin 2.5(L)        Anion Gap 12        Baso # 0.03        Basophil% 0.3        BUN, Bld 46(H)        Calcium 9.7        Chloride 100        CO2 23        Creatinine 4.8(H)        Differential Method Automated        eGFR if  11.9(A)        eGFR if non  10.3  Comment:  Calculation used to obtain the estimated glomerular filtration  rate (eGFR) is the CKD-EPI equation.   (A)        Eos # 0.2        Eosinophil% 2.5        Glucose 164(H)        Gran # 7.3        Gran% 81.1(H)        Hematocrit 29.8(L)        Hemoglobin 9.2(L)        Coumadin Monitoring INR 1.4  Comment:  Coumadin Therapy:  2.0 - 3.0 for INR for all indicators except mechanical heart valves  and antiphospholipid syndromes which should use 2.5 - 3.5.  (H)        Lymph # 0.8(L)        Lymph% 9.3(L)        MCH 30.2        MCHC 30.9(L)        MCV 98        Mono # 0.5        Mono% 6.0        MPV 10.5        nRBC 0        Phosphorus 2.9        Platelets 301        POCT Glucose  185(H) 225(H) 200(H)     Potassium 3.6        Protime 14.2(H)        RBC 3.05(L)        RDW 18.1(H)        Sodium 135(L)        WBC 8.96            All pertinent labs from the last 24 hours have been reviewed.    Significant Diagnostics:  CT: No results found in the last 24 hours.  MRI: No results found in the last 24 hours.  I have reviewed all pertinent imaging results/findings within the past 24 hours.    Assessment/Plan:     * Acute bilateral low back pain, post-lumbar spinal fusion    85 y.o. male s/p L2-4 fusion 10/20 for lumbar stenosis who  presents with acute worsening of his LBP and LE weakness. s/p washout and hematoma evacuation    -Patient neuro stable post-op  -Drains to be removed  -Continue to hold ASA and coumadin  -HD per nephrology.   -Q4h neuro checks  -PT/OT/OOB. Therapy recommending SNF   -Pain control per primary team  -Recommend delirium precautions   -TEDs/SCDs/SQH for DVT prophylaxis   -Coordination of care and medical management per primary team   -Please call NSGY with any change in neuro exam               Shirlene Selby MD  Neurosurgery  [facility]

## 2017-11-20 NOTE — ASSESSMENT & PLAN NOTE
HD MWF 3.5 hrs duration via LFA AVF at [facility]. Unsure EDW or residual function.   -HD treatment today for metabolic clearance/volume management.  BPs labile, UF goal of 1L as tolerated.  Albumin/midodrine as needed for BP support.    Anemia of CKD and acute blood loss  H/H low/stable.  ZHEN with HD      BMD  -Phos WNL.  No need for binders at this time.

## 2017-11-20 NOTE — PT/OT/SLP PROGRESS
Physical Therapy  Treatment    Donta Cline   MRN: [id number]   Admitting Diagnosis: Acute bilateral low back pain    PT Received On: 11/20/17  PT Start Time: 1120     PT Stop Time: 1138    PT Total Time (min): 18 min   + 5 mins = 23 min  Returned to assist patient back to bed with OT assist    Billable Minutes:  Therapeutic Activity 23    Treatment Type: Treatment  PT/PTA: PT     PTA Visit Number: 0       General Precautions: Standard, aspiration, fall, nectar thick  Orthopedic Precautions: N/A   Braces: LSO    Subjective:  Communicated with RN prior to session.  Patient agreeable to PT session. Reporting significant LBP at rest.    Pain/Comfort  Pain Rating 1: 10/10  Location - Orientation 1: lower  Location 1: back  Pain Addressed 1: Reposition, Distraction, Cessation of Activity  Pain Rating Post-Intervention 1: 10/10    Objective:   Patient found with: blood pressure cuff, telemetry    Functional Mobility:  Bed Mobility:   Rolling/Turning Right: Moderate assistance  Scooting/Bridging: Moderate Assistance (seated scoot)  Supine to Sit: Moderate Assistance (log roll)  Sit to Supine:  (not observed; patient left up in bedside chair)    Transfers:  Bed <> Chair Technique: Squat Pivot  Bed <> Chair Assistance: Total Assistance  Bed <> Chair Assistive Device: No Assistive Device    Gait:   Gait Distance: unable to perform    Balance:   Static Sit: FAIR-: Maintains without assist but inconsistent   Dynamic Sit: FAIR+: Maintains balance through MINIMAL excursions of active trunk motion  Static Stand: 0: Needs MAXIMAL assist to maintain   Dynamic stand: 0: N/A     Therapeutic Activities and Exercises:  Patient educated on:   - role of PT/POC   - safety with all functional mobility   - transfer training   - donning LSO    Brace donned dependently with blue pad placed between patient and brace. Drain site leaking RN notified.    Patient holding on to bed rail and then bracing self on wall decreasing safety and increasing  difficulty of Total Assist transfer     Returned 2hrs later to get patient back to bed; patient continued to impede forward progression with transfer by holding onto bed rail and wall. Total Assist of 2 for transfer with OT assist. Brace doffed dependently and returned to supine position with Total Assist of 2.    AM-PAC 6 CLICK MOBILITY  How much help from another person does this patient currently need?   1 = Unable, Total/Dependent Assistance  2 = A lot, Maximum/Moderate Assistance  3 = A little, Minimum/Contact Guard/Supervision  4 = None, Modified [place]/Independent    Turning over in bed (including adjusting bedclothes, sheets and blankets)?: 2  Sitting down on and standing up from a chair with arms (e.g., wheelchair, bedside commode, etc.): 2  Moving from lying on back to sitting on the side of the bed?: 2  Moving to and from a bed to a chair (including a wheelchair)?: 1  Need to walk in hospital room?: 1  Climbing 3-5 steps with a railing?: 1  Total Score: 9    AM-PAC Raw Score CMS G-Code Modifier Level of Impairment Assistance   6 % Total / Unable   7 - 9 CM 80 - 100% Maximal Assist   10 - 14 CL 60 - 80% Moderate Assist   15 - 19 CK 40 - 60% Moderate Assist   20 - 22 CJ 20 - 40% Minimal Assist   23 CI 1-20% SBA / CGA   24 CH 0% Independent/ Mod I     Patient left up in chair with all lines intact, call button in reach, RN notified and daughter present.    Assessment:  Donta Cline is a 85 y.o. male with a medical diagnosis of Acute bilateral low back pain REVISION-WOUND--lumbar washout (N/A) 4 Days Post-Op and presents with rehab identified impairments listed below. Mod Assist for bed mobility. Total Assist for transfer. Transferred with increased difficulty secondary to patient holding onto bed rail and then proceed to brace himself against wall limiting forward progress. Able to sit in bedside chair x 2 hrs before being returned to supine position with Total Assist fo 2. To benefit from  continued skilled intervention to address deficits. Safe to transfer with rehab only at this time.    Rehab identified problem list/impairments: Rehab identified problem list/impairments: impaired self care skills, weakness, impaired endurance, impaired functional mobilty, gait instability, impaired sensation, impaired balance, decreased lower extremity function, decreased coordination, decreased safety awareness, pain, decreased ROM, orthopedic precautions    Rehab potential is fair.    Activity tolerance: Poor    Discharge recommendations: Discharge Facility/Level Of Care Needs: nursing facility, skilled     Barriers to discharge: Barriers to Discharge: Decreased caregiver support    Equipment recommendations: Equipment Needed After Discharge:  (TBD at next level of care)     GOALS:    Physical Therapy Goals        Problem: Physical Therapy Goal    Goal Priority Disciplines Outcome Goal Variances Interventions   Physical Therapy Goal     PT/OT, PT Ongoing (interventions implemented as appropriate)     Description:  Goals to be met by: 2017     Patient will increase functional independence with mobility by performin. Supine to sit with MInimal Assistance  2. Sit to supine with MInimal Assistance  3. Sit to stand transfer with Moderate Assistance  4. Bed to chair transfer with Moderate Assistance  5. Gait  x 10 feet with Maximum Assistance using Rolling Walker.   6. Lower extremity exercise program x15 reps per handout, with assistance as needed                       PLAN:    Patient to be seen 3 x/week  to address the above listed problems via gait training, therapeutic activities, therapeutic exercises, neuromuscular re-education  Plan of Care expires: 12/15/17  Plan of Care reviewed with: patient, daughter         Edgar Wright LORRIE, PT  2017

## 2017-11-20 NOTE — PROGRESS NOTES
[facility]  Neurosurgery  Progress Note    Subjective:     History of Present Illness: Donta Cline is 85 y.o. male with PMH ESRD on HD MWF, CAD, NSTEMI, bladder/prostate cancer, DMII, aortic stenosis and recent L2-L4 fusion 10/20/17 who presents to [facility] with complaint of worsened back pain and functional decline since last Friday 11/3. There is no family in the room. The patient is mildy confused and thus a poor historian so most of the history obtained from the medical record. He was discharged to a rehab after the last admission and progressing well so transferred to a skilled nursing facility. He was able to walk 50ft on his own until last week when he had acute worsening of his low back pain and LE weakness. He has been unable to weight bear since that time. He also complains of abdominal pain and bilateral hip pain. He reports some BLE pain but is unable to characterize it. Denies BB dysfunction or saddle anesthesia. There is no record of fever or trauma. ESR/CRP are elevated. INR 3.8, on coumadin.     Post-Op Info:  Procedure(s) (LRB):  REVISION-WOUND--lumbar washout (N/A)   3 Days Post-Op     Interval History: NAEON.    Medications:  Continuous Infusions:   albumin human 25%      midodrine       Scheduled Meds:   sodium chloride 0.9%   Intravenous Once    atorvastatin  80 mg Oral Daily    bisacodyl  10 mg Rectal Daily    [START ON 11/20/2017] ceFAZolin (ANCEF) IVPB  2 g Intravenous Q48H    dextromethorphan-guaifenesin  mg/5 ml  10 mL Oral Q6H    fentaNYL  1 patch Transdermal Q72H    insulin detemir  5 Units Subcutaneous QHS    metoprolol tartrate  25 mg Oral TID    mupirocin  1 g Nasal BID    pantoprazole  40 mg Oral Nightly    polyethylene glycol  17 g Oral BID    senna-docusate 8.6-50 mg  1 tablet Oral BID    sertraline  100 mg Oral QHS     PRN Meds:sodium chloride, sodium chloride 0.9%, sodium chloride 0.9%, acetaminophen, albumin human 25%, albuterol sulfate,  aluminum-magnesium hydroxide-simethicone, benzonatate, dextrose 50%, dextrose 50%, glucagon (human recombinant), glucose, glucose, hydrocodone-acetaminophen 10-325mg, insulin aspart, midodrine, ondansetron, ondansetron, promethazine (PHENERGAN) IVPB, ramelteon, senna-docusate 8.6-50 mg, sodium chloride 0.9%     Review of Systems    Objective:     Weight: 97.5 kg (215 lb)  Body mass index is 39.32 kg/m².  Vital Signs (Most Recent):  Temp: 98 °F (36.7 °C) (11/19/17 2017)  Pulse: 92 (11/19/17 2324)  Resp: 16 (11/19/17 2017)  BP: 113/78 (11/19/17 2017)  SpO2: 95 % (11/19/17 2020) Vital Signs (24h Range):  Temp:  [97.2 °F (36.2 °C)-98 °F (36.7 °C)] 98 °F (36.7 °C)  Pulse:  [] 92  Resp:  [12-20] 16  SpO2:  [95 %-99 %] 95 %  BP: (103-153)/(58-78) 113/78       Date 11/19/17 0700 - 11/20/17 0659   Shift 7519-3773 8001-4070 7828-0505 24 Hour Total   I  N  T  A  K  E   P.O. 480 480  960    Shift Total  (mL/kg) 480  (4.9) 480  (4.9)  960  (9.8)   O  U  T  P  U  T   Urine  (mL/kg/hr) 50  (0.1) 50  (0.1)  100    Drains  70  70    Shift Total  (mL/kg) 50  (0.5) 120  (1.2)  170  (1.7)   Weight (kg) 97.5 97.5 97.5 97.5            Closed/Suction Drain 11/16/17 2320 Posterior Back  (Active)   Site Description Unable to view 11/17/2017  7:51 AM   Dressing Type [place] 11/17/2017  7:51 AM   Dressing Status Biopatch in place;Clean;Dry 11/17/2017  7:51 AM   Dressing Intervention Dressing reinforced 11/17/2017  7:51 AM   Drainage Bloody 11/17/2017  7:51 AM   Status To bulb suction 11/17/2017  7:51 AM   Output (mL) 100 mL 11/17/2017 10:38 AM            Closed/Suction Drain 11/16/17 2321 Posterior Back Other (Comment) (Active)   Site Description Unable to view 11/17/2017  7:51 AM   Dressing Type [place] 11/17/2017  7:51 AM   Dressing Status Biopatch in place;Clean;Dry 11/17/2017  7:51 AM   Dressing Intervention Dressing reinforced 11/17/2017  7:51 AM   Drainage Bloody 11/17/2017  7:51 AM   Status To bulb suction 11/17/2017   7:51 AM   Output (mL) 25 mL 11/17/2017 10:38 AM            Hemodialysis AV Fistula Right forearm (Active)   Needle Size 15ga 11/17/2017  2:10 PM   Site Assessment Clean;Dry;Intact;No redness;No swelling 11/17/2017  2:10 PM   Patency Present;Thrill;Bruit 11/17/2017  2:10 PM   Status Accessed 11/17/2017  2:10 PM   Flows Good 11/17/2017  2:10 PM   Dressing Intervention New dressing 11/17/2017  2:10 PM   Dressing Status Clean;Dry;Intact 11/17/2017  2:10 PM   Site Condition No complications 11/17/2017  2:10 PM   Dressing Open to air (None) 11/17/2017  1:15 AM       Neurosurgery Physical Exam    General: well developed, well nourished, no distress.   Head: normocephalic, atraumatic  Neurologic: Pt confused. Alert and oriented to person and place only. Thought content appropriate. Pt resistant to the exam  GCS: Motor: 6/Verbal: 4/Eyes: 4 GCS Total: 14  Mental Status: Awake, Alert, Oriented x2  Cranial nerves: face symmetric, tongue midline, CN II-XII grossly intact.   Eyes: pupils equal, round, reactive to light with accomodation, EOMI.   Sensory: intact to light touch throughout  Motor Strength: Moves all extremities spontaneously with good tone. No abnormal movements seen. LE exam effort and pain limited     Strength   Deltoids Triceps Biceps Wrist Extension Wrist Flexion Hand    Upper: R 5/5 5/5 5/5 5/5 5/5 5/5     L 5/5 5/5 5/5 5/5 5/5 5/5       Iliopsoas Quadriceps Knee  Flexion Tibialis  anterior Gastro- cnemius EHL   Lower: R 3/5 1/5 5/5 5/5 5/5 5/5     L 3/5 1/5 5/5 5/5 5/5 5/5      Bandage clean dry and intact, 2 HV drains in place    Significant Labs:    Recent Labs  Lab 11/18/17 0513 11/19/17  0359   * 115*    140   K 3.6 3.4*    105   CO2 20* 23   BUN 21 34*   CREATININE 2.8* 3.7*   CALCIUM 9.3 9.7       Recent Labs  Lab 11/18/17 0513 11/19/17  0359   WBC 11.96 13.00*   HGB 7.0* 8.6*   HCT 23.3* 27.4*    258       Recent Labs  Lab 11/18/17  0513 11/19/17  0359   INR 2.0* 1.9*      Microbiology Results (last 7 days)     Procedure Component Value Units Date/Time    AFB Culture & Smear [[id number]] Collected:  11/16/17 2218    Order Status:  Completed Specimen:  Wound from Back Updated:  11/18/17 0927     AFB Culture & Smear Culture in progress     AFB CULTURE STAIN No acid fast bacilli seen.    Narrative:       2.deep lumbar spine    AFB Culture & Smear [[id number]] Collected:  11/16/17 2218    Order Status:  Completed Specimen:  Wound from Back Updated:  11/18/17 0927     AFB Culture & Smear Culture in progress     AFB CULTURE STAIN No acid fast bacilli seen.    Narrative:       1.superficial lumbar spine    Aerobic culture [[id number]] Collected:  11/16/17 2218    Order Status:  Completed Specimen:  Wound from Back Updated:  11/18/17 0711     Aerobic Bacterial Culture No growth    Narrative:       1.superficial lumbar spine    Aerobic culture [[id number]] Collected:  11/16/17 2218    Order Status:  Completed Specimen:  Wound from Back Updated:  11/18/17 0711     Aerobic Bacterial Culture No growth    Narrative:       2.deep lumbar spine    Fungus culture [[id number]] Collected:  11/16/17 2218    Order Status:  Completed Specimen:  Wound from Back Updated:  11/17/17 0742     Fungus (Mycology) Culture Culture in progress    Narrative:       1.superficial lumbar spine    Fungus culture [[id number]] Collected:  11/16/17 2218    Order Status:  Completed Specimen:  Wound from Back Updated:  11/17/17 0742     Fungus (Mycology) Culture Culture in progress    Narrative:       2.deep lumbar spine    Gram stain [[id number]] Collected:  11/16/17 2218    Order Status:  Completed Specimen:  Wound from Back Updated:  11/17/17 0039     Gram Stain Result Rare WBC's      No organisms seen    Narrative:       2.deep lumbar spine    Gram stain [[id number]] Collected:  11/16/17 2218    Order Status:  Completed Specimen:  Wound from Back Updated:  11/16/17 2357     Gram Stain Result No WBC's      No organisms seen     Narrative:       1.superficial lumbar spine    Culture, Anaerobic [[id number]] Collected:  11/16/17 2218    Order Status:  Sent Specimen:  Wound from Back Updated:  11/16/17 2234    Culture, Anaerobic [[id number]] Collected:  11/16/17 2218    Order Status:  Sent Specimen:  Wound from Back Updated:  11/16/17 2232    Gram stain [[id number]] Collected:  11/15/17 0126    Order Status:  Completed Specimen:  Urine Updated:  11/16/17 0643     Gram Stain Result Moderate WBC's      Rare yeast    Narrative:       Preferred Collection Type->Urine, Clean Catch    Urine culture [[id number]] Collected:  11/15/17 0126    Order Status:  Completed Specimen:  Urine Updated:  11/16/17 0532     Urine Culture, Routine Multiple organisms isolated. None in predominance.  Repeat if     Urine Culture, Routine clinically necessary.    Narrative:       Preferred Collection Type->Urine, Clean Catch    Gram stain [[id number]]     Order Status:  Completed Specimen:  Urine from Urine     Gram stain [[id number]]     Order Status:  Canceled Specimen:  Urine from Urine         All pertinent labs from the last 24 hours have been reviewed.    Significant Diagnostics:  None new     Assessment/Plan:     * Acute bilateral low back pain, post-lumbar spinal fusion    85 y.o. male s/p L2-4 fusion 10/20 for lumbar stenosis who presents with acute worsening of his LBP and LE weakness. POD3 s/p washout and hematoma evacuation    -Patient neuro stable post-op  -Drains to remain in place, ABx while in.  -Continue to hold ASA and coumadin  -HD per nephrology.   -Q4h neuro checks  -PT/OT/OOB. Therapy recommending SNF   -Pain control per primary team  -Recommend delirium precautions   -TEDs/SCDs/SQH for DVT prophylaxis   -Coordination of care and medical management per primary team   -Please call NSGY with any change in neuro exam               David Lira MD  Neurosurgery  [facility]

## 2017-11-20 NOTE — PLAN OF CARE
Problem: SLP Goal  Goal: SLP Goal  Speech Language Pathology Goals  Goals expected to be met by 11/27/17  1. Pt will tolerate dental soft diet without overt S/S aspiration, Supervision  2. Pt will tolerate nectar-thickened liquids without overt S/S aspiration, Supervision  3. Pt will complete dysphagia exercises x10 ea. , MIN A, to improve BOT and laryngeal elevation  4. Educate pt and family on S/S aspiration and aspiration precautions     Goals expected to be met by 11/24/17  1. Pt will tolerate puree diet without overt S/S aspiration, Supervision- met   2. Pt will tolerate nectar-thickened liquids without overt S/S aspiration, Supervision -ongoing  3. Pt will complete dysphagia exercises x10 ea. , MIN A, to improve BOT and laryngeal elevation -ongoing  4. Educate pt and family on S/S aspiration and aspiration precautions -ongoing          Outcome: Ongoing (interventions implemented as appropriate)  Pt seen for ongoing tx for swallowing function today. Pt reported strong dislike of puree texture foods. Dental soft trials tolerated by pt, and diet upgrade recommended. Pt observed to take large sips from cup, and required supervision and cuing from clinician to control pacing. POC updated.       Nimisha Robles M.A. CCC-SLP  Speech Language Pathologist  [phone]  11/20/2017

## 2017-11-20 NOTE — SUBJECTIVE & OBJECTIVE
Interval History:   Seen on HD this afternoon, tolerating well.  Dialysate adjusted to current labs.    Review of patient's allergies indicates:   Allergen Reactions    Morphine Other (See Comments)     Other reaction(s): severe abdominal pain    Darvocet a500  [propoxyphene n-acetaminophen]      Other reaction(s): Unknown     Current Facility-Administered Medications   Medication Frequency    0.9%  NaCl infusion (for blood administration) Q24H PRN    0.9%  NaCl infusion PRN    0.9%  NaCl infusion Once    0.9%  NaCl infusion PRN    0.9%  NaCl infusion Once    acetaminophen tablet 650 mg Q8H PRN    albumin human 25% bottle 25 g Continuous PRN    albuterol nebulizer solution 2.5 mg Q4H PRN    aluminum-magnesium hydroxide-simethicone 200-200-20 mg/5 mL suspension 30 mL Q4H PRN    atorvastatin tablet 80 mg Daily    benzonatate capsule 100 mg TID PRN    bisacodyl suppository 10 mg Daily    dextromethorphan-guaifenesin  mg/5 ml liquid 10 mL Q6H    dextrose 50% injection 12.5 g PRN    dextrose 50% injection 25 g PRN    fentaNYL 12 mcg/hr 1 patch Q72H    glucagon (human recombinant) injection 1 mg PRN    glucose chewable tablet 16 g PRN    glucose chewable tablet 24 g PRN    hydrocodone-acetaminophen 10-325mg per tablet 1 tablet Q6H PRN    insulin aspart pen 0-5 Units QID (AC + HS) PRN    insulin detemir pen 8 Units QHS    metoprolol tartrate (LOPRESSOR) tablet 25 mg TID    midodrine tablet 10 mg Continuous PRN    mupirocin 2 % ointment 1 g BID    ondansetron disintegrating tablet 8 mg Q8H PRN    ondansetron disintegrating tablet 8 mg Q6H PRN    pantoprazole EC tablet 40 mg Nightly    polyethylene glycol packet 17 g BID    promethazine (PHENERGAN) 6.25 mg in dextrose 5 % 50 mL IVPB Q6H PRN    ramelteon tablet 8 mg Nightly PRN    senna-docusate 8.6-50 mg per tablet 1 tablet BID    senna-docusate 8.6-50 mg per tablet 2 tablet Nightly PRN    sertraline tablet 100 mg QHS    sodium  chloride 0.9% flush 3 mL PRN       Objective:     Vital Signs (Most Recent):  Temp: 98.4 °F (36.9 °C) (11/20/17 1533)  Pulse: (!) 128 (11/20/17 1533)  Resp: 18 (11/20/17 1533)  BP: (!) 91/51 (11/20/17 1533)  SpO2: (!) 94 % (11/20/17 1533)  O2 Device (Oxygen Therapy): room air (11/20/17 1533) Vital Signs (24h Range):  Temp:  [97.2 °F (36.2 °C)-98.4 °F (36.9 °C)] 98.4 °F (36.9 °C)  Pulse:  [] 128  Resp:  [16-18] 18  SpO2:  [94 %-100 %] 94 %  BP: ()/(50-78) 91/51     Weight: 97.5 kg (215 lb) (11/15/17 2030)  Body mass index is 39.32 kg/m².  Body surface area is 2.07 meters squared.    I/O last 3 completed shifts:  In: 1410 [P.O.:1410]  Out: 245 [Urine:100; Drains:145]    Physical Exam   Constitutional: He appears well-developed and well-nourished.   Eyes: EOM are normal.   Neck: Neck supple.   Cardiovascular: An irregularly irregular rhythm present.   Murmur heard.  Pulmonary/Chest: Effort normal. No respiratory distress. He has no wheezes. He has rales.   Abdominal: Soft. Bowel sounds are normal. There is no tenderness.   Neurological: He is alert.   Oriented to self and place not time or situation.    Skin: Skin is warm and dry.   LFA AVF bruit and thrill       Significant Labs:  CBC:   Recent Labs  Lab 11/20/17  0643   WBC 8.96   RBC 3.05*   HGB 9.2*   HCT 29.8*      MCV 98   MCH 30.2   MCHC 30.9*     CMP:   Recent Labs  Lab 11/14/17  1534  11/20/17  0643   *  < > 164*   CALCIUM 9.6  < > 9.7   ALBUMIN 2.4*  < > 2.5*   PROT 6.5  --   --      < > 135*   K 4.0  < > 3.6   CO2 31*  < > 23   CL 96  < > 100   BUN 31*  < > 46*   CREATININE 4.0*  < > 4.8*   ALKPHOS 193*  --   --    ALT 33  --   --    AST 70*  --   --    BILITOT 0.6  --   --    < > = values in this interval not displayed.

## 2017-11-20 NOTE — ASSESSMENT & PLAN NOTE
85 y.o. male s/p L2-4 fusion 10/20 for lumbar stenosis who presents with acute worsening of his LBP and LE weakness. s/p washout and hematoma evacuation    -Patient neuro stable post-op  -Drains to be removed  -Continue to hold ASA and coumadin  -HD per nephrology.   -Q4h neuro checks  -PT/OT/OOB. Therapy recommending SNF   -Pain control per primary team  -Recommend delirium precautions   -TEDs/SCDs/SQH for DVT prophylaxis   -Coordination of care and medical management per primary team   -Please call NSGY with any change in neuro exam

## 2017-11-21 NOTE — PLAN OF CARE
Problem: Patient Care Overview  Goal: Plan of Care Review  Outcome: Ongoing (interventions implemented as appropriate)  Patient AAOx2, orientation varying during shift. Mostly oriented to self and place, disoriented to time and situation. VSS, no acute distress during shift. Thickener used for all liquids. Tele monitoring in place. Frequent reorienting provided. PRN pain medication given for back pain. Bed low and locked, call light and possessions within reach, remains free of falls, will continue to monitor.

## 2017-11-21 NOTE — PROGRESS NOTES
[facility]  Neurosurgery  Progress Note      Attempted to see patient several times, who was off the floor for dialysis. Will follow up with patient tomorrow.    - May restart aspirin, coumadin two weeks postoperatively.     Discussed with Dr. Sands.       Sulma Weller PA-C  Neurosurgery  Pager: [phone]

## 2017-11-21 NOTE — SUBJECTIVE & OBJECTIVE
Interval History:   HD treatment ended early yesterday 2/2 RVR which resolved with NS bolus.  Per nursing staff, did not receive midodrine or albumin with treatment.  Brought back to dialysis this morning for further treatment and tolerating well w/o UF today.      Review of patient's allergies indicates:   Allergen Reactions    Morphine Other (See Comments)     Other reaction(s): severe abdominal pain    Darvocet a500  [propoxyphene n-acetaminophen]      Other reaction(s): Unknown     Current Facility-Administered Medications   Medication Frequency    0.9%  NaCl infusion (for blood administration) Q24H PRN    0.9%  NaCl infusion (for blood administration) Q24H PRN    0.9%  NaCl infusion PRN    0.9%  NaCl infusion Once    0.9%  NaCl infusion PRN    0.9%  NaCl infusion Once    acetaminophen tablet 650 mg Q8H PRN    albumin human 25% bottle 25 g Continuous PRN    albuterol nebulizer solution 2.5 mg Q4H PRN    aluminum-magnesium hydroxide-simethicone 200-200-20 mg/5 mL suspension 30 mL Q4H PRN    atorvastatin tablet 80 mg Daily    benzonatate capsule 100 mg TID PRN    bisacodyl suppository 10 mg Daily    dextromethorphan-guaifenesin  mg/5 ml liquid 10 mL Q6H    dextrose 50% injection 12.5 g PRN    dextrose 50% injection 25 g PRN    epoetin preeti (PROCRIT) injection 5,000 units kit Every Mon, Wed, Fri    fentaNYL 12 mcg/hr 1 patch Q72H    glucagon (human recombinant) injection 1 mg PRN    glucose chewable tablet 16 g PRN    glucose chewable tablet 24 g PRN    hydrocodone-acetaminophen 10-325mg per tablet 1 tablet Q6H PRN    influenza (FLUZONE HIGH-DOSE) vaccine 0.5 mL vaccine x 1 dose    insulin aspart pen 0-5 Units QID (AC + HS) PRN    insulin detemir pen 8 Units QHS    metoprolol tartrate (LOPRESSOR) tablet 25 mg TID    midodrine tablet 10 mg Continuous PRN    midodrine tablet 10 mg TID    mupirocin 2 % ointment 1 g BID    ondansetron disintegrating tablet 8 mg Q8H PRN     ondansetron disintegrating tablet 8 mg Q6H PRN    pantoprazole EC tablet 40 mg Nightly    pneumoc 13-osiel conj-dip cr(PF) 0.5 mL vaccine x 1 dose    polyethylene glycol packet 17 g BID    promethazine (PHENERGAN) 6.25 mg in dextrose 5 % 50 mL IVPB Q6H PRN    ramelteon tablet 8 mg Nightly PRN    senna-docusate 8.6-50 mg per tablet 1 tablet BID    senna-docusate 8.6-50 mg per tablet 2 tablet Nightly PRN    sertraline tablet 100 mg QHS    sodium chloride 0.9% bolus 250 mL Once    sodium chloride 0.9% flush 3 mL PRN       Objective:     Vital Signs (Most Recent):  Temp: 97.9 °F (36.6 °C) (11/21/17 0731)  Pulse: (!) 118 (11/21/17 1100)  Resp: 20 (11/21/17 1030)  BP: 129/70 (11/21/17 1045)  SpO2: 96 % (11/21/17 0731)  O2 Device (Oxygen Therapy): room air (11/21/17 1015) Vital Signs (24h Range):  Temp:  [97.2 °F (36.2 °C)-98.4 °F (36.9 °C)] 97.9 °F (36.6 °C)  Pulse:  [] 118  Resp:  [14-20] 20  SpO2:  [93 %-96 %] 96 %  BP: ()/(38-70) 129/70     Weight: 97.5 kg (215 lb) (11/15/17 2030)  Body mass index is 39.32 kg/m².  Body surface area is 2.07 meters squared.    I/O last 3 completed shifts:  In: 1110 [P.O.:260; Other:850]  Out: 910 [Drains:45; Other:865]    Physical Exam   Constitutional: He appears well-developed and well-nourished.   Eyes: EOM are normal.   Neck: Neck supple.   Cardiovascular: An irregularly irregular rhythm present.   Murmur heard.  Pulmonary/Chest: Effort normal. No respiratory distress. He has no wheezes. He has rales.   Abdominal: Soft. Bowel sounds are normal. There is no tenderness.   Neurological: He is alert.   Oriented to self and place not time or situation.    Skin: Skin is warm and dry.   LFA AVF bruit and thrill       Significant Labs:  CBC:   Recent Labs  Lab 11/21/17  0545   WBC 9.36   RBC 2.92*   HGB 9.0*   HCT 28.6*      MCV 98   MCH 30.8   MCHC 31.5*     CMP:   Recent Labs  Lab 11/20/17  1930 11/21/17  0545   * 168*   CALCIUM 9.3 9.7   ALBUMIN 2.5* 2.5*    PROT 6.1  --    * 137   K 3.4* 4.2   CO2 21* 23    102   BUN 33* 39*   CREATININE 3.6* 4.2*   ALKPHOS 193*  --    ALT <5*  --    AST 52*  --    BILITOT 0.4  --

## 2017-11-21 NOTE — PT/OT/SLP PROGRESS
"Occupational Therapy  Treatment    Donta Cline   MRN: [id number]   Admitting Diagnosis: Acute bilateral low back pain    OT Date of Treatment: 11/21/17   OT Start Time: 1344  OT Stop Time: 1412  OT Total Time (min): 28 min    Billable Minutes:  Self Care/Home Management 28 minutes    General Precautions: Standard, aspiration, fall  Orthopedic Precautions:  spinal  Braces: TLSO    Subjective:  Communicated with RN prior to session.  "I can't do it babe."  Pain/Comfort  Pain Rating 1: 10/10  Location - Side 1: Bilateral  Location - Orientation 1: lower  Location 1: back  Pain Addressed 1: Distraction, Cessation of Activity  Pain Rating Post-Intervention 1: 10/10  Pain Rating 2:  (did not rate)  Location - Side 2: Left  Location 2:  (knee)  Pain Addressed 2: Reposition    Objective:  Patient found with: telemetry, pt found supine with HOB slightly elevated and agreeable to therapy.     Functional Mobility:  Bed Mobility:  Rolling/Turning to Left: Maximum assistance  Rolling/Turning Right: Maximum assistance  Scooting/Bridging: Total Assistance, With assist of 2    Transfers:    Too much pain to attempt    Functional Ambulation: did not occur    Activities of Daily Living:    Feeding: Set-up Assistance, Stand by assistance (A to thicken liquids or cut food, but pt able to eat finger-foods or use 1 utensil)    Grooming Position:  (upright via bed controls, supported position)  Grooming Level of Assistance: Maximum assistance (to shave; no mirror present, use of safety razor, pt able to move face to reposition for use of razor, but unable to perform with hands)    Balance:   Not assessed today    Therapeutic Activities and Exercises:  Pt ed re OT role and POC. Pt able to feed self finger-food today with setup SBA. Pt able to move head and facial muscles for Max A shaving. Pt requiring Max/Total A for bed mobility.    AM-PAC 6 CLICK ADL   How much help from another person does this patient currently need?   1 = Unable, " "Total/Dependent Assistance  2 = A lot, Maximum/Moderate Assistance  3 = A little, Minimum/Contact Guard/Supervision  4 = None, Modified [place]/Independent    Putting on and taking off regular lower body clothing? : 1  Bathing (including washing, rinsing, drying)?: 1  Toileting, which includes using toilet, bedpan, or urinal? : 1  Putting on and taking off regular upper body clothing?: 1  Taking care of personal grooming such as brushing teeth?: 3  Eating meals?: 3  Total Score: 10     AM-PAC Raw Score CMS "G-Code Modifier Level of Impairment Assistance   6 % Total / Unable   7 - 8 CM 80 - 100% Maximal Assist   9-13 CL 60 - 80% Moderate Assist   14 - 19 CK 40 - 60% Moderate Assist   20 - 22 CJ 20 - 40% Minimal Assist   23 CI 1-20% SBA / CGA   24 CH 0% Independent/ Mod I       Patient left HOB elevated with all lines intact, call button in reach and daughter present    ASSESSMENT:  Donta Cline is a 85 y.o. male with a medical diagnosis of Acute bilateral low back pain and presents with the impairments listed below. Pt is pleasant and participates well, but is limited by significant pain in his back that also radiates into LLE. Pt does not tolerate repositioning well, but is able to push through the pain as the pain is generally relieved by repositioning into less hip flexion and ER. Pt requiring increased assistance for self care tasks due to pain, but is still able to move UEs in a fxnl ROM and has good UE strength. Pt would benefit from cont OT to improve self care skills and bed mobility, as well as to improve overall strength and endurance for transfers.    Rehab identified problem list/impairments: Rehab identified problem list/impairments: weakness, impaired endurance, impaired self care skills, impaired functional mobilty, gait instability, impaired balance, decreased lower extremity function, pain, orthopedic precautions, decreased ROM    Rehab potential is good.    Activity tolerance: " Fair    Discharge recommendations: Discharge Facility/Level Of Care Needs: nursing facility, skilled     Barriers to discharge: Barriers to Discharge: Decreased caregiver support    Equipment recommendations:  (TBD)     GOALS:    Occupational Therapy Goals        Problem: Occupational Therapy Goal    Goal Priority Disciplines Outcome Interventions   Occupational Therapy Goal     OT, PT/OT Ongoing (interventions implemented as appropriate)    Description:  Goals to be met by: 11/22     Patient will increase functional independence with ADLs by performing:    Bed mobility with demo understanding for spinal precautions with min(A).  Stand Pivot transfer to multiple surfaces (chair, wheelchair, bedside commode) with Moderate Assistance.   Donning LSO with Minimal Assistance while seated EOB.  Donning socks with sock aid with minimal assistance.   Functional dynamic sitting task ~8 min duration while seated EOB with CGA for postural control.   UE endurance HEP with set up and assistance as needed.                       Plan:  Patient to be seen 4 x/week to address the above listed problems via self-care/home management, therapeutic activities, therapeutic exercises, neuromuscular re-education  Plan of Care expires: 12/14/17  Plan of Care reviewed with: patient, daughter    Franki ArcosPIYUSH  11/21/2017  Pager: [phone]

## 2017-11-21 NOTE — PROGRESS NOTES
[facility]  Nephrology  Progress Note    Patient Name: Donta Cline  MRN: [id number]  Admission Date: 11/14/2017  Hospital Length of Stay: 6 days  Attending Provider: Abby Pisano MD   Primary Care Physician: ZAID Richardson Iii, MD  Principal Problem:Acute bilateral low back pain    Subjective:     HPI: 84 yo male with significant history for hypertension, hyperlipidemia, remote smoker, PAF, Severe AS, DMT2, CAD sp NSTEMI, dcognitive impairment, recent sp lumbar fusion/laminectomy 10/20/17, and ESRD x 2 years who is admitted for lower back pain associated with BLE pain/spasm since discharge to [facility] on 11/10 from inpatient rehab at [facility]. Patient confuse.Daughter Wendy at bedside reports patient had acute decline on 11/11 as patient went from walking 50 to 100 ft and minimal assist with ADL's to not able to hold self up or walk. MRI lumbar showed fluid collection at L1. Neurosurgery consulted. Nephrology consult for hemodialysis. HD MWF 3.5 hrs duration via LFA AVF at [facility]. Some residual but not sure how much. Unclear of EDW. Last HD 11/13. CXR bilateral basilar atelectasis otherwise clear.       Interval History:   HD treatment ended early yesterday 2/2 RVR which resolved with NS bolus.  Per nursing staff, did not receive midodrine or albumin with treatment.  Brought back to dialysis this morning for further treatment and tolerating well w/o UF today.      Review of patient's allergies indicates:   Allergen Reactions    Morphine Other (See Comments)     Other reaction(s): severe abdominal pain    Darvocet a500  [propoxyphene n-acetaminophen]      Other reaction(s): Unknown     Current Facility-Administered Medications   Medication Frequency    0.9%  NaCl infusion (for blood administration) Q24H PRN    0.9%  NaCl infusion (for blood administration) Q24H PRN    0.9%  NaCl infusion PRN    0.9%  NaCl infusion Once    0.9%  NaCl infusion PRN     0.9%  NaCl infusion Once    acetaminophen tablet 650 mg Q8H PRN    albumin human 25% bottle 25 g Continuous PRN    albuterol nebulizer solution 2.5 mg Q4H PRN    aluminum-magnesium hydroxide-simethicone 200-200-20 mg/5 mL suspension 30 mL Q4H PRN    atorvastatin tablet 80 mg Daily    benzonatate capsule 100 mg TID PRN    bisacodyl suppository 10 mg Daily    dextromethorphan-guaifenesin  mg/5 ml liquid 10 mL Q6H    dextrose 50% injection 12.5 g PRN    dextrose 50% injection 25 g PRN    epoetin preeti (PROCRIT) injection 5,000 units kit Every Mon, Wed, Fri    fentaNYL 12 mcg/hr 1 patch Q72H    glucagon (human recombinant) injection 1 mg PRN    glucose chewable tablet 16 g PRN    glucose chewable tablet 24 g PRN    hydrocodone-acetaminophen 10-325mg per tablet 1 tablet Q6H PRN    influenza (FLUZONE HIGH-DOSE) vaccine 0.5 mL vaccine x 1 dose    insulin aspart pen 0-5 Units QID (AC + HS) PRN    insulin detemir pen 8 Units QHS    metoprolol tartrate (LOPRESSOR) tablet 25 mg TID    midodrine tablet 10 mg Continuous PRN    midodrine tablet 10 mg TID    mupirocin 2 % ointment 1 g BID    ondansetron disintegrating tablet 8 mg Q8H PRN    ondansetron disintegrating tablet 8 mg Q6H PRN    pantoprazole EC tablet 40 mg Nightly    pneumoc 13-osiel conj-dip cr(PF) 0.5 mL vaccine x 1 dose    polyethylene glycol packet 17 g BID    promethazine (PHENERGAN) 6.25 mg in dextrose 5 % 50 mL IVPB Q6H PRN    ramelteon tablet 8 mg Nightly PRN    senna-docusate 8.6-50 mg per tablet 1 tablet BID    senna-docusate 8.6-50 mg per tablet 2 tablet Nightly PRN    sertraline tablet 100 mg QHS    sodium chloride 0.9% bolus 250 mL Once    sodium chloride 0.9% flush 3 mL PRN       Objective:     Vital Signs (Most Recent):  Temp: 97.9 °F (36.6 °C) (11/21/17 0731)  Pulse: (!) 118 (11/21/17 1100)  Resp: 20 (11/21/17 1030)  BP: 129/70 (11/21/17 1045)  SpO2: 96 % (11/21/17 0731)  O2 Device (Oxygen Therapy): room air  (11/21/17 1015) Vital Signs (24h Range):  Temp:  [97.2 °F (36.2 °C)-98.4 °F (36.9 °C)] 97.9 °F (36.6 °C)  Pulse:  [] 118  Resp:  [14-20] 20  SpO2:  [93 %-96 %] 96 %  BP: ()/(38-70) 129/70     Weight: 97.5 kg (215 lb) (11/15/17 2030)  Body mass index is 39.32 kg/m².  Body surface area is 2.07 meters squared.    I/O last 3 completed shifts:  In: 1110 [P.O.:260; Other:850]  Out: 910 [Drains:45; Other:865]    Physical Exam   Constitutional: He appears well-developed and well-nourished.   Eyes: EOM are normal.   Neck: Neck supple.   Cardiovascular: An irregularly irregular rhythm present.   Murmur heard.  Pulmonary/Chest: Effort normal. No respiratory distress. He has no wheezes. He has rales.   Abdominal: Soft. Bowel sounds are normal. There is no tenderness.   Neurological: He is alert.   Oriented to self and place not time or situation.    Skin: Skin is warm and dry.   LFA AVF bruit and thrill       Significant Labs:  CBC:   Recent Labs  Lab 11/21/17  0545   WBC 9.36   RBC 2.92*   HGB 9.0*   HCT 28.6*      MCV 98   MCH 30.8   MCHC 31.5*     CMP:   Recent Labs  Lab 11/20/17  1930 11/21/17  0545   * 168*   CALCIUM 9.3 9.7   ALBUMIN 2.5* 2.5*   PROT 6.1  --    * 137   K 3.4* 4.2   CO2 21* 23    102   BUN 33* 39*   CREATININE 3.6* 4.2*   ALKPHOS 193*  --    ALT <5*  --    AST 52*  --    BILITOT 0.4  --             Assessment/Plan:     ESRD (end stage renal disease) on dialysis    HD MWF 3.5 hrs duration via LFA AVF at [facility]. Unsure EDW or residual function.   -HD treatment today for metabolic clearance/volume management.  BPs labile, UF goal of 1L as tolerated.  Albumin/midodrine as needed for BP support.    Anemia of CKD and acute blood loss  H/H low/stable.  ZHEN with HD      BMD  -Phos WNL.  No need for binders at this time.                [place] NP  Nephrology  [facility]  Pager:  [phone]

## 2017-11-21 NOTE — PLAN OF CARE
11/20/17 0942   Right Care Assessment   Can the patient answer the patient profile reliably? (per MD, pt not medically stable for discharge due to drains and output.)   How often would a person be available to care for the patient? Whenever needed   Describe the patient's ability to walk at the present time. Major restrictions/daily assistance from another person   How does the patient rate their overall health at the present time? Poor   Number of comorbid conditions (as recorded on the chart) Five or more   During the past month, has the patient often been bothered by feeling down, depressed or hopeless? No   Have you felt down, depressed, or hopeless? 0   During the past month, has the patient often been bothered by little interest or pleasure in doing things? No

## 2017-11-21 NOTE — PROGRESS NOTES
Patient arrived in 10th floor bed accompanied by patient escort.  AV Fistula to RFA was then prepped and cannulated with 15 gauge needles as per protocol.  Both lines aspirate and flush without resistance or infiltration.  Patient denies any pain.  Maintenance dialysis was then initiated.  Patient primed on with 25 G. Albumin.

## 2017-11-21 NOTE — PLAN OF CARE
Problem: Occupational Therapy Goal  Goal: Occupational Therapy Goal  Goals to be met by: 11/22     Patient will increase functional independence with ADLs by performing:    Bed mobility with demo understanding for spinal precautions with min(A).  Stand Pivot transfer to multiple surfaces (chair, wheelchair, bedside commode) with Moderate Assistance.   Donning LSO with Minimal Assistance while seated EOB.  Donning socks with sock aid with minimal assistance.   Functional dynamic sitting task ~8 min duration while seated EOB with CGA for postural control.   UE endurance HEP with set up and assistance as needed.      Outcome: Ongoing (interventions implemented as appropriate)  Goals remain appropriate. Cont POC.    PIYUSH Conway  11/21/2017  Pager: [phone]

## 2017-11-21 NOTE — ASSESSMENT & PLAN NOTE
- neurosurgery took pt for washout POD2 and evacuation of hematoma,  -Hgb dropping since admit - s/p 2unit pRBC, and extra session of UF   -drains removed, and d/c Cefazolin order - cultures remain NGTD from surgery.    - Supportive care, PT/OT  - continue fentanyl patches and norco prn - chronic management may be pain control, he still has severe pain that neurosurgery does not believe is explained by operative findings alone.

## 2017-11-21 NOTE — ASSESSMENT & PLAN NOTE
-in stepdown bed  -Still in Afib rhythm wise  -continue metoprolol, BP more stable in last 24hrs   -However not tolerating dialysis well provoking RVR, schedule Midodrine TID to see if this improves BP to tolerate HD.   Discuss extra UF sessions with Nephrology?  -given reversal of anticoagulation prior to going into afib, he is probably not a candidate for amiodarone therapy, if rate control is poor will consider consulting cardiology for further assisitance, potential pati/cardioversion if afib causing proonged clinical instability?  -

## 2017-11-21 NOTE — SUBJECTIVE & OBJECTIVE
Interval History:  As above      Review of Systems   Constitutional: Negative for chills, diaphoresis, fatigue and fever.   HENT: Negative for rhinorrhea, sinus pain, sinus pressure, sneezing and sore throat.    Eyes: Negative for visual disturbance.   Respiratory: Negative for cough, choking, chest tightness and shortness of breath.    Cardiovascular: Negative for chest pain, palpitations and leg swelling.   Gastrointestinal: Positive for constipation. Negative for diarrhea and nausea.        Denies bowel incontinence   Genitourinary: Negative for dysuria.   Musculoskeletal: Positive for back pain and gait problem. Negative for joint swelling, myalgias and neck pain.   Neurological: Negative for dizziness, facial asymmetry, light-headedness and headaches.   Psychiatric/Behavioral: Negative for agitation and behavioral problems.     Objective:     Vital Signs (Most Recent):  Temp: 97.5 °F (36.4 °C) (11/20/17 2010)  Pulse: 96 (11/20/17 2010)  Resp: 16 (11/20/17 2010)  BP: (!) 106/56 (11/20/17 2010)  SpO2: 96 % (11/20/17 2010) Vital Signs (24h Range):  Temp:  [97.2 °F (36.2 °C)-98.4 °F (36.9 °C)] 97.5 °F (36.4 °C)  Pulse:  [] 96  Resp:  [16-18] 16  SpO2:  [94 %-100 %] 96 %  BP: ()/(38-59) 106/56     Weight: 97.5 kg (215 lb)  Body mass index is 39.32 kg/m².    Intake/Output Summary (Last 24 hours) at 11/20/17 2118  Last data filed at 11/20/17 1730   Gross per 24 hour   Intake             1110 ml   Output              910 ml   Net              200 ml      Physical Exam   Constitutional: He appears well-developed.   Awake/alert   HENT:   Mouth/Throat: Oropharynx is clear and moist.   Eyes: Conjunctivae are normal. No scleral icterus.   Cardiovascular: Normal heart sounds and intact distal pulses.    No murmur heard.  Irregular rhythm, tachycardic   Pulmonary/Chest: Effort normal. No respiratory distress. He has no wheezes. He has rales.   Bibasilar crackles present   Abdominal: Soft. Bowel sounds are normal.    Mild distension, soft, tympanic to percussion, no fluid wave noted, no bulging flanks, or abdominal collateral vessels   Musculoskeletal:   RUE forearm AV fistula with palpable thrill.     Surgical drains removed, dressing with sanguinous drainage   Lymphadenopathy:     He has no cervical adenopathy.   Neurological:   Alert, oriented to self, hospital   Skin: Skin is warm. There is pallor.       Significant Labs:   CBC:     Recent Labs  Lab 11/19/17 0359 11/20/17 0643 11/20/17 1930 11/20/17  2030   WBC 13.00* 8.96 12.89*  --    HGB 8.6* 9.2* 5.1* 9.4*   HCT 27.4* 29.8* 16.2* 29.3*    301 373*  --      CMP:     Recent Labs  Lab 11/19/17 0359 11/20/17 0643 11/20/17 1930    135* 135*   K 3.4* 3.6 3.4*    100 103   CO2 23 23 21*   * 164* 165*   BUN 34* 46* 33*   CREATININE 3.7* 4.8* 3.6*   CALCIUM 9.7 9.7 9.3   PROT  --   --  6.1   ALBUMIN 2.5* 2.5* 2.5*   BILITOT  --   --  0.4   ALKPHOS  --   --  193*   AST  --   --  52*   ALT  --   --  <5*   ANIONGAP 12 12 11   EGFRNONAA 14.0* 10.3* 14.5*     Microbiology Results (last 7 days)     Procedure Component Value Units Date/Time    Culture, Anaerobic [[id number]] Collected:  11/16/17 2218    Order Status:  Completed Specimen:  Wound from Back Updated:  11/20/17 1405     Anaerobic Culture Culture in progress    Narrative:       2.deep lumbar spine    Culture, Anaerobic [[id number]] Collected:  11/16/17 2218    Order Status:  Completed Specimen:  Wound from Back Updated:  11/20/17 1405     Anaerobic Culture Culture in progress    Narrative:       1.superficial lumbar spine    Aerobic culture [[id number]] Collected:  11/16/17 2218    Order Status:  Completed Specimen:  Wound from Back Updated:  11/20/17 0904     Aerobic Bacterial Culture No growth    Narrative:       2.deep lumbar spine    Aerobic culture [[id number]] Collected:  11/16/17 2218    Order Status:  Completed Specimen:  Wound from Back Updated:  11/20/17 0904     Aerobic Bacterial  Culture No growth    Narrative:       1.superficial lumbar spine    AFB Culture & Smear [[id number]] Collected:  11/16/17 2218    Order Status:  Completed Specimen:  Wound from Back Updated:  11/18/17 0927     AFB Culture & Smear Culture in progress     AFB CULTURE STAIN No acid fast bacilli seen.    Narrative:       2.deep lumbar spine    AFB Culture & Smear [[id number]] Collected:  11/16/17 2218    Order Status:  Completed Specimen:  Wound from Back Updated:  11/18/17 0927     AFB Culture & Smear Culture in progress     AFB CULTURE STAIN No acid fast bacilli seen.    Narrative:       1.superficial lumbar spine    Fungus culture [[id number]] Collected:  11/16/17 2218    Order Status:  Completed Specimen:  Wound from Back Updated:  11/17/17 0742     Fungus (Mycology) Culture Culture in progress    Narrative:       1.superficial lumbar spine    Fungus culture [[id number]] Collected:  11/16/17 2218    Order Status:  Completed Specimen:  Wound from Back Updated:  11/17/17 0742     Fungus (Mycology) Culture Culture in progress    Narrative:       2.deep lumbar spine    Gram stain [[id number]] Collected:  11/16/17 2218    Order Status:  Completed Specimen:  Wound from Back Updated:  11/17/17 0039     Gram Stain Result Rare WBC's      No organisms seen    Narrative:       2.deep lumbar spine    Gram stain [[id number]] Collected:  11/16/17 2218    Order Status:  Completed Specimen:  Wound from Back Updated:  11/16/17 2357     Gram Stain Result No WBC's      No organisms seen    Narrative:       1.superficial lumbar spine    Gram stain [[id number]] Collected:  11/15/17 0126    Order Status:  Completed Specimen:  Urine Updated:  11/16/17 0643     Gram Stain Result Moderate WBC's      Rare yeast    Narrative:       Preferred Collection Type->Urine, Clean Catch    Urine culture [[id number]] Collected:  11/15/17 0126    Order Status:  Completed Specimen:  Urine Updated:  11/16/17 0532     Urine Culture, Routine Multiple organisms  isolated. None in predominance.  Repeat if     Urine Culture, Routine clinically necessary.    Narrative:       Preferred Collection Type->Urine, Clean Catch    Gram stain [[id number]]     Order Status:  Completed Specimen:  Urine from Urine     Gram stain [[id number]]     Order Status:  Canceled Specimen:  Urine from Urine             Significant Imaging: I have reviewed all pertinent imaging results/findings within the past 24 hours.

## 2017-11-21 NOTE — ASSESSMENT & PLAN NOTE
HD MWF 3.5 hrs duration via LFA AVF at [facility]. Unsure EDW or residual function.   -HD treatment today for metabolic clearance/volume management.  BPs labile, UF goal of 1L as tolerated.  Albumin/midodrine as needed for BP support.    Anemia of CKD and acute blood loss  H/H low/stable.  ZHEN with HD      BMD  -Phos WNL.  No need for binders at this time.

## 2017-11-21 NOTE — PT/OT/SLP PROGRESS
Speech Language Pathology  Dysphagia Treatment    Donta Cline   MRN: [id number]   1043/1043 A    Admitting Diagnosis: Acute bilateral low back pain    Diet recommendations: Solid Diet Level: Dental Soft  Liquid Diet Level: Nectar Thick Feed only when awake/alert, No straws, Small bites/sips, 1 bite/sip at a time, Check for pocketing/oral residue, Meds crushed in puree, Eliminate distractions, Assistance with meals and Assistance with thickening liquids, Encourage double swallows per bolus  · To achieve nectar thick liquid: 4 oz of any liquid to 1 pack of ThickenUp Clear or 6 oz of any liquid to 1 pack of ThickenUp  · No ice cream, jello, or ice.  · Avoid mixed consistencies (soup, cereal with milk, juicy fruits).       SLP Treatment Date: 11/21/17  Speech Start Time: 1344     Speech Stop Time: 1405     Speech Total (min): 21 min       TREATMENT BILLABLE MINUTES:  Treatment Swallowing Dysfunction 21    Has the patient been evaluated by SLP for swallowing? : Yes  Keep patient NPO?: No   General Precautions: Standard, fall, aspiration, nectar thick          Subjective:  Pt awake with family present in room shaving pt. OT entered room.          Objective:   Wet cough and vocal quality noted prior to and throughout po trials.   Upon entry, pt's family member was found shaving pt. Per family, they have not seen a dental soft tray yet to assess pt's intake. Pt had dialysis last PM and this AM, so pt was not assessed with meal by family. Family educated on diet upgrade yesterday and importance of compliance with all safe swallowing precautions. Pt's family member v/u understanding. SLP brought pt a sandwich and apple juice to assess at the bedside. Upon re-entry into room, OT assisting family member with shaving pt's face. OT assisted SLP in repositioning pt and elevating HOB to assess self feeding. Pt complaining of back pain throughout po trials despite OT attempts to reposition pt. SLP assessed pt with 1 bite of a ham  sandwich and presented with excess chewing and slow oral transit time requiring 4 sips of apple juice to aid in clearing oral cavity. Pt stating he is not hungry and continued to request to lay back down. No further po trials attempted. SLP encouraged family to continue to monitor for s/s of aspiration. ST will f/u 2/2 minimal trials assessed this date.       Assessment:  Donta Cline is a 85 y.o. male with a medical diagnosis of Acute bilateral low back pain and presents with Dyaphagia. ST will continue to follow.    Discharge recommendations: Discharge Facility/Level Of Care Needs: nursing facility, skilled     Goals:    SLP Goals        Problem: SLP Goal    Goal Priority Disciplines Outcome   SLP Goal     SLP Ongoing (interventions implemented as appropriate)   Description:  Speech Language Pathology Goals  Goals expected to be met by 11/27/17  1. Pt will tolerate dental soft diet without overt S/S aspiration, Supervision  2. Pt will tolerate nectar-thickened liquids without overt S/S aspiration, Supervision  3. Pt will complete dysphagia exercises x10 ea. , MIN A, to improve BOT and laryngeal elevation  4. Educate pt and family on S/S aspiration and aspiration precautions     Goals expected to be met by 11/24/17  1. Pt will tolerate puree diet without overt S/S aspiration, Supervision- met   2. Pt will tolerate nectar-thickened liquids without overt S/S aspiration, Supervision -ongoing  3. Pt will complete dysphagia exercises x10 ea. , MIN A, to improve BOT and laryngeal elevation -ongoing  4. Educate pt and family on S/S aspiration and aspiration precautions -ongoing                            Plan:   Patient to be seen Therapy Frequency: 5 x/week   Plan of Care expires: 12/17/17  Plan of Care reviewed with: patient, daughter  SLP Follow-up?: Yes          DUGLAS Strange, CCC-SLP   Pager: [phone]  11/21/2017

## 2017-11-21 NOTE — PLAN OF CARE
CM rounded to room. Pt not in room. No family. Updated board w my name.  DC plan remains the same:      11/21/17 1120   Right Care Assessment   Can the patient answer the patient profile reliably? (pt not in room)   How often would a person be available to care for the patient? Whenever needed   Describe the patient's ability to walk at the present time. Major restrictions/daily assistance from another person   How does the patient rate their overall health at the present time? Poor   Number of comorbid conditions (as recorded on the chart) Five or more   Have you felt down, depressed, or hopeless? Unable to Assess   [facility] when medically stable.  w HD.

## 2017-11-21 NOTE — PLAN OF CARE
Problem: Patient Care Overview  Goal: Plan of Care Review  Outcome: Ongoing (interventions implemented as appropriate)  Hemodialysis is complete.  Blood returned.  AV Fistula to RFA was then de accessed and needles removed.  Hemostasis attained.  No bleed or hematoma.  Fistula has Bruit and Thrill.  HD time = 180 min.  UF = 0.   Total of 50 G. Of Albumin given during dialysis.  Toward end of tx.  Patient was tachycardic with HR = 127.

## 2017-11-21 NOTE — PROGRESS NOTES
Pt unable to tolerate dialysis treatment, one hour into the treatment, developed rapid a-fib with heart rate 140s -160s, treatment terminated per  Dr Lamas with nephrology, 300mls n/s saline bolus given,  Dr Velazquez notified, orders for additional 250 mls n/s bolus noted, labs: bmp, cbc and troponin drawn and 12 LEAD EKG ordered per MD. Heart rate 120s -130s after the intervention, OK to transfer pt to the room per Dr Alonso. Heart rate 126/min at time of transfer. Report given to Torito YOUNG

## 2017-11-21 NOTE — ASSESSMENT & PLAN NOTE
- last echo 10/2017 with pulmonary HTN and undetermined diastolic function, EF 55  - -continue metoprolol  -HD is main source of volume removal  -started pt on CPAP @ 8 similar to prior hospital stay, due to concerns of volume overload post operatively, continue as pt tolerates well.

## 2017-11-21 NOTE — PROGRESS NOTES
"[facility] Hospital Medicine  Progress Note    Patient Name: Donta Cline  MRN: [id number]  Patient Class: IP- Inpatient   Admission Date: 11/14/2017  Length of Stay: 5 days  Attending Physician: Gurmeet Alonso MD  Primary Care Provider: ZAID Richardson Iii, MD    [facility] Medicine Team: [facility] MED L Gurmeet Alonso MD    Subjective:     Principal Problem:Acute bilateral low back pain    HPI:  Donta Cline is a 85 y.o. male who presents with PMHx of ESRD with dialysis MWF, HFpEF, NSTEMI, bladder/prostate cancer, DM II, aortic stenosis and CAD for evaluation of back pain s/p lumbar fusion/laminectomy (10/20/17). No family at bedside. Difficult to obtain HPI as speech garbled, however patient endorses progressive lower extremity weakness with difficulty ambulating for the past 4-5 days. His back pain is without radiation. Patient is now unable to ambulate independently. Denies trauma and urinary bowel/bladder incontinence, fever.    Per EDMD:  "The daughter brought patient to ED due to concern of his severe back pain and inability to perform activities as he was doing previously. She had discussed with Dr. Saul, and he recommended the patient come to ED and obtain imaging for spine."    Imaging:    Ct Lumbar Spine Without Contrast    Result Date: 11/14/2017  Comparison: MRI 10/12/17. Findings: Routine CT lumbar spine protocol performed without IV contrast. Prior L2-L4 instrumented lumbar fusion with interbody disc spacer at L3-4. Decompressive posterior laminectomies at from L2-3 through L5-S1. No evidence of hardware failure. No fracture identified, noting the inferior endplate of L4 is indistinct. Infection or postsurgical fluid collection not excluded, noting limited evaluation without IV contrast and artifact from adjacent pedicle screws. There is partial fusion of the right L5-S1 facet. The SI joints are unremarkable. There is extensive calcified atherosclerotic disease. Atrophic " kidneys. Partially imaged right renal stent noted. No hydronephrosis. Small renal lesions, too small to characterize without IV contrast.      Mri Lumbar Spine Without Contrast    Result Date: 11/14/2017  MRI LUMBAR SPINE TECHNIQUE: MRI lumbar spine was performed without contrast. There is an ill-defined heterogeneous collection encircling L1 spinous process demonstrating fluid levels on the right. This may represents a seroma or hematoma postoperatively however abscess cannot be excluded. This collection is best seen on series 11 image 4.    Hospital Course:  In discussion with daughter who is at bedside today (11/15) patient was discharged after surgical procedure to an inpatient rehab and he reports that he was doing well at the rehab but reached a plateau in his activity/functional level.  She states patient was walking 50 feet with minimal assist, able to toilet with minimal assistance.  Patient was transferred to SNF and since transfer, last Friday (11/10) patient requiring max assistance to stand and reported inability to bear weight secondary to pain, and since this time patient has been bed bound.     She also notes that patient with poor appetite as speech therapy has recommended puree nectar thick diet with Nepro shakes, reports that pt is losing weight and some increased abdominal girth.     Overnight patient admitted due to concerns of worsening pain and inability to ambulate, patient underwent MRI and CT imaging of the lumbar spine.  He has intact fusion, no spinal canal stenosis or cord compression noted, concern for a fluid collection encircling L1 spinous process, post op L2-L4 fusion changes and s/p laminectomy L3-L4.  Discussed imaging findings with attending neurosurgeon Dr. Sands who performed pts operation and he reported that fluid collection appears outside of range where instrumentation was.  Patient with no reported falls, workup today reveals elevated CRP >150, ESR 92, has as  supratherapeutic INR 3.8    Informed plan will be to take patient to OR for washout and exploration to evaluate for hematoma vs infection.     11/16 - Patient without acute events overnight, patient states he and his daughter are going to [place] today.  Daughter has provided consent for surgery and blood products, patient is receiving serial doses of FFP with intermittent INR checks with plan for surgery this afternoon if INR <1.4.  Daughter reports that patient w/o complaints of pain when lying in bed, but if he is moved/turned causes exacerbation of his pain.     Patient taken for operative intervention and in discussion with neurosurgeon Dr. Sands, he reports that patient with well healed wound and surgical fusion was intact, when cut into the dura and in subdural region there was area of hematoma that was evacuated (full op report pending in EMR)  Cultures sent to rule out infection but clinical impression was not an appearance of an infected operative site.  Dr. Sands noted that degree of patient's pain incongruent with surgical findings.     Post-oepratively in the morning patient coverted to afib with RVR and this morning with borderline BP, his Hgb downtrending since admission and was 6.7 post-op.  Patient received intermittent fluid boluses early AM of 11/17 but also with intake of 3+L prior to surgery due to need for high amount of FFP (6units).  Patient will receive 1 unit PRBC during Hemodialysis today, giving midodrine as well.     11/18 - Overnight initiated CPAP for patient due to concerns for pulmonary edema, blood gas with some hypercapnia but ph 7.3,  Discussed with Nephrology patient to receive UF today, and will give another 1unit PRBC due to hgb 6.8-7.0.  Pt reports slept well with cpap, still has back pain with movement and on examination.  Culture data from surgery remains negative - neurosurgery has ordered cefazolin, will renally dose this.     11/19 - Patient seen this AM, awake, alert,  reporting still having back pain and reporting frequent cough today, coughing during interview.  S/p UF by nephrology yesterday and negative -1.4L total for the day and s/p 2nd unit PRBC and hgb is stable at 8.6, remains in afib and blood pressures are stable.      11/20 _Patient with ongoing back pain, neurosurgery removed 2 surgical drains,  Hemoglobin remains  Stable  W/o requiring  Transfusion.  He remains in Afib rhythmwise and is  Planned for dialysis.  PT worked with pt and SLP eval pt upgraded to Dental soft nectar thick liquids.     This evening called by HD unit and patient 1 hour into session with AFIB with RVR and BP in 80/50s, given 300cc bolus by nephrology and asking for additional recs, HD session stopped early and 250cc bolus given and stat CBC ordered.  Discussed with overnight covering team to f/u results and eval as necessary.     Interval History:  As above      Review of Systems   Constitutional: Negative for chills, diaphoresis, fatigue and fever.   HENT: Negative for rhinorrhea, sinus pain, sinus pressure, sneezing and sore throat.    Eyes: Negative for visual disturbance.   Respiratory: Negative for cough, choking, chest tightness and shortness of breath.    Cardiovascular: Negative for chest pain, palpitations and leg swelling.   Gastrointestinal: Positive for constipation. Negative for diarrhea and nausea.        Denies bowel incontinence   Genitourinary: Negative for dysuria.   Musculoskeletal: Positive for back pain and gait problem. Negative for joint swelling, myalgias and neck pain.   Neurological: Negative for dizziness, facial asymmetry, light-headedness and headaches.   Psychiatric/Behavioral: Negative for agitation and behavioral problems.     Objective:     Vital Signs (Most Recent):  Temp: 97.5 °F (36.4 °C) (11/20/17 2010)  Pulse: 96 (11/20/17 2010)  Resp: 16 (11/20/17 2010)  BP: (!) 106/56 (11/20/17 2010)  SpO2: 96 % (11/20/17 2010) Vital Signs (24h Range):  Temp:  [97.2 °F  (36.2 °C)-98.4 °F (36.9 °C)] 97.5 °F (36.4 °C)  Pulse:  [] 96  Resp:  [16-18] 16  SpO2:  [94 %-100 %] 96 %  BP: ()/(38-59) 106/56     Weight: 97.5 kg (215 lb)  Body mass index is 39.32 kg/m².    Intake/Output Summary (Last 24 hours) at 11/20/17 2118  Last data filed at 11/20/17 1730   Gross per 24 hour   Intake             1110 ml   Output              910 ml   Net              200 ml      Physical Exam   Constitutional: He appears well-developed.   Awake/alert   HENT:   Mouth/Throat: Oropharynx is clear and moist.   Eyes: Conjunctivae are normal. No scleral icterus.   Cardiovascular: Normal heart sounds and intact distal pulses.    No murmur heard.  Irregular rhythm, tachycardic   Pulmonary/Chest: Effort normal. No respiratory distress. He has no wheezes. He has rales.   Bibasilar crackles present   Abdominal: Soft. Bowel sounds are normal.   Mild distension, soft, tympanic to percussion, no fluid wave noted, no bulging flanks, or abdominal collateral vessels   Musculoskeletal:   RUE forearm AV fistula with palpable thrill.     Surgical drains removed, dressing with sanguinous drainage   Lymphadenopathy:     He has no cervical adenopathy.   Neurological:   Alert, oriented to self, hospital   Skin: Skin is warm. There is pallor.       Significant Labs:   CBC:     Recent Labs  Lab 11/19/17 0359 11/20/17 0643 11/20/17 1930 11/20/17  2030   WBC 13.00* 8.96 12.89*  --    HGB 8.6* 9.2* 5.1* 9.4*   HCT 27.4* 29.8* 16.2* 29.3*    301 373*  --      CMP:     Recent Labs  Lab 11/19/17 0359 11/20/17 0643 11/20/17 1930    135* 135*   K 3.4* 3.6 3.4*    100 103   CO2 23 23 21*   * 164* 165*   BUN 34* 46* 33*   CREATININE 3.7* 4.8* 3.6*   CALCIUM 9.7 9.7 9.3   PROT  --   --  6.1   ALBUMIN 2.5* 2.5* 2.5*   BILITOT  --   --  0.4   ALKPHOS  --   --  193*   AST  --   --  52*   ALT  --   --  <5*   ANIONGAP 12 12 11   EGFRNONAA 14.0* 10.3* 14.5*     Microbiology Results (last 7 days)      Procedure Component Value Units Date/Time    Culture, Anaerobic [[id number]] Collected:  11/16/17 2218    Order Status:  Completed Specimen:  Wound from Back Updated:  11/20/17 1405     Anaerobic Culture Culture in progress    Narrative:       2.deep lumbar spine    Culture, Anaerobic [[id number]] Collected:  11/16/17 2218    Order Status:  Completed Specimen:  Wound from Back Updated:  11/20/17 1405     Anaerobic Culture Culture in progress    Narrative:       1.superficial lumbar spine    Aerobic culture [[id number]] Collected:  11/16/17 2218    Order Status:  Completed Specimen:  Wound from Back Updated:  11/20/17 0904     Aerobic Bacterial Culture No growth    Narrative:       2.deep lumbar spine    Aerobic culture [[id number]] Collected:  11/16/17 2218    Order Status:  Completed Specimen:  Wound from Back Updated:  11/20/17 0904     Aerobic Bacterial Culture No growth    Narrative:       1.superficial lumbar spine    AFB Culture & Smear [[id number]] Collected:  11/16/17 2218    Order Status:  Completed Specimen:  Wound from Back Updated:  11/18/17 0927     AFB Culture & Smear Culture in progress     AFB CULTURE STAIN No acid fast bacilli seen.    Narrative:       2.deep lumbar spine    AFB Culture & Smear [[id number]] Collected:  11/16/17 2218    Order Status:  Completed Specimen:  Wound from Back Updated:  11/18/17 0927     AFB Culture & Smear Culture in progress     AFB CULTURE STAIN No acid fast bacilli seen.    Narrative:       1.superficial lumbar spine    Fungus culture [[id number]] Collected:  11/16/17 2218    Order Status:  Completed Specimen:  Wound from Back Updated:  11/17/17 0742     Fungus (Mycology) Culture Culture in progress    Narrative:       1.superficial lumbar spine    Fungus culture [[id number]] Collected:  11/16/17 2218    Order Status:  Completed Specimen:  Wound from Back Updated:  11/17/17 0742     Fungus (Mycology) Culture Culture in progress    Narrative:       2.deep lumbar spine    Gram  stain [[id number]] Collected:  11/16/17 2218    Order Status:  Completed Specimen:  Wound from Back Updated:  11/17/17 0039     Gram Stain Result Rare WBC's      No organisms seen    Narrative:       2.deep lumbar spine    Gram stain [[id number]] Collected:  11/16/17 2218    Order Status:  Completed Specimen:  Wound from Back Updated:  11/16/17 2357     Gram Stain Result No WBC's      No organisms seen    Narrative:       1.superficial lumbar spine    Gram stain [[id number]] Collected:  11/15/17 0126    Order Status:  Completed Specimen:  Urine Updated:  11/16/17 0643     Gram Stain Result Moderate WBC's      Rare yeast    Narrative:       Preferred Collection Type->Urine, Clean Catch    Urine culture [[id number]] Collected:  11/15/17 0126    Order Status:  Completed Specimen:  Urine Updated:  11/16/17 0532     Urine Culture, Routine Multiple organisms isolated. None in predominance.  Repeat if     Urine Culture, Routine clinically necessary.    Narrative:       Preferred Collection Type->Urine, Clean Catch    Gram stain [[id number]]     Order Status:  Completed Specimen:  Urine from Urine     Gram stain [[id number]]     Order Status:  Canceled Specimen:  Urine from Urine             Significant Imaging: I have reviewed all pertinent imaging results/findings within the past 24 hours.    Assessment/Plan:      * Acute bilateral low back pain, post-lumbar spinal fusion    - neurosurgery took pt for washout POD2 and evacuation of hematoma,  -Hgb dropping since admit - s/p 2unit pRBC, and extra session of UF   -drains removed, and d/c Cefazolin order - cultures remain NGTD from surgery.    - Supportive care, PT/OT  - continue fentanyl patches and norco prn - chronic management may be pain control, he still has severe pain that neurosurgery does not believe is explained by operative findings alone.           Hematoma    As above  -trend CBC        Supratherapeutic INR    --Hold coumadin/anti-platelet  -s/p vitamin K 2.5mg  11/16, and 6units FFP  -        Paroxysmal atrial fibrillation    -in stepdown bed  -Still in Afib rhythm wise  -continue metoprolol, BP more stable in last 24hrs   -However not tolerating dialysis well provoking RVR, schedule Midodrine TID to see if this improves BP to tolerate HD.   Discuss extra UF sessions with Nephrology?  -given reversal of anticoagulation prior to going into afib, he is probably not a candidate for amiodarone therapy, if rate control is poor will consider consulting cardiology for further assisitance, potential pati/cardioversion if afib causing proonged clinical instability?  -         ESRD (end stage renal disease) on dialysis    - nephrology consulted for volume management.   -s/p HD Friday and UF Saturday.  Check with nephrology if pt receiving EPO  -as above        Acute blood loss anemia    -presumed secondary to supratherapeutic INR and occurred post-operatively  -s/p 2units PRBC  -trend CBC and Coags, transfuse if <7, will have to monitor hemodynamic/respiratory status and weigh risk benefit of further blood product transfusion          Pyuria    - in setting of ESRD and oliguria  - urine culture shows many organisms - none in predominance, no antimicrobial treatment necessary        Chronic diastolic heart failure    - last echo 10/2017 with pulmonary HTN and undetermined diastolic function, EF 55  - -continue metoprolol  -HD is main source of volume removal  -started pt on CPAP @ 8 similar to prior hospital stay, due to concerns of volume overload post operatively, continue as pt tolerates well.         Status post lumbar spinal fusion    -neurosurgery consultation as above          Coronary artery disease involving native coronary artery of native heart without angina pectoris    - no CP, SOB, anginal equivalents  - continue asa, statin  - EKG without ischemic changes        Type 2 diabetes mellitus with chronic kidney disease on chronic dialysis, with long-term current use of  insulin    - increasing from 5 units to 8 units,         Severe aortic stenosis    - history of          VTE Risk Mitigation         Ordered     Medium Risk of VTE  Once      11/17/17 0011     Place sequential compression device  Until discontinued      11/14/17 1907     Place BRAIN hose  Until discontinued      11/14/17 1907              Gurmeet Alonso MD  Department of Hospital Medicine   [facility]

## 2017-11-21 NOTE — PLAN OF CARE
Problem: SLP Goal  Goal: SLP Goal  Speech Language Pathology Goals  Goals expected to be met by 11/27/17  1. Pt will tolerate dental soft diet without overt S/S aspiration, Supervision  2. Pt will tolerate nectar-thickened liquids without overt S/S aspiration, Supervision  3. Pt will complete dysphagia exercises x10 ea. , MIN A, to improve BOT and laryngeal elevation  4. Educate pt and family on S/S aspiration and aspiration precautions     Goals expected to be met by 11/24/17  1. Pt will tolerate puree diet without overt S/S aspiration, Supervision- met   2. Pt will tolerate nectar-thickened liquids without overt S/S aspiration, Supervision -ongoing  3. Pt will complete dysphagia exercises x10 ea. , MIN A, to improve BOT and laryngeal elevation -ongoing  4. Educate pt and family on S/S aspiration and aspiration precautions -ongoing          Outcome: Ongoing (interventions implemented as appropriate)  Goals remain appropriate. ST will continue to follow.  YUMIKO Ortiz., CCC-SLP  Pager: [phone]  11/21/2017

## 2017-11-21 NOTE — ASSESSMENT & PLAN NOTE
- nephrology consulted for volume management.   -s/p HD Friday and UF Saturday.  Check with nephrology if pt receiving EPO  -as above

## 2017-11-21 NOTE — PLAN OF CARE
Problem: Patient Care Overview  Goal: Plan of Care Review  PT oriented to self, vs stable, iv access flushes well, c/o lower back pain, norco given, dialysis done today, pt is not to ambulate without PT present due to little weight bearing capability of  R and L lower extremities , no falls or complications throughout shift will continue to monitor.    Problem: Pain, Acute (Adult)  Intervention: Monitor/Manage Analgesia  Explained non-pharmalogical ways to control pain, norco given. Positional changes utilized

## 2017-11-22 NOTE — PROGRESS NOTES
"[facility] Hospital Medicine  Progress Note    Patient Name: Donta Cline  MRN: [id number]  Patient Class: IP- Inpatient   Admission Date: 11/14/2017  Length of Stay: 6 days  Attending Physician: Abby Pisano MD  Primary Care Provider: ZAID Richardson Iii, MD    [facility] Medicine Team: [facility] Abby Pisano MD    Subjective:     Principal Problem:Acute bilateral low back pain    HPI:  Donta Cline is a 85 y.o. male who presents with PMHx of ESRD with dialysis MWF, HFpEF, NSTEMI, bladder/prostate cancer, DM II, aortic stenosis and CAD for evaluation of back pain s/p lumbar fusion/laminectomy (10/20/17). No family at bedside. Difficult to obtain HPI as speech garbled, however patient endorses progressive lower extremity weakness with difficulty ambulating for the past 4-5 days. His back pain is without radiation. Patient is now unable to ambulate independently. Denies trauma and urinary bowel/bladder incontinence, fever.    Per EDMD:  "The daughter brought patient to ED due to concern of his severe back pain and inability to perform activities as he was doing previously. She had discussed with Dr. Saul, and he recommended the patient come to ED and obtain imaging for spine."    Imaging:    Ct Lumbar Spine Without Contrast    Result Date: 11/14/2017  Comparison: MRI 10/12/17. Findings: Routine CT lumbar spine protocol performed without IV contrast. Prior L2-L4 instrumented lumbar fusion with interbody disc spacer at L3-4. Decompressive posterior laminectomies at from L2-3 through L5-S1. No evidence of hardware failure. No fracture identified, noting the inferior endplate of L4 is indistinct. Infection or postsurgical fluid collection not excluded, noting limited evaluation without IV contrast and artifact from adjacent pedicle screws. There is partial fusion of the right L5-S1 facet. The SI joints are unremarkable. There is extensive calcified atherosclerotic disease. Atrophic " kidneys. Partially imaged right renal stent noted. No hydronephrosis. Small renal lesions, too small to characterize without IV contrast.      Mri Lumbar Spine Without Contrast    Result Date: 11/14/2017  MRI LUMBAR SPINE TECHNIQUE: MRI lumbar spine was performed without contrast. There is an ill-defined heterogeneous collection encircling L1 spinous process demonstrating fluid levels on the right. This may represents a seroma or hematoma postoperatively however abscess cannot be excluded. This collection is best seen on series 11 image 4.    Hospital Course:  In discussion with daughter who is at bedside today (11/15) patient was discharged after surgical procedure to an inpatient rehab and he reports that he was doing well at the rehab but reached a plateau in his activity/functional level.  She states patient was walking 50 feet with minimal assist, able to toilet with minimal assistance.  Patient was transferred to SNF and since transfer, last Friday (11/10) patient requiring max assistance to stand and reported inability to bear weight secondary to pain, and since this time patient has been bed bound.     She also notes that patient with poor appetite as speech therapy has recommended puree nectar thick diet with Nepro shakes, reports that pt is losing weight and some increased abdominal girth.     Overnight patient admitted due to concerns of worsening pain and inability to ambulate, patient underwent MRI and CT imaging of the lumbar spine.  He has intact fusion, no spinal canal stenosis or cord compression noted, concern for a fluid collection encircling L1 spinous process, post op L2-L4 fusion changes and s/p laminectomy L3-L4.  Discussed imaging findings with attending neurosurgeon Dr. Sands who performed pts operation and he reported that fluid collection appears outside of range where instrumentation was.  Patient with no reported falls, workup today reveals elevated CRP >150, ESR 92, has as  supratherapeutic INR 3.8    Informed plan will be to take patient to OR for washout and exploration to evaluate for hematoma vs infection.     11/16 - Patient without acute events overnight, patient states he and his daughter are going to [place] today.  Daughter has provided consent for surgery and blood products, patient is receiving serial doses of FFP with intermittent INR checks with plan for surgery this afternoon if INR <1.4.  Daughter reports that patient w/o complaints of pain when lying in bed, but if he is moved/turned causes exacerbation of his pain.     Patient taken for operative intervention and in discussion with neurosurgeon Dr. Sands, he reports that patient with well healed wound and surgical fusion was intact, when cut into the dura and in subdural region there was area of hematoma that was evacuated (full op report pending in EMR)  Cultures sent to rule out infection but clinical impression was not an appearance of an infected operative site.  Dr. Sands noted that degree of patient's pain incongruent with surgical findings.     Post-oepratively in the morning patient coverted to afib with RVR and this morning with borderline BP, his Hgb downtrending since admission and was 6.7 post-op.  Patient received intermittent fluid boluses early AM of 11/17 but also with intake of 3+L prior to surgery due to need for high amount of FFP (6units).  Patient will receive 1 unit PRBC during Hemodialysis today, giving midodrine as well.     11/18 - Overnight initiated CPAP for patient due to concerns for pulmonary edema, blood gas with some hypercapnia but ph 7.3,  Discussed with Nephrology patient to receive UF today, and will give another 1unit PRBC due to hgb 6.8-7.0.  Pt reports slept well with cpap, still has back pain with movement and on examination.  Culture data from surgery remains negative - neurosurgery has ordered cefazolin, will renally dose this.     11/19 - Patient seen this AM, awake, alert,  reporting still having back pain and reporting frequent cough today, coughing during interview.  S/p UF by nephrology yesterday and negative -1.4L total for the day and s/p 2nd unit PRBC and hgb is stable at 8.6, remains in afib and blood pressures are stable.      11/20 _Patient with ongoing back pain, neurosurgery removed 2 surgical drains,  Hemoglobin remains  Stable  W/o requiring  Transfusion.  He remains in Afib rhythmwise and is  Planned for dialysis.  PT worked with pt and SLP eval pt upgraded to Dental soft nectar thick liquids.     This evening called by HD unit and patient 1 hour into session with AFIB with RVR and BP in 80/50s, given 300cc bolus by nephrology and asking for additional recs, HD session stopped early and 250cc bolus given and stat CBC ordered.  Discussed with overnight covering team to f/u results and eval as necessary.     Interval History:  No acute events overnight. Thsi AM complains of back pian - no other complaints.       Review of Systems   Constitutional: Negative for chills, diaphoresis, fatigue and fever.   HENT: Negative for rhinorrhea, sinus pain, sinus pressure, sneezing and sore throat.    Eyes: Negative for visual disturbance.   Respiratory: Negative for cough, choking, chest tightness and shortness of breath.    Cardiovascular: Negative for chest pain, palpitations and leg swelling.   Gastrointestinal: Positive for constipation. Negative for diarrhea and nausea.        Denies bowel incontinence   Genitourinary: Negative for dysuria.   Musculoskeletal: Positive for back pain and gait problem. Negative for joint swelling, myalgias and neck pain.   Neurological: Negative for dizziness, facial asymmetry, light-headedness and headaches.   Psychiatric/Behavioral: Negative for agitation and behavioral problems.     Objective:     Vital Signs (Most Recent):  Temp: 97.7 °F (36.5 °C) (11/21/17 2036)  Pulse: 91 (11/21/17 2300)  Resp: 18 (11/21/17 2036)  BP: (!) 141/67 (11/21/17  2036)  SpO2: 95 % (11/21/17 2036) Vital Signs (24h Range):  Temp:  [97.7 °F (36.5 °C)-98.2 °F (36.8 °C)] 97.7 °F (36.5 °C)  Pulse:  [] 91  Resp:  [14-20] 18  SpO2:  [94 %-96 %] 95 %  BP: ()/(45-93) 141/67     Weight: 97.5 kg (215 lb)  Body mass index is 39.32 kg/m².    Intake/Output Summary (Last 24 hours) at 11/21/17 1478  Last data filed at 11/21/17 1243   Gross per 24 hour   Intake              900 ml   Output              561 ml   Net              339 ml      Physical Exam   Constitutional: He appears well-developed.   Awake/alert   HENT:   Mouth/Throat: Oropharynx is clear and moist.   Eyes: Conjunctivae are normal. No scleral icterus.   Cardiovascular: Normal heart sounds and intact distal pulses.    No murmur heard.  Irregular rhythm, tachycardic   Pulmonary/Chest: Effort normal. No respiratory distress. He has no wheezes. He has rales.   Bibasilar crackles present   Abdominal: Soft. Bowel sounds are normal.   Mild distension, soft, tympanic to percussion, no fluid wave noted, no bulging flanks, or abdominal collateral vessels   Musculoskeletal:   RUE forearm AV fistula with palpable thrill.     Surgical drains removed, dressing with sanguinous drainage   Lymphadenopathy:     He has no cervical adenopathy.   Neurological:   Alert, oriented to self, hospital   Skin: Skin is warm. There is pallor.       Significant Labs:   CBC:     Recent Labs  Lab 11/20/17 0643 11/20/17 1930 11/20/17 2030 11/21/17  0545   WBC 8.96 12.89*  --  9.36   HGB 9.2* 5.1* 9.4* 9.0*   HCT 29.8* 16.2* 29.3* 28.6*    373*  --  315     CMP:     Recent Labs  Lab 11/20/17  0643 11/20/17 1930 11/21/17  0545   * 135* 137   K 3.6 3.4* 4.2    103 102   CO2 23 21* 23   * 165* 168*   BUN 46* 33* 39*   CREATININE 4.8* 3.6* 4.2*   CALCIUM 9.7 9.3 9.7   PROT  --  6.1  --    ALBUMIN 2.5* 2.5* 2.5*   BILITOT  --  0.4  --    ALKPHOS  --  193*  --    AST  --  52*  --    ALT  --  <5*  --    ANIONGAP 12 11 12    EGFRNONAA 10.3* 14.5* 12.1*     Microbiology Results (last 7 days)     Procedure Component Value Units Date/Time    Culture, Anaerobic [[id number]] Collected:  11/16/17 2218    Order Status:  Completed Specimen:  Wound from Back Updated:  11/21/17 1217     Anaerobic Culture Culture in progress    Narrative:       2.deep lumbar spine    Culture, Anaerobic [[id number]] Collected:  11/16/17 2218    Order Status:  Completed Specimen:  Wound from Back Updated:  11/21/17 1217     Anaerobic Culture Culture in progress    Narrative:       1.superficial lumbar spine    Aerobic culture [[id number]] Collected:  11/16/17 2218    Order Status:  Completed Specimen:  Wound from Back Updated:  11/20/17 0904     Aerobic Bacterial Culture No growth    Narrative:       2.deep lumbar spine    Aerobic culture [[id number]] Collected:  11/16/17 2218    Order Status:  Completed Specimen:  Wound from Back Updated:  11/20/17 0904     Aerobic Bacterial Culture No growth    Narrative:       1.superficial lumbar spine    AFB Culture & Smear [[id number]] Collected:  11/16/17 2218    Order Status:  Completed Specimen:  Wound from Back Updated:  11/18/17 0927     AFB Culture & Smear Culture in progress     AFB CULTURE STAIN No acid fast bacilli seen.    Narrative:       2.deep lumbar spine    AFB Culture & Smear [[id number]] Collected:  11/16/17 2218    Order Status:  Completed Specimen:  Wound from Back Updated:  11/18/17 0927     AFB Culture & Smear Culture in progress     AFB CULTURE STAIN No acid fast bacilli seen.    Narrative:       1.superficial lumbar spine    Fungus culture [[id number]] Collected:  11/16/17 2218    Order Status:  Completed Specimen:  Wound from Back Updated:  11/17/17 0742     Fungus (Mycology) Culture Culture in progress    Narrative:       1.superficial lumbar spine    Fungus culture [[id number]] Collected:  11/16/17 2218    Order Status:  Completed Specimen:  Wound from Back Updated:  11/17/17 0742     Fungus (Mycology)  Culture Culture in progress    Narrative:       2.deep lumbar spine    Gram stain [[id number]] Collected:  11/16/17 2218    Order Status:  Completed Specimen:  Wound from Back Updated:  11/17/17 0039     Gram Stain Result Rare WBC's      No organisms seen    Narrative:       2.deep lumbar spine    Gram stain [[id number]] Collected:  11/16/17 2218    Order Status:  Completed Specimen:  Wound from Back Updated:  11/16/17 2357     Gram Stain Result No WBC's      No organisms seen    Narrative:       1.superficial lumbar spine    Gram stain [[id number]] Collected:  11/15/17 0126    Order Status:  Completed Specimen:  Urine Updated:  11/16/17 0643     Gram Stain Result Moderate WBC's      Rare yeast    Narrative:       Preferred Collection Type->Urine, Clean Catch    Urine culture [[id number]] Collected:  11/15/17 0126    Order Status:  Completed Specimen:  Urine Updated:  11/16/17 0532     Urine Culture, Routine Multiple organisms isolated. None in predominance.  Repeat if     Urine Culture, Routine clinically necessary.    Narrative:       Preferred Collection Type->Urine, Clean Catch    Gram stain [[id number]]     Order Status:  Completed Specimen:  Urine from Urine     Gram stain [[id number]]     Order Status:  Canceled Specimen:  Urine from Urine             Significant Imaging: I have reviewed all pertinent imaging results/findings within the past 24 hours.    Assessment/Plan:      * Acute bilateral low back pain, post-lumbar spinal fusion    - neurosurgery took pt for washout POD2 and evacuation of hematoma,  -Hgb dropped during admission admit - s/p 2unit pRBC, Hgb now stable  -drains removed and not on abx  cultures remain NGTD from surgery.    - Supportive care, PT/OT  - continue fentanyl patches and norco prn - chronic management may be pain control, he still has severe pain that neurosurgery does not believe is explained by operative findings alone.           Acute blood loss anemia    -presumed secondary to  supratherapeutic INR and occurred post-operatively  -s/p 2units PRBC  -Hgb stable today at ~9.0   -trend CBC and Coags, transfuse if <7, will have to monitor hemodynamic/respiratory status and weigh risk benefit of further blood product transfusion          Hematoma    As above  -trend CBC        Supratherapeutic INR    --Hold coumadin/anti-platelet  -s/p vitamin K 2.5mg 11/16, and 6units FFP  -        Status post lumbar spinal fusion    -neurosurgery consultation as above          Pyuria    - in setting of ESRD and oliguria  - urine culture shows many organisms - none in predominance, no antimicrobial treatment necessary        Paroxysmal atrial fibrillation    -in stepdown bed  -Still in Afib rhythm wise  -continue metoprolol, BP more stable in last 24hrs   -However not tolerating dialysis well provoking RVR, schedule Midodrine TID to see if this improves BP to tolerate HD.   Discuss extra UF sessions with Nephrology?  -given reversal of anticoagulation prior to going into afib, he is probably not a candidate for amiodarone therapy, if rate control is poor will consider consulting cardiology for further assisitance, potential pati/cardioversion if afib causing proonged clinical instability?  -         Severe aortic stenosis    - history of        ESRD (end stage renal disease) on dialysis    - nephrology consulted for volume management.   -s/p HD Friday and UF Saturday.  Check with nephrology if pt receiving EPO  -as above        Type 2 diabetes mellitus with chronic kidney disease on chronic dialysis, with long-term current use of insulin    - increasing from 5 units to 8 units,         Coronary artery disease involving native coronary artery of native heart without angina pectoris    - no CP, SOB, anginal equivalents  - continue asa, statin  - EKG without ischemic changes        Chronic diastolic heart failure    - last echo 10/2017 with pulmonary HTN and undetermined diastolic function, EF 55  - -continue  metoprolol  -HD is main source of volume removal  -cont on CPAP @ 8 similar to prior hospital stay qhs, due to concerns of volume overload post operatively, continue as pt tolerates well.           VTE Risk Mitigation         Ordered     Medium Risk of VTE  Once      11/17/17 0011     Place sequential compression device  Until discontinued      11/14/17 1907     Place BRAIN hose  Until discontinued      11/14/17 1907              Abby Pisano MD  Department of Hospital Medicine   [facility]

## 2017-11-22 NOTE — PT/OT/SLP PROGRESS
Speech Language Pathology  Dysphagia Treatment    Donta Cline   MRN: [id number]   [id number]/[id number] A    Admitting Diagnosis: Acute bilateral low back pain    Diet recommendations: Solid Diet Level: Dental Soft  Liquid Diet Level: Nectar Thick Feed only when awake/alert, No straws, Small bites/sips, 1 bite/sip at a time, Check for pocketing/oral residue, Meds crushed in puree, Eliminate distractions, Assistance with meals and Assistance with thickening liquids, Encourage double swallows per bolus  · To achieve nectar thick liquid: 4 oz of any liquid to 1 pack of ThickenUp Clear or 6 oz of any liquid to 1 pack of ThickenUp  · No ice cream, jello, or ice.  · Avoid mixed consistencies (soup, cereal with milk, juicy fruits).    SLP Treatment Date: 11/21/17  Speech Start Time: 1344     Speech Stop Time: 1405     Speech Total (min): 21 min       TREATMENT BILLABLE MINUTES:  Treatment Swallowing Dysfunction 11 and Seld Care/Home Management Training 10    Has the patient been evaluated by SLP for swallowing? : Yes  Keep patient NPO?: No   General Precautions: Standard, fall, aspiration, nectar thick          Subjective:  Pt awake and cooperative. HOB elevated to 90 degrees. Pt with no complaints of back pain today when elevationg HOB. MD present in room upon ST entry.      Objective:   Pt with a congested cough prior to and throughout po trials. No coughs consistent with specific consistency or immediately following bites/sips.   SLP provided extra cups and spoons in pt's room for thickener.   Renal protein drink thickened to nectar thick consistency. Pt assessed with bit of finely chopped sausage x1 and bites of eggs x2. Pt presented with prolonged mastication and required multiple liquid washes to clear small bites of eggs and sausage. Pt did not want further trials. SLP discussed pureed diet vs. Dental soft and SLP concerns (prolonged mastication placing pt at a risk to aspirate, concern for fatigue during a meal, and concern  "for adequate nutrition). Pt initially requested, "Okay. I want a pureed diet again." Later in session, pt reported, "I won't eat that pureed food. I just need softer foods. Let's try that." SLP provided further education on dental soft vs. Pureed and noted difficulty this AM with chewing dental soft. Pt wishes to continue dental soft diet at this time despite the risks. He reported that he will not eat a pureed diet. RN entered room to administer po medications crushed in puree. Pt drank entire renal protein carton via cup. Pt did not complete effortful swallows this date despite max cues and model. Pt repeated hard /g/ and /k/ words x10 each. White board updated. No further questions.     Assessment:  Donta Cline is a 85 y.o. male with a medical diagnosis of Acute bilateral low back pain and presents with Dysphagia. ST will continue to follow.     Discharge recommendations: Discharge Facility/Level Of Care Needs: nursing facility, skilled     Goals:    SLP Goals        Problem: SLP Goal    Goal Priority Disciplines Outcome   SLP Goal     SLP Ongoing (interventions implemented as appropriate)   Description:  Speech Language Pathology Goals  Goals expected to be met by 11/27/17  1. Pt will tolerate dental soft diet without overt S/S aspiration, Supervision  2. Pt will tolerate nectar-thickened liquids without overt S/S aspiration, Supervision  3. Pt will complete dysphagia exercises x10 ea. , MIN A, to improve BOT and laryngeal elevation  4. Educate pt and family on S/S aspiration and aspiration precautions     Goals expected to be met by 11/24/17  1. Pt will tolerate puree diet without overt S/S aspiration, Supervision- met   2. Pt will tolerate nectar-thickened liquids without overt S/S aspiration, Supervision -ongoing  3. Pt will complete dysphagia exercises x10 ea. , MIN A, to improve BOT and laryngeal elevation -ongoing  4. Educate pt and family on S/S aspiration and aspiration precautions -ongoing         "                    Plan:   Patient to be seen Therapy Frequency: 5 x/week   Plan of Care expires: 12/17/17  Plan of Care reviewed with: patient, daughter  SLP Follow-up?: Yes              DUGLAS Strange, CCC-SLP   Pager: [phone]  11/22/2017

## 2017-11-22 NOTE — ASSESSMENT & PLAN NOTE
85 y.o. male s/p L2-4 fusion 10/20 for lumbar stenosis who presents with acute worsening of his LBP and LE weakness. s/p washout and hematoma evacuation    -Patient with stable strength post-op. Delirious on exam today  -Recommend delirium precautions and workup for possible infectious etiology per primary team  -Continue to hold ASA and coumadin  -HD per nephrology  -Afib, rate control per primary team. Midodrine TID to see if this improves BP to tolerate HD.  -Q4h neuro checks  -PT/OT/OOB. Therapy recommending SNF   -Pain control per primary team. Recommend adding prn muscle relaxer  -TEDs/SCDs/SQH for DVT prophylaxis   -Coordination of care and medical management per primary team   -Please call NSGY with any change in neuro exam

## 2017-11-22 NOTE — NURSING
Pt oriented to self, continues to take gown off and pull tele box off, refusing meals, multiple attempts needed to take accu checks,

## 2017-11-22 NOTE — SUBJECTIVE & OBJECTIVE
Interval History: Pt had HD yesterday. He is confused today on exam. Daughter at bedside reports he was only able to sit on the side of the bed today with therapy for less than 10 minutes 2/2 pain.     Medications:  Continuous Infusions:   albumin human 25%      midodrine       Scheduled Meds:   sodium chloride 0.9%   Intravenous Once    sodium chloride 0.9%   Intravenous Once    atorvastatin  80 mg Oral Daily    bisacodyl  10 mg Rectal Daily    dextromethorphan-guaifenesin  mg/5 ml  10 mL Oral Q6H    epoetin preeti  5,000 Units Intravenous Every Mon, Wed, Fri    fentaNYL  1 patch Transdermal Q72H    insulin detemir  8 Units Subcutaneous QHS    metoprolol tartrate  25 mg Oral TID    midodrine  10 mg Oral TID    pantoprazole  40 mg Oral Nightly    polyethylene glycol  17 g Oral BID    senna-docusate 8.6-50 mg  1 tablet Oral BID    sertraline  100 mg Oral QHS    sodium chloride 0.9%  250 mL Intravenous Once     PRN Meds:sodium chloride, sodium chloride, sodium chloride 0.9%, sodium chloride 0.9%, acetaminophen, albumin human 25%, albuterol sulfate, aluminum-magnesium hydroxide-simethicone, benzonatate, dextrose 50%, dextrose 50%, glucagon (human recombinant), glucose, glucose, hydrocodone-acetaminophen 10-325mg, influenza, insulin aspart, midodrine, ondansetron, ondansetron, pneumoc 13-osiel conj-dip cr(PF), promethazine (PHENERGAN) IVPB, ramelteon, senna-docusate 8.6-50 mg, sodium chloride 0.9%     Review of Systems  Objective:     Weight: 97.5 kg (215 lb)  Body mass index is 39.32 kg/m².  Vital Signs (Most Recent):  Temp: 97.7 °F (36.5 °C) (11/22/17 1152)  Pulse: 95 (11/22/17 1500)  Resp: 18 (11/22/17 1152)  BP: 128/60 (11/22/17 1152)  SpO2: (!) 93 % (11/22/17 1152) Vital Signs (24h Range):  Temp:  [97.7 °F (36.5 °C)-98.8 °F (37.1 °C)] 97.7 °F (36.5 °C)  Pulse:  [] 95  Resp:  [16-18] 18  SpO2:  [92 %-95 %] 93 %  BP: (104-141)/(51-67) 128/60                           Hemodialysis AV Fistula  Right forearm (Active)   Needle Size 15ga 11/21/2017  9:45 AM   Site Assessment Clean;Dry;Intact 11/22/2017  4:34 AM   Patency Present;Thrill;Bruit 11/22/2017  4:34 AM   Status Deaccessed 11/22/2017  4:34 AM   Flows Good 11/21/2017 12:43 PM   Dressing Intervention New dressing 11/21/2017 12:43 PM   Dressing Status Clean;Dry;Intact 11/21/2017  9:45 AM   Site Condition No complications 11/22/2017  4:34 AM   Dressing Gauze 11/21/2017 12:43 PM       Neurosurgery Physical Exam  General: well developed, well nourished, no distress.   Head: normocephalic, atraumatic  Neurologic: somnolent but easily arousable. Disoriented to place and time. Intermittently follows commands  GCS: Motor: 6/Verbal: 4/Eyes: 4 GCS Total: 14  Mental Status: Awake, Alert, Oriented x3  Cranial nerves: face symmetric, tongue midline, CN II-XII grossly intact.   Eyes: pupils equal, round, reactive to light with accomodation, EOMI.   Sensory: intact to light touch throughout  Motor Strength:Moves all extremities spontaneously with good tone.  No abnormal movements seen. BLE 3/5 bilateral HF, 4/5 KE/KF, 5/5 distally. BUE 5/5  DTR's - 2 + and symmetric in UE and LE  Pronator Drift: no drift noted  Finger-to-nose: Intact bilaterally  Funez: absent  Clonus: absent  Babinski: absent  Pulses: 2+ and symmetric radial and dorsalis pedis. No lower extremity edema    Significant Labs:    Recent Labs  Lab 11/20/17 1930 11/21/17  0545 11/22/17  0414   * 168* 126*   * 137 140   K 3.4* 4.2 3.5    102 104   CO2 21* 23 25   BUN 33* 39* 23   CREATININE 3.6* 4.2* 3.0*   CALCIUM 9.3 9.7 10.1   MG 1.6  --   --        Recent Labs  Lab 11/20/17 1930 11/20/17  2030 11/21/17  0545 11/22/17  0414   WBC 12.89*  --  9.36 11.94   HGB 5.1* 9.4* 9.0* 8.2*   HCT 16.2* 29.3* 28.6* 26.1*   *  --  315 298       Recent Labs  Lab 11/21/17  0545 11/22/17  0414   INR 1.4* 1.6*     Microbiology Results (last 7 days)     Procedure Component Value Units  Date/Time    Urine culture [[id number]] Collected:  11/22/17 1555    Order Status:  Sent Specimen:  Urine from Urine Updated:  11/22/17 1556    Culture, Anaerobic [[id number]] Collected:  11/16/17 2218    Order Status:  Completed Specimen:  Wound from Back Updated:  11/21/17 1217     Anaerobic Culture Culture in progress    Narrative:       2.deep lumbar spine    Culture, Anaerobic [[id number]] Collected:  11/16/17 2218    Order Status:  Completed Specimen:  Wound from Back Updated:  11/21/17 1217     Anaerobic Culture Culture in progress    Narrative:       1.superficial lumbar spine    Aerobic culture [[id number]] Collected:  11/16/17 2218    Order Status:  Completed Specimen:  Wound from Back Updated:  11/20/17 0904     Aerobic Bacterial Culture No growth    Narrative:       2.deep lumbar spine    Aerobic culture [[id number]] Collected:  11/16/17 2218    Order Status:  Completed Specimen:  Wound from Back Updated:  11/20/17 0904     Aerobic Bacterial Culture No growth    Narrative:       1.superficial lumbar spine    AFB Culture & Smear [[id number]] Collected:  11/16/17 2218    Order Status:  Completed Specimen:  Wound from Back Updated:  11/18/17 0927     AFB Culture & Smear Culture in progress     AFB CULTURE STAIN No acid fast bacilli seen.    Narrative:       2.deep lumbar spine    AFB Culture & Smear [[id number]] Collected:  11/16/17 2218    Order Status:  Completed Specimen:  Wound from Back Updated:  11/18/17 0927     AFB Culture & Smear Culture in progress     AFB CULTURE STAIN No acid fast bacilli seen.    Narrative:       1.superficial lumbar spine    Fungus culture [[id number]] Collected:  11/16/17 2218    Order Status:  Completed Specimen:  Wound from Back Updated:  11/17/17 0742     Fungus (Mycology) Culture Culture in progress    Narrative:       1.superficial lumbar spine    Fungus culture [[id number]] Collected:  11/16/17 2218    Order Status:  Completed Specimen:  Wound from Back Updated:  11/17/17 0742      Fungus (Mycology) Culture Culture in progress    Narrative:       2.deep lumbar spine    Gram stain [[id number]] Collected:  11/16/17 2218    Order Status:  Completed Specimen:  Wound from Back Updated:  11/17/17 0039     Gram Stain Result Rare WBC's      No organisms seen    Narrative:       2.deep lumbar spine    Gram stain [[id number]] Collected:  11/16/17 2218    Order Status:  Completed Specimen:  Wound from Back Updated:  11/16/17 2357     Gram Stain Result No WBC's      No organisms seen    Narrative:       1.superficial lumbar spine    Gram stain [[id number]] Collected:  11/15/17 0126    Order Status:  Completed Specimen:  Urine Updated:  11/16/17 0643     Gram Stain Result Moderate WBC's      Rare yeast    Narrative:       Preferred Collection Type->Urine, Clean Catch    Urine culture [[id number]] Collected:  11/15/17 0126    Order Status:  Completed Specimen:  Urine Updated:  11/16/17 0532     Urine Culture, Routine Multiple organisms isolated. None in predominance.  Repeat if     Urine Culture, Routine clinically necessary.    Narrative:       Preferred Collection Type->Urine, Clean Catch    Gram stain [[id number]]     Order Status:  Completed Specimen:  Urine from Urine     Gram stain [[id number]]     Order Status:  Canceled Specimen:  Urine from Urine         All pertinent labs from the last 24 hours have been reviewed.    Significant Diagnostics:  None new

## 2017-11-22 NOTE — PROGRESS NOTES
[facility]  Neurosurgery  Progress Note    Subjective:     History of Present Illness: Donta Cline is 85 y.o. male with PMH ESRD on HD MWF, CAD, NSTEMI, bladder/prostate cancer, DMII, aortic stenosis and recent L2-L4 fusion 10/20/17 who presents to [facility] with complaint of worsened back pain and functional decline since last Friday 11/3. There is no family in the room. The patient is mildy confused and thus a poor historian so most of the history obtained from the medical record. He was discharged to a rehab after the last admission and progressing well so transferred to a skilled nursing facility. He was able to walk 50ft on his own until last week when he had acute worsening of his low back pain and LE weakness. He has been unable to weight bear since that time. He also complains of abdominal pain and bilateral hip pain. He reports some BLE pain but is unable to characterize it. Denies BB dysfunction or saddle anesthesia. There is no record of fever or trauma. ESR/CRP are elevated. INR 3.8, on coumadin.     Post-Op Info:  Procedure(s) (LRB):  REVISION-WOUND--lumbar washout (N/A)   6 Days Post-Op     Interval History: Pt had HD yesterday. He is confused today on exam. Daughter at bedside reports he was only able to sit on the side of the bed today with therapy for less than 10 minutes 2/2 pain.     Medications:  Continuous Infusions:   albumin human 25%      midodrine       Scheduled Meds:   sodium chloride 0.9%   Intravenous Once    sodium chloride 0.9%   Intravenous Once    atorvastatin  80 mg Oral Daily    bisacodyl  10 mg Rectal Daily    dextromethorphan-guaifenesin  mg/5 ml  10 mL Oral Q6H    epoetin preeti  5,000 Units Intravenous Every Mon, Wed, Fri    fentaNYL  1 patch Transdermal Q72H    insulin detemir  8 Units Subcutaneous QHS    metoprolol tartrate  25 mg Oral TID    midodrine  10 mg Oral TID    pantoprazole  40 mg Oral Nightly    polyethylene glycol  17 g Oral BID     senna-docusate 8.6-50 mg  1 tablet Oral BID    sertraline  100 mg Oral QHS    sodium chloride 0.9%  250 mL Intravenous Once     PRN Meds:sodium chloride, sodium chloride, sodium chloride 0.9%, sodium chloride 0.9%, acetaminophen, albumin human 25%, albuterol sulfate, aluminum-magnesium hydroxide-simethicone, benzonatate, dextrose 50%, dextrose 50%, glucagon (human recombinant), glucose, glucose, hydrocodone-acetaminophen 10-325mg, influenza, insulin aspart, midodrine, ondansetron, ondansetron, pneumoc 13-osiel conj-dip cr(PF), promethazine (PHENERGAN) IVPB, ramelteon, senna-docusate 8.6-50 mg, sodium chloride 0.9%     Review of Systems  Objective:     Weight: 97.5 kg (215 lb)  Body mass index is 39.32 kg/m².  Vital Signs (Most Recent):  Temp: 97.7 °F (36.5 °C) (11/22/17 1152)  Pulse: 95 (11/22/17 1500)  Resp: 18 (11/22/17 1152)  BP: 128/60 (11/22/17 1152)  SpO2: (!) 93 % (11/22/17 1152) Vital Signs (24h Range):  Temp:  [97.7 °F (36.5 °C)-98.8 °F (37.1 °C)] 97.7 °F (36.5 °C)  Pulse:  [] 95  Resp:  [16-18] 18  SpO2:  [92 %-95 %] 93 %  BP: (104-141)/(51-67) 128/60                           Hemodialysis AV Fistula Right forearm (Active)   Needle Size 15ga 11/21/2017  9:45 AM   Site Assessment Clean;Dry;Intact 11/22/2017  4:34 AM   Patency Present;Thrill;Bruit 11/22/2017  4:34 AM   Status Deaccessed 11/22/2017  4:34 AM   Flows Good 11/21/2017 12:43 PM   Dressing Intervention New dressing 11/21/2017 12:43 PM   Dressing Status Clean;Dry;Intact 11/21/2017  9:45 AM   Site Condition No complications 11/22/2017  4:34 AM   Dressing Gauze 11/21/2017 12:43 PM       Neurosurgery Physical Exam  General: well developed, well nourished, no distress.   Head: normocephalic, atraumatic  Neurologic: somnolent but easily arousable. Disoriented to place and time. Intermittently follows commands  GCS: Motor: 6/Verbal: 4/Eyes: 4 GCS Total: 14  Mental Status: Awake, Alert, Oriented x3  Cranial nerves: face symmetric, tongue midline,  CN II-XII grossly intact.   Eyes: pupils equal, round, reactive to light with accomodation, EOMI.   Sensory: intact to light touch throughout  Motor Strength:Moves all extremities spontaneously with good tone.  No abnormal movements seen. BLE 3/5 bilateral HF, 4/5 KE/KF, 5/5 distally. BUE 5/5  DTR's - 2 + and symmetric in UE and LE  Pronator Drift: no drift noted  Finger-to-nose: Intact bilaterally  Funez: absent  Clonus: absent  Babinski: absent  Pulses: 2+ and symmetric radial and dorsalis pedis. No lower extremity edema    Significant Labs:    Recent Labs  Lab 11/20/17 1930 11/21/17  0545 11/22/17  0414   * 168* 126*   * 137 140   K 3.4* 4.2 3.5    102 104   CO2 21* 23 25   BUN 33* 39* 23   CREATININE 3.6* 4.2* 3.0*   CALCIUM 9.3 9.7 10.1   MG 1.6  --   --        Recent Labs  Lab 11/20/17 1930 11/20/17  2030 11/21/17  0545 11/22/17  0414   WBC 12.89*  --  9.36 11.94   HGB 5.1* 9.4* 9.0* 8.2*   HCT 16.2* 29.3* 28.6* 26.1*   *  --  315 298       Recent Labs  Lab 11/21/17  0545 11/22/17  0414   INR 1.4* 1.6*     Microbiology Results (last 7 days)     Procedure Component Value Units Date/Time    Urine culture [[id number]] Collected:  11/22/17 1555    Order Status:  Sent Specimen:  Urine from Urine Updated:  11/22/17 1556    Culture, Anaerobic [[id number]] Collected:  11/16/17 2218    Order Status:  Completed Specimen:  Wound from Back Updated:  11/21/17 1217     Anaerobic Culture Culture in progress    Narrative:       2.deep lumbar spine    Culture, Anaerobic [[id number]] Collected:  11/16/17 2218    Order Status:  Completed Specimen:  Wound from Back Updated:  11/21/17 1217     Anaerobic Culture Culture in progress    Narrative:       1.superficial lumbar spine    Aerobic culture [[id number]] Collected:  11/16/17 2218    Order Status:  Completed Specimen:  Wound from Back Updated:  11/20/17 0904     Aerobic Bacterial Culture No growth    Narrative:       2.deep lumbar spine    Aerobic  culture [[id number]] Collected:  11/16/17 2218    Order Status:  Completed Specimen:  Wound from Back Updated:  11/20/17 0904     Aerobic Bacterial Culture No growth    Narrative:       1.superficial lumbar spine    AFB Culture & Smear [[id number]] Collected:  11/16/17 2218    Order Status:  Completed Specimen:  Wound from Back Updated:  11/18/17 0927     AFB Culture & Smear Culture in progress     AFB CULTURE STAIN No acid fast bacilli seen.    Narrative:       2.deep lumbar spine    AFB Culture & Smear [[id number]] Collected:  11/16/17 2218    Order Status:  Completed Specimen:  Wound from Back Updated:  11/18/17 0927     AFB Culture & Smear Culture in progress     AFB CULTURE STAIN No acid fast bacilli seen.    Narrative:       1.superficial lumbar spine    Fungus culture [[id number]] Collected:  11/16/17 2218    Order Status:  Completed Specimen:  Wound from Back Updated:  11/17/17 0742     Fungus (Mycology) Culture Culture in progress    Narrative:       1.superficial lumbar spine    Fungus culture [[id number]] Collected:  11/16/17 2218    Order Status:  Completed Specimen:  Wound from Back Updated:  11/17/17 0742     Fungus (Mycology) Culture Culture in progress    Narrative:       2.deep lumbar spine    Gram stain [[id number]] Collected:  11/16/17 2218    Order Status:  Completed Specimen:  Wound from Back Updated:  11/17/17 0039     Gram Stain Result Rare WBC's      No organisms seen    Narrative:       2.deep lumbar spine    Gram stain [[id number]] Collected:  11/16/17 2218    Order Status:  Completed Specimen:  Wound from Back Updated:  11/16/17 2357     Gram Stain Result No WBC's      No organisms seen    Narrative:       1.superficial lumbar spine    Gram stain [[id number]] Collected:  11/15/17 0126    Order Status:  Completed Specimen:  Urine Updated:  11/16/17 0643     Gram Stain Result Moderate WBC's      Rare yeast    Narrative:       Preferred Collection Type->Urine, Clean Catch    Urine culture [[id number]]  Collected:  11/15/17 0126    Order Status:  Completed Specimen:  Urine Updated:  11/16/17 0532     Urine Culture, Routine Multiple organisms isolated. None in predominance.  Repeat if     Urine Culture, Routine clinically necessary.    Narrative:       Preferred Collection Type->Urine, Clean Catch    Gram stain [[id number]]     Order Status:  Completed Specimen:  Urine from Urine     Gram stain [[id number]]     Order Status:  Canceled Specimen:  Urine from Urine         All pertinent labs from the last 24 hours have been reviewed.    Significant Diagnostics:  None new    Assessment/Plan:     * Acute bilateral low back pain, post-lumbar spinal fusion    85 y.o. male s/p L2-4 fusion 10/20 for lumbar stenosis who presents with acute worsening of his LBP and LE weakness. s/p washout and hematoma evacuation    -Patient with stable strength post-op. Delirious on exam today  -Recommend delirium precautions and workup for possible infectious etiology per primary team  -Continue to hold ASA and coumadin  -HD per nephrology  -Afib, rate control per primary team. Midodrine TID to see if this improves BP to tolerate HD.  -Q4h neuro checks  -PT/OT/OOB. Therapy recommending SNF   -Pain control per primary team. Recommend adding prn muscle relaxer  -TEDs/SCDs/SQH for DVT prophylaxis   -Coordination of care and medical management per primary team   -Please call NSGY with any change in neuro exam               Moon Dong PA-C  Neurosurgery  [facility]

## 2017-11-22 NOTE — SUBJECTIVE & OBJECTIVE
Interval History:  No acute events overnight. Thsi AM complains of back pian - no other complaints.       Review of Systems   Constitutional: Negative for chills, diaphoresis, fatigue and fever.   HENT: Negative for rhinorrhea, sinus pain, sinus pressure, sneezing and sore throat.    Eyes: Negative for visual disturbance.   Respiratory: Negative for cough, choking, chest tightness and shortness of breath.    Cardiovascular: Negative for chest pain, palpitations and leg swelling.   Gastrointestinal: Positive for constipation. Negative for diarrhea and nausea.        Denies bowel incontinence   Genitourinary: Negative for dysuria.   Musculoskeletal: Positive for back pain and gait problem. Negative for joint swelling, myalgias and neck pain.   Neurological: Negative for dizziness, facial asymmetry, light-headedness and headaches.   Psychiatric/Behavioral: Negative for agitation and behavioral problems.     Objective:     Vital Signs (Most Recent):  Temp: 97.7 °F (36.5 °C) (11/21/17 2036)  Pulse: 91 (11/21/17 2300)  Resp: 18 (11/21/17 2036)  BP: (!) 141/67 (11/21/17 2036)  SpO2: 95 % (11/21/17 2036) Vital Signs (24h Range):  Temp:  [97.7 °F (36.5 °C)-98.2 °F (36.8 °C)] 97.7 °F (36.5 °C)  Pulse:  [] 91  Resp:  [14-20] 18  SpO2:  [94 %-96 %] 95 %  BP: ()/(45-93) 141/67     Weight: 97.5 kg (215 lb)  Body mass index is 39.32 kg/m².    Intake/Output Summary (Last 24 hours) at 11/21/17 2348  Last data filed at 11/21/17 1243   Gross per 24 hour   Intake              900 ml   Output              561 ml   Net              339 ml      Physical Exam   Constitutional: He appears well-developed.   Awake/alert   HENT:   Mouth/Throat: Oropharynx is clear and moist.   Eyes: Conjunctivae are normal. No scleral icterus.   Cardiovascular: Normal heart sounds and intact distal pulses.    No murmur heard.  Irregular rhythm, tachycardic   Pulmonary/Chest: Effort normal. No respiratory distress. He has no wheezes. He has rales.    Bibasilar crackles present   Abdominal: Soft. Bowel sounds are normal.   Mild distension, soft, tympanic to percussion, no fluid wave noted, no bulging flanks, or abdominal collateral vessels   Musculoskeletal:   RUE forearm AV fistula with palpable thrill.     Surgical drains removed, dressing with sanguinous drainage   Lymphadenopathy:     He has no cervical adenopathy.   Neurological:   Alert, oriented to self, hospital   Skin: Skin is warm. There is pallor.       Significant Labs:   CBC:     Recent Labs  Lab 11/20/17 0643 11/20/17 1930 11/20/17  2030 11/21/17  0545   WBC 8.96 12.89*  --  9.36   HGB 9.2* 5.1* 9.4* 9.0*   HCT 29.8* 16.2* 29.3* 28.6*    373*  --  315     CMP:     Recent Labs  Lab 11/20/17 0643 11/20/17 1930 11/21/17  0545   * 135* 137   K 3.6 3.4* 4.2    103 102   CO2 23 21* 23   * 165* 168*   BUN 46* 33* 39*   CREATININE 4.8* 3.6* 4.2*   CALCIUM 9.7 9.3 9.7   PROT  --  6.1  --    ALBUMIN 2.5* 2.5* 2.5*   BILITOT  --  0.4  --    ALKPHOS  --  193*  --    AST  --  52*  --    ALT  --  <5*  --    ANIONGAP 12 11 12   EGFRNONAA 10.3* 14.5* 12.1*     Microbiology Results (last 7 days)     Procedure Component Value Units Date/Time    Culture, Anaerobic [[id number]] Collected:  11/16/17 2218    Order Status:  Completed Specimen:  Wound from Back Updated:  11/21/17 1217     Anaerobic Culture Culture in progress    Narrative:       2.deep lumbar spine    Culture, Anaerobic [[id number]] Collected:  11/16/17 2218    Order Status:  Completed Specimen:  Wound from Back Updated:  11/21/17 1217     Anaerobic Culture Culture in progress    Narrative:       1.superficial lumbar spine    Aerobic culture [[id number]] Collected:  11/16/17 2218    Order Status:  Completed Specimen:  Wound from Back Updated:  11/20/17 0904     Aerobic Bacterial Culture No growth    Narrative:       2.deep lumbar spine    Aerobic culture [[id number]] Collected:  11/16/17 7473    Order Status:  Completed  Specimen:  Wound from Back Updated:  11/20/17 0904     Aerobic Bacterial Culture No growth    Narrative:       1.superficial lumbar spine    AFB Culture & Smear [[id number]] Collected:  11/16/17 2218    Order Status:  Completed Specimen:  Wound from Back Updated:  11/18/17 0927     AFB Culture & Smear Culture in progress     AFB CULTURE STAIN No acid fast bacilli seen.    Narrative:       2.deep lumbar spine    AFB Culture & Smear [[id number]] Collected:  11/16/17 2218    Order Status:  Completed Specimen:  Wound from Back Updated:  11/18/17 0927     AFB Culture & Smear Culture in progress     AFB CULTURE STAIN No acid fast bacilli seen.    Narrative:       1.superficial lumbar spine    Fungus culture [[id number]] Collected:  11/16/17 2218    Order Status:  Completed Specimen:  Wound from Back Updated:  11/17/17 0742     Fungus (Mycology) Culture Culture in progress    Narrative:       1.superficial lumbar spine    Fungus culture [[id number]] Collected:  11/16/17 2218    Order Status:  Completed Specimen:  Wound from Back Updated:  11/17/17 0742     Fungus (Mycology) Culture Culture in progress    Narrative:       2.deep lumbar spine    Gram stain [[id number]] Collected:  11/16/17 2218    Order Status:  Completed Specimen:  Wound from Back Updated:  11/17/17 0039     Gram Stain Result Rare WBC's      No organisms seen    Narrative:       2.deep lumbar spine    Gram stain [[id number]] Collected:  11/16/17 2218    Order Status:  Completed Specimen:  Wound from Back Updated:  11/16/17 2357     Gram Stain Result No WBC's      No organisms seen    Narrative:       1.superficial lumbar spine    Gram stain [[id number]] Collected:  11/15/17 0126    Order Status:  Completed Specimen:  Urine Updated:  11/16/17 0643     Gram Stain Result Moderate WBC's      Rare yeast    Narrative:       Preferred Collection Type->Urine, Clean Catch    Urine culture [[id number]] Collected:  11/15/17 0126    Order Status:  Completed Specimen:  Urine  Updated:  11/16/17 0532     Urine Culture, Routine Multiple organisms isolated. None in predominance.  Repeat if     Urine Culture, Routine clinically necessary.    Narrative:       Preferred Collection Type->Urine, Clean Catch    Gram stain [[id number]]     Order Status:  Completed Specimen:  Urine from Urine     Gram stain [[id number]]     Order Status:  Canceled Specimen:  Urine from Urine             Significant Imaging: I have reviewed all pertinent imaging results/findings within the past 24 hours.

## 2017-11-22 NOTE — ASSESSMENT & PLAN NOTE
- last echo 10/2017 with pulmonary HTN and undetermined diastolic function, EF 55  - -continue metoprolol  -HD is main source of volume removal  -cont on CPAP @ 8 similar to prior hospital stay qhs, due to concerns of volume overload post operatively, continue as pt tolerates well.

## 2017-11-22 NOTE — PT/OT/SLP PROGRESS
Physical Therapy  Treatment    Donta Cline   MRN: [id number]   Admitting Diagnosis: Acute bilateral low back pain    PT Received On: 11/22/17  PT Start Time: 1420     PT Stop Time: 1434    PT Total Time (min): 14 min       Billable Minutes:  Therapeutic Activity 14    Treatment Type: Treatment  PT/PTA: PT     PTA Visit Number: 0       General Precautions: Standard, fall, aspiration, nectar thick (spinal)  Orthopedic Precautions: N/A   Braces: TLSO         Subjective:  Communicated with RN prior to session.  Patient agreeable to PT session with encouragement from writing therapist and daughter.    Pain/Comfort  Pain Rating 1: 10/10  Location - Orientation 1: lower  Location 1: back  Pain Addressed 1: Distraction, Cessation of Activity, Reposition  Pain Rating Post-Intervention 1: 10/10    Objective:   Patient found with: telemetry    Functional Mobility:  Bed Mobility:   Rolling/Turning to Left: Moderate assistance  Scooting/Bridging: Moderate Assistance (seated scoot/supine scoot with assist of 2)  Supine to Sit: Moderate Assistance (log roll)  Sit to Supine: Maximum Assistance    Transfers:  Sit <> Stand Assistance: Activity did not occur  Patient declined attempt    Balance:   Static Sit: NORMAL: No deviations seen in posture held statically with BUE support  Dynamic Sit: FAIR: Cannot move trunk without losing balance    Therapeutic Activities and Exercises:  Patient and daughter educated on:   - role of PT/POC 3x/week   - safety with all mobility   - decreased safety with transfer attempt secondary to AMS   - transfer attempt from previous session   - importance of participation with therapy services   - importance of sitting EOB to improve tolerance   - donning/doffing TLSO    Verbalized understanding of all education provided.    Seated EOB with BUE x 8 mins. Unable to tolerate further duration secondary to LBP and inability to hold head up despite vc's.     2-/5 with BLE knee extension     AM-PAC 6 CLICK  MOBILITY  How much help from another person does this patient currently need?   1 = Unable, Total/Dependent Assistance  2 = A lot, Maximum/Moderate Assistance  3 = A little, Minimum/Contact Guard/Supervision  4 = None, Modified [place]/Independent    Turning over in bed (including adjusting bedclothes, sheets and blankets)?: 2  Sitting down on and standing up from a chair with arms (e.g., wheelchair, bedside commode, etc.): 1  Moving from lying on back to sitting on the side of the bed?: 2  Moving to and from a bed to a chair (including a wheelchair)?: 1  Need to walk in hospital room?: 1  Climbing 3-5 steps with a railing?: 1  Total Score: 8    AM-PAC Raw Score CMS G-Code Modifier Level of Impairment Assistance   6 % Total / Unable   7 - 9 CM 80 - 100% Maximal Assist   10 - 14 CL 60 - 80% Moderate Assist   15 - 19 CK 40 - 60% Moderate Assist   20 - 22 CJ 20 - 40% Minimal Assist   23 CI 1-20% SBA / CGA   24 CH 0% Independent/ Mod I     Patient left supine with all lines intact, call button in reach, RN notified and daughter present.    Assessment:  Donta Cline is a 85 y.o. male with a medical diagnosis of Acute bilateral low back pain REVISION-WOUND--lumbar washout (N/A) 5 Days Post-Op and presents with rehab identified impairments listed below. Needs maximum encouragement for participation. Bed mobility with Mod/Max Assist. Seated EOB x 8 mins with BUE. Attempted to sit with alternating UE support; however, patient unable secondary to pain. Unsafe to transfer secondary to AMS. To benefit from continued skilled intervention to address deficits.    Rehab identified problem list/impairments: Rehab identified problem list/impairments: weakness, impaired endurance, impaired functional mobilty, impaired balance, gait instability, decreased lower extremity function, pain, decreased safety awareness, impaired cognition, orthopedic precautions, impaired skin, decreased ROM    Rehab potential is  fair.    Activity tolerance: Fair    Discharge recommendations: Discharge Facility/Level Of Care Needs: nursing facility, skilled     Barriers to discharge: Barriers to Discharge: Decreased caregiver support (patient requiring significant increased assist at this time)    Equipment recommendations: Equipment Needed After Discharge:  (TBD)     GOALS:    Physical Therapy Goals        Problem: Physical Therapy Goal    Goal Priority Disciplines Outcome Goal Variances Interventions   Physical Therapy Goal     PT/OT, PT Ongoing (interventions implemented as appropriate)     Description:  Goals to be met by: 2017     Patient will increase functional independence with mobility by performin. Supine to sit with MInimal Assistance  2. Sit to supine with MInimal Assistance  3. Sit to stand transfer with Moderate Assistance  4. Bed to chair transfer with Moderate Assistance  5. Gait  x 10 feet with Maximum Assistance using Rolling Walker.   6. Lower extremity exercise program x15 reps per handout, with assistance as needed                       PLAN:    Patient to be seen 3 x/week  to address the above listed problems via therapeutic activities, gait training, therapeutic exercises, neuromuscular re-education  Plan of Care expires: 12/15/17  Plan of Care reviewed with: patient, daughter         Edgar GIBSON Kyle ANDREW, PT  2017

## 2017-11-22 NOTE — PLAN OF CARE
Problem: Patient Care Overview  Goal: Plan of Care Review  Outcome: Ongoing (interventions implemented as appropriate)  Patient AAO to self. Free of falls and injuries overnight. BG check ac /hs. Levemir administered. Weight shift postioning as tolerated. Patient stable will continue to monitor.

## 2017-11-22 NOTE — ASSESSMENT & PLAN NOTE
-presumed secondary to supratherapeutic INR and occurred post-operatively  -s/p 2units PRBC  -Hgb stable today at ~9.0   -trend CBC and Coags, transfuse if <7, will have to monitor hemodynamic/respiratory status and weigh risk benefit of further blood product transfusion

## 2017-11-22 NOTE — ASSESSMENT & PLAN NOTE
- neurosurgery took pt for washout POD2 and evacuation of hematoma,  -Hgb dropped during admission admit - s/p 2unit pRBC, Hgb now stable  -drains removed and not on abx  cultures remain NGTD from surgery.    - Supportive care, PT/OT  - continue fentanyl patches and norco prn - chronic management may be pain control, he still has severe pain that neurosurgery does not believe is explained by operative findings alone.

## 2017-11-22 NOTE — PLAN OF CARE
Calldl by nurse to room per family member. Cm went to room. Pt's other daughter was present (CM had previously met Wendy Cisneros). Other daughter had questions about anticipated dc date.  I informed pt's daughter that it could possibly be on Friday, pending medically stable from MD and as this was reported in our am huddle.  CM had stopped by yesterday but no pt or family were in room as pt was in dialysis.   MD had seen pt down in dialysis unit yesterday but is coming to the room to meet w other daughter.  Daughter wants to meet and speak with RUSTAM Calixto re: transportation and referral to [facility]. Raina EASTON is going to room to speak w daughter.     11/22/17 1112   Right Care Assessment   Can the patient answer the patient profile reliably? Yes, cognitively intact   How often would a person be available to care for the patient? Whenever needed   Describe the patient's ability to walk at the present time. Major restrictions/daily assistance from another person   How does the patient rate their overall health at the present time? Poor   Number of comorbid conditions (as recorded on the chart) Five or more   During the past month, has the patient often been bothered by feeling down, depressed or hopeless? No   Have you felt down, depressed, or hopeless? 0   During the past month, has the patient often been bothered by little interest or pleasure in doing things? No

## 2017-11-23 PROBLEM — D72.829 LEUKOCYTOSIS: Status: ACTIVE | Noted: 2017-01-01

## 2017-11-23 PROBLEM — R41.0 DELIRIUM: Status: ACTIVE | Noted: 2017-01-01

## 2017-11-23 NOTE — ASSESSMENT & PLAN NOTE
- Neurosurgery took pt for washout and evacuation of hematoma on 11/16  - Hgb dropped during admission admit - s/p 2unit pRBC, Hgb now grossly stable  - Drains removed and not on abx-  cultures remain NGTD from surgery.    - Supportive care, PT/OT  - Given worsening delirium - will stop fentanyl patches and norco prn  - Pain control with scheduled tylenol 650 q8hrs;  tramadol 50 qhs for breakthrough ; will hold pregabalin 50mg Qd for now given worsening delirium ;  - component of pain likely severe right hip osteoarthritis - will place lidocaine patches   - Mobility remains poor and strength in hip flexors/proximally still poor. 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 Unclear if this is pts new baseline.

## 2017-11-23 NOTE — SUBJECTIVE & OBJECTIVE
Interval History:  No acute events overnight. Spoke to pts daughters on rounds today. Pt still confused - more than normal. Complaining of back pain - also grabbing right leg in pain.     Review of Systems   Constitutional: Negative for chills, diaphoresis, fatigue and fever.   HENT: Negative for rhinorrhea, sinus pain, sinus pressure, sneezing and sore throat.    Eyes: Negative for visual disturbance.   Respiratory: Negative for cough, choking, chest tightness and shortness of breath.    Cardiovascular: Negative for chest pain, palpitations and leg swelling.   Gastrointestinal: Positive for constipation. Negative for diarrhea and nausea.        Denies bowel incontinence   Genitourinary: Negative for dysuria.   Musculoskeletal: Positive for back pain and gait problem. Negative for joint swelling, myalgias and neck pain.   Neurological: Negative for dizziness, facial asymmetry, light-headedness and headaches.   Psychiatric/Behavioral: Negative for agitation and behavioral problems.     Objective:     Vital Signs (Most Recent):  Temp: 97.7 °F (36.5 °C) (11/1935)  Pulse: 93 (11/1935)  Resp: 18 (11/1935)  BP: (!) 147/69 (11/1935)  SpO2: (!) 88 % (11/22/17 1643) Vital Signs (24h Range):  Temp:  [97.6 °F (36.4 °C)-98.8 °F (37.1 °C)] 97.7 °F (36.5 °C)  Pulse:  [75-95] 93  Resp:  [16-18] 18  SpO2:  [88 %-95 %] 88 %  BP: (104-147)/(51-69) 147/69     Weight: 97.5 kg (215 lb)  Body mass index is 39.32 kg/m².    Intake/Output Summary (Last 24 hours) at 11/22/17 2119  Last data filed at 11/22/17 2027   Gross per 24 hour   Intake               60 ml   Output                0 ml   Net               60 ml      Physical Exam   Constitutional: He appears well-developed.   Awake/alert   HENT:   Mouth/Throat: Oropharynx is clear and moist.   Eyes: Conjunctivae are normal. No scleral icterus.   Cardiovascular: Normal heart sounds and intact distal pulses.    No murmur heard.  Irregular rhythm, tachycardic    Pulmonary/Chest: Effort normal. No respiratory distress. He has no wheezes. He has rales.   Bibasilar crackles present   Abdominal: Soft. Bowel sounds are normal.   Mild distension, soft, tympanic to percussion, no fluid wave noted, no bulging flanks, or abdominal collateral vessels   Musculoskeletal:   RUE forearm AV fistula with palpable thrill.     Surgical drains removed, dressing with sanguinous drainage   Lymphadenopathy:     He has no cervical adenopathy.   Neurological:   Alert, oriented to self, hospital   Skin: Skin is warm. There is pallor.       Significant Labs:   CBC:     Recent Labs  Lab 11/21/17  0545 11/22/17  0414   WBC 9.36 11.94   HGB 9.0* 8.2*   HCT 28.6* 26.1*    298     CMP:     Recent Labs  Lab 11/21/17 0545 11/22/17  0414    140   K 4.2 3.5    104   CO2 23 25   * 126*   BUN 39* 23   CREATININE 4.2* 3.0*   CALCIUM 9.7 10.1   ALBUMIN 2.5* 3.0*   ANIONGAP 12 11   EGFRNONAA 12.1* 18.1*     Microbiology Results (last 7 days)     Procedure Component Value Units Date/Time    Urine culture [[id number]] Collected:  11/22/17 1555    Order Status:  Sent Specimen:  Urine from Urine Updated:  11/22/17 1706    Culture, Anaerobic [[id number]] Collected:  11/16/17 2218    Order Status:  Completed Specimen:  Wound from Back Updated:  11/21/17 1217     Anaerobic Culture Culture in progress    Narrative:       2.deep lumbar spine    Culture, Anaerobic [[id number]] Collected:  11/16/17 2218    Order Status:  Completed Specimen:  Wound from Back Updated:  11/21/17 1217     Anaerobic Culture Culture in progress    Narrative:       1.superficial lumbar spine    Aerobic culture [[id number]] Collected:  11/16/17 2218    Order Status:  Completed Specimen:  Wound from Back Updated:  11/20/17 0904     Aerobic Bacterial Culture No growth    Narrative:       2.deep lumbar spine    Aerobic culture [[id number]] Collected:  11/16/17 2218    Order Status:  Completed Specimen:  Wound from Back Updated:   11/20/17 0904     Aerobic Bacterial Culture No growth    Narrative:       1.superficial lumbar spine    AFB Culture & Smear [[id number]] Collected:  11/16/17 2218    Order Status:  Completed Specimen:  Wound from Back Updated:  11/18/17 0927     AFB Culture & Smear Culture in progress     AFB CULTURE STAIN No acid fast bacilli seen.    Narrative:       2.deep lumbar spine    AFB Culture & Smear [[id number]] Collected:  11/16/17 2218    Order Status:  Completed Specimen:  Wound from Back Updated:  11/18/17 0927     AFB Culture & Smear Culture in progress     AFB CULTURE STAIN No acid fast bacilli seen.    Narrative:       1.superficial lumbar spine    Fungus culture [[id number]] Collected:  11/16/17 2218    Order Status:  Completed Specimen:  Wound from Back Updated:  11/17/17 0742     Fungus (Mycology) Culture Culture in progress    Narrative:       1.superficial lumbar spine    Fungus culture [[id number]] Collected:  11/16/17 2218    Order Status:  Completed Specimen:  Wound from Back Updated:  11/17/17 0742     Fungus (Mycology) Culture Culture in progress    Narrative:       2.deep lumbar spine    Gram stain [[id number]] Collected:  11/16/17 2218    Order Status:  Completed Specimen:  Wound from Back Updated:  11/17/17 0039     Gram Stain Result Rare WBC's      No organisms seen    Narrative:       2.deep lumbar spine    Gram stain [[id number]] Collected:  11/16/17 2218    Order Status:  Completed Specimen:  Wound from Back Updated:  11/16/17 2357     Gram Stain Result No WBC's      No organisms seen    Narrative:       1.superficial lumbar spine    Gram stain [[id number]] Collected:  11/15/17 0126    Order Status:  Completed Specimen:  Urine Updated:  11/16/17 0643     Gram Stain Result Moderate WBC's      Rare yeast    Narrative:       Preferred Collection Type->Urine, Clean Catch    Urine culture [[id number]] Collected:  11/15/17 0126    Order Status:  Completed Specimen:  Urine Updated:  11/16/17 0532     Urine  Culture, Routine Multiple organisms isolated. None in predominance.  Repeat if     Urine Culture, Routine clinically necessary.    Narrative:       Preferred Collection Type->Urine, Clean Catch            Significant Imaging: I have reviewed all pertinent imaging results/findings within the past 24 hours.

## 2017-11-23 NOTE — ASSESSMENT & PLAN NOTE
- in setting of ESRD and oliguria  - urine culture on admit shows many organisms - none in predominance  - urine culture repeated given worsening delirium

## 2017-11-23 NOTE — ASSESSMENT & PLAN NOTE
85 y.o. male s/p L2-4 fusion 10/20 for lumbar stenosis who presents with acute worsening of his LBP and LE weakness. s/p washout and hematoma evacuation    -Patient with stable strength post-op. Delirious on exam today  -delirium precautions and workup for possible infectious etiology per primary team  -Continue to hold ASA and coumadin  -HD per nephrology  -Afib, rate control per primary team.  -Q4h neuro checks  -PT/OT/OOB. Therapy recommending SNF   -Pain control per primary team.   -TEDs/SCDs/SQH for DVT prophylaxis   -Coordination of care and medical management per primary team   -Please call NSGY with any change in neuro exam

## 2017-11-23 NOTE — ASSESSMENT & PLAN NOTE
-last echo 10/2017 with pulmonary HTN and undetermined diastolic function, EF 55  -continue metoprolol 25 TID   -HD is main source of volume removal

## 2017-11-23 NOTE — PLAN OF CARE
Problem: Patient Care Overview  Goal: Plan of Care Review  Outcome: Ongoing (interventions implemented as appropriate)  AVSS. Pt oriented only to self. Confusion and drowsiness throughout shift. Pt taken for Xray. Pt on aspiration precautions, suction at bedside. Pt safety maintained. Hourly rounding performed.

## 2017-11-23 NOTE — ASSESSMENT & PLAN NOTE
-presumed secondary to supratherapeutic INR and occurred post-operatively  -s/p 2units PRBC  -Hgb slightly dropped - 8.2 today    -trend CBC and Coags, transfuse if <7, will have to monitor hemodynamic/respiratory status and weigh risk benefit of further blood product transfusion

## 2017-11-23 NOTE — SUBJECTIVE & OBJECTIVE
Interval History: NAEON. Remains delirious.    Medications:  Continuous Infusions:   albumin human 25%      midodrine       Scheduled Meds:   sodium chloride 0.9%   Intravenous Once    sodium chloride 0.9%   Intravenous Once    acetaminophen  650 mg Oral Q8H    atorvastatin  80 mg Oral Daily    bisacodyl  10 mg Rectal Daily    dextromethorphan-guaifenesin  mg/5 ml  10 mL Oral Q6H    epoetin preeti  5,000 Units Intravenous Every Mon, Wed, Fri    insulin detemir  8 Units Subcutaneous QHS    lidocaine  2 patch Transdermal Q24H    metoprolol tartrate  25 mg Oral TID    midodrine  10 mg Oral TID    pantoprazole  40 mg Oral Nightly    polyethylene glycol  17 g Oral BID    pregabalin  50 mg Oral Daily    senna-docusate 8.6-50 mg  1 tablet Oral BID    sertraline  100 mg Oral QHS    sodium chloride 0.9%  250 mL Intravenous Once     PRN Meds:sodium chloride, sodium chloride, sodium chloride 0.9%, sodium chloride 0.9%, albumin human 25%, albuterol sulfate, aluminum-magnesium hydroxide-simethicone, benzonatate, dextrose 50%, dextrose 50%, glucagon (human recombinant), glucose, glucose, influenza, insulin aspart, midodrine, ondansetron, pneumoc 13-osiel conj-dip cr(PF), promethazine (PHENERGAN) IVPB, ramelteon, senna-docusate 8.6-50 mg, sodium chloride 0.9%, traMADol     Review of Systems    Objective:     Weight: 97.5 kg (215 lb)  Body mass index is 39.32 kg/m².  Vital Signs (Most Recent):  Temp: 98.4 °F (36.9 °C) (11/23/17 0713)  Pulse: 84 (11/23/17 0718)  Resp: 16 (11/23/17 0713)  BP: 138/68 (11/23/17 0713)  SpO2: 98 % (11/23/17 0713) Vital Signs (24h Range):  Temp:  [97.4 °F (36.3 °C)-98.4 °F (36.9 °C)] 98.4 °F (36.9 °C)  Pulse:  [] 84  Resp:  [16-18] 16  SpO2:  [88 %-100 %] 98 %  BP: (122-148)/(58-85) 138/68           Oxygen Concentration (%):  [21] 21         Hemodialysis AV Fistula Right forearm (Active)   Needle Size 15ga 11/21/2017  9:45 AM   Site Assessment Clean;Dry;Intact 11/22/2017  4:34  AM   Patency Present;Thrill;Bruit 11/22/2017  4:34 AM   Status Deaccessed 11/22/2017  4:34 AM   Flows Good 11/21/2017 12:43 PM   Dressing Intervention New dressing 11/21/2017 12:43 PM   Dressing Status Clean;Dry;Intact 11/21/2017  9:45 AM   Site Condition No complications 11/22/2017  4:34 AM   Dressing Gauze 11/21/2017 12:43 PM       Neurosurgery Physical Exam    General: well developed, well nourished, no distress.   Head: normocephalic, atraumatic  Neurologic: somnolent but easily arousable. Disoriented to place and time. Intermittently follows commands  GCS: Motor: 6/Verbal: 4/Eyes: 4 GCS Total: 14  Mental Status: Awake, Alert, Oriented to person.  Cranial nerves: face symmetric, tongue midline, CN II-XII grossly intact.   Eyes: pupils equal, round, reactive to light with accomodation, EOMI.   Sensory: intact to light touch throughout  Motor Strength:Moves all extremities spontaneously with good tone.  No abnormal movements seen. BLE 3/5 bilateral HF, 4/5 KE/KF, 5/5 distally. BUE 5/5    Significant Labs:    Recent Labs  Lab 11/22/17 0414 11/23/17  0515   * 196*    138   K 3.5 3.7    102   CO2 25 24   BUN 23 37*   CREATININE 3.0* 3.9*   CALCIUM 10.1 10.6*   MG  --  1.7       Recent Labs  Lab 11/22/17 0414 11/23/17 0514   WBC 11.94 14.61*   HGB 8.2* 8.7*   HCT 26.1* 28.8*    334       Recent Labs  Lab 11/22/17 0414 11/23/17  0514   INR 1.6* 1.4*     Microbiology Results (last 7 days)     Procedure Component Value Units Date/Time    Blood culture [[id number]] Collected:  11/23/17 0909    Order Status:  Sent Specimen:  Blood Updated:  11/23/17 1028    Blood culture [[id number]] Collected:  11/23/17 0909    Order Status:  Sent Specimen:  Blood Updated:  11/23/17 1028    Culture, Respiratory with Gram Stain [[id number]]     Order Status:  No result Specimen:  Respiratory     Urine culture [[id number]] Collected:  11/22/17 1555    Order Status:  Sent Specimen:  Urine from Urine Updated:   11/22/17 1706    Culture, Anaerobic [[id number]] Collected:  11/16/17 2218    Order Status:  Completed Specimen:  Wound from Back Updated:  11/21/17 1217     Anaerobic Culture Culture in progress    Narrative:       2.deep lumbar spine    Culture, Anaerobic [[id number]] Collected:  11/16/17 2218    Order Status:  Completed Specimen:  Wound from Back Updated:  11/21/17 1217     Anaerobic Culture Culture in progress    Narrative:       1.superficial lumbar spine    Aerobic culture [[id number]] Collected:  11/16/17 2218    Order Status:  Completed Specimen:  Wound from Back Updated:  11/20/17 0904     Aerobic Bacterial Culture No growth    Narrative:       2.deep lumbar spine    Aerobic culture [[id number]] Collected:  11/16/17 2218    Order Status:  Completed Specimen:  Wound from Back Updated:  11/20/17 0904     Aerobic Bacterial Culture No growth    Narrative:       1.superficial lumbar spine    AFB Culture & Smear [[id number]] Collected:  11/16/17 2218    Order Status:  Completed Specimen:  Wound from Back Updated:  11/18/17 0927     AFB Culture & Smear Culture in progress     AFB CULTURE STAIN No acid fast bacilli seen.    Narrative:       2.deep lumbar spine    AFB Culture & Smear [[id number]] Collected:  11/16/17 2218    Order Status:  Completed Specimen:  Wound from Back Updated:  11/18/17 0927     AFB Culture & Smear Culture in progress     AFB CULTURE STAIN No acid fast bacilli seen.    Narrative:       1.superficial lumbar spine    Fungus culture [[id number]] Collected:  11/16/17 2218    Order Status:  Completed Specimen:  Wound from Back Updated:  11/17/17 0742     Fungus (Mycology) Culture Culture in progress    Narrative:       1.superficial lumbar spine    Fungus culture [[id number]] Collected:  11/16/17 2218    Order Status:  Completed Specimen:  Wound from Back Updated:  11/17/17 0742     Fungus (Mycology) Culture Culture in progress    Narrative:       2.deep lumbar spine    Gram stain [[id number]] Collected:   11/16/17 2218    Order Status:  Completed Specimen:  Wound from Back Updated:  11/17/17 0039     Gram Stain Result Rare WBC's      No organisms seen    Narrative:       2.deep lumbar spine    Gram stain [[id number]] Collected:  11/16/17 2218    Order Status:  Completed Specimen:  Wound from Back Updated:  11/16/17 4135     Gram Stain Result No WBC's      No organisms seen    Narrative:       1.superficial lumbar spine        All pertinent labs from the last 24 hours have been reviewed.    Significant Diagnostics:  None new

## 2017-11-23 NOTE — ASSESSMENT & PLAN NOTE
- Neurosurgery took pt for washout and evacuation of hematoma on 11/16  - Hgb dropped during admission admit - s/p 2unit pRBC, Hgb now grossly stable  - Drains removed and not on abx-  cultures remain NGTD from surgery.    - Supportive care, PT/OT  - Given worsening delirium - will stop fentanyl patches and norco prn  - Pain control with scheduled tylenol 560 q8hrs; will add pregabalin 50mg Qd ; tramadol 50 qhs for breakthrough  - component of pain likely severe right hip osteoarthritis - will place lidocaine patches   - Mobility remains poor and strength in hip flexors/proximally still poor. 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 Unclear if this is pts new baseline.

## 2017-11-23 NOTE — PLAN OF CARE
Problem: Patient Care Overview  Goal: Plan of Care Review  Outcome: Ongoing (interventions implemented as appropriate)  Pt disoriented to time and situation. Confusion increasing throughout day, reoriented as needed. Pt c/o thick cough, cough syrup ordered given. Pt bg covered with ordered insulin. Incision CDI with some dried drainage. Pt daughter to bedside. Some scant thick purulent urine produced, MD notified, sample sent to lab. AVSS. No pain noted during shift. Pt safety maintained. Hourly rounding performed.

## 2017-11-23 NOTE — PROGRESS NOTES
[facility]  Neurosurgery  Progress Note    Subjective:     History of Present Illness: Donta Cline is 85 y.o. male with PMH ESRD on HD MWF, CAD, NSTEMI, bladder/prostate cancer, DMII, aortic stenosis and recent L2-L4 fusion 10/20/17 who presents to [facility] with complaint of worsened back pain and functional decline since last Friday 11/3. There is no family in the room. The patient is mildy confused and thus a poor historian so most of the history obtained from the medical record. He was discharged to a rehab after the last admission and progressing well so transferred to a skilled nursing facility. He was able to walk 50ft on his own until last week when he had acute worsening of his low back pain and LE weakness. He has been unable to weight bear since that time. He also complains of abdominal pain and bilateral hip pain. He reports some BLE pain but is unable to characterize it. Denies BB dysfunction or saddle anesthesia. There is no record of fever or trauma. ESR/CRP are elevated. INR 3.8, on coumadin.     Post-Op Info:  Procedure(s) (LRB):  REVISION-WOUND--lumbar washout (N/A)   7 Days Post-Op     Interval History: NAEON. Remains delirious.    Medications:  Continuous Infusions:   albumin human 25%      midodrine       Scheduled Meds:   sodium chloride 0.9%   Intravenous Once    sodium chloride 0.9%   Intravenous Once    acetaminophen  650 mg Oral Q8H    atorvastatin  80 mg Oral Daily    bisacodyl  10 mg Rectal Daily    dextromethorphan-guaifenesin  mg/5 ml  10 mL Oral Q6H    epoetin preeti  5,000 Units Intravenous Every Mon, Wed, Fri    insulin detemir  8 Units Subcutaneous QHS    lidocaine  2 patch Transdermal Q24H    metoprolol tartrate  25 mg Oral TID    midodrine  10 mg Oral TID    pantoprazole  40 mg Oral Nightly    polyethylene glycol  17 g Oral BID    pregabalin  50 mg Oral Daily    senna-docusate 8.6-50 mg  1 tablet Oral BID    sertraline  100 mg Oral QHS     sodium chloride 0.9%  250 mL Intravenous Once     PRN Meds:sodium chloride, sodium chloride, sodium chloride 0.9%, sodium chloride 0.9%, albumin human 25%, albuterol sulfate, aluminum-magnesium hydroxide-simethicone, benzonatate, dextrose 50%, dextrose 50%, glucagon (human recombinant), glucose, glucose, influenza, insulin aspart, midodrine, ondansetron, pneumoc 13-osiel conj-dip cr(PF), promethazine (PHENERGAN) IVPB, ramelteon, senna-docusate 8.6-50 mg, sodium chloride 0.9%, traMADol     Review of Systems    Objective:     Weight: 97.5 kg (215 lb)  Body mass index is 39.32 kg/m².  Vital Signs (Most Recent):  Temp: 98.4 °F (36.9 °C) (11/23/17 0713)  Pulse: 84 (11/23/17 0718)  Resp: 16 (11/23/17 0713)  BP: 138/68 (11/23/17 0713)  SpO2: 98 % (11/23/17 0713) Vital Signs (24h Range):  Temp:  [97.4 °F (36.3 °C)-98.4 °F (36.9 °C)] 98.4 °F (36.9 °C)  Pulse:  [] 84  Resp:  [16-18] 16  SpO2:  [88 %-100 %] 98 %  BP: (122-148)/(58-85) 138/68           Oxygen Concentration (%):  [21] 21         Hemodialysis AV Fistula Right forearm (Active)   Needle Size 15ga 11/21/2017  9:45 AM   Site Assessment Clean;Dry;Intact 11/22/2017  4:34 AM   Patency Present;Thrill;Bruit 11/22/2017  4:34 AM   Status Deaccessed 11/22/2017  4:34 AM   Flows Good 11/21/2017 12:43 PM   Dressing Intervention New dressing 11/21/2017 12:43 PM   Dressing Status Clean;Dry;Intact 11/21/2017  9:45 AM   Site Condition No complications 11/22/2017  4:34 AM   Dressing Gauze 11/21/2017 12:43 PM       Neurosurgery Physical Exam    General: well developed, well nourished, no distress.   Head: normocephalic, atraumatic  Neurologic: somnolent but easily arousable. Disoriented to place and time. Intermittently follows commands  GCS: Motor: 6/Verbal: 4/Eyes: 4 GCS Total: 14  Mental Status: Awake, Alert, Oriented to person.  Cranial nerves: face symmetric, tongue midline, CN II-XII grossly intact.   Eyes: pupils equal, round, reactive to light with accomodation, EOMI.    Sensory: intact to light touch throughout  Motor Strength:Moves all extremities spontaneously with good tone.  No abnormal movements seen. BLE 3/5 bilateral HF, 4/5 KE/KF, 5/5 distally. BUE 5/5    Significant Labs:    Recent Labs  Lab 11/22/17 0414 11/23/17 0515   * 196*    138   K 3.5 3.7    102   CO2 25 24   BUN 23 37*   CREATININE 3.0* 3.9*   CALCIUM 10.1 10.6*   MG  --  1.7       Recent Labs  Lab 11/22/17 0414 11/23/17 0514   WBC 11.94 14.61*   HGB 8.2* 8.7*   HCT 26.1* 28.8*    334       Recent Labs  Lab 11/22/17 0414 11/23/17 0514   INR 1.6* 1.4*     Microbiology Results (last 7 days)     Procedure Component Value Units Date/Time    Blood culture [[id number]] Collected:  11/23/17 0909    Order Status:  Sent Specimen:  Blood Updated:  11/23/17 1028    Blood culture [[id number]] Collected:  11/23/17 0909    Order Status:  Sent Specimen:  Blood Updated:  11/23/17 1028    Culture, Respiratory with Gram Stain [[id number]]     Order Status:  No result Specimen:  Respiratory     Urine culture [[id number]] Collected:  11/22/17 1555    Order Status:  Sent Specimen:  Urine from Urine Updated:  11/22/17 1706    Culture, Anaerobic [[id number]] Collected:  11/16/17 2218    Order Status:  Completed Specimen:  Wound from Back Updated:  11/21/17 1217     Anaerobic Culture Culture in progress    Narrative:       2.deep lumbar spine    Culture, Anaerobic [[id number]] Collected:  11/16/17 2218    Order Status:  Completed Specimen:  Wound from Back Updated:  11/21/17 1217     Anaerobic Culture Culture in progress    Narrative:       1.superficial lumbar spine    Aerobic culture [[id number]] Collected:  11/16/17 2218    Order Status:  Completed Specimen:  Wound from Back Updated:  11/20/17 0904     Aerobic Bacterial Culture No growth    Narrative:       2.deep lumbar spine    Aerobic culture [[id number]] Collected:  11/16/17 2218    Order Status:  Completed Specimen:  Wound from Back Updated:  11/20/17  0904     Aerobic Bacterial Culture No growth    Narrative:       1.superficial lumbar spine    AFB Culture & Smear [[id number]] Collected:  11/16/17 2218    Order Status:  Completed Specimen:  Wound from Back Updated:  11/18/17 0927     AFB Culture & Smear Culture in progress     AFB CULTURE STAIN No acid fast bacilli seen.    Narrative:       2.deep lumbar spine    AFB Culture & Smear [[id number]] Collected:  11/16/17 2218    Order Status:  Completed Specimen:  Wound from Back Updated:  11/18/17 0927     AFB Culture & Smear Culture in progress     AFB CULTURE STAIN No acid fast bacilli seen.    Narrative:       1.superficial lumbar spine    Fungus culture [[id number]] Collected:  11/16/17 2218    Order Status:  Completed Specimen:  Wound from Back Updated:  11/17/17 0742     Fungus (Mycology) Culture Culture in progress    Narrative:       1.superficial lumbar spine    Fungus culture [[id number]] Collected:  11/16/17 2218    Order Status:  Completed Specimen:  Wound from Back Updated:  11/17/17 0742     Fungus (Mycology) Culture Culture in progress    Narrative:       2.deep lumbar spine    Gram stain [[id number]] Collected:  11/16/17 2218    Order Status:  Completed Specimen:  Wound from Back Updated:  11/17/17 0039     Gram Stain Result Rare WBC's      No organisms seen    Narrative:       2.deep lumbar spine    Gram stain [[id number]] Collected:  11/16/17 2218    Order Status:  Completed Specimen:  Wound from Back Updated:  11/16/17 2357     Gram Stain Result No WBC's      No organisms seen    Narrative:       1.superficial lumbar spine        All pertinent labs from the last 24 hours have been reviewed.    Significant Diagnostics:  None new    Assessment/Plan:     * Acute bilateral low back pain, post-lumbar spinal fusion    85 y.o. male s/p L2-4 fusion 10/20 for lumbar stenosis who presents with acute worsening of his LBP and LE weakness. s/p washout and hematoma evacuation    -Patient with stable strength post-op.  Delirious on exam today  -delirium precautions and workup for possible infectious etiology per primary team  -Continue to hold ASA and coumadin  -HD per nephrology  -Afib, rate control per primary team.  -Q4h neuro checks  -PT/OT/OOB. Therapy recommending SNF   -Pain control per primary team.   -TEDs/SCDs/SQH for DVT prophylaxis   -Coordination of care and medical management per primary team   -Please call NSGY with any change in neuro exam               David Lira MD  Neurosurgery  [facility]

## 2017-11-23 NOTE — PROGRESS NOTES
"[facility] Hospital Medicine  Progress Note    Patient Name: Donta Cline  MRN: [id number]  Patient Class: IP- Inpatient   Admission Date: 11/14/2017  Length of Stay: 7 days  Attending Physician: Abby Pisano MD  Primary Care Provider: ZAID Richardson Iii, MD    [facility] Medicine Team: [facility] HOSP MED L Abby Pisano MD    Subjective:     Principal Problem:Acute bilateral low back pain    HPI:  Donta Cline is a 85 y.o. male who presents with PMHx of ESRD with dialysis MWF, HFpEF, NSTEMI, bladder/prostate cancer, DM II, aortic stenosis and CAD for evaluation of back pain s/p lumbar fusion/laminectomy (10/20/17). No family at bedside. Difficult to obtain HPI as speech garbled, however patient endorses progressive lower extremity weakness with difficulty ambulating for the past 4-5 days. His back pain is without radiation. Patient is now unable to ambulate independently. Denies trauma and urinary bowel/bladder incontinence, fever.    Per EDMD:  "The daughter brought patient to ED due to concern of his severe back pain and inability to perform activities as he was doing previously. She had discussed with Dr. Saul, and he recommended the patient come to ED and obtain imaging for spine."    Imaging:    Ct Lumbar Spine Without Contrast    Result Date: 11/14/2017  Comparison: MRI 10/12/17. Findings: Routine CT lumbar spine protocol performed without IV contrast. Prior L2-L4 instrumented lumbar fusion with interbody disc spacer at L3-4. Decompressive posterior laminectomies at from L2-3 through L5-S1. No evidence of hardware failure. No fracture identified, noting the inferior endplate of L4 is indistinct. Infection or postsurgical fluid collection not excluded, noting limited evaluation without IV contrast and artifact from adjacent pedicle screws. There is partial fusion of the right L5-S1 facet. The SI joints are unremarkable. There is extensive calcified atherosclerotic disease. Atrophic " kidneys. Partially imaged right renal stent noted. No hydronephrosis. Small renal lesions, too small to characterize without IV contrast.      Mri Lumbar Spine Without Contrast    Result Date: 11/14/2017  MRI LUMBAR SPINE TECHNIQUE: MRI lumbar spine was performed without contrast. There is an ill-defined heterogeneous collection encircling L1 spinous process demonstrating fluid levels on the right. This may represents a seroma or hematoma postoperatively however abscess cannot be excluded. This collection is best seen on series 11 image 4.    Hospital Course:  In discussion with daughter who is at bedside today (11/15) patient was discharged after surgical procedure to an inpatient rehab and he reports that he was doing well at the rehab but reached a plateau in his activity/functional level.  She states patient was walking 50 feet with minimal assist, able to toilet with minimal assistance.  Patient was transferred to SNF and since transfer, last Friday (11/10) patient requiring max assistance to stand and reported inability to bear weight secondary to pain, and since this time patient has been bed bound.     She also notes that patient with poor appetite as speech therapy has recommended puree nectar thick diet with Nepro shakes, reports that pt is losing weight and some increased abdominal girth.     Overnight patient admitted due to concerns of worsening pain and inability to ambulate, patient underwent MRI and CT imaging of the lumbar spine.  He has intact fusion, no spinal canal stenosis or cord compression noted, concern for a fluid collection encircling L1 spinous process, post op L2-L4 fusion changes and s/p laminectomy L3-L4.  Discussed imaging findings with attending neurosurgeon Dr. Sands who performed pts operation and he reported that fluid collection appears outside of range where instrumentation was.  Patient with no reported falls, workup today reveals elevated CRP >150, ESR 92, has as  supratherapeutic INR 3.8    Informed plan will be to take patient to OR for washout and exploration to evaluate for hematoma vs infection.     11/16 - Patient without acute events overnight, patient states he and his daughter are going to [place] today.  Daughter has provided consent for surgery and blood products, patient is receiving serial doses of FFP with intermittent INR checks with plan for surgery this afternoon if INR <1.4.  Daughter reports that patient w/o complaints of pain when lying in bed, but if he is moved/turned causes exacerbation of his pain.     Patient taken for operative intervention and in discussion with neurosurgeon Dr. Sands, he reports that patient with well healed wound and surgical fusion was intact, when cut into the dura and in subdural region there was area of hematoma that was evacuated (full op report pending in EMR)  Cultures sent to rule out infection but clinical impression was not an appearance of an infected operative site.  Dr. Sands noted that degree of patient's pain incongruent with surgical findings.     Post-oepratively in the morning patient coverted to afib with RVR and this morning with borderline BP, his Hgb downtrending since admission and was 6.7 post-op.  Patient received intermittent fluid boluses early AM of 11/17 but also with intake of 3+L prior to surgery due to need for high amount of FFP (6units).  Patient will receive 1 unit PRBC during Hemodialysis today, giving midodrine as well.     11/18 - Overnight initiated CPAP for patient due to concerns for pulmonary edema, blood gas with some hypercapnia but ph 7.3,  Discussed with Nephrology patient to receive UF today, and will give another 1unit PRBC due to hgb 6.8-7.0.  Pt reports slept well with cpap, still has back pain with movement and on examination.  Culture data from surgery remains negative - neurosurgery has ordered cefazolin, will renally dose this.     11/19 - Patient seen this AM, awake, alert,  reporting still having back pain and reporting frequent cough today, coughing during interview.  S/p UF by nephrology yesterday and negative -1.4L total for the day and s/p 2nd unit PRBC and hgb is stable at 8.6, remains in afib and blood pressures are stable.      11/20 _Patient with ongoing back pain, neurosurgery removed 2 surgical drains,  Hemoglobin remains  Stable  W/o requiring  Transfusion.  He remains in Afib rhythmwise and is  Planned for dialysis.  PT worked with pt and SLP eval pt upgraded to Dental soft nectar thick liquids.     11/20- Called by HD unit and patient 1 hour into session with AFIB with RVR and BP in 80/50s, given 300cc bolus by nephrology and asking for additional recs, HD session stopped early and 250cc bolus given and stat CBC ordered.      11/21- Pt tolerated dialysis well. Completed 3 hours.    Interval History:  No acute events overnight. Spoke to pts daughters on rounds today. Pt still confused - more than normal. Complaining of back pain - also grabbing right leg in pain.     Review of Systems   Constitutional: Negative for chills, diaphoresis, fatigue and fever.   HENT: Negative for rhinorrhea, sinus pain, sinus pressure, sneezing and sore throat.    Eyes: Negative for visual disturbance.   Respiratory: Negative for cough, choking, chest tightness and shortness of breath.    Cardiovascular: Negative for chest pain, palpitations and leg swelling.   Gastrointestinal: Positive for constipation. Negative for diarrhea and nausea.        Denies bowel incontinence   Genitourinary: Negative for dysuria.   Musculoskeletal: Positive for back pain and gait problem. Negative for joint swelling, myalgias and neck pain.   Neurological: Negative for dizziness, facial asymmetry, light-headedness and headaches.   Psychiatric/Behavioral: Negative for agitation and behavioral problems.     Objective:     Vital Signs (Most Recent):  Temp: 97.7 °F (36.5 °C) (11/1935)  Pulse: 93 (11/22/17  1935)  Resp: 18 (11/1935)  BP: (!) 147/69 (11/1935)  SpO2: (!) 88 % (11/22/17 1643) Vital Signs (24h Range):  Temp:  [97.6 °F (36.4 °C)-98.8 °F (37.1 °C)] 97.7 °F (36.5 °C)  Pulse:  [75-95] 93  Resp:  [16-18] 18  SpO2:  [88 %-95 %] 88 %  BP: (104-147)/(51-69) 147/69     Weight: 97.5 kg (215 lb)  Body mass index is 39.32 kg/m².    Intake/Output Summary (Last 24 hours) at 11/22/17 2119  Last data filed at 11/22/17 2027   Gross per 24 hour   Intake               60 ml   Output                0 ml   Net               60 ml      Physical Exam   Constitutional: He appears well-developed.   Awake/alert   HENT:   Mouth/Throat: Oropharynx is clear and moist.   Eyes: Conjunctivae are normal. No scleral icterus.   Cardiovascular: Normal heart sounds and intact distal pulses.    No murmur heard.  Irregular rhythm, tachycardic   Pulmonary/Chest: Effort normal. No respiratory distress. He has no wheezes. He has rales.   Bibasilar crackles present   Abdominal: Soft. Bowel sounds are normal.   Mild distension, soft, tympanic to percussion, no fluid wave noted, no bulging flanks, or abdominal collateral vessels   Musculoskeletal:   RUE forearm AV fistula with palpable thrill.     Surgical drains removed, dressing with sanguinous drainage   Lymphadenopathy:     He has no cervical adenopathy.   Neurological:   Alert, oriented to self, hospital   Skin: Skin is warm. There is pallor.       Significant Labs:   CBC:     Recent Labs  Lab 11/21/17  0545 11/22/17 0414   WBC 9.36 11.94   HGB 9.0* 8.2*   HCT 28.6* 26.1*    298     CMP:     Recent Labs  Lab 11/21/17  0545 11/22/17 0414    140   K 4.2 3.5    104   CO2 23 25   * 126*   BUN 39* 23   CREATININE 4.2* 3.0*   CALCIUM 9.7 10.1   ALBUMIN 2.5* 3.0*   ANIONGAP 12 11   EGFRNONAA 12.1* 18.1*     Microbiology Results (last 7 days)     Procedure Component Value Units Date/Time    Urine culture [[id number]] Collected:  11/22/17 1551    Order Status:  Sent  Specimen:  Urine from Urine Updated:  11/22/17 1706    Culture, Anaerobic [[id number]] Collected:  11/16/17 2218    Order Status:  Completed Specimen:  Wound from Back Updated:  11/21/17 1217     Anaerobic Culture Culture in progress    Narrative:       2.deep lumbar spine    Culture, Anaerobic [[id number]] Collected:  11/16/17 2218    Order Status:  Completed Specimen:  Wound from Back Updated:  11/21/17 1217     Anaerobic Culture Culture in progress    Narrative:       1.superficial lumbar spine    Aerobic culture [[id number]] Collected:  11/16/17 2218    Order Status:  Completed Specimen:  Wound from Back Updated:  11/20/17 0904     Aerobic Bacterial Culture No growth    Narrative:       2.deep lumbar spine    Aerobic culture [[id number]] Collected:  11/16/17 2218    Order Status:  Completed Specimen:  Wound from Back Updated:  11/20/17 0904     Aerobic Bacterial Culture No growth    Narrative:       1.superficial lumbar spine    AFB Culture & Smear [[id number]] Collected:  11/16/17 2218    Order Status:  Completed Specimen:  Wound from Back Updated:  11/18/17 0927     AFB Culture & Smear Culture in progress     AFB CULTURE STAIN No acid fast bacilli seen.    Narrative:       2.deep lumbar spine    AFB Culture & Smear [[id number]] Collected:  11/16/17 2218    Order Status:  Completed Specimen:  Wound from Back Updated:  11/18/17 0927     AFB Culture & Smear Culture in progress     AFB CULTURE STAIN No acid fast bacilli seen.    Narrative:       1.superficial lumbar spine    Fungus culture [[id number]] Collected:  11/16/17 2218    Order Status:  Completed Specimen:  Wound from Back Updated:  11/17/17 0742     Fungus (Mycology) Culture Culture in progress    Narrative:       1.superficial lumbar spine    Fungus culture [[id number]] Collected:  11/16/17 2218    Order Status:  Completed Specimen:  Wound from Back Updated:  11/17/17 0742     Fungus (Mycology) Culture Culture in progress    Narrative:       2.deep lumbar spine     Gram stain [[id number]] Collected:  11/16/17 2218    Order Status:  Completed Specimen:  Wound from Back Updated:  11/17/17 0039     Gram Stain Result Rare WBC's      No organisms seen    Narrative:       2.deep lumbar spine    Gram stain [[id number]] Collected:  11/16/17 2218    Order Status:  Completed Specimen:  Wound from Back Updated:  11/16/17 2357     Gram Stain Result No WBC's      No organisms seen    Narrative:       1.superficial lumbar spine    Gram stain [[id number]] Collected:  11/15/17 0126    Order Status:  Completed Specimen:  Urine Updated:  11/16/17 0643     Gram Stain Result Moderate WBC's      Rare yeast    Narrative:       Preferred Collection Type->Urine, Clean Catch    Urine culture [[id number]] Collected:  11/15/17 0126    Order Status:  Completed Specimen:  Urine Updated:  11/16/17 0532     Urine Culture, Routine Multiple organisms isolated. None in predominance.  Repeat if     Urine Culture, Routine clinically necessary.    Narrative:       Preferred Collection Type->Urine, Clean Catch            Significant Imaging: I have reviewed all pertinent imaging results/findings within the past 24 hours.    Assessment/Plan:      * Acute bilateral low back pain, post-lumbar spinal fusion    - Neurosurgery took pt for washout and evacuation of hematoma on 11/16  - Hgb dropped during admission admit - s/p 2unit pRBC, Hgb now grossly stable  - Drains removed and not on abx-  cultures remain NGTD from surgery.    - Supportive care, PT/OT  - Given worsening delirium - will stop fentanyl patches and norco prn  - Pain control with scheduled tylenol 560 q8hrs; will add pregabalin 50mg Qd ; tramadol 50 qhs for breakthrough  - component of pain likely severe right hip osteoarthritis - will place lidocaine patches   - Mobility remains poor and strength in hip flexors/proximally still poor. 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 Unclear if this is pts new baseline.         Acute blood loss  anemia    -presumed secondary to supratherapeutic INR and occurred post-operatively  -s/p 2units PRBC  -Hgb slightly dropped - 8.2 today    -trend CBC and Coags, transfuse if <7, will have to monitor hemodynamic/respiratory status and weigh risk benefit of further blood product transfusion          Hematoma    As above  -trend CBC        Supratherapeutic INR    --Hold coumadin/anti-platelet  -s/p vitamin K 2.5mg 11/16, and 6units FFP  -monitor inr           Status post lumbar spinal fusion    -neurosurgery consultation as above          Pyuria    - in setting of ESRD and oliguria  - urine culture on admit shows many organisms - none in predominance  - urine culture repeated given worsening delirium         Paroxysmal atrial fibrillation    -HR stable today but remains in Afib rhythm wise  -continue metoprolol 25 TID  -continue scheduled midodrine TID for now as pt tolerted dialysis well with this; discuss with nephrology - may only be needed on dialysis days         Severe aortic stenosis    - no acute intervention at this time.   - Will cont to monitor         ESRD (end stage renal disease) on dialysis    - nephrology consulted for volume management.   - Tolerated HD 11/21  - Will cont to monitor         Type 2 diabetes mellitus with chronic kidney disease on chronic dialysis, with long-term current use of insulin    - cont levamir 8 units  - may need to add low dose premeal as post prandial glucoses are in the 200's; Am glucose at goal         Coronary artery disease involving native coronary artery of native heart without angina pectoris    - no CP, SOB, anginal equivalents  - continue asa, statin  - EKG without ischemic changes        Chronic diastolic heart failure    -last echo 10/2017 with pulmonary HTN and undetermined diastolic function, EF 55  -continue metoprolol 25 TID   -HD is main source of volume removal            VTE Risk Mitigation         Ordered     Medium Risk of VTE  Once      11/17/17 0011      Place sequential compression device  Until discontinued      11/14/17 1907     Place BRAIN hose  Until discontinued      11/14/17 1907              Abby Pisano MD  Department of Hospital Medicine   [facility]

## 2017-11-23 NOTE — PLAN OF CARE
"Problem: Patient Care Overview  Goal: Plan of Care Review  Outcome: Ongoing (interventions implemented as appropriate)  Pt alert and oriented to self. Pt remains confused and stating "just let me go home" and pulling off his telemetry leads. Continues to have cough, chest xray done overnight. Incision to back dry and intact. No other changes, will continue to monitor.       "

## 2017-11-23 NOTE — ASSESSMENT & PLAN NOTE
- cont levamir 8 units  - may need to add low dose premeal as post prandial glucoses are in the 200's; Am glucose at goal

## 2017-11-24 PROBLEM — R41.0 DELIRIUM: Status: ACTIVE | Noted: 2017-01-01

## 2017-11-24 PROBLEM — D72.829 LEUKOCYTOSIS: Status: ACTIVE | Noted: 2017-01-01

## 2017-11-24 NOTE — PROGRESS NOTES
Dialysis completed. Needles removed from right forearm fistula with pressure held for 10 minutes each with hemostasis achieved. Gauze and tape to sites. Patient dialyzed for 3  Hours with fluid removal of 650 ml. Blood pressure remained stable, but ultrafiltration was paused due to increased pulse rate to 100-120. Pulse rate decreased to low one hundreds after dialysis completed.  Report called to floor to HECTOR Zaragoza.

## 2017-11-24 NOTE — ASSESSMENT & PLAN NOTE
Leukocytosis improving.    - Blood cxs NGTD, urine cx - growing yeast, chest xray - possible worsening opacity of the right base - pna ? - pt is coughing  - wound appears not infected   - RUQ US given elevated LFT's   - cont ceftriaxone given improvement in wbcs; will consider adding vanc if deterioration

## 2017-11-24 NOTE — ASSESSMENT & PLAN NOTE
New leukocytosis today concerning for infection. Pt also with possible worsening delirium.   - Blood cxs, urine cx, chest xray   - wound appears not infected   - will start emp gudreep ceftriaxone to cover dirty UA; will consider adding vanc if deterioration

## 2017-11-24 NOTE — SUBJECTIVE & OBJECTIVE
Interval History:  No acute events overnight. Pt still confused - AO x 1 this AM. Denies any pain.     Review of Systems   Constitutional: Negative for chills, diaphoresis, fatigue and fever.   HENT: Negative for rhinorrhea, sinus pain, sinus pressure, sneezing and sore throat.    Eyes: Negative for visual disturbance.   Respiratory: Negative for cough, choking, chest tightness and shortness of breath.    Cardiovascular: Negative for chest pain, palpitations and leg swelling.   Gastrointestinal: Positive for constipation. Negative for diarrhea and nausea.        Denies bowel incontinence   Genitourinary: Negative for dysuria.   Musculoskeletal: Positive for back pain and gait problem. Negative for joint swelling, myalgias and neck pain.   Neurological: Negative for dizziness, facial asymmetry, light-headedness and headaches.   Psychiatric/Behavioral: Negative for agitation and behavioral problems.     Objective:     Vital Signs (Most Recent):  Temp: 98.5 °F (36.9 °C) (11/23/17 1907)  Pulse: 88 (11/23/17 1913)  Resp: 18 (11/23/17 1907)  BP: (!) 126/58 (11/23/17 1907)  SpO2: 99 % (11/23/17 1907) Vital Signs (24h Range):  Temp:  [97.4 °F (36.3 °C)-98.5 °F (36.9 °C)] 98.5 °F (36.9 °C)  Pulse:  [] 88  Resp:  [16-18] 18  SpO2:  [91 %-100 %] 99 %  BP: (122-158)/(58-85) 126/58     Weight: 97.5 kg (215 lb)  Body mass index is 39.32 kg/m².    Intake/Output Summary (Last 24 hours) at 11/23/17 1923  Last data filed at 11/23/17 0200   Gross per 24 hour   Intake               80 ml   Output                0 ml   Net               80 ml      Physical Exam   Constitutional: He appears well-developed.   Awake/alert   HENT:   Mouth/Throat: Oropharynx is clear and moist.   Eyes: Conjunctivae are normal. No scleral icterus.   Cardiovascular: Normal heart sounds and intact distal pulses.    No murmur heard.  Irregular rhythm, tachycardic   Pulmonary/Chest: Effort normal. No respiratory distress. He has no wheezes. He has rales.    Bibasilar crackles present   Abdominal: Soft. Bowel sounds are normal.   Mild distension, soft, tympanic to percussion, no fluid wave noted, no bulging flanks, or abdominal collateral vessels   Musculoskeletal:   RUE forearm AV fistula with palpable thrill.     Surgical drains removed, dressing with sanguinous drainage   Lymphadenopathy:     He has no cervical adenopathy.   Neurological:   Alert, oriented to self, hospital   Skin: Skin is warm. There is pallor.       Significant Labs:   CBC:     Recent Labs  Lab 11/22/17 0414 11/23/17  0514 11/23/17  1345   WBC 11.94 14.61* 15.29*   HGB 8.2* 8.7* 9.0*   HCT 26.1* 28.8* 29.8*    334 309     CMP:     Recent Labs  Lab 11/22/17 0414 11/23/17  0515    138   K 3.5 3.7    102   CO2 25 24   * 196*   BUN 23 37*   CREATININE 3.0* 3.9*   CALCIUM 10.1 10.6*   PROT  --  6.9   ALBUMIN 3.0* 2.9*   BILITOT  --  0.5   ALKPHOS  --  233*   AST  --  66*   ALT  --  <5*   ANIONGAP 11 12   EGFRNONAA 18.1* 13.2*     Microbiology Results (last 7 days)     Procedure Component Value Units Date/Time    Blood culture [[id number]] Collected:  11/23/17 0909    Order Status:  Completed Specimen:  Blood Updated:  11/23/17 1715     Blood Culture, Routine No Growth to date    Blood culture [[id number]] Collected:  11/23/17 0909    Order Status:  Completed Specimen:  Blood Updated:  11/23/17 1715     Blood Culture, Routine No Growth to date    Culture, Respiratory with Gram Stain [[id number]]     Order Status:  No result Specimen:  Respiratory     Urine culture [[id number]] Collected:  11/22/17 1555    Order Status:  Sent Specimen:  Urine from Urine Updated:  11/22/17 1706    Culture, Anaerobic [[id number]] Collected:  11/16/17 2218    Order Status:  Completed Specimen:  Wound from Back Updated:  11/21/17 1217     Anaerobic Culture Culture in progress    Narrative:       2.deep lumbar spine    Culture, Anaerobic [[id number]] Collected:  11/16/17 2218    Order Status:  Completed  Specimen:  Wound from Back Updated:  11/21/17 1217     Anaerobic Culture Culture in progress    Narrative:       1.superficial lumbar spine    Aerobic culture [[id number]] Collected:  11/16/17 2218    Order Status:  Completed Specimen:  Wound from Back Updated:  11/20/17 0904     Aerobic Bacterial Culture No growth    Narrative:       2.deep lumbar spine    Aerobic culture [[id number]] Collected:  11/16/17 2218    Order Status:  Completed Specimen:  Wound from Back Updated:  11/20/17 0904     Aerobic Bacterial Culture No growth    Narrative:       1.superficial lumbar spine    AFB Culture & Smear [[id number]] Collected:  11/16/17 2218    Order Status:  Completed Specimen:  Wound from Back Updated:  11/18/17 0927     AFB Culture & Smear Culture in progress     AFB CULTURE STAIN No acid fast bacilli seen.    Narrative:       2.deep lumbar spine    AFB Culture & Smear [[id number]] Collected:  11/16/17 2218    Order Status:  Completed Specimen:  Wound from Back Updated:  11/18/17 0927     AFB Culture & Smear Culture in progress     AFB CULTURE STAIN No acid fast bacilli seen.    Narrative:       1.superficial lumbar spine    Fungus culture [[id number]] Collected:  11/16/17 2218    Order Status:  Completed Specimen:  Wound from Back Updated:  11/17/17 0742     Fungus (Mycology) Culture Culture in progress    Narrative:       1.superficial lumbar spine    Fungus culture [[id number]] Collected:  11/16/17 2218    Order Status:  Completed Specimen:  Wound from Back Updated:  11/17/17 0742     Fungus (Mycology) Culture Culture in progress    Narrative:       2.deep lumbar spine    Gram stain [[id number]] Collected:  11/16/17 2218    Order Status:  Completed Specimen:  Wound from Back Updated:  11/17/17 0039     Gram Stain Result Rare WBC's      No organisms seen    Narrative:       2.deep lumbar spine    Gram stain [[id number]] Collected:  11/16/17 2218    Order Status:  Completed Specimen:  Wound from Back Updated:  11/16/17 7613      Gram Stain Result No WBC's      No organisms seen    Narrative:       1.superficial lumbar spine            Significant Imaging: I have reviewed all pertinent imaging results/findings within the past 24 hours.

## 2017-11-24 NOTE — PT/OT/SLP PROGRESS
Speech Language Pathology Note  Attempted    Attempted to see pt at 1021; pt ONOFRE for HD. Attempted to see pt again at 1302; pt going for x-ray. Pt with concerns for aspiration pneumonia based on physician's note. SLP to fu at next scheduled visit. Thank you.    Nimisha Robles M.A. CCC-SLP  Speech Language Pathologist  [phone]  11/24/2017

## 2017-11-24 NOTE — PLAN OF CARE
Problem: Patient Care Overview  Goal: Plan of Care Review  Outcome: Ongoing (interventions implemented as appropriate)  Pt alert and oriented to self. Continues to have cough. Incision to back dry and intact. No other changes, will continue to monitor. VSS, will continue to monitor.

## 2017-11-24 NOTE — PLAN OF CARE
Per DR. COWAN in [facility], pt is not med stable for dc yet. WBC slightly elevated 13.73  Cm rounded to room but pt and family in dialysis area  CM called Wendy Jenkins at [phone] to inform of possible tristian of Monday  She will notify other sister.     11/24/17 0928   Right Care Assessment   Can the patient answer the patient profile reliably? Yes, cognitively intact   How often would a person be available to care for the patient? Whenever needed   Describe the patient's ability to walk at the present time. Major restrictions/daily assistance from another person   How does the patient rate their overall health at the present time? Poor   Number of comorbid conditions (as recorded on the chart) Five or more   During the past month, has the patient often been bothered by feeling down, depressed or hopeless? No   Have you felt down, depressed, or hopeless? 0   During the past month, has the patient often been bothered by little interest or pleasure in doing things? No

## 2017-11-24 NOTE — SUBJECTIVE & OBJECTIVE
Interval History: NAEON    Medications:  Continuous Infusions:   albumin human 25%      midodrine       Scheduled Meds:   sodium chloride 0.9%   Intravenous Once    sodium chloride 0.9%   Intravenous Once    acetaminophen  650 mg Oral Q8H    atorvastatin  80 mg Oral Daily    bisacodyl  10 mg Rectal Daily    cefTRIAXone (ROCEPHIN) IVPB  1 g Intravenous Q24H    dextromethorphan-guaifenesin  mg/5 ml  10 mL Oral Q6H    epoetin preeti  5,000 Units Intravenous Every Mon, Wed, Fri    insulin detemir  8 Units Subcutaneous QHS    lidocaine  2 patch Transdermal Q24H    metoprolol tartrate  25 mg Oral TID    midodrine  10 mg Oral TID    pantoprazole  40 mg Oral Nightly    polyethylene glycol  17 g Oral BID    pregabalin  50 mg Oral Daily    senna-docusate 8.6-50 mg  1 tablet Oral BID    sertraline  100 mg Oral QHS    sodium chloride 0.9%  250 mL Intravenous Once     PRN Meds:sodium chloride, sodium chloride, sodium chloride 0.9%, sodium chloride 0.9%, sodium chloride 0.9%, albumin human 25%, albuterol sulfate, aluminum-magnesium hydroxide-simethicone, benzonatate, dextrose 50%, dextrose 50%, glucagon (human recombinant), glucose, glucose, influenza, insulin aspart, midodrine, ondansetron, pneumoc 13-osiel conj-dip cr(PF), promethazine (PHENERGAN) IVPB, ramelteon, senna-docusate 8.6-50 mg, sodium chloride 0.9%, traMADol     Review of Systems  Objective:     Weight: 97.5 kg (215 lb)  Body mass index is 39.32 kg/m².  Vital Signs (Most Recent):  Temp: 98.7 °F (37.1 °C) (11/24/17 0825)  Pulse: (!) 113 (11/24/17 1130)  Resp: 18 (11/24/17 1130)  BP: (!) 118/55 (11/24/17 1130)  SpO2: 98 % (11/24/17 1045) Vital Signs (24h Range):  Temp:  [97.8 °F (36.6 °C)-98.7 °F (37.1 °C)] 98.7 °F (37.1 °C)  Pulse:  [] 113  Resp:  [16-20] 18  SpO2:  [93 %-100 %] 98 %  BP: (111-144)/(49-71) 118/55                 Oxygen Concentration (%):  [21] 21         Hemodialysis AV Fistula Right forearm (Active)   Needle Size 15ga  11/24/2017  8:40 AM   Site Assessment Clean;Dry;Intact 11/24/2017  8:40 AM   Patency Present;Thrill;Bruit 11/24/2017  8:40 AM   Status Accessed 11/24/2017  8:40 AM   Flows Good 11/24/2017  8:40 AM   Dressing Intervention New dressing 11/21/2017 12:43 PM   Dressing Status Clean;Dry;Intact 11/21/2017  9:45 AM   Site Condition No complications 11/24/2017  8:40 AM   Dressing Gauze 11/21/2017 12:43 PM       Physical Exam:  Vitals reviewed.    Constitutional: He appears well-developed and well-nourished. He is not diaphoretic. No distress.     Eyes: Pupils are equal, round, and reactive to light. EOM are normal.     Cardiovascular: Normal rate.     Abdominal: Soft.     Psych/Behavior: He is alert. He has a normal mood and affect.     Neurological:        Sensory: There is no sensory deficit in the trunk. There is no sensory deficit in the extremities.        Cranial nerves: Cranial nerve(s) II, III, IV, V, VI, VII, VIII, IX, X, XI and XII are intact.   AOx2 (self, place)  BUE strength: 5/5  BLE strength: 4/5 HF, 4/5 KF, 2/5 KE, 5/5 PF/DF       Significant Labs:    Recent Labs  Lab 11/23/17  0515 11/24/17  0448   * 137*    139   K 3.7 3.9    104   CO2 24 23   BUN 37* 47*   CREATININE 3.9* 4.6*   CALCIUM 10.6* 9.9   MG 1.7 1.8       Recent Labs  Lab 11/23/17  0514 11/23/17  1345 11/24/17  0448   WBC 14.61* 15.29* 13.73*   HGB 8.7* 9.0* 9.2*   HCT 28.8* 29.8* 30.1*    309 325       Recent Labs  Lab 11/23/17  0514 11/24/17  0448   INR 1.4* 1.3*     Microbiology Results (last 7 days)     Procedure Component Value Units Date/Time    Urine culture [[id number]] Collected:  11/22/17 1555    Order Status:  Completed Specimen:  Urine from Urine Updated:  11/24/17 1301     Urine Culture, Routine --     CANDIDA ALBICANS  > 100,000 cfu/ml  Treatment of asymptomatic candiduria is not recommended (except for   specific populations). Candida isolated in the urine typically   represents colonization. If an  indwelling urinary catheter is present  it should be removed or replaced.      Narrative:       Urine sample sent out for urinalysis. 11/23  ADD ON PER DR ALVARADO ORDER [id number] URINALYSIS @0902 11/23/17    Blood culture [[id number]] Collected:  11/23/17 0909    Order Status:  Completed Specimen:  Blood Updated:  11/24/17 1212     Blood Culture, Routine No Growth to date     Blood Culture, Routine No Growth to date    Blood culture [[id number]] Collected:  11/23/17 0909    Order Status:  Completed Specimen:  Blood Updated:  11/24/17 1212     Blood Culture, Routine No Growth to date     Blood Culture, Routine No Growth to date    Culture, Anaerobic [[id number]] Collected:  11/16/17 2218    Order Status:  Completed Specimen:  Wound from Back Updated:  11/24/17 0749     Anaerobic Culture No anaerobes isolated    Narrative:       2.deep lumbar spine    Culture, Anaerobic [[id number]] Collected:  11/16/17 2218    Order Status:  Completed Specimen:  Wound from Back Updated:  11/24/17 0749     Anaerobic Culture No anaerobes isolated    Narrative:       1.superficial lumbar spine    Culture, Respiratory with Gram Stain [[id number]]     Order Status:  No result Specimen:  Respiratory     Aerobic culture [[id number]] Collected:  11/16/17 2218    Order Status:  Completed Specimen:  Wound from Back Updated:  11/20/17 0904     Aerobic Bacterial Culture No growth    Narrative:       2.deep lumbar spine    Aerobic culture [[id number]] Collected:  11/16/17 2218    Order Status:  Completed Specimen:  Wound from Back Updated:  11/20/17 0904     Aerobic Bacterial Culture No growth    Narrative:       1.superficial lumbar spine    AFB Culture & Smear [[id number]] Collected:  11/16/17 2218    Order Status:  Completed Specimen:  Wound from Back Updated:  11/18/17 0927     AFB Culture & Smear Culture in progress     AFB CULTURE STAIN No acid fast bacilli seen.    Narrative:       2.deep lumbar spine    AFB Culture & Smear [[id number]] Collected:   11/16/17 2218    Order Status:  Completed Specimen:  Wound from Back Updated:  11/18/17 0927     AFB Culture & Smear Culture in progress     AFB CULTURE STAIN No acid fast bacilli seen.    Narrative:       1.superficial lumbar spine        Recent Lab Results       11/24/17  1444 11/24/17  1116 11/24/17  0448 11/23/17  2113 11/23/17  1752      Immature Granulocytes   0.9(H)       Immature Grans (Abs)   0.13(H)       Albumin   2.7(L)       Alkaline Phosphatase   203(H)       ALT   10       Anion Gap   12       AST   96(H)       Baso #   0.05       Basophil%   0.4       Total Bilirubin   0.5  Comment:  For infants and newborns, interpretation of results should be based  on gestational age, weight and in agreement with clinical  observations.  Premature Infant recommended reference ranges:  Up to 24 hours.............<8.0 mg/dL  Up to 48 hours............<12.0 mg/dL  3-5 days..................<15.0 mg/dL  6-29 days.................<15.0 mg/dL         BUN, Bld   47(H)       Calcium   9.9       Chloride   104       CO2   23       Creatinine   4.6(H)       Differential Method   Automated       eGFR if    12.5(A)       eGFR if non    10.8  Comment:  Calculation used to obtain the estimated glomerular filtration  rate (eGFR) is the CKD-EPI equation.   (A)       Eos #   0.2       Eosinophil%   1.7       GGT   33       Glucose   137(H)       Gran #   11.6(H)       Gran%   84.5(H)       Hematocrit   30.1(L)       Hemoglobin   9.2(L)       Coumadin Monitoring INR   1.3  Comment:  Coumadin Therapy:  2.0 - 3.0 for INR for all indicators except mechanical heart valves  and antiphospholipid syndromes which should use 2.5 - 3.5.  (H)       Lymph #   1.0       Lymph%   7.5(L)       Magnesium   1.8       MCH   30.2       MCHC   30.6(L)       MCV   99(H)       Mono #   0.7       Mono%   5.0       MPV   10.8       nRBC   0       Platelets   325       POCT Glucose 143(H) 90  216(H) 176(H)     Potassium    3.9       Total Protein   6.5       Protime   13.2(H)       RBC   3.05(L)       RDW   19.1(H)       Sodium   139       WBC   13.73(H)                     All pertinent labs from the last 24 hours have been reviewed.    Significant Diagnostics:  CT: No results found in the last 24 hours.  MRI: No results found in the last 24 hours.  I have reviewed all pertinent imaging results/findings within the past 24 hours.

## 2017-11-24 NOTE — ASSESSMENT & PLAN NOTE
AO x 2-3. Seems to be pts baseline over the last 1-2 months since prolonged hospitalizations. Per family was driving self to dialysis prior - 2 months ago. Improved today.   - infectious workup as above given elevated wbc; leukocytosis improved   - weaning opiates - no opiates in last 48hrs

## 2017-11-24 NOTE — PROGRESS NOTES
[facility]  Neurosurgery  Progress Note    Subjective:     History of Present Illness: Donta Cline is 85 y.o. male with PMH ESRD on HD MWF, CAD, NSTEMI, bladder/prostate cancer, DMII, aortic stenosis and recent L2-L4 fusion 10/20/17 who presents to [facility] with complaint of worsened back pain and functional decline since last Friday 11/3. There is no family in the room. The patient is mildy confused and thus a poor historian so most of the history obtained from the medical record. He was discharged to a rehab after the last admission and progressing well so transferred to a skilled nursing facility. He was able to walk 50ft on his own until last week when he had acute worsening of his low back pain and LE weakness. He has been unable to weight bear since that time. He also complains of abdominal pain and bilateral hip pain. He reports some BLE pain but is unable to characterize it. Denies BB dysfunction or saddle anesthesia. There is no record of fever or trauma. ESR/CRP are elevated. INR 3.8, on coumadin.     Post-Op Info:  Procedure(s) (LRB):  REVISION-WOUND--lumbar washout (N/A)   8 Days Post-Op     Interval History: NAEON    Medications:  Continuous Infusions:   albumin human 25%      midodrine       Scheduled Meds:   sodium chloride 0.9%   Intravenous Once    sodium chloride 0.9%   Intravenous Once    acetaminophen  650 mg Oral Q8H    atorvastatin  80 mg Oral Daily    bisacodyl  10 mg Rectal Daily    cefTRIAXone (ROCEPHIN) IVPB  1 g Intravenous Q24H    dextromethorphan-guaifenesin  mg/5 ml  10 mL Oral Q6H    epoetin preeti  5,000 Units Intravenous Every Mon, Wed, Fri    insulin detemir  8 Units Subcutaneous QHS    lidocaine  2 patch Transdermal Q24H    metoprolol tartrate  25 mg Oral TID    midodrine  10 mg Oral TID    pantoprazole  40 mg Oral Nightly    polyethylene glycol  17 g Oral BID    pregabalin  50 mg Oral Daily    senna-docusate 8.6-50 mg  1 tablet Oral BID     sertraline  100 mg Oral QHS    sodium chloride 0.9%  250 mL Intravenous Once     PRN Meds:sodium chloride, sodium chloride, sodium chloride 0.9%, sodium chloride 0.9%, sodium chloride 0.9%, albumin human 25%, albuterol sulfate, aluminum-magnesium hydroxide-simethicone, benzonatate, dextrose 50%, dextrose 50%, glucagon (human recombinant), glucose, glucose, influenza, insulin aspart, midodrine, ondansetron, pneumoc 13-osiel conj-dip cr(PF), promethazine (PHENERGAN) IVPB, ramelteon, senna-docusate 8.6-50 mg, sodium chloride 0.9%, traMADol     Review of Systems  Objective:     Weight: 97.5 kg (215 lb)  Body mass index is 39.32 kg/m².  Vital Signs (Most Recent):  Temp: 98.7 °F (37.1 °C) (11/24/17 0825)  Pulse: (!) 113 (11/24/17 1130)  Resp: 18 (11/24/17 1130)  BP: (!) 118/55 (11/24/17 1130)  SpO2: 98 % (11/24/17 1045) Vital Signs (24h Range):  Temp:  [97.8 °F (36.6 °C)-98.7 °F (37.1 °C)] 98.7 °F (37.1 °C)  Pulse:  [] 113  Resp:  [16-20] 18  SpO2:  [93 %-100 %] 98 %  BP: (111-144)/(49-71) 118/55                 Oxygen Concentration (%):  [21] 21         Hemodialysis AV Fistula Right forearm (Active)   Needle Size 15ga 11/24/2017  8:40 AM   Site Assessment Clean;Dry;Intact 11/24/2017  8:40 AM   Patency Present;Thrill;Bruit 11/24/2017  8:40 AM   Status Accessed 11/24/2017  8:40 AM   Flows Good 11/24/2017  8:40 AM   Dressing Intervention New dressing 11/21/2017 12:43 PM   Dressing Status Clean;Dry;Intact 11/21/2017  9:45 AM   Site Condition No complications 11/24/2017  8:40 AM   Dressing Gauze 11/21/2017 12:43 PM       Physical Exam:  Vitals reviewed.    Constitutional: He appears well-developed and well-nourished. He is not diaphoretic. No distress.     Eyes: Pupils are equal, round, and reactive to light. EOM are normal.     Cardiovascular: Normal rate.     Abdominal: Soft.     Psych/Behavior: He is alert. He has a normal mood and affect.     Neurological:        Sensory: There is no sensory deficit in the trunk.  There is no sensory deficit in the extremities.        Cranial nerves: Cranial nerve(s) II, III, IV, V, VI, VII, VIII, IX, X, XI and XII are intact.   AOx2 (self, place)  BUE strength: 5/5  BLE strength: 4/5 HF, 4/5 KF, 2/5 KE, 5/5 PF/DF       Significant Labs:    Recent Labs  Lab 11/23/17  0515 11/24/17  0448   * 137*    139   K 3.7 3.9    104   CO2 24 23   BUN 37* 47*   CREATININE 3.9* 4.6*   CALCIUM 10.6* 9.9   MG 1.7 1.8       Recent Labs  Lab 11/23/17  0514 11/23/17  1345 11/24/17 0448   WBC 14.61* 15.29* 13.73*   HGB 8.7* 9.0* 9.2*   HCT 28.8* 29.8* 30.1*    309 325       Recent Labs  Lab 11/23/17 0514 11/24/17 0448   INR 1.4* 1.3*     Microbiology Results (last 7 days)     Procedure Component Value Units Date/Time    Urine culture [[id number]] Collected:  11/22/17 1555    Order Status:  Completed Specimen:  Urine from Urine Updated:  11/24/17 1301     Urine Culture, Routine --     CANDIDA ALBICANS  > 100,000 cfu/ml  Treatment of asymptomatic candiduria is not recommended (except for   specific populations). Candida isolated in the urine typically   represents colonization. If an indwelling urinary catheter is present  it should be removed or replaced.      Narrative:       Urine sample sent out for urinalysis. 11/23  ADD ON PER DR ALVARADO ORDER [id number] URINALYSIS @0902 11/23/17    Blood culture [[id number]] Collected:  11/23/17 0909    Order Status:  Completed Specimen:  Blood Updated:  11/24/17 1212     Blood Culture, Routine No Growth to date     Blood Culture, Routine No Growth to date    Blood culture [[id number]] Collected:  11/23/17 0909    Order Status:  Completed Specimen:  Blood Updated:  11/24/17 1212     Blood Culture, Routine No Growth to date     Blood Culture, Routine No Growth to date    Culture, Anaerobic [[id number]] Collected:  11/16/17 2218    Order Status:  Completed Specimen:  Wound from Back Updated:  11/24/17 0749     Anaerobic Culture No anaerobes isolated     Narrative:       2.deep lumbar spine    Culture, Anaerobic [[id number]] Collected:  11/16/17 2218    Order Status:  Completed Specimen:  Wound from Back Updated:  11/24/17 0749     Anaerobic Culture No anaerobes isolated    Narrative:       1.superficial lumbar spine    Culture, Respiratory with Gram Stain [[id number]]     Order Status:  No result Specimen:  Respiratory     Aerobic culture [[id number]] Collected:  11/16/17 2218    Order Status:  Completed Specimen:  Wound from Back Updated:  11/20/17 0904     Aerobic Bacterial Culture No growth    Narrative:       2.deep lumbar spine    Aerobic culture [[id number]] Collected:  11/16/17 2218    Order Status:  Completed Specimen:  Wound from Back Updated:  11/20/17 0904     Aerobic Bacterial Culture No growth    Narrative:       1.superficial lumbar spine    AFB Culture & Smear [[id number]] Collected:  11/16/17 2218    Order Status:  Completed Specimen:  Wound from Back Updated:  11/18/17 0927     AFB Culture & Smear Culture in progress     AFB CULTURE STAIN No acid fast bacilli seen.    Narrative:       2.deep lumbar spine    AFB Culture & Smear [[id number]] Collected:  11/16/17 2218    Order Status:  Completed Specimen:  Wound from Back Updated:  11/18/17 0927     AFB Culture & Smear Culture in progress     AFB CULTURE STAIN No acid fast bacilli seen.    Narrative:       1.superficial lumbar spine        Recent Lab Results       11/24/17  1444 11/24/17  1116 11/24/17  0448 11/23/17  2113 11/23/17  1752      Immature Granulocytes   0.9(H)       Immature Grans (Abs)   0.13(H)       Albumin   2.7(L)       Alkaline Phosphatase   203(H)       ALT   10       Anion Gap   12       AST   96(H)       Baso #   0.05       Basophil%   0.4       Total Bilirubin   0.5  Comment:  For infants and newborns, interpretation of results should be based  on gestational age, weight and in agreement with clinical  observations.  Premature Infant recommended reference ranges:  Up to 24  hours.............<8.0 mg/dL  Up to 48 hours............<12.0 mg/dL  3-5 days..................<15.0 mg/dL  6-29 days.................<15.0 mg/dL         BUN, Bld   47(H)       Calcium   9.9       Chloride   104       CO2   23       Creatinine   4.6(H)       Differential Method   Automated       eGFR if    12.5(A)       eGFR if non    10.8  Comment:  Calculation used to obtain the estimated glomerular filtration  rate (eGFR) is the CKD-EPI equation.   (A)       Eos #   0.2       Eosinophil%   1.7       GGT   33       Glucose   137(H)       Gran #   11.6(H)       Gran%   84.5(H)       Hematocrit   30.1(L)       Hemoglobin   9.2(L)       Coumadin Monitoring INR   1.3  Comment:  Coumadin Therapy:  2.0 - 3.0 for INR for all indicators except mechanical heart valves  and antiphospholipid syndromes which should use 2.5 - 3.5.  (H)       Lymph #   1.0       Lymph%   7.5(L)       Magnesium   1.8       MCH   30.2       MCHC   30.6(L)       MCV   99(H)       Mono #   0.7       Mono%   5.0       MPV   10.8       nRBC   0       Platelets   325       POCT Glucose 143(H) 90  216(H) 176(H)     Potassium   3.9       Total Protein   6.5       Protime   13.2(H)       RBC   3.05(L)       RDW   19.1(H)       Sodium   139       WBC   13.73(H)                     All pertinent labs from the last 24 hours have been reviewed.    Significant Diagnostics:  CT: No results found in the last 24 hours.  MRI: No results found in the last 24 hours.  I have reviewed all pertinent imaging results/findings within the past 24 hours.    Assessment/Plan:     * Acute bilateral low back pain, post-lumbar spinal fusion    85 y.o. male s/p L2-4 fusion 10/20 for lumbar stenosis who presents with acute worsening of his LBP and LE weakness. s/p washout and hematoma evacuation    -Patient with stable strength post-op.   -Improved delirium on exam today  -delirium precautions and workup for possible infectious etiology per primary  team  -Continue to hold ASA and coumadin  -HD per nephrology  -Afib, rate control per primary team.  -Q4h neuro checks  -PT/OT/OOB. Therapy recommending SNF   -Pain control per primary team.   -TEDs/SCDs/SQH for DVT prophylaxis   -Coordination of care and medical management per primary team   -Please call NSGY with any change in neuro exam               Shirlene Selby MD  Neurosurgery  [facility]

## 2017-11-24 NOTE — PROGRESS NOTES
[facility]  Nephrology  Progress Note    Patient Name: Donta Cline  MRN: [id number]  Admission Date: 11/14/2017  Hospital Length of Stay: 9 days  Attending Provider: Abby Pisano MD   Primary Care Physician: ZAID Richardson Iii, MD  Principal Problem:Acute bilateral low back pain    Subjective:     HPI: 86 yo male with significant history for hypertension, hyperlipidemia, remote smoker, PAF, Severe AS, DMT2, CAD sp NSTEMI, dcognitive impairment, recent sp lumbar fusion/laminectomy 10/20/17, and ESRD x 2 years who is admitted for lower back pain associated with BLE pain/spasm since discharge to [facility] on 11/10 from inpatient rehab at [facility]. Patient confuse.Daughter Wendy at bedside reports patient had acute decline on 11/11 as patient went from walking 50 to 100 ft and minimal assist with ADL's to not able to hold self up or walk. MRI lumbar showed fluid collection at L1. Neurosurgery consulted. Nephrology consult for hemodialysis. HD MWF 3.5 hrs duration via LFA AVF at [facility]. Some residual but not sure how much. Unclear of EDW. Last HD 11/13. CXR bilateral basilar atelectasis otherwise clear.       Interval History:   Seen on dialysis this morning, tolerating treatment well with goal UF of 1L.  Oxygenating well on 2L NC.      Review of patient's allergies indicates:   Allergen Reactions    Morphine Other (See Comments)     Other reaction(s): severe abdominal pain    Darvocet a500  [propoxyphene n-acetaminophen]      Other reaction(s): Unknown     Current Facility-Administered Medications   Medication Frequency    0.9%  NaCl infusion (for blood administration) Q24H PRN    0.9%  NaCl infusion (for blood administration) Q24H PRN    0.9%  NaCl infusion PRN    0.9%  NaCl infusion Once    0.9%  NaCl infusion PRN    0.9%  NaCl infusion Once    0.9%  NaCl infusion PRN    0.9%  NaCl infusion Once    acetaminophen tablet 650 mg Q8H    albumin human  25% bottle 25 g Continuous PRN    albuterol nebulizer solution 2.5 mg Q4H PRN    aluminum-magnesium hydroxide-simethicone 200-200-20 mg/5 mL suspension 30 mL Q4H PRN    atorvastatin tablet 80 mg Daily    benzonatate capsule 100 mg TID PRN    bisacodyl suppository 10 mg Daily    cefTRIAXone (ROCEPHIN) 1 g in dextrose 5 % 50 mL IVPB Q24H    dextromethorphan-guaifenesin  mg/5 ml liquid 10 mL Q6H    dextrose 50% injection 12.5 g PRN    dextrose 50% injection 25 g PRN    epoetin preeti injection 5,000 Units Every Mon, Wed, Fri    glucagon (human recombinant) injection 1 mg PRN    glucose chewable tablet 16 g PRN    glucose chewable tablet 24 g PRN    influenza (FLUZONE HIGH-DOSE) vaccine 0.5 mL vaccine x 1 dose    insulin aspart pen 0-5 Units QID (AC + HS) PRN    insulin detemir pen 8 Units QHS    lidocaine 5 % patch 2 patch Q24H    metoprolol tartrate (LOPRESSOR) tablet 25 mg TID    midodrine tablet 10 mg Continuous PRN    midodrine tablet 10 mg TID    ondansetron disintegrating tablet 8 mg Q8H PRN    pantoprazole EC tablet 40 mg Nightly    pneumoc 13-osiel conj-dip cr(PF) 0.5 mL vaccine x 1 dose    polyethylene glycol packet 17 g BID    promethazine (PHENERGAN) 6.25 mg in dextrose 5 % 50 mL IVPB Q6H PRN    ramelteon tablet 8 mg Nightly PRN    senna-docusate 8.6-50 mg per tablet 1 tablet BID    senna-docusate 8.6-50 mg per tablet 2 tablet Nightly PRN    sertraline tablet 100 mg QHS    sodium chloride 0.9% bolus 250 mL Once    sodium chloride 0.9% flush 3 mL PRN    traMADol tablet 50 mg Q8H PRN       Objective:     Vital Signs (Most Recent):  Temp: 98.7 °F (37.1 °C) (11/24/17 0730)  Pulse: 91 (11/24/17 0730)  Resp: 16 (11/24/17 0730)  BP: (!) 111/59 (11/24/17 0730)  SpO2: 100 % (11/24/17 0730)  O2 Device (Oxygen Therapy): nasal cannula (11/24/17 0730) Vital Signs (24h Range):  Temp:  [97.6 °F (36.4 °C)-98.7 °F (37.1 °C)] 98.7 °F (37.1 °C)  Pulse:  [] 91  Resp:  [16-20] 16  SpO2:   [93 %-100 %] 100 %  BP: (111-158)/(58-65) 111/59     Weight: 97.5 kg (215 lb) (11/15/17 2030)  Body mass index is 39.32 kg/m².  Body surface area is 2.07 meters squared.    I/O last 3 completed shifts:  In: 140 [P.O.:140]  Out: 0     Physical Exam   Constitutional: He appears well-developed and well-nourished.   Eyes: EOM are normal.   Neck: Neck supple.   Cardiovascular: An irregularly irregular rhythm present.   Murmur heard.  Pulmonary/Chest: Effort normal. No respiratory distress. He has no wheezes. He has rhonchi. He has no rales.   Abdominal: Soft. Bowel sounds are normal. There is no tenderness.   Neurological: He is alert.   Oriented to self and place not time or situation.    Skin: Skin is warm and dry.   LFA AVF bruit and thrill       Significant Labs:  CBC:   Recent Labs  Lab 11/24/17  0448   WBC 13.73*   RBC 3.05*   HGB 9.2*   HCT 30.1*      MCV 99*   MCH 30.2   MCHC 30.6*     CMP:   Recent Labs  Lab 11/24/17  0448   *   CALCIUM 9.9   ALBUMIN 2.7*   PROT 6.5      K 3.9   CO2 23      BUN 47*   CREATININE 4.6*   ALKPHOS 203*   ALT 10   AST 96*   BILITOT 0.5            Assessment/Plan:     ESRD (end stage renal disease) on dialysis    HD MWF 3.5 hrs duration via LFA AVF at [facility]. Unsure EDW or residual function.     -HD treatment today for metabolic clearance/volume management.  BPs labile, UF goal of 1L as tolerated.  Albumin/midodrine as needed for BP support.      BMD  -Phos WNL.  No need for binders at this time.    Anemia Management  -H/H low but stable.  Continue ZHEN with dialysis                [place] NP  Nephrology  [facility]  Pager:  [phone]

## 2017-11-24 NOTE — ASSESSMENT & PLAN NOTE
-presumed secondary to supratherapeutic INR and occurred post-operatively  -s/p 2units PRBC earlier in hospital course   -stable at ~ 9.0    -trend CBC and Coags, transfuse if <7, will have to monitor hemodynamic/respiratory status and weigh risk benefit of further blood product transfusion

## 2017-11-24 NOTE — ASSESSMENT & PLAN NOTE
-HR stable today but remains in Afib rhythm wise  -continue metoprolol 25 TID  -continue scheduled midodrine TID for now as pt tolerted dialysis well with this; discuss with nephrology - may only be needed on dialysis days

## 2017-11-24 NOTE — SUBJECTIVE & OBJECTIVE
Interval History:  No acute events overnight. Pt's delirium improved -  AO x 3 this AM. Pt does report intermittent spasms of pain in anal area.        Review of Systems   Constitutional: Positive for appetite change. Negative for chills, diaphoresis, fatigue and fever.   HENT: Negative for rhinorrhea, sinus pain, sinus pressure, sneezing and sore throat.    Eyes: Negative for visual disturbance.   Respiratory: Negative for cough, choking, chest tightness and shortness of breath.    Cardiovascular: Negative for chest pain, palpitations and leg swelling.   Gastrointestinal: Positive for constipation. Negative for diarrhea and nausea.        Denies bowel incontinence   Genitourinary: Negative for dysuria.   Musculoskeletal: Positive for back pain and gait problem. Negative for joint swelling, myalgias and neck pain.   Neurological: Negative for dizziness, facial asymmetry, light-headedness and headaches.   Psychiatric/Behavioral: Negative for agitation and behavioral problems.     Objective:     Vital Signs (Most Recent):  Temp: 98.7 °F (37.1 °C) (11/24/17 0825)  Pulse: (!) 115 (11/24/17 1115)  Resp: 18 (11/24/17 1115)  BP: 113/69 (11/24/17 1115)  SpO2: 98 % (11/24/17 1045) Vital Signs (24h Range):  Temp:  [97.6 °F (36.4 °C)-98.7 °F (37.1 °C)] 98.7 °F (37.1 °C)  Pulse:  [] 115  Resp:  [16-20] 18  SpO2:  [93 %-100 %] 98 %  BP: (111-158)/(49-71) 113/69     Weight: 97.5 kg (215 lb)  Body mass index is 39.32 kg/m².    Intake/Output Summary (Last 24 hours) at 11/24/17 1124  Last data filed at 11/24/17 0300   Gross per 24 hour   Intake               60 ml   Output                0 ml   Net               60 ml      Physical Exam   Constitutional: He appears well-developed.   Awake/alert   HENT:   Mouth/Throat: Oropharynx is clear and moist.   Eyes: Conjunctivae are normal. No scleral icterus.   Cardiovascular: Normal heart sounds and intact distal pulses.    No murmur heard.  Irregular rhythm, tachycardic    Pulmonary/Chest: Effort normal. No respiratory distress. He has no wheezes. He has rales.   Bibasilar crackles present   Abdominal: Soft. Bowel sounds are normal.   Mild distension, soft, tympanic to percussion, no fluid wave noted, no bulging flanks, or abdominal collateral vessels   Musculoskeletal:   RUE forearm AV fistula with palpable thrill.     Surgical drains removed, dressing with sanguinous drainage   Lymphadenopathy:     He has no cervical adenopathy.   Neurological:   Alert, oriented to self, hospital   Skin: Skin is warm. There is pallor.       Significant Labs:   CBC:     Recent Labs  Lab 11/23/17  0514 11/23/17  1345 11/24/17  0448   WBC 14.61* 15.29* 13.73*   HGB 8.7* 9.0* 9.2*   HCT 28.8* 29.8* 30.1*    309 325     CMP:     Recent Labs  Lab 11/23/17  0515 11/24/17  0448    139   K 3.7 3.9    104   CO2 24 23   * 137*   BUN 37* 47*   CREATININE 3.9* 4.6*   CALCIUM 10.6* 9.9   PROT 6.9 6.5   ALBUMIN 2.9* 2.7*   BILITOT 0.5 0.5   ALKPHOS 233* 203*   AST 66* 96*   ALT <5* 10   ANIONGAP 12 12   EGFRNONAA 13.2* 10.8*     Microbiology Results (last 7 days)     Procedure Component Value Units Date/Time    Urine culture [[id number]] Collected:  11/22/17 1555    Order Status:  Completed Specimen:  Urine from Urine Updated:  11/24/17 0940     Urine Culture, Routine --     YEAST   > 100,000 cfu/ml  Identification pending      Narrative:       Urine sample sent out for urinalysis. 11/23  ADD ON PER DR ALVARADO ORDER [id number] URINALYSIS @0902 11/23/17    Culture, Anaerobic [[id number]] Collected:  11/16/17 2218    Order Status:  Completed Specimen:  Wound from Back Updated:  11/24/17 0749     Anaerobic Culture No anaerobes isolated    Narrative:       2.deep lumbar spine    Culture, Anaerobic [[id number]] Collected:  11/16/17 2218    Order Status:  Completed Specimen:  Wound from Back Updated:  11/24/17 0749     Anaerobic Culture No anaerobes isolated    Narrative:       1.superficial  lumbar spine    Blood culture [[id number]] Collected:  11/23/17 0909    Order Status:  Completed Specimen:  Blood Updated:  11/23/17 1715     Blood Culture, Routine No Growth to date    Blood culture [[id number]] Collected:  11/23/17 0909    Order Status:  Completed Specimen:  Blood Updated:  11/23/17 1715     Blood Culture, Routine No Growth to date    Culture, Respiratory with Gram Stain [[id number]]     Order Status:  No result Specimen:  Respiratory     Aerobic culture [[id number]] Collected:  11/16/17 2218    Order Status:  Completed Specimen:  Wound from Back Updated:  11/20/17 0904     Aerobic Bacterial Culture No growth    Narrative:       2.deep lumbar spine    Aerobic culture [[id number]] Collected:  11/16/17 2218    Order Status:  Completed Specimen:  Wound from Back Updated:  11/20/17 0904     Aerobic Bacterial Culture No growth    Narrative:       1.superficial lumbar spine    AFB Culture & Smear [[id number]] Collected:  11/16/17 2218    Order Status:  Completed Specimen:  Wound from Back Updated:  11/18/17 0927     AFB Culture & Smear Culture in progress     AFB CULTURE STAIN No acid fast bacilli seen.    Narrative:       2.deep lumbar spine    AFB Culture & Smear [[id number]] Collected:  11/16/17 2218    Order Status:  Completed Specimen:  Wound from Back Updated:  11/18/17 0927     AFB Culture & Smear Culture in progress     AFB CULTURE STAIN No acid fast bacilli seen.    Narrative:       1.superficial lumbar spine            Significant Imaging: I have reviewed all pertinent imaging results/findings within the past 24 hours.

## 2017-11-24 NOTE — ASSESSMENT & PLAN NOTE
AO x 2-3. Seems to be pts baseline over the last 1-2 months since prolonged hospitalizations. Per family was driving self to dialysis prior - 2 months ago   - seems slightly worse today   - infectious workup as above given elevated wbc   - weaning opiates

## 2017-11-24 NOTE — PROGRESS NOTES
Patient received from floor with report from Bonnie Zaragoza.  Maintenance dialysis began per orders via 15 gauge fistula needles to right forearm fistula.

## 2017-11-24 NOTE — PROGRESS NOTES
"[facility] Hospital Medicine  Progress Note    Patient Name: Donta Cilne  MRN: [id number]  Patient Class: IP- Inpatient   Admission Date: 11/14/2017  Length of Stay: 9 days  Attending Physician: Abby Pisano MD  Primary Care Provider: ZAID Richardson Iii, MD    [facility] Medicine Team: [facility] [place] HOSP MED L Abby Pisano MD    Subjective:     Principal Problem:Acute bilateral low back pain    HPI:  Donta Cline is a 85 y.o. male who presents with PMHx of ESRD with dialysis MWF, HFpEF, NSTEMI, bladder/prostate cancer, DM II, aortic stenosis and CAD for evaluation of back pain s/p lumbar fusion/laminectomy (10/20/17). No family at bedside. Difficult to obtain HPI as speech garbled, however patient endorses progressive lower extremity weakness with difficulty ambulating for the past 4-5 days. His back pain is without radiation. Patient is now unable to ambulate independently. Denies trauma and urinary bowel/bladder incontinence, fever.    Per EDMD:  "The daughter brought patient to ED due to concern of his severe back pain and inability to perform activities as he was doing previously. She had discussed with Dr. Saul, and he recommended the patient come to ED and obtain imaging for spine."    Imaging:    Ct Lumbar Spine Without Contrast    Result Date: 11/14/2017  Comparison: MRI 10/12/17. Findings: Routine CT lumbar spine protocol performed without IV contrast. Prior L2-L4 instrumented lumbar fusion with interbody disc spacer at L3-4. Decompressive posterior laminectomies at from L2-3 through L5-S1. No evidence of hardware failure. No fracture identified, noting the inferior endplate of L4 is indistinct. Infection or postsurgical fluid collection not excluded, noting limited evaluation without IV contrast and artifact from adjacent pedicle screws. There is partial fusion of the right L5-S1 facet. The SI joints are unremarkable. There is extensive calcified atherosclerotic disease. Atrophic " kidneys. Partially imaged right renal stent noted. No hydronephrosis. Small renal lesions, too small to characterize without IV contrast.      Mri Lumbar Spine Without Contrast    Result Date: 11/14/2017  MRI LUMBAR SPINE TECHNIQUE: MRI lumbar spine was performed without contrast. There is an ill-defined heterogeneous collection encircling L1 spinous process demonstrating fluid levels on the right. This may represents a seroma or hematoma postoperatively however abscess cannot be excluded. This collection is best seen on series 11 image 4.    Hospital Course:  In discussion with daughter who is at bedside today (11/15) patient was discharged after surgical procedure to an inpatient rehab and he reports that he was doing well at the rehab but reached a plateau in his activity/functional level.  She states patient was walking 50 feet with minimal assist, able to toilet with minimal assistance.  Patient was transferred to SNF and since transfer, last Friday (11/10) patient requiring max assistance to stand and reported inability to bear weight secondary to pain, and since this time patient has been bed bound.     She also notes that patient with poor appetite as speech therapy has recommended puree nectar thick diet with Nepro shakes, reports that pt is losing weight and some increased abdominal girth.     Overnight patient admitted due to concerns of worsening pain and inability to ambulate, patient underwent MRI and CT imaging of the lumbar spine.  He has intact fusion, no spinal canal stenosis or cord compression noted, concern for a fluid collection encircling L1 spinous process, post op L2-L4 fusion changes and s/p laminectomy L3-L4.  Discussed imaging findings with attending neurosurgeon Dr. Sands who performed pts operation and he reported that fluid collection appears outside of range where instrumentation was.  Patient with no reported falls, workup today reveals elevated CRP >150, ESR 92, has as  supratherapeutic INR 3.8    Informed plan will be to take patient to OR for washout and exploration to evaluate for hematoma vs infection.     11/16 - Patient without acute events overnight, patient states he and his daughter are going to [place] today.  Daughter has provided consent for surgery and blood products, patient is receiving serial doses of FFP with intermittent INR checks with plan for surgery this afternoon if INR <1.4.  Daughter reports that patient w/o complaints of pain when lying in bed, but if he is moved/turned causes exacerbation of his pain.     Patient taken for operative intervention and in discussion with neurosurgeon Dr. Sands, he reports that patient with well healed wound and surgical fusion was intact, when cut into the dura and in subdural region there was area of hematoma that was evacuated (full op report pending in EMR)  Cultures sent to rule out infection but clinical impression was not an appearance of an infected operative site.  Dr. Sands noted that degree of patient's pain incongruent with surgical findings.     Post-oepratively in the morning patient coverted to afib with RVR and this morning with borderline BP, his Hgb downtrending since admission and was 6.7 post-op.  Patient received intermittent fluid boluses early AM of 11/17 but also with intake of 3+L prior to surgery due to need for high amount of FFP (6units).  Patient will receive 1 unit PRBC during Hemodialysis today, giving midodrine as well.     11/18 - Overnight initiated CPAP for patient due to concerns for pulmonary edema, blood gas with some hypercapnia but ph 7.3,  Discussed with Nephrology patient to receive UF today, and will give another 1unit PRBC due to hgb 6.8-7.0.  Pt reports slept well with cpap, still has back pain with movement and on examination.  Culture data from surgery remains negative - neurosurgery has ordered cefazolin, will renally dose this.     11/19 - Patient seen this AM, awake, alert,  reporting still having back pain and reporting frequent cough today, coughing during interview.  S/p UF by nephrology yesterday and negative -1.4L total for the day and s/p 2nd unit PRBC and hgb is stable at 8.6, remains in afib and blood pressures are stable.      11/20 _Patient with ongoing back pain, neurosurgery removed 2 surgical drains,  Hemoglobin remains  Stable  W/o requiring  Transfusion.  He remains in Afib rhythmwise and is  Planned for dialysis.  PT worked with pt and SLP eval pt upgraded to Dental soft nectar thick liquids.     11/20- Called by HD unit and patient 1 hour into session with AFIB with RVR and BP in 80/50s, given 300cc bolus by nephrology and asking for additional recs, HD session stopped early and 250cc bolus given and stat CBC ordered.      11/21 - Pt tolerated dialysis well. Completed 3 hours.    11/22-11/23. Pt had increasing delirium and wbc elevated thus started on ceftriaxone based on dirty UA. DDx includes aspiration PNA as pt with cough.     Interval History:  No acute events overnight. Pt's delirium improved -  AO x 3 this AM. Pt does report intermittent spasms of pain in anal area.     Called to the bedside around 4:00pm as pt had increasing oxygen requirements - sating in low 80's on 2L and complaining of SOB. Also with increasing audible rales.     Vitals were significant for hypotension - with BP 80/40-50's with HR in the 80's. RR in the high 20-30's. Exam significant for diffuse rhonchi bilaterally.     Pt given 250 cc bolus and oxygen increased to 5L. Pt deep suctioned.  Vitals improved after bolus to 100/50's , HR 80's, RR improved, O2 sats improved to high %. Hypotension and hypoxia likely  sepsis secondary PNA  given increasing oxygen requirement, notable cough in recent days, and leukocytosis. Abx broadened to vanc and cefepime for presumed hospital acquired PNA. DDx includes aspiration PNA as pt has had delirium in recent days.        Review of Systems    Constitutional: Positive for appetite change. Negative for chills, diaphoresis, fatigue and fever.   HENT: Negative for rhinorrhea, sinus pain, sinus pressure, sneezing and sore throat.    Eyes: Negative for visual disturbance.   Respiratory: Negative for cough, choking, chest tightness and shortness of breath.    Cardiovascular: Negative for chest pain, palpitations and leg swelling.   Gastrointestinal: Positive for constipation. Negative for diarrhea and nausea.        Denies bowel incontinence   Genitourinary: Negative for dysuria.   Musculoskeletal: Positive for back pain and gait problem. Negative for joint swelling, myalgias and neck pain.   Neurological: Negative for dizziness, facial asymmetry, light-headedness and headaches.   Psychiatric/Behavioral: Negative for agitation and behavioral problems.     Objective:     Vital Signs (Most Recent):  Temp: 98.7 °F (37.1 °C) (11/24/17 0825)  Pulse: (!) 115 (11/24/17 1115)  Resp: 18 (11/24/17 1115)  BP: 113/69 (11/24/17 1115)  SpO2: 98 % (11/24/17 1045) Vital Signs (24h Range):  Temp:  [97.6 °F (36.4 °C)-98.7 °F (37.1 °C)] 98.7 °F (37.1 °C)  Pulse:  [] 115  Resp:  [16-20] 18  SpO2:  [93 %-100 %] 98 %  BP: (111-158)/(49-71) 113/69     Weight: 97.5 kg (215 lb)  Body mass index is 39.32 kg/m².    Intake/Output Summary (Last 24 hours) at 11/24/17 1124  Last data filed at 11/24/17 0300   Gross per 24 hour   Intake               60 ml   Output                0 ml   Net               60 ml      Physical Exam   Constitutional: He appears well-developed.   Awake/alert   HENT:   Mouth/Throat: Oropharynx is clear and moist.   Eyes: Conjunctivae are normal. No scleral icterus.   Cardiovascular: Normal heart sounds and intact distal pulses.    No murmur heard.  Irregular rhythm, tachycardic   Pulmonary/Chest: Effort normal. No respiratory distress. He has no wheezes. He has rales.   Bibasilar crackles present   Abdominal: Soft. Bowel sounds are normal.   Mild distension,  soft, tympanic to percussion, no fluid wave noted, no bulging flanks, or abdominal collateral vessels   Musculoskeletal:   RUE forearm AV fistula with palpable thrill.     Surgical drains removed, dressing with sanguinous drainage   Lymphadenopathy:     He has no cervical adenopathy.   Neurological:   Alert, oriented to self, hospital   Skin: Skin is warm. There is pallor.       Significant Labs:   CBC:     Recent Labs  Lab 11/23/17  0514 11/23/17  1345 11/24/17  0448   WBC 14.61* 15.29* 13.73*   HGB 8.7* 9.0* 9.2*   HCT 28.8* 29.8* 30.1*    309 325     CMP:     Recent Labs  Lab 11/23/17  0515 11/24/17  0448    139   K 3.7 3.9    104   CO2 24 23   * 137*   BUN 37* 47*   CREATININE 3.9* 4.6*   CALCIUM 10.6* 9.9   PROT 6.9 6.5   ALBUMIN 2.9* 2.7*   BILITOT 0.5 0.5   ALKPHOS 233* 203*   AST 66* 96*   ALT <5* 10   ANIONGAP 12 12   EGFRNONAA 13.2* 10.8*     Microbiology Results (last 7 days)     Procedure Component Value Units Date/Time    Urine culture [[id number]] Collected:  11/22/17 1555    Order Status:  Completed Specimen:  Urine from Urine Updated:  11/24/17 0940     Urine Culture, Routine --     YEAST   > 100,000 cfu/ml  Identification pending      Narrative:       Urine sample sent out for urinalysis. 11/23  ADD ON PER DR ALVARADO ORDER [id number] URINALYSIS @0902 11/23/17    Culture, Anaerobic [[id number]] Collected:  11/16/17 2218    Order Status:  Completed Specimen:  Wound from Back Updated:  11/24/17 0749     Anaerobic Culture No anaerobes isolated    Narrative:       2.deep lumbar spine    Culture, Anaerobic [[id number]] Collected:  11/16/17 2218    Order Status:  Completed Specimen:  Wound from Back Updated:  11/24/17 0749     Anaerobic Culture No anaerobes isolated    Narrative:       1.superficial lumbar spine    Blood culture [[id number]] Collected:  11/23/17 0909    Order Status:  Completed Specimen:  Blood Updated:  11/23/17 1715     Blood Culture, Routine No Growth to date     Blood culture [[id number]] Collected:  11/23/17 0909    Order Status:  Completed Specimen:  Blood Updated:  11/23/17 1715     Blood Culture, Routine No Growth to date    Culture, Respiratory with Gram Stain [[id number]]     Order Status:  No result Specimen:  Respiratory     Aerobic culture [[id number]] Collected:  11/16/17 2218    Order Status:  Completed Specimen:  Wound from Back Updated:  11/20/17 0904     Aerobic Bacterial Culture No growth    Narrative:       2.deep lumbar spine    Aerobic culture [[id number]] Collected:  11/16/17 2218    Order Status:  Completed Specimen:  Wound from Back Updated:  11/20/17 0904     Aerobic Bacterial Culture No growth    Narrative:       1.superficial lumbar spine    AFB Culture & Smear [[id number]] Collected:  11/16/17 2218    Order Status:  Completed Specimen:  Wound from Back Updated:  11/18/17 0927     AFB Culture & Smear Culture in progress     AFB CULTURE STAIN No acid fast bacilli seen.    Narrative:       2.deep lumbar spine    AFB Culture & Smear [[id number]] Collected:  11/16/17 2218    Order Status:  Completed Specimen:  Wound from Back Updated:  11/18/17 0927     AFB Culture & Smear Culture in progress     AFB CULTURE STAIN No acid fast bacilli seen.    Narrative:       1.superficial lumbar spine            Significant Imaging: I have reviewed all pertinent imaging results/findings within the past 24 hours.    Assessment/Plan:      * Acute bilateral low back pain, post-lumbar spinal fusion    - Neurosurgery took pt for washout and evacuation of hematoma on 11/16  - Hgb dropped during admission admit - s/p 2unit pRBC, Hgb now grossly stable  - Drains removed and not on abx-  cultures remain NGTD from surgery.    - Supportive care, PT/OT  - Given worsening delirium - will stop fentanyl patches and norco prn  - Pain control with scheduled tylenol 650 q8hrs;  tramadol 50 qhs for breakthrough ; will restart pregabalin 50mg Qd for now   - component of pain likely severe right  hip osteoarthritis - will cont lidocaine patches   - Mobility remains poor and strength in hip flexors/proximally still poor. 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 Unclear if this is pts new baseline.         Sepsis    Leukocytosis improving. Episode of hypoxia and hypotension this afternoon.    - Blood cxs NGTD, urine cx - growing yeast, chest xray - possible worsening opacity of the right base - pna ? - pt is coughing  - wound appears not infected   - RUQ US given elevated LFT's   - switched to cefepime and vanc for presumed HAP vs. Asp pna given deterioration   -respiratory culture pending  - will consider treating yeast - pt with ureteral stent in place           Delirium     AO x 2-3. Seems to be pts baseline over the last 1-2 months since prolonged hospitalizations. Per family was driving self to dialysis prior - 2 months ago. Improved today.   - infectious workup as above given elevated wbc; leukocytosis improved   - weaning opiates - no opiates in last 48hrs           Acute blood loss anemia    -presumed secondary to supratherapeutic INR and occurred post-operatively  -s/p 2units PRBC earlier in hospital course   -stable at ~ 9.0    -trend CBC and Coags, transfuse if <7, will have to monitor hemodynamic/respiratory status and weigh risk benefit of further blood product transfusion          Hematoma    As above  -trend CBC        L-S radiculopathy              Supratherapeutic INR    --Hold coumadin/anti-platelet  -s/p vitamin K 2.5mg 11/16, and 6units FFP  -monitor inr           Status post lumbar spinal fusion    -neurosurgery consultation as above          Pyuria    - in setting of ESRD and oliguria  - urine culture on admit shows many organisms - none in predominance  - urine culture repeated given worsening delirium ; growing yeast - likely nonspecific         Paroxysmal atrial fibrillation    -HR stable today but remains in Afib rhythm wise  -continue metoprolol 25 TID  -continue  scheduled midodrine TID for now as pt tolerted dialysis well with this; discuss with nephrology - may only be needed on dialysis days         Severe aortic stenosis    - no acute intervention at this time.   - Will cont to monitor         ESRD (end stage renal disease) on dialysis    - nephrology consulted for volume management.   - Tolerated HD 11/21  - Will cont to monitor         Type 2 diabetes mellitus with chronic kidney disease on chronic dialysis, with long-term current use of insulin    - cont levamir 8 units  - may need to add low dose premeal as post prandial glucoses are in the 200's; Am glucose at goal         Coronary artery disease involving native coronary artery of native heart without angina pectoris    - no CP, SOB, anginal equivalents  - continue asa, statin  - EKG without ischemic changes        Chronic diastolic heart failure    -last echo 10/2017 with pulmonary HTN and undetermined diastolic function, EF 55  -continue metoprolol 25 TID   -HD is main source of volume removal            VTE Risk Mitigation         Ordered     Medium Risk of VTE  Once      11/17/17 0011     Place sequential compression device  Until discontinued      11/14/17 1907     Place BRAIN hose  Until discontinued      11/14/17 1907              Abby Pisano MD  Department of Hospital Medicine   [facility]

## 2017-11-24 NOTE — ASSESSMENT & PLAN NOTE
85 y.o. male s/p L2-4 fusion 10/20 for lumbar stenosis who presents with acute worsening of his LBP and LE weakness. s/p washout and hematoma evacuation    -Patient with stable strength post-op.   -Improved delirium on exam today  -delirium precautions and workup for possible infectious etiology per primary team  -Continue to hold ASA and coumadin  -HD per nephrology  -Afib, rate control per primary team.  -Q4h neuro checks  -PT/OT/OOB. Therapy recommending SNF   -Pain control per primary team.   -TEDs/SCDs/SQH for DVT prophylaxis   -Coordination of care and medical management per primary team   -Please call NSGY with any change in neuro exam

## 2017-11-24 NOTE — ASSESSMENT & PLAN NOTE
- in setting of ESRD and oliguria  - urine culture on admit shows many organisms - none in predominance  - urine culture repeated given worsening delirium ; growing yeast - likely nonspecific

## 2017-11-24 NOTE — SUBJECTIVE & OBJECTIVE
Interval History:   Seen on dialysis this morning, tolerating treatment well with goal UF of 1L.  Oxygenating well on 2L NC.      Review of patient's allergies indicates:   Allergen Reactions    Morphine Other (See Comments)     Other reaction(s): severe abdominal pain    Darvocet a500  [propoxyphene n-acetaminophen]      Other reaction(s): Unknown     Current Facility-Administered Medications   Medication Frequency    0.9%  NaCl infusion (for blood administration) Q24H PRN    0.9%  NaCl infusion (for blood administration) Q24H PRN    0.9%  NaCl infusion PRN    0.9%  NaCl infusion Once    0.9%  NaCl infusion PRN    0.9%  NaCl infusion Once    0.9%  NaCl infusion PRN    0.9%  NaCl infusion Once    acetaminophen tablet 650 mg Q8H    albumin human 25% bottle 25 g Continuous PRN    albuterol nebulizer solution 2.5 mg Q4H PRN    aluminum-magnesium hydroxide-simethicone 200-200-20 mg/5 mL suspension 30 mL Q4H PRN    atorvastatin tablet 80 mg Daily    benzonatate capsule 100 mg TID PRN    bisacodyl suppository 10 mg Daily    cefTRIAXone (ROCEPHIN) 1 g in dextrose 5 % 50 mL IVPB Q24H    dextromethorphan-guaifenesin  mg/5 ml liquid 10 mL Q6H    dextrose 50% injection 12.5 g PRN    dextrose 50% injection 25 g PRN    epoetin preeti injection 5,000 Units Every Mon, Wed, Fri    glucagon (human recombinant) injection 1 mg PRN    glucose chewable tablet 16 g PRN    glucose chewable tablet 24 g PRN    influenza (FLUZONE HIGH-DOSE) vaccine 0.5 mL vaccine x 1 dose    insulin aspart pen 0-5 Units QID (AC + HS) PRN    insulin detemir pen 8 Units QHS    lidocaine 5 % patch 2 patch Q24H    metoprolol tartrate (LOPRESSOR) tablet 25 mg TID    midodrine tablet 10 mg Continuous PRN    midodrine tablet 10 mg TID    ondansetron disintegrating tablet 8 mg Q8H PRN    pantoprazole EC tablet 40 mg Nightly    pneumoc 13-osiel conj-dip cr(PF) 0.5 mL vaccine x 1 dose    polyethylene glycol packet 17 g BID     promethazine (PHENERGAN) 6.25 mg in dextrose 5 % 50 mL IVPB Q6H PRN    ramelteon tablet 8 mg Nightly PRN    senna-docusate 8.6-50 mg per tablet 1 tablet BID    senna-docusate 8.6-50 mg per tablet 2 tablet Nightly PRN    sertraline tablet 100 mg QHS    sodium chloride 0.9% bolus 250 mL Once    sodium chloride 0.9% flush 3 mL PRN    traMADol tablet 50 mg Q8H PRN       Objective:     Vital Signs (Most Recent):  Temp: 98.7 °F (37.1 °C) (11/24/17 0730)  Pulse: 91 (11/24/17 0730)  Resp: 16 (11/24/17 0730)  BP: (!) 111/59 (11/24/17 0730)  SpO2: 100 % (11/24/17 0730)  O2 Device (Oxygen Therapy): nasal cannula (11/24/17 0730) Vital Signs (24h Range):  Temp:  [97.6 °F (36.4 °C)-98.7 °F (37.1 °C)] 98.7 °F (37.1 °C)  Pulse:  [] 91  Resp:  [16-20] 16  SpO2:  [93 %-100 %] 100 %  BP: (111-158)/(58-65) 111/59     Weight: 97.5 kg (215 lb) (11/15/17 2030)  Body mass index is 39.32 kg/m².  Body surface area is 2.07 meters squared.    I/O last 3 completed shifts:  In: 140 [P.O.:140]  Out: 0     Physical Exam   Constitutional: He appears well-developed and well-nourished.   Eyes: EOM are normal.   Neck: Neck supple.   Cardiovascular: An irregularly irregular rhythm present.   Murmur heard.  Pulmonary/Chest: Effort normal. No respiratory distress. He has no wheezes. He has rhonchi. He has no rales.   Abdominal: Soft. Bowel sounds are normal. There is no tenderness.   Neurological: He is alert.   Oriented to self and place not time or situation.    Skin: Skin is warm and dry.   LFA AVF bruit and thrill       Significant Labs:  CBC:   Recent Labs  Lab 11/24/17  0448   WBC 13.73*   RBC 3.05*   HGB 9.2*   HCT 30.1*      MCV 99*   MCH 30.2   MCHC 30.6*     CMP:   Recent Labs  Lab 11/24/17  0448   *   CALCIUM 9.9   ALBUMIN 2.7*   PROT 6.5      K 3.9   CO2 23      BUN 47*   CREATININE 4.6*   ALKPHOS 203*   ALT 10   AST 96*   BILITOT 0.5

## 2017-11-24 NOTE — ASSESSMENT & PLAN NOTE
- Neurosurgery took pt for washout and evacuation of hematoma on 11/16  - Hgb dropped during admission admit - s/p 2unit pRBC, Hgb now grossly stable  - Drains removed and not on abx-  cultures remain NGTD from surgery.    - Supportive care, PT/OT  - Given worsening delirium - will stop fentanyl patches and norco prn  - Pain control with scheduled tylenol 650 q8hrs;  tramadol 50 qhs for breakthrough ; will restart pregabalin 50mg Qd for now   - component of pain likely severe right hip osteoarthritis - will cont lidocaine patches   - Mobility remains poor and strength in hip flexors/proximally still poor. 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 Unclear if this is pts new baseline.

## 2017-11-24 NOTE — ASSESSMENT & PLAN NOTE
HD MWF 3.5 hrs duration via LFA AVF at [facility]. Unsure EDW or residual function.     -HD treatment today for metabolic clearance/volume management.  BPs labile, UF goal of 1L as tolerated.  Albumin/midodrine as needed for BP support.      BMD  -Phos WNL.  No need for binders at this time.    Anemia Management  -H/H low but stable.  Continue ZHEN with dialysis

## 2017-11-25 PROBLEM — A41.9 SEPSIS: Status: ACTIVE | Noted: 2017-01-01

## 2017-11-25 PROBLEM — R65.10 SIRS (SYSTEMIC INFLAMMATORY RESPONSE SYNDROME): Status: ACTIVE | Noted: 2017-01-01

## 2017-11-25 NOTE — PT/OT/SLP PROGRESS
"Physical Therapy  Treatment    Donta Cline   MRN: [id number]   Admitting Diagnosis: Acute bilateral low back pain    PT Received On: 11/25/17  PT Start Time: 1447     PT Stop Time: 1514    PT Total Time (min): 27 min       Billable Minutes:  Therapeutic Activity 16 and Therapeutic Exercise 11    Treatment Type: Treatment  PT/PTA: PTA     PTA Visit Number: 1       General Precautions: Standard, fall, aspiration, nectar thick  Orthopedic Precautions: N/A   Braces: TLSO         Subjective:  Communicated with NSG prior to session.  Patient states " I am in a lot of pain, but not like last time".    Pain/Comfort  Pain Rating 1: 10/10  Location - Side 1: Bilateral  Location - Orientation 1: lower  Location 1: back  Pain Addressed 1: Distraction, Cessation of Activity, Reposition  Pain Rating Post-Intervention 1: 3/10    Objective:   Patient found with: telemetry    Functional Mobility:  Bed Mobility:   Scooting/Bridging: Total Assistance, With assist of 2  Supine to Sit: Total Assistance, With assist of 2  Sit to Supine: Total Assistance, With assist of  2    Transfers:  Sit <> Stand Assistance: Activity did not occur    Gait:   Gait Distance: Unable to perform.    Stairs:      Balance:   Static Sit: FAIR-: Maintains without assist but inconsistent   Dynamic Sit: POOR: N/A  Static Stand: N/A  Dynamic stand: 0: N/A     Therapeutic Activities and Exercises:  Donned/Doffed TLSO. Static sitting balance at EOB with mod assistance, progressing to CGA. Repositioned in bed with RN with total assistance. B LE PROM x 30 reps on all available planes of motion. Donned SCD's after treatment.    AM-PAC 6 CLICK MOBILITY  How much help from another person does this patient currently need?   1 = Unable, Total/Dependent Assistance  2 = A lot, Maximum/Moderate Assistance  3 = A little, Minimum/Contact Guard/Supervision  4 = None, Modified [place]/Independent    Turning over in bed (including adjusting bedclothes, sheets and blankets)?: " 2  Sitting down on and standing up from a chair with arms (e.g., wheelchair, bedside commode, etc.): 1  Moving from lying on back to sitting on the side of the bed?: 2  Moving to and from a bed to a chair (including a wheelchair)?: 1  Need to walk in hospital room?: 1  Climbing 3-5 steps with a railing?: 1  Total Score: 8    AM-PAC Raw Score CMS G-Code Modifier Level of Impairment Assistance   6 % Total / Unable   7 - 9 CM 80 - 100% Maximal Assist   10 - 14 CL 60 - 80% Moderate Assist   15 - 19 CK 40 - 60% Moderate Assist   20 - 22 CJ 20 - 40% Minimal Assist   23 CI 1-20% SBA / CGA   24 CH 0% Independent/ Mod I     Patient left HOB elevated with all lines intact, call button in reach, bed alarm on and family present.    Assessment:  Donta Cline is a 85 y.o. male with a medical diagnosis of Acute bilateral low back pain and presents with decreased functional mobility. Patient was very anxious and fearful due to pain, but improved as treatment progressed and Patient started feeling more comfortable. Patient with a tendency to lean back and sideways. Patient would benefit from continued P.T. To address deficits.    Rehab identified problem list/impairments: Rehab identified problem list/impairments: weakness, impaired endurance, impaired functional mobilty, impaired balance, decreased lower extremity function, pain, edema, decreased ROM    Rehab potential is fair.    Activity tolerance: Fair    Discharge recommendations: Discharge Facility/Level Of Care Needs: nursing facility, skilled     Barriers to discharge: Barriers to Discharge: Decreased caregiver support    Equipment recommendations: Equipment Needed After Discharge: other (see comments) (TBD pending progress.)     GOALS:    Physical Therapy Goals        Problem: Physical Therapy Goal    Goal Priority Disciplines Outcome Goal Variances Interventions   Physical Therapy Goal     PT/OT, PT Ongoing (interventions implemented as appropriate)      Description:  Goals to be met by: 2017     Patient will increase functional independence with mobility by performin. Supine to sit with MInimal Assistance  2. Sit to supine with MInimal Assistance  3. Sit to stand transfer with Moderate Assistance  4. Bed to chair transfer with Moderate Assistance  5. Gait  x 10 feet with Maximum Assistance using Rolling Walker.   6. Lower extremity exercise program x15 reps per handout, with assistance as needed                       PLAN:    Patient to be seen 3 x/week  to address the above listed problems via therapeutic activities, therapeutic exercises, neuromuscular re-education  Plan of Care expires: 12/15/17  Plan of Care reviewed with: patient, family         Eloy  Emiliano, PTA  2017

## 2017-11-25 NOTE — PLAN OF CARE
Problem: Physical Therapy Goal  Goal: Physical Therapy Goal  Goals to be met by: 2017     Patient will increase functional independence with mobility by performin. Supine to sit with MInimal Assistance  2. Sit to supine with MInimal Assistance  3. Sit to stand transfer with Moderate Assistance  4. Bed to chair transfer with Moderate Assistance  5. Gait  x 10 feet with Maximum Assistance using Rolling Walker.   6. Lower extremity exercise program x15 reps per handout, with assistance as needed      Outcome: Ongoing (interventions implemented as appropriate)  Improved tolerance to sitting balance, but mobility continues to be limited.

## 2017-11-25 NOTE — PLAN OF CARE
Problem: SLP Goal  Goal: SLP Goal  Speech Language Pathology Goals  Goals expected to be met by 11/27/17  1. Pt will tolerate dental soft diet without overt S/S aspiration, Supervision  2. Pt will tolerate nectar-thickened liquids without overt S/S aspiration, Supervision  3. Pt will complete dysphagia exercises x10 ea. , MIN A, to improve BOT and laryngeal elevation  4. Educate pt and family on S/S aspiration and aspiration precautions     Goals expected to be met by 11/24/17  1. Pt will tolerate puree diet without overt S/S aspiration, Supervision- met   2. Pt will tolerate nectar-thickened liquids without overt S/S aspiration, Supervision -ongoing  3. Pt will complete dysphagia exercises x10 ea. , MIN A, to improve BOT and laryngeal elevation -ongoing  4. Educate pt and family on S/S aspiration and aspiration precautions -ongoing          Outcome: Ongoing (interventions implemented as appropriate)  Pt with minimal acceptance of thickened liquids in session today. Pt consuming thin liquids via straw with family upon SLP entry to room.  Nurse notified. Patient and family educated on thickener guidelines and aspiration precautions. See note for full details. ST to continue to follow.     YUMIKO Fields., [facility]SLP  Speech-Language Pathology  Pager: [phone]  11/25/2017

## 2017-11-25 NOTE — PT/OT/SLP PROGRESS
Speech Language Pathology  Treatment    Donta Cline   MRN: [id number]   Admitting Diagnosis: Acute bilateral low back pain    Diet recommendations: Solid Diet Level: Dental Soft  Liquid Diet Level: Nectar Thick   Please feed only when awake/alert, No straws, Small bites/sips, 1 bite/sip at a time, Check for pocketing/oral residue, Meds crushed in puree, Eliminate distractions, Assistance with meals and Assistance with thickening liquids, Encourage double swallows per bolus  · To achieve nectar thick liquid: 4 oz of any liquid to 1 pack of ThickenUp Clear or 6 oz of any liquid to 1 pack of ThickenUp  · No ice cream, jello, or ice.  · Avoid mixed consistencies (soup, cereal with milk, juicy fruits).  · Continue to monitor for signs and symptoms of aspiration and discontinue oral feeding should you notice any of the following: watery eyes, reddened facial area, wet vocal quality, increased work of breathing, change in respiratory status, increased congestion, coughing, fever, etc.    SLP Treatment Date: 11/25/17  Speech Start Time: 1530     Speech Stop Time: 1539     Speech Total (min): 9 min       TREATMENT BILLABLE MINUTES:  Treatment Swallowing Dysfunction 9    Has the patient been evaluated by SLP for swallowing? : Yes  Keep patient NPO?: No   General Precautions: Standard, aspiration, fall, nectar thick  Current Respiratory Status: nasal cannula       CXR 11/24/17:Findings:  The cardiomediastinal silhouette is stable.  There is no pleural effusion.  The trachea is midline.  The lungs are symmetrically expanded bilaterally with increased interstitial attenuation bilaterally, suggesting mild edema, noting left lower lung zone increased parenchymal attenuation, retrocardiac, somewhat more prominent than on previous exam, developing consolidation is a consideration versus atelectasis.   There is no pneumothorax.  The osseous structures are unchanged.    Subjective:  SLP reviewed Pt with nurse, pt with RT upon first  "attempt, upon second attempt cleared for ST  Patient alert and awake upon second attempt  Pt explains, "Please no more of that [thickened liquids.]"  Pt reports 5/10 pain on lower coccyx. Nurse notified.          Objective:   Patient found with: peripheral IV, telemetry. Upon SLP entrance to room, Pt found reclined in bed, being fed straw sips thin liquids by family at bedside. SLP elevates HOB, thickens liquids to nectar-thickened consistency and removes straw from cup. SLP notes aspiration precautions at bedside and on whiteboard. Patient and family educated on ongoing diet modifications, thickener guidelines, aspiration precautions. Patient verbalizes partial understanding while family verbalizes understanding and apologizes for providing thin liquids. Patient accepts 1 sip nectar-thickened water via cup edge, self-fed, then declines additional PO trials. No further questions. Whiteboard current. Findings reviewed with nurse following session.     Assessment:  Donta Cline is a 85 y.o. male with a medical diagnosis of Acute bilateral low back pain and presents with Oropharyngeal Dysphagia. Pt with minimal PO acceptance today. ST to continue to monitor. Thank you.     Discharge recommendations: Discharge Facility/Level Of Care Needs: nursing facility, skilled     Goals:    SLP Goals        Problem: SLP Goal    Goal Priority Disciplines Outcome   SLP Goal     SLP Ongoing (interventions implemented as appropriate)   Description:  Speech Language Pathology Goals  Goals expected to be met by 11/27/17  1. Pt will tolerate dental soft diet without overt S/S aspiration, Supervision  2. Pt will tolerate nectar-thickened liquids without overt S/S aspiration, Supervision  3. Pt will complete dysphagia exercises x10 ea. , MIN A, to improve BOT and laryngeal elevation  4. Educate pt and family on S/S aspiration and aspiration precautions     Goals expected to be met by 11/24/17  1. Pt will tolerate puree diet without overt " S/S aspiration, Supervision- met   2. Pt will tolerate nectar-thickened liquids without overt S/S aspiration, Supervision -ongoing  3. Pt will complete dysphagia exercises x10 ea. , MIN A, to improve BOT and laryngeal elevation -ongoing  4. Educate pt and family on S/S aspiration and aspiration precautions -ongoing                            Plan:   Patient to be seen Therapy Frequency: 5 x/week   Plan of Care expires: 12/17/17  Plan of Care reviewed with: patient, family  SLP Follow-up?: Yes       LYNDSAY Fields, [facility]SLP  Speech-Language Pathology  Pager: [phone]  11/25/2017

## 2017-11-25 NOTE — PROGRESS NOTES
Patient sitting up with therapy.  Patient reports shortness of breath while sitting up.  Patient is on 4L nasal canula.  Will continue to monitor.

## 2017-11-25 NOTE — NURSING
Pt c/o difficulty breathing. Pt breath sounds wet, gargling noted. BP 80s/40s, o2 high 80s. MD notified, to bedside. Deep Suction performed, 250cc bolus given with improvement in Bp. Oxygen increased to 4.5L, spo2 up to 100%, oxygen titrated down to 3L with no problems. CXR ordered, performed. VBG obtained. IV ABX stat. RT to sxn and CPT q4.  Will continue to monitor.

## 2017-11-26 PROBLEM — J18.9 PNEUMONIA: Status: ACTIVE | Noted: 2017-01-01

## 2017-11-26 PROBLEM — R93.5 ABNORMAL US (ULTRASOUND) OF ABDOMEN: Status: ACTIVE | Noted: 2017-01-01

## 2017-11-26 PROBLEM — B37.49 CANDIDURIA: Status: ACTIVE | Noted: 2017-01-01

## 2017-11-26 NOTE — SUBJECTIVE & OBJECTIVE
Interval History: No acute events overnight; stable on exam    Medications:  Continuous Infusions:   albumin human 25%      midodrine       Scheduled Meds:   sodium chloride 0.9%   Intravenous Once    sodium chloride 0.9%   Intravenous Once    acetaminophen  650 mg Oral Q8H    albuterol-ipratropium 2.5mg-0.5mg/3mL  3 mL Nebulization Q6H WAKE    atorvastatin  80 mg Oral Daily    bisacodyl  10 mg Rectal Daily    ceFEPime (MAXIPIME) IVPB  1 g Intravenous Daily    dextromethorphan-guaifenesin  mg/5 ml  10 mL Oral Q6H    epoetin preeti  5,000 Units Intravenous Every Mon, Wed, Fri    fluconazole  200 mg Oral Daily    insulin detemir  8 Units Subcutaneous QHS    lidocaine  2 patch Transdermal Q24H    metoprolol tartrate  25 mg Oral TID    midodrine  10 mg Oral TID    pantoprazole  40 mg Oral Nightly    polyethylene glycol  17 g Oral BID    [START ON 11/26/2017] pregabalin  75 mg Oral Daily    senna-docusate 8.6-50 mg  1 tablet Oral BID    sertraline  100 mg Oral QHS    sodium chloride 0.9%  250 mL Intravenous Once     PRN Meds:sodium chloride, sodium chloride, sodium chloride 0.9%, sodium chloride 0.9%, sodium chloride 0.9%, albumin human 25%, albuterol sulfate, aluminum-magnesium hydroxide-simethicone, benzonatate, cyclobenzaprine, dextrose 50%, dextrose 50%, glucagon (human recombinant), glucose, glucose, influenza, insulin aspart, midodrine, ondansetron, pneumoc 13-osiel conj-dip cr(PF), promethazine (PHENERGAN) IVPB, ramelteon, senna-docusate 8.6-50 mg, sodium chloride 0.9%, traMADol     Review of Systems    Objective:     Weight: 76.3 kg (168 lb 4.8 oz)  Body mass index is 30.78 kg/m².  Vital Signs (Most Recent):  Temp: 98.6 °F (37 °C) (11/25/17 1557)  Pulse: 83 (11/25/17 1941)  Resp: 18 (11/25/17 1245)  BP: (!) 111/58 (11/25/17 1557)  SpO2: 99 % (11/25/17 1557) Vital Signs (24h Range):  Temp:  [97.7 °F (36.5 °C)-98.9 °F (37.2 °C)] 98.6 °F (37 °C)  Pulse:  [73-95] 83  Resp:  [16-20] 18  SpO2:   [94 %-100 %] 99 %  BP: (107-135)/(55-60) 111/58       Date 11/25/17 0700 - 11/26/17 0659   Shift 4897-6859 0354-1455 1159-0614 24 Hour Total   I  N  T  A  K  E   P.O. 180   180    Shift Total  (mL/kg) 180  (2.4)   180  (2.4)   O  U  T  P  U  T   Shift Total  (mL/kg)       Weight (kg) 76.3 76.3 76.3 76.3              Oxygen Concentration (%):  [32] 32         Hemodialysis AV Fistula Right forearm (Active)   Needle Size 15ga 11/24/2017  8:40 AM   Site Assessment Clean;Dry;Intact 11/24/2017  8:40 AM   Patency Present;Thrill;Bruit 11/24/2017  8:40 AM   Status Accessed 11/24/2017  8:40 AM   Flows Good 11/24/2017  8:40 AM   Dressing Intervention New dressing 11/21/2017 12:43 PM   Dressing Status Clean;Dry;Intact 11/21/2017  9:45 AM   Site Condition No complications 11/24/2017  8:40 AM   Dressing Gauze 11/21/2017 12:43 PM       Physical Exam:  Vitals reviewed.    Constitutional: He appears well-developed and well-nourished. He is not diaphoretic. No distress.     Eyes: Pupils are equal, round, and reactive to light. EOM are normal.     Cardiovascular: Normal rate.     Abdominal: Soft.     Psych/Behavior: He is alert. He has a normal mood and affect.     Neurological:        Sensory: There is no sensory deficit in the trunk. There is no sensory deficit in the extremities.        Cranial nerves: Cranial nerve(s) II, III, IV, V, VI, VII, VIII, IX, X, XI and XII are intact.   AOx2 (self, place)  BUE strength: 5/5  BLE strength: 4/5 HF, 4/5 KF, 2/5 KE, 5/5 PF/DF       Significant Labs:    Recent Labs  Lab 11/24/17  0448 11/24/17  1643 11/25/17  0452   * 150* 99    141 138   K 3.9 3.9 4.0    105 103   CO2 23 25 22*   BUN 47* 22 30*   CREATININE 4.6* 2.6* 3.3*   CALCIUM 9.9 9.5 10.4   MG 1.8  --  2.0       Recent Labs  Lab 11/24/17  1643 11/24/17  2229 11/25/17  0452   WBC 13.00* 10.97 12.68   HGB 8.4* 9.0* 9.9*   HCT 27.5* 29.2* 33.2*    269 255       Recent Labs  Lab 11/24/17  0448 11/25/17  0452  11/25/17  0821   INR 1.3* 2.1* 1.4*     Microbiology Results (last 7 days)     Procedure Component Value Units Date/Time    Culture, Respiratory with Gram Stain [[id number]] Collected:  11/25/17 1420    Order Status:  Completed Specimen:  Respiratory from Sputum, Induced Updated:  11/25/17 1832     Gram Stain (Respiratory) >10 epithelial cells per low power field     Gram Stain (Respiratory) Many WBC's     Gram Stain (Respiratory) Moderate yeast     Gram Stain (Respiratory) Rare Gram positive rods    Culture, Respiratory with Gram Stain [[id number]] Collected:  11/24/17 1530    Order Status:  Completed Specimen:  Respiratory from Sputum, Expectorated Updated:  11/25/17 1306     Respiratory Culture --     YEAST   Few  Identification pending       Gram Stain (Respiratory) <10 epithelial cells per low power field.     Gram Stain (Respiratory) Few WBC's     Gram Stain (Respiratory) Few Gram positive cocci     Gram Stain (Respiratory) Rare yeast     Gram Stain (Respiratory) Rare Gram positive rods    Blood culture [[id number]] Collected:  11/23/17 0909    Order Status:  Completed Specimen:  Blood Updated:  11/25/17 1212     Blood Culture, Routine No Growth to date     Blood Culture, Routine No Growth to date     Blood Culture, Routine No Growth to date    Blood culture [[id number]] Collected:  11/23/17 0909    Order Status:  Completed Specimen:  Blood Updated:  11/25/17 1212     Blood Culture, Routine No Growth to date     Blood Culture, Routine No Growth to date     Blood Culture, Routine No Growth to date    Urine culture [[id number]] Collected:  11/22/17 1555    Order Status:  Completed Specimen:  Urine from Urine Updated:  11/24/17 1301     Urine Culture, Routine --     CANDIDA ALBICANS  > 100,000 cfu/ml  Treatment of asymptomatic candiduria is not recommended (except for   specific populations). Candida isolated in the urine typically   represents colonization. If an indwelling urinary catheter is present  it should be  removed or replaced.      Narrative:       Urine sample sent out for urinalysis. 11/23  ADD ON PER DR ALVARADO ORDER [id number] URINALYSIS @0902 11/23/17    Culture, Anaerobic [[id number]] Collected:  11/16/17 2218    Order Status:  Completed Specimen:  Wound from Back Updated:  11/24/17 0749     Anaerobic Culture No anaerobes isolated    Narrative:       2.deep lumbar spine    Culture, Anaerobic [[id number]] Collected:  11/16/17 2218    Order Status:  Completed Specimen:  Wound from Back Updated:  11/24/17 0749     Anaerobic Culture No anaerobes isolated    Narrative:       1.superficial lumbar spine    Aerobic culture [[id number]] Collected:  11/16/17 2218    Order Status:  Completed Specimen:  Wound from Back Updated:  11/20/17 0904     Aerobic Bacterial Culture No growth    Narrative:       2.deep lumbar spine    Aerobic culture [[id number]] Collected:  11/16/17 2218    Order Status:  Completed Specimen:  Wound from Back Updated:  11/20/17 0904     Aerobic Bacterial Culture No growth    Narrative:       1.superficial lumbar spine          All pertinent labs from the last 24 hours have been reviewed.    Significant Diagnostics:  CT: No results found in the last 24 hours.  MRI: No results found in the last 24 hours.  I have reviewed all pertinent imaging results/findings within the past 24 hours.    KUB 11/24:  Nonspecific 3.0 cm echogenic focus within the left hepatic lobe conforms to a geographic distribution and may represent a region of focal fatty infiltration.  However, given this patient's history of elevated liver function tests and without prior imaging available for comparison, further evaluation may be obtained with contrasted triple phase CT or MRI if clinical concern dictates.  Alternatively, this focus may be followed with ultrasound to assess for stability.    Scattered calcification within the liver and spleen, most consistent with granulomas.    Cholecystectomy.      CXR 11/24:  The cardiomediastinal  silhouette is stable.  There is no pleural effusion.  The trachea is midline.  The lungs are symmetrically expanded bilaterally with increased interstitial attenuation bilaterally, suggesting mild edema, noting left lower lung zone increased parenchymal attenuation, retrocardiac, somewhat more prominent than on previous exam, developing consolidation is a consideration versus atelectasis.   There is no pneumothorax.  The osseous structures are unchanged.

## 2017-11-26 NOTE — SUBJECTIVE & OBJECTIVE
Interval History:  No acute events overnight. Pt's delirium is much improved today - awake , alert, joking with me on evaluation - AO x 3. Pt continues to report more intermittent spasms of pain in anal area - overall pain has improved.      Review of Systems   Constitutional: Positive for appetite change. Negative for chills, diaphoresis, fatigue and fever.   HENT: Negative for rhinorrhea, sinus pain, sinus pressure, sneezing and sore throat.    Eyes: Negative for visual disturbance.   Respiratory: Negative for cough, choking, chest tightness and shortness of breath.    Cardiovascular: Negative for chest pain, palpitations and leg swelling.   Gastrointestinal: Positive for constipation. Negative for diarrhea and nausea.        Denies bowel incontinence   Genitourinary: Negative for dysuria.   Musculoskeletal: Positive for back pain and gait problem. Negative for joint swelling, myalgias and neck pain.   Neurological: Negative for dizziness, facial asymmetry, light-headedness and headaches.   Psychiatric/Behavioral: Negative for agitation and behavioral problems.     Objective:     Vital Signs (Most Recent):  Temp: 97.8 °F (36.6 °C) (11/26/17 0802)  Pulse: 88 (11/26/17 1100)  Resp: 18 (11/26/17 0810)  BP: (!) 104/58 (11/26/17 0802)  SpO2: 99 % (11/26/17 0810) Vital Signs (24h Range):  Temp:  [97.2 °F (36.2 °C)-98.6 °F (37 °C)] 97.8 °F (36.6 °C)  Pulse:  [73-95] 88  Resp:  [18] 18  SpO2:  [97 %-100 %] 99 %  BP: ()/(44-58) 104/58     Weight: 77.8 kg (171 lb 8 oz)  Body mass index is 31.37 kg/m².    Intake/Output Summary (Last 24 hours) at 11/26/17 1213  Last data filed at 11/26/17 1042   Gross per 24 hour   Intake              280 ml   Output               75 ml   Net              205 ml      Physical Exam   Constitutional: He appears well-developed.   Awake/alert   HENT:   Mouth/Throat: Oropharynx is clear and moist.   Eyes: Conjunctivae are normal. No scleral icterus.   Cardiovascular: Normal heart sounds and  intact distal pulses.    No murmur heard.  Irregular rhythm, tachycardic   Pulmonary/Chest: Effort normal. No respiratory distress. He has no wheezes. He has rales.   Bibasilar crackles present   Abdominal: Soft. Bowel sounds are normal.   Mild distension, soft, tympanic to percussion, no fluid wave noted, no bulging flanks, or abdominal collateral vessels   Musculoskeletal:   RUE forearm AV fistula with palpable thrill.     Surgical drains removed, dressing with sanguinous drainage   Lymphadenopathy:     He has no cervical adenopathy.   Neurological:   Alert, oriented to self, hospital, year    Skin: Skin is warm. There is pallor.       Significant Labs:   CBC:     Recent Labs  Lab 11/24/17  2229 11/25/17  0452 11/26/17  0640   WBC 10.97 12.68 12.85*   HGB 9.0* 9.9* 8.6*   HCT 29.2* 33.2* 28.3*    255 271     CMP:     Recent Labs  Lab 11/24/17  1643 11/25/17  0452 11/26/17  0640    138 137   K 3.9 4.0 4.3    103 102   CO2 25 22* 26   * 99 115*   BUN 22 30* 43*   CREATININE 2.6* 3.3* 4.5*   CALCIUM 9.5 10.4 10.2   PROT 6.4 7.3 6.2   ALBUMIN 2.8* 3.0* 2.5*   BILITOT 0.5 0.4 0.4   ALKPHOS 176* 201* 176*   AST 99* 107* 99*   ALT 13 16 18   ANIONGAP 11 13 9   EGFRNONAA 21.5* 16.1* 11.1*     Microbiology Results (last 7 days)     Procedure Component Value Units Date/Time    Blood culture [[id number]] Collected:  11/23/17 0909    Order Status:  Completed Specimen:  Blood Updated:  11/26/17 1212     Blood Culture, Routine No Growth to date     Blood Culture, Routine No Growth to date     Blood Culture, Routine No Growth to date     Blood Culture, Routine No Growth to date    Blood culture [[id number]] Collected:  11/23/17 0909    Order Status:  Completed Specimen:  Blood Updated:  11/26/17 1212     Blood Culture, Routine No Growth to date     Blood Culture, Routine No Growth to date     Blood Culture, Routine No Growth to date     Blood Culture, Routine No Growth to date    Culture, Respiratory with  Gram Stain [[id number]] Collected:  11/25/17 1420    Order Status:  Completed Specimen:  Respiratory from Sputum, Induced Updated:  11/26/17 1010     Respiratory Culture --     YEAST   Moderate  Identification pending       Gram Stain (Respiratory) >10 epithelial cells per low power field     Gram Stain (Respiratory) Many WBC's     Gram Stain (Respiratory) Moderate yeast     Gram Stain (Respiratory) Rare Gram positive rods    Blood culture [[id number]] Collected:  11/25/17 2222    Order Status:  Completed Specimen:  Blood Updated:  11/26/17 0545     Blood Culture, Routine No Growth to date    Fungus Culture, Blood or Bone Marrow [[id number]] Collected:  11/25/17 2222    Order Status:  Sent Specimen:  Blood from Blood Updated:  11/25/17 2239    Culture, Respiratory with Gram Stain [[id number]] Collected:  11/24/17 1530    Order Status:  Completed Specimen:  Respiratory from Sputum, Expectorated Updated:  11/25/17 1306     Respiratory Culture --     YEAST   Few  Identification pending       Gram Stain (Respiratory) <10 epithelial cells per low power field.     Gram Stain (Respiratory) Few WBC's     Gram Stain (Respiratory) Few Gram positive cocci     Gram Stain (Respiratory) Rare yeast     Gram Stain (Respiratory) Rare Gram positive rods    Urine culture [[id number]] Collected:  11/22/17 1555    Order Status:  Completed Specimen:  Urine from Urine Updated:  11/24/17 1301     Urine Culture, Routine --     CANDIDA ALBICANS  > 100,000 cfu/ml  Treatment of asymptomatic candiduria is not recommended (except for   specific populations). Candida isolated in the urine typically   represents colonization. If an indwelling urinary catheter is present  it should be removed or replaced.      Narrative:       Urine sample sent out for urinalysis. 11/23  ADD ON PER DR ALVARADO ORDER [id number] URINALYSIS @0902 11/23/17    Culture, Anaerobic [[id number]] Collected:  11/16/17 2218    Order Status:  Completed Specimen:  Wound from Back  Updated:  11/24/17 0749     Anaerobic Culture No anaerobes isolated    Narrative:       2.deep lumbar spine    Culture, Anaerobic [[id number]] Collected:  11/16/17 2218    Order Status:  Completed Specimen:  Wound from Back Updated:  11/24/17 0749     Anaerobic Culture No anaerobes isolated    Narrative:       1.superficial lumbar spine    Aerobic culture [[id number]] Collected:  11/16/17 2218    Order Status:  Completed Specimen:  Wound from Back Updated:  11/20/17 0904     Aerobic Bacterial Culture No growth    Narrative:       2.deep lumbar spine    Aerobic culture [[id number]] Collected:  11/16/17 2218    Order Status:  Completed Specimen:  Wound from Back Updated:  11/20/17 0904     Aerobic Bacterial Culture No growth    Narrative:       1.superficial lumbar spine            Significant Imaging: I have reviewed all pertinent imaging results/findings within the past 24 hours.

## 2017-11-26 NOTE — PROGRESS NOTES
Paged Dr. Pisano in reference to patient's pus in urine.  Family at bedside.  Patient complained of pain 7/10 in groin area.  Will continue to monitor.

## 2017-11-26 NOTE — ASSESSMENT & PLAN NOTE
- Neurosurgery took pt for washout and evacuation of hematoma on 11/16  - Hgb dropped during admission admit - s/p 2unit pRBC, Hgb now grossly stable  - Drains removed and not on abx-  cultures remain NGTD from surgery.    - Supportive care, PT/OT  - Given worsened delirium - stopped fentanyl patches and norco prn  - Pain control with scheduled tylenol 650 q8hrs;  tramadol 50 qhs for breakthrough ; will increase pregabalin to 75mg Qd for now ; also add flexeril as muscle relaxants apparently have worked in the past   - component of pain likely severe right hip osteoarthritis - will cont lidocaine patches   - Mobility remains poor and strength in hip flexors/proximally still poor. 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 Unclear if this is pts new baseline.

## 2017-11-26 NOTE — PT/OT/SLP PROGRESS
Occupational Therapy  Treatment    Donta Cline   MRN: [id number]   Admitting Diagnosis: Pneumonia    OT Date of Treatment: 11/26/17   OT Start Time: 1045  OT Stop Time: 1125  OT Total Time (min): 40 min    Billable Minutes:  Self Care/Home Management 15 and Therapeutic Activity 25    General Precautions: Standard, nectar thick, aspiration, fall  Orthopedic Precautions: N/A  Braces: TLSO         Subjective:  Communicated with RN prior to session.  Pt has redness all over groin and discharge coming from penis  Pain/Comfort  Pain Rating 1:  (7/10 at rest, primarily in his groin.  Severe 10/10 in sitting, primarily in his back)    Objective:        Functional Mobility:  Bed Mobility:  Rolling/Turning to Left:  (min/mod a)  Rolling/Turning Right:  (min/mod a)  Scooting/Bridging:  (Scoot to hob in trendelenberg with max a first attempt then min a; scoot to eob in sitting max a)  Supine to Sit: Maximum Assistance  Sit to Supine: Total Assistance    Transfers:        Functional Ambulation: Unable     Activities of Daily Living:     Feedin  UE Dressing Level of Assistance: Total assistance (Don gown like a robe on eob; pt assisted 15%)  Grooming Position: EOB  Grooming Level of Assistance:  (Brushed teeth on eob with varing min to mod a for sitting balance; no assist for toothbrushing)                  Balance:   Static Sit: POOR: Needs MODERATE assist to maintain  Dynamic Sit: POOR: N/A    Therapeutic Activities and Exercises:  -up to sit as above; worked on trunk control and BUE arom via grooming tasks on eob.  Pt wanted to use BUE to hold self up in sitting.  When one had engaged in activity, he required min a for balance, if both hands not supporting, he required mod a for balance.  -After approx 6 min sitting eob, pt insisted on lying down due to severe back pain  -Once in supine again, OT worked on rolling mult times, using adap tech    AM-PAC 6 CLICK ADL   How much help from another person does this patient currently  "need?   1 = Unable, Total/Dependent Assistance  2 = A lot, Maximum/Moderate Assistance  3 = A little, Minimum/Contact Guard/Supervision  4 = None, Modified [place]/Independent    Putting on and taking off regular lower body clothing? : 1  Bathing (including washing, rinsing, drying)?: 1  Toileting, which includes using toilet, bedpan, or urinal? : 1  Putting on and taking off regular upper body clothing?: 2  Taking care of personal grooming such as brushing teeth?: 3  Eating meals?: 3  Total Score: 11     AM-PAC Raw Score CMS "G-Code Modifier Level of Impairment Assistance   6 % Total / Unable   7 - 8 CM 80 - 100% Maximal Assist   9-13 CL 60 - 80% Moderate Assist   14 - 19 CK 40 - 60% Moderate Assist   20 - 22 CJ 20 - 40% Minimal Assist   23 CI 1-20% SBA / CGA   24 CH 0% Independent/ Mod I       Patient left supine with all lines intact and call button in reach    ASSESSMENT:  Donta Cline is a 85 y.o. male with a medical diagnosis of Pneumonia, SIRS, recent lumbar huggins/fusion.  The pt is progressing, improving from completing grooming in bed, to completing on eob.  Pain in back and groin impacting function.  Pt's cognition and command following improving.    Rehab identified problem list/impairments: Rehab identified problem list/impairments: weakness, impaired endurance, impaired self care skills, impaired functional mobilty, gait instability, impaired balance, decreased lower extremity function, pain, decreased ROM, impaired cardiopulmonary response to activity, impaired joint extensibility    Rehab potential is good.    Activity tolerance: Fair    Discharge recommendations: Discharge Facility/Level Of Care Needs: nursing facility, skilled     Barriers to discharge: Barriers to Discharge: Decreased caregiver support    Equipment recommendations:       GOALS:    Occupational Therapy Goals        Problem: Occupational Therapy Goal    Goal Priority Disciplines Outcome Interventions   Occupational " Therapy Goal     OT, PT/OT Ongoing (interventions implemented as appropriate)    Description:  Goals to be met by 12/10/2017:     Patient will increase functional independence with ADLs by performing:    Bed mobility with demo understanding for spinal precautions with min(A).  Stand Pivot transfer to multiple surfaces (chair, wheelchair, bedside commode) with Moderate Assistance.   Donning LSO with Minimal Assistance while seated EOB.  Donning socks with sock aid with minimal assistance.   Functional dynamic sitting task ~8 min duration while seated EOB with CGA for postural control.   UE endurance HEP with set up and assistance as needed.   [place] on eob with CG A for balance  BSC transfer with max a                       Plan:  Patient to be seen 4 x/week to address the above listed problems via self-care/home management, therapeutic activities, therapeutic exercises, neuromuscular re-education  Plan of Care expires: 12/14/17  Plan of Care reviewed with: patient, daughter         Lily GIBSON PIYUSH Kulkarni  11/26/2017

## 2017-11-26 NOTE — ASSESSMENT & PLAN NOTE
- Patient with evident candida in the urine and in his mouth   - Likely also due to antibiotics   - Recommend to continue fluconazole 200 mg as he is ESRD patients  - Recommend to stop antibiotics for now

## 2017-11-26 NOTE — PROGRESS NOTES
"[facility] Hospital Medicine  Progress Note    Patient Name: Donta Cline  MRN: [id number]  Patient Class: IP- Inpatient   Admission Date: 11/14/2017  Length of Stay: 10 days  Attending Physician: Abby Pisano MD  Primary Care Provider: ZAID Richardson Iii, MD    [facility] Medicine Team: [facility] MED L Abby Pisano MD    Subjective:     Principal Problem:Acute bilateral low back pain    HPI:  Donta Cline is a 85 y.o. male who presents with PMHx of ESRD with dialysis MWF, HFpEF, NSTEMI, bladder/prostate cancer, DM II, aortic stenosis and CAD for evaluation of back pain s/p lumbar fusion/laminectomy (10/20/17). No family at bedside. Difficult to obtain HPI as speech garbled, however patient endorses progressive lower extremity weakness with difficulty ambulating for the past 4-5 days. His back pain is without radiation. Patient is now unable to ambulate independently. Denies trauma and urinary bowel/bladder incontinence, fever.    Per EDMD:  "The daughter brought patient to ED due to concern of his severe back pain and inability to perform activities as he was doing previously. She had discussed with Dr. Saul, and he recommended the patient come to ED and obtain imaging for spine."    Imaging:    Ct Lumbar Spine Without Contrast    Result Date: 11/14/2017  Comparison: MRI 10/12/17. Findings: Routine CT lumbar spine protocol performed without IV contrast. Prior L2-L4 instrumented lumbar fusion with interbody disc spacer at L3-4. Decompressive posterior laminectomies at from L2-3 through L5-S1. No evidence of hardware failure. No fracture identified, noting the inferior endplate of L4 is indistinct. Infection or postsurgical fluid collection not excluded, noting limited evaluation without IV contrast and artifact from adjacent pedicle screws. There is partial fusion of the right L5-S1 facet. The SI joints are unremarkable. There is extensive calcified atherosclerotic disease. Atrophic " kidneys. Partially imaged right renal stent noted. No hydronephrosis. Small renal lesions, too small to characterize without IV contrast.      Mri Lumbar Spine Without Contrast    Result Date: 11/14/2017  MRI LUMBAR SPINE TECHNIQUE: MRI lumbar spine was performed without contrast. There is an ill-defined heterogeneous collection encircling L1 spinous process demonstrating fluid levels on the right. This may represents a seroma or hematoma postoperatively however abscess cannot be excluded. This collection is best seen on series 11 image 4.    Hospital Course:  In discussion with daughter who is at bedside today (11/15) patient was discharged after surgical procedure to an inpatient rehab and he reports that he was doing well at the rehab but reached a plateau in his activity/functional level.  She states patient was walking 50 feet with minimal assist, able to toilet with minimal assistance.  Patient was transferred to SNF and since transfer, last Friday (11/10) patient requiring max assistance to stand and reported inability to bear weight secondary to pain, and since this time patient has been bed bound.     She also notes that patient with poor appetite as speech therapy has recommended puree nectar thick diet with Nepro shakes, reports that pt is losing weight and some increased abdominal girth.     Overnight patient admitted due to concerns of worsening pain and inability to ambulate, patient underwent MRI and CT imaging of the lumbar spine.  He has intact fusion, no spinal canal stenosis or cord compression noted, concern for a fluid collection encircling L1 spinous process, post op L2-L4 fusion changes and s/p laminectomy L3-L4.  Discussed imaging findings with attending neurosurgeon Dr. Sands who performed pts operation and he reported that fluid collection appears outside of range where instrumentation was.  Patient with no reported falls, workup today reveals elevated CRP >150, ESR 92, has as  supratherapeutic INR 3.8    Informed plan will be to take patient to OR for washout and exploration to evaluate for hematoma vs infection.     11/16 - Patient without acute events overnight, patient states he and his daughter are going to [facility] today.  Daughter has provided consent for surgery and blood products, patient is receiving serial doses of FFP with intermittent INR checks with plan for surgery this afternoon if INR <1.4.  Daughter reports that patient w/o complaints of pain when lying in bed, but if he is moved/turned causes exacerbation of his pain.     Patient taken for operative intervention and in discussion with neurosurgeon Dr. Sands, he reports that patient with well healed wound and surgical fusion was intact, when cut into the dura and in subdural region there was area of hematoma that was evacuated (full op report pending in EMR)  Cultures sent to rule out infection but clinical impression was not an appearance of an infected operative site.  Dr. Sands noted that degree of patient's pain incongruent with surgical findings.     Post-oepratively in the morning patient coverted to afib with RVR and this morning with borderline BP, his Hgb downtrending since admission and was 6.7 post-op.  Patient received intermittent fluid boluses early AM of 11/17 but also with intake of 3+L prior to surgery due to need for high amount of FFP (6units).  Patient will receive 1 unit PRBC during Hemodialysis today, giving midodrine as well.     11/18 - Overnight initiated CPAP for patient due to concerns for pulmonary edema, blood gas with some hypercapnia but ph 7.3,  Discussed with Nephrology patient to receive UF today, and will give another 1unit PRBC due to hgb 6.8-7.0.  Pt reports slept well with cpap, still has back pain with movement and on examination.  Culture data from surgery remains negative - neurosurgery has ordered cefazolin, will renally dose this.     11/19 - Patient seen this AM, awake, alert,  reporting still having back pain and reporting frequent cough today, coughing during interview.  S/p UF by nephrology yesterday and negative -1.4L total for the day and s/p 2nd unit PRBC and hgb is stable at 8.6, remains in afib and blood pressures are stable.      11/20 _Patient with ongoing back pain, neurosurgery removed 2 surgical drains,  Hemoglobin remains  Stable  W/o requiring  Transfusion.  He remains in Afib rhythmwise and is  Planned for dialysis.  PT worked with pt and SLP eval pt upgraded to Dental soft nectar thick liquids.     11/20- Called by HD unit and patient 1 hour into session with AFIB with RVR and BP in 80/50s, given 300cc bolus by nephrology and asking for additional recs, HD session stopped early and 250cc bolus given and stat CBC ordered.      11/21 - Pt tolerated dialysis well. Completed 3 hours.    11/22-11/24. Pt had increasing delirium and wbc elevated thus started on ceftriaxone based on dirty UA. DDx includes aspiration PNA as pt with cough.     11/25: Called to the bedside around 4:00pm as pt had increasing oxygen requirements - sating in low 80's on 2L and complaining of SOB. Also with increasing audible rales. Vitals were significant for hypotension - with BP 80/40-50's with HR in the 80's. RR in the high 20-30's. Exam significant for diffuse rhonchi bilaterally.      Pt given 250 cc bolus and oxygen increased to 5L. Pt deep suctioned.  Vitals improved after bolus to 100/50's , HR 80's, RR improved, O2 sats improved to high %. Hypotension and hypoxia likely  sepsis secondary PNA  given increasing oxygen requirement, notable cough in recent days, and leukocytosis. Abx broadened to vanc and cefepime for presumed hospital acquired PNA. DDx includes aspiration PNA as pt has had delirium in recent days.            Interval History:  No acute events overnight. Pt's delirium improved -  AO x 3 again this AM. Pt does report more intermittent spasms of pain in anal area. Also today  reports difficulty swallowing which is new. Pt states food won't go down - denies medina pain.        Review of Systems   Constitutional: Positive for appetite change. Negative for chills, diaphoresis, fatigue and fever.   HENT: Negative for rhinorrhea, sinus pain, sinus pressure, sneezing and sore throat.    Eyes: Negative for visual disturbance.   Respiratory: Negative for cough, choking, chest tightness and shortness of breath.    Cardiovascular: Negative for chest pain, palpitations and leg swelling.   Gastrointestinal: Positive for constipation. Negative for diarrhea and nausea.        Denies bowel incontinence   Genitourinary: Negative for dysuria.   Musculoskeletal: Positive for back pain and gait problem. Negative for joint swelling, myalgias and neck pain.   Neurological: Negative for dizziness, facial asymmetry, light-headedness and headaches.   Psychiatric/Behavioral: Negative for agitation and behavioral problems.     Objective:     Vital Signs (Most Recent):  Temp: 97.2 °F (36.2 °C) (11/25/17 2056)  Pulse: 76 (11/25/17 2056)  Resp: 18 (11/25/17 2056)  BP: (!) 93/44 (11/25/17 2056)  SpO2: 99 % (11/25/17 2056) Vital Signs (24h Range):  Temp:  [97.2 °F (36.2 °C)-98.9 °F (37.2 °C)] 97.2 °F (36.2 °C)  Pulse:  [73-95] 76  Resp:  [16-20] 18  SpO2:  [94 %-100 %] 99 %  BP: ()/(44-60) 93/44     Weight: 76.3 kg (168 lb 4.8 oz)  Body mass index is 30.78 kg/m².    Intake/Output Summary (Last 24 hours) at 11/25/17 2143  Last data filed at 11/25/17 1300   Gross per 24 hour   Intake              360 ml   Output               25 ml   Net              335 ml      Physical Exam   Constitutional: He appears well-developed.   Awake/alert   HENT:   Mouth/Throat: Oropharynx is clear and moist.   Eyes: Conjunctivae are normal. No scleral icterus.   Cardiovascular: Normal heart sounds and intact distal pulses.    No murmur heard.  Irregular rhythm, tachycardic   Pulmonary/Chest: Effort normal. No respiratory distress. He  has no wheezes. He has rales.   Bibasilar crackles present   Abdominal: Soft. Bowel sounds are normal.   Mild distension, soft, tympanic to percussion, no fluid wave noted, no bulging flanks, or abdominal collateral vessels   Musculoskeletal:   RUE forearm AV fistula with palpable thrill.     Surgical drains removed, dressing with sanguinous drainage   Lymphadenopathy:     He has no cervical adenopathy.   Neurological:   Alert, oriented to self, hospital   Skin: Skin is warm. There is pallor.       Significant Labs:   CBC:     Recent Labs  Lab 11/24/17  1643 11/24/17  2229 11/25/17  0452   WBC 13.00* 10.97 12.68   HGB 8.4* 9.0* 9.9*   HCT 27.5* 29.2* 33.2*    269 255     CMP:     Recent Labs  Lab 11/24/17  0448 11/24/17  1643 11/25/17  0452    141 138   K 3.9 3.9 4.0    105 103   CO2 23 25 22*   * 150* 99   BUN 47* 22 30*   CREATININE 4.6* 2.6* 3.3*   CALCIUM 9.9 9.5 10.4   PROT 6.5 6.4 7.3   ALBUMIN 2.7* 2.8* 3.0*   BILITOT 0.5 0.5 0.4   ALKPHOS 203* 176* 201*   AST 96* 99* 107*   ALT 10 13 16   ANIONGAP 12 11 13   EGFRNONAA 10.8* 21.5* 16.1*     Microbiology Results (last 7 days)     Procedure Component Value Units Date/Time    Culture, Respiratory with Gram Stain [[id number]] Collected:  11/25/17 1420    Order Status:  Completed Specimen:  Respiratory from Sputum, Induced Updated:  11/25/17 1832     Gram Stain (Respiratory) >10 epithelial cells per low power field     Gram Stain (Respiratory) Many WBC's     Gram Stain (Respiratory) Moderate yeast     Gram Stain (Respiratory) Rare Gram positive rods    Culture, Respiratory with Gram Stain [[id number]] Collected:  11/24/17 1530    Order Status:  Completed Specimen:  Respiratory from Sputum, Expectorated Updated:  11/25/17 1306     Respiratory Culture --     YEAST   Few  Identification pending       Gram Stain (Respiratory) <10 epithelial cells per low power field.     Gram Stain (Respiratory) Few WBC's     Gram Stain (Respiratory) Few Gram  positive cocci     Gram Stain (Respiratory) Rare yeast     Gram Stain (Respiratory) Rare Gram positive rods    Blood culture [[id number]] Collected:  11/23/17 0909    Order Status:  Completed Specimen:  Blood Updated:  11/25/17 1212     Blood Culture, Routine No Growth to date     Blood Culture, Routine No Growth to date     Blood Culture, Routine No Growth to date    Blood culture [[id number]] Collected:  11/23/17 0909    Order Status:  Completed Specimen:  Blood Updated:  11/25/17 1212     Blood Culture, Routine No Growth to date     Blood Culture, Routine No Growth to date     Blood Culture, Routine No Growth to date    Urine culture [[id number]] Collected:  11/22/17 1555    Order Status:  Completed Specimen:  Urine from Urine Updated:  11/24/17 1301     Urine Culture, Routine --     CANDIDA ALBICANS  > 100,000 cfu/ml  Treatment of asymptomatic candiduria is not recommended (except for   specific populations). Candida isolated in the urine typically   represents colonization. If an indwelling urinary catheter is present  it should be removed or replaced.      Narrative:       Urine sample sent out for urinalysis. 11/23  ADD ON PER DR ALVARADO ORDER [id number] URINALYSIS @0902 11/23/17    Culture, Anaerobic [[id number]] Collected:  11/16/17 2218    Order Status:  Completed Specimen:  Wound from Back Updated:  11/24/17 0749     Anaerobic Culture No anaerobes isolated    Narrative:       2.deep lumbar spine    Culture, Anaerobic [[id number]] Collected:  11/16/17 2218    Order Status:  Completed Specimen:  Wound from Back Updated:  11/24/17 0749     Anaerobic Culture No anaerobes isolated    Narrative:       1.superficial lumbar spine    Aerobic culture [[id number]] Collected:  11/16/17 2218    Order Status:  Completed Specimen:  Wound from Back Updated:  11/20/17 0904     Aerobic Bacterial Culture No growth    Narrative:       2.deep lumbar spine    Aerobic culture [[id number]] Collected:  11/16/17 2218    Order Status:   Completed Specimen:  Wound from Back Updated:  11/20/17 0904     Aerobic Bacterial Culture No growth    Narrative:       1.superficial lumbar spine            Significant Imaging: I have reviewed all pertinent imaging results/findings within the past 24 hours.    Assessment/Plan:      * Acute bilateral low back pain, post-lumbar spinal fusion    - Neurosurgery took pt for washout and evacuation of hematoma on 11/16  - Hgb dropped during admission admit - s/p 2unit pRBC, Hgb now grossly stable  - Drains removed -  cultures remain NGTD from surgery.    - Supportive care, PT/OT  - Given worsened delirium - stopped fentanyl patches and norco prn  - Pain control with scheduled tylenol 650 q8hrs;  tramadol 50 qhs for breakthrough ; will increase pregabalin to 75mg Qd for now ; also add flexeril as muscle relaxants apparently have worked in the past   - component of pain likely severe right hip osteoarthritis - will cont lidocaine patches   - Mobility remains poor and strength in hip flexors/proximally still poor. 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 Unclear if this is pts new baseline.         SIRS (systemic inflammatory response syndrome)    Pt with leukocytosis and developed increased RR over the last two days. Episode of hypoxia and hypotension yesterday afternoon. Improved with abx and 250 cc bolus x 2. Leukocytosis resolved today.    Blood cxs NGTD, urine cx - growing yeast. Per family pt with hx of yeast in urine - has old urinary stent in place. Chest xray shows  possible worsening opacity of the left base. Pt is coughing w/ increased secretions andO2 requirements consistent w/ PNA. Respiratory cx from 11/24  growing yeast .   - wound appears not infected   - RUQ US given elevated LFT's - showed hypodense area - no evidence of infection; will need ct tirple phase vs. Repeat US at later date  - cont cefepime and vanc for presumed HAP vs. Asp pna given deterioration for now   - Repeat  respiratory culture today again growing yeast , final results pending  - ID consult given concern for disseminated yeast infection given positive culture from multiple sites - urine, respiratory - also now with new onset / worsening dysphagia. ID also consulted for  abx management of presumed HAP.    - repeat blood cultures / fungal cultures   - start diflucan for now 200 qd - renal dosing            Delirium     AO x 2-3. Seems to be pts baseline over the last 1-2 months since prolonged hospitalizations. Per family was driving self to dialysis prior - 2 months ago. Improved today.   - infectious workup as above given elevated wbc; leukocytosis improved   - weaning opiates - tramadol 50 q8 prn           Acute blood loss anemia    -presumed secondary to supratherapeutic INR and occurred post-operatively  -s/p 2units PRBC earlier in hospital course   -stable at ~ 9.0    -trend CBC and Coags, transfuse if <7, will have to monitor hemodynamic/respiratory status and weigh risk benefit of further blood product transfusion          Hematoma    As above  -trend CBC        L-S radiculopathy              Supratherapeutic INR    --Hold coumadin/anti-platelet  -s/p vitamin K 2.5mg 11/16, and 6units FFP  -monitor inr           Status post lumbar spinal fusion    -neurosurgery consultation as above          Pyuria    - in setting of ESRD and oliguria  - urine culture on admit shows many organisms - none in predominance  - urine culture repeated given worsening delirium ; growing yeast - likely nonspecific         Paroxysmal atrial fibrillation    -HR stable today but remains in Afib rhythm wise  -continue metoprolol 25 TID  -continue scheduled midodrine TID for now as pt tolerted dialysis well with this; discuss with nephrology - may only be needed on dialysis days         Severe aortic stenosis    - no acute intervention at this time.   - Will cont to monitor         ESRD (end stage renal disease) on dialysis    - nephrology  consulted for volume management.   - Tolerated HD 11/21  - Will cont to monitor         Type 2 diabetes mellitus with chronic kidney disease on chronic dialysis, with long-term current use of insulin    - cont levamir 8 units  - may need to add low dose premeal as post prandial glucoses are in the 200's; Am glucose at goal         Coronary artery disease involving native coronary artery of native heart without angina pectoris    - no CP, SOB, anginal equivalents  - continue asa, statin  - EKG without ischemic changes        Chronic diastolic heart failure    -last echo 10/2017 with pulmonary HTN and undetermined diastolic function, EF 55  -continue metoprolol 25 TID   -HD is main source of volume removal            VTE Risk Mitigation         Ordered     Medium Risk of VTE  Once      11/17/17 0011     Place sequential compression device  Until discontinued      11/14/17 1907     Place BRAIN hose  Until discontinued      11/14/17 1907              Abby Pisano MD  Department of Hospital Medicine   [facility]

## 2017-11-26 NOTE — PROGRESS NOTES
[facility]  Neurosurgery  Progress Note    Subjective:     History of Present Illness: Donta Cline is 85 y.o. male with PMH ESRD on HD MWF, CAD, NSTEMI, bladder/prostate cancer, DMII, aortic stenosis and recent L2-L4 fusion 10/20/17 who presents to [facility] with complaint of worsened back pain and functional decline since last Friday 11/3. There is no family in the room. The patient is mildy confused and thus a poor historian so most of the history obtained from the medical record. He was discharged to a rehab after the last admission and progressing well so transferred to a skilled nursing facility. He was able to walk 50ft on his own until last week when he had acute worsening of his low back pain and LE weakness. He has been unable to weight bear since that time. He also complains of abdominal pain and bilateral hip pain. He reports some BLE pain but is unable to characterize it. Denies BB dysfunction or saddle anesthesia. There is no record of fever or trauma. ESR/CRP are elevated. INR 3.8, on coumadin.     Post-Op Info:  Procedure(s) (LRB):  REVISION-WOUND--lumbar washout (N/A)   9 Days Post-Op     Interval History: No acute events overnight; stable on exam    Medications:  Continuous Infusions:   albumin human 25%      midodrine       Scheduled Meds:   sodium chloride 0.9%   Intravenous Once    sodium chloride 0.9%   Intravenous Once    acetaminophen  650 mg Oral Q8H    albuterol-ipratropium 2.5mg-0.5mg/3mL  3 mL Nebulization Q6H WAKE    atorvastatin  80 mg Oral Daily    bisacodyl  10 mg Rectal Daily    ceFEPime (MAXIPIME) IVPB  1 g Intravenous Daily    dextromethorphan-guaifenesin  mg/5 ml  10 mL Oral Q6H    epoetin preeti  5,000 Units Intravenous Every Mon, Wed, Fri    fluconazole  200 mg Oral Daily    insulin detemir  8 Units Subcutaneous QHS    lidocaine  2 patch Transdermal Q24H    metoprolol tartrate  25 mg Oral TID    midodrine  10 mg Oral TID    pantoprazole  40  mg Oral Nightly    polyethylene glycol  17 g Oral BID    [START ON 11/26/2017] pregabalin  75 mg Oral Daily    senna-docusate 8.6-50 mg  1 tablet Oral BID    sertraline  100 mg Oral QHS    sodium chloride 0.9%  250 mL Intravenous Once     PRN Meds:sodium chloride, sodium chloride, sodium chloride 0.9%, sodium chloride 0.9%, sodium chloride 0.9%, albumin human 25%, albuterol sulfate, aluminum-magnesium hydroxide-simethicone, benzonatate, cyclobenzaprine, dextrose 50%, dextrose 50%, glucagon (human recombinant), glucose, glucose, influenza, insulin aspart, midodrine, ondansetron, pneumoc 13-osiel conj-dip cr(PF), promethazine (PHENERGAN) IVPB, ramelteon, senna-docusate 8.6-50 mg, sodium chloride 0.9%, traMADol     Review of Systems    Objective:     Weight: 76.3 kg (168 lb 4.8 oz)  Body mass index is 30.78 kg/m².  Vital Signs (Most Recent):  Temp: 98.6 °F (37 °C) (11/25/17 1557)  Pulse: 83 (11/25/17 1941)  Resp: 18 (11/25/17 1245)  BP: (!) 111/58 (11/25/17 1557)  SpO2: 99 % (11/25/17 1557) Vital Signs (24h Range):  Temp:  [97.7 °F (36.5 °C)-98.9 °F (37.2 °C)] 98.6 °F (37 °C)  Pulse:  [73-95] 83  Resp:  [16-20] 18  SpO2:  [94 %-100 %] 99 %  BP: (107-135)/(55-60) 111/58       Date 11/25/17 0700 - 11/26/17 0659   Shift 3402-1566 9892-8239 8369-1925 24 Hour Total   I  N  T  A  K  E   P.O. 180   180    Shift Total  (mL/kg) 180  (2.4)   180  (2.4)   O  U  T  P  U  T   Shift Total  (mL/kg)       Weight (kg) 76.3 76.3 76.3 76.3              Oxygen Concentration (%):  [32] 32         Hemodialysis AV Fistula Right forearm (Active)   Needle Size 15ga 11/24/2017  8:40 AM   Site Assessment Clean;Dry;Intact 11/24/2017  8:40 AM   Patency Present;Thrill;Bruit 11/24/2017  8:40 AM   Status Accessed 11/24/2017  8:40 AM   Flows Good 11/24/2017  8:40 AM   Dressing Intervention New dressing 11/21/2017 12:43 PM   Dressing Status Clean;Dry;Intact 11/21/2017  9:45 AM   Site Condition No complications 11/24/2017  8:40 AM   Dressing Gauze  11/21/2017 12:43 PM       Physical Exam:  Vitals reviewed.    Constitutional: He appears well-developed and well-nourished. He is not diaphoretic. No distress.     Eyes: Pupils are equal, round, and reactive to light. EOM are normal.     Cardiovascular: Normal rate.     Abdominal: Soft.     Psych/Behavior: He is alert. He has a normal mood and affect.     Neurological:        Sensory: There is no sensory deficit in the trunk. There is no sensory deficit in the extremities.        Cranial nerves: Cranial nerve(s) II, III, IV, V, VI, VII, VIII, IX, X, XI and XII are intact.   AOx2 (self, place)  BUE strength: 5/5  BLE strength: 4/5 HF, 4/5 KF, 2/5 KE, 5/5 PF/DF       Significant Labs:    Recent Labs  Lab 11/24/17  0448 11/24/17  1643 11/25/17  0452   * 150* 99    141 138   K 3.9 3.9 4.0    105 103   CO2 23 25 22*   BUN 47* 22 30*   CREATININE 4.6* 2.6* 3.3*   CALCIUM 9.9 9.5 10.4   MG 1.8  --  2.0       Recent Labs  Lab 11/24/17  1643 11/24/17  2229 11/25/17  0452   WBC 13.00* 10.97 12.68   HGB 8.4* 9.0* 9.9*   HCT 27.5* 29.2* 33.2*    269 255       Recent Labs  Lab 11/24/17  0448 11/25/17  0452 11/25/17  0821   INR 1.3* 2.1* 1.4*     Microbiology Results (last 7 days)     Procedure Component Value Units Date/Time    Culture, Respiratory with Gram Stain [[id number]] Collected:  11/25/17 1420    Order Status:  Completed Specimen:  Respiratory from Sputum, Induced Updated:  11/25/17 1832     Gram Stain (Respiratory) >10 epithelial cells per low power field     Gram Stain (Respiratory) Many WBC's     Gram Stain (Respiratory) Moderate yeast     Gram Stain (Respiratory) Rare Gram positive rods    Culture, Respiratory with Gram Stain [[id number]] Collected:  11/24/17 1530    Order Status:  Completed Specimen:  Respiratory from Sputum, Expectorated Updated:  11/25/17 6132     Respiratory Culture --     YEAST   Few  Identification pending       Gram Stain (Respiratory) <10 epithelial cells per low  power field.     Gram Stain (Respiratory) Few WBC's     Gram Stain (Respiratory) Few Gram positive cocci     Gram Stain (Respiratory) Rare yeast     Gram Stain (Respiratory) Rare Gram positive rods    Blood culture [[id number]] Collected:  11/23/17 0909    Order Status:  Completed Specimen:  Blood Updated:  11/25/17 1212     Blood Culture, Routine No Growth to date     Blood Culture, Routine No Growth to date     Blood Culture, Routine No Growth to date    Blood culture [[id number]] Collected:  11/23/17 0909    Order Status:  Completed Specimen:  Blood Updated:  11/25/17 1212     Blood Culture, Routine No Growth to date     Blood Culture, Routine No Growth to date     Blood Culture, Routine No Growth to date    Urine culture [[id number]] Collected:  11/22/17 1555    Order Status:  Completed Specimen:  Urine from Urine Updated:  11/24/17 1301     Urine Culture, Routine --     CANDIDA ALBICANS  > 100,000 cfu/ml  Treatment of asymptomatic candiduria is not recommended (except for   specific populations). Candida isolated in the urine typically   represents colonization. If an indwelling urinary catheter is present  it should be removed or replaced.      Narrative:       Urine sample sent out for urinalysis. 11/23  ADD ON PER DR ALVARADO ORDER [id number] URINALYSIS @0902 11/23/17    Culture, Anaerobic [[id number]] Collected:  11/16/17 2218    Order Status:  Completed Specimen:  Wound from Back Updated:  11/24/17 0749     Anaerobic Culture No anaerobes isolated    Narrative:       2.deep lumbar spine    Culture, Anaerobic [[id number]] Collected:  11/16/17 2218    Order Status:  Completed Specimen:  Wound from Back Updated:  11/24/17 0749     Anaerobic Culture No anaerobes isolated    Narrative:       1.superficial lumbar spine    Aerobic culture [[id number]] Collected:  11/16/17 2218    Order Status:  Completed Specimen:  Wound from Back Updated:  11/20/17 0904     Aerobic Bacterial Culture No growth    Narrative:       2.deep  lumbar spine    Aerobic culture [[id number]] Collected:  11/16/17 2218    Order Status:  Completed Specimen:  Wound from Back Updated:  11/20/17 0904     Aerobic Bacterial Culture No growth    Narrative:       1.superficial lumbar spine          All pertinent labs from the last 24 hours have been reviewed.    Significant Diagnostics:  CT: No results found in the last 24 hours.  MRI: No results found in the last 24 hours.  I have reviewed all pertinent imaging results/findings within the past 24 hours.    KUB 11/24:  Nonspecific 3.0 cm echogenic focus within the left hepatic lobe conforms to a geographic distribution and may represent a region of focal fatty infiltration.  However, given this patient's history of elevated liver function tests and without prior imaging available for comparison, further evaluation may be obtained with contrasted triple phase CT or MRI if clinical concern dictates.  Alternatively, this focus may be followed with ultrasound to assess for stability.    Scattered calcification within the liver and spleen, most consistent with granulomas.    Cholecystectomy.      CXR 11/24:  The cardiomediastinal silhouette is stable.  There is no pleural effusion.  The trachea is midline.  The lungs are symmetrically expanded bilaterally with increased interstitial attenuation bilaterally, suggesting mild edema, noting left lower lung zone increased parenchymal attenuation, retrocardiac, somewhat more prominent than on previous exam, developing consolidation is a consideration versus atelectasis.   There is no pneumothorax.  The osseous structures are unchanged.    Assessment/Plan:     * Acute bilateral low back pain, post-lumbar spinal fusion    85 y.o. male s/p L2-4 fusion 10/20 for lumbar stenosis who presents with acute worsening of his LBP and LE weakness. s/p washout and hematoma evacuation    -Patient with stable strength post-op.   -Improved delirium on exam today  -delirium precautions and workup  for possible infectious etiology per primary team  -Continue to hold ASA and coumadin  -HD per nephrology  -Afib, rate control per primary team.  -Q4h neuro checks  -PT/OT/OOB. Therapy recommending SNF   -Pain control per primary team.   -TEDs/SCDs/SQH for DVT prophylaxis   -Coordination of care and medical management per primary team   -Please call NSGY with any change in neuro exam               Kain Cannon MD  Neurosurgery  [facility]

## 2017-11-26 NOTE — CONSULTS
Consult received, Patient seen and examined during rounds. Please refer to consult note dated 11/26/2017      Dana Davis MD, MPH  Internal Medicine PGY1  [address] [place] [address]

## 2017-11-26 NOTE — SUBJECTIVE & OBJECTIVE
Interval History:  No acute events overnight. Pt's delirium improved -  AO x 3 again this AM. Pt does report more intermittent spasms of pain in anal area. Also today reports difficulty swallowing which is new. Pt states food won't go down - denies medina pain.        Review of Systems   Constitutional: Positive for appetite change. Negative for chills, diaphoresis, fatigue and fever.   HENT: Negative for rhinorrhea, sinus pain, sinus pressure, sneezing and sore throat.    Eyes: Negative for visual disturbance.   Respiratory: Negative for cough, choking, chest tightness and shortness of breath.    Cardiovascular: Negative for chest pain, palpitations and leg swelling.   Gastrointestinal: Positive for constipation. Negative for diarrhea and nausea.        Denies bowel incontinence   Genitourinary: Negative for dysuria.   Musculoskeletal: Positive for back pain and gait problem. Negative for joint swelling, myalgias and neck pain.   Neurological: Negative for dizziness, facial asymmetry, light-headedness and headaches.   Psychiatric/Behavioral: Negative for agitation and behavioral problems.     Objective:     Vital Signs (Most Recent):  Temp: 97.2 °F (36.2 °C) (11/25/17 2056)  Pulse: 76 (11/25/17 2056)  Resp: 18 (11/25/17 2056)  BP: (!) 93/44 (11/25/17 2056)  SpO2: 99 % (11/25/17 2056) Vital Signs (24h Range):  Temp:  [97.2 °F (36.2 °C)-98.9 °F (37.2 °C)] 97.2 °F (36.2 °C)  Pulse:  [73-95] 76  Resp:  [16-20] 18  SpO2:  [94 %-100 %] 99 %  BP: ()/(44-60) 93/44     Weight: 76.3 kg (168 lb 4.8 oz)  Body mass index is 30.78 kg/m².    Intake/Output Summary (Last 24 hours) at 11/25/17 2143  Last data filed at 11/25/17 1300   Gross per 24 hour   Intake              360 ml   Output               25 ml   Net              335 ml      Physical Exam   Constitutional: He appears well-developed.   Awake/alert   HENT:   Mouth/Throat: Oropharynx is clear and moist.   Eyes: Conjunctivae are normal. No scleral icterus.    Cardiovascular: Normal heart sounds and intact distal pulses.    No murmur heard.  Irregular rhythm, tachycardic   Pulmonary/Chest: Effort normal. No respiratory distress. He has no wheezes. He has rales.   Bibasilar crackles present   Abdominal: Soft. Bowel sounds are normal.   Mild distension, soft, tympanic to percussion, no fluid wave noted, no bulging flanks, or abdominal collateral vessels   Musculoskeletal:   RUE forearm AV fistula with palpable thrill.     Surgical drains removed, dressing with sanguinous drainage   Lymphadenopathy:     He has no cervical adenopathy.   Neurological:   Alert, oriented to self, hospital   Skin: Skin is warm. There is pallor.       Significant Labs:   CBC:     Recent Labs  Lab 11/24/17  1643 11/24/17  2229 11/25/17  0452   WBC 13.00* 10.97 12.68   HGB 8.4* 9.0* 9.9*   HCT 27.5* 29.2* 33.2*    269 255     CMP:     Recent Labs  Lab 11/24/17  0448 11/24/17  1643 11/25/17  0452    141 138   K 3.9 3.9 4.0    105 103   CO2 23 25 22*   * 150* 99   BUN 47* 22 30*   CREATININE 4.6* 2.6* 3.3*   CALCIUM 9.9 9.5 10.4   PROT 6.5 6.4 7.3   ALBUMIN 2.7* 2.8* 3.0*   BILITOT 0.5 0.5 0.4   ALKPHOS 203* 176* 201*   AST 96* 99* 107*   ALT 10 13 16   ANIONGAP 12 11 13   EGFRNONAA 10.8* 21.5* 16.1*     Microbiology Results (last 7 days)     Procedure Component Value Units Date/Time    Culture, Respiratory with Gram Stain [[id number]] Collected:  11/25/17 1420    Order Status:  Completed Specimen:  Respiratory from Sputum, Induced Updated:  11/25/17 1832     Gram Stain (Respiratory) >10 epithelial cells per low power field     Gram Stain (Respiratory) Many WBC's     Gram Stain (Respiratory) Moderate yeast     Gram Stain (Respiratory) Rare Gram positive rods    Culture, Respiratory with Gram Stain [[id number]] Collected:  11/24/17 1530    Order Status:  Completed Specimen:  Respiratory from Sputum, Expectorated Updated:  11/25/17 1306     Respiratory Culture --     YEAST    Few  Identification pending       Gram Stain (Respiratory) <10 epithelial cells per low power field.     Gram Stain (Respiratory) Few WBC's     Gram Stain (Respiratory) Few Gram positive cocci     Gram Stain (Respiratory) Rare yeast     Gram Stain (Respiratory) Rare Gram positive rods    Blood culture [[id number]] Collected:  11/23/17 0909    Order Status:  Completed Specimen:  Blood Updated:  11/25/17 1212     Blood Culture, Routine No Growth to date     Blood Culture, Routine No Growth to date     Blood Culture, Routine No Growth to date    Blood culture [[id number]] Collected:  11/23/17 0909    Order Status:  Completed Specimen:  Blood Updated:  11/25/17 1212     Blood Culture, Routine No Growth to date     Blood Culture, Routine No Growth to date     Blood Culture, Routine No Growth to date    Urine culture [[id number]] Collected:  11/22/17 1555    Order Status:  Completed Specimen:  Urine from Urine Updated:  11/24/17 1301     Urine Culture, Routine --     CANDIDA ALBICANS  > 100,000 cfu/ml  Treatment of asymptomatic candiduria is not recommended (except for   specific populations). Candida isolated in the urine typically   represents colonization. If an indwelling urinary catheter is present  it should be removed or replaced.      Narrative:       Urine sample sent out for urinalysis. 11/23  ADD ON PER DR ALVARADO ORDER [id number] URINALYSIS @0902 11/23/17    Culture, Anaerobic [[id number]] Collected:  11/16/17 2218    Order Status:  Completed Specimen:  Wound from Back Updated:  11/24/17 0749     Anaerobic Culture No anaerobes isolated    Narrative:       2.deep lumbar spine    Culture, Anaerobic [[id number]] Collected:  11/16/17 2218    Order Status:  Completed Specimen:  Wound from Back Updated:  11/24/17 0749     Anaerobic Culture No anaerobes isolated    Narrative:       1.superficial lumbar spine    Aerobic culture [[id number]] Collected:  11/16/17 2218    Order Status:  Completed Specimen:  Wound from Back  Updated:  11/20/17 0904     Aerobic Bacterial Culture No growth    Narrative:       2.deep lumbar spine    Aerobic culture [[id number]] Collected:  11/16/17 2218    Order Status:  Completed Specimen:  Wound from Back Updated:  11/20/17 0904     Aerobic Bacterial Culture No growth    Narrative:       1.superficial lumbar spine            Significant Imaging: I have reviewed all pertinent imaging results/findings within the past 24 hours.

## 2017-11-26 NOTE — CONSULTS
[facility]  Infectious Disease  Consult Note    Patient Name: Donta Cline  MRN: [id number]  Admission Date: 11/14/2017  Hospital Length of Stay: 11 days  Attending Physician: Abby Pisano MD  Primary Care Provider: ZAID Richardson Iii, MD     Isolation Status: No active isolations    Patient information was obtained from patient, caregiver / friend and ER records.      Consults  Assessment/Plan:     * Pneumonia    Mr. Cline is a 85 y.o. male with ESRD on dialysis MWF, HFpEF, NSTEMI, bladder/prostate cancer, DM II, aortic stenosis and CAD, previous fungal UTI's  .  Who is having candida cultures from respiratory, and urinary cultures.       - Patient with increasing need for oxygen over the last day  - Exam is consistent with pulmonary edema  - Xray more consistent with fluid overload  - Recommend to discontinue cefepime as it can cause AMS and is altering normal norah. Possibly exacerbating candida infection   - Suspect that he does not have a pneumonia   - Recommend removal of fluid with HD tomorrow  - If no respiratory improvement with HD recommend ruling out PE with CTA given that he is post procedural and has acute respiratory failure requiring 5L of oxygen.         Candiduria    - Patient with evident candida in the urine and in his mouth   - Likely also due to antibiotics   - Recommend to continue fluconazole 200 mg qd as he is ESRD patients  - Recommend to stop antibiotics for now             Thank you for your consult. I will follow-up with patient. Please contact us if you have any additional questions.    Dana Davis MD  Infectious Disease  [facility]    Subjective:     Principal Problem: Pneumonia    HPI: Mr. Cline is a 85 y.o. male with ESRD ondialysis MWF, HFpEF, NSTEMI, bladder/prostate cancer, DM II, aortic stenosis and CAD, previous fungal UTI's  . He present to [facility] for evaluation of back pain s/p lumbar fusion/laminectomy (10/20/17).  ID  consulted for concern for disseminated yeast infection and new onset dysphagia and abx management of presumed HAP.      Patient states that he feels better this morning, He states that yesterday he had problems with painful swallowing but today he is doing ok , was eating a breakfast of toast, oatmeal , and beef.  He endorses having a cough for weeks and that he is not usually on oxygen at home. No family at bedside  Per chart review patient had multiple fungal UTI's and a history of a stent placement    He denies fever, chills, dysuria, hematuria , or diarrhea.    Patient in patient antibiotics :    Cefazolin 11/19  Cefepime 11/24-today   Ceftriaxone 11/23-11/24  Flucanazole 11/25- Today   Vancomycin 11/24      Past Medical History:   Diagnosis Date    Bladder cancer     Chronic diastolic heart failure 8/21/2012    3/13: AOSAT: 98, FICKCI: 2.41, FICKCO: 5.18 PAPRES: 34/16 (23), PASAT: 65, PVR: 1.74 PWPRES: 18/18 (14), RA PRES: 14/13 (12), RV: 40/0, 10     Chronic hip pain, right 10/4/2017    Coronary artery disease involving native coronary artery of native heart without angina pectoris     Dyslipidemia     Encounter for blood transfusion     ESRD (end stage renal disease) on dialysis 6/9/2014    Essential hypertension     Hypercholesteremia 8/21/2012    Long-term (current) use of anticoagulants 10/9/2015    NSTEMI (non-ST elevated myocardial infarction) 10/4/2017    Paroxysmal atrial fibrillation 10/8/2015    Prostate cancer     Severe aortic stenosis 10/14/2014    Type 2 diabetes mellitus with chronic kidney disease on chronic dialysis, with long-term current use of insulin 12/15/2013    Ventricular tachycardia     monomorphic       Past Surgical History:   Procedure Laterality Date    BACK SURGERY      laminectomy x2    COLON SURGERY      EXTENSIVE PROSTATE SURGER      Prostatectomy, Radical    JOINT REPLACEMENT      left hip       Review of patient's allergies indicates:   Allergen Reactions     Morphine Other (See Comments)     Other reaction(s): severe abdominal pain    Darvocet a500  [propoxyphene n-acetaminophen]      Other reaction(s): Unknown       Medications:  Prescriptions Prior to Admission   Medication Sig    amitriptyline (ELAVIL) 25 MG tablet Take 25 mg by mouth nightly as needed for Insomnia.    aspirin (ECOTRIN) 81 MG EC tablet Take 81 mg by mouth once daily.    bisacodyl (DULCOLAX) 10 mg Supp Place 10 mg rectally daily as needed.    celecoxib (CELEBREX) 200 MG capsule Take 200 mg by mouth 2 (two) times daily.    hydrocodone-acetaminophen 10-325mg (NORCO)  mg Tab Take 1 tablet by mouth every 6 (six) hours as needed for Pain.     insulin detemir (LEVEMIR FLEXTOUCH) 100 unit/mL (3 mL) SubQ InPn pen Inject 14 Units into the skin every evening.    midodrine (PROAMATINE) 10 MG tablet Take 1 tablet (10 mg total) by mouth 3 (three) times daily.    ondansetron (ZOFRAN) 4 MG tablet Take 4 mg by mouth every 8 (eight) hours as needed for Nausea.    pantoprazole (PROTONIX) 40 MG tablet Take 1 tablet (40 mg total) by mouth nightly.    sertraline (ZOLOFT) 100 MG tablet Take 1 tablet by mouth every evening.    atorvastatin (LIPITOR) 80 MG tablet Take 1 tablet (80 mg total) by mouth once daily.    coenzyme Q10 200 mg capsule Take 200 mg by mouth once daily.    gabapentin (NEURONTIN) 300 MG capsule Take 300 mg by mouth every evening.    metoprolol tartrate (LOPRESSOR) 25 MG tablet Take 1 tablet (25 mg total) by mouth 2 (two) times daily.    NITROSTAT 0.4 mg SL tablet place 1 tablet under the tongue if needed every 5 minutes for chest pain for 3 doses IF NO RELIEF AFTER 3RD DOSE CALL PRESCRIBER .    ramelteon (ROZEREM) 8 mg tablet Take 1 tablet (8 mg total) by mouth nightly as needed for Insomnia.    senna-docusate 8.6-50 mg (PERICOLACE) 8.6-50 mg per tablet Take 1 tablet by mouth 2 (two) times daily.    vit b cmplx 3-fa-vit c-biotin 1- mg-mg-mcg (ANKIT-AZ RX) 1-  mg-mg-mcg Tab Take 1 tablet by mouth once daily.     Antibiotics     Start     Stop Route Frequency Ordered    11/26/17 0930  vancomycin 1 g in dextrose 5 % 250 mL IVPB (ready to mix system)  (Vancomycin IVPB with levels panel)      -- IV Once 11/26/17 0824    11/24/17 1730  cefepime in dextrose 5 % 1 gram/50 mL IVPB 1 g      -- IV Daily 11/24/17 1720    11/17/17 0900  mupirocin 2 % ointment 1 g      11/22 0859 Nasl 2 times daily 11/17/17 0011        Antifungals     Start     Stop Route Frequency Ordered    11/25/17 1730  fluconazole tablet 200 mg      -- Oral Daily 11/25/17 1718        Antivirals     None           There is no immunization history for the selected administration types on file for this patient.    Family History     None        Social History     Social History    Marital status:      Spouse name: N/A    Number of children: N/A    Years of education: N/A     Social History Main Topics    Smoking status: Former Smoker     Packs/day: 3.00     Years: 40.00     Quit date: 1/1/1980    Smokeless tobacco: Former User    Alcohol use No      Comment: a few drinks    Drug use: Unknown    Sexual activity: Not Asked     Other Topics Concern    None     Social History Narrative    None     Review of Systems   Constitutional: Negative for appetite change, chills, diaphoresis, fatigue and fever.   HENT: Negative for rhinorrhea, sinus pain, sinus pressure, sneezing and sore throat.    Eyes: Negative for visual disturbance.   Respiratory: Positive for cough. Negative for choking, chest tightness and shortness of breath.    Cardiovascular: Negative for chest pain, palpitations and leg swelling.   Gastrointestinal: Positive for constipation. Negative for diarrhea and nausea.        Denies bowel incontinence   Genitourinary: Negative for dysuria.   Musculoskeletal: Positive for back pain and gait problem. Negative for joint swelling, myalgias and neck pain.   Neurological: Negative for dizziness,  facial asymmetry, light-headedness and headaches.   Psychiatric/Behavioral: Positive for confusion. Negative for agitation and behavioral problems.     Objective:     Vital Signs (Most Recent):  Temp: 97.8 °F (36.6 °C) (11/26/17 0802)  Pulse: 80 (11/26/17 0810)  Resp: 18 (11/26/17 0810)  BP: (!) 104/58 (11/26/17 0802)  SpO2: 99 % (11/26/17 0810) Vital Signs (24h Range):  Temp:  [97.2 °F (36.2 °C)-98.6 °F (37 °C)] 97.8 °F (36.6 °C)  Pulse:  [73-95] 80  Resp:  [18] 18  SpO2:  [97 %-100 %] 99 %  BP: ()/(44-60) 104/58     Weight: 77.8 kg (171 lb 8 oz)  Body mass index is 31.37 kg/m².    Estimated Creatinine Clearance: 10.8 mL/min (based on SCr of 4.5 mg/dL (H)).    Physical Exam   Constitutional: He appears well-developed.   Awake/alert   HENT:   Thrush noted   Eyes: Conjunctivae are normal. No scleral icterus.   Cardiovascular: Normal heart sounds and intact distal pulses.    No murmur heard.  Irregular rhythm, tachycardic   Pulmonary/Chest: Effort normal. No respiratory distress. He has no wheezes. He has rales.   Bibasilar crackles present   Abdominal: Soft. Bowel sounds are normal. There is no tenderness.   Mild distension, soft, tympanic to percussion, no fluid wave noted.    Genitourinary:   Genitourinary Comments: Erythema around groin, with purulent discharge from penis   Musculoskeletal: Normal range of motion. He exhibits no edema or deformity.   RUE forearm AV fistula with palpable thrill.        Lymphadenopathy:     He has no cervical adenopathy.   Neurological:   Alert, oriented to self, hospital   Skin: Skin is warm.       Significant Labs:   CBC:   Recent Labs  Lab 11/24/17  2229 11/25/17  0452 11/26/17  0640   WBC 10.97 12.68 12.85*   HGB 9.0* 9.9* 8.6*   HCT 29.2* 33.2* 28.3*    255 271     CMP:   Recent Labs  Lab 11/24/17  1643 11/25/17  0452 11/26/17  0640    138 137   K 3.9 4.0 4.3    103 102   CO2 25 22* 26   * 99 115*   BUN 22 30* 43*   CREATININE 2.6* 3.3* 4.5*    CALCIUM 9.5 10.4 10.2   PROT 6.4 7.3 6.2   ALBUMIN 2.8* 3.0* 2.5*   BILITOT 0.5 0.4 0.4   ALKPHOS 176* 201* 176*   AST 99* 107* 99*   ALT 13 16 18   ANIONGAP 11 13 9   EGFRNONAA 21.5* 16.1* 11.1*       Significant Imaging: I have reviewed all pertinent imaging results/findings within the past 24 hours.

## 2017-11-26 NOTE — ASSESSMENT & PLAN NOTE
Mr. Cline is a 85 y.o. male with ESRD on dialysis MWF, HFpEF, NSTEMI, bladder/prostate cancer, DM II, aortic stenosis and CAD, previous fungal UTI's  . Who is having candida cultures from respiratory, and urinary cultures.       - Patient with increasing need for oxygen over the last day  - Exam is consistent with pulmonary edema  - Xray more consistent with fluid overload  - Recommend to discontinue cefepime as it can cause AMS and is altering normal norah. Possibly exacerbating candida infection   - Suspect that he does not have a pneumonia   - Recommend removal of fluid with HD tomorrow  - If no respiratory improvement with HD recommend ruling out PE with CTA given that he is post procedural and has acute respiratory failure requiring 5L of oxygen.

## 2017-11-26 NOTE — PLAN OF CARE
Problem: Occupational Therapy Goal  Goal: Occupational Therapy Goal  Goals to be met by 12/10/2017:     Patient will increase functional independence with ADLs by performing:    Bed mobility with demo understanding for spinal precautions with min(A).  Stand Pivot transfer to multiple surfaces (chair, wheelchair, bedside commode) with Moderate Assistance.   Donning LSO with Minimal Assistance while seated EOB.  Donning socks with sock aid with minimal assistance.   Functional dynamic sitting task ~8 min duration while seated EOB with CGA for postural control.   UE endurance HEP with set up and assistance as needed.   Shaw on eob with CG A for balance  BSC transfer with max a     Outcome: Ongoing (interventions implemented as appropriate)  Goals updated  PIYUSH Katz  11/26/2017  [phone]

## 2017-11-26 NOTE — ASSESSMENT & PLAN NOTE
AO x 2-3. Seems to be pts baseline over the last 1-2 months since prolonged hospitalizations. Per family was driving self to dialysis prior - 2 months ago. Improved today.   - infectious workup as above given elevated wbc; leukocytosis improved   - weaning opiates - tramadol 50 q8 prn

## 2017-11-26 NOTE — ASSESSMENT & PLAN NOTE
Pt with leukocytosis and developed increased RR over the last two days. Episode of hypoxia and hypotension yesterday afternoon. Improved with abx and 250 cc bolus x 2. Leukocytosis resolved today.    Blood cxs NGTD, urine cx - growing yeast. Per family pt with hx of yeast in urine - has old urinary stent in place. Chest xray shows  possible worsening opacity of the left base. Pt is coughing w/ increased secretions andO2 requirements consistent w/ PNA. Respiratory cx from 11/24  growing yeast .   - wound appears not infected   - RUQ US given elevated LFT's - showed hypodense area - no evidence of infection; will need ct tirple phase vs. Repeat US at later date  - cont cefepime and vanc for presumed HAP vs. Asp pna given deterioration for now   - Repeat respiratory culture today again growing yeast , final results pending  - ID consult given concern for disseminated yeast infection given positive culture from multiple sites - urine, respiratory - also now with new onset / worsening dysphagia. ID also consulted for  abx management of presumed HAP.    - repeat blood cultures / fungal cultures   - start diflucan for now 200 qd - renal dosing

## 2017-11-26 NOTE — ASSESSMENT & PLAN NOTE
- Neurosurgery took pt for washout and evacuation of hematoma on 11/16  - Hgb dropped during admission admit - s/p 2unit pRBC, Hgb now grossly stable  - Drains removed and not on abx-  cultures remain NGTD from surgery.    - Supportive care, PT/OT  - Given worsened delirium - stopped fentanyl patches and norco prn  - Pain control with scheduled tylenol 650 q8hrs;  tramadol 50 qhs for breakthrough ; will cont pregabalin to 75mg Qd for now ; also add flexeril as muscle relaxants apparently have worked in the past   - component of pain likely severe right hip osteoarthritis - will cont lidocaine patches   - Mobility remains poor and strength in hip flexors/proximally still poor. 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 Unclear if this is pts new baseline.

## 2017-11-26 NOTE — ASSESSMENT & PLAN NOTE
- RUQ US given elevated LFT's - showed hypodense area - no evidence of infection  - will need ct triple phase vs. Repeat US at later date

## 2017-11-26 NOTE — PROGRESS NOTES
"[facility] Hospital Medicine  Progress Note    Patient Name: Donta Cline  MRN: [id number]  Patient Class: IP- Inpatient   Admission Date: 11/14/2017  Length of Stay: 11 days  Attending Physician: Abby Pisano MD  Primary Care Provider: ZAID Richardson Iii, MD    [facility] Medicine Team: [facility] MED L Abby Pisano MD    Subjective:     Principal Problem:Pneumonia    HPI:  Donta Cline is a 85 y.o. male who presents with PMHx of ESRD with dialysis MWF, HFpEF, NSTEMI, bladder/prostate cancer, DM II, aortic stenosis and CAD for evaluation of back pain s/p lumbar fusion/laminectomy (10/20/17). No family at bedside. Difficult to obtain HPI as speech garbled, however patient endorses progressive lower extremity weakness with difficulty ambulating for the past 4-5 days. His back pain is without radiation. Patient is now unable to ambulate independently. Denies trauma and urinary bowel/bladder incontinence, fever.    Per EDMD:  "The daughter brought patient to ED due to concern of his severe back pain and inability to perform activities as he was doing previously. She had discussed with Dr. Saul, and he recommended the patient come to ED and obtain imaging for spine."    Imaging:    Ct Lumbar Spine Without Contrast    Result Date: 11/14/2017  Comparison: MRI 10/12/17. Findings: Routine CT lumbar spine protocol performed without IV contrast. Prior L2-L4 instrumented lumbar fusion with interbody disc spacer at L3-4. Decompressive posterior laminectomies at from L2-3 through L5-S1. No evidence of hardware failure. No fracture identified, noting the inferior endplate of L4 is indistinct. Infection or postsurgical fluid collection not excluded, noting limited evaluation without IV contrast and artifact from adjacent pedicle screws. There is partial fusion of the right L5-S1 facet. The SI joints are unremarkable. There is extensive calcified atherosclerotic disease. Atrophic kidneys. Partially " imaged right renal stent noted. No hydronephrosis. Small renal lesions, too small to characterize without IV contrast.      Mri Lumbar Spine Without Contrast    Result Date: 11/14/2017  MRI LUMBAR SPINE TECHNIQUE: MRI lumbar spine was performed without contrast. There is an ill-defined heterogeneous collection encircling L1 spinous process demonstrating fluid levels on the right. This may represents a seroma or hematoma postoperatively however abscess cannot be excluded. This collection is best seen on series 11 image 4.    Hospital Course:  In discussion with daughter who is at bedside today (11/15) patient was discharged after surgical procedure to an inpatient rehab and he reports that he was doing well at the rehab but reached a plateau in his activity/functional level.  She states patient was walking 50 feet with minimal assist, able to toilet with minimal assistance.  Patient was transferred to SNF and since transfer, last Friday (11/10) patient requiring max assistance to stand and reported inability to bear weight secondary to pain, and since this time patient has been bed bound.     She also notes that patient with poor appetite as speech therapy has recommended puree nectar thick diet with Nepro shakes, reports that pt is losing weight and some increased abdominal girth.     Overnight patient admitted due to concerns of worsening pain and inability to ambulate, patient underwent MRI and CT imaging of the lumbar spine.  He has intact fusion, no spinal canal stenosis or cord compression noted, concern for a fluid collection encircling L1 spinous process, post op L2-L4 fusion changes and s/p laminectomy L3-L4.  Discussed imaging findings with attending neurosurgeon Dr. Sands who performed pts operation and he reported that fluid collection appears outside of range where instrumentation was.  Patient with no reported falls, workup today reveals elevated CRP >150, ESR 92, has as supratherapeutic INR  3.8    Informed plan will be to take patient to OR for washout and exploration to evaluate for hematoma vs infection.     11/16 - Patient without acute events overnight, patient states he and his daughter are going to [place] today.  Daughter has provided consent for surgery and blood products, patient is receiving serial doses of FFP with intermittent INR checks with plan for surgery this afternoon if INR <1.4.  Daughter reports that patient w/o complaints of pain when lying in bed, but if he is moved/turned causes exacerbation of his pain.     Patient taken for operative intervention and in discussion with neurosurgeon Dr. Sands, he reports that patient with well healed wound and surgical fusion was intact, when cut into the dura and in subdural region there was area of hematoma that was evacuated (full op report pending in EMR)  Cultures sent to rule out infection but clinical impression was not an appearance of an infected operative site.  Dr. Sands noted that degree of patient's pain incongruent with surgical findings.     Post-oepratively in the morning patient coverted to afib with RVR and this morning with borderline BP, his Hgb downtrending since admission and was 6.7 post-op.  Patient received intermittent fluid boluses early AM of 11/17 but also with intake of 3+L prior to surgery due to need for high amount of FFP (6units).  Patient will receive 1 unit PRBC during Hemodialysis today, giving midodrine as well.     11/18 - Overnight initiated CPAP for patient due to concerns for pulmonary edema, blood gas with some hypercapnia but ph 7.3,  Discussed with Nephrology patient to receive UF today, and will give another 1unit PRBC due to hgb 6.8-7.0.  Pt reports slept well with cpap, still has back pain with movement and on examination.  Culture data from surgery remains negative - neurosurgery has ordered cefazolin, will renally dose this.     11/19 - Patient seen this AM, awake, alert, reporting still having  back pain and reporting frequent cough today, coughing during interview.  S/p UF by nephrology yesterday and negative -1.4L total for the day and s/p 2nd unit PRBC and hgb is stable at 8.6, remains in afib and blood pressures are stable.      11/20 _Patient with ongoing back pain, neurosurgery removed 2 surgical drains,  Hemoglobin remains  Stable  W/o requiring  Transfusion.  He remains in Afib rhythmwise and is  Planned for dialysis.  PT worked with pt and SLP eval pt upgraded to Dental soft nectar thick liquids.     11/20- Called by HD unit and patient 1 hour into session with AFIB with RVR and BP in 80/50s, given 300cc bolus by nephrology and asking for additional recs, HD session stopped early and 250cc bolus given and stat CBC ordered.      11/21 - Pt tolerated dialysis well. Completed 3 hours.    11/22-11/24. Pt had increasing delirium and wbc elevated thus started on ceftriaxone based on dirty UA. DDx includes aspiration PNA as pt with cough.     11/25: Called to the bedside around 4:00pm as pt had increasing oxygen requirements - sating in low 80's on 2L and complaining of SOB. Also with increasing audible rales. Vitals were significant for hypotension - with BP 80/40-50's with HR in the 80's. RR in the high 20-30's. Exam significant for diffuse rhonchi bilaterally.      Pt given 250 cc bolus and oxygen increased to 5L. Pt deep suctioned.  Vitals improved after bolus to 100/50's , HR 80's, RR improved, O2 sats improved to high %. Hypotension and hypoxia likely  sepsis secondary PNA  given increasing oxygen requirement, notable cough in recent days, and leukocytosis. Abx broadened to vanc and cefepime for presumed hospital acquired PNA. DDx includes aspiration PNA as pt has had delirium in recent days.            Interval History:  No acute events overnight. Pt's delirium is much improved today - awake , alert, joking with me on evaluation - AO x 3. Pt continues to report more intermittent spasms of  pain in anal area - overall pain has improved.      Review of Systems   Constitutional: Positive for appetite change. Negative for chills, diaphoresis, fatigue and fever.   HENT: Negative for rhinorrhea, sinus pain, sinus pressure, sneezing and sore throat.    Eyes: Negative for visual disturbance.   Respiratory: Negative for cough, choking, chest tightness and shortness of breath.    Cardiovascular: Negative for chest pain, palpitations and leg swelling.   Gastrointestinal: Positive for constipation. Negative for diarrhea and nausea.        Denies bowel incontinence   Genitourinary: Negative for dysuria.   Musculoskeletal: Positive for back pain and gait problem. Negative for joint swelling, myalgias and neck pain.   Neurological: Negative for dizziness, facial asymmetry, light-headedness and headaches.   Psychiatric/Behavioral: Negative for agitation and behavioral problems.     Objective:     Vital Signs (Most Recent):  Temp: 97.8 °F (36.6 °C) (11/26/17 0802)  Pulse: 88 (11/26/17 1100)  Resp: 18 (11/26/17 0810)  BP: (!) 104/58 (11/26/17 0802)  SpO2: 99 % (11/26/17 0810) Vital Signs (24h Range):  Temp:  [97.2 °F (36.2 °C)-98.6 °F (37 °C)] 97.8 °F (36.6 °C)  Pulse:  [73-95] 88  Resp:  [18] 18  SpO2:  [97 %-100 %] 99 %  BP: ()/(44-58) 104/58     Weight: 77.8 kg (171 lb 8 oz)  Body mass index is 31.37 kg/m².    Intake/Output Summary (Last 24 hours) at 11/26/17 1213  Last data filed at 11/26/17 1042   Gross per 24 hour   Intake              280 ml   Output               75 ml   Net              205 ml      Physical Exam   Constitutional: He appears well-developed.   Awake/alert   HENT:   Mouth/Throat: Oropharynx is clear and moist.   Eyes: Conjunctivae are normal. No scleral icterus.   Cardiovascular: Normal heart sounds and intact distal pulses.    No murmur heard.  Irregular rhythm, tachycardic   Pulmonary/Chest: Effort normal. No respiratory distress. He has no wheezes. He has rales.   Bibasilar crackles  present   Abdominal: Soft. Bowel sounds are normal.   Mild distension, soft, tympanic to percussion, no fluid wave noted, no bulging flanks, or abdominal collateral vessels   Musculoskeletal:   RUE forearm AV fistula with palpable thrill.     Surgical drains removed, dressing with sanguinous drainage   Lymphadenopathy:     He has no cervical adenopathy.   Neurological:   Alert, oriented to self, hospital, year    Skin: Skin is warm. There is pallor.       Significant Labs:   CBC:     Recent Labs  Lab 11/24/17  2229 11/25/17  0452 11/26/17  0640   WBC 10.97 12.68 12.85*   HGB 9.0* 9.9* 8.6*   HCT 29.2* 33.2* 28.3*    255 271     CMP:     Recent Labs  Lab 11/24/17  1643 11/25/17  0452 11/26/17  0640    138 137   K 3.9 4.0 4.3    103 102   CO2 25 22* 26   * 99 115*   BUN 22 30* 43*   CREATININE 2.6* 3.3* 4.5*   CALCIUM 9.5 10.4 10.2   PROT 6.4 7.3 6.2   ALBUMIN 2.8* 3.0* 2.5*   BILITOT 0.5 0.4 0.4   ALKPHOS 176* 201* 176*   AST 99* 107* 99*   ALT 13 16 18   ANIONGAP 11 13 9   EGFRNONAA 21.5* 16.1* 11.1*     Microbiology Results (last 7 days)     Procedure Component Value Units Date/Time    Blood culture [[id number]] Collected:  11/23/17 0909    Order Status:  Completed Specimen:  Blood Updated:  11/26/17 1212     Blood Culture, Routine No Growth to date     Blood Culture, Routine No Growth to date     Blood Culture, Routine No Growth to date     Blood Culture, Routine No Growth to date    Blood culture [[id number]] Collected:  11/23/17 0909    Order Status:  Completed Specimen:  Blood Updated:  11/26/17 1212     Blood Culture, Routine No Growth to date     Blood Culture, Routine No Growth to date     Blood Culture, Routine No Growth to date     Blood Culture, Routine No Growth to date    Culture, Respiratory with Gram Stain [[id number]] Collected:  11/25/17 1420    Order Status:  Completed Specimen:  Respiratory from Sputum, Induced Updated:  11/26/17 1010     Respiratory Culture --     YEAST    Moderate  Identification pending       Gram Stain (Respiratory) >10 epithelial cells per low power field     Gram Stain (Respiratory) Many WBC's     Gram Stain (Respiratory) Moderate yeast     Gram Stain (Respiratory) Rare Gram positive rods    Blood culture [[id number]] Collected:  11/25/17 2222    Order Status:  Completed Specimen:  Blood Updated:  11/26/17 0545     Blood Culture, Routine No Growth to date    Fungus Culture, Blood or Bone Marrow [[id number]] Collected:  11/25/17 2222    Order Status:  Sent Specimen:  Blood from Blood Updated:  11/25/17 2239    Culture, Respiratory with Gram Stain [[id number]] Collected:  11/24/17 1530    Order Status:  Completed Specimen:  Respiratory from Sputum, Expectorated Updated:  11/25/17 1306     Respiratory Culture --     YEAST   Few  Identification pending       Gram Stain (Respiratory) <10 epithelial cells per low power field.     Gram Stain (Respiratory) Few WBC's     Gram Stain (Respiratory) Few Gram positive cocci     Gram Stain (Respiratory) Rare yeast     Gram Stain (Respiratory) Rare Gram positive rods    Urine culture [[id number]] Collected:  11/22/17 1555    Order Status:  Completed Specimen:  Urine from Urine Updated:  11/24/17 1301     Urine Culture, Routine --     CANDIDA ALBICANS  > 100,000 cfu/ml  Treatment of asymptomatic candiduria is not recommended (except for   specific populations). Candida isolated in the urine typically   represents colonization. If an indwelling urinary catheter is present  it should be removed or replaced.      Narrative:       Urine sample sent out for urinalysis. 11/23  ADD ON PER DR ALVARADO ORDER [id number] URINALYSIS @0902 11/23/17    Culture, Anaerobic [[id number]] Collected:  11/16/17 2218    Order Status:  Completed Specimen:  Wound from Back Updated:  11/24/17 0749     Anaerobic Culture No anaerobes isolated    Narrative:       2.deep lumbar spine    Culture, Anaerobic [[id number]] Collected:  11/16/17 2218    Order Status:   Completed Specimen:  Wound from Back Updated:  11/24/17 0749     Anaerobic Culture No anaerobes isolated    Narrative:       1.superficial lumbar spine    Aerobic culture [[id number]] Collected:  11/16/17 2218    Order Status:  Completed Specimen:  Wound from Back Updated:  11/20/17 0904     Aerobic Bacterial Culture No growth    Narrative:       2.deep lumbar spine    Aerobic culture [[id number]] Collected:  11/16/17 2218    Order Status:  Completed Specimen:  Wound from Back Updated:  11/20/17 0904     Aerobic Bacterial Culture No growth    Narrative:       1.superficial lumbar spine            Significant Imaging: I have reviewed all pertinent imaging results/findings within the past 24 hours.    Assessment/Plan:      SIRS (systemic inflammatory response syndrome)    Pt with leukocytosis and developed increased RR from 11/22. Episode of hypoxia and hypotension 11/24 in the afternoon. Improved with abx and 250 cc bolus x 2. Leukocytosis now improved. Blood cxs NGTD, urine cx - growing yeast. Per family pt with hx of yeast in urine - has old urinary stent in place.  Respiratory cx from 11/24 and 11/25 growing yeast .   - ID consult given concern for disseminated yeast infection given positive culture from multiple sites - urine, respiratory - also now with new onset / worsening dysphagia. ID also consulted for  abx management of presumed HAP.  Appreciate recs - will dc abx and monitor   - f/u blood cultures / fungal cultures   - cont diflucan for now 200 qd - renal dosing            Abnormal US (ultrasound) of abdomen    - RUQ US given elevated LFT's - showed hypodense area - no evidence of infection  - will need ct triple phase vs. Repeat US at later date          Delirium     AO x 2-3. Seems to be pts baseline over the last 1-2 months since prolonged hospitalizations. Per family was driving self to dialysis prior - 2 months ago. Improved today.   - infectious workup as above given elevated wbc; leukocytosis  improved   - weaning opiates - tramadol 50 q8 prn           Acute blood loss anemia    -presumed secondary to supratherapeutic INR and occurred post-operatively  -s/p 2units PRBC earlier in hospital course   -stable at ~ 9.0    -trend CBC and Coags, transfuse if <7, will have to monitor hemodynamic/respiratory status and weigh risk benefit of further blood product transfusion          Hematoma    As above  -trend CBC        L-S radiculopathy              Supratherapeutic INR    --Hold coumadin/anti-platelet  -s/p vitamin K 2.5mg 11/16, and 6units FFP  -monitor inr           Status post lumbar spinal fusion    -neurosurgery consultation as above          Pyuria    - in setting of ESRD and oliguria  - urine culture on admit shows many organisms - none in predominance  - urine culture repeated given worsening delirium ; growing yeast - likely nonspecific         Acute bilateral low back pain, post-lumbar spinal fusion    - Neurosurgery took pt for washout and evacuation of hematoma on 11/16  - Hgb dropped during admission admit - s/p 2unit pRBC, Hgb now grossly stable  - Drains removed and not on abx-  cultures remain NGTD from surgery.    - Supportive care, PT/OT  - Given worsened delirium - stopped fentanyl patches and norco prn  - Pain control with scheduled tylenol 650 q8hrs;  tramadol 50 qhs for breakthrough ; will cont pregabalin to 75mg Qd for now ; also add flexeril as muscle relaxants apparently have worked in the past   - component of pain likely severe right hip osteoarthritis - will cont lidocaine patches   - Mobility remains poor and strength in hip flexors/proximally still poor. 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 Unclear if this is pts new baseline.         Paroxysmal atrial fibrillation    -HR stable today but remains in Afib rhythm wise  -continue metoprolol 25 TID  -continue scheduled midodrine TID for now as pt tolerted dialysis well with this; discuss with nephrology - may  only be needed on dialysis days         Severe aortic stenosis    - no acute intervention at this time.   - Will cont to monitor         ESRD (end stage renal disease) on dialysis    - nephrology consulted for volume management.   - Tolerated HD 11/21  - Will cont to monitor         Type 2 diabetes mellitus with chronic kidney disease on chronic dialysis, with long-term current use of insulin    - cont levamir 8 units  - may need to add low dose premeal as post prandial glucoses are in the 200's; Am glucose at goal         Coronary artery disease involving native coronary artery of native heart without angina pectoris    - no CP, SOB, anginal equivalents  - continue asa, statin  - EKG without ischemic changes        Chronic diastolic heart failure    -last echo 10/2017 with pulmonary HTN and undetermined diastolic function, EF 55  -continue metoprolol 25 TID   -HD is main source of volume removal            VTE Risk Mitigation         Ordered     Medium Risk of VTE  Once      11/17/17 0011     Place sequential compression device  Until discontinued      11/14/17 1907     Place BRAIN hose  Until discontinued      11/14/17 1907              Abby Pisano MD  Department of Hospital Medicine   [facility]

## 2017-11-26 NOTE — ASSESSMENT & PLAN NOTE
Pt with leukocytosis and developed increased RR from 11/22. Episode of hypoxia and hypotension 11/24 in the afternoon. Improved with abx and 250 cc bolus x 2. Leukocytosis now improved. Blood cxs NGTD, urine cx - growing yeast. Per family pt with hx of yeast in urine - has old urinary stent in place.  Respiratory cx from 11/24 and 11/25 growing yeast .   - ID consult given concern for disseminated yeast infection given positive culture from multiple sites - urine, respiratory - also now with new onset / worsening dysphagia. ID also consulted for  abx management of presumed HAP.  Appreciate recs - will dc abx and monitor   - f/u blood cultures / fungal cultures   - cont diflucan for now 200 qd - renal dosing

## 2017-11-26 NOTE — HPI
Pt is an 86 y/o male with a pmhx of ESRD with HD MWF HFpEF, NSTEMI, bladder/prostate cancer, DM II, aortic stenosis and CAD, previous fungal UTI's known to ID service on this admission hwen we were consulted for a concern of disseminated yeast infection and new onset dysphagia and abx management of presumed HAP. Thought at that time was that pt did not in fact ahve a PNA and if pt remained altered it would be best to rule out ohter causes of AMS such as PE or volume overload in HD patient. Recommendations were to stop antibiotics at that time and continue HD dosed fluconazole with an end date of 12/07/2017, at which point ID signed off on 11/27/2017. Pt continues to have problems with mentation and ID has been re-consulted for an increasing WBC, while off antibiotic therapy.    Pt answers questions slowly but appears to be oriented to self, place, time and mostly situation. Denies any fever or chills, denies SOB, is coughing up scan phlegm. States that his hands and legs are hurting in a myalgic pattern. Denies constipation or diarrhea, no dysuria. Pt's BP's have labile, today was only able to have UF instead of HD secondary to pressure issues.     Previous Positive Cultures  01/16/2004 UCx Pseudomonas aeruginosa  10/07/2017 UCx Candida albicans    Cultures This Admission   11/22/2017 UCx  C.albicans  11/23/2017 Resp Cx C.albicans, few WBC's, few GPC's, rare yeast, Rare GPR  11/24/2017 Bcx, NSI No growth  11/24/2017 BCx, NSI No growth  11/25/2017 Resp Cx C.albicans, many WBC's, mod yeast, rare GPC's  11/25/2017 BCx  No growth  11/30/2017 BCx  No growth   11/30/2017 BCx  No growth

## 2017-11-26 NOTE — SUBJECTIVE & OBJECTIVE
Past Medical History:   Diagnosis Date    Bladder cancer     Chronic diastolic heart failure 8/21/2012    3/13: AOSAT: 98, FICKCI: 2.41, FICKCO: 5.18 PAPRES: 34/16 (23), PASAT: 65, PVR: 1.74 PWPRES: 18/18 (14), RA PRES: 14/13 (12), RV: 40/0, 10     Chronic hip pain, right 10/4/2017    Coronary artery disease involving native coronary artery of native heart without angina pectoris     Dyslipidemia     Encounter for blood transfusion     ESRD (end stage renal disease) on dialysis 6/9/2014    Essential hypertension     Hypercholesteremia 8/21/2012    Long-term (current) use of anticoagulants 10/9/2015    NSTEMI (non-ST elevated myocardial infarction) 10/4/2017    Paroxysmal atrial fibrillation 10/8/2015    Prostate cancer     Severe aortic stenosis 10/14/2014    Type 2 diabetes mellitus with chronic kidney disease on chronic dialysis, with long-term current use of insulin 12/15/2013    Ventricular tachycardia     monomorphic       Past Surgical History:   Procedure Laterality Date    BACK SURGERY      laminectomy x2    COLON SURGERY      EXTENSIVE PROSTATE SURGER      Prostatectomy, Radical    JOINT REPLACEMENT      left hip       Review of patient's allergies indicates:   Allergen Reactions    Morphine Other (See Comments)     Other reaction(s): severe abdominal pain    Darvocet a500  [propoxyphene n-acetaminophen]      Other reaction(s): Unknown       Medications:  Prescriptions Prior to Admission   Medication Sig    amitriptyline (ELAVIL) 25 MG tablet Take 25 mg by mouth nightly as needed for Insomnia.    aspirin (ECOTRIN) 81 MG EC tablet Take 81 mg by mouth once daily.    bisacodyl (DULCOLAX) 10 mg Supp Place 10 mg rectally daily as needed.    celecoxib (CELEBREX) 200 MG capsule Take 200 mg by mouth 2 (two) times daily.    hydrocodone-acetaminophen 10-325mg (NORCO)  mg Tab Take 1 tablet by mouth every 6 (six) hours as needed for Pain.     insulin detemir (LEVEMIR FLEXTOUCH) 100  unit/mL (3 mL) SubQ InPn pen Inject 14 Units into the skin every evening.    midodrine (PROAMATINE) 10 MG tablet Take 1 tablet (10 mg total) by mouth 3 (three) times daily.    ondansetron (ZOFRAN) 4 MG tablet Take 4 mg by mouth every 8 (eight) hours as needed for Nausea.    pantoprazole (PROTONIX) 40 MG tablet Take 1 tablet (40 mg total) by mouth nightly.    sertraline (ZOLOFT) 100 MG tablet Take 1 tablet by mouth every evening.    atorvastatin (LIPITOR) 80 MG tablet Take 1 tablet (80 mg total) by mouth once daily.    coenzyme Q10 200 mg capsule Take 200 mg by mouth once daily.    gabapentin (NEURONTIN) 300 MG capsule Take 300 mg by mouth every evening.    metoprolol tartrate (LOPRESSOR) 25 MG tablet Take 1 tablet (25 mg total) by mouth 2 (two) times daily.    NITROSTAT 0.4 mg SL tablet place 1 tablet under the tongue if needed every 5 minutes for chest pain for 3 doses IF NO RELIEF AFTER 3RD DOSE CALL PRESCRIBER .    ramelteon (ROZEREM) 8 mg tablet Take 1 tablet (8 mg total) by mouth nightly as needed for Insomnia.    senna-docusate 8.6-50 mg (PERICOLACE) 8.6-50 mg per tablet Take 1 tablet by mouth 2 (two) times daily.    vit b cmplx 3-fa-vit c-biotin 1- mg-mg-mcg (ANKIT-AZ RX) 1- mg-mg-mcg Tab Take 1 tablet by mouth once daily.     Antibiotics     Start     Stop Route Frequency Ordered    11/26/17 0930  vancomycin 1 g in dextrose 5 % 250 mL IVPB (ready to mix system)  (Vancomycin IVPB with levels panel)      -- IV Once 11/26/17 0824    11/24/17 1730  cefepime in dextrose 5 % 1 gram/50 mL IVPB 1 g      -- IV Daily 11/24/17 1720    11/17/17 0900  mupirocin 2 % ointment 1 g      11/22 0859 Nasl 2 times daily 11/17/17 0011        Antifungals     Start     Stop Route Frequency Ordered    11/25/17 1730  fluconazole tablet 200 mg      -- Oral Daily 11/25/17 1718        Antivirals     None           There is no immunization history for the selected administration types on file for this  patient.    Family History     None        Social History     Social History    Marital status:      Spouse name: N/A    Number of children: N/A    Years of education: N/A     Social History Main Topics    Smoking status: Former Smoker     Packs/day: 3.00     Years: 40.00     Quit date: 1/1/1980    Smokeless tobacco: Former User    Alcohol use No      Comment: a few drinks    Drug use: Unknown    Sexual activity: Not Asked     Other Topics Concern    None     Social History Narrative    None     Review of Systems   Constitutional: Negative for appetite change, chills, diaphoresis, fatigue and fever.   HENT: Negative for rhinorrhea, sinus pain, sinus pressure, sneezing and sore throat.    Eyes: Negative for visual disturbance.   Respiratory: Positive for cough. Negative for choking, chest tightness and shortness of breath.    Cardiovascular: Negative for chest pain, palpitations and leg swelling.   Gastrointestinal: Positive for constipation. Negative for diarrhea and nausea.        Denies bowel incontinence   Genitourinary: Negative for dysuria.   Musculoskeletal: Positive for back pain and gait problem. Negative for joint swelling, myalgias and neck pain.   Neurological: Negative for dizziness, facial asymmetry, light-headedness and headaches.   Psychiatric/Behavioral: Positive for confusion. Negative for agitation and behavioral problems.     Objective:     Vital Signs (Most Recent):  Temp: 97.8 °F (36.6 °C) (11/26/17 0802)  Pulse: 80 (11/26/17 0810)  Resp: 18 (11/26/17 0810)  BP: (!) 104/58 (11/26/17 0802)  SpO2: 99 % (11/26/17 0810) Vital Signs (24h Range):  Temp:  [97.2 °F (36.2 °C)-98.6 °F (37 °C)] 97.8 °F (36.6 °C)  Pulse:  [73-95] 80  Resp:  [18] 18  SpO2:  [97 %-100 %] 99 %  BP: ()/(44-60) 104/58     Weight: 77.8 kg (171 lb 8 oz)  Body mass index is 31.37 kg/m².    Estimated Creatinine Clearance: 10.8 mL/min (based on SCr of 4.5 mg/dL (H)).    Physical Exam   Constitutional: He  appears well-developed.   Awake/alert   HENT:   Thrush noted   Eyes: Conjunctivae are normal. No scleral icterus.   Cardiovascular: Normal heart sounds and intact distal pulses.    No murmur heard.  Irregular rhythm, tachycardic   Pulmonary/Chest: Effort normal. No respiratory distress. He has no wheezes. He has rales.   Bibasilar crackles present   Abdominal: Soft. Bowel sounds are normal. There is no tenderness.   Mild distension, soft, tympanic to percussion, no fluid wave noted.    Genitourinary:   Genitourinary Comments: Erythema around groin, with purulent discharge from penis   Musculoskeletal: Normal range of motion. He exhibits no edema or deformity.   RUE forearm AV fistula with palpable thrill.        Lymphadenopathy:     He has no cervical adenopathy.   Neurological:   Alert, oriented to self, hospital   Skin: Skin is warm.       Significant Labs:   CBC:   Recent Labs  Lab 11/24/17  2229 11/25/17  0452 11/26/17  0640   WBC 10.97 12.68 12.85*   HGB 9.0* 9.9* 8.6*   HCT 29.2* 33.2* 28.3*    255 271     CMP:   Recent Labs  Lab 11/24/17  1643 11/25/17  0452 11/26/17  0640    138 137   K 3.9 4.0 4.3    103 102   CO2 25 22* 26   * 99 115*   BUN 22 30* 43*   CREATININE 2.6* 3.3* 4.5*   CALCIUM 9.5 10.4 10.2   PROT 6.4 7.3 6.2   ALBUMIN 2.8* 3.0* 2.5*   BILITOT 0.5 0.4 0.4   ALKPHOS 176* 201* 176*   AST 99* 107* 99*   ALT 13 16 18   ANIONGAP 11 13 9   EGFRNONAA 21.5* 16.1* 11.1*       Significant Imaging: I have reviewed all pertinent imaging results/findings within the past 24 hours.

## 2017-11-27 PROBLEM — D72.829 LEUKOCYTOSIS: Status: ACTIVE | Noted: 2017-01-01

## 2017-11-27 PROBLEM — B37.9 CANDIDIASIS: Status: ACTIVE | Noted: 2017-01-01

## 2017-11-27 NOTE — SUBJECTIVE & OBJECTIVE
Interval History: Respiratory distress over the weekend with associated hypotension and tachypnea. Improvement in VS following 250cc bolus, increase in O2, and deep suctioning. HM considering aspiration pna as high on ddx considering recent delirium. Abx broadened to vanc and cefepime. Awake, alert, oriented today.     Medications:  Continuous Infusions:   albumin human 25%      midodrine       Scheduled Meds:   sodium chloride 0.9%   Intravenous Once    sodium chloride 0.9%   Intravenous Once    sodium chloride 0.9%   Intravenous Once    acetaminophen  650 mg Oral Q8H    albuterol-ipratropium 2.5mg-0.5mg/3mL  3 mL Nebulization Q6H WAKE    atorvastatin  80 mg Oral Daily    bisacodyl  10 mg Rectal Daily    dextromethorphan-guaifenesin  mg/5 ml  10 mL Oral Q6H    epoetin preeti  5,000 Units Intravenous Every Mon, Wed, Fri    fluconazole  200 mg Oral Daily    insulin detemir  8 Units Subcutaneous QHS    lidocaine  3 patch Transdermal Q24H    metoprolol tartrate  25 mg Oral TID    miconazole NITRATE 2 %   Topical BID    midodrine  10 mg Oral TID    pantoprazole  40 mg Oral Nightly    polyethylene glycol  17 g Oral BID    pregabalin  75 mg Oral Daily    senna-docusate 8.6-50 mg  1 tablet Oral BID    sertraline  100 mg Oral QHS    sodium chloride 0.9%  250 mL Intravenous Once     PRN Meds:sodium chloride, sodium chloride, sodium chloride 0.9%, sodium chloride 0.9%, sodium chloride 0.9%, sodium chloride 0.9%, albumin human 25%, albuterol sulfate, aluminum-magnesium hydroxide-simethicone, benzonatate, cyclobenzaprine, dextrose 50%, dextrose 50%, glucagon (human recombinant), glucose, glucose, influenza, insulin aspart, midodrine, ondansetron, pneumoc 13-osiel conj-dip cr(PF), promethazine (PHENERGAN) IVPB, ramelteon, senna-docusate 8.6-50 mg, sodium chloride 0.9%, traMADol     Review of Systems    Objective:     Weight: 78.7 kg (173 lb 8 oz)  Body mass index is 31.73 kg/m².  Vital Signs (Most  Recent):  Temp: 97.7 °F (36.5 °C) (11/27/17 1224)  Pulse: 78 (11/27/17 1301)  Resp: 20 (11/27/17 1301)  BP: (!) 124/91 (11/27/17 0830)  SpO2: 97 % (11/27/17 1301) Vital Signs (24h Range):  Temp:  [96 °F (35.6 °C)-99.7 °F (37.6 °C)] 97.7 °F (36.5 °C)  Pulse:  [] 78  Resp:  [16-20] 20  SpO2:  [92 %-100 %] 97 %  BP: (124-159)/(51-97) 124/91                           Hemodialysis AV Fistula Right forearm (Active)   Needle Size 15ga 11/24/2017  8:40 AM   Site Assessment Clean;Dry;Intact 11/24/2017  8:40 AM   Patency Present;Thrill;Bruit 11/24/2017  8:40 AM   Status Accessed 11/24/2017  8:40 AM   Flows Good 11/24/2017  8:40 AM   Dressing Intervention New dressing 11/21/2017 12:43 PM   Dressing Status Clean;Dry;Intact 11/21/2017  9:45 AM   Site Condition No complications 11/24/2017  8:40 AM   Dressing Gauze 11/21/2017 12:43 PM       Physical Exam:  Vitals reviewed.    Constitutional: He appears well-developed and well-nourished. He is not diaphoretic. No distress.     Eyes: Pupils are equal, round, and reactive to light. EOM are normal.     Cardiovascular: Normal rate.     Abdominal: Soft.   Redness and patchy white plaque inside groin consistent with yeast infection     Psych/Behavior: He is alert. He has a normal mood and affect.     Neurological:        Sensory: There is no sensory deficit in the trunk. There is no sensory deficit in the extremities.        Cranial nerves: Cranial nerve(s) II, III, IV, V, VI, VII, VIII, IX, X, XI and XII are intact.   AOx2 (self, place)  BUE strength: 5/5  BLE strength: 4/5 HF, 4/5 KF, 2/5 KE, 5/5 PF/DF     Incision c/d/i    Significant Labs:    Recent Labs  Lab 11/26/17  0640 11/27/17  0458   * 76    137   K 4.3 5.1    102   CO2 26 19*   BUN 43* 52*   CREATININE 4.5* 5.1*   CALCIUM 10.2 10.1   MG 1.8 2.2       Recent Labs  Lab 11/26/17  0640 11/27/17  0926   WBC 12.85* 14.96*   HGB 8.6* 8.5*   HCT 28.3* 27.8*    266       Recent Labs  Lab 11/26/17  0640    INR 1.4*     Microbiology Results (last 7 days)     Procedure Component Value Units Date/Time    Culture, Respiratory with Gram Stain [[id number]] Collected:  11/25/17 1420    Order Status:  Completed Specimen:  Respiratory from Sputum, Induced Updated:  11/27/17 1215     Respiratory Culture --     CANDIDA ALBICANS  Moderate       Gram Stain (Respiratory) >10 epithelial cells per low power field     Gram Stain (Respiratory) Many WBC's     Gram Stain (Respiratory) Moderate yeast     Gram Stain (Respiratory) Rare Gram positive rods    Blood culture [[id number]] Collected:  11/23/17 0909    Order Status:  Completed Specimen:  Blood Updated:  11/27/17 1212     Blood Culture, Routine No Growth to date     Blood Culture, Routine No Growth to date     Blood Culture, Routine No Growth to date     Blood Culture, Routine No Growth to date     Blood Culture, Routine No Growth to date    Blood culture [[id number]] Collected:  11/23/17 0909    Order Status:  Completed Specimen:  Blood Updated:  11/27/17 1212     Blood Culture, Routine No Growth to date     Blood Culture, Routine No Growth to date     Blood Culture, Routine No Growth to date     Blood Culture, Routine No Growth to date     Blood Culture, Routine No Growth to date    Culture, Respiratory with Gram Stain [[id number]] Collected:  11/24/17 1530    Order Status:  Completed Specimen:  Respiratory from Sputum, Expectorated Updated:  11/27/17 1158     Respiratory Culture --     CANDIDA ALBICANS  Few       Gram Stain (Respiratory) <10 epithelial cells per low power field.     Gram Stain (Respiratory) Few WBC's     Gram Stain (Respiratory) Few Gram positive cocci     Gram Stain (Respiratory) Rare yeast     Gram Stain (Respiratory) Rare Gram positive rods    Blood culture [[id number]] Collected:  11/25/17 2222    Order Status:  Completed Specimen:  Blood Updated:  11/27/17 0613     Blood Culture, Routine No Growth to date     Blood Culture, Routine No Growth to date    Fungus  Culture, Blood or Bone Marrow [[id number]] Collected:  11/25/17 2222    Order Status:  Sent Specimen:  Blood from Blood Updated:  11/25/17 2239    Urine culture [[id number]] Collected:  11/22/17 1555    Order Status:  Completed Specimen:  Urine from Urine Updated:  11/24/17 1301     Urine Culture, Routine --     CANDIDA ALBICANS  > 100,000 cfu/ml  Treatment of asymptomatic candiduria is not recommended (except for   specific populations). Candida isolated in the urine typically   represents colonization. If an indwelling urinary catheter is present  it should be removed or replaced.      Narrative:       Urine sample sent out for urinalysis. 11/23  ADD ON PER DR ALVARADO ORDER [id number] URINALYSIS @0902 11/23/17    Culture, Anaerobic [[id number]] Collected:  11/16/17 2218    Order Status:  Completed Specimen:  Wound from Back Updated:  11/24/17 0749     Anaerobic Culture No anaerobes isolated    Narrative:       2.deep lumbar spine    Culture, Anaerobic [[id number]] Collected:  11/16/17 2218    Order Status:  Completed Specimen:  Wound from Back Updated:  11/24/17 0749     Anaerobic Culture No anaerobes isolated    Narrative:       1.superficial lumbar spine          All pertinent labs from the last 24 hours have been reviewed.    Significant Diagnostics:  No new diagnostics.

## 2017-11-27 NOTE — PLAN OF CARE
Problem: Patient Care Overview  Goal: Plan of Care Review  POC reviewed with pt, acknowledged understanding. Pt orientation waxed and waned throughout the shift. Pt remains free from falls/injuries, VSS. Blood glucose monitored AC/HS. All liquids nectar thick. Bed alarm for safety. Frequent weight shifting provided. Pain managed with scheduled tylenol. WCTM.

## 2017-11-27 NOTE — PROGRESS NOTES
[facility]  Infectious Disease  Progress Note    Patient Name: Donta Cline  MRN: [id number]  Admission Date: 11/14/2017  Length of Stay: 12 days  Attending Physician: Abby Pisano MD  Primary Care Provider: ZAID Richardson Iii, MD    Isolation Status: No active isolations  Assessment/Plan:      * Pneumonia    Mr. Cline is a 85 y.o. male with ESRD on dialysis MWF, HFpEF, NSTEMI, bladder/prostate cancer, DM II, aortic stenosis and CAD, previous fungal UTI's  . Who is having candida cultures from respiratory, and urinary cultures.       - Patient with increasing need for oxygen over the last day  - Exam is consistent with pulmonary edema  - Xray more consistent with fluid overload  - Suspect that he does not have a pneumonia   - Recommend removal of fluid with HD  - If no respiratory improvement with HD recommend ruling out PE with CTA given that he is post procedural and has acute respiratory failure requiring 5L of oxygen.         Candiduria    - Patient with evident candida in the urine and thrush   - Recommend to continue fluconazole 200 mg qd as he is ESRD patient  - Patient on exam today much improved erythema in his groin.   - Will need treatment for around ten more days end date is 12/7/2017              Anticipated Disposition: Pending weaning off oxygen     Thank you for your consult. I will sign off. Please contact us if you have any additional questions.    Dana Davis MD  Infectious Disease  [facility]    Subjective:     Principal Problem:Pneumonia    HPI: Mr. Cline is a 85 y.o. male with ESRD ondialysis MWF, HFpEF, NSTEMI, bladder/prostate cancer, DM II, aortic stenosis and CAD, previous fungal UTI's  . He present to [facility] for evaluation of back pain s/p lumbar fusion/laminectomy (10/20/17).  ID consulted for concern for disseminated yeast infection and new onset dysphagia and abx management of presumed HAP.      Patient states that he feels better this  morning, He states that yesterday he had problems with painful swallowing but today he is doing ok , was eating a breakfast of toast, oatmeal , and beef.  He endorses having a cough for weeks and that he is not usually on oxygen at home. No family at bedside  Per chart review patient had multiple fungal UTI's and a history of a stent placement    He denies fever, chills, dysuria, hematuria , or diarrhea.    Patient in patient antibiotics :    Cefazolin 11/19  Cefepime 11/24-today   Ceftriaxone 11/23-11/24  Flucanazole 11/25- Today   Vancomycin 11/24    Interval History: Patient with no acute events overnight , remains having back spasms. He states that his dysphagia and pain in his groin is much better. According to him and his daughter he was able to eat the most this morning since admission     Review of Systems   Constitutional: Negative for appetite change, chills, diaphoresis, fatigue and fever.   HENT: Negative for rhinorrhea, sinus pain, sinus pressure, sneezing and sore throat.    Eyes: Negative for visual disturbance.   Respiratory: Positive for cough. Negative for choking, chest tightness and shortness of breath.    Cardiovascular: Negative for chest pain, palpitations and leg swelling.   Gastrointestinal: Positive for constipation. Negative for diarrhea and nausea.        Denies bowel incontinence   Genitourinary: Positive for discharge. Negative for dysuria.   Musculoskeletal: Positive for back pain and gait problem. Negative for joint swelling, myalgias and neck pain.   Neurological: Negative for dizziness, facial asymmetry, light-headedness and headaches.   Psychiatric/Behavioral: Positive for confusion. Negative for agitation and behavioral problems.     Objective:     Vital Signs (Most Recent):  Temp: 97.3 °F (36.3 °C) (11/27/17 0830)  Pulse: 77 (11/27/17 0830)  Resp: 16 (11/27/17 0830)  BP: (!) 124/91 (11/27/17 0830)  SpO2: 100 % (11/27/17 0830) Vital Signs (24h Range):  Temp:  [96 °F (35.6 °C)-99.7  °F (37.6 °C)] 97.3 °F (36.3 °C)  Pulse:  [] 77  Resp:  [16-18] 16  SpO2:  [96 %-100 %] 100 %  BP: (124-159)/(51-97) 124/91     Weight: 78.7 kg (173 lb 8 oz)  Body mass index is 31.73 kg/m².    Estimated Creatinine Clearance: 9.6 mL/min (based on SCr of 5.1 mg/dL (H)).    Physical Exam   Constitutional: He appears well-developed.   Awake/alert   HENT:   Thrush noted, improved    Eyes: Conjunctivae are normal. No scleral icterus.   Cardiovascular: Normal heart sounds and intact distal pulses.    No murmur heard.  Irregular rhythm, tachycardic   Pulmonary/Chest: Effort normal. No respiratory distress. He has no wheezes. He has rales.   Bibasilar crackles present   Abdominal: Soft. Bowel sounds are normal. There is no tenderness.   Mild distension, soft, tympanic to percussion, no fluid wave noted.    Genitourinary:   Genitourinary Comments: Erythema around groin, with purulent discharge from penis, much improved    Musculoskeletal: Normal range of motion. He exhibits no edema or deformity.   RUE forearm AV fistula with palpable thrill.        Lymphadenopathy:     He has no cervical adenopathy.   Neurological:   Alert, oriented to self, hospital   Skin: Skin is warm.       Significant Labs:   CBC:   Recent Labs  Lab 11/26/17  0640 11/27/17  0926   WBC 12.85* 14.96*   HGB 8.6* 8.5*   HCT 28.3* 27.8*    266     CMP:   Recent Labs  Lab 11/26/17  0640 11/27/17  0458    137   K 4.3 5.1    102   CO2 26 19*   * 76   BUN 43* 52*   CREATININE 4.5* 5.1*   CALCIUM 10.2 10.1   PROT 6.2 6.7   ALBUMIN 2.5* 2.6*   BILITOT 0.4 0.4   ALKPHOS 176* 196*   AST 99* 102*   ALT 18 20   ANIONGAP 9 16   EGFRNONAA 11.1* 9.5*       Significant Imaging: I have reviewed all pertinent imaging results/findings within the past 24 hours.    Microbiology Results (last 7 days)     Procedure Component Value Units Date/Time    Culture, Respiratory with Gram Stain [[id number]] Collected:  11/25/17 1420    Order Status:   Completed Specimen:  Respiratory from Sputum, Induced Updated:  11/27/17 1215     Respiratory Culture --     CANDIDA ALBICANS  Moderate       Gram Stain (Respiratory) >10 epithelial cells per low power field     Gram Stain (Respiratory) Many WBC's     Gram Stain (Respiratory) Moderate yeast     Gram Stain (Respiratory) Rare Gram positive rods    Blood culture [[id number]] Collected:  11/23/17 0909    Order Status:  Completed Specimen:  Blood Updated:  11/27/17 1212     Blood Culture, Routine No Growth to date     Blood Culture, Routine No Growth to date     Blood Culture, Routine No Growth to date     Blood Culture, Routine No Growth to date     Blood Culture, Routine No Growth to date    Blood culture [[id number]] Collected:  11/23/17 0909    Order Status:  Completed Specimen:  Blood Updated:  11/27/17 1212     Blood Culture, Routine No Growth to date     Blood Culture, Routine No Growth to date     Blood Culture, Routine No Growth to date     Blood Culture, Routine No Growth to date     Blood Culture, Routine No Growth to date    Culture, Respiratory with Gram Stain [[id number]] Collected:  11/24/17 1530    Order Status:  Completed Specimen:  Respiratory from Sputum, Expectorated Updated:  11/27/17 1158     Respiratory Culture --     CANDIDA ALBICANS  Few       Gram Stain (Respiratory) <10 epithelial cells per low power field.     Gram Stain (Respiratory) Few WBC's     Gram Stain (Respiratory) Few Gram positive cocci     Gram Stain (Respiratory) Rare yeast     Gram Stain (Respiratory) Rare Gram positive rods    Blood culture [[id number]] Collected:  11/25/17 2222    Order Status:  Completed Specimen:  Blood Updated:  11/27/17 0613     Blood Culture, Routine No Growth to date     Blood Culture, Routine No Growth to date    Fungus Culture, Blood or Bone Marrow [[id number]] Collected:  11/25/17 2222    Order Status:  Sent Specimen:  Blood from Blood Updated:  11/25/17 2239    Urine culture [[id number]] Collected:  11/22/17  1555    Order Status:  Completed Specimen:  Urine from Urine Updated:  11/24/17 1301     Urine Culture, Routine --     CANDIDA ALBICANS  > 100,000 cfu/ml  Treatment of asymptomatic candiduria is not recommended (except for   specific populations). Candida isolated in the urine typically   represents colonization. If an indwelling urinary catheter is present  it should be removed or replaced.      Narrative:       Urine sample sent out for urinalysis. 11/23  ADD ON PER DR ALVARADO ORDER [id number] URINALYSIS @0902 11/23/17    Culture, Anaerobic [[id number]] Collected:  11/16/17 2218    Order Status:  Completed Specimen:  Wound from Back Updated:  11/24/17 0749     Anaerobic Culture No anaerobes isolated    Narrative:       2.deep lumbar spine    Culture, Anaerobic [[id number]] Collected:  11/16/17 2218    Order Status:  Completed Specimen:  Wound from Back Updated:  11/24/17 0749     Anaerobic Culture No anaerobes isolated    Narrative:       1.superficial lumbar spine

## 2017-11-27 NOTE — PROGRESS NOTES
"  [facility]  Adult Nutrition  Consult Note    SUMMARY     Recommendations    1. Continue current renal, dental soft diet w/ nectar thick liquids.   2. Add Novasource ONS to aid in caloric intake.   3. RD to monitor & follow-up.    Goals: PO intake >50%  Nutrition Goal Status: new  Communication of RD Recs: reviewed with RN    Reason for Assessment    Reason for Assessment: length of stay  Diagnosis: other (see comments) (Pneumonia)  Relevent Medical History: Ca, DM, ESRD on HD   Interdisciplinary Rounds: did not attend     General Information Comments: Pt disoriented at time of visit. Per RN notes, pt eating 50% of meals. ST recommends dental soft diet w/ nectar thick liquids. Receiving HD.  Nutrition Discharge Planning: Adequate PO intake    Nutrition Prescription Ordered    Current Diet Order: Renal, dental soft w/ nectar thick liquids    Nutrition/Diet History    Patient Reported Diet/Restrictions/Preferences: other (see comments) (FELICIA)     Factors Affecting Nutritional Intake: decreased appetite    Labs/Tests/Procedures/Meds    Pertinent Labs Reviewed: reviewed, pertinent  Pertinent Labs Comments: BUN 52, Creat 5.1, GFR 49.5, A1C 5.1  Pertinent Medications Reviewed: reviewed, pertinent    Physical Findings    Overall Physical Appearance: overweight  Oral/Mouth Cavity: WDL  Skin: incision (Back)    Anthropometrics    Height: 5' 2" (157.5 cm)  Weight Method: Bed Scale  Weight: 78.7 kg (173 lb 8 oz)    Ideal Body Weight (IBW), Male: 118 lb  % Ideal Body Weight, Male (lb): 147.03 lb     BMI (Calculated): 31.8  BMI Grade: 30 - 34.9- obesity - grade I    Estimated/Assessed Needs    Weight Used For Calorie Calculations: 78.7 kg (173 lb 8 oz)      Energy Calorie Requirements (kcal): 1688 kcal/d  Energy Need Method: [place]-St Jeor (1.25 PAL)     Weight Used For Protein Calculations: 78.7 kg (173 lb 8 oz)  Protein Requirements: 79 g/d (1 g/kg)     Fluid Need Method: RDA Method, other (see comments) " (Per MD or 1 mL/kcal)     Assessment and Plan    No nutritional dx at this time.    Monitor and Evaluation    Food and Nutrient Intake: energy intake, food and beverage intake  Food and Nutrient Adminstration: diet order     Physical Activity and Function: nutrition-related ADLs and IADLs  Anthropometric Measurements: weight, weight change  Biochemical Data, Medical Tests and Procedures: lipid profile, inflammatory profile, gastrointestinal profile, electrolyte and renal panel, glucose/endocrine profile  Nutrition-Focused Physical Findings: overall appearance    Nutrition Risk    Level of Risk: other (see comments) (1x/week)    Nutrition Follow-Up    RD Follow-up?: Yes

## 2017-11-27 NOTE — SUBJECTIVE & OBJECTIVE
Interval History: No acute events overnight; stable on exam; groin with what appears to be yeast infection    Medications:  Continuous Infusions:   albumin human 25%      midodrine       Scheduled Meds:   sodium chloride 0.9%   Intravenous Once    sodium chloride 0.9%   Intravenous Once    acetaminophen  650 mg Oral Q8H    albuterol-ipratropium 2.5mg-0.5mg/3mL  3 mL Nebulization Q6H WAKE    atorvastatin  80 mg Oral Daily    bisacodyl  10 mg Rectal Daily    dextromethorphan-guaifenesin  mg/5 ml  10 mL Oral Q6H    epoetin preeti  5,000 Units Intravenous Every Mon, Wed, Fri    fluconazole  200 mg Oral Daily    insulin detemir  8 Units Subcutaneous QHS    lidocaine  3 patch Transdermal Q24H    metoprolol tartrate  25 mg Oral TID    miconazole NITRATE 2 %   Topical BID    midodrine  10 mg Oral TID    pantoprazole  40 mg Oral Nightly    polyethylene glycol  17 g Oral BID    pregabalin  75 mg Oral Daily    senna-docusate 8.6-50 mg  1 tablet Oral BID    sertraline  100 mg Oral QHS    sodium chloride 0.9%  250 mL Intravenous Once     PRN Meds:sodium chloride, sodium chloride, sodium chloride 0.9%, sodium chloride 0.9%, sodium chloride 0.9%, albumin human 25%, albuterol sulfate, aluminum-magnesium hydroxide-simethicone, benzonatate, cyclobenzaprine, dextrose 50%, dextrose 50%, glucagon (human recombinant), glucose, glucose, influenza, insulin aspart, midodrine, ondansetron, pneumoc 13-osiel conj-dip cr(PF), promethazine (PHENERGAN) IVPB, ramelteon, senna-docusate 8.6-50 mg, sodium chloride 0.9%, traMADol     Review of Systems    Objective:     Weight: 77.8 kg (171 lb 8 oz)  Body mass index is 31.37 kg/m².  Vital Signs (Most Recent):  Temp: 97 °F (36.1 °C) (11/26/17 2356)  Pulse: 83 (11/26/17 2356)  Resp: 18 (11/26/17 2356)  BP: (!) 159/97 (11/26/17 2356)  SpO2: 100 % (11/26/17 2356) Vital Signs (24h Range):  Temp:  [97 °F (36.1 °C)-99.7 °F (37.6 °C)] 97 °F (36.1 °C)  Pulse:  [] 83  Resp:  [16-18]  18  SpO2:  [96 %-100 %] 100 %  BP: (104-159)/(51-97) 159/97                           Hemodialysis AV Fistula Right forearm (Active)   Needle Size 15ga 11/24/2017  8:40 AM   Site Assessment Clean;Dry;Intact 11/24/2017  8:40 AM   Patency Present;Thrill;Bruit 11/24/2017  8:40 AM   Status Accessed 11/24/2017  8:40 AM   Flows Good 11/24/2017  8:40 AM   Dressing Intervention New dressing 11/21/2017 12:43 PM   Dressing Status Clean;Dry;Intact 11/21/2017  9:45 AM   Site Condition No complications 11/24/2017  8:40 AM   Dressing Gauze 11/21/2017 12:43 PM       Physical Exam:  Vitals reviewed.    Constitutional: He appears well-developed and well-nourished. He is not diaphoretic. No distress.     Eyes: Pupils are equal, round, and reactive to light. EOM are normal.     Cardiovascular: Normal rate.     Abdominal: Soft.   Redness and patchy white plaque inside groin consistent with yeast infection     Psych/Behavior: He is alert. He has a normal mood and affect.     Neurological:        Sensory: There is no sensory deficit in the trunk. There is no sensory deficit in the extremities.        Cranial nerves: Cranial nerve(s) II, III, IV, V, VI, VII, VIII, IX, X, XI and XII are intact.   AOx2 (self, place)  BUE strength: 5/5  BLE strength: 4/5 HF, 4/5 KF, 2/5 KE, 5/5 PF/DF       Significant Labs:    Recent Labs  Lab 11/25/17 0452 11/26/17  0640   GLU 99 115*    137   K 4.0 4.3    102   CO2 22* 26   BUN 30* 43*   CREATININE 3.3* 4.5*   CALCIUM 10.4 10.2   MG 2.0 1.8       Recent Labs  Lab 11/25/17 0452 11/26/17  0640   WBC 12.68 12.85*   HGB 9.9* 8.6*   HCT 33.2* 28.3*    271       Recent Labs  Lab 11/25/17 0452 11/25/17  0821 11/26/17  0640   INR 2.1* 1.4* 1.4*     Microbiology Results (last 7 days)     Procedure Component Value Units Date/Time    Culture, Respiratory with Gram Stain [[id number]] Collected:  11/25/17 1420    Order Status:  Completed Specimen:  Respiratory from Sputum, Induced Updated:   11/26/17 1221     Respiratory Culture --     CANDIDA ALBICANS  Moderate       Gram Stain (Respiratory) >10 epithelial cells per low power field     Gram Stain (Respiratory) Many WBC's     Gram Stain (Respiratory) Moderate yeast     Gram Stain (Respiratory) Rare Gram positive rods    Culture, Respiratory with Gram Stain [[id number]] Collected:  11/24/17 1530    Order Status:  Completed Specimen:  Respiratory from Sputum, Expectorated Updated:  11/26/17 1220     Respiratory Culture --     CANDIDA ALBICANS  Few       Gram Stain (Respiratory) <10 epithelial cells per low power field.     Gram Stain (Respiratory) Few WBC's     Gram Stain (Respiratory) Few Gram positive cocci     Gram Stain (Respiratory) Rare yeast     Gram Stain (Respiratory) Rare Gram positive rods    Blood culture [[id number]] Collected:  11/23/17 0909    Order Status:  Completed Specimen:  Blood Updated:  11/26/17 1212     Blood Culture, Routine No Growth to date     Blood Culture, Routine No Growth to date     Blood Culture, Routine No Growth to date     Blood Culture, Routine No Growth to date    Blood culture [[id number]] Collected:  11/23/17 0909    Order Status:  Completed Specimen:  Blood Updated:  11/26/17 1212     Blood Culture, Routine No Growth to date     Blood Culture, Routine No Growth to date     Blood Culture, Routine No Growth to date     Blood Culture, Routine No Growth to date    Blood culture [[id number]] Collected:  11/25/17 2222    Order Status:  Completed Specimen:  Blood Updated:  11/26/17 0545     Blood Culture, Routine No Growth to date    Fungus Culture, Blood or Bone Marrow [[id number]] Collected:  11/25/17 2222    Order Status:  Sent Specimen:  Blood from Blood Updated:  11/25/17 2239    Urine culture [[id number]] Collected:  11/22/17 1555    Order Status:  Completed Specimen:  Urine from Urine Updated:  11/24/17 1301     Urine Culture, Routine --     CANDIDA ALBICANS  > 100,000 cfu/ml  Treatment of asymptomatic candiduria is not  recommended (except for   specific populations). Candida isolated in the urine typically   represents colonization. If an indwelling urinary catheter is present  it should be removed or replaced.      Narrative:       Urine sample sent out for urinalysis. 11/23  ADD ON PER DR ALVARADO ORDER [id number] URINALYSIS @0902 11/23/17    Culture, Anaerobic [[id number]] Collected:  11/16/17 2218    Order Status:  Completed Specimen:  Wound from Back Updated:  11/24/17 0749     Anaerobic Culture No anaerobes isolated    Narrative:       2.deep lumbar spine    Culture, Anaerobic [[id number]] Collected:  11/16/17 2218    Order Status:  Completed Specimen:  Wound from Back Updated:  11/24/17 0749     Anaerobic Culture No anaerobes isolated    Narrative:       1.superficial lumbar spine    Aerobic culture [[id number]] Collected:  11/16/17 2218    Order Status:  Completed Specimen:  Wound from Back Updated:  11/20/17 0904     Aerobic Bacterial Culture No growth    Narrative:       2.deep lumbar spine    Aerobic culture [[id number]] Collected:  11/16/17 2218    Order Status:  Completed Specimen:  Wound from Back Updated:  11/20/17 0904     Aerobic Bacterial Culture No growth    Narrative:       1.superficial lumbar spine          All pertinent labs from the last 24 hours have been reviewed.    Significant Diagnostics:  No new diagnostics of significance

## 2017-11-27 NOTE — PROGRESS NOTES
Patient received from floor with report from HECTOR Zaragoza.  Maintenance dialysis began per orders via 15 gauge fistula needles to right forearm fistula.

## 2017-11-27 NOTE — SUBJECTIVE & OBJECTIVE
Interval History:   Seen on dialysis this afternoon, tolerating treatment well with goal UF of 1L.  No c/o SOB.    Review of patient's allergies indicates:   Allergen Reactions    Morphine Other (See Comments)     Other reaction(s): severe abdominal pain    Darvocet a500  [propoxyphene n-acetaminophen]      Other reaction(s): Unknown     Current Facility-Administered Medications   Medication Frequency    0.9%  NaCl infusion (for blood administration) Q24H PRN    0.9%  NaCl infusion (for blood administration) Q24H PRN    0.9%  NaCl infusion PRN    0.9%  NaCl infusion Once    0.9%  NaCl infusion PRN    0.9%  NaCl infusion Once    0.9%  NaCl infusion PRN    0.9%  NaCl infusion PRN    0.9%  NaCl infusion Once    acetaminophen tablet 650 mg Q8H    albumin human 25% bottle 25 g Continuous PRN    albuterol nebulizer solution 2.5 mg Q4H PRN    albuterol-ipratropium 2.5mg-0.5mg/3mL nebulizer solution 3 mL Q6H WAKE    aluminum-magnesium hydroxide-simethicone 200-200-20 mg/5 mL suspension 30 mL Q4H PRN    atorvastatin tablet 80 mg Daily    benzonatate capsule 100 mg TID PRN    bisacodyl suppository 10 mg Daily    cyclobenzaprine tablet 5 mg TID PRN    dextromethorphan-guaifenesin  mg/5 ml liquid 10 mL Q6H    dextrose 50% injection 12.5 g PRN    dextrose 50% injection 25 g PRN    epoetin preeti injection 5,000 Units Every Mon, Wed, Fri    fluconazole tablet 200 mg Daily    glucagon (human recombinant) injection 1 mg PRN    glucose chewable tablet 16 g PRN    glucose chewable tablet 24 g PRN    influenza (FLUZONE HIGH-DOSE) vaccine 0.5 mL vaccine x 1 dose    insulin aspart pen 0-5 Units QID (AC + HS) PRN    insulin detemir pen 8 Units QHS    lidocaine 5 % patch 3 patch Q24H    metoprolol tartrate (LOPRESSOR) tablet 25 mg TID    miconazole NITRATE 2 % top powder BID    midodrine tablet 10 mg Continuous PRN    midodrine tablet 10 mg TID    ondansetron disintegrating tablet 8 mg Q8H PRN     pantoprazole EC tablet 40 mg Nightly    pneumoc 13-osiel conj-dip cr(PF) 0.5 mL vaccine x 1 dose    polyethylene glycol packet 17 g BID    pregabalin capsule 75 mg Daily    promethazine (PHENERGAN) 6.25 mg in dextrose 5 % 50 mL IVPB Q6H PRN    ramelteon tablet 8 mg Nightly PRN    senna-docusate 8.6-50 mg per tablet 1 tablet BID    senna-docusate 8.6-50 mg per tablet 2 tablet Nightly PRN    sertraline tablet 100 mg QHS    sodium chloride 0.9% bolus 250 mL Once    sodium chloride 0.9% flush 3 mL PRN    traMADol tablet 50 mg Q8H PRN       Objective:     Vital Signs (Most Recent):  Temp: 97.8 °F (36.6 °C) (11/27/17 1400)  Pulse: 90 (11/27/17 1445)  Resp: 20 (11/27/17 1500)  BP: (!) 97/54 (11/27/17 1445)  SpO2: 97 % (11/27/17 1445)  O2 Device (Oxygen Therapy): nasal cannula (11/27/17 1400) Vital Signs (24h Range):  Temp:  [96 °F (35.6 °C)-99.7 °F (37.6 °C)] 97.8 °F (36.6 °C)  Pulse:  [] 90  Resp:  [16-20] 20  SpO2:  [92 %-100 %] 97 %  BP: ()/(51-97) 97/54     Weight: 78.7 kg (173 lb 8 oz) (11/27/17 1300)  Body mass index is 31.73 kg/m².  Body surface area is 1.86 meters squared.    I/O last 3 completed shifts:  In: 204 [P.O.:204]  Out: 75 [Urine:75]    Physical Exam   Constitutional: He appears well-developed and well-nourished.   Eyes: EOM are normal.   Neck: Neck supple.   Cardiovascular: An irregularly irregular rhythm present.   Murmur heard.  Pulmonary/Chest: Effort normal. No respiratory distress. He has no wheezes. He has rhonchi. He has no rales.   Abdominal: Soft. Bowel sounds are normal. There is no tenderness.   Neurological: He is alert.   Oriented to self and place not time or situation.    Skin: Skin is warm and dry.   LFA AVF bruit and thrill       Significant Labs:  CBC:   Recent Labs  Lab 11/27/17  0926   WBC 14.96*   RBC 2.80*   HGB 8.5*   HCT 27.8*      MCV 99*   MCH 30.4   MCHC 30.6*     CMP:   Recent Labs  Lab 11/27/17  0458   GLU 76   CALCIUM 10.1   ALBUMIN 2.6*   PROT  6.7      K 5.1   CO2 19*      BUN 52*   CREATININE 5.1*   ALKPHOS 196*   ALT 20   *   BILITOT 0.4

## 2017-11-27 NOTE — SUBJECTIVE & OBJECTIVE
Interval History: Patient with no acute events overnight , remains having back spasms. He states that his dysphagia and pain in his groin is much better. According to him and his daughter he was able to eat the most this morning since admission     Review of Systems   Constitutional: Negative for appetite change, chills, diaphoresis, fatigue and fever.   HENT: Negative for rhinorrhea, sinus pain, sinus pressure, sneezing and sore throat.    Eyes: Negative for visual disturbance.   Respiratory: Positive for cough. Negative for choking, chest tightness and shortness of breath.    Cardiovascular: Negative for chest pain, palpitations and leg swelling.   Gastrointestinal: Positive for constipation. Negative for diarrhea and nausea.        Denies bowel incontinence   Genitourinary: Positive for discharge. Negative for dysuria.   Musculoskeletal: Positive for back pain and gait problem. Negative for joint swelling, myalgias and neck pain.   Neurological: Negative for dizziness, facial asymmetry, light-headedness and headaches.   Psychiatric/Behavioral: Positive for confusion. Negative for agitation and behavioral problems.     Objective:     Vital Signs (Most Recent):  Temp: 97.3 °F (36.3 °C) (11/27/17 0830)  Pulse: 77 (11/27/17 0830)  Resp: 16 (11/27/17 0830)  BP: (!) 124/91 (11/27/17 0830)  SpO2: 100 % (11/27/17 0830) Vital Signs (24h Range):  Temp:  [96 °F (35.6 °C)-99.7 °F (37.6 °C)] 97.3 °F (36.3 °C)  Pulse:  [] 77  Resp:  [16-18] 16  SpO2:  [96 %-100 %] 100 %  BP: (124-159)/(51-97) 124/91     Weight: 78.7 kg (173 lb 8 oz)  Body mass index is 31.73 kg/m².    Estimated Creatinine Clearance: 9.6 mL/min (based on SCr of 5.1 mg/dL (H)).    Physical Exam   Constitutional: He appears well-developed.   Awake/alert   HENT:   Thrush noted, improved    Eyes: Conjunctivae are normal. No scleral icterus.   Cardiovascular: Normal heart sounds and intact distal pulses.    No murmur heard.  Irregular rhythm, tachycardic    Pulmonary/Chest: Effort normal. No respiratory distress. He has no wheezes. He has rales.   Bibasilar crackles present   Abdominal: Soft. Bowel sounds are normal. There is no tenderness.   Mild distension, soft, tympanic to percussion, no fluid wave noted.    Genitourinary:   Genitourinary Comments: Erythema around groin, with purulent discharge from penis, much improved    Musculoskeletal: Normal range of motion. He exhibits no edema or deformity.   RUE forearm AV fistula with palpable thrill.        Lymphadenopathy:     He has no cervical adenopathy.   Neurological:   Alert, oriented to self, hospital   Skin: Skin is warm.       Significant Labs:   CBC:   Recent Labs  Lab 11/26/17  0640 11/27/17  0926   WBC 12.85* 14.96*   HGB 8.6* 8.5*   HCT 28.3* 27.8*    266     CMP:   Recent Labs  Lab 11/26/17  0640 11/27/17  0458    137   K 4.3 5.1    102   CO2 26 19*   * 76   BUN 43* 52*   CREATININE 4.5* 5.1*   CALCIUM 10.2 10.1   PROT 6.2 6.7   ALBUMIN 2.5* 2.6*   BILITOT 0.4 0.4   ALKPHOS 176* 196*   AST 99* 102*   ALT 18 20   ANIONGAP 9 16   EGFRNONAA 11.1* 9.5*       Significant Imaging: I have reviewed all pertinent imaging results/findings within the past 24 hours.

## 2017-11-27 NOTE — ASSESSMENT & PLAN NOTE
- Patient with evident candida in the urine and thrush   - Recommend to continue fluconazole 200 mg qd as he is ESRD patient  - Patient on exam today much improved erythema in his groin.   - Will need treatment for around ten more days 12/7/2017

## 2017-11-27 NOTE — ASSESSMENT & PLAN NOTE
Mr. Cline is a 85 y.o. male with ESRD on dialysis MWF, HFpEF, NSTEMI, bladder/prostate cancer, DM II, aortic stenosis and CAD, previous fungal UTI's  . Who is having candida cultures from respiratory, and urinary cultures.       - Patient with increasing need for oxygen over the last day  - Exam is consistent with pulmonary edema  - Xray more consistent with fluid overload  - Recommend to discontinue cefepime as it can cause AMS and is altering normal norah. Possibly exacerbating candida infection   - Suspect that he does not have a pneumonia   - Recommend removal of fluid with HD  - If no respiratory improvement with HD recommend ruling out PE with CTA given that he is post procedural and has acute respiratory failure requiring 5L of oxygen.

## 2017-11-27 NOTE — PROGRESS NOTES
[facility]  Neurosurgery  Progress Note    Subjective:     History of Present Illness: Donta Cline is 85 y.o. male with PMH ESRD on HD MWF, CAD, NSTEMI, bladder/prostate cancer, DMII, aortic stenosis and recent L2-L4 fusion 10/20/17 who presents to [facility] with complaint of worsened back pain and functional decline since last Friday 11/3. There is no family in the room. The patient is mildy confused and thus a poor historian so most of the history obtained from the medical record. He was discharged to a rehab after the last admission and progressing well so transferred to a skilled nursing facility. He was able to walk 50ft on his own until last week when he had acute worsening of his low back pain and LE weakness. He has been unable to weight bear since that time. He also complains of abdominal pain and bilateral hip pain. He reports some BLE pain but is unable to characterize it. Denies BB dysfunction or saddle anesthesia. There is no record of fever or trauma. ESR/CRP are elevated. INR 3.8, on coumadin.     Post-Op Info:  Procedure(s) (LRB):  REVISION-WOUND--lumbar washout (N/A)   11 Days Post-Op     Interval History: Respiratory distress over the weekend with associated hypotension and tachypnea. Improvement in VS following 250cc bolus, increase in O2, and deep suctioning. HM considering aspiration pna as high on ddx considering recent delirium. Abx broadened to vanc and cefepime. Awake, alert, oriented today.     Medications:  Continuous Infusions:   albumin human 25%      midodrine       Scheduled Meds:   sodium chloride 0.9%   Intravenous Once    sodium chloride 0.9%   Intravenous Once    sodium chloride 0.9%   Intravenous Once    acetaminophen  650 mg Oral Q8H    albuterol-ipratropium 2.5mg-0.5mg/3mL  3 mL Nebulization Q6H WAKE    atorvastatin  80 mg Oral Daily    bisacodyl  10 mg Rectal Daily    dextromethorphan-guaifenesin  mg/5 ml  10 mL Oral Q6H    epoetin preeti  5,000  Units Intravenous Every Mon, Wed, Fri    fluconazole  200 mg Oral Daily    insulin detemir  8 Units Subcutaneous QHS    lidocaine  3 patch Transdermal Q24H    metoprolol tartrate  25 mg Oral TID    miconazole NITRATE 2 %   Topical BID    midodrine  10 mg Oral TID    pantoprazole  40 mg Oral Nightly    polyethylene glycol  17 g Oral BID    pregabalin  75 mg Oral Daily    senna-docusate 8.6-50 mg  1 tablet Oral BID    sertraline  100 mg Oral QHS    sodium chloride 0.9%  250 mL Intravenous Once     PRN Meds:sodium chloride, sodium chloride, sodium chloride 0.9%, sodium chloride 0.9%, sodium chloride 0.9%, sodium chloride 0.9%, albumin human 25%, albuterol sulfate, aluminum-magnesium hydroxide-simethicone, benzonatate, cyclobenzaprine, dextrose 50%, dextrose 50%, glucagon (human recombinant), glucose, glucose, influenza, insulin aspart, midodrine, ondansetron, pneumoc 13-osiel conj-dip cr(PF), promethazine (PHENERGAN) IVPB, ramelteon, senna-docusate 8.6-50 mg, sodium chloride 0.9%, traMADol     Review of Systems    Objective:     Weight: 78.7 kg (173 lb 8 oz)  Body mass index is 31.73 kg/m².  Vital Signs (Most Recent):  Temp: 97.7 °F (36.5 °C) (11/27/17 1224)  Pulse: 78 (11/27/17 1301)  Resp: 20 (11/27/17 1301)  BP: (!) 124/91 (11/27/17 0830)  SpO2: 97 % (11/27/17 1301) Vital Signs (24h Range):  Temp:  [96 °F (35.6 °C)-99.7 °F (37.6 °C)] 97.7 °F (36.5 °C)  Pulse:  [] 78  Resp:  [16-20] 20  SpO2:  [92 %-100 %] 97 %  BP: (124-159)/(51-97) 124/91                           Hemodialysis AV Fistula Right forearm (Active)   Needle Size 15ga 11/24/2017  8:40 AM   Site Assessment Clean;Dry;Intact 11/24/2017  8:40 AM   Patency Present;Thrill;Bruit 11/24/2017  8:40 AM   Status Accessed 11/24/2017  8:40 AM   Flows Good 11/24/2017  8:40 AM   Dressing Intervention New dressing 11/21/2017 12:43 PM   Dressing Status Clean;Dry;Intact 11/21/2017  9:45 AM   Site Condition No complications 11/24/2017  8:40 AM   Dressing  Gauze 11/21/2017 12:43 PM       Physical Exam:  Vitals reviewed.    Constitutional: He appears well-developed and well-nourished. He is not diaphoretic. No distress.     Eyes: Pupils are equal, round, and reactive to light. EOM are normal.     Cardiovascular: Normal rate.     Abdominal: Soft.   Redness and patchy white plaque inside groin consistent with yeast infection     Psych/Behavior: He is alert. He has a normal mood and affect.     Neurological:        Sensory: There is no sensory deficit in the trunk. There is no sensory deficit in the extremities.        Cranial nerves: Cranial nerve(s) II, III, IV, V, VI, VII, VIII, IX, X, XI and XII are intact.   AOx2 (self, place)  BUE strength: 5/5  BLE strength: 4/5 HF, 4/5 KF, 2/5 KE, 5/5 PF/DF     Incision c/d/i    Significant Labs:    Recent Labs  Lab 11/26/17  0640 11/27/17  0458   * 76    137   K 4.3 5.1    102   CO2 26 19*   BUN 43* 52*   CREATININE 4.5* 5.1*   CALCIUM 10.2 10.1   MG 1.8 2.2       Recent Labs  Lab 11/26/17  0640 11/27/17  0926   WBC 12.85* 14.96*   HGB 8.6* 8.5*   HCT 28.3* 27.8*    266       Recent Labs  Lab 11/26/17  0640   INR 1.4*     Microbiology Results (last 7 days)     Procedure Component Value Units Date/Time    Culture, Respiratory with Gram Stain [[id number]] Collected:  11/25/17 1420    Order Status:  Completed Specimen:  Respiratory from Sputum, Induced Updated:  11/27/17 1215     Respiratory Culture --     CANDIDA ALBICANS  Moderate       Gram Stain (Respiratory) >10 epithelial cells per low power field     Gram Stain (Respiratory) Many WBC's     Gram Stain (Respiratory) Moderate yeast     Gram Stain (Respiratory) Rare Gram positive rods    Blood culture [[id number]] Collected:  11/23/17 0909    Order Status:  Completed Specimen:  Blood Updated:  11/27/17 1212     Blood Culture, Routine No Growth to date     Blood Culture, Routine No Growth to date     Blood Culture, Routine No Growth to date     Blood  Culture, Routine No Growth to date     Blood Culture, Routine No Growth to date    Blood culture [[id number]] Collected:  11/23/17 0909    Order Status:  Completed Specimen:  Blood Updated:  11/27/17 1212     Blood Culture, Routine No Growth to date     Blood Culture, Routine No Growth to date     Blood Culture, Routine No Growth to date     Blood Culture, Routine No Growth to date     Blood Culture, Routine No Growth to date    Culture, Respiratory with Gram Stain [[id number]] Collected:  11/24/17 1530    Order Status:  Completed Specimen:  Respiratory from Sputum, Expectorated Updated:  11/27/17 1158     Respiratory Culture --     CANDIDA ALBICANS  Few       Gram Stain (Respiratory) <10 epithelial cells per low power field.     Gram Stain (Respiratory) Few WBC's     Gram Stain (Respiratory) Few Gram positive cocci     Gram Stain (Respiratory) Rare yeast     Gram Stain (Respiratory) Rare Gram positive rods    Blood culture [[id number]] Collected:  11/25/17 2222    Order Status:  Completed Specimen:  Blood Updated:  11/27/17 0613     Blood Culture, Routine No Growth to date     Blood Culture, Routine No Growth to date    Fungus Culture, Blood or Bone Marrow [[id number]] Collected:  11/25/17 2222    Order Status:  Sent Specimen:  Blood from Blood Updated:  11/25/17 2239    Urine culture [[id number]] Collected:  11/22/17 1555    Order Status:  Completed Specimen:  Urine from Urine Updated:  11/24/17 1301     Urine Culture, Routine --     CANDIDA ALBICANS  > 100,000 cfu/ml  Treatment of asymptomatic candiduria is not recommended (except for   specific populations). Candida isolated in the urine typically   represents colonization. If an indwelling urinary catheter is present  it should be removed or replaced.      Narrative:       Urine sample sent out for urinalysis. 11/23  ADD ON PER DR ALVARADO ORDER [id number] URINALYSIS @0902 11/23/17    Culture, Anaerobic [[id number]] Collected:  11/16/17 2218    Order Status:  Completed  Specimen:  Wound from Back Updated:  11/24/17 0749     Anaerobic Culture No anaerobes isolated    Narrative:       2.deep lumbar spine    Culture, Anaerobic [[id number]] Collected:  11/16/17 2218    Order Status:  Completed Specimen:  Wound from Back Updated:  11/24/17 0749     Anaerobic Culture No anaerobes isolated    Narrative:       1.superficial lumbar spine          All pertinent labs from the last 24 hours have been reviewed.    Significant Diagnostics:  No new diagnostics.    Assessment/Plan:     Acute bilateral low back pain, post-lumbar spinal fusion    85 y.o. male s/p L2-4 fusion 10/20 for lumbar stenosis who presents with acute worsening of his LBP and LE weakness. s/p washout and hematoma evacuation    -Patient with stable strength post-op. Incision healing well.  -Delirium precautions and workup for possible infectious etiology per primary team   -Resp cx's growing candida; otherwise micro is NGTD   -Presumed aspiration pna per HM; abx broadened to vanc and cefepime.   -Continue to hold ASA and coumadin until two weeks post operatively.  -HD per nephrology.  -Afib, rate control per primary team.  -Q4h neuro checks  -PT/OT/OOB. Therapy recommending SNF.  -TEDs/SCDs/SQH for DVT prophylaxis   -Please call NSGY with any change in neuro exam.  - Will DC staples 11/30, or prior to discharge. Please contact if planning to discharge patient prior to 11/30 so that we may evaluate incision and remove staples, if appropriate.              Sulma Weller PA-C  Neurosurgery  [facility]

## 2017-11-27 NOTE — ASSESSMENT & PLAN NOTE
85 y.o. male s/p L2-4 fusion 10/20 for lumbar stenosis who presents with acute worsening of his LBP and LE weakness. s/p washout and hematoma evacuation    -Patient with stable strength post-op.   -delirium precautions and workup for possible infectious etiology per primary team   -Resp cx's growing candida; otherwise micro is NGTD  -Continue to hold ASA and coumadin  -HD per nephrology  -Afib, rate control per primary team.  -Q4h neuro checks  -PT/OT/OOB. Therapy recommending SNF   -Pain control per primary team.   -TEDs/SCDs/SQH for DVT prophylaxis   -Coordination of care and medical management per primary team   -Patient now on miconazole ointment and PO fluconazole for candida   -Please call NSGY with any change in neuro exam

## 2017-11-27 NOTE — PROGRESS NOTES
[facility]  Nephrology  Progress Note    Patient Name: Donta Cline  MRN: [id number]  Admission Date: 11/14/2017  Hospital Length of Stay: 12 days  Attending Provider: Abby Pisano MD   Primary Care Physician: ZAID Richardson Iii, MD  Principal Problem:Pneumonia    Subjective:     HPI: 84 yo male with significant history for hypertension, hyperlipidemia, remote smoker, PAF, Severe AS, DMT2, CAD sp NSTEMI, dcognitive impairment, recent sp lumbar fusion/laminectomy 10/20/17, and ESRD x 2 years who is admitted for lower back pain associated with BLE pain/spasm since discharge to [facility] on 11/10 from inpatient rehab at [facility]. Patient confuse.Daughter Wendy at bedside reports patient had acute decline on 11/11 as patient went from walking 50 to 100 ft and minimal assist with ADL's to not able to hold self up or walk. MRI lumbar showed fluid collection at L1. Neurosurgery consulted. Nephrology consult for hemodialysis. HD MWF 3.5 hrs duration via LFA AVF at [facility]. Some residual but not sure how much. Unclear of EDW. Last HD 11/13. CXR bilateral basilar atelectasis otherwise clear.       Interval History:   Seen on dialysis this afternoon, tolerating treatment well with goal UF of 1L.  No c/o SOB.    Review of patient's allergies indicates:   Allergen Reactions    Morphine Other (See Comments)     Other reaction(s): severe abdominal pain    Darvocet a500  [propoxyphene n-acetaminophen]      Other reaction(s): Unknown     Current Facility-Administered Medications   Medication Frequency    0.9%  NaCl infusion (for blood administration) Q24H PRN    0.9%  NaCl infusion (for blood administration) Q24H PRN    0.9%  NaCl infusion PRN    0.9%  NaCl infusion Once    0.9%  NaCl infusion PRN    0.9%  NaCl infusion Once    0.9%  NaCl infusion PRN    0.9%  NaCl infusion PRN    0.9%  NaCl infusion Once    acetaminophen tablet 650 mg Q8H    albumin human 25%  bottle 25 g Continuous PRN    albuterol nebulizer solution 2.5 mg Q4H PRN    albuterol-ipratropium 2.5mg-0.5mg/3mL nebulizer solution 3 mL Q6H WAKE    aluminum-magnesium hydroxide-simethicone 200-200-20 mg/5 mL suspension 30 mL Q4H PRN    atorvastatin tablet 80 mg Daily    benzonatate capsule 100 mg TID PRN    bisacodyl suppository 10 mg Daily    cyclobenzaprine tablet 5 mg TID PRN    dextromethorphan-guaifenesin  mg/5 ml liquid 10 mL Q6H    dextrose 50% injection 12.5 g PRN    dextrose 50% injection 25 g PRN    epoetin preeti injection 5,000 Units Every Mon, Wed, Fri    fluconazole tablet 200 mg Daily    glucagon (human recombinant) injection 1 mg PRN    glucose chewable tablet 16 g PRN    glucose chewable tablet 24 g PRN    influenza (FLUZONE HIGH-DOSE) vaccine 0.5 mL vaccine x 1 dose    insulin aspart pen 0-5 Units QID (AC + HS) PRN    insulin detemir pen 8 Units QHS    lidocaine 5 % patch 3 patch Q24H    metoprolol tartrate (LOPRESSOR) tablet 25 mg TID    miconazole NITRATE 2 % top powder BID    midodrine tablet 10 mg Continuous PRN    midodrine tablet 10 mg TID    ondansetron disintegrating tablet 8 mg Q8H PRN    pantoprazole EC tablet 40 mg Nightly    pneumoc 13-osiel conj-dip cr(PF) 0.5 mL vaccine x 1 dose    polyethylene glycol packet 17 g BID    pregabalin capsule 75 mg Daily    promethazine (PHENERGAN) 6.25 mg in dextrose 5 % 50 mL IVPB Q6H PRN    ramelteon tablet 8 mg Nightly PRN    senna-docusate 8.6-50 mg per tablet 1 tablet BID    senna-docusate 8.6-50 mg per tablet 2 tablet Nightly PRN    sertraline tablet 100 mg QHS    sodium chloride 0.9% bolus 250 mL Once    sodium chloride 0.9% flush 3 mL PRN    traMADol tablet 50 mg Q8H PRN       Objective:     Vital Signs (Most Recent):  Temp: 97.8 °F (36.6 °C) (11/27/17 1400)  Pulse: 90 (11/27/17 1445)  Resp: 20 (11/27/17 1500)  BP: (!) 97/54 (11/27/17 1445)  SpO2: 97 % (11/27/17 1445)  O2 Device (Oxygen Therapy): nasal  cannula (11/27/17 1400) Vital Signs (24h Range):  Temp:  [96 °F (35.6 °C)-99.7 °F (37.6 °C)] 97.8 °F (36.6 °C)  Pulse:  [] 90  Resp:  [16-20] 20  SpO2:  [92 %-100 %] 97 %  BP: ()/(51-97) 97/54     Weight: 78.7 kg (173 lb 8 oz) (11/27/17 1300)  Body mass index is 31.73 kg/m².  Body surface area is 1.86 meters squared.    I/O last 3 completed shifts:  In: 204 [P.O.:204]  Out: 75 [Urine:75]    Physical Exam   Constitutional: He appears well-developed and well-nourished.   Eyes: EOM are normal.   Neck: Neck supple.   Cardiovascular: An irregularly irregular rhythm present.   Murmur heard.  Pulmonary/Chest: Effort normal. No respiratory distress. He has no wheezes. He has rhonchi. He has no rales.   Abdominal: Soft. Bowel sounds are normal. There is no tenderness.   Neurological: He is alert.   Oriented to self and place not time or situation.    Skin: Skin is warm and dry.   LFA AVF bruit and thrill       Significant Labs:  CBC:   Recent Labs  Lab 11/27/17  0926   WBC 14.96*   RBC 2.80*   HGB 8.5*   HCT 27.8*      MCV 99*   MCH 30.4   MCHC 30.6*     CMP:   Recent Labs  Lab 11/27/17  0458   GLU 76   CALCIUM 10.1   ALBUMIN 2.6*   PROT 6.7      K 5.1   CO2 19*      BUN 52*   CREATININE 5.1*   ALKPHOS 196*   ALT 20   *   BILITOT 0.4          Assessment/Plan:     ESRD (end stage renal disease) on dialysis    HD MWF 3.5 hrs duration via LFA AVF at [facility]. Unsure EDW or residual function.     -HD treatment today for metabolic clearance/volume management.  BPs labile, UF goal of 1L as tolerated.  Albumin/midodrine as needed for BP support.      BMD  -Phos levels daily.  Poor PO intake, continue to hold phos binders.    Anemia Management  -H/H low but stable.  Increase ZHEN with dialysis.  Iron studies in AM.                [place] NP  Nephrology  [facility]  Pager:  [phone]

## 2017-11-27 NOTE — ASSESSMENT & PLAN NOTE
85 y.o. male s/p L2-4 fusion 10/20 for lumbar stenosis who presents with acute worsening of his LBP and LE weakness. s/p washout and hematoma evacuation    -Patient with stable strength post-op. Incision healing well.  -Delirium precautions and workup for possible infectious etiology per primary team   -Resp cx's growing candida; otherwise micro is NGTD   -Presumed aspiration pna per HM; abx broadened to vanc and cefepime.   -Continue to hold ASA and coumadin until two weeks post operatively.  -HD per nephrology.  -Afib, rate control per primary team.  -Q4h neuro checks  -PT/OT/OOB. Therapy recommending SNF.  -TEDs/SCDs/SQH for DVT prophylaxis   -Please call NSGY with any change in neuro exam.  - Will DC staples 11/30, or prior to discharge. Please contact if planning to discharge patient prior to 11/30 so that we may evaluate incision and remove staples, if appropriate.

## 2017-11-27 NOTE — ASSESSMENT & PLAN NOTE
HD MWF 3.5 hrs duration via LFA AVF at [facility]. Unsure EDW or residual function.     -HD treatment today for metabolic clearance/volume management.  BPs labile, UF goal of 1L as tolerated.  Albumin/midodrine as needed for BP support.      BMD  -Phos levels daily.  Poor PO intake, continue to hold phos binders.    Anemia Management  -H/H low but stable.  Increase ZHEN with dialysis.  Iron studies in AM.

## 2017-11-27 NOTE — PROGRESS NOTES
[facility]  Neurosurgery  Progress Note    Subjective:     History of Present Illness: Donta Cline is 85 y.o. male with PMH ESRD on HD MWF, CAD, NSTEMI, bladder/prostate cancer, DMII, aortic stenosis and recent L2-L4 fusion 10/20/17 who presents to [facility] with complaint of worsened back pain and functional decline since last Friday 11/3. There is no family in the room. The patient is mildy confused and thus a poor historian so most of the history obtained from the medical record. He was discharged to a rehab after the last admission and progressing well so transferred to a skilled nursing facility. He was able to walk 50ft on his own until last week when he had acute worsening of his low back pain and LE weakness. He has been unable to weight bear since that time. He also complains of abdominal pain and bilateral hip pain. He reports some BLE pain but is unable to characterize it. Denies BB dysfunction or saddle anesthesia. There is no record of fever or trauma. ESR/CRP are elevated. INR 3.8, on coumadin.     Post-Op Info:  Procedure(s) (LRB):  REVISION-WOUND--lumbar washout (N/A)   11 Days Post-Op     Interval History: No acute events overnight; stable on exam; groin with what appears to be yeast infection    Medications:  Continuous Infusions:   albumin human 25%      midodrine       Scheduled Meds:   sodium chloride 0.9%   Intravenous Once    sodium chloride 0.9%   Intravenous Once    acetaminophen  650 mg Oral Q8H    albuterol-ipratropium 2.5mg-0.5mg/3mL  3 mL Nebulization Q6H WAKE    atorvastatin  80 mg Oral Daily    bisacodyl  10 mg Rectal Daily    dextromethorphan-guaifenesin  mg/5 ml  10 mL Oral Q6H    epoetin preeti  5,000 Units Intravenous Every Mon, Wed, Fri    fluconazole  200 mg Oral Daily    insulin detemir  8 Units Subcutaneous QHS    lidocaine  3 patch Transdermal Q24H    metoprolol tartrate  25 mg Oral TID    miconazole NITRATE 2 %   Topical BID    midodrine   10 mg Oral TID    pantoprazole  40 mg Oral Nightly    polyethylene glycol  17 g Oral BID    pregabalin  75 mg Oral Daily    senna-docusate 8.6-50 mg  1 tablet Oral BID    sertraline  100 mg Oral QHS    sodium chloride 0.9%  250 mL Intravenous Once     PRN Meds:sodium chloride, sodium chloride, sodium chloride 0.9%, sodium chloride 0.9%, sodium chloride 0.9%, albumin human 25%, albuterol sulfate, aluminum-magnesium hydroxide-simethicone, benzonatate, cyclobenzaprine, dextrose 50%, dextrose 50%, glucagon (human recombinant), glucose, glucose, influenza, insulin aspart, midodrine, ondansetron, pneumoc 13-osiel conj-dip cr(PF), promethazine (PHENERGAN) IVPB, ramelteon, senna-docusate 8.6-50 mg, sodium chloride 0.9%, traMADol     Review of Systems    Objective:     Weight: 77.8 kg (171 lb 8 oz)  Body mass index is 31.37 kg/m².  Vital Signs (Most Recent):  Temp: 97 °F (36.1 °C) (11/26/17 2356)  Pulse: 83 (11/26/17 2356)  Resp: 18 (11/26/17 2356)  BP: (!) 159/97 (11/26/17 2356)  SpO2: 100 % (11/26/17 2356) Vital Signs (24h Range):  Temp:  [97 °F (36.1 °C)-99.7 °F (37.6 °C)] 97 °F (36.1 °C)  Pulse:  [] 83  Resp:  [16-18] 18  SpO2:  [96 %-100 %] 100 %  BP: (104-159)/(51-97) 159/97                           Hemodialysis AV Fistula Right forearm (Active)   Needle Size 15ga 11/24/2017  8:40 AM   Site Assessment Clean;Dry;Intact 11/24/2017  8:40 AM   Patency Present;Thrill;Bruit 11/24/2017  8:40 AM   Status Accessed 11/24/2017  8:40 AM   Flows Good 11/24/2017  8:40 AM   Dressing Intervention New dressing 11/21/2017 12:43 PM   Dressing Status Clean;Dry;Intact 11/21/2017  9:45 AM   Site Condition No complications 11/24/2017  8:40 AM   Dressing Gauze 11/21/2017 12:43 PM       Physical Exam:  Vitals reviewed.    Constitutional: He appears well-developed and well-nourished. He is not diaphoretic. No distress.     Eyes: Pupils are equal, round, and reactive to light. EOM are normal.     Cardiovascular: Normal rate.      Abdominal: Soft.   Redness and patchy white plaque inside groin consistent with yeast infection     Psych/Behavior: He is alert. He has a normal mood and affect.     Neurological:        Sensory: There is no sensory deficit in the trunk. There is no sensory deficit in the extremities.        Cranial nerves: Cranial nerve(s) II, III, IV, V, VI, VII, VIII, IX, X, XI and XII are intact.   AOx2 (self, place)  BUE strength: 5/5  BLE strength: 4/5 HF, 4/5 KF, 2/5 KE, 5/5 PF/DF       Significant Labs:    Recent Labs  Lab 11/25/17  0452 11/26/17  0640   GLU 99 115*    137   K 4.0 4.3    102   CO2 22* 26   BUN 30* 43*   CREATININE 3.3* 4.5*   CALCIUM 10.4 10.2   MG 2.0 1.8       Recent Labs  Lab 11/25/17  0452 11/26/17  0640   WBC 12.68 12.85*   HGB 9.9* 8.6*   HCT 33.2* 28.3*    271       Recent Labs  Lab 11/25/17  0452 11/25/17  0821 11/26/17  0640   INR 2.1* 1.4* 1.4*     Microbiology Results (last 7 days)     Procedure Component Value Units Date/Time    Culture, Respiratory with Gram Stain [[id number]] Collected:  11/25/17 1420    Order Status:  Completed Specimen:  Respiratory from Sputum, Induced Updated:  11/26/17 1221     Respiratory Culture --     CANDIDA ALBICANS  Moderate       Gram Stain (Respiratory) >10 epithelial cells per low power field     Gram Stain (Respiratory) Many WBC's     Gram Stain (Respiratory) Moderate yeast     Gram Stain (Respiratory) Rare Gram positive rods    Culture, Respiratory with Gram Stain [[id number]] Collected:  11/24/17 1530    Order Status:  Completed Specimen:  Respiratory from Sputum, Expectorated Updated:  11/26/17 1220     Respiratory Culture --     CANDIDA ALBICANS  Few       Gram Stain (Respiratory) <10 epithelial cells per low power field.     Gram Stain (Respiratory) Few WBC's     Gram Stain (Respiratory) Few Gram positive cocci     Gram Stain (Respiratory) Rare yeast     Gram Stain (Respiratory) Rare Gram positive rods    Blood culture [[id number]]  Collected:  11/23/17 0909    Order Status:  Completed Specimen:  Blood Updated:  11/26/17 1212     Blood Culture, Routine No Growth to date     Blood Culture, Routine No Growth to date     Blood Culture, Routine No Growth to date     Blood Culture, Routine No Growth to date    Blood culture [[id number]] Collected:  11/23/17 0909    Order Status:  Completed Specimen:  Blood Updated:  11/26/17 1212     Blood Culture, Routine No Growth to date     Blood Culture, Routine No Growth to date     Blood Culture, Routine No Growth to date     Blood Culture, Routine No Growth to date    Blood culture [[id number]] Collected:  11/25/17 2222    Order Status:  Completed Specimen:  Blood Updated:  11/26/17 0545     Blood Culture, Routine No Growth to date    Fungus Culture, Blood or Bone Marrow [[id number]] Collected:  11/25/17 2222    Order Status:  Sent Specimen:  Blood from Blood Updated:  11/25/17 2239    Urine culture [[id number]] Collected:  11/22/17 1555    Order Status:  Completed Specimen:  Urine from Urine Updated:  11/24/17 1301     Urine Culture, Routine --     CANDIDA ALBICANS  > 100,000 cfu/ml  Treatment of asymptomatic candiduria is not recommended (except for   specific populations). Candida isolated in the urine typically   represents colonization. If an indwelling urinary catheter is present  it should be removed or replaced.      Narrative:       Urine sample sent out for urinalysis. 11/23  ADD ON PER DR ALVARADO ORDER [id number] URINALYSIS @0902 11/23/17    Culture, Anaerobic [[id number]] Collected:  11/16/17 2218    Order Status:  Completed Specimen:  Wound from Back Updated:  11/24/17 0749     Anaerobic Culture No anaerobes isolated    Narrative:       2.deep lumbar spine    Culture, Anaerobic [[id number]] Collected:  11/16/17 2218    Order Status:  Completed Specimen:  Wound from Back Updated:  11/24/17 0749     Anaerobic Culture No anaerobes isolated    Narrative:       1.superficial lumbar spine    Aerobic culture  [[id number]] Collected:  11/16/17 2218    Order Status:  Completed Specimen:  Wound from Back Updated:  11/20/17 0904     Aerobic Bacterial Culture No growth    Narrative:       2.deep lumbar spine    Aerobic culture [[id number]] Collected:  11/16/17 2218    Order Status:  Completed Specimen:  Wound from Back Updated:  11/20/17 0904     Aerobic Bacterial Culture No growth    Narrative:       1.superficial lumbar spine          All pertinent labs from the last 24 hours have been reviewed.    Significant Diagnostics:  No new diagnostics of significance    Assessment/Plan:     Acute bilateral low back pain, post-lumbar spinal fusion    85 y.o. male s/p L2-4 fusion 10/20 for lumbar stenosis who presents with acute worsening of his LBP and LE weakness. s/p washout and hematoma evacuation    -Patient with stable strength post-op.   -delirium precautions and workup for possible infectious etiology per primary team   -Resp cx's growing candida; otherwise micro is NGTD  -Continue to hold ASA and coumadin  -HD per nephrology  -Afib, rate control per primary team.  -Q4h neuro checks  -PT/OT/OOB. Therapy recommending SNF   -Pain control per primary team.   -TEDs/SCDs/SQH for DVT prophylaxis   -Coordination of care and medical management per primary team   -Patient now on miconazole ointment and PO fluconazole for candida   -Please call NSGY with any change in neuro exam               Kain Cannon MD  Neurosurgery  [facility]

## 2017-11-28 PROBLEM — D62 ACUTE BLOOD LOSS ANEMIA: Status: RESOLVED | Noted: 2017-01-01 | Resolved: 2017-01-01

## 2017-11-28 PROBLEM — R79.1 SUPRATHERAPEUTIC INR: Status: RESOLVED | Noted: 2017-01-01 | Resolved: 2017-01-01

## 2017-11-28 PROBLEM — M54.16 LUMBAR BACK PAIN WITH RADICULOPATHY AFFECTING RIGHT LOWER EXTREMITY: Status: ACTIVE | Noted: 2017-01-01

## 2017-11-28 NOTE — PROGRESS NOTES
Dialysis completed. Needles removed from right forearm fistula with pressure held to sites for 10 minutes each with hemostasis achieved. Gauze and tape to sites. Fluid removal of 550 ml .  Ultrafiltration paused last 45 minutes of treatment due to increased pulse rate ranging from 109 to 120 .  Denies pain or distress. Pulse rate decreased to  rinse back. Report called to floor to Bonnie Zaragoza.

## 2017-11-28 NOTE — SUBJECTIVE & OBJECTIVE
Interval History:  No acute events overnight. Pt's delirium is worse today. Denies pain.     Review of Systems   Constitutional: Positive for appetite change. Negative for chills, diaphoresis, fatigue and fever.   HENT: Negative for rhinorrhea, sinus pain, sinus pressure, sneezing and sore throat.    Eyes: Negative for visual disturbance.   Respiratory: Negative for cough, choking, chest tightness and shortness of breath.    Cardiovascular: Negative for chest pain, palpitations and leg swelling.   Gastrointestinal: Positive for constipation. Negative for diarrhea and nausea.        Denies bowel incontinence   Genitourinary: Negative for dysuria.   Musculoskeletal: Positive for back pain and gait problem. Negative for joint swelling, myalgias and neck pain.   Neurological: Negative for dizziness, facial asymmetry, light-headedness and headaches.   Psychiatric/Behavioral: Negative for agitation and behavioral problems.     Objective:     Vital Signs (Most Recent):  Temp: 98.8 °F (37.1 °C) (11/27/17 2314)  Pulse: 105 (11/27/17 2314)  Resp: 18 (11/27/17 2314)  BP: (!) 133/59 (11/27/17 2314)  SpO2: 97 % (11/27/17 2314) Vital Signs (24h Range):  Temp:  [96 °F (35.6 °C)-98.8 °F (37.1 °C)] 98.8 °F (37.1 °C)  Pulse:  [] 105  Resp:  [16-22] 18  SpO2:  [74 %-100 %] 97 %  BP: ()/(53-97) 133/59     Weight: 78.7 kg (173 lb 8 oz)  Body mass index is 31.73 kg/m².    Intake/Output Summary (Last 24 hours) at 11/27/17 2336  Last data filed at 11/27/17 1730   Gross per 24 hour   Intake              624 ml   Output             1193 ml   Net             -569 ml      Physical Exam   Constitutional: He appears well-developed.   Awake/alert   HENT:   Mouth/Throat: Oropharynx is clear and moist.   Eyes: Conjunctivae are normal. No scleral icterus.   Cardiovascular: Normal heart sounds and intact distal pulses.    No murmur heard.  Irregular rhythm, tachycardic   Pulmonary/Chest: Effort normal. No respiratory distress. He has no  wheezes. He has rales.   Bibasilar crackles present   Abdominal: Soft. Bowel sounds are normal.   Mild distension, soft, tympanic to percussion, no fluid wave noted, no bulging flanks, or abdominal collateral vessels   Musculoskeletal:   RUE forearm AV fistula with palpable thrill.     Surgical drains removed, dressing with sanguinous drainage   Lymphadenopathy:     He has no cervical adenopathy.   Neurological:   Alert, oriented to self, hospital, year    Skin: Skin is warm. There is pallor.       Significant Labs:   CBC:     Recent Labs  Lab 11/26/17 0640 11/27/17 0926   WBC 12.85* 14.96*   HGB 8.6* 8.5*   HCT 28.3* 27.8*    266     CMP:     Recent Labs  Lab 11/26/17 0640 11/27/17  0458    137   K 4.3 5.1    102   CO2 26 19*   * 76   BUN 43* 52*   CREATININE 4.5* 5.1*   CALCIUM 10.2 10.1   PROT 6.2 6.7   ALBUMIN 2.5* 2.6*   BILITOT 0.4 0.4   ALKPHOS 176* 196*   AST 99* 102*   ALT 18 20   ANIONGAP 9 16   EGFRNONAA 11.1* 9.5*     Microbiology Results (last 7 days)     Procedure Component Value Units Date/Time    Culture, Respiratory with Gram Stain [[id number]] Collected:  11/25/17 1420    Order Status:  Completed Specimen:  Respiratory from Sputum, Induced Updated:  11/27/17 1215     Respiratory Culture --     CANDIDA ALBICANS  Moderate       Gram Stain (Respiratory) >10 epithelial cells per low power field     Gram Stain (Respiratory) Many WBC's     Gram Stain (Respiratory) Moderate yeast     Gram Stain (Respiratory) Rare Gram positive rods    Blood culture [[id number]] Collected:  11/23/17 0909    Order Status:  Completed Specimen:  Blood Updated:  11/27/17 1212     Blood Culture, Routine No Growth to date     Blood Culture, Routine No Growth to date     Blood Culture, Routine No Growth to date     Blood Culture, Routine No Growth to date     Blood Culture, Routine No Growth to date    Blood culture [[id number]] Collected:  11/23/17 0909    Order Status:  Completed Specimen:  Blood  Updated:  11/27/17 1212     Blood Culture, Routine No Growth to date     Blood Culture, Routine No Growth to date     Blood Culture, Routine No Growth to date     Blood Culture, Routine No Growth to date     Blood Culture, Routine No Growth to date    Culture, Respiratory with Gram Stain [[id number]] Collected:  11/24/17 1530    Order Status:  Completed Specimen:  Respiratory from Sputum, Expectorated Updated:  11/27/17 1158     Respiratory Culture --     CANDIDA ALBICANS  Few       Gram Stain (Respiratory) <10 epithelial cells per low power field.     Gram Stain (Respiratory) Few WBC's     Gram Stain (Respiratory) Few Gram positive cocci     Gram Stain (Respiratory) Rare yeast     Gram Stain (Respiratory) Rare Gram positive rods    Blood culture [[id number]] Collected:  11/25/17 2222    Order Status:  Completed Specimen:  Blood Updated:  11/27/17 0613     Blood Culture, Routine No Growth to date     Blood Culture, Routine No Growth to date    Fungus Culture, Blood or Bone Marrow [[id number]] Collected:  11/25/17 2222    Order Status:  Sent Specimen:  Blood from Blood Updated:  11/25/17 2239    Urine culture [[id number]] Collected:  11/22/17 1555    Order Status:  Completed Specimen:  Urine from Urine Updated:  11/24/17 1301     Urine Culture, Routine --     CANDIDA ALBICANS  > 100,000 cfu/ml  Treatment of asymptomatic candiduria is not recommended (except for   specific populations). Candida isolated in the urine typically   represents colonization. If an indwelling urinary catheter is present  it should be removed or replaced.      Narrative:       Urine sample sent out for urinalysis. 11/23  ADD ON PER DR ALVARADO ORDER [id number] URINALYSIS @0902 11/23/17    Culture, Anaerobic [[id number]] Collected:  11/16/17 2218    Order Status:  Completed Specimen:  Wound from Back Updated:  11/24/17 0749     Anaerobic Culture No anaerobes isolated    Narrative:       2.deep lumbar spine    Culture, Anaerobic [[id number]] Collected:   11/16/17 2218    Order Status:  Completed Specimen:  Wound from Back Updated:  11/24/17 0749     Anaerobic Culture No anaerobes isolated    Narrative:       1.superficial lumbar spine            Significant Imaging: I have reviewed all pertinent imaging results/findings within the past 24 hours.

## 2017-11-28 NOTE — ASSESSMENT & PLAN NOTE
- nephrology consulted for volume management.   - Tolerating HD with acceptable HR   - Will cont to monitor

## 2017-11-28 NOTE — PT/OT/SLP PROGRESS
"Speech Language Pathology  Treatment    Donta Cline   MRN: [id number]   Admitting Diagnosis: Pneumonia    Diet recommendations: Solid Diet Level: Dental Soft  Liquid Diet Level: Nectar Thick   Please feed only when awake/alert, No straws, Small bites/sips, 1 bite/sip at a time, Check for pocketing/oral residue, Meds crushed in puree, Eliminate distractions, Assistance with meals and Assistance with thickening liquids, Encourage double swallows per bolus  · To achieve nectar thick liquid: 4 oz of any liquid to 1 pack of ThickenUp Clear or 6 oz of any liquid to 1 pack of ThickenUp  · No ice cream, jello, or ice.  · Avoid mixed consistencies (soup, cereal with milk, juicy fruits).  · Continue to monitor for signs and symptoms of aspiration and discontinue oral feeding should you notice any of the following: watery eyes, reddened facial area, wet vocal quality, increased work of breathing, change in respiratory status, increased congestion, coughing, fever, etc.    SLP Treatment Date: 11/27/17  Speech Start Time: 1156     Speech Stop Time: 1204     Speech Total (min): 8 min       TREATMENT BILLABLE MINUTES:  Treatment Swallowing Dysfunction 8    Has the patient been evaluated by SLP for swallowing? : Yes  Keep patient NPO?: No   General Precautions: Standard, aspiration, respiratory, nectar thick  Current Respiratory Status: nasal cannula       Subjective:  SLP reviewed pt with nurse, nurse reports pt tolerating medications and with minimal appetite with breakfast tray  Pt presents fatigued  He explains, "Not with my back" [regarding sitting up in bed]    Pain/Comfort  Pain Rating 1: 3/10  Location - Orientation 1: lower  Location 1: back  Pain Addressed 1: Cessation of Activity  Pain Rating Post-Intervention 1: 3/10    Objective:   Patient found with: telemetry, oxygen . Pt asleep in bed upon SLP entrance to room, lunch lukasz at bedside. Pt requires MAX verbal and tactile cueing to wake to SLP. Pt declining SLP attempts " to reposition pt ion bed or PO trials despite SLP encouragement 2/2 increased lethargy.  Patient requires MAX A to wake and attend to SLP education for up to 15 seconds, then easily falls back to sleep. Pt explaining he is not hungry and SLP attempts to educate pt on safe swallow precautions and ongoing dysphagia therapy.Patient not able to sustain attention and easily falls back to sleep. No further questions. SLP discontinues attempts to initiate lunch with patient and SLP explains to patient she will re-attempt later service day.   Patient off the floor for HD upon second attempt later service day and SLP not able to observe pt with PO trials this service day.     Assessment:  Donta Cline is a 85 y.o. male with a medical diagnosis of Pneumonia and presents with oropharyngeal dysphagia. He presents with increased lethargy and decreased PO acceptance with ST today. ST to continue to follow.     Discharge recommendations: Discharge Facility/Level Of Care Needs: nursing facility, skilled     Goals:    SLP Goals        Problem: SLP Goal    Goal Priority Disciplines Outcome   SLP Goal     SLP Ongoing (interventions implemented as appropriate)   Description:  Speech Language Pathology Goals  Goals expected to be met by 11/27/17  1. Pt will tolerate dental soft diet without overt S/S aspiration, Supervision  2. Pt will tolerate nectar-thickened liquids without overt S/S aspiration, Supervision  3. Pt will complete dysphagia exercises x10 ea. , MIN A, to improve BOT and laryngeal elevation  4. Educate pt and family on S/S aspiration and aspiration precautions     Goals expected to be met by 11/24/17  1. Pt will tolerate puree diet without overt S/S aspiration, Supervision- met   2. Pt will tolerate nectar-thickened liquids without overt S/S aspiration, Supervision -ongoing  3. Pt will complete dysphagia exercises x10 ea. , MIN A, to improve BOT and laryngeal elevation -ongoing  4. Educate pt and family on S/S  aspiration and aspiration precautions -ongoing                            Plan:   Patient to be seen Therapy Frequency: 5 x/week   Plan of Care expires: 12/17/17  Plan of Care reviewed with: patient  SLP Follow-up?: Yes           LYNDSAY Fields, [facility]-SLP  Speech-Language Pathology  Pager: [phone]  11/27/2017

## 2017-11-28 NOTE — PLAN OF CARE
Problem: Patient Care Overview  Goal: Plan of Care Review  Outcome: Ongoing (interventions implemented as appropriate)   Pt remains free from falls/injuries. Orientation waxed and waned this shift. Soft wrist restraints used in beginning of shift for about 2 hrs due to confusion and aggressiveness after returning from dialysis. Pt removing NC and was desating into 80's. Removed when pt was not showing any more signs of need. Blood glucose monitored AC/HS. All liquids nectar thick. On CPAP for a couple of hours but put back on NC due to pt request. SPO2 closely monitored and was >92% on NC. Frequent weight shifting provided for pt. SCD's worn. Bed alarm for safety. WCTM.

## 2017-11-28 NOTE — ASSESSMENT & PLAN NOTE
Per family pt with hx of yeast in urine - has old urinary stent in place.  Respiratory cx from 11/24 and 11/25 growing yeast .   - ID consult given concern for disseminated yeast infection given positive culture from multiple sites namely urine, respiratory - also now with new onset / worsening dysphagia. ID also consulted for  abx management of presumed HAP.  Appreciate recs - will dc abx and monitor   - f/u blood cultures / fungal cultures   - cont diflucan for now 200 qd x 10 days - renal dosing

## 2017-11-28 NOTE — PROGRESS NOTES
"[facility] Hospital Medicine  Progress Note    Patient Name: Donta Cline  MRN: [id number]  Patient Class: IP- Inpatient   Admission Date: 11/14/2017  Length of Stay: 12 days  Attending Physician: Abby Pisano MD  Primary Care Provider: ZAID Richardson Iii, MD    [facility] Medicine Team: [facility] MED L Abby Pisano MD    Subjective:     Principal Problem:Pneumonia    HPI:  Donta Cline is a 85 y.o. male who presents with PMHx of ESRD with dialysis MWF, HFpEF, NSTEMI, bladder/prostate cancer, DM II, aortic stenosis and CAD for evaluation of back pain s/p lumbar fusion/laminectomy (10/20/17). No family at bedside. Difficult to obtain HPI as speech garbled, however patient endorses progressive lower extremity weakness with difficulty ambulating for the past 4-5 days. His back pain is without radiation. Patient is now unable to ambulate independently. Denies trauma and urinary bowel/bladder incontinence, fever.    Per EDMD:  "The daughter brought patient to ED due to concern of his severe back pain and inability to perform activities as he was doing previously. She had discussed with Dr. Saul, and he recommended the patient come to ED and obtain imaging for spine."    Imaging:    Ct Lumbar Spine Without Contrast    Result Date: 11/14/2017  Comparison: MRI 10/12/17. Findings: Routine CT lumbar spine protocol performed without IV contrast. Prior L2-L4 instrumented lumbar fusion with interbody disc spacer at L3-4. Decompressive posterior laminectomies at from L2-3 through L5-S1. No evidence of hardware failure. No fracture identified, noting the inferior endplate of L4 is indistinct. Infection or postsurgical fluid collection not excluded, noting limited evaluation without IV contrast and artifact from adjacent pedicle screws. There is partial fusion of the right L5-S1 facet. The SI joints are unremarkable. There is extensive calcified atherosclerotic disease. Atrophic kidneys. Partially " imaged right renal stent noted. No hydronephrosis. Small renal lesions, too small to characterize without IV contrast.      Mri Lumbar Spine Without Contrast    Result Date: 11/14/2017  MRI LUMBAR SPINE TECHNIQUE: MRI lumbar spine was performed without contrast. There is an ill-defined heterogeneous collection encircling L1 spinous process demonstrating fluid levels on the right. This may represents a seroma or hematoma postoperatively however abscess cannot be excluded. This collection is best seen on series 11 image 4.    Hospital Course:  In discussion with daughter who is at bedside today (11/15) patient was discharged after surgical procedure to an inpatient rehab and he reports that he was doing well at the rehab but reached a plateau in his activity/functional level.  She states patient was walking 50 feet with minimal assist, able to toilet with minimal assistance.  Patient was transferred to SNF and since transfer, last Friday (11/10) patient requiring max assistance to stand and reported inability to bear weight secondary to pain, and since this time patient has been bed bound.     She also notes that patient with poor appetite as speech therapy has recommended puree nectar thick diet with Nepro shakes, reports that pt is losing weight and some increased abdominal girth.     Overnight patient admitted due to concerns of worsening pain and inability to ambulate, patient underwent MRI and CT imaging of the lumbar spine.  He has intact fusion, no spinal canal stenosis or cord compression noted, concern for a fluid collection encircling L1 spinous process, post op L2-L4 fusion changes and s/p laminectomy L3-L4.  Discussed imaging findings with attending neurosurgeon Dr. Sands who performed pts operation and he reported that fluid collection appears outside of range where instrumentation was.  Patient with no reported falls, workup today reveals elevated CRP >150, ESR 92, has as supratherapeutic INR  3.8    Informed plan will be to take patient to OR for washout and exploration to evaluate for hematoma vs infection.     11/16 - Patient without acute events overnight, patient states he and his daughter are going to [place] today.  Daughter has provided consent for surgery and blood products, patient is receiving serial doses of FFP with intermittent INR checks with plan for surgery this afternoon if INR <1.4.  Daughter reports that patient w/o complaints of pain when lying in bed, but if he is moved/turned causes exacerbation of his pain.     Patient taken for operative intervention and in discussion with neurosurgeon Dr. Sands, he reports that patient with well healed wound and surgical fusion was intact, when cut into the dura and in subdural region there was area of hematoma that was evacuated (full op report pending in EMR)  Cultures sent to rule out infection but clinical impression was not an appearance of an infected operative site.  Dr. Sands noted that degree of patient's pain incongruent with surgical findings.     Post-oepratively in the morning patient coverted to afib with RVR and this morning with borderline BP, his Hgb downtrending since admission and was 6.7 post-op.  Patient received intermittent fluid boluses early AM of 11/17 but also with intake of 3+L prior to surgery due to need for high amount of FFP (6units).  Patient will receive 1 unit PRBC during Hemodialysis today, giving midodrine as well.     11/18 - Overnight initiated CPAP for patient due to concerns for pulmonary edema, blood gas with some hypercapnia but ph 7.3,  Discussed with Nephrology patient to receive UF today, and will give another 1unit PRBC due to hgb 6.8-7.0.  Pt reports slept well with cpap, still has back pain with movement and on examination.  Culture data from surgery remains negative - neurosurgery has ordered cefazolin, will renally dose this.     11/19 - Patient seen this AM, awake, alert, reporting still having  back pain and reporting frequent cough today, coughing during interview.  S/p UF by nephrology yesterday and negative -1.4L total for the day and s/p 2nd unit PRBC and hgb is stable at 8.6, remains in afib and blood pressures are stable.      11/20 _Patient with ongoing back pain, neurosurgery removed 2 surgical drains,  Hemoglobin remains  Stable  W/o requiring  Transfusion.  He remains in Afib rhythmwise and is  Planned for dialysis.  PT worked with pt and SLP eval pt upgraded to Dental soft nectar thick liquids.     11/20- Called by HD unit and patient 1 hour into session with AFIB with RVR and BP in 80/50s, given 300cc bolus by nephrology and asking for additional recs, HD session stopped early and 250cc bolus given and stat CBC ordered.      11/21 - Pt tolerated dialysis well. Completed 3 hours.    11/22-11/24. Pt had increasing delirium and wbc elevated thus started on ceftriaxone based on dirty UA. DDx includes aspiration PNA as pt with cough.     11/24: Called to the bedside around 4:00pm as pt had increasing oxygen requirements - sating in low 80's on 2L and complaining of SOB. Also with increasing audible rales. Vitals were significant for hypotension - with BP 80/40-50's with HR in the 80's. RR in the high 20-30's. Exam significant for diffuse rhonchi bilaterally.      Pt given 250 cc bolus and oxygen increased to 5L. Pt deep suctioned.  Vitals improved after bolus to 100/50's , HR 80's, RR improved, O2 sats improved to high %. Hypotension and hypoxia likely  sepsis secondary PNA  given increasing oxygen requirement, notable cough in recent days, and leukocytosis. Abx broadened to vanc and cefepime for presumed hospital acquired PNA. DDx includes aspiration PNA as pt has had delirium in recent days.      11/25: Started on Lyrica for back pain,. Reports new onset difficulty swallowing.     11/26: ID consulted. Recommending DC vanc/cefepime. Continue fluconazole.    Interval History:  No acute events  overnight. Pt's delirium is worse today. Denies pain.     Review of Systems   Constitutional: Positive for appetite change. Negative for chills, diaphoresis, fatigue and fever.   HENT: Negative for rhinorrhea, sinus pain, sinus pressure, sneezing and sore throat.    Eyes: Negative for visual disturbance.   Respiratory: Negative for cough, choking, chest tightness and shortness of breath.    Cardiovascular: Negative for chest pain, palpitations and leg swelling.   Gastrointestinal: Positive for constipation. Negative for diarrhea and nausea.        Denies bowel incontinence   Genitourinary: Negative for dysuria.   Musculoskeletal: Positive for back pain and gait problem. Negative for joint swelling, myalgias and neck pain.   Neurological: Negative for dizziness, facial asymmetry, light-headedness and headaches.   Psychiatric/Behavioral: Negative for agitation and behavioral problems.     Objective:     Vital Signs (Most Recent):  Temp: 98.8 °F (37.1 °C) (11/27/17 2314)  Pulse: 105 (11/27/17 2314)  Resp: 18 (11/27/17 2314)  BP: (!) 133/59 (11/27/17 2314)  SpO2: 97 % (11/27/17 2314) Vital Signs (24h Range):  Temp:  [96 °F (35.6 °C)-98.8 °F (37.1 °C)] 98.8 °F (37.1 °C)  Pulse:  [] 105  Resp:  [16-22] 18  SpO2:  [74 %-100 %] 97 %  BP: ()/(53-97) 133/59     Weight: 78.7 kg (173 lb 8 oz)  Body mass index is 31.73 kg/m².    Intake/Output Summary (Last 24 hours) at 11/27/17 9826  Last data filed at 11/27/17 1730   Gross per 24 hour   Intake              624 ml   Output             1193 ml   Net             -569 ml      Physical Exam   Constitutional: He appears well-developed.   Awake/alert   HENT:   Mouth/Throat: Oropharynx is clear and moist.   Eyes: Conjunctivae are normal. No scleral icterus.   Cardiovascular: Normal heart sounds and intact distal pulses.    No murmur heard.  Irregular rhythm, tachycardic   Pulmonary/Chest: Effort normal. No respiratory distress. He has no wheezes. He has rales.   Bibasilar  crackles present   Abdominal: Soft. Bowel sounds are normal.   Mild distension, soft, tympanic to percussion, no fluid wave noted, no bulging flanks, or abdominal collateral vessels   Musculoskeletal:   RUE forearm AV fistula with palpable thrill.     Surgical drains removed, dressing with sanguinous drainage   Lymphadenopathy:     He has no cervical adenopathy.   Neurological:   Alert, oriented to self, hospital, year    Skin: Skin is warm. There is pallor.       Significant Labs:   CBC:     Recent Labs  Lab 11/26/17 0640 11/27/17 0926   WBC 12.85* 14.96*   HGB 8.6* 8.5*   HCT 28.3* 27.8*    266     CMP:     Recent Labs  Lab 11/26/17 0640 11/27/17  0458    137   K 4.3 5.1    102   CO2 26 19*   * 76   BUN 43* 52*   CREATININE 4.5* 5.1*   CALCIUM 10.2 10.1   PROT 6.2 6.7   ALBUMIN 2.5* 2.6*   BILITOT 0.4 0.4   ALKPHOS 176* 196*   AST 99* 102*   ALT 18 20   ANIONGAP 9 16   EGFRNONAA 11.1* 9.5*     Microbiology Results (last 7 days)     Procedure Component Value Units Date/Time    Culture, Respiratory with Gram Stain [[id number]] Collected:  11/25/17 1420    Order Status:  Completed Specimen:  Respiratory from Sputum, Induced Updated:  11/27/17 1215     Respiratory Culture --     CANDIDA ALBICANS  Moderate       Gram Stain (Respiratory) >10 epithelial cells per low power field     Gram Stain (Respiratory) Many WBC's     Gram Stain (Respiratory) Moderate yeast     Gram Stain (Respiratory) Rare Gram positive rods    Blood culture [[id number]] Collected:  11/23/17 0909    Order Status:  Completed Specimen:  Blood Updated:  11/27/17 1212     Blood Culture, Routine No Growth to date     Blood Culture, Routine No Growth to date     Blood Culture, Routine No Growth to date     Blood Culture, Routine No Growth to date     Blood Culture, Routine No Growth to date    Blood culture [[id number]] Collected:  11/23/17 0909    Order Status:  Completed Specimen:  Blood Updated:  11/27/17 1212     Blood  Culture, Routine No Growth to date     Blood Culture, Routine No Growth to date     Blood Culture, Routine No Growth to date     Blood Culture, Routine No Growth to date     Blood Culture, Routine No Growth to date    Culture, Respiratory with Gram Stain [[id number]] Collected:  11/24/17 1530    Order Status:  Completed Specimen:  Respiratory from Sputum, Expectorated Updated:  11/27/17 1158     Respiratory Culture --     CANDIDA ALBICANS  Few       Gram Stain (Respiratory) <10 epithelial cells per low power field.     Gram Stain (Respiratory) Few WBC's     Gram Stain (Respiratory) Few Gram positive cocci     Gram Stain (Respiratory) Rare yeast     Gram Stain (Respiratory) Rare Gram positive rods    Blood culture [[id number]] Collected:  11/25/17 2222    Order Status:  Completed Specimen:  Blood Updated:  11/27/17 0613     Blood Culture, Routine No Growth to date     Blood Culture, Routine No Growth to date    Fungus Culture, Blood or Bone Marrow [[id number]] Collected:  11/25/17 2222    Order Status:  Sent Specimen:  Blood from Blood Updated:  11/25/17 2239    Urine culture [[id number]] Collected:  11/22/17 1555    Order Status:  Completed Specimen:  Urine from Urine Updated:  11/24/17 1301     Urine Culture, Routine --     CANDIDA ALBICANS  > 100,000 cfu/ml  Treatment of asymptomatic candiduria is not recommended (except for   specific populations). Candida isolated in the urine typically   represents colonization. If an indwelling urinary catheter is present  it should be removed or replaced.      Narrative:       Urine sample sent out for urinalysis. 11/23  ADD ON PER DR ALVARADO ORDER [id number] URINALYSIS @0902 11/23/17    Culture, Anaerobic [[id number]] Collected:  11/16/17 2218    Order Status:  Completed Specimen:  Wound from Back Updated:  11/24/17 0749     Anaerobic Culture No anaerobes isolated    Narrative:       2.deep lumbar spine    Culture, Anaerobic [[id number]] Collected:  11/16/17 2218    Order Status:   Completed Specimen:  Wound from Back Updated:  11/24/17 0749     Anaerobic Culture No anaerobes isolated    Narrative:       1.superficial lumbar spine            Significant Imaging: I have reviewed all pertinent imaging results/findings within the past 24 hours.    Assessment/Plan:      Candidiasis    Per family pt with hx of yeast in urine - has old urinary stent in place.  Respiratory cx from 11/24 and 11/25 growing yeast .   - ID consult given concern for disseminated yeast infection given positive culture from multiple sites namely urine, respiratory - also now with new onset / worsening dysphagia. ID also consulted for  abx management of presumed HAP.  Appreciate recs - will dc abx and monitor   - f/u blood cultures / fungal cultures   - cont diflucan for now 200 qd x 10 days - renal dosing            Abnormal US (ultrasound) of abdomen    - RUQ US given elevated LFT's - showed hypodense area - no evidence of infection  - will need ct triple phase vs. Repeat US at later date          Leukocytosis    Will trend cbc   Recent infectious workup negative   Will repeat cxs if wbc continues to rise vs possibly re-image back; wound appears clean            Delirium     AO x 2-3 throughout stay - Seems to be pts baseline over the last 1-2 months since prolonged hospitalizations. Per family was driving self to dialysis prior - 2 months ago. Worse today - AO x 1.   - Opiates weaned to tramadol 50 q8 prn  - decrease lyrica to 50   - monitor for signs of infection given leukocytosis ; recent infectious workup negative - only consistent with candidiasis            Acute blood loss anemia    -presumed secondary to supratherapeutic INR and occurred post-operatively  -s/p 2units PRBC earlier in hospital course   -stable at ~ 9.0    -trend CBC and Coags, transfuse if <7, will have to monitor hemodynamic/respiratory status and weigh risk benefit of further blood product transfusion          Hematoma    As above  -trend CBC         L-S radiculopathy              Supratherapeutic INR    --Hold coumadin/anti-platelet  -s/p vitamin K 2.5mg 11/16, and 6units FFP  -monitor inr           Status post lumbar spinal fusion    -neurosurgery consultation as above          Pyuria    - in setting of ESRD and oliguria  - urine culture on admit shows many organisms - none in predominance  - urine culture repeated given worsening delirium ; growing yeast - likely nonspecific         Acute bilateral low back pain, post-lumbar spinal fusion    - Neurosurgery took pt for washout and evacuation of hematoma on 11/16  - Hgb dropped during admission admit - s/p 2unit pRBC, Hgb now grossly stable  - Drains removed and not on abx-  cultures remain NGTD from surgery.    - Supportive care, PT/OT  - Given worsened delirium - stopped fentanyl patches and norco prn  - Pain control with scheduled tylenol 650 q8hrs;  tramadol 50 qhs for breakthrough ; will cont pregabalin to 75mg Qd for now ; also add flexeril as muscle relaxants apparently have worked in the past   - component of pain likely severe right hip osteoarthritis - will cont lidocaine patches   - Mobility remains poor and strength in hip flexors/proximally still poor. 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 Unclear if this is pts new baseline.         Paroxysmal atrial fibrillation    -HR stable today but remains in Afib rhythm wise  -continue metoprolol 25 TID  -continue scheduled midodrine TID for now as pt tolerted dialysis well with this; discuss with nephrology - may only be needed on dialysis days         Severe aortic stenosis    - no acute intervention at this time.   - Will cont to monitor         ESRD (end stage renal disease) on dialysis    - nephrology consulted for volume management.   - Tolerating HD with acceptable HR   - Will cont to monitor         Type 2 diabetes mellitus with chronic kidney disease on chronic dialysis, with long-term current use of insulin    - cont levamir  8 units  - may need to add low dose premeal as post prandial glucoses are in the 200's; Am glucose at goal         Coronary artery disease involving native coronary artery of native heart without angina pectoris    - no CP, SOB, anginal equivalents  - continue asa, statin  - EKG without ischemic changes        Chronic diastolic heart failure    -last echo 10/2017 with pulmonary HTN and undetermined diastolic function, EF 55  -continue metoprolol 25 TID   -HD is main source of volume removal            VTE Risk Mitigation         Ordered     Medium Risk of VTE  Once      11/17/17 0011     Place sequential compression device  Until discontinued      11/14/17 1907     Place BRAIN hose  Until discontinued      11/14/17 1907              Abby Pisano MD  Department of Hospital Medicine   [facility]

## 2017-11-28 NOTE — PLAN OF CARE
Problem: SLP Goal  Goal: SLP Goal  Speech Language Pathology Goals  Goals expected to be met by 11/27/17  1. Pt will tolerate dental soft diet without overt S/S aspiration, Supervision  2. Pt will tolerate nectar-thickened liquids without overt S/S aspiration, Supervision  3. Pt will complete dysphagia exercises x10 ea. , MIN A, to improve BOT and laryngeal elevation  4. Educate pt and family on S/S aspiration and aspiration precautions     Goals expected to be met by 11/24/17  1. Pt will tolerate puree diet without overt S/S aspiration, Supervision- met   2. Pt will tolerate nectar-thickened liquids without overt S/S aspiration, Supervision -ongoing  3. Pt will complete dysphagia exercises x10 ea. , MIN A, to improve BOT and laryngeal elevation -ongoing  4. Educate pt and family on S/S aspiration and aspiration precautions -ongoing          Outcome: Ongoing (interventions implemented as appropriate)  Pt with refusal of PO items during therapy today. SLP re-attempts later service day; however, Patient ONOFRE for HD. Please see note for full details. ST to continue to follow. Thank you.    YUMIKO Fields., [facility]SLP  Speech-Language Pathology  Pager: [phone]  11/27/2017

## 2017-11-28 NOTE — ASSESSMENT & PLAN NOTE
Will trend cbc   Recent infectious workup negative   Will repeat cxs if wbc continues to rise vs possibly re-image back; wound appears clean

## 2017-11-28 NOTE — ASSESSMENT & PLAN NOTE
AO x 2-3 throughout stay - Seems to be pts baseline over the last 1-2 months since prolonged hospitalizations. Per family was driving self to dialysis prior - 2 months ago. Worse today - AO x 1.   - Opiates weaned to tramadol 50 q8 prn  - decrease lyrica to 50   - monitor for signs of infection given leukocytosis ; recent infectious workup negative - only consistent with candidiasis

## 2017-11-28 NOTE — PT/OT/SLP PROGRESS
"Physical Therapy  Treatment    Donta Cline   MRN: [id number]   Admitting Diagnosis: Pneumonia    PT Received On: 11/28/17  PT Start Time: 0745     PT Stop Time: 0809    PT Total Time (min): 24 min       Billable Minutes:  Therapeutic Activity 24    Treatment Type: Treatment  PT/PTA: PT     PTA Visit Number: 1       General Precautions: Standard, fall, aspiration, respiratory, nectar thick  Orthopedic Precautions: N/A   Braces: TLSO    Subjective:  Communicated with RN prior to session.  Patient agreeable to PT session. More alert this session upon room entry and responding appropriately to questions asked.    However, patient AxOx3 unable to get year correct despite verbal cues stating "2014".    Crackles noted with patient exhale. Educated on deep breathing and coughing techniques seated EOB to mobilize secretions.    Pain/Comfort  Pain Rating 1:  (R anterior thigh pain not reported with VAS)    Objective:   Patient found with: telemetry, oxygen    Functional Mobility:  Bed Mobility:   Rolling/Turning to Left: Minimum assistance  Rolling/Turning Right: Minimum assistance  Scooting/Bridging: Stand by Assistance (supine scoot)  Supine to Sit: Moderate Assistance  Sit to Supine: Maximum Assistance    Transfers:  Sit <> Stand Assistance: Activity did not occur    Gait:   Gait Distance: Unable to perform    Balance:   Static Sit: FAIR-: Maintains without assist but inconsistent   Dynamic Sit: FAIR: Cannot move trunk without losing balance     Therapeutic Activities and Exercises:  Patient educated on:   - role of PT/POC   - safety with all functional mobility   - importance of participation with therapy services   - bed mobility training   - supine LE therex    Verbalized understanding of all education provided. Needs reinforcement.    Treatment session with emphasis on improving bed mobility with decreased assistance.    Patient attempted to urinate in urinal in L-sidelying position; however, unable.    Brace donned " dependently EOB    Attempted LE therex seated EOB; however, patient with minimal palpable contraction with knee extension on BLE     After sitting EOB patient requesting to return to supine and began urinating. RN contacted to assist with bed mobility and pericare.     Daughter reports assisting patient with BLE ROM.    AM-PAC 6 CLICK MOBILITY  How much help from another person does this patient currently need?   1 = Unable, Total/Dependent Assistance  2 = A lot, Maximum/Moderate Assistance  3 = A little, Minimum/Contact Guard/Supervision  4 = None, Modified [place]/Independent    Turning over in bed (including adjusting bedclothes, sheets and blankets)?: 3  Sitting down on and standing up from a chair with arms (e.g., wheelchair, bedside commode, etc.): 1  Moving from lying on back to sitting on the side of the bed?: 2  Moving to and from a bed to a chair (including a wheelchair)?: 1  Need to walk in hospital room?: 1  Climbing 3-5 steps with a railing?: 1  Total Score: 9    AM-PAC Raw Score CMS G-Code Modifier Level of Impairment Assistance   6 % Total / Unable   7 - 9 CM 80 - 100% Maximal Assist   10 - 14 CL 60 - 80% Moderate Assist   15 - 19 CK 40 - 60% Moderate Assist   20 - 22 CJ 20 - 40% Minimal Assist   23 CI 1-20% SBA / CGA   24 CH 0% Independent/ Mod I     Patient left supine with all lines intact and RN/daughter present.  Assessment:  Donta Cline is a 85 y.o. male with a medical diagnosis of Pneumonia and presents with rehab identified impairments listed below. Improved bed mobility requiring decreased assistance. Poor sitting balance and tolerance to EOB with CGA this session requiring instances of Mod Assist with BUE on bedrail to remain in midline, upright position. Minimal palpable quad contraction with seated LE therex. Good tolerance to PROM BLE. Rolling with Min Assist and SBA with cues for supine scoot. Max Assist to return to supine position safely. Unsafe to attempt sit<>stand  or stand pivot transfer secondary to minimal palpable LE quad contraction this session. To benefit from continued skilled intervention to address deficits.    Rehab identified problem list/impairments: Rehab identified problem list/impairments: weakness, impaired endurance, impaired self care skills, impaired functional mobilty, gait instability, impaired balance, decreased lower extremity function, impaired cognition, decreased coordination, pain, decreased ROM, impaired cardiopulmonary response to activity, orthopedic precautions, impaired skin    Rehab potential is fair.    Activity tolerance: Poor    Discharge recommendations: Discharge Facility/Level Of Care Needs:  (SNF)     Barriers to discharge: Barriers to Discharge: Decreased caregiver support (requiring significant increased assisted at this time)    Equipment recommendations: Equipment Needed After Discharge: wheelchair, hospital bed, lift device     GOALS:    Physical Therapy Goals        Problem: Physical Therapy Goal    Goal Priority Disciplines Outcome Goal Variances Interventions   Physical Therapy Goal     PT/OT, PT Ongoing (interventions implemented as appropriate)     Description:  Goals to be met by: 2017     Patient will increase functional independence with mobility by performin. Supine to sit with MInimal Assistance  2. Sit to supine with MInimal Assistance  3. Sit to stand transfer with Moderate Assistance  4. Bed to chair transfer with Moderate Assistance  5. Gait  x 10 feet with Maximum Assistance using Rolling Walker.   6. Lower extremity exercise program x15 reps per handout, with assistance as needed                        PLAN:    Patient to be seen 3 x/week  to address the above listed problems via gait training, therapeutic activities, therapeutic exercises, neuromuscular re-education  Plan of Care expires: 12/15/17  Plan of Care reviewed with: daughter, patient         Edgar GIBSON Kyle ANDREW, PT  2017

## 2017-11-28 NOTE — PT/OT/SLP PROGRESS
"Speech Language Pathology  Treatment    Donta Cline   MRN: [id number]   Admitting Diagnosis: Pneumonia    Diet recommendations: Solid Diet Level: Dental Soft  Liquid Diet Level: Nectar Thick   Please feed only when awake/alert, No straws, Small bites/sips, 1 bite/sip at a time, Check for pocketing/oral residue, Meds crushed in puree, Eliminate distractions, Assistance with meals and Assistance with thickening liquids, Encourage double swallows per bolus  · To achieve nectar thick liquid: 4 oz of any liquid to 1 pack of ThickenUp Clear or 6 oz of any liquid to 1 pack of ThickenUp  · No ice cream, jello, or ice.  · Avoid mixed consistencies (soup, cereal with milk, juicy fruits).  · Continue to monitor for signs and symptoms of aspiration and discontinue oral feeding should you notice any of the following: watery eyes, reddened facial area, wet vocal quality, increased work of breathing, change in respiratory status, increased congestion, coughing, fever, etc.    SLP Treatment Date: 11/28/17  Speech Start Time: 1158     Speech Stop Time: 1215     Speech Total (min): 17 min       TREATMENT BILLABLE MINUTES:  [facility]/Home Management Training 17    Has the patient been evaluated by SLP for swallowing? : Yes  Keep patient NPO?: No   General Precautions: Standard, aspiration, fall, respiratory, nectar thick  Current Respiratory Status: nasal cannula       Subjective:  SLP reviewed Pt with nurse, nurse in room with Pt to review and help attempt to reposition  Pt presents fatigued, agitated  Pt explains, "I can;t eat, I can't get comfortable"  Pt with increased R back pain as session progresses     Pain/Comfort  Pain Rating 1: 6/10  Location - Side 1: Right  Location - Orientation 1: lower  Location 1: back  Pain Addressed 1: Reposition, Distraction, Cessation of Activity, Nurse notified  Pain Rating Post-Intervention 1: 6/10    Objective:   Patient found with: telemetry, oxygen. Pt found awake in be. SLP notes lunch tray " at bedside. Pt recalls 1 safe swallow strategy, and reports increased frustration as SLP sets-up lunch meal tray and attempts to thicken liquids on lunch meal tray to nectar-thickened liquids. SLP educates pt on aspiration precautions, safe swallow strategies and ongoing SLP POC. Pt not able to verbalize or demonstrate understanding    Patient moving about in bed, with increased agitation and increased c/o pain. SLP attempts to reposition pt in bed and elevate bed to assist pt with lunch tray meal. Nurse in room to assist SLP in repositioning Pt in bed. Nurse leaves room to retrieve glucometer while Pt lowers HOB, explaining he is in too much pain to eat. Pt attempting to turn on side and lower bed across SLP attempts to initiate dysphagia exercises or PO trials. Pt declining lunch tray items. Nurse in room and v/u. SLP discontinues session. No further questions. Whiteboard current.  SLP to re-attempt later service day should schedule permit.    Assessment:  Donta Cline is a 85 y.o. male with a medical diagnosis of Pneumonia and presents with Dysphagia. Pt with increased agitation and increased pain today, declines lunch tray items with SLP.  ST to continue to follow.     Discharge recommendations: Discharge Facility/Level Of Care Needs: nursing facility, skilled     Goals:    SLP Goals        Problem: SLP Goal    Goal Priority Disciplines Outcome   SLP Goal     SLP Ongoing (interventions implemented as appropriate)   Description:  Speech Language Pathology Goals  Goals to be met by 12/04/17  1. Pt will tolerate dental soft diet without overt S/S aspiration, Supervision  2. Pt will tolerate nectar-thickened liquids without overt S/S aspiration, Supervision  3. Pt will complete dysphagia exercises x10n   4. Educate pt and family on S/S aspiration and aspiration precautions -ongoing     Goals expected to be met by 11/27/17  1. Pt will tolerate dental soft diet without overt S/S aspiration, Supervision =ongoing    2. Pt will tolerate nectar-thickened liquids without overt S/S aspiration, Supervision- ongoing  3. Pt will complete dysphagia exercises x10n -ongoing  4. Educate pt and family on S/S aspiration and aspiration precautions -ongoing                             Plan:   Patient to be seen Therapy Frequency: 5 x/week   Plan of Care expires: 12/17/17  Plan of Care reviewed with: patient  SLP Follow-up?: Yes             LYNDSAY Fields, [facility]-SLP  Speech-Language Pathology  Pager: [phone]  11/28/2017

## 2017-11-28 NOTE — PLAN OF CARE
Problem: SLP Goal  Goal: SLP Goal  Speech Language Pathology Goals  Goals to be met by 12/04/17  1. Pt will tolerate dental soft diet without overt S/S aspiration, Supervision  2. Pt will tolerate nectar-thickened liquids without overt S/S aspiration, Supervision  3. Pt will complete dysphagia exercises x10n   4. Educate pt and family on S/S aspiration and aspiration precautions -ongoing     Goals expected to be met by 11/27/17  1. Pt will tolerate dental soft diet without overt S/S aspiration, Supervision =ongoing   2. Pt will tolerate nectar-thickened liquids without overt S/S aspiration, Supervision- ongoing  3. Pt will complete dysphagia exercises x10n -ongoing  4. Educate pt and family on S/S aspiration and aspiration precautions -ongoing           Outcome: Ongoing (interventions implemented as appropriate)  SLP attempts to see Pt with lunch tray, patient easily distracted and moving about in pain. Declines PO intake. Nurse in room with Pt. ST to continue to follow. Thank you.    YUMIKO Fields., [facility]-SLP  Speech-Language Pathology  Pager: [phone]  11/28/2017

## 2017-11-28 NOTE — PLAN OF CARE
11/28/17 0823   Right Care Assessment   Can the patient answer the patient profile reliably? Yes, cognitively intact;No, cognitively impaired  (still with confusion but CM spoke with daughter, Wendy Cisneros, at the bs. She wants to speak w Dr NORRIS about pt's liver.)   How often would a person be available to care for the patient? Whenever needed   Describe the patient's ability to walk at the present time. Major restrictions/daily assistance from another person   How does the patient rate their overall health at the present time? Poor   Number of comorbid conditions (as recorded on the chart) Five or more   During the past month, has the patient often been bothered by feeling down, depressed or hopeless? No   Have you felt down, depressed, or hopeless? 0   During the past month, has the patient often been bothered by little interest or pleasure in doing things? No   dc plan : thib snf when med stable. Wbc 16.7

## 2017-11-28 NOTE — PLAN OF CARE
Per Dr. Pisano in [facility]): pt not medically stable for discharge but plan is to return back to [facility] when medically stable and infection cleared.     11/27/17 1421   Right Care Assessment   Can the patient answer the patient profile reliably? Yes, cognitively intact;No, cognitively impaired  (slightly confused but follows commands well.)   How often would a person be available to care for the patient? Whenever needed   Describe the patient's ability to walk at the present time. Major restrictions/daily assistance from another person   How does the patient rate their overall health at the present time? Poor   Number of comorbid conditions (as recorded on the chart) Five or more   During the past month, has the patient often been bothered by feeling down, depressed or hopeless? No   Have you felt down, depressed, or hopeless? 0   During the past month, has the patient often been bothered by little interest or pleasure in doing things? No

## 2017-11-29 PROBLEM — R82.81 PYURIA: Status: RESOLVED | Noted: 2017-01-01 | Resolved: 2017-01-01

## 2017-11-29 PROBLEM — T14.8XXA HEMATOMA: Status: RESOLVED | Noted: 2017-01-01 | Resolved: 2017-01-01

## 2017-11-29 NOTE — PLAN OF CARE
Problem: Patient Care Overview  Goal: Plan of Care Review  Outcome: Ongoing (interventions implemented as appropriate)  Pt awake, alert, oriented to person and situation at times, disoriented to time and place. AVSS. Pt has trouble swallowing, able to take pills freely at lunch, with more difficulty after lunch - pills were crushed in pudding. Pt able to feed himself with assistance. Pt became more disoriented later in the afternoon, having delusions about people outside the window, asking to leave and get his truck. Pt was reoriented as needed, and pt spoke with daughter on phone who assured him truck was safe. Pt safety maintained. Hourly rounding performed.

## 2017-11-29 NOTE — PLAN OF CARE
Problem: Occupational Therapy Goal  Goal: Occupational Therapy Goal  Goals to be met by 12/10/2017:     Patient will increase functional independence with ADLs by performing:    Bed mobility with demo understanding for spinal precautions with min(A).  Stand Pivot transfer to multiple surfaces (chair, wheelchair, bedside commode) with Moderate Assistance.   Donning LSO with Minimal Assistance while seated EOB.  Donning socks with sock aid with minimal assistance.   Functional dynamic sitting task ~8 min duration while seated EOB with CGA for postural control.   UE endurance HEP with set up and assistance as needed.   [place] on eob with CG A for balance  BSC transfer with max a      Outcome: Ongoing (interventions implemented as appropriate)  Goals remain appropriate. Cont POC.    PIYUSH Conway  11/29/2017  Pager: [phone]

## 2017-11-29 NOTE — PLAN OF CARE
Problem: Patient Care Overview  Goal: Plan of Care Review  Outcome: Ongoing (interventions implemented as appropriate)  Patient oriented to self and place. VSS. Patient went to dialysis this morning. Worked with occupational therapy. Patient remains free of falls. Xray done at bedside. Patient currently on 2L nasal cannula. Family at bedside.

## 2017-11-29 NOTE — ASSESSMENT & PLAN NOTE
-HR stable,remains in Afib rhythm wise  -continue metoprolol 25 TID  -continue scheduled midodrine TID for now as pt tolerted dialysis well with this; discuss with nephrology - may only be needed on dialysis days

## 2017-11-29 NOTE — NURSING
Medicine team L paged. O2 sats 78%. Switched to nonrebreather. Awaiting call back. Will continue to monitor patient.

## 2017-11-29 NOTE — PROGRESS NOTES
Patient arrived in 10 th floor bed.  AV Fistula to RFA  was then prepped and cannulated per protocol with 25 gauge needles.  Both lines aspirate and flush without resistance or infiltration.  Patient denies any pain.  Maintenance dialysis was then initiated.

## 2017-11-29 NOTE — PROGRESS NOTES
"[facility] Hospital Medicine  Progress Note    Patient Name: Donta Cline  MRN: [id number]  Patient Class: IP- Inpatient   Admission Date: 11/14/2017  Length of Stay: 13 days  Attending Physician: Gurmeet Alonso MD  Primary Care Provider: ZAID Richardson Iii, MD    [facility] Medicine Team: [facility] MED L Gurmeet Alonso MD    Subjective:     Principal Problem:Acute bilateral low back pain    HPI:  Donta Cline is a 85 y.o. male who presents with PMHx of ESRD with dialysis MWF, HFpEF, NSTEMI, bladder/prostate cancer, DM II, aortic stenosis and CAD for evaluation of back pain s/p lumbar fusion/laminectomy (10/20/17). No family at bedside. Difficult to obtain HPI as speech garbled, however patient endorses progressive lower extremity weakness with difficulty ambulating for the past 4-5 days. His back pain is without radiation. Patient is now unable to ambulate independently. Denies trauma and urinary bowel/bladder incontinence, fever.    Per EDMD:  "The daughter brought patient to ED due to concern of his severe back pain and inability to perform activities as he was doing previously. She had discussed with Dr. Saul, and he recommended the patient come to ED and obtain imaging for spine."    Imaging:    Ct Lumbar Spine Without Contrast    Result Date: 11/14/2017  Comparison: MRI 10/12/17. Findings: Routine CT lumbar spine protocol performed without IV contrast. Prior L2-L4 instrumented lumbar fusion with interbody disc spacer at L3-4. Decompressive posterior laminectomies at from L2-3 through L5-S1. No evidence of hardware failure. No fracture identified, noting the inferior endplate of L4 is indistinct. Infection or postsurgical fluid collection not excluded, noting limited evaluation without IV contrast and artifact from adjacent pedicle screws. There is partial fusion of the right L5-S1 facet. The SI joints are unremarkable. There is extensive calcified atherosclerotic disease. Atrophic " kidneys. Partially imaged right renal stent noted. No hydronephrosis. Small renal lesions, too small to characterize without IV contrast.      Mri Lumbar Spine Without Contrast    Result Date: 11/14/2017  MRI LUMBAR SPINE TECHNIQUE: MRI lumbar spine was performed without contrast. There is an ill-defined heterogeneous collection encircling L1 spinous process demonstrating fluid levels on the right. This may represents a seroma or hematoma postoperatively however abscess cannot be excluded. This collection is best seen on series 11 image 4.    Hospital Course:  In discussion with daughter who is at bedside today (11/15) patient was discharged after surgical procedure to an inpatient rehab and he reports that he was doing well at the rehab but reached a plateau in his activity/functional level.  She states patient was walking 50 feet with minimal assist, able to toilet with minimal assistance.  Patient was transferred to SNF and since transfer, last Friday (11/10) patient requiring max assistance to stand and reported inability to bear weight secondary to pain, and since this time patient has been bed bound.     She also notes that patient with poor appetite as speech therapy has recommended puree nectar thick diet with Nepro shakes, reports that pt is losing weight and some increased abdominal girth.     Overnight patient admitted due to concerns of worsening pain and inability to ambulate, patient underwent MRI and CT imaging of the lumbar spine.  He has intact fusion, no spinal canal stenosis or cord compression noted, concern for a fluid collection encircling L1 spinous process, post op L2-L4 fusion changes and s/p laminectomy L3-L4.  Discussed imaging findings with attending neurosurgeon Dr. Sands who performed pts operation and he reported that fluid collection appears outside of range where instrumentation was.  Patient with no reported falls, workup today reveals elevated CRP >150, ESR 92, has as  supratherapeutic INR 3.8    Informed plan will be to take patient to OR for washout and exploration to evaluate for hematoma vs infection.     11/16 - Patient without acute events overnight, patient states he and his daughter are going to [place] today.  Daughter has provided consent for surgery and blood products, patient is receiving serial doses of FFP with intermittent INR checks with plan for surgery this afternoon if INR <1.4.  Daughter reports that patient w/o complaints of pain when lying in bed, but if he is moved/turned causes exacerbation of his pain.     Patient taken for operative intervention and in discussion with neurosurgeon Dr. Sands, he reports that patient with well healed wound and surgical fusion was intact, when cut into the dura and in subdural region there was area of hematoma that was evacuated (full op report pending in EMR)  Cultures sent to rule out infection but clinical impression was not an appearance of an infected operative site.  Dr. Sands noted that degree of patient's pain incongruent with surgical findings.     Post-oepratively in the morning patient coverted to afib with RVR and this morning with borderline BP, his Hgb downtrending since admission and was 6.7 post-op.  Patient received intermittent fluid boluses early AM of 11/17 but also with intake of 3+L prior to surgery due to need for high amount of FFP (6units).  Patient will receive 1 unit PRBC during Hemodialysis today, giving midodrine as well.     11/18 - Overnight initiated CPAP for patient due to concerns for pulmonary edema, blood gas with some hypercapnia but ph 7.3,  Discussed with Nephrology patient to receive UF today, and will give another 1unit PRBC due to hgb 6.8-7.0.  Pt reports slept well with cpap, still has back pain with movement and on examination.  Culture data from surgery remains negative - neurosurgery has ordered cefazolin, will renally dose this.     11/19 - Patient seen this AM, awake, alert,  reporting still having back pain and reporting frequent cough today, coughing during interview.  S/p UF by nephrology yesterday and negative -1.4L total for the day and s/p 2nd unit PRBC and hgb is stable at 8.6, remains in afib and blood pressures are stable.      11/20 _Patient with ongoing back pain, neurosurgery removed 2 surgical drains,  Hemoglobin remains  Stable  W/o requiring  Transfusion.  He remains in Afib rhythmwise and is  Planned for dialysis.  PT worked with pt and SLP eval pt upgraded to Dental soft nectar thick liquids.     11/20- Called by HD unit and patient 1 hour into session with AFIB with RVR and BP in 80/50s, given 300cc bolus by nephrology and asking for additional recs, HD session stopped early and 250cc bolus given and stat CBC ordered.      11/21 - Pt tolerated dialysis well. Completed 3 hours.    11/22-11/24. Pt had increasing delirium and wbc elevated thus started on ceftriaxone based on dirty UA. DDx includes aspiration PNA as pt with cough.     11/24: Called to the bedside around 4:00pm as pt had increasing oxygen requirements - sating in low 80's on 2L and complaining of SOB. Also with increasing audible rales. Vitals were significant for hypotension - with BP 80/40-50's with HR in the 80's. RR in the high 20-30's. Exam significant for diffuse rhonchi bilaterally.      Pt given 250 cc bolus and oxygen increased to 5L. Pt deep suctioned.  Vitals improved after bolus to 100/50's , HR 80's, RR improved, O2 sats improved to high %. Hypotension and hypoxia likely  sepsis secondary PNA  given increasing oxygen requirement, notable cough in recent days, and leukocytosis. Abx broadened to vanc and cefepime for presumed hospital acquired PNA. DDx includes aspiration PNA as pt has had delirium in recent days.      11/25: Started on Lyrica for back pain,. Reports new onset difficulty swallowing. Respiratory culture positive for yeast. Started fluconazole.    11/26: ID consulted.  Recommending DC vanc/cefepime- unlikely PNA. Continue fluconazole.    11/28 - Patient seen, upset that symptoms havent improved, ready to leave the hospital.  Wants to get out of the bed.     Interval History: as above    Review of Systems   Constitutional: Positive for appetite change. Negative for chills, diaphoresis, fatigue and fever.   HENT: Negative for rhinorrhea, sinus pain, sinus pressure, sneezing and sore throat.    Eyes: Negative for visual disturbance.   Respiratory: Negative for cough, choking, chest tightness and shortness of breath.    Cardiovascular: Negative for chest pain, palpitations and leg swelling.   Gastrointestinal: Positive for constipation. Negative for diarrhea and nausea.        Denies bowel incontinence   Genitourinary: Negative for dysuria.   Musculoskeletal: Positive for back pain and gait problem. Negative for joint swelling, myalgias and neck pain.   Neurological: Negative for dizziness, facial asymmetry, light-headedness and headaches.   Psychiatric/Behavioral: Negative for agitation and behavioral problems.     Objective:     Vital Signs (Most Recent):  Temp: 98 °F (36.7 °C) (11/28/17 1955)  Pulse: 88 (11/28/17 1955)  Resp: 14 (11/28/17 1955)  BP: (!) 128/59 (11/28/17 1955)  SpO2: (!) 93 % (11/28/17 1955) Vital Signs (24h Range):  Temp:  [97.7 °F (36.5 °C)-98.8 °F (37.1 °C)] 98 °F (36.7 °C)  Pulse:  [] 88  Resp:  [14-20] 14  SpO2:  [93 %-100 %] 93 %  BP: (105-159)/(45-68) 128/59     Weight: 78.5 kg (173 lb)  Body mass index is 31.64 kg/m².  No intake or output data in the 24 hours ending 11/28/17 2223   Physical Exam   Constitutional: He appears well-developed.   Awake/alert   HENT:   Mouth/Throat: Oropharynx is clear and moist.   Eyes: Conjunctivae are normal. No scleral icterus.   Cardiovascular: Normal heart sounds and intact distal pulses.    No murmur heard.  Irregular rhythm, tachycardic   Pulmonary/Chest: Effort normal. No respiratory distress. He has no wheezes. He  has rales.   Bibasilar crackles present   Abdominal: Soft. Bowel sounds are normal.   Mild distension, soft, tympanic to percussion, no fluid wave noted, no bulging flanks, or abdominal collateral vessels   Musculoskeletal:   RUE forearm AV fistula with palpable thrill.      Lymphadenopathy:     He has no cervical adenopathy.   Neurological:   Alert, oriented to self, hospital, year    Skin: Skin is warm. There is pallor.       Significant Labs:   CBC:   Recent Labs  Lab 11/27/17  0926 11/28/17  0411   WBC 14.96* 16.60*   HGB 8.5* 8.7*   HCT 27.8* 27.8*    263     CMP:   Recent Labs  Lab 11/27/17 0458 11/28/17 0411    137   K 5.1 3.8    98   CO2 19* 26   GLU 76 130*   BUN 52* 26*   CREATININE 5.1* 3.2*   CALCIUM 10.1 9.8   PROT 6.7 6.6   ALBUMIN 2.6* 2.4*   BILITOT 0.4 0.4   ALKPHOS 196* 196*   * 83*   ALT 20 16   ANIONGAP 16 13   EGFRNONAA 9.5* 16.7*       Significant Imaging: n/a    Assessment/Plan:      * Acute bilateral low back pain, post-lumbar spinal fusion    - Neurosurgery took pt for washout and evacuation of hematoma on 11/16  - Hgb dropped during admission admit - s/p 2unit pRBC, Hgb now grossly stable  - Drains removed and not on abx-  cultures remain NGTD from surgery.    - Supportive care, PT/OT  - Given worsened delirium - stopped fentanyl patches and norco prn  - Pain control with scheduled tylenol 650 q8hrs;  tramadol 50 qhs for breakthrough ; will cont pregabalin to 75mg Qd for now ; also add flexeril as muscle relaxants apparently have worked in the past   - component of pain likely severe right hip osteoarthritis - will cont lidocaine patches   - Mobility remains poor and strength in hip flexors/proximally still poor. 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 Unclear if this is pts new baseline.         Hematoma    As above  -trend CBC        Paroxysmal atrial fibrillation    -HR stable,remains in Afib rhythm wise  -continue metoprolol 25  TID  -continue scheduled midodrine TID for now as pt tolerted dialysis well with this; discuss with nephrology - may only be needed on dialysis days         ESRD (end stage renal disease) on dialysis    - nephrology consulted for volume management.   - Tolerating HD with acceptable HR   - Will cont to monitor         Pyuria    - in setting of ESRD and oliguria  - urine culture on admit shows many organisms - none in predominance  - urine culture repeated given worsening delirium ; growing yeast - likely nonspecific         Chronic diastolic heart failure    -last echo 10/2017 with pulmonary HTN and undetermined diastolic function, EF 55  -continue metoprolol 25 TID   -HD is main source of volume removal          Status post lumbar spinal fusion    -neurosurgery consultation as above          Coronary artery disease involving native coronary artery of native heart without angina pectoris    - no CP, SOB, anginal equivalents  - continue asa, statin  - EKG without ischemic changes        Type 2 diabetes mellitus with chronic kidney disease on chronic dialysis, with long-term current use of insulin    - cont levamir 8 units  - may need to add low dose premeal as post prandial glucoses are in the 200's; Am glucose at goal         Severe aortic stenosis    - no acute intervention at this time.   - Will cont to monitor         Abnormal US (ultrasound) of abdomen    - RUQ US given elevated LFT's - showed hypodense area - no evidence of infection  - will need ct triple phase vs. Repeat US at later date          Leukocytosis    Will trend cbc   Recent infectious workup negative   Will repeat cxs if wbc continues to rise vs possibly re-image back; wound appears clean            Delirium     AO x 2-3 throughout stay - Seems to be pts baseline over the last 1-2 months since prolonged hospitalizations. Per family was driving self to dialysis prior - 2 months ago. Worse today - AO x 1.   - Opiates weaned to tramadol 50 q8 prn  -  decrease lyrica to 50   - monitor for signs of infection given leukocytosis ; recent infectious workup negative - only consistent with candidiasis            Candidiasis    Per family pt with hx of yeast in urine - has old urinary stent in place.  Respiratory cx from 11/24 and 11/25 growing yeast .   - ID consult given concern for disseminated yeast infection given positive culture from multiple sites namely urine, respiratory - also now with new onset / worsening dysphagia. ID also consulted for  abx management of presumed HAP.  Appreciate recs - will dc abx and monitor   - f/u blood cultures / fungal cultures   - cont diflucan for now 200 qd x 10 days - renal dosing            L-S radiculopathy                VTE Risk Mitigation         Ordered     Medium Risk of VTE  Once      11/17/17 0011     Place sequential compression device  Until discontinued      11/14/17 1907     Place BRAIN hose  Until discontinued      11/14/17 1907              Gurmeet Alonso MD  Department of Hospital Medicine   [facility]

## 2017-11-29 NOTE — SUBJECTIVE & OBJECTIVE
Interval History: as above    Review of Systems   Constitutional: Positive for appetite change. Negative for chills, diaphoresis, fatigue and fever.   HENT: Negative for rhinorrhea, sinus pain, sinus pressure, sneezing and sore throat.    Eyes: Negative for visual disturbance.   Respiratory: Negative for cough, choking, chest tightness and shortness of breath.    Cardiovascular: Negative for chest pain, palpitations and leg swelling.   Gastrointestinal: Positive for constipation. Negative for diarrhea and nausea.        Denies bowel incontinence   Genitourinary: Negative for dysuria.   Musculoskeletal: Positive for back pain and gait problem. Negative for joint swelling, myalgias and neck pain.   Neurological: Negative for dizziness, facial asymmetry, light-headedness and headaches.   Psychiatric/Behavioral: Negative for agitation and behavioral problems.     Objective:     Vital Signs (Most Recent):  Temp: 98 °F (36.7 °C) (11/28/17 1955)  Pulse: 88 (11/28/17 1955)  Resp: 14 (11/28/17 1955)  BP: (!) 128/59 (11/28/17 1955)  SpO2: (!) 93 % (11/28/17 1955) Vital Signs (24h Range):  Temp:  [97.7 °F (36.5 °C)-98.8 °F (37.1 °C)] 98 °F (36.7 °C)  Pulse:  [] 88  Resp:  [14-20] 14  SpO2:  [93 %-100 %] 93 %  BP: (105-159)/(45-68) 128/59     Weight: 78.5 kg (173 lb)  Body mass index is 31.64 kg/m².  No intake or output data in the 24 hours ending 11/28/17 2223   Physical Exam   Constitutional: He appears well-developed.   Awake/alert   HENT:   Mouth/Throat: Oropharynx is clear and moist.   Eyes: Conjunctivae are normal. No scleral icterus.   Cardiovascular: Normal heart sounds and intact distal pulses.    No murmur heard.  Irregular rhythm, tachycardic   Pulmonary/Chest: Effort normal. No respiratory distress. He has no wheezes. He has rales.   Bibasilar crackles present   Abdominal: Soft. Bowel sounds are normal.   Mild distension, soft, tympanic to percussion, no fluid wave noted, no bulging flanks, or abdominal  collateral vessels   Musculoskeletal:   RUE forearm AV fistula with palpable thrill.      Lymphadenopathy:     He has no cervical adenopathy.   Neurological:   Alert, oriented to self, hospital, year    Skin: Skin is warm. There is pallor.       Significant Labs:   CBC:   Recent Labs  Lab 11/27/17 0926 11/28/17 0411   WBC 14.96* 16.60*   HGB 8.5* 8.7*   HCT 27.8* 27.8*    263     CMP:   Recent Labs  Lab 11/27/17 0458 11/28/17 0411    137   K 5.1 3.8    98   CO2 19* 26   GLU 76 130*   BUN 52* 26*   CREATININE 5.1* 3.2*   CALCIUM 10.1 9.8   PROT 6.7 6.6   ALBUMIN 2.6* 2.4*   BILITOT 0.4 0.4   ALKPHOS 196* 196*   * 83*   ALT 20 16   ANIONGAP 16 13   EGFRNONAA 9.5* 16.7*       Significant Imaging: n/a

## 2017-11-29 NOTE — PLAN OF CARE
Problem: Patient Care Overview  Goal: Plan of Care Review  Outcome: Ongoing (interventions implemented as appropriate)  Hemodialysis is complete.  Blood returned.  AV Fistula to LFA was then de accessed and needles removed.  Hemostasis attained.  No bleed or hematoma.  Fistula has Bruit and Thrill.  HD time = 300 min.  UF = 2000 ml.

## 2017-11-29 NOTE — PT/OT/SLP PROGRESS
"Occupational Therapy  Treatment    Donta Cline   MRN: [id number]   Admitting Diagnosis: Acute bilateral low back pain    OT Date of Treatment: 11/29/17   OT Start Time: 1410  OT Stop Time: 1448  OT Total Time (min): 38 min    Billable Minutes:  Therapeutic Activity 38 minutes    General Precautions: Standard, aspiration, fall    Subjective:  Communicated with RN prior to session.  "I can't babe."  Pain/Comfort  Pain Rating 1: 7/10  Location 1: back  Pain Addressed 1: Reposition, Distraction, Cessation of Activity  Pain Rating Post-Intervention 1: 7/10    Objective:  Patient found with: telemetry, oxygen, pt found supine with HOB elevated and agreeable to therapy with encouragement     Functional Mobility:  Bed Mobility:  Scooting/Bridging: Total Assistance, With assist of 2  Supine to Sit: Total Assistance, With assist of 2  Sit to Supine: Total Assistance, With assist of 2    Transfers:    did not occur    Functional Ambulation: did not occur    Activities of Daily Living:    Feeding: Nurse assisted with medication and pudding, Max A  Grooming: pt refused grooming while EOB and reports he already brushed his teeth today (which was not true)    Balance:   Static Sit: FAIR-: Maintains without assist but inconsistent  - inconsistent due to pain  Dynamic Sit: FAIR+: Maintains balance through MINIMAL excursions of active trunk motion    Therapeutic Activities and Exercises:  Pt ed re OT role and POC. Pt performed supine to sit and scoot to EOB with Total A. Pt sat EOB and performed pursed-lip breathing for 10 minutes, with CGA-min A for balance. Pt performed leg extensions, but unable to generate ROM requiring AA. Pt returned to supine with Total A and performed 10 reps of B LE SAQs with AAROM. Pt and daughter ed re importance of EOB sitting, upright posture, and therex.    AM-PAC 6 CLICK ADL   How much help from another person does this patient currently need?   1 = Unable, Total/Dependent Assistance  2 = A lot, " "Maximum/Moderate Assistance  3 = A little, Minimum/Contact Guard/Supervision  4 = None, Modified [place]/Independent    Putting on and taking off regular lower body clothing? : 1  Bathing (including washing, rinsing, drying)?: 1  Toileting, which includes using toilet, bedpan, or urinal? : 1  Putting on and taking off regular upper body clothing?: 2  Taking care of personal grooming such as brushing teeth?: 3  Eating meals?: 3  Total Score: 11     AM-PAC Raw Score CMS "G-Code Modifier Level of Impairment Assistance   6 % Total / Unable   7 - 8 CM 80 - 100% Maximal Assist   9-13 CL 60 - 80% Moderate Assist   14 - 19 CK 40 - 60% Moderate Assist   20 - 22 CJ 20 - 40% Minimal Assist   23 CI 1-20% SBA / CGA   24 CH 0% Independent/ Mod I       Patient left HOB elevated with all lines intact, call button in reach and RN and daughter present    ASSESSMENT:  Donta Cline is a 85 y.o. male with a medical diagnosis of Acute bilateral low back pain and presents with the impairments listed below. Pt participates with max encouragement but c/o increased pain with movement. Pt has very little activation of quads during LE therex and during mobility. Pt using BUE for support while sitting EOB, and was unable to participate in functional self care tasks in this position. Pt demo improved activity tolerance with EOB sitting and improved breath control with pursed-lip breathing and verbal cues. Pt would benefit from cont OT to improve self care skills and overall strength and endurance for mobility and (I)ce in self care.    Rehab identified problem list/impairments: Rehab identified problem list/impairments: weakness, impaired endurance, impaired self care skills, impaired functional mobilty, impaired balance, decreased lower extremity function, pain, decreased ROM, orthopedic precautions, impaired muscle length, impaired joint extensibility    Rehab potential is good.    Activity tolerance: Fair    Discharge " recommendations: Discharge Facility/Level Of Care Needs: nursing facility, skilled     Barriers to discharge: Barriers to Discharge: Decreased caregiver support    Equipment recommendations: wheelchair, hospital bed, lift device     GOALS:    Occupational Therapy Goals        Problem: Occupational Therapy Goal    Goal Priority Disciplines Outcome Interventions   Occupational Therapy Goal     OT, PT/OT Ongoing (interventions implemented as appropriate)    Description:  Goals to be met by 12/10/2017:     Patient will increase functional independence with ADLs by performing:    Bed mobility with demo understanding for spinal precautions with min(A).  Stand Pivot transfer to multiple surfaces (chair, wheelchair, bedside commode) with Moderate Assistance.   Donning LSO with Minimal Assistance while seated EOB.  Donning socks with sock aid with minimal assistance.   Functional dynamic sitting task ~8 min duration while seated EOB with CGA for postural control.   UE endurance HEP with set up and assistance as needed.   [place] on eob with CG A for balance  BSC transfer with max a                       Plan:  Patient to be seen 4 x/week to address the above listed problems via self-care/home management, therapeutic activities, therapeutic exercises, neuromuscular re-education  Plan of Care expires: 12/14/17  Plan of Care reviewed with: patient, daughter    PIYUSH Conway  11/29/2017  Pager: [phone]

## 2017-11-30 NOTE — PROGRESS NOTES
"[facility] Hospital Medicine  Progress Note    Patient Name: Donta Cline  MRN: [id number]  Patient Class: IP- Inpatient   Admission Date: 11/14/2017  Length of Stay: 14 days  Attending Physician: Gurmeet Alonso MD  Primary Care Provider: ZAID Richardson Iii, MD    [facility] Medicine Team: [facility] MED L Gurmeet Alonso MD    Subjective:     Principal Problem:Acute bilateral low back pain    HPI:  Donta Cline is a 85 y.o. male who presents with PMHx of ESRD with dialysis MWF, HFpEF, NSTEMI, bladder/prostate cancer, DM II, aortic stenosis and CAD for evaluation of back pain s/p lumbar fusion/laminectomy (10/20/17). No family at bedside. Difficult to obtain HPI as speech garbled, however patient endorses progressive lower extremity weakness with difficulty ambulating for the past 4-5 days. His back pain is without radiation. Patient is now unable to ambulate independently. Denies trauma and urinary bowel/bladder incontinence, fever.    Per EDMD:  "The daughter brought patient to ED due to concern of his severe back pain and inability to perform activities as he was doing previously. She had discussed with Dr. Saul, and he recommended the patient come to ED and obtain imaging for spine."    Imaging:    Ct Lumbar Spine Without Contrast    Result Date: 11/14/2017  Comparison: MRI 10/12/17. Findings: Routine CT lumbar spine protocol performed without IV contrast. Prior L2-L4 instrumented lumbar fusion with interbody disc spacer at L3-4. Decompressive posterior laminectomies at from L2-3 through L5-S1. No evidence of hardware failure. No fracture identified, noting the inferior endplate of L4 is indistinct. Infection or postsurgical fluid collection not excluded, noting limited evaluation without IV contrast and artifact from adjacent pedicle screws. There is partial fusion of the right L5-S1 facet. The SI joints are unremarkable. There is extensive calcified atherosclerotic disease. Atrophic " kidneys. Partially imaged right renal stent noted. No hydronephrosis. Small renal lesions, too small to characterize without IV contrast.      Mri Lumbar Spine Without Contrast    Result Date: 11/14/2017  MRI LUMBAR SPINE TECHNIQUE: MRI lumbar spine was performed without contrast. There is an ill-defined heterogeneous collection encircling L1 spinous process demonstrating fluid levels on the right. This may represents a seroma or hematoma postoperatively however abscess cannot be excluded. This collection is best seen on series 11 image 4.    Hospital Course:  In discussion with daughter who is at bedside today (11/15) patient was discharged after surgical procedure to an inpatient rehab and he reports that he was doing well at the rehab but reached a plateau in his activity/functional level.  She states patient was walking 50 feet with minimal assist, able to toilet with minimal assistance.  Patient was transferred to SNF and since transfer, last Friday (11/10) patient requiring max assistance to stand and reported inability to bear weight secondary to pain, and since this time patient has been bed bound.     She also notes that patient with poor appetite as speech therapy has recommended puree nectar thick diet with Nepro shakes, reports that pt is losing weight and some increased abdominal girth.     Overnight patient admitted due to concerns of worsening pain and inability to ambulate, patient underwent MRI and CT imaging of the lumbar spine.  He has intact fusion, no spinal canal stenosis or cord compression noted, concern for a fluid collection encircling L1 spinous process, post op L2-L4 fusion changes and s/p laminectomy L3-L4.  Discussed imaging findings with attending neurosurgeon Dr. Sands who performed pts operation and he reported that fluid collection appears outside of range where instrumentation was.  Patient with no reported falls, workup today reveals elevated CRP >150, ESR 92, has as  supratherapeutic INR 3.8    Informed plan will be to take patient to OR for washout and exploration to evaluate for hematoma vs infection.     11/16 - Patient without acute events overnight, patient states he and his daughter are going to [facility] today.  Daughter has provided consent for surgery and blood products, patient is receiving serial doses of FFP with intermittent INR checks with plan for surgery this afternoon if INR <1.4.  Daughter reports that patient w/o complaints of pain when lying in bed, but if he is moved/turned causes exacerbation of his pain.     Patient taken for operative intervention and in discussion with neurosurgeon Dr. Sands, he reports that patient with well healed wound and surgical fusion was intact, when cut into the dura and in subdural region there was area of hematoma that was evacuated (full op report pending in EMR)  Cultures sent to rule out infection but clinical impression was not an appearance of an infected operative site.  Dr. Sands noted that degree of patient's pain incongruent with surgical findings.     Post-oepratively in the morning patient coverted to afib with RVR and this morning with borderline BP, his Hgb downtrending since admission and was 6.7 post-op.  Patient received intermittent fluid boluses early AM of 11/17 but also with intake of 3+L prior to surgery due to need for high amount of FFP (6units).  Patient will receive 1 unit PRBC during Hemodialysis today, giving midodrine as well.     11/18 - Overnight initiated CPAP for patient due to concerns for pulmonary edema, blood gas with some hypercapnia but ph 7.3,  Discussed with Nephrology patient to receive UF today, and will give another 1unit PRBC due to hgb 6.8-7.0.  Pt reports slept well with cpap, still has back pain with movement and on examination.  Culture data from surgery remains negative - neurosurgery has ordered cefazolin, will renally dose this.     11/19 - Patient seen this AM, awake, alert,  reporting still having back pain and reporting frequent cough today, coughing during interview.  S/p UF by nephrology yesterday and negative -1.4L total for the day and s/p 2nd unit PRBC and hgb is stable at 8.6, remains in afib and blood pressures are stable.      11/20 _Patient with ongoing back pain, neurosurgery removed 2 surgical drains,  Hemoglobin remains  Stable  W/o requiring  Transfusion.  He remains in Afib rhythmwise and is  Planned for dialysis.  PT worked with pt and SLP eval pt upgraded to Dental soft nectar thick liquids.     11/20- Called by HD unit and patient 1 hour into session with AFIB with RVR and BP in 80/50s, given 300cc bolus by nephrology and asking for additional recs, HD session stopped early and 250cc bolus given and stat CBC ordered.      11/21 - Pt tolerated dialysis well. Completed 3 hours.    11/22-11/24. Pt had increasing delirium and wbc elevated thus started on ceftriaxone based on dirty UA. DDx includes aspiration PNA as pt with cough.     11/24: Called to the bedside around 4:00pm as pt had increasing oxygen requirements - sating in low 80's on 2L and complaining of SOB. Also with increasing audible rales. Vitals were significant for hypotension - with BP 80/40-50's with HR in the 80's. RR in the high 20-30's. Exam significant for diffuse rhonchi bilaterally.      Pt given 250 cc bolus and oxygen increased to 5L. Pt deep suctioned.  Vitals improved after bolus to 100/50's , HR 80's, RR improved, O2 sats improved to high %. Hypotension and hypoxia likely  sepsis secondary PNA  given increasing oxygen requirement, notable cough in recent days, and leukocytosis. Abx broadened to vanc and cefepime for presumed hospital acquired PNA. DDx includes aspiration PNA as pt has had delirium in recent days.      11/25: Started on Lyrica for back pain,. Reports new onset difficulty swallowing. Respiratory culture positive for yeast. Started fluconazole.    11/26: ID consulted.  Recommending DC vanc/cefepime- unlikely PNA. Continue fluconazole.    11/28 - Patient seen, upset that symptoms havent improved, ready to leave the hospital.  Wants to get out of the bed.     11/29 - noted by nursing to be hypoxic this morning, placed on non-rebreather and  Tapered to 4L nasal cannula o2.  Patient taken for routine dialysis and had UF of 2 liters, had some hypotensionin HD requiring albumin infusion and midodrine dosage.  This afternoon patient w/o acute complaints, wishes he was feeling better.     Interval History: as above    Review of Systems   Constitutional: Positive for appetite change. Negative for chills, diaphoresis, fatigue and fever.   HENT: Negative for rhinorrhea, sinus pain, sinus pressure, sneezing and sore throat.    Eyes: Negative for visual disturbance.   Respiratory: Negative for cough, choking, chest tightness and shortness of breath.    Cardiovascular: Negative for chest pain, palpitations and leg swelling.   Gastrointestinal: Positive for constipation. Negative for diarrhea and nausea.        Denies bowel incontinence   Genitourinary: Negative for dysuria.   Musculoskeletal: Positive for back pain and gait problem. Negative for joint swelling, myalgias and neck pain.   Neurological: Negative for dizziness, facial asymmetry, light-headedness and headaches.   Psychiatric/Behavioral: Negative for agitation and behavioral problems.     Objective:     Vital Signs (Most Recent):  Temp: 97.7 °F (36.5 °C) (11/29/17 1551)  Pulse: 97 (11/29/17 1551)  Resp: 18 (11/29/17 1551)  BP: 123/69 (11/29/17 1551)  SpO2: 100 % (11/29/17 1551) Vital Signs (24h Range):  Temp:  [97.5 °F (36.4 °C)-98.6 °F (37 °C)] 97.7 °F (36.5 °C)  Pulse:  [] 97  Resp:  [14-18] 18  SpO2:  [78 %-100 %] 100 %  BP: ()/(38-70) 123/69     Weight: 78.5 kg (173 lb)  Body mass index is 31.64 kg/m².    Intake/Output Summary (Last 24 hours) at 11/29/17 0979  Last data filed at 11/29/17 1230   Gross per 24 hour    Intake              800 ml   Output             2700 ml   Net            -1900 ml      Physical Exam   Constitutional: He appears well-developed.   Awake/alert   HENT:   Mouth/Throat: Oropharynx is clear and moist.   Wet quality to voice   Eyes: Conjunctivae are normal. No scleral icterus.   Cardiovascular: Normal heart sounds and intact distal pulses.    No murmur heard.  Irregular rhythm, tachycardic   Pulmonary/Chest: Effort normal. No respiratory distress. He has no wheezes.   Bilateral coarse breath sounds with crackles present   Abdominal: Soft. Bowel sounds are normal.   Mild distension, soft, tympanic to percussion, no fluid wave noted, no bulging flanks, or abdominal collateral vessels   Musculoskeletal:   RUE forearm AV fistula with palpable thrill.      Lymphadenopathy:     He has no cervical adenopathy.   Neurological:   Alert, oriented to self, hospital, year    Skin: Skin is warm. There is pallor.       Significant Labs:   CBC:     Recent Labs  Lab 11/28/17 0411 11/29/17  0444   WBC 16.60* 14.61*   HGB 8.7* 8.5*   HCT 27.8* 28.4*    281     CMP:     Recent Labs  Lab 11/28/17 0411 11/29/17  0444    139   K 3.8 3.6   CL 98 98   CO2 26 26   * 128*   BUN 26* 37*   CREATININE 3.2* 4.4*   CALCIUM 9.8 10.0   PROT 6.6 6.3   ALBUMIN 2.4* 2.3*   BILITOT 0.4 0.4   ALKPHOS 196* 207*   AST 83* 87*   ALT 16 15   ANIONGAP 13 15   EGFRNONAA 16.7* 11.4*       Significant Imaging:   CXR AP film 11/29/17  Findings:  The cardiomediastinal silhouette or is stable in configuration noting calcification of the aortic arch.  There is no pleural effusion.  The trachea is midline.  The lungs are symmetrically expanded bilaterally with coarse interstitial attenuation.  There is bilateral basilar subsegmental atelectasis, noting the process is slightly more focal overlying the left lower lung zone however has not significantly changed since the previous exam in fact, there appears to be somewhat better  aeration suggesting interval improvement of possible airspace process versus edema.  No definite new large focal consolidation, continued followup advised..   There is a suspected skin fold projected over the lateral aspect of the right lung zones rather than pneumothorax although consider reimaging for confirmation..  The osseous structures are unchanged.      Assessment/Plan:      * Acute bilateral low back pain, post-lumbar spinal fusion    - Neurosurgery took pt for washout and evacuation of hematoma on 11/16  - Hgb dropped during admission admit - s/p 2unit pRBC, Hgb now grossly stable  - Supportive care, PT/OT  - Given worsened delirium - stopped fentanyl patches and norco prn  - Pain control with scheduled tylenol 650 q8hrs;  tramadol 50 qhs for breakthrough ; will cont pregabalin to 75mg Qd for now ; also add flexeril as muscle relaxants apparently have worked in the past   - component of pain likely severe right hip osteoarthritis - will cont lidocaine patches   - Mobility remains poor and strength in hip flexors/proximally still poor. 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 Unclear if this is pts new baseline.         Paroxysmal atrial fibrillation    -HR stable,remains in Afib rhythm wise  -continue metoprolol 25 TID  -continue scheduled midodrine TID for now as pt tolerted dialysis well with this; discuss with nephrology - may only be needed on dialysis days         ESRD (end stage renal disease) on dialysis    - nephrology consulted for volume management.   - Tolerating HD with acceptable HR   -11/29 - UF of 2L        L-S radiculopathy    Management as above, patient now with chronic pain that is debilitating and patient has been mostly bed bound since his surgery.           Leukocytosis    Will trend cbc   Recent infectious workup negative   Downtrending today          Chronic diastolic heart failure    -last echo 10/2017 with pulmonary HTN and undetermined diastolic function, EF  55  -continue metoprolol 25 TID   -HD is main source of volume removal          Status post lumbar spinal fusion    -neurosurgery consultation as above          Coronary artery disease involving native coronary artery of native heart without angina pectoris    -continue asa, statin          Type 2 diabetes mellitus with chronic kidney disease on chronic dialysis, with long-term current use of insulin    - cont levamir 8 units  - may need to add low dose premeal as post prandial glucoses are in the 200's; Am glucose at goal         Severe aortic stenosis    - no acute intervention at this time.   - Will cont to monitor         Abnormal US (ultrasound) of abdomen    - RUQ US given elevated LFT's - showed hypodense area - no evidence of infection  - will need ct triple phase vs. Repeat US at later date          Delirium     AO x 2-3 throughout stay - Seems to be pts baseline over the last 1-2 months since prolonged hospitalizations. Per family was driving self to dialysis prior - 2 months ago. Worse today - AO x 1.   - Opiates weaned to tramadol 50 q8 prn  - decrease lyrica to 50   - monitor for signs of infection given leukocytosis ; recent infectious workup negative - only consistent with candidiasis            Candidiasis    Per family pt with hx of yeast in urine - has old urinary stent in place.  Respiratory cx from 11/24 and 11/25 growing yeast .   - ID consult given concern for disseminated yeast infection given positive culture from multiple sites namely urine, respiratory - also now with new onset / worsening dysphagia. ID also consulted for  abx management of presumed HAP.  Appreciate recs - will dc abx and monitor   - f/u blood cultures / fungal cultures   - cont diflucan for now 200 qd x 10 days - renal dosing              VTE Risk Mitigation         Ordered     Medium Risk of VTE  Once      11/17/17 0011     Place sequential compression device  Until discontinued      11/14/17 1907     Place BRAIN hose   Until discontinued      11/14/17 1907              Gurmeet Alonso MD  Department of Hospital Medicine   [facility]

## 2017-11-30 NOTE — PHYSICIAN QUERY
PT Name: Donta Cline  MR #: [id number]     Physician Query Form - Documentation Clarification      CDS: Nicole Alonzo RN, CCDS       Contact information: [email or web address]    This form is a permanent document in the medical record.     Query Date: November 30, 2017    By submitting this query, we are merely seeking further clarification of documentation. Please utilize your independent clinical judgment when addressing the question(s) below.    The Medical record reflects the following:    Supporting Clinical Findings Location in Medical Record   Sepsis  Leukocytosis improving. Episode of hypoxia and hypotension this afternoon.    - Blood cxs NGTD, urine cx - growing yeast, chest xray - possible worsening opacity of the right base - pna ? - pt is coughing  - wound appears not infected   - RUQ US given elevated LFT's   - switched to cefepime and vanc for presumed HAP vs. Asp pna given deterioration   -respiratory culture pending  - will consider treating yeast - pt with ureteral stent in place  11/24-Hosp Med PN     Hypotension and hypoxia likely  sepsis secondary PNA  given increasing oxygen requirement, notable cough in recent days, and leukocytosis. Abx broadened to vanc and cefepime for presumed hospital acquired PNA. DDx includes aspiration PNA as pt has had delirium in recent days.     SIRS (systemic inflammatory response syndrome)  Pt with leukocytosis and developed increased RR over the last two days. Episode of hypoxia and hypotension yesterday afternoon. Improved with abx and 250 cc bolus x 2. Leukocytosis resolved today.    Blood cxs NGTD, urine cx - growing yeast. Per family pt with hx of yeast in urine - has old urinary stent in place. Chest xray shows  possible worsening opacity of the left base. Pt is coughing w/ increased secretions andO2 requirements consistent w/ PNA. Respiratory cx from 11/24  growing yeast .   - wound appears not infected   - RUQ US given elevated LFT's - showed hypodense  area - no evidence of infection; will need ct tirple phase vs. Repeat US at later date  - cont cefepime and vanc for presumed HAP vs. Asp pna given deterioration for now   - Repeat respiratory culture today again growing yeast , final results pending  - ID consult given concern for disseminated yeast infection given positive culture from multiple sites - urine, respiratory - also now with new onset / worsening dysphagia. ID also consulted for  abx management of presumed HAP.    - repeat blood cultures / fungal cultures   - start diflucan for now 200 qd - renal dosing       11/24-Hosp Med PN            11/25-Hosp Med PN                                                                          Doctor, Please specify diagnosis or diagnoses associated with above clinical findings.    Please clarify if the dx of ___Sepsis___ is ruled in or ruled out.   If ruled in, please provide the known or suspected organism associated with the dx.    Provider Use Only     [  X ] Sepsis Ruled In (Specify Organism)__candidiasis________________________      [   ] Sepsis Ruled Out     [   ] Other Clarification (please specify)__________________________                                                                                                                 [   ] Clinically undetermined

## 2017-11-30 NOTE — SUBJECTIVE & OBJECTIVE
Interval History: as above    Review of Systems   Constitutional: Positive for appetite change. Negative for chills, diaphoresis, fatigue and fever.   HENT: Negative for rhinorrhea, sinus pain, sinus pressure, sneezing and sore throat.    Eyes: Negative for visual disturbance.   Respiratory: Negative for cough, choking, chest tightness and shortness of breath.    Cardiovascular: Negative for chest pain, palpitations and leg swelling.   Gastrointestinal: Positive for constipation. Negative for diarrhea and nausea.        Denies bowel incontinence   Genitourinary: Negative for dysuria.   Musculoskeletal: Positive for back pain and gait problem. Negative for joint swelling, myalgias and neck pain.   Neurological: Negative for dizziness, facial asymmetry, light-headedness and headaches.   Psychiatric/Behavioral: Negative for agitation and behavioral problems.     Objective:     Vital Signs (Most Recent):  Temp: 97.7 °F (36.5 °C) (11/29/17 1551)  Pulse: 97 (11/29/17 1551)  Resp: 18 (11/29/17 1551)  BP: 123/69 (11/29/17 1551)  SpO2: 100 % (11/29/17 1551) Vital Signs (24h Range):  Temp:  [97.5 °F (36.4 °C)-98.6 °F (37 °C)] 97.7 °F (36.5 °C)  Pulse:  [] 97  Resp:  [14-18] 18  SpO2:  [78 %-100 %] 100 %  BP: ()/(38-70) 123/69     Weight: 78.5 kg (173 lb)  Body mass index is 31.64 kg/m².    Intake/Output Summary (Last 24 hours) at 11/29/17 1848  Last data filed at 11/29/17 1230   Gross per 24 hour   Intake              800 ml   Output             2700 ml   Net            -1900 ml      Physical Exam   Constitutional: He appears well-developed.   Awake/alert   HENT:   Mouth/Throat: Oropharynx is clear and moist.   Wet quality to voice   Eyes: Conjunctivae are normal. No scleral icterus.   Cardiovascular: Normal heart sounds and intact distal pulses.    No murmur heard.  Irregular rhythm, tachycardic   Pulmonary/Chest: Effort normal. No respiratory distress. He has no wheezes.   Bilateral coarse breath sounds with  crackles present   Abdominal: Soft. Bowel sounds are normal.   Mild distension, soft, tympanic to percussion, no fluid wave noted, no bulging flanks, or abdominal collateral vessels   Musculoskeletal:   RUE forearm AV fistula with palpable thrill.      Lymphadenopathy:     He has no cervical adenopathy.   Neurological:   Alert, oriented to self, hospital, year    Skin: Skin is warm. There is pallor.       Significant Labs:   CBC:     Recent Labs  Lab 11/28/17  0411 11/29/17  0444   WBC 16.60* 14.61*   HGB 8.7* 8.5*   HCT 27.8* 28.4*    281     CMP:     Recent Labs  Lab 11/28/17  0411 11/29/17  0444    139   K 3.8 3.6   CL 98 98   CO2 26 26   * 128*   BUN 26* 37*   CREATININE 3.2* 4.4*   CALCIUM 9.8 10.0   PROT 6.6 6.3   ALBUMIN 2.4* 2.3*   BILITOT 0.4 0.4   ALKPHOS 196* 207*   AST 83* 87*   ALT 16 15   ANIONGAP 13 15   EGFRNONAA 16.7* 11.4*       Significant Imaging:   CXR AP film 11/29/17  Findings:  The cardiomediastinal silhouette or is stable in configuration noting calcification of the aortic arch.  There is no pleural effusion.  The trachea is midline.  The lungs are symmetrically expanded bilaterally with coarse interstitial attenuation.  There is bilateral basilar subsegmental atelectasis, noting the process is slightly more focal overlying the left lower lung zone however has not significantly changed since the previous exam in fact, there appears to be somewhat better aeration suggesting interval improvement of possible airspace process versus edema.  No definite new large focal consolidation, continued followup advised..   There is a suspected skin fold projected over the lateral aspect of the right lung zones rather than pneumothorax although consider reimaging for confirmation..  The osseous structures are unchanged.

## 2017-11-30 NOTE — PT/OT/SLP PROGRESS
Speech Language Pathology      Donta Cline  MRN: [id number]    SLP attempts to see patient across two attempts this am. SLP reviewed Patient nurse.  Patient off floor for dialysis across both attempts and SLP not able to make third attempt later service day.  Patient not seen today secondary to dialysis. Will follow-up next service day. Thank you.    YUMIKO Fields., [facility]-SLP  Speech-Language Pathology  Pager: [phone]  11/29/2017

## 2017-11-30 NOTE — PLAN OF CARE
Problem: Patient Care Overview  Goal: Plan of Care Review  Pt remains oriented to self and place. Vital signs have been stable. Pt refused to wear CPAP at night, on 3L per NC. Pt complained of pain, PRN Ultram given as ordered. Plan of care reviewed with pt. Will continue to monitor.

## 2017-11-30 NOTE — PHYSICIAN QUERY
PT Name: Donta Cline  MR #: [id number]     Physician Query Form - Documentation Clarification      CDS: Nicole Alonzo RN, CCDS       Contact information: [email or web address]    This form is a permanent document in the medical record.     Query Date: November 30, 2017    By submitting this query, we are merely seeking further clarification of documentation. Please utilize your independent clinical judgment when addressing the question(s) below.    The Medical record reflects the following:    Supporting Clinical Findings Location in Medical Record   11/24: Called to the bedside around 4:00pm as pt had increasing oxygen requirements - sating in low 80's on 2L and complaining of SOB. Also with increasing audible rales. Vitals were significant for hypotension - with BP 80/40-50's with HR in the 80's. RR in the high 20-30's. Exam significant for diffuse rhonchi bilaterally. Pt given 250 cc bolus and oxygen increased to 5L. Pt deep suctioned. Vitals improved after bolus to 100/50's , HR 80's, RR improved, O2 sats improved to high %. Hypotension and hypoxia likely sepsis secondary PNA  given increasing oxygen requirement, notable cough in recent days, and leukocytosis. Abx broadened to vanc and cefepime for presumed hospital acquired PNA. DDx includes aspiration PNA as pt has had delirium in recent days.  11/29-PN     11/26: ID consulted. Recommending DC vanc/cefepime- unlikely PNA. Continue fluconazole.    -Patient with increasing need for oxygen over the last day  - Exam is consistent with pulmonary edema  - Xray more consistent with fluid overload  - Suspect that he does not have a pneumonia    11/29-PN      11/27-ID PN                                                                          Doctor, Please specify diagnosis or diagnoses associated with above clinical findings.    Please clarify if the dx of _____Pneumonia____has been ruled in or ruled out.   If ruled in, please provide type.    Provider Use  Only     [   ] Pneumonia Ruled In         Specify Type: [   ] Aspiration PNA                                  [   ] HAP (specify organism)_________________                                  [ X  ] Fungal PNA (specify organism)_candidiasis_______________     [   ] Pneumonia Ruled Out     [   ] Other clarification (please specify)__________________                                                                                                          [   ] Clinically undetermined

## 2017-11-30 NOTE — PHYSICIAN QUERY
PT Name: Donta Cline  MR #: [id number]    Physician Query Form - Respiratory Condition Clarification    CDS: Nicole Alonzo RN, CCDS       Contact information: [email or web address]    This form is a permanent document in the medical record.    Query Date: November 30, 2017    By submitting this query, we are merely seeking further clarification of documentation. Please utilize your independent clinical judgment when addressing the question(s) below.    The Medical record contains the following:   Indicators   Supporting Clinical Findings Location in Medical Record   X            X Pulmonary Edema documented Overnight initiated CPAP for patient due to concerns for pulmonary edema, blood gas with some hypercapnia but ph 7.3,  Discussed with Nephrology patient to receive UF today, and will give another 1unit PRBC due to hgb 6.8-7.0.    Patient with increasing need for oxygen over the last day  - Exam is consistent with pulmonary edema  - Xray more consistent with fluid overload 11/18-PN            11/26-ID Consult    Wheezing, Productive cough, SOB, JOHN, Use of accessory muscles documented      Radiology Findings     X Hypoxia , Hypoxemia Hypoxic documented Hypoxia:  Suspect this may be related to volume overload as opposed to pneumonia.  Suggest fluid removal with dialysis tomorrow.   11/26-ID Consult    Respiratory Distress documented      RR                  PaO2                  PaCO2                     O2 sat      Treatment:      Supplemental O2:      Oxygen dependence     X      X Other: ESRD (end stage renal disease) on dialysis, Chronic diastolic heart failure    LUA=[id number] 11/18-PN      11/14-Labs       Provider, please specify diagnosis or diagnoses associated with above clinical findings.      Pulmonary Edema (please specify acuity and type)   Acuity    Type   [X ] Acute  [ ] Cardiac (Specify cause) ____________________________   [ ] Chronic   [X ] Non Cardiac (Specify cause)  ____esrd____________________   [ ] Acute on Chronic [ ] Unspecified    [ ] Other Respiratory Diagnosis (please specify): _________________________________  [ ] Clinically Undetermined    Please document in your progress notes daily for the duration of treatment, until resolved, and include in your discharge summary.

## 2017-11-30 NOTE — PLAN OF CARE
Problem: Patient Care Overview  Goal: Plan of Care Review  Outcome: Ongoing (interventions implemented as appropriate)  Patient oriented to self and place. VSS. Patient up to side of bed today with son. Patient repeatedly removing telemetry and nasal cannula. Patient redirected. Repositioned frequently. Breathing treatments administered by respiratory therapy. No complaints of pain. Patient changed and bathed during shift.

## 2017-11-30 NOTE — ASSESSMENT & PLAN NOTE
- nephrology consulted for volume management.   - Tolerating HD with acceptable HR   -11/29 - UF of 2L

## 2017-11-30 NOTE — PT/OT/SLP PROGRESS
Speech Language Pathology  Dysphagia Treatment    Donta Cline   MRN: [id number]   1043/1043 A    Admitting Diagnosis: Acute bilateral low back pain    Diet recommendations: Solid Diet Level: Dental Soft  Liquid Diet Level: Nectar Thick   Please feed only when awake/alert, No straws, Small bites/sips, 1 bite/sip at a time, Check for pocketing/oral residue, Meds crushed in puree, Eliminate distractions, Assistance with meals and Assistance with thickening liquids, Encourage double swallows per bolus  · To achieve nectar thick liquid: 4 oz of any liquid to 1 pack of ThickenUp Clear or 6 oz of any liquid to 1 pack of ThickenUp  · No ice cream, jello, or ice.  · Avoid mixed consistencies (soup, cereal with milk, juicy fruits).  · Continue to monitor for signs and symptoms of aspiration and discontinue oral feeding should you notice any of the following: watery eyes, reddened facial area, wet vocal quality, increased work of breathing, change in respiratory status, increased congestion, coughing, fever, etc.    SLP Treatment Date: 11/30/17  Speech Start Time: 1145     Speech Stop Time: 1200     Speech Total (min): 15 min       TREATMENT BILLABLE MINUTES:  Treatment Swallowing Dysfunction 15    Has the patient been evaluated by SLP for swallowing? : Yes  Keep patient NPO?: No   General Precautions: Standard, fall, aspiration, nectar thick  Current Respiratory Status: nasal cannula       Subjective:  Pt sleeping upon SLP entry. MD entered room at end of ST session.          Objective:     Pt seen for dysphagia therapy. Pt lethargic. SLP turned room light on and raised HOB. Oral care provided with oral swabs, mouth wash, and suctioning and red/brown residue removed form oral cavity. Yankauer and tubing replaced. Pt with increased lethargy and closing eyes despite stimulation and encouragement to participate in therapy. Pt refusing po trials. Pt not following commands for swallowing exercises. Noted use thickener packets open  on breakfast tray. Family not present in room this date. Per chart review, family/patient previously noncompliant with thickener use. Education previously provided.       Assessment:  Donta Cline is a 85 y.o. male with a medical diagnosis of Acute bilateral low back pain and presents with Dysphagia. ST will continue to follow.    Discharge recommendations: Discharge Facility/Level Of Care Needs: nursing facility, skilled     Goals:    SLP Goals        Problem: SLP Goal    Goal Priority Disciplines Outcome   SLP Goal     SLP Ongoing (interventions implemented as appropriate)   Description:  Speech Language Pathology Goals  Goals to be met by 12/04/17  1. Pt will tolerate dental soft diet without overt S/S aspiration, Supervision  2. Pt will tolerate nectar-thickened liquids without overt S/S aspiration, Supervision  3. Pt will complete dysphagia exercises x10n   4. Educate pt and family on S/S aspiration and aspiration precautions -ongoing     Goals expected to be met by 11/27/17  1. Pt will tolerate dental soft diet without overt S/S aspiration, Supervision =ongoing   2. Pt will tolerate nectar-thickened liquids without overt S/S aspiration, Supervision- ongoing  3. Pt will complete dysphagia exercises x10n -ongoing  4. Educate pt and family on S/S aspiration and aspiration precautions -ongoing                             Plan:   Patient to be seen Therapy Frequency: 5 x/week   Plan of Care expires: 12/17/17  Plan of Care reviewed with: patient  SLP Follow-up?: Yes              DUGLAS Strange, CCC-SLP   Pager: [phone]  11/30/2017

## 2017-11-30 NOTE — ASSESSMENT & PLAN NOTE
Management as above, patient now with chronic pain that is debilitating and patient has been mostly bed bound since his surgery.

## 2017-11-30 NOTE — PLAN OF CARE
Seng spoke with Wendy Cisneros, daughter, to update  Pt's WBC 17. Pt not med stable  Xray done yesterday, advised another  Dr. NORRIS to touch base with pt's daughter today.     11/30/17 1104   Right Care Assessment   Can the patient answer the patient profile reliably? (spoke with pt's daughter, Wendy Cisneros)   Describe the patient's ability to walk at the present time. Major restrictions/daily assistance from another person   How does the patient rate their overall health at the present time? Poor   Number of comorbid conditions (as recorded on the chart) Five or more   During the past month, has the patient often been bothered by feeling down, depressed or hopeless? No   Have you felt down, depressed, or hopeless? 0   During the past month, has the patient often been bothered by little interest or pleasure in doing things? No     Dc plan: [facility] SNF when med stable.

## 2017-11-30 NOTE — ASSESSMENT & PLAN NOTE
- Neurosurgery took pt for washout and evacuation of hematoma on 11/16  - Hgb dropped during admission admit - s/p 2unit pRBC, Hgb now grossly stable  - Supportive care, PT/OT  - Given worsened delirium - stopped fentanyl patches and norco prn  - Pain control with scheduled tylenol 650 q8hrs;  tramadol 50 qhs for breakthrough ; will cont pregabalin to 75mg Qd for now ; also add flexeril as muscle relaxants apparently have worked in the past   - component of pain likely severe right hip osteoarthritis - will cont lidocaine patches   - Mobility remains poor and strength in hip flexors/proximally still poor. 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 Unclear if this is pts new baseline.

## 2017-11-30 NOTE — PLAN OF CARE
Problem: SLP Goal  Goal: SLP Goal  Speech Language Pathology Goals  Goals to be met by 12/04/17  1. Pt will tolerate dental soft diet without overt S/S aspiration, Supervision  2. Pt will tolerate nectar-thickened liquids without overt S/S aspiration, Supervision  3. Pt will complete dysphagia exercises x10n   4. Educate pt and family on S/S aspiration and aspiration precautions -ongoing     Goals expected to be met by 11/27/17  1. Pt will tolerate dental soft diet without overt S/S aspiration, Supervision =ongoing   2. Pt will tolerate nectar-thickened liquids without overt S/S aspiration, Supervision- ongoing  3. Pt will complete dysphagia exercises x10n -ongoing  4. Educate pt and family on S/S aspiration and aspiration precautions -ongoing           Outcome: Ongoing (interventions implemented as appropriate)  Goals remain appropriate. ST will continue to follow.   YUMIKO Ortiz., CCC-SLP  Pager: [phone]  11/30/2017

## 2017-12-01 PROBLEM — J96.01 ACUTE RESPIRATORY FAILURE WITH HYPOXIA: Status: ACTIVE | Noted: 2017-01-01

## 2017-12-01 NOTE — PT/OT/SLP PROGRESS
Occupational Therapy      Donta BARRIOS Marlyn  MRN: [id number]    Patient not seen today secondary to off the floor for dialysis and unable to return. Will follow-up per schedule.    PIYUSH Bardales  12/1/2017

## 2017-12-01 NOTE — ASSESSMENT & PLAN NOTE
Repeat infectious workup  -ABG to rule out hypercapnia  -Rising leukocytosis of unclear etiology.   -Delirium precautions.   -May have to make patient NPO from a safety standpoint.   or for signs of infection given leukocytosis ; recent infectious workup negative - only consistent with candidiasis

## 2017-12-01 NOTE — CONSULTS
[facility]  Infectious Disease  Consult Note    Patient Name: Donta Cline  MRN: [id number]  Admission Date: 11/14/2017  Hospital Length of Stay: 16 days  Attending Physician: Gurmeet Alonso MD  Primary Care Provider: ZAID Richardson Iii, MD     Isolation Status: No active isolations    Patient information was obtained from patient, past medical records and ER records.      Consults  Assessment/Plan:     Pneumonia    Mr. Cline is a 85 y.o. male with ESRD on dialysis MWF, HFpEF, NSTEMI, bladder/prostate cancer, DM II, aortic stenosis and CAD, previous fungal UTI's now having continual increased O2 requirements and some slowed mentation with leucocytosis  - pt continues to have increased O2 demands, although he denies any SOB  - on exam has some rhonchorus sounds in the left upper lobe  - pt remains afebrile but has had increasing WBC  - blood cultures from yesterday are NGTD, recommend UA and UCx  - CXR actually appears slightly improved from previous, will follow up on CTA PE later today  - recommend starting moxifloxacin 400 mg PO daily for now, can broaden if pt's condition deteriorates  - will continue to follow.              Thank you for your consult. I will follow-up with patient. Please contact us if you have any additional questions.    Jean Beach MD, PhD  Infectious Disease, PGY-4  [facility]    Subjective:     Principal Problem: Leukocytosis    HPI: Pt is an 84 y/o male with a pmhx of ESRD with HD MWF HFpEF, NSTEMI, bladder/prostate cancer, DM II, aortic stenosis and CAD, previous fungal UTI's known to ID service on this admission hwen we were consulted for a concern of disseminated yeast infection and new onset dysphagia and abx management of presumed HAP. Thought at that time was that pt did not in fact ahve a PNA and if pt remained altered it would be best to rule out ohter causes of AMS such as PE or volume overload in HD patient. Recommendations were to  stop antibiotics at that time and continue HD dosed fluconazole with an end date of 12/07/2017, at which point ID signed off on 11/27/2017. Pt continues to have problems with mentation and ID has been re-consulted for an increasing WBC, while off antibiotic therapy.    Pt answers questions slowly but appears to be oriented to self, place, time and mostly situation. Denies any fever or chills, denies SOB, is coughing up scan phlegm. States that his hands and legs are hurting in a myalgic pattern. Denies constipation or diarrhea, no dysuria. Pt's BP's have labile, today was only able to have UF instead of HD secondary to pressure issues.     Previous Positive Cultures  01/16/2004 UCx Pseudomonas aeruginosa  10/07/2017 UCx Candida albicans    Cultures This Admission   11/22/2017 UCx  C.albicans  11/23/2017 Resp Cx C.albicans, few WBC's, few GPC's, rare yeast, Rare GPR  11/24/2017 Bcx, NSI No growth  11/24/2017 BCx, NSI No growth  11/25/2017 Resp Cx C.albicans, many WBC's, mod yeast, rare GPC's  11/25/2017 BCx  No growth  11/30/2017 BCx  No growth   11/30/2017 BCx  No growth        Past Medical History:   Diagnosis Date    Bladder cancer     Chronic diastolic heart failure 8/21/2012    3/13: AOSAT: 98, FICKCI: 2.41, FICKCO: 5.18 PAPRES: 34/16 (23), PASAT: 65, PVR: 1.74 PWPRES: 18/18 (14), RA PRES: 14/13 (12), RV: 40/0, 10     Chronic hip pain, right 10/4/2017    Coronary artery disease involving native coronary artery of native heart without angina pectoris     Dyslipidemia     Encounter for blood transfusion     ESRD (end stage renal disease) on dialysis 6/9/2014    Essential hypertension     Hypercholesteremia 8/21/2012    Long-term (current) use of anticoagulants 10/9/2015    NSTEMI (non-ST elevated myocardial infarction) 10/4/2017    Paroxysmal atrial fibrillation 10/8/2015    Prostate cancer     Severe aortic stenosis 10/14/2014    Type 2 diabetes mellitus with chronic kidney disease on chronic  dialysis, with long-term current use of insulin 12/15/2013    Ventricular tachycardia     monomorphic       Past Surgical History:   Procedure Laterality Date    BACK SURGERY      laminectomy x2    COLON SURGERY      EXTENSIVE PROSTATE SURGER      Prostatectomy, Radical    JOINT REPLACEMENT      left hip       Review of patient's allergies indicates:   Allergen Reactions    Morphine Other (See Comments)     Other reaction(s): severe abdominal pain    Darvocet a500  [propoxyphene n-acetaminophen]      Other reaction(s): Unknown       Medications:  Prescriptions Prior to Admission   Medication Sig    amitriptyline (ELAVIL) 25 MG tablet Take 25 mg by mouth nightly as needed for Insomnia.    aspirin (ECOTRIN) 81 MG EC tablet Take 81 mg by mouth once daily.    bisacodyl (DULCOLAX) 10 mg Supp Place 10 mg rectally daily as needed.    celecoxib (CELEBREX) 200 MG capsule Take 200 mg by mouth 2 (two) times daily.    hydrocodone-acetaminophen 10-325mg (NORCO)  mg Tab Take 1 tablet by mouth every 6 (six) hours as needed for Pain.     insulin detemir (LEVEMIR FLEXTOUCH) 100 unit/mL (3 mL) SubQ InPn pen Inject 14 Units into the skin every evening.    midodrine (PROAMATINE) 10 MG tablet Take 1 tablet (10 mg total) by mouth 3 (three) times daily.    ondansetron (ZOFRAN) 4 MG tablet Take 4 mg by mouth every 8 (eight) hours as needed for Nausea.    pantoprazole (PROTONIX) 40 MG tablet Take 1 tablet (40 mg total) by mouth nightly.    sertraline (ZOLOFT) 100 MG tablet Take 1 tablet by mouth every evening.    atorvastatin (LIPITOR) 80 MG tablet Take 1 tablet (80 mg total) by mouth once daily.    coenzyme Q10 200 mg capsule Take 200 mg by mouth once daily.    gabapentin (NEURONTIN) 300 MG capsule Take 300 mg by mouth every evening.    metoprolol tartrate (LOPRESSOR) 25 MG tablet Take 1 tablet (25 mg total) by mouth 2 (two) times daily.    NITROSTAT 0.4 mg SL tablet place 1 tablet under the tongue if needed  every 5 minutes for chest pain for 3 doses IF NO RELIEF AFTER 3RD DOSE CALL PRESCRIBER .    ramelteon (ROZEREM) 8 mg tablet Take 1 tablet (8 mg total) by mouth nightly as needed for Insomnia.    senna-docusate 8.6-50 mg (PERICOLACE) 8.6-50 mg per tablet Take 1 tablet by mouth 2 (two) times daily.    vit b cmplx 3-fa-vit c-biotin 1- mg-mg-mcg (ANKIT-AZ RX) 1- mg-mg-mcg Tab Take 1 tablet by mouth once daily.     Antibiotics     Start     Stop Route Frequency Ordered    11/17/17 0900  mupirocin 2 % ointment 1 g      11/22 0859 Nasl 2 times daily 11/17/17 0011        Antifungals     Start     Stop Route Frequency Ordered    11/26/17 2100  miconazole NITRATE 2 % top powder      -- TP 2 times daily 11/26/17 1206    11/25/17 1730  fluconazole tablet 200 mg      -- Oral Daily 11/25/17 1718        Antivirals     None           There is no immunization history for the selected administration types on file for this patient.    Family History     None        Social History     Social History    Marital status:      Spouse name: N/A    Number of children: N/A    Years of education: N/A     Social History Main Topics    Smoking status: Former Smoker     Packs/day: 3.00     Years: 40.00     Quit date: 1/1/1980    Smokeless tobacco: Former User    Alcohol use No      Comment: a few drinks    Drug use: Unknown    Sexual activity: Not Asked     Other Topics Concern    None     Social History Narrative    None     Review of Systems   Constitutional: Negative for chills and fever.   Respiratory: Positive for cough. Negative for chest tightness and shortness of breath.    Cardiovascular: Negative for chest pain and palpitations.   Gastrointestinal: Negative for abdominal distention, abdominal pain, constipation, diarrhea, nausea and vomiting.   Genitourinary: Negative for difficulty urinating, dysuria and hematuria.   Musculoskeletal: Positive for myalgias.   Skin: Negative for color change and  rash.   Neurological: Negative for dizziness and weakness.   Psychiatric/Behavioral: Positive for confusion.     Objective:     Vital Signs (Most Recent):  Temp: 97.6 °F (36.4 °C) (12/01/17 1216)  Pulse: 105 (12/01/17 1216)  Resp: (!) 22 (12/01/17 1216)  BP: (!) 93/50 (12/01/17 1216)  SpO2: 98 % (12/01/17 0709) Vital Signs (24h Range):  Temp:  [97.6 °F (36.4 °C)-99.6 °F (37.6 °C)] 97.6 °F (36.4 °C)  Pulse:  [] 105  Resp:  [17-22] 22  SpO2:  [90 %-100 %] 98 %  BP: ()/(32-81) 93/50     Weight: 74.5 kg (164 lb 3.9 oz)  Body mass index is 30.04 kg/m².    Estimated Creatinine Clearance: 10.6 mL/min (based on SCr of 4.5 mg/dL (H)).    Physical Exam   Constitutional: He is oriented to person, place, and time. He appears well-developed and well-nourished.   Pt appears moderately distressed   HENT:   Head: Normocephalic and atraumatic.   Mouth/Throat: No oropharyngeal exudate.   Eyes: Pupils are equal, round, and reactive to light. No scleral icterus.   Neck: No JVD present.   Cardiovascular: Regular rhythm and normal heart sounds.  Exam reveals no friction rub.    No murmur heard.  Pulmonary/Chest: He is in respiratory distress. He has no wheezes. He has no rales. He exhibits no tenderness.   Pt appears to be slightly dyspneic, rhonchorus breath sounds present in TYLER.    Abdominal: Soft. He exhibits no distension. There is no tenderness. There is no rebound and no guarding.   Musculoskeletal: Normal range of motion. He exhibits no edema.   Lymphadenopathy:     He has no cervical adenopathy.   Neurological: He is alert and oriented to person, place, and time.   Pt appears A&O but does seem to have some slowed mentation    Skin: Skin is warm and dry. Capillary refill takes less than 2 seconds.       Significant Labs:   CBC:   Recent Labs  Lab 11/30/17  0421 12/01/17  0856   WBC 17.21* 21.00*   HGB 9.3* 8.8*   HCT 30.8* 29.2*    262     CMP:   Recent Labs  Lab 11/30/17  0421 12/01/17  0856    142   K  4.4 4.7    109   CO2 21* 21*   * 135*   BUN 21 37*   CREATININE 3.0* 4.5*   CALCIUM 10.5 10.6*   PROT 7.0 6.5   ALBUMIN 2.8* 2.5*   BILITOT 0.4 0.3   ALKPHOS 196* 182*   AST 84* 70*   ALT 18 20   ANIONGAP 13 12   EGFRNONAA 18.1* 11.1*     Lactic Acid: No results for input(s): LACTATE in the last 48 hours.  Microbiology Results (last 7 days)     Procedure Component Value Units Date/Time    Blood culture [[id number]] Collected:  11/25/17 2222    Order Status:  Completed Specimen:  Blood Updated:  12/01/17 0612     Blood Culture, Routine No growth after 5 days.    Blood culture [[id number]] Collected:  11/30/17 2050    Order Status:  Completed Specimen:  Blood from Peripheral, Lower Arm, Left Updated:  12/01/17 0515     Blood Culture, Routine No Growth to date    Narrative:       Left Peripheral    Blood culture [[id number]] Collected:  11/30/17 2038    Order Status:  Completed Specimen:  Blood Updated:  12/01/17 0515     Blood Culture, Routine No Growth to date    Narrative:       Left peripheral #2    Fungus Culture, Blood or Bone Marrow [[id number]] Collected:  11/25/17 2222    Order Status:  Completed Specimen:  Blood from Blood Updated:  11/29/17 0955     Fungus Cult, blood or BM Culture in progress    Blood culture [[id number]] Collected:  11/23/17 0909    Order Status:  Completed Specimen:  Blood Updated:  11/28/17 1212     Blood Culture, Routine No growth after 5 days.    Blood culture [[id number]] Collected:  11/23/17 0909    Order Status:  Completed Specimen:  Blood Updated:  11/28/17 1212     Blood Culture, Routine No growth after 5 days.    Culture, Respiratory with Gram Stain [[id number]] Collected:  11/25/17 1420    Order Status:  Completed Specimen:  Respiratory from Sputum, Induced Updated:  11/27/17 1215     Respiratory Culture --     CANDIDA ALBICANS  Moderate       Gram Stain (Respiratory) >10 epithelial cells per low power field     Gram Stain (Respiratory) Many WBC's     Gram Stain  (Respiratory) Moderate yeast     Gram Stain (Respiratory) Rare Gram positive rods    Culture, Respiratory with Gram Stain [[id number]] Collected:  11/24/17 1530    Order Status:  Completed Specimen:  Respiratory from Sputum, Expectorated Updated:  11/27/17 1158     Respiratory Culture --     CANDIDA ALBICANS  Few       Gram Stain (Respiratory) <10 epithelial cells per low power field.     Gram Stain (Respiratory) Few WBC's     Gram Stain (Respiratory) Few Gram positive cocci     Gram Stain (Respiratory) Rare yeast     Gram Stain (Respiratory) Rare Gram positive rods        Procalcitonin: No results for input(s): PROCAL in the last 48 hours.    Significant Imaging: I have reviewed all pertinent imaging results/findings within the past 24 hours.

## 2017-12-01 NOTE — PLAN OF CARE
Problem: SLP Goal  Goal: SLP Goal  Speech Language Pathology Goals  Goals to be met by 12/04/17  1. Pt will tolerate dental soft diet without overt S/S aspiration, Supervision  2. Pt will tolerate nectar-thickened liquids without overt S/S aspiration, Supervision  3. Pt will complete dysphagia exercises x10n   4. Educate pt and family on S/S aspiration and aspiration precautions -ongoing     Goals expected to be met by 11/27/17  1. Pt will tolerate dental soft diet without overt S/S aspiration, Supervision =ongoing   2. Pt will tolerate nectar-thickened liquids without overt S/S aspiration, Supervision- ongoing  3. Pt will complete dysphagia exercises x10n -ongoing  4. Educate pt and family on S/S aspiration and aspiration precautions -ongoing           Outcome: Ongoing (interventions implemented as appropriate)  Pt with decreased tolerance of diet/liquids and increased lethargy. Discussed diet downgrade to honey thick vs. NPO with MD.  unable to assess honey thick liquids at the bedside this date 2/2 back pain and lethargy. See note.  YUMIKO Ortiz., CCC-SLP  Pager: [phone]  12/01/2017

## 2017-12-01 NOTE — ASSESSMENT & PLAN NOTE
-tramadol 50 q8 prn  - decreased lyrica to 50   - monitor for signs of infection given leukocytosis ; recent infectious workup negative - only consistent with candidiasis

## 2017-12-01 NOTE — PROGRESS NOTES
"[facility] Hospital Medicine  Progress Note    Patient Name: Donta Cline  MRN: [id number]  Patient Class: IP- Inpatient   Admission Date: 11/14/2017  Length of Stay: 16 days  Attending Physician: Gurmeet Alonso MD  Primary Care Provider: ZAID Richardson Iii, MD    [facility] Medicine Team: [facility] Gurmeet Alonso MD    Subjective:     Principal Problem:Leukocytosis    HPI:  Donta Cline is a 85 y.o. male who presents with PMHx of ESRD with dialysis MWF, HFpEF, NSTEMI, bladder/prostate cancer, DM II, aortic stenosis and CAD for evaluation of back pain s/p lumbar fusion/laminectomy (10/20/17). No family at bedside. Difficult to obtain HPI as speech garbled, however patient endorses progressive lower extremity weakness with difficulty ambulating for the past 4-5 days. His back pain is without radiation. Patient is now unable to ambulate independently. Denies trauma and urinary bowel/bladder incontinence, fever.    Per EDMD:  "The daughter brought patient to ED due to concern of his severe back pain and inability to perform activities as he was doing previously. She had discussed with Dr. Saul, and he recommended the patient come to ED and obtain imaging for spine."    Imaging:    Ct Lumbar Spine Without Contrast    Result Date: 11/14/2017  Comparison: MRI 10/12/17. Findings: Routine CT lumbar spine protocol performed without IV contrast. Prior L2-L4 instrumented lumbar fusion with interbody disc spacer at L3-4. Decompressive posterior laminectomies at from L2-3 through L5-S1. No evidence of hardware failure. No fracture identified, noting the inferior endplate of L4 is indistinct. Infection or postsurgical fluid collection not excluded, noting limited evaluation without IV contrast and artifact from adjacent pedicle screws. There is partial fusion of the right L5-S1 facet. The SI joints are unremarkable. There is extensive calcified atherosclerotic disease. Atrophic kidneys. Partially " imaged right renal stent noted. No hydronephrosis. Small renal lesions, too small to characterize without IV contrast.      Mri Lumbar Spine Without Contrast    Result Date: 11/14/2017  MRI LUMBAR SPINE TECHNIQUE: MRI lumbar spine was performed without contrast. There is an ill-defined heterogeneous collection encircling L1 spinous process demonstrating fluid levels on the right. This may represents a seroma or hematoma postoperatively however abscess cannot be excluded. This collection is best seen on series 11 image 4.    Hospital Course:  In discussion with daughter who is at bedside today (11/15) patient was discharged after surgical procedure to an inpatient rehab and he reports that he was doing well at the rehab but reached a plateau in his activity/functional level.  She states patient was walking 50 feet with minimal assist, able to toilet with minimal assistance.  Patient was transferred to SNF and since transfer, last Friday (11/10) patient requiring max assistance to stand and reported inability to bear weight secondary to pain, and since this time patient has been bed bound.     She also notes that patient with poor appetite as speech therapy has recommended puree nectar thick diet with Nepro shakes, reports that pt is losing weight and some increased abdominal girth.     Overnight patient admitted due to concerns of worsening pain and inability to ambulate, patient underwent MRI and CT imaging of the lumbar spine.  He has intact fusion, no spinal canal stenosis or cord compression noted, concern for a fluid collection encircling L1 spinous process, post op L2-L4 fusion changes and s/p laminectomy L3-L4.  Discussed imaging findings with attending neurosurgeon Dr. Sands who performed pts operation and he reported that fluid collection appears outside of range where instrumentation was.  Patient with no reported falls, workup today reveals elevated CRP >150, ESR 92, has as supratherapeutic INR  3.8    Informed plan will be to take patient to OR for washout and exploration to evaluate for hematoma vs infection.     11/16 - Patient without acute events overnight, patient states he and his daughter are going to [place] today.  Daughter has provided consent for surgery and blood products, patient is receiving serial doses of FFP with intermittent INR checks with plan for surgery this afternoon if INR <1.4.  Daughter reports that patient w/o complaints of pain when lying in bed, but if he is moved/turned causes exacerbation of his pain.     Patient taken for operative intervention and in discussion with neurosurgeon Dr. Sands, he reports that patient with well healed wound and surgical fusion was intact, when cut into the dura and in subdural region there was area of hematoma that was evacuated (full op report pending in EMR)  Cultures sent to rule out infection but clinical impression was not an appearance of an infected operative site.  Dr. Sands noted that degree of patient's pain incongruent with surgical findings.     Post-oepratively in the morning patient coverted to afib with RVR and this morning with borderline BP, his Hgb downtrending since admission and was 6.7 post-op.  Patient received intermittent fluid boluses early AM of 11/17 but also with intake of 3+L prior to surgery due to need for high amount of FFP (6units).  Patient will receive 1 unit PRBC during Hemodialysis today, giving midodrine as well.     11/18 - Overnight initiated CPAP for patient due to concerns for pulmonary edema, blood gas with some hypercapnia but ph 7.3,  Discussed with Nephrology patient to receive UF today, and will give another 1unit PRBC due to hgb 6.8-7.0.  Pt reports slept well with cpap, still has back pain with movement and on examination.  Culture data from surgery remains negative - neurosurgery has ordered cefazolin, will renally dose this.     11/19 - Patient seen this AM, awake, alert, reporting still having  back pain and reporting frequent cough today, coughing during interview.  S/p UF by nephrology yesterday and negative -1.4L total for the day and s/p 2nd unit PRBC and hgb is stable at 8.6, remains in afib and blood pressures are stable.      11/20 _Patient with ongoing back pain, neurosurgery removed 2 surgical drains,  Hemoglobin remains  Stable  W/o requiring  Transfusion.  He remains in Afib rhythmwise and is  Planned for dialysis.  PT worked with pt and SLP eval pt upgraded to Dental soft nectar thick liquids.     11/20- Called by HD unit and patient 1 hour into session with AFIB with RVR and BP in 80/50s, given 300cc bolus by nephrology and asking for additional recs, HD session stopped early and 250cc bolus given and stat CBC ordered.      11/21 - Pt tolerated dialysis well. Completed 3 hours.    11/22-11/24. Pt had increasing delirium and wbc elevated thus started on ceftriaxone based on dirty UA. DDx includes aspiration PNA as pt with cough.     11/24: Called to the bedside around 4:00pm as pt had increasing oxygen requirements - sating in low 80's on 2L and complaining of SOB. Also with increasing audible rales. Vitals were significant for hypotension - with BP 80/40-50's with HR in the 80's. RR in the high 20-30's. Exam significant for diffuse rhonchi bilaterally.      Pt given 250 cc bolus and oxygen increased to 5L. Pt deep suctioned.  Vitals improved after bolus to 100/50's , HR 80's, RR improved, O2 sats improved to high %. Hypotension and hypoxia likely  sepsis secondary PNA  given increasing oxygen requirement, notable cough in recent days, and leukocytosis. Abx broadened to vanc and cefepime for presumed hospital acquired PNA. DDx includes aspiration PNA as pt has had delirium in recent days.      11/25: Started on Lyrica for back pain,. Reports new onset difficulty swallowing. Respiratory culture positive for yeast. Started fluconazole.    11/26: ID consulted. Recommending DC vanc/cefepime-  unlikely PNA. Continue fluconazole.    11/28 - Patient seen, upset that symptoms havent improved, ready to leave the hospital.  Wants to get out of the bed.     11/29 - noted by nursing to be hypoxic this morning, placed on non-rebreather and  Tapered to 4L nasal cannula o2.  Patient taken for routine dialysis and had UF of 2 liters, had some hypotensionin HD requiring albumin infusion and midodrine dosage.  This afternoon patient w/o acute complaints, wishes he was feeling better.     11/30 - patient with some depressed mental status, difficult to understand speech today, overall patient with rising WBC over the last week, remains with O2 requireiment but not as high as last week, has undergone HD sessions w/o dramatic improvement in symptoms, has been afebrile.     Updated daughter (Wendy) regarding his condition this week, given rising WBC cound, repeat infectious workup with cultures and obtain Pan CTA chest to eval lungs and rule out PE as recd by ID and also CT abd/pelvis, triple phase to characterize liver lesion and check for signs of infection.     12/1 - Patient seen after dialysis this afternoon.  Patient appears in moderate distress, mouth breathing, oxygen tubing tangled in bed.  He is oriented to self/year, believes he's in [place].  When oriented to [facility], states he was there for 4 weeks because of a problem with his back.   RN reports patient arrived from dialysis disoriented, on face mask oxygen supplementation, reports that unable to get oxygen saturation.     CTA Chest is pending, CT abdomen will have to be completed on a different Day.  ID re-consulted given rising WBC count and progressive encephalopathy.   Speech contacted me and reports concern that patient aspirating and recommend NPO, Pureed with Honey thick liquids if preference is to feed pt, will discuss with daughter.     Interval History: as above    Review of Systems   Unable to perform ROS: Mental status change   Gastrointestinal:  Negative for constipation.   Musculoskeletal: Positive for back pain and gait problem. Negative for joint swelling, myalgias and neck pain.   Psychiatric/Behavioral: Positive for confusion. Negative for behavioral problems.     Objective:     Vital Signs (Most Recent):  Temp: 97.6 °F (36.4 °C) (12/01/17 1216)  Pulse: (!) 111 (12/01/17 1500)  Resp: 20 (12/01/17 1435)  BP: (!) 93/50 (12/01/17 1216)  SpO2: (!) 89 % (12/01/17 1435) Vital Signs (24h Range):  Temp:  [97.6 °F (36.4 °C)-99.6 °F (37.6 °C)] 97.6 °F (36.4 °C)  Pulse:  [] 111  Resp:  [17-22] 20  SpO2:  [89 %-100 %] 89 %  BP: ()/(32-81) 93/50     Weight: 74.5 kg (164 lb 3.9 oz)  Body mass index is 30.04 kg/m².    Intake/Output Summary (Last 24 hours) at 12/01/17 1546  Last data filed at 12/01/17 1216   Gross per 24 hour   Intake              650 ml   Output              791 ml   Net             -141 ml      Physical Exam   Constitutional: He appears well-developed.   Awake/alert   HENT:   Mouth/Throat: Oropharynx is clear and moist.   Wet quality to voice   Eyes: Conjunctivae are normal. No scleral icterus.   Cardiovascular: Normal heart sounds and intact distal pulses.    No murmur heard.  Irregular rhythm, tachycardic   Pulmonary/Chest: Effort normal. He has no wheezes.   Mouth breathing, accessory muscle use, Bilateral coarse breath sounds with crackles present   Abdominal: Soft. Bowel sounds are normal.   Distended abdomen, abdominal wall asymmetric distension, Left >Right.  Non-tender, non-rigid, no guarding.    Musculoskeletal:   RUE forearm AV fistula with palpable thrill.      Lymphadenopathy:     He has no cervical adenopathy.   Neurological:   Lethargic, arousable, Oriented to Self, year   Skin: Skin is warm. There is pallor.       Significant Labs:   CBC:     Recent Labs  Lab 11/30/17  0421 12/01/17  0856   WBC 17.21* 21.00*   HGB 9.3* 8.8*   HCT 30.8* 29.2*    262     CMP:     Recent Labs  Lab 11/30/17  0421 12/01/17  0856     142   K 4.4 4.7    109   CO2 21* 21*   * 135*   BUN 21 37*   CREATININE 3.0* 4.5*   CALCIUM 10.5 10.6*   PROT 7.0 6.5   ALBUMIN 2.8* 2.5*   BILITOT 0.4 0.3   ALKPHOS 196* 182*   AST 84* 70*   ALT 18 20   ANIONGAP 13 12   EGFRNONAA 18.1* 11.1*       Significant Imaging:   CTA Chest pending.       Assessment/Plan:      * Leukocytosis    -WBC count has been rising since 11/27, patient was stopped from broad spectrum antibiotics due to concerns that cefepime may have been causing AMS and allowing Candidiasis to spread.   -obtain CTA chest to rule out PE and eval for parenchymal disease and then CT abdomen to rule out abdominal source.   -Reconsult ID  -Concern for possible aspiration pneumonia          Acute bilateral low back pain, post-lumbar spinal fusion    - s/p washout and evacuation of hematoma on 11/16  - Hgb dropped during admission admit - s/p 2unit pRBC, Hgb now grossly stable  - Supportive care, PT/OT  - Pain control with scheduled tylenol 650 q8hrs;  tramadol 50 qhs for breakthrough ; will cont pregabalin to 75mg Qd for now ; also add flexeril as muscle relaxants apparently have worked in the past   - component of pain likely severe right hip osteoarthritis - will cont lidocaine patches   - Mobility remains poor and strength in hip flexors/proximally still poor.Patient unable to participate with therapy, poor progress from functional standpoint.         Delirium    Repeat infectious workup  -ABG to rule out hypercapnia  -Rising leukocytosis of unclear etiology.   -Delirium precautions.   -May have to make patient NPO from a safety standpoint.   or for signs of infection given leukocytosis ; recent infectious workup negative - only consistent with candidiasis            Paroxysmal atrial fibrillation    -HR stable,remains in Afib rhythm wise  -continue metoprolol 25 TID  -continue scheduled midodrine TID for now as pt tolerted dialysis well with this; discuss with nephrology - may only be needed  on dialysis days         ESRD (end stage renal disease) on dialysis    - nephrology consulted  -12/1 - patient s/p HD but with minimal volume removal kept nearly even (-181)        Acute respiratory failure with hypoxia    -Etiology for aspiration pneumonia vs pulmonary edema vs fungal pneumonia  -difficulty obtaining oxygen saturations, progressive delirium with rising leukocytosis.   -CTA Chest pending to evaluate parenchymal disease and rule out PE  -Obtain ABG now to determine if pt is hypercapnic too, and if he may require Non-invasive ventilation.           L-S radiculopathy    Management as above, patient now with chronic pain that is debilitating and patient has been mostly bed bound since his surgery.           Chronic diastolic heart failure    -last echo 10/2017 with pulmonary HTN and undetermined diastolic function, EF 55  -continue metoprolol 25 TID   -HD is main source of volume removal          Status post lumbar spinal fusion    -neurosurgery consultation as above          Coronary artery disease involving native coronary artery of native heart without angina pectoris    -continue asa, statin          Type 2 diabetes mellitus with chronic kidney disease on chronic dialysis, with long-term current use of insulin    Reduce levemir dose, as pt with minimal oral intake, may need NPo        Severe aortic stenosis    - no acute intervention at this time.   - Will cont to monitor         Abnormal US (ultrasound) of abdomen    - RUQ US given elevated LFT's - showed hypodense area - no evidence of infection  CT scan ordered.           Candidiasis    Per family pt with hx of yeast in urine - has old urinary stent in place.  Respiratory cx from 11/24 and 11/25 growing yeast .   - cont diflucan for now 200 qd x 10 days - renal dosing            Back pain                VTE Risk Mitigation         Ordered     Medium Risk of VTE  Once      11/17/17 0011     Place sequential compression device  Until discontinued       11/14/17 1907     Place BRAIN hose  Until discontinued      11/14/17 1907              Gurmeet Alonso MD  Department of Hospital Medicine   [facility]

## 2017-12-01 NOTE — PROGRESS NOTES
Pt arrived via stretcher.  Placed on cardiac monitor and b/p check every 15 minutes. Right arm fistuala Dialysis access prep with prevantic swab.  maintenace   Hemodialysis started per orders.

## 2017-12-01 NOTE — PT/OT/SLP PROGRESS
Physical Therapy      Donta Cline  MRN: [id number]    Patient not seen today secondary to pt away at dialysis first attempt, and pt being seen by SLP second attempt. Will follow-up at next scheduled visit per PT POC.    Simona Lea, PTA

## 2017-12-01 NOTE — ASSESSMENT & PLAN NOTE
- nephrology consulted  -12/1 - patient s/p HD but with minimal volume removal kept nearly even (-181)

## 2017-12-01 NOTE — ASSESSMENT & PLAN NOTE
-Etiology for aspiration pneumonia vs pulmonary edema vs fungal pneumonia  -difficulty obtaining oxygen saturations, progressive delirium with rising leukocytosis.   -CTA Chest pending to evaluate parenchymal disease and rule out PE  -Obtain ABG now to determine if pt is hypercapnic too, and if he may require Non-invasive ventilation.

## 2017-12-01 NOTE — ASSESSMENT & PLAN NOTE
Mr. Cline is a 85 y.o. male with ESRD on dialysis MWF, HFpEF, NSTEMI, bladder/prostate cancer, DM II, aortic stenosis and CAD, previous fungal UTI's now having continual increased O2 requirements and some slowed mentation with leucocytosis  - pt continues to have increased O2 demands, although he denies any SOB  - on exam has some rhonchorus sounds in the left upper lobe  - pt remains afebrile but has had increasing WBC  - blood cultures from yesterday are NGTD, recommend UA and UCx  - CXR actually appears slightly improved from previous, will follow up on CTA PE later today  - recommend starting moxifloxacin 400 mg PO daily for now, can broaden if pt's condition deteriorates  - will continue to follow.

## 2017-12-01 NOTE — ASSESSMENT & PLAN NOTE
- s/p washout and evacuation of hematoma on 11/16  - Hgb dropped during admission admit - s/p 2unit pRBC, Hgb now grossly stable  - Supportive care, PT/OT  - Pain control with scheduled tylenol 650 q8hrs;  tramadol 50 qhs for breakthrough ; will cont pregabalin to 75mg Qd for now ; also add flexeril as muscle relaxants apparently have worked in the past   - component of pain likely severe right hip osteoarthritis - will cont lidocaine patches   - Mobility remains poor and strength in hip flexors/proximally still poor.Patient unable to participate with therapy, poor progress from functional standpoint.

## 2017-12-01 NOTE — SUBJECTIVE & OBJECTIVE
Interval History: as above    Review of Systems   Constitutional: Positive for appetite change. Negative for chills, diaphoresis, fatigue and fever.   HENT: Negative for rhinorrhea, sinus pain, sinus pressure, sneezing and sore throat.    Eyes: Negative for visual disturbance.   Respiratory: Negative for cough, choking, chest tightness and shortness of breath.    Cardiovascular: Negative for chest pain, palpitations and leg swelling.   Gastrointestinal: Positive for constipation. Negative for diarrhea and nausea.        Denies bowel incontinence   Genitourinary: Negative for dysuria.   Musculoskeletal: Positive for back pain and gait problem. Negative for joint swelling, myalgias and neck pain.   Neurological: Negative for dizziness, facial asymmetry, light-headedness and headaches.   Psychiatric/Behavioral: Negative for agitation and behavioral problems.     Objective:     Vital Signs (Most Recent):  Temp: 98 °F (36.7 °C) (11/30/17 1704)  Pulse: (!) 120 (11/30/17 1704)  Resp: 18 (11/30/17 1242)  BP: 129/81 (11/30/17 1704)  SpO2: 98 % (11/30/17 1248) Vital Signs (24h Range):  Temp:  [97.9 °F (36.6 °C)-98.5 °F (36.9 °C)] 98 °F (36.7 °C)  Pulse:  [] 120  Resp:  [16-20] 18  SpO2:  [94 %-99 %] 98 %  BP: (107-139)/(52-81) 129/81     Weight: 74.5 kg (164 lb 3.9 oz)  Body mass index is 30.04 kg/m².  No intake or output data in the 24 hours ending 11/30/17 2011   Physical Exam   Constitutional: He appears well-developed.   Awake/alert   HENT:   Mouth/Throat: Oropharynx is clear and moist.   Wet quality to voice   Eyes: Conjunctivae are normal. No scleral icterus.   Cardiovascular: Normal heart sounds and intact distal pulses.    No murmur heard.  Irregular rhythm, tachycardic   Pulmonary/Chest: Effort normal. No respiratory distress. He has no wheezes.   Bilateral coarse breath sounds with crackles present   Abdominal: Soft. Bowel sounds are normal.   Mild distension, soft, tympanic to percussion, no fluid wave noted,  no bulging flanks, or abdominal collateral vessels   Musculoskeletal:   RUE forearm AV fistula with palpable thrill.      Lymphadenopathy:     He has no cervical adenopathy.   Neurological:   Alert, oriented to self, hospital, year    Skin: Skin is warm. There is pallor.       Significant Labs:   CBC:     Recent Labs  Lab 11/29/17  0444 11/30/17  0421   WBC 14.61* 17.21*   HGB 8.5* 9.3*   HCT 28.4* 30.8*    320     CMP:     Recent Labs  Lab 11/29/17 0444 11/30/17  0421    140   K 3.6 4.4   CL 98 106   CO2 26 21*   * 136*   BUN 37* 21   CREATININE 4.4* 3.0*   CALCIUM 10.0 10.5   PROT 6.3 7.0   ALBUMIN 2.3* 2.8*   BILITOT 0.4 0.4   ALKPHOS 207* 196*   AST 87* 84*   ALT 15 18   ANIONGAP 15 13   EGFRNONAA 11.4* 18.1*       Significant Imaging:   CXR AP film 11/29/17  Findings:  The cardiomediastinal silhouette or is stable in configuration noting calcification of the aortic arch.  There is no pleural effusion.  The trachea is midline.  The lungs are symmetrically expanded bilaterally with coarse interstitial attenuation.  There is bilateral basilar subsegmental atelectasis, noting the process is slightly more focal overlying the left lower lung zone however has not significantly changed since the previous exam in fact, there appears to be somewhat better aeration suggesting interval improvement of possible airspace process versus edema.  No definite new large focal consolidation, continued followup advised..   There is a suspected skin fold projected over the lateral aspect of the right lung zones rather than pneumothorax although consider reimaging for confirmation..  The osseous structures are unchanged.

## 2017-12-01 NOTE — PLAN OF CARE
WBC 21  Right Care Assessment   Can the patient answer the patient profile reliably? (spoke with pt's daughter, Wendy Cisneros to update her. Wbc 21. ID w/u      12/01/17 1031   Right Care Assessment   Can the patient answer the patient profile reliably? (CM spoke with pt's daughter, Wendy Cisneros over the phone to update her.)   How often would a person be available to care for the patient? Whenever needed   Describe the patient's ability to walk at the present time. Major restrictions/daily assistance from another person   How does the patient rate their overall health at the present time? Poor   Number of comorbid conditions (as recorded on the chart) Five or more   During the past month, has the patient often been bothered by feeling down, depressed or hopeless? No   Have you felt down, depressed, or hopeless? 0   During the past month, has the patient often been bothered by little interest or pleasure in doing things? No      Describe the patient's ability to walk at the present time. Major restrictions/daily assistance from another person   How does the patient rate their overall health at the present time? Poor   Number of comorbid conditions (as recorded on the chart) Five or more   During the past month, has the patient often been bothered by feeling down, depressed or hopeless? No   Have you felt down, depressed, or hopeless? 0   During the past month, has the patient often been bothered by little interest or pleasure in doing things? No      Dc plan: [facility] SNF when med stable.

## 2017-12-01 NOTE — SUBJECTIVE & OBJECTIVE
Interval History: as above    Review of Systems   Unable to perform ROS: Mental status change   Gastrointestinal: Negative for constipation.   Musculoskeletal: Positive for back pain and gait problem. Negative for joint swelling, myalgias and neck pain.   Psychiatric/Behavioral: Positive for confusion. Negative for behavioral problems.     Objective:     Vital Signs (Most Recent):  Temp: 97.6 °F (36.4 °C) (12/01/17 1216)  Pulse: (!) 111 (12/01/17 1500)  Resp: 20 (12/01/17 1435)  BP: (!) 93/50 (12/01/17 1216)  SpO2: (!) 89 % (12/01/17 1435) Vital Signs (24h Range):  Temp:  [97.6 °F (36.4 °C)-99.6 °F (37.6 °C)] 97.6 °F (36.4 °C)  Pulse:  [] 111  Resp:  [17-22] 20  SpO2:  [89 %-100 %] 89 %  BP: ()/(32-81) 93/50     Weight: 74.5 kg (164 lb 3.9 oz)  Body mass index is 30.04 kg/m².    Intake/Output Summary (Last 24 hours) at 12/01/17 1546  Last data filed at 12/01/17 1216   Gross per 24 hour   Intake              650 ml   Output              791 ml   Net             -141 ml      Physical Exam   Constitutional: He appears well-developed.   Awake/alert   HENT:   Mouth/Throat: Oropharynx is clear and moist.   Wet quality to voice   Eyes: Conjunctivae are normal. No scleral icterus.   Cardiovascular: Normal heart sounds and intact distal pulses.    No murmur heard.  Irregular rhythm, tachycardic   Pulmonary/Chest: Effort normal. He has no wheezes.   Mouth breathing, accessory muscle use, Bilateral coarse breath sounds with crackles present   Abdominal: Soft. Bowel sounds are normal.   Distended abdomen, abdominal wall asymmetric distension, Left >Right.  Non-tender, non-rigid, no guarding.    Musculoskeletal:   RUE forearm AV fistula with palpable thrill.      Lymphadenopathy:     He has no cervical adenopathy.   Neurological:   Lethargic, arousable, Oriented to Self, year   Skin: Skin is warm. There is pallor.       Significant Labs:   CBC:     Recent Labs  Lab 11/30/17  0421 12/01/17  0856   WBC 17.21* 21.00*    HGB 9.3* 8.8*   HCT 30.8* 29.2*    262     CMP:     Recent Labs  Lab 11/30/17  0421 12/01/17  0856    142   K 4.4 4.7    109   CO2 21* 21*   * 135*   BUN 21 37*   CREATININE 3.0* 4.5*   CALCIUM 10.5 10.6*   PROT 7.0 6.5   ALBUMIN 2.8* 2.5*   BILITOT 0.4 0.3   ALKPHOS 196* 182*   AST 84* 70*   ALT 18 20   ANIONGAP 13 12   EGFRNONAA 18.1* 11.1*       Significant Imaging:   CTA Chest pending.

## 2017-12-01 NOTE — PLAN OF CARE
Problem: Patient Care Overview  Goal: Plan of Care Review  Outcome: Ongoing (interventions implemented as appropriate)  Patient remains disoriented to place/time/situation. VSS. Afebrile. Pain medication administered for complaints of back pain. Removing telemetry and nasal cannula. Per MD, infectious workup started for pt's worsening mental status, BCX drawn. Will continue to monitor.

## 2017-12-01 NOTE — ASSESSMENT & PLAN NOTE
-WBC count has been rising since 11/27, patient was stopped from broad spectrum antibiotics due to concerns that cefepime may have been causing AMS and allowing Candidiasis to spread.   -obtain CTA chest to rule out PE and eval for parenchymal disease and then CT abdomen to rule out abdominal source.   -Reconsult ID  -Concern for possible aspiration pneumonia

## 2017-12-01 NOTE — ASSESSMENT & PLAN NOTE
- RUQ US given elevated LFT's - showed hypodense area - no evidence of infection  CT scan ordered.

## 2017-12-01 NOTE — PROGRESS NOTES
Dialysis complete.  Blood returned.   Hemostasis complete.   Held pressure to  Right lower arm   A/v site for  10  Minutes to each site.   Guaze and tape applied to needle site.   No active bleeding noted.   + thrill and bruit.    Pt tolerated hemodialysis without diff.   Pt ran   2.5 Hrs on hemodialysis machine.   Took off  191   Net volume.   Used   f160    Dialyzer.    A-fib /tachycardia noted thru out tx.

## 2017-12-01 NOTE — NURSING
Patient back from dialysis. Attempted multiple times to get an oxygen sat and was unsuccessful. Used a portable oxygen reader and received a 92%. Patient on 4L per face mask due to mouth breathing. Fingers are cool to the touch. Patient disoriented but answers questions about pain, comfort, hunger. Disoriented to place, time and situation. Infectious Disease MD at bedside to assess patient. Did not take AM meds due to having difficulty swallowing liquids and refusing food. Patient coughs as soon as he drinks a few sips of liquid. Vitals 93/50, 22, 105, 97.6. Speech at bedside to re-evaluate swallowing

## 2017-12-01 NOTE — ASSESSMENT & PLAN NOTE
-WBC count has been rising since 11/27, patient was stopped from broad spectrum antibiotics due to concerns that cefepime may have been causing AMS and allowing Candidiasis to spread.     -obtain CTA chest to rule out PE and eval for parenchymal disease, CT abdomen to rule out abdominal source.

## 2017-12-01 NOTE — SUBJECTIVE & OBJECTIVE
Past Medical History:   Diagnosis Date    Bladder cancer     Chronic diastolic heart failure 8/21/2012    3/13: AOSAT: 98, FICKCI: 2.41, FICKCO: 5.18 PAPRES: 34/16 (23), PASAT: 65, PVR: 1.74 PWPRES: 18/18 (14), RA PRES: 14/13 (12), RV: 40/0, 10     Chronic hip pain, right 10/4/2017    Coronary artery disease involving native coronary artery of native heart without angina pectoris     Dyslipidemia     Encounter for blood transfusion     ESRD (end stage renal disease) on dialysis 6/9/2014    Essential hypertension     Hypercholesteremia 8/21/2012    Long-term (current) use of anticoagulants 10/9/2015    NSTEMI (non-ST elevated myocardial infarction) 10/4/2017    Paroxysmal atrial fibrillation 10/8/2015    Prostate cancer     Severe aortic stenosis 10/14/2014    Type 2 diabetes mellitus with chronic kidney disease on chronic dialysis, with long-term current use of insulin 12/15/2013    Ventricular tachycardia     monomorphic       Past Surgical History:   Procedure Laterality Date    BACK SURGERY      laminectomy x2    COLON SURGERY      EXTENSIVE PROSTATE SURGER      Prostatectomy, Radical    JOINT REPLACEMENT      left hip       Review of patient's allergies indicates:   Allergen Reactions    Morphine Other (See Comments)     Other reaction(s): severe abdominal pain    Darvocet a500  [propoxyphene n-acetaminophen]      Other reaction(s): Unknown       Medications:  Prescriptions Prior to Admission   Medication Sig    amitriptyline (ELAVIL) 25 MG tablet Take 25 mg by mouth nightly as needed for Insomnia.    aspirin (ECOTRIN) 81 MG EC tablet Take 81 mg by mouth once daily.    bisacodyl (DULCOLAX) 10 mg Supp Place 10 mg rectally daily as needed.    celecoxib (CELEBREX) 200 MG capsule Take 200 mg by mouth 2 (two) times daily.    hydrocodone-acetaminophen 10-325mg (NORCO)  mg Tab Take 1 tablet by mouth every 6 (six) hours as needed for Pain.     insulin detemir (LEVEMIR FLEXTOUCH) 100  unit/mL (3 mL) SubQ InPn pen Inject 14 Units into the skin every evening.    midodrine (PROAMATINE) 10 MG tablet Take 1 tablet (10 mg total) by mouth 3 (three) times daily.    ondansetron (ZOFRAN) 4 MG tablet Take 4 mg by mouth every 8 (eight) hours as needed for Nausea.    pantoprazole (PROTONIX) 40 MG tablet Take 1 tablet (40 mg total) by mouth nightly.    sertraline (ZOLOFT) 100 MG tablet Take 1 tablet by mouth every evening.    atorvastatin (LIPITOR) 80 MG tablet Take 1 tablet (80 mg total) by mouth once daily.    coenzyme Q10 200 mg capsule Take 200 mg by mouth once daily.    gabapentin (NEURONTIN) 300 MG capsule Take 300 mg by mouth every evening.    metoprolol tartrate (LOPRESSOR) 25 MG tablet Take 1 tablet (25 mg total) by mouth 2 (two) times daily.    NITROSTAT 0.4 mg SL tablet place 1 tablet under the tongue if needed every 5 minutes for chest pain for 3 doses IF NO RELIEF AFTER 3RD DOSE CALL PRESCRIBER .    ramelteon (ROZEREM) 8 mg tablet Take 1 tablet (8 mg total) by mouth nightly as needed for Insomnia.    senna-docusate 8.6-50 mg (PERICOLACE) 8.6-50 mg per tablet Take 1 tablet by mouth 2 (two) times daily.    vit b cmplx 3-fa-vit c-biotin 1- mg-mg-mcg (ANKIT-AZ RX) 1- mg-mg-mcg Tab Take 1 tablet by mouth once daily.     Antibiotics     Start     Stop Route Frequency Ordered    11/17/17 0900  mupirocin 2 % ointment 1 g      11/22 0859 Nasl 2 times daily 11/17/17 0011        Antifungals     Start     Stop Route Frequency Ordered    11/26/17 2100  miconazole NITRATE 2 % top powder      -- TP 2 times daily 11/26/17 1206    11/25/17 1730  fluconazole tablet 200 mg      -- Oral Daily 11/25/17 1718        Antivirals     None           There is no immunization history for the selected administration types on file for this patient.    Family History     None        Social History     Social History    Marital status:      Spouse name: N/A    Number of children: N/A     Years of education: N/A     Social History Main Topics    Smoking status: Former Smoker     Packs/day: 3.00     Years: 40.00     Quit date: 1/1/1980    Smokeless tobacco: Former User    Alcohol use No      Comment: a few drinks    Drug use: Unknown    Sexual activity: Not Asked     Other Topics Concern    None     Social History Narrative    None     Review of Systems   Constitutional: Negative for chills and fever.   Respiratory: Positive for cough. Negative for chest tightness and shortness of breath.    Cardiovascular: Negative for chest pain and palpitations.   Gastrointestinal: Negative for abdominal distention, abdominal pain, constipation, diarrhea, nausea and vomiting.   Genitourinary: Negative for difficulty urinating, dysuria and hematuria.   Musculoskeletal: Positive for myalgias.   Skin: Negative for color change and rash.   Neurological: Negative for dizziness and weakness.   Psychiatric/Behavioral: Positive for confusion.     Objective:     Vital Signs (Most Recent):  Temp: 97.6 °F (36.4 °C) (12/01/17 1216)  Pulse: 105 (12/01/17 1216)  Resp: (!) 22 (12/01/17 1216)  BP: (!) 93/50 (12/01/17 1216)  SpO2: 98 % (12/01/17 0709) Vital Signs (24h Range):  Temp:  [97.6 °F (36.4 °C)-99.6 °F (37.6 °C)] 97.6 °F (36.4 °C)  Pulse:  [] 105  Resp:  [17-22] 22  SpO2:  [90 %-100 %] 98 %  BP: ()/(32-81) 93/50     Weight: 74.5 kg (164 lb 3.9 oz)  Body mass index is 30.04 kg/m².    Estimated Creatinine Clearance: 10.6 mL/min (based on SCr of 4.5 mg/dL (H)).    Physical Exam   Constitutional: He is oriented to person, place, and time. He appears well-developed and well-nourished.   Pt appears moderately distressed   HENT:   Head: Normocephalic and atraumatic.   Mouth/Throat: No oropharyngeal exudate.   Eyes: Pupils are equal, round, and reactive to light. No scleral icterus.   Neck: No JVD present.   Cardiovascular: Regular rhythm and normal heart sounds.  Exam reveals no friction rub.    No murmur  heard.  Pulmonary/Chest: He is in respiratory distress. He has no wheezes. He has no rales. He exhibits no tenderness.   Pt appears to be slightly dyspneic, rhonchorus breath sounds present in TYLER.    Abdominal: Soft. He exhibits no distension. There is no tenderness. There is no rebound and no guarding.   Musculoskeletal: Normal range of motion. He exhibits no edema.   Lymphadenopathy:     He has no cervical adenopathy.   Neurological: He is alert and oriented to person, place, and time.   Pt appears A&O but does seem to have some slowed mentation    Skin: Skin is warm and dry. Capillary refill takes less than 2 seconds.       Significant Labs:   CBC:   Recent Labs  Lab 11/30/17 0421 12/01/17  0856   WBC 17.21* 21.00*   HGB 9.3* 8.8*   HCT 30.8* 29.2*    262     CMP:   Recent Labs  Lab 11/30/17  0421 12/01/17  0856    142   K 4.4 4.7    109   CO2 21* 21*   * 135*   BUN 21 37*   CREATININE 3.0* 4.5*   CALCIUM 10.5 10.6*   PROT 7.0 6.5   ALBUMIN 2.8* 2.5*   BILITOT 0.4 0.3   ALKPHOS 196* 182*   AST 84* 70*   ALT 18 20   ANIONGAP 13 12   EGFRNONAA 18.1* 11.1*     Lactic Acid: No results for input(s): LACTATE in the last 48 hours.  Microbiology Results (last 7 days)     Procedure Component Value Units Date/Time    Blood culture [[id number]] Collected:  11/25/17 2222    Order Status:  Completed Specimen:  Blood Updated:  12/01/17 0612     Blood Culture, Routine No growth after 5 days.    Blood culture [[id number]] Collected:  11/30/17 2050    Order Status:  Completed Specimen:  Blood from Peripheral, Lower Arm, Left Updated:  12/01/17 0515     Blood Culture, Routine No Growth to date    Narrative:       Left Peripheral    Blood culture [[id number]] Collected:  11/30/17 2038    Order Status:  Completed Specimen:  Blood Updated:  12/01/17 0515     Blood Culture, Routine No Growth to date    Narrative:       Left peripheral #2    Fungus Culture, Blood or Bone Marrow [[id number]] Collected:  11/25/17  2222    Order Status:  Completed Specimen:  Blood from Blood Updated:  11/29/17 0955     Fungus Cult, blood or BM Culture in progress    Blood culture [[id number]] Collected:  11/23/17 0909    Order Status:  Completed Specimen:  Blood Updated:  11/28/17 1212     Blood Culture, Routine No growth after 5 days.    Blood culture [[id number]] Collected:  11/23/17 0909    Order Status:  Completed Specimen:  Blood Updated:  11/28/17 1212     Blood Culture, Routine No growth after 5 days.    Culture, Respiratory with Gram Stain [[id number]] Collected:  11/25/17 1420    Order Status:  Completed Specimen:  Respiratory from Sputum, Induced Updated:  11/27/17 1215     Respiratory Culture --     CANDIDA ALBICANS  Moderate       Gram Stain (Respiratory) >10 epithelial cells per low power field     Gram Stain (Respiratory) Many WBC's     Gram Stain (Respiratory) Moderate yeast     Gram Stain (Respiratory) Rare Gram positive rods    Culture, Respiratory with Gram Stain [[id number]] Collected:  11/24/17 1530    Order Status:  Completed Specimen:  Respiratory from Sputum, Expectorated Updated:  11/27/17 1158     Respiratory Culture --     CANDIDA ALBICANS  Few       Gram Stain (Respiratory) <10 epithelial cells per low power field.     Gram Stain (Respiratory) Few WBC's     Gram Stain (Respiratory) Few Gram positive cocci     Gram Stain (Respiratory) Rare yeast     Gram Stain (Respiratory) Rare Gram positive rods        Procalcitonin: No results for input(s): PROCAL in the last 48 hours.    Significant Imaging: I have reviewed all pertinent imaging results/findings within the past 24 hours.

## 2017-12-01 NOTE — PROGRESS NOTES
"[facility] Hospital Medicine  Progress Note    Patient Name: Donta Cline  MRN: [id number]  Patient Class: IP- Inpatient   Admission Date: 11/14/2017  Length of Stay: 15 days  Attending Physician: Gurmeet Alonso MD  Primary Care Provider: ZAID Richardson Iii, MD    [facility] Medicine Team: [facility] L Gurmeet Alonso MD    Subjective:     Principal Problem:Acute bilateral low back pain    HPI:  Donta Cline is a 85 y.o. male who presents with PMHx of ESRD with dialysis MWF, HFpEF, NSTEMI, bladder/prostate cancer, DM II, aortic stenosis and CAD for evaluation of back pain s/p lumbar fusion/laminectomy (10/20/17). No family at bedside. Difficult to obtain HPI as speech garbled, however patient endorses progressive lower extremity weakness with difficulty ambulating for the past 4-5 days. His back pain is without radiation. Patient is now unable to ambulate independently. Denies trauma and urinary bowel/bladder incontinence, fever.    Per EDMD:  "The daughter brought patient to ED due to concern of his severe back pain and inability to perform activities as he was doing previously. She had discussed with Dr. Saul, and he recommended the patient come to ED and obtain imaging for spine."    Imaging:    Ct Lumbar Spine Without Contrast    Result Date: 11/14/2017  Comparison: MRI 10/12/17. Findings: Routine CT lumbar spine protocol performed without IV contrast. Prior L2-L4 instrumented lumbar fusion with interbody disc spacer at L3-4. Decompressive posterior laminectomies at from L2-3 through L5-S1. No evidence of hardware failure. No fracture identified, noting the inferior endplate of L4 is indistinct. Infection or postsurgical fluid collection not excluded, noting limited evaluation without IV contrast and artifact from adjacent pedicle screws. There is partial fusion of the right L5-S1 facet. The SI joints are unremarkable. There is extensive calcified atherosclerotic disease. Atrophic " kidneys. Partially imaged right renal stent noted. No hydronephrosis. Small renal lesions, too small to characterize without IV contrast.      Mri Lumbar Spine Without Contrast    Result Date: 11/14/2017  MRI LUMBAR SPINE TECHNIQUE: MRI lumbar spine was performed without contrast. There is an ill-defined heterogeneous collection encircling L1 spinous process demonstrating fluid levels on the right. This may represents a seroma or hematoma postoperatively however abscess cannot be excluded. This collection is best seen on series 11 image 4.    Hospital Course:  In discussion with daughter who is at bedside today (11/15) patient was discharged after surgical procedure to an inpatient rehab and he reports that he was doing well at the rehab but reached a plateau in his activity/functional level.  She states patient was walking 50 feet with minimal assist, able to toilet with minimal assistance.  Patient was transferred to SNF and since transfer, last Friday (11/10) patient requiring max assistance to stand and reported inability to bear weight secondary to pain, and since this time patient has been bed bound.     She also notes that patient with poor appetite as speech therapy has recommended puree nectar thick diet with Nepro shakes, reports that pt is losing weight and some increased abdominal girth.     Overnight patient admitted due to concerns of worsening pain and inability to ambulate, patient underwent MRI and CT imaging of the lumbar spine.  He has intact fusion, no spinal canal stenosis or cord compression noted, concern for a fluid collection encircling L1 spinous process, post op L2-L4 fusion changes and s/p laminectomy L3-L4.  Discussed imaging findings with attending neurosurgeon Dr. Sands who performed pts operation and he reported that fluid collection appears outside of range where instrumentation was.  Patient with no reported falls, workup today reveals elevated CRP >150, ESR 92, has as  supratherapeutic INR 3.8    Informed plan will be to take patient to OR for washout and exploration to evaluate for hematoma vs infection.     11/16 - Patient without acute events overnight, patient states he and his daughter are going to [place] today.  Daughter has provided consent for surgery and blood products, patient is receiving serial doses of FFP with intermittent INR checks with plan for surgery this afternoon if INR <1.4.  Daughter reports that patient w/o complaints of pain when lying in bed, but if he is moved/turned causes exacerbation of his pain.     Patient taken for operative intervention and in discussion with neurosurgeon Dr. Sands, he reports that patient with well healed wound and surgical fusion was intact, when cut into the dura and in subdural region there was area of hematoma that was evacuated (full op report pending in EMR)  Cultures sent to rule out infection but clinical impression was not an appearance of an infected operative site.  Dr. Sands noted that degree of patient's pain incongruent with surgical findings.     Post-oepratively in the morning patient coverted to afib with RVR and this morning with borderline BP, his Hgb downtrending since admission and was 6.7 post-op.  Patient received intermittent fluid boluses early AM of 11/17 but also with intake of 3+L prior to surgery due to need for high amount of FFP (6units).  Patient will receive 1 unit PRBC during Hemodialysis today, giving midodrine as well.     11/18 - Overnight initiated CPAP for patient due to concerns for pulmonary edema, blood gas with some hypercapnia but ph 7.3,  Discussed with Nephrology patient to receive UF today, and will give another 1unit PRBC due to hgb 6.8-7.0.  Pt reports slept well with cpap, still has back pain with movement and on examination.  Culture data from surgery remains negative - neurosurgery has ordered cefazolin, will renally dose this.     11/19 - Patient seen this AM, awake, alert,  reporting still having back pain and reporting frequent cough today, coughing during interview.  S/p UF by nephrology yesterday and negative -1.4L total for the day and s/p 2nd unit PRBC and hgb is stable at 8.6, remains in afib and blood pressures are stable.      11/20 _Patient with ongoing back pain, neurosurgery removed 2 surgical drains,  Hemoglobin remains  Stable  W/o requiring  Transfusion.  He remains in Afib rhythmwise and is  Planned for dialysis.  PT worked with pt and SLP eval pt upgraded to Dental soft nectar thick liquids.     11/20- Called by HD unit and patient 1 hour into session with AFIB with RVR and BP in 80/50s, given 300cc bolus by nephrology and asking for additional recs, HD session stopped early and 250cc bolus given and stat CBC ordered.      11/21 - Pt tolerated dialysis well. Completed 3 hours.    11/22-11/24. Pt had increasing delirium and wbc elevated thus started on ceftriaxone based on dirty UA. DDx includes aspiration PNA as pt with cough.     11/24: Called to the bedside around 4:00pm as pt had increasing oxygen requirements - sating in low 80's on 2L and complaining of SOB. Also with increasing audible rales. Vitals were significant for hypotension - with BP 80/40-50's with HR in the 80's. RR in the high 20-30's. Exam significant for diffuse rhonchi bilaterally.      Pt given 250 cc bolus and oxygen increased to 5L. Pt deep suctioned.  Vitals improved after bolus to 100/50's , HR 80's, RR improved, O2 sats improved to high %. Hypotension and hypoxia likely  sepsis secondary PNA  given increasing oxygen requirement, notable cough in recent days, and leukocytosis. Abx broadened to vanc and cefepime for presumed hospital acquired PNA. DDx includes aspiration PNA as pt has had delirium in recent days.      11/25: Started on Lyrica for back pain,. Reports new onset difficulty swallowing. Respiratory culture positive for yeast. Started fluconazole.    11/26: ID consulted.  Recommending DC vanc/cefepime- unlikely PNA. Continue fluconazole.    11/28 - Patient seen, upset that symptoms havent improved, ready to leave the hospital.  Wants to get out of the bed.     11/29 - noted by nursing to be hypoxic this morning, placed on non-rebreather and  Tapered to 4L nasal cannula o2.  Patient taken for routine dialysis and had UF of 2 liters, had some hypotensionin HD requiring albumin infusion and midodrine dosage.  This afternoon patient w/o acute complaints, wishes he was feeling better.     11/30 - patient with some depressed mental status, difficult to understand speech today, overall patient with rising WBC over the last week, remains with O2 requireiment but not as high as last week, has undergone HD sessions w/o dramatic improvement in symptoms, has been afebrile.     Updated daughter (Wendy) regarding his condition this week, given rising WBC cound, repeat infectious workup with cultures and obtain Pan CTA chest to eval lungs and rule out PE as recd by ID and also CT abd/pelvis, triple phase to characterize liver lesion and check for signs of infection.     Interval History: as above    Review of Systems   Constitutional: Positive for appetite change. Negative for chills, diaphoresis, fatigue and fever.   HENT: Negative for rhinorrhea, sinus pain, sinus pressure, sneezing and sore throat.    Eyes: Negative for visual disturbance.   Respiratory: Negative for cough, choking, chest tightness and shortness of breath.    Cardiovascular: Negative for chest pain, palpitations and leg swelling.   Gastrointestinal: Positive for constipation. Negative for diarrhea and nausea.        Denies bowel incontinence   Genitourinary: Negative for dysuria.   Musculoskeletal: Positive for back pain and gait problem. Negative for joint swelling, myalgias and neck pain.   Neurological: Negative for dizziness, facial asymmetry, light-headedness and headaches.   Psychiatric/Behavioral: Negative for agitation  and behavioral problems.     Objective:     Vital Signs (Most Recent):  Temp: 98 °F (36.7 °C) (11/30/17 1704)  Pulse: (!) 120 (11/30/17 1704)  Resp: 18 (11/30/17 1242)  BP: 129/81 (11/30/17 1704)  SpO2: 98 % (11/30/17 1248) Vital Signs (24h Range):  Temp:  [97.9 °F (36.6 °C)-98.5 °F (36.9 °C)] 98 °F (36.7 °C)  Pulse:  [] 120  Resp:  [16-20] 18  SpO2:  [94 %-99 %] 98 %  BP: (107-139)/(52-81) 129/81     Weight: 74.5 kg (164 lb 3.9 oz)  Body mass index is 30.04 kg/m².  No intake or output data in the 24 hours ending 11/30/17 2011   Physical Exam   Constitutional: He appears well-developed.   Awake/alert   HENT:   Mouth/Throat: Oropharynx is clear and moist.   Wet quality to voice   Eyes: Conjunctivae are normal. No scleral icterus.   Cardiovascular: Normal heart sounds and intact distal pulses.    No murmur heard.  Irregular rhythm, tachycardic   Pulmonary/Chest: Effort normal. No respiratory distress. He has no wheezes.   Bilateral coarse breath sounds with crackles present   Abdominal: Soft. Bowel sounds are normal.   Mild distension, soft, tympanic to percussion, no fluid wave noted, no bulging flanks, or abdominal collateral vessels   Musculoskeletal:   RUE forearm AV fistula with palpable thrill.      Lymphadenopathy:     He has no cervical adenopathy.   Neurological:   Alert, oriented to self, hospital, year    Skin: Skin is warm. There is pallor.       Significant Labs:   CBC:     Recent Labs  Lab 11/29/17 0444 11/30/17 0421   WBC 14.61* 17.21*   HGB 8.5* 9.3*   HCT 28.4* 30.8*    320     CMP:     Recent Labs  Lab 11/29/17 0444 11/30/17 0421    140   K 3.6 4.4   CL 98 106   CO2 26 21*   * 136*   BUN 37* 21   CREATININE 4.4* 3.0*   CALCIUM 10.0 10.5   PROT 6.3 7.0   ALBUMIN 2.3* 2.8*   BILITOT 0.4 0.4   ALKPHOS 207* 196*   AST 87* 84*   ALT 15 18   ANIONGAP 15 13   EGFRNONAA 11.4* 18.1*       Significant Imaging:   CXR AP film 11/29/17  Findings:  The cardiomediastinal silhouette or  is stable in configuration noting calcification of the aortic arch.  There is no pleural effusion.  The trachea is midline.  The lungs are symmetrically expanded bilaterally with coarse interstitial attenuation.  There is bilateral basilar subsegmental atelectasis, noting the process is slightly more focal overlying the left lower lung zone however has not significantly changed since the previous exam in fact, there appears to be somewhat better aeration suggesting interval improvement of possible airspace process versus edema.  No definite new large focal consolidation, continued followup advised..   There is a suspected skin fold projected over the lateral aspect of the right lung zones rather than pneumothorax although consider reimaging for confirmation..  The osseous structures are unchanged.      Assessment/Plan:      * Acute bilateral low back pain, post-lumbar spinal fusion    - Neurosurgery took pt for washout and evacuation of hematoma on 11/16  - Hgb dropped during admission admit - s/p 2unit pRBC, Hgb now grossly stable  - Supportive care, PT/OT  - Given worsened delirium - stopped fentanyl patches and norco prn  - Pain control with scheduled tylenol 650 q8hrs;  tramadol 50 qhs for breakthrough ; will cont pregabalin to 75mg Qd for now ; also add flexeril as muscle relaxants apparently have worked in the past   - component of pain likely severe right hip osteoarthritis - will cont lidocaine patches   - Mobility remains poor and strength in hip flexors/proximally still poor. 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 Unclear if this is pts new baseline.         Leukocytosis    -WBC count has been rising since 11/27, patient was stopped from broad spectrum antibiotics due to concerns that cefepime may have been causing AMS and allowing Candidiasis to spread.     -obtain CTA chest to rule out PE and eval for parenchymal disease, CT abdomen to rule out abdominal source.         Paroxysmal  atrial fibrillation    -HR stable,remains in Afib rhythm wise  -continue metoprolol 25 TID  -continue scheduled midodrine TID for now as pt tolerted dialysis well with this; discuss with nephrology - may only be needed on dialysis days         ESRD (end stage renal disease) on dialysis    - nephrology consulted for volume management.   - Tolerating HD with acceptable HR   -11/29 - UF of 2L        L-S radiculopathy    Management as above, patient now with chronic pain that is debilitating and patient has been mostly bed bound since his surgery.           Chronic diastolic heart failure    -last echo 10/2017 with pulmonary HTN and undetermined diastolic function, EF 55  -continue metoprolol 25 TID   -HD is main source of volume removal          Status post lumbar spinal fusion    -neurosurgery consultation as above          Coronary artery disease involving native coronary artery of native heart without angina pectoris    -continue asa, statin          Type 2 diabetes mellitus with chronic kidney disease on chronic dialysis, with long-term current use of insulin    - cont levamir 8 units  - may need to add low dose premeal as post prandial glucoses are in the 200's; Am glucose at goal         Severe aortic stenosis    - no acute intervention at this time.   - Will cont to monitor         Abnormal US (ultrasound) of abdomen    - RUQ US given elevated LFT's - showed hypodense area - no evidence of infection  CT scan ordered.           Delirium     -tramadol 50 q8 prn  - decreased lyrica to 50   - monitor for signs of infection given leukocytosis ; recent infectious workup negative - only consistent with candidiasis            Candidiasis    Per family pt with hx of yeast in urine - has old urinary stent in place.  Respiratory cx from 11/24 and 11/25 growing yeast .   - ID consult given concern for disseminated yeast infection given positive culture from multiple sites namely urine, respiratory - also now with new onset  / worsening dysphagia. ID also consulted for  abx management of presumed HAP.  Appreciate recs - will dc abx and monitor   - f/u blood cultures / fungal cultures   - cont diflucan for now 200 qd x 10 days - renal dosing              VTE Risk Mitigation         Ordered     Medium Risk of VTE  Once      11/17/17 0011     Place sequential compression device  Until discontinued      11/14/17 1907     Place BRAIN hose  Until discontinued      11/14/17 1907              Gurmeet Alonso MD  Department of Hospital Medicine   [facility]

## 2017-12-01 NOTE — PT/OT/SLP PROGRESS
Speech Language Pathology  Dysphagia Treatment    Donta Cline   MRN: [id number]   [id number] A    Admitting Diagnosis: Acute bilateral low back pain    Diet recommendations: Solid Diet Level: NPO  Liquid Diet Level: NPO   Should MD/family wish to continue oral diet, Pureed diet and HONEY thick liquids recommended at this time with STRICT aspiration precautions:    Aspiration precautions:  Please feed only when awake/alert, No straws, Small bites/sips, 1 bite/sip at a time, Check for pocketing/oral residue, Meds crushed in puree, Eliminate distractions, Assistance with meals and Assistance with thickening liquids, Encourage double swallows per bolus  · To achieve honey thick liquid: 4 oz of any liquid to 1 pack of ThickenUp  · No ice cream, jello, or ice.  · Avoid mixed consistencies (soup, cereal with milk, juicy fruits).  · Continue to monitor for signs and symptoms of aspiration and discontinue oral feeding should you notice any of the following: watery eyes, reddened facial area, wet vocal quality, increased work of breathing, change in respiratory status, increased congestion, coughing, fever, etc.    SLP Treatment Date: 12/01/17  Speech Start Time: 1313     Speech Stop Time: 1336     Speech Total (min): 23 min       TREATMENT BILLABLE MINUTES:  Treatment Swallowing Dysfunction 23    Has the patient been evaluated by SLP for swallowing? : Yes  Keep patient NPO?: Yes   General Precautions: Standard, fall, aspiration, pureed diet, honey thick  Current Respiratory Status: venti mask    Subjective:  Pt found with venti mask off of face. SLP replaced mask around pt's head. Pt reporting it is making his mouth dry. Lethargy noted thorughout session.          Objective:   Per MBSS completed 10/12, pt presented with silent aspiration of thin and nectar thick liquids and penetration with honey thick liquids. Pudding thick was recommended at that time. Per chart review, pt was upgraded to nectar thick liquids at NH and  continued NT liquids during current admission 2/2 increased HAFSA, ability to follow safe swallowing precautions, and improved tolerance at the bedside.     Pt seen for dysphagia therapy. Upon attempt to reposition pt and elevate HOB, pt reporting significant back pain. Pt positioning not optimal for po intake. Wet breathing and congested cough noted prior to and throughout session. SLP provided oral care with oral swabs, mouthwash, and suctioning. Pt taking mask on and off for nectar thick trials of nutritional supplement. He required cues to swallow for each trial. Pt unable to complete recommended double swallows this date given cues. Coughing noted s/p third tsp trial of nectar thick liquid and SLP suctioned Boost from oral cavity.   Given pt's decreased ability to follow safe swallowing precautions (poor positioning, inability to follow directions for double swallows, and requiring cues to swallow) decreased tolerance of nectar thick liquids, increased O2 requirements (from nasal cannula to venti mask), and increased lethargy, diet downgrade to honey thick liquids vs. NPO discussed with Dr. Alonso. ST unable to assess honey thick liquids at the bedside this date 2/2 back pain, lethargy, and pt request to lay back.      Assessment:  Donta Cline is a 85 y.o. male with a medical diagnosis of Acute bilateral low back pain and presents with Severe Dysphagia. ST will continue to follow.     Discharge recommendations: Discharge Facility/Level Of Care Needs: nursing facility, skilled     Goals:    SLP Goals        Problem: SLP Goal    Goal Priority Disciplines Outcome   SLP Goal     SLP Ongoing (interventions implemented as appropriate)   Description:  Speech Language Pathology Goals  Goals to be met by 12/04/17  1. Pt will tolerate dental soft diet without overt S/S aspiration, Supervision  2. Pt will tolerate nectar-thickened liquids without overt S/S aspiration, Supervision  3. Pt will complete dysphagia  exercises x10n   4. Educate pt and family on S/S aspiration and aspiration precautions -ongoing     Goals expected to be met by 11/27/17  1. Pt will tolerate dental soft diet without overt S/S aspiration, Supervision =ongoing   2. Pt will tolerate nectar-thickened liquids without overt S/S aspiration, Supervision- ongoing  3. Pt will complete dysphagia exercises x10n -ongoing  4. Educate pt and family on S/S aspiration and aspiration precautions -ongoing                             Plan:   Patient to be seen Therapy Frequency: 5 x/week   Plan of Care expires: 12/17/17  Plan of Care reviewed with: patient  SLP Follow-up?: Yes              DUGLAS Strange, CCC-SLP   Pager: [phone]  12/01/2017

## 2017-12-02 NOTE — PLAN OF CARE
Problem: Patient Care Overview  Goal: Plan of Care Review  Outcome: Ongoing (interventions implemented as appropriate)  Patient lucid at the beginning of shift, became more disoriented overnight. VSS. Complained of back pain throughout shift, explained to pt the purpose and benefits of being repositioned off of back, pt moved back to same spot on back. Small stg 2 to buttock noted and wound on L thigh where patient has been picking at his skin. Wound to thigh cleaned w/ saline and covered w/ mepilex. Barrier cream applied to sacrum and antifungal power to groin. Patient constantly removing nasal cannula and CPAP, as well as telemetry. Cont pulse ox ordered, pt sating in the 90s all night. Patient tolerating honey-thickened fluids when alert and upright. Safety maintained throughout shift. Suction set up at beside. Will continue to monitor.

## 2017-12-02 NOTE — PROGRESS NOTES
[facility]  Infectious Disease  Progress Note    Patient Name: Donta Cline  MRN: [id number]  Admission Date: 11/14/2017  Length of Stay: 17 days  Attending Physician: Gurmeet Alonso MD  Primary Care Provider: ZAID Richardson Iii, MD    Isolation Status: No active isolations  Assessment/Plan:      Pneumonia    Mr. Cline is a 85 y.o. male with ESRD on dialysis MWF, HFpEF, NSTEMI, bladder/prostate cancer, DM II, aortic stenosis and CAD, previous fungal UTI's now having continual increased O2 requirements and some slowed mentation with leucocytosis    - pt continues to have increased O2 demands, although he denies any SOB  - on exam has some rhonchorus sounds in the left upper lobe  - pt remains afebrile and improving WBC  - recommend UA and UCx  - await CT  - continue moxifloxacin 400 mg PO daily for now, can broaden if pt's condition deteriorates  - will continue to follow.              Thank you for your consult. I will follow-up with patient. Please contact us if you have any additional questions.    David Gottlieb MD  Infectious Disease  [facility]    Subjective:     Principal Problem:Leukocytosis    HPI: Pt is an 86 y/o male with a pmhx of ESRD with HD MWF HFpEF, NSTEMI, bladder/prostate cancer, DM II, aortic stenosis and CAD, previous fungal UTI's known to ID service on this admission hwen we were consulted for a concern of disseminated yeast infection and new onset dysphagia and abx management of presumed HAP. Thought at that time was that pt did not in fact ahve a PNA and if pt remained altered it would be best to rule out ohter causes of AMS such as PE or volume overload in HD patient. Recommendations were to stop antibiotics at that time and continue HD dosed fluconazole with an end date of 12/07/2017, at which point ID signed off on 11/27/2017. Pt continues to have problems with mentation and ID has been re-consulted for an increasing WBC, while off antibiotic  therapy.    Pt answers questions slowly but appears to be oriented to self, place, time and mostly situation. Denies any fever or chills, denies SOB, is coughing up scan phlegm. States that his hands and legs are hurting in a myalgic pattern. Denies constipation or diarrhea, no dysuria. Pt's BP's have labile, today was only able to have UF instead of HD secondary to pressure issues.     Previous Positive Cultures  01/16/2004 UCx Pseudomonas aeruginosa  10/07/2017 UCx Candida albicans    Cultures This Admission   11/22/2017 UCx  C.albicans  11/23/2017 Resp Cx C.albicans, few WBC's, few GPC's, rare yeast, Rare GPR  11/24/2017 Bcx, NSI No growth  11/24/2017 BCx, NSI No growth  11/25/2017 Resp Cx C.albicans, many WBC's, mod yeast, rare GPC's  11/25/2017 BCx  No growth  11/30/2017 BCx  No growth   11/30/2017 BCx  No growth      Interval History: No change    Review of Systems   Unable to perform ROS: Mental status change   Gastrointestinal: Negative for constipation.   Musculoskeletal: Positive for back pain and gait problem. Negative for joint swelling, myalgias and neck pain.   Psychiatric/Behavioral: Positive for confusion. Negative for behavioral problems.     Objective:     Vital Signs (Most Recent):  Temp: 97.8 °F (36.6 °C) (12/02/17 0718)  Pulse: 96 (12/02/17 0726)  Resp: 18 (12/02/17 0726)  BP: (!) 104/54 (12/02/17 0718)  SpO2: 99 % (12/02/17 0726) Vital Signs (24h Range):  Temp:  [97.6 °F (36.4 °C)-99.3 °F (37.4 °C)] 97.8 °F (36.6 °C)  Pulse:  [] 96  Resp:  [16-22] 18  SpO2:  [89 %-100 %] 99 %  BP: ()/(49-60) 104/54     Weight: 75.3 kg (166 lb 1.6 oz)  Body mass index is 30.38 kg/m².    Intake/Output Summary (Last 24 hours) at 12/02/17 1114  Last data filed at 12/02/17 1100   Gross per 24 hour   Intake              325 ml   Output                0 ml   Net              325 ml      Physical Exam   Constitutional: He appears well-developed.   Awake/alert   HENT:   Mouth/Throat: Oropharynx is clear and  moist.   Wet quality to voice   Eyes: Conjunctivae are normal. No scleral icterus.   Cardiovascular: Normal heart sounds and intact distal pulses.    No murmur heard.  Irregular rhythm, tachycardic   Pulmonary/Chest: Effort normal. He has no wheezes.   Mouth breathing, accessory muscle use, Bilateral coarse breath sounds with crackles present   Abdominal: Soft. Bowel sounds are normal.   Distended abdomen, abdominal wall asymmetric distension, Left >Right.  Non-tender, non-rigid, no guarding.    Musculoskeletal:   RUE forearm AV fistula with palpable thrill.      Lymphadenopathy:     He has no cervical adenopathy.   Neurological:   Lethargic, arousable, Oriented to Self, year   Skin: Skin is warm. There is pallor.       Significant Labs:   CBC:     Recent Labs  Lab 12/01/17  0856 12/02/17  0536   WBC 21.00* 17.07*   HGB 8.8* 8.0*   HCT 29.2* 26.7*    239     CMP:     Recent Labs  Lab 12/01/17  0856 12/02/17  0536    138   K 4.7 4.4    101   CO2 21* 25   * 133*   BUN 37* 30*   CREATININE 4.5* 3.5*   CALCIUM 10.6* 10.4   PROT 6.5 6.5   ALBUMIN 2.5* 2.6*   BILITOT 0.3 0.4   ALKPHOS 182* 164*   AST 70* 80*   ALT 20 21   ANIONGAP 12 12   EGFRNONAA 11.1* 15.0*       Significant Imaging:   Reviewed

## 2017-12-02 NOTE — SUBJECTIVE & OBJECTIVE
Interval History: No change    Review of Systems   Unable to perform ROS: Mental status change   Gastrointestinal: Negative for constipation.   Musculoskeletal: Positive for back pain and gait problem. Negative for joint swelling, myalgias and neck pain.   Psychiatric/Behavioral: Positive for confusion. Negative for behavioral problems.     Objective:     Vital Signs (Most Recent):  Temp: 97.8 °F (36.6 °C) (12/02/17 0718)  Pulse: 96 (12/02/17 0726)  Resp: 18 (12/02/17 0726)  BP: (!) 104/54 (12/02/17 0718)  SpO2: 99 % (12/02/17 0726) Vital Signs (24h Range):  Temp:  [97.6 °F (36.4 °C)-99.3 °F (37.4 °C)] 97.8 °F (36.6 °C)  Pulse:  [] 96  Resp:  [16-22] 18  SpO2:  [89 %-100 %] 99 %  BP: ()/(49-60) 104/54     Weight: 75.3 kg (166 lb 1.6 oz)  Body mass index is 30.38 kg/m².    Intake/Output Summary (Last 24 hours) at 12/02/17 1114  Last data filed at 12/02/17 1100   Gross per 24 hour   Intake              325 ml   Output                0 ml   Net              325 ml      Physical Exam   Constitutional: He appears well-developed.   Awake/alert   HENT:   Mouth/Throat: Oropharynx is clear and moist.   Wet quality to voice   Eyes: Conjunctivae are normal. No scleral icterus.   Cardiovascular: Normal heart sounds and intact distal pulses.    No murmur heard.  Irregular rhythm, tachycardic   Pulmonary/Chest: Effort normal. He has no wheezes.   Mouth breathing, accessory muscle use, Bilateral coarse breath sounds with crackles present   Abdominal: Soft. Bowel sounds are normal.   Distended abdomen, abdominal wall asymmetric distension, Left >Right.  Non-tender, non-rigid, no guarding.    Musculoskeletal:   RUE forearm AV fistula with palpable thrill.      Lymphadenopathy:     He has no cervical adenopathy.   Neurological:   Lethargic, arousable, Oriented to Self, year   Skin: Skin is warm. There is pallor.       Significant Labs:   CBC:     Recent Labs  Lab 12/01/17  0856 12/02/17  0536   WBC 21.00* 17.07*   HGB  8.8* 8.0*   HCT 29.2* 26.7*    239     CMP:     Recent Labs  Lab 12/01/17  0856 12/02/17  0536    138   K 4.7 4.4    101   CO2 21* 25   * 133*   BUN 37* 30*   CREATININE 4.5* 3.5*   CALCIUM 10.6* 10.4   PROT 6.5 6.5   ALBUMIN 2.5* 2.6*   BILITOT 0.3 0.4   ALKPHOS 182* 164*   AST 70* 80*   ALT 20 21   ANIONGAP 12 12   EGFRNONAA 11.1* 15.0*       Significant Imaging:   Reviewed

## 2017-12-02 NOTE — NURSING
Patient more alert but still disoriented to place, time and situation. Unable to get a 18g IV for CTA. PICC team consulted. Npo due to concern for aspiration. If giving meds, use honey thick liquids. Vs stable. Ultrafiltrated in hd. No fluids removed. Patient finally allowed nurse to change dressing to peripheral iv. Iv flushes and is intact. Family at bedside. Update given

## 2017-12-02 NOTE — ASSESSMENT & PLAN NOTE
Mr. Cline is a 85 y.o. male with ESRD on dialysis MWF, HFpEF, NSTEMI, bladder/prostate cancer, DM II, aortic stenosis and CAD, previous fungal UTI's now having continual increased O2 requirements and some slowed mentation with leucocytosis    - pt continues to have increased O2 demands, although he denies any SOB  - on exam has some rhonchorus sounds in the left upper lobe  - pt remains afebrile and improving WBC  - recommend UA and UCx  - await CT  - continue moxifloxacin 400 mg PO daily for now, can broaden if pt's condition deteriorates  - will continue to follow.

## 2017-12-02 NOTE — PT/OT/SLP PROGRESS
Occupational Therapy      Donta Cline  MRN: [id number]    Patient not seen today secondary to pt in extreme pain and inconsolable. Pt's daughter(s) not present and OT cannot force pt to participate. Pt continued to refuse, but only c/o pain and was in 10/10 pain at rest. OT to follow-up next visit.    PIYUSH Conway  12/2/2017  Pager: [phone]

## 2017-12-03 PROBLEM — C78.00 METASTATIC CANCER TO LUNG: Status: ACTIVE | Noted: 2017-01-01

## 2017-12-03 PROBLEM — J18.9 LEFT LOWER LOBE PNEUMONIA: Status: ACTIVE | Noted: 2017-01-01

## 2017-12-03 NOTE — PT/OT/SLP PROGRESS
"Physical Therapy  Treatment    Donta Cline   MRN: [id number]   Admitting Diagnosis: Leukocytosis    PT Received On: 12/03/17  PT Start Time: 1306     PT Stop Time: 1338    PT Total Time (min): 32 min       Billable Minutes:  Therapeutic Activity 24 and Neuromuscular Re-education 8    Treatment Type: Treatment, Other (see comments) (Co-Treatment with O.T.)  PT/PTA: PTA     PTA Visit Number: 1       General Precautions: Standard, fall, aspiration, pureed diet, honey thick  Orthopedic Precautions: N/A   Braces: TLSO         Subjective:  Communicated with NSG prior to session.  Patient states " Why do I need to do this?, it will make no difference".    Pain/Comfort  Pain Rating 1:  (Unable to rate, but back pain reported with mobility.)  Location - Orientation 1: lower  Location 1: back  Pain Addressed 1: Pre-medicate for activity, Reposition, Distraction    Objective:   Patient found with: telemetry, oxygen    Functional Mobility:  Bed Mobility:   Rolling/Turning to Left: Moderate assistance  Scooting/Bridging: Total Assistance, With assist of 2  Supine to Sit: Total Assistance, With assist of 2  Sit to Supine: Total Assistance, With assist of  2    Transfers:  Sit <> Stand Assistance: Activity did not occur    Gait:   Gait Distance: Unable to perform due to limited postural control.    Stairs:      Balance:   Static Sit: FAIR-: Maintains without assist but inconsistent   Dynamic Sit: POOR: N/A  Static Stand: N/A  Dynamic stand: 0: N/A     Therapeutic Activities and Exercises:  Doffed/Donned pressure relief boots and SCD's. Donned/Doffed TLSO. Static sitting balance at EOB x 15 minutes with assistance ranging from CGA to mod assistance due to tendency to lean to the left. B LE PROM x 25 reps on all available planes of motion.     AM-PAC 6 CLICK MOBILITY  How much help from another person does this patient currently need?   1 = Unable, Total/Dependent Assistance  2 = A lot, Maximum/Moderate Assistance  3 = A little, " Minimum/Contact Guard/Supervision  4 = None, Modified [place]/Independent    Turning over in bed (including adjusting bedclothes, sheets and blankets)?: 2  Sitting down on and standing up from a chair with arms (e.g., wheelchair, bedside commode, etc.): 1  Moving from lying on back to sitting on the side of the bed?: 2  Moving to and from a bed to a chair (including a wheelchair)?: 1  Need to walk in hospital room?: 1  Climbing 3-5 steps with a railing?: 1  Total Score: 8    AM-PAC Raw Score CMS G-Code Modifier Level of Impairment Assistance   6 % Total / Unable   7 - 9 CM 80 - 100% Maximal Assist   10 - 14 CL 60 - 80% Moderate Assist   15 - 19 CK 40 - 60% Moderate Assist   20 - 22 CJ 20 - 40% Minimal Assist   23 CI 1-20% SBA / CGA   24 CH 0% Independent/ Mod I     Patient left HOB elevated with all lines intact, call button in reach, bed alarm on and daughter present.    Assessment:  Donta Cline is a 85 y.o. male with a medical diagnosis of Leukocytosis and presents with decreased functional mobility. Patient with limited cervical control as Patient was unable to extend his neck without assistance and a tendency to lean to the left even when lying in bed. Patient appeared lethargic and with limited motivation. Patient would benefit from continued P.T. To address deficits .    Rehab identified problem list/impairments: Rehab identified problem list/impairments: weakness, impaired endurance, impaired self care skills, impaired functional mobilty, impaired balance, decreased upper extremity function, decreased lower extremity function, decreased safety awareness, pain, impaired cardiopulmonary response to activity, orthopedic precautions, decreased ROM, decreased coordination    Rehab potential is fair.    Activity tolerance: Fair    Discharge recommendations: Discharge Facility/Level Of Care Needs: nursing facility, skilled     Barriers to discharge: Barriers to Discharge: Decreased caregiver  support    Equipment recommendations: Equipment Needed After Discharge: wheelchair, hospital bed, lift device     GOALS:    Physical Therapy Goals        Problem: Physical Therapy Goal    Goal Priority Disciplines Outcome Goal Variances Interventions   Physical Therapy Goal     PT/OT, PT Ongoing (interventions implemented as appropriate)     Description:  Goals to be met by: 2017     Patient will increase functional independence with mobility by performin. Supine to sit with MInimal Assistance  2. Sit to supine with MInimal Assistance  3. Sit to stand transfer with Moderate Assistance  4. Bed to chair transfer with Moderate Assistance  5. Gait  x 10 feet with Maximum Assistance using Rolling Walker.   6. Lower extremity exercise program x15 reps per handout, with assistance as needed                        PLAN:    Patient to be seen 3 x/week  to address the above listed problems via gait training, therapeutic activities, therapeutic exercises, neuromuscular re-education  Plan of Care expires: 12/15/17  Plan of Care reviewed with: patient, daughter         Eloy Cummings, PTA  2017

## 2017-12-03 NOTE — ASSESSMENT & PLAN NOTE
Mr. Cline is a 85 y.o. male with ESRD on dialysis MWF, HFpEF, NSTEMI, bladder/prostate cancer, DM II, aortic stenosis and CAD, previous fungal UTI's now having continual increased O2 requirements and some slowed mentation with leucocytosis    - pt remains afebrile and improving WBC  - await CT  - continue moxifloxacin 400 mg PO daily for 7 days total  - will continue to follow.

## 2017-12-03 NOTE — SUBJECTIVE & OBJECTIVE
Interval History: as above    Review of Systems   Unable to perform ROS: Mental status change   Gastrointestinal: Negative for constipation.   Musculoskeletal: Positive for back pain and gait problem. Negative for joint swelling, myalgias and neck pain.   Psychiatric/Behavioral: Positive for confusion. Negative for behavioral problems.     Objective:     Vital Signs (Most Recent):  Temp: 98.5 °F (36.9 °C) (12/02/17 1914)  Pulse: 103 (12/02/17 2038)  Resp: (!) 22 (12/02/17 2038)  BP: (!) 106/52 (12/02/17 2123)  SpO2: (!) 88 % (12/02/17 2038) Vital Signs (24h Range):  Temp:  [97.8 °F (36.6 °C)-100.1 °F (37.8 °C)] 98.5 °F (36.9 °C)  Pulse:  [] 103  Resp:  [16-22] 22  SpO2:  [88 %-99 %] 88 %  BP: ()/(49-61) 106/52     Weight: 75.3 kg (166 lb 1.6 oz)  Body mass index is 30.38 kg/m².    Intake/Output Summary (Last 24 hours) at 12/02/17 2155  Last data filed at 12/02/17 1949   Gross per 24 hour   Intake              325 ml   Output                0 ml   Net              325 ml      Physical Exam   Constitutional: He appears well-developed.   Awake/alert   HENT:   Mouth/Throat: Oropharynx is clear and moist.   Wet quality to voice   Eyes: Conjunctivae are normal. No scleral icterus.   Cardiovascular: Normal heart sounds and intact distal pulses.    No murmur heard.  Irregular rhythm, tachycardic   Pulmonary/Chest: Effort normal. He has no wheezes.   Mouth breathing, accessory muscle use, Bilateral coarse breath sounds with crackles present   Abdominal: Soft. Bowel sounds are normal.   Distended abdomen, abdominal wall asymmetric distension, Left >Right.  Non-tender, non-rigid, no guarding.    Musculoskeletal:   RUE forearm AV fistula with palpable thrill.      Lymphadenopathy:     He has no cervical adenopathy.   Neurological:   Lethargic, arousable, Oriented to Self, year   Skin: Skin is warm. There is pallor.       Significant Labs:   CBC:     Recent Labs  Lab 12/01/17  0856 12/02/17  0536   WBC 21.00* 17.07*    HGB 8.8* 8.0*   HCT 29.2* 26.7*    239     CMP:     Recent Labs  Lab 12/01/17  0856 12/02/17  0536    138   K 4.7 4.4    101   CO2 21* 25   * 133*   BUN 37* 30*   CREATININE 4.5* 3.5*   CALCIUM 10.6* 10.4   PROT 6.5 6.5   ALBUMIN 2.5* 2.6*   BILITOT 0.3 0.4   ALKPHOS 182* 164*   AST 70* 80*   ALT 20 21   ANIONGAP 12 12   EGFRNONAA 11.1* 15.0*       Significant Imaging:   CTA Chest pending.

## 2017-12-03 NOTE — ASSESSMENT & PLAN NOTE
-HR stable,remains in Afib rhythm wise  Holding tablets as pt strict NPO - unless awake alert/upright and following commands  -Can use IV metoprolol for rate control if needed.

## 2017-12-03 NOTE — PROGRESS NOTES
[facility]  Infectious Disease  Progress Note    Patient Name: Donta Cline  MRN: [id number]  Admission Date: 11/14/2017  Length of Stay: 18 days  Attending Physician: Gurmeet Alonso MD  Primary Care Provider: ZAID Richardson Iii, MD    Isolation Status: No active isolations  Assessment/Plan:      Pneumonia    Mr. Cline is a 85 y.o. male with ESRD on dialysis MWF, HFpEF, NSTEMI, bladder/prostate cancer, DM II, aortic stenosis and CAD, previous fungal UTI's now having continual increased O2 requirements and some slowed mentation with leucocytosis    - pt remains afebrile and improving WBC  - await CT  - continue moxifloxacin 400 mg PO daily for 7 days total  - will continue to follow.              Thank you for your consult. I will follow-up with patient. Please contact us if you have any additional questions.    David Gottlieb MD  Infectious Disease  [facility]    Subjective:     Principal Problem:Leukocytosis    HPI: Pt is an 84 y/o male with a pmhx of ESRD with HD MWF HFpEF, NSTEMI, bladder/prostate cancer, DM II, aortic stenosis and CAD, previous fungal UTI's known to ID service on this admission hwen we were consulted for a concern of disseminated yeast infection and new onset dysphagia and abx management of presumed HAP. Thought at that time was that pt did not in fact ahve a PNA and if pt remained altered it would be best to rule out ohter causes of AMS such as PE or volume overload in HD patient. Recommendations were to stop antibiotics at that time and continue HD dosed fluconazole with an end date of 12/07/2017, at which point ID signed off on 11/27/2017. Pt continues to have problems with mentation and ID has been re-consulted for an increasing WBC, while off antibiotic therapy.    Pt answers questions slowly but appears to be oriented to self, place, time and mostly situation. Denies any fever or chills, denies SOB, is coughing up scan phlegm. States that his  hands and legs are hurting in a myalgic pattern. Denies constipation or diarrhea, no dysuria. Pt's BP's have labile, today was only able to have UF instead of HD secondary to pressure issues.     Previous Positive Cultures  01/16/2004 UCx Pseudomonas aeruginosa  10/07/2017 UCx Candida albicans    Cultures This Admission   11/22/2017 UCx  C.albicans  11/23/2017 Resp Cx C.albicans, few WBC's, few GPC's, rare yeast, Rare GPR  11/24/2017 Bcx, NSI No growth  11/24/2017 BCx, NSI No growth  11/25/2017 Resp Cx C.albicans, many WBC's, mod yeast, rare GPC's  11/25/2017 BCx  No growth  11/30/2017 BCx  No growth   11/30/2017 BCx  No growth      Interval History: No change    Review of Systems   Unable to perform ROS: Mental status change   Gastrointestinal: Negative for constipation.   Musculoskeletal: Positive for back pain and gait problem. Negative for joint swelling, myalgias and neck pain.   Psychiatric/Behavioral: Positive for confusion. Negative for behavioral problems.     Objective:     Vital Signs (Most Recent):  Temp: 98.5 °F (36.9 °C) (12/03/17 0742)  Pulse: 87 (12/03/17 0806)  Resp: (!) 21 (12/03/17 0806)  BP: (!) 109/51 (12/03/17 0742)  SpO2: 97 % (12/03/17 0806) Vital Signs (24h Range):  Temp:  [97.9 °F (36.6 °C)-100.1 °F (37.8 °C)] 98.5 °F (36.9 °C)  Pulse:  [] 87  Resp:  [16-22] 21  SpO2:  [88 %-100 %] 97 %  BP: ()/(46-71) 109/51     Weight: 74.6 kg (164 lb 8 oz)  Body mass index is 30.09 kg/m².    Intake/Output Summary (Last 24 hours) at 12/03/17 1023  Last data filed at 12/03/17 0451   Gross per 24 hour   Intake              260 ml   Output                0 ml   Net              260 ml      Physical Exam   Constitutional: He appears well-developed.   Awake/alert   HENT:   Mouth/Throat: Oropharynx is clear and moist.   Wet quality to voice   Eyes: Conjunctivae are normal. No scleral icterus.   Cardiovascular: Normal heart sounds and intact distal pulses.    No murmur heard.  Irregular rhythm,  tachycardic   Pulmonary/Chest: Effort normal. He has no wheezes.   Mouth breathing, accessory muscle use, Bilateral coarse breath sounds with crackles present   Abdominal: Soft. Bowel sounds are normal.   Distended abdomen, abdominal wall asymmetric distension, Left >Right.  Non-tender, non-rigid, no guarding.    Musculoskeletal:   RUE forearm AV fistula with palpable thrill.      Lymphadenopathy:     He has no cervical adenopathy.   Neurological:   Lethargic, arousable, Oriented to Self, year   Skin: Skin is warm. There is pallor.       Significant Labs:   CBC:     Recent Labs  Lab 12/02/17  0536 12/03/17  0434   WBC 17.07* 16.68*   HGB 8.0* 8.1*   HCT 26.7* 27.1*    234     CMP:     Recent Labs  Lab 12/02/17  0536 12/03/17  0434    141   K 4.4 5.0    103   CO2 25 22*   * 150*   BUN 30* 45*   CREATININE 3.5* 4.7*   CALCIUM 10.4 10.5   PROT 6.5 6.7   ALBUMIN 2.6* 2.9*   BILITOT 0.4 0.4   ALKPHOS 164* 155*   AST 80* 83*   ALT 21 26   ANIONGAP 12 16   EGFRNONAA 15.0* 10.5*       Significant Imaging:   Reviewed

## 2017-12-03 NOTE — PLAN OF CARE
Problem: Patient Care Overview  Goal: Plan of Care Review  PT oriented to self, responds to voice, vs stable, iv access flushes well, iv vancomycin given, small bm today, straight cath for U/A unsuccessful due to anuria, tramadol given for back pain, metoprolol 2.5mg held due to low bp, md aware, states as long as hr is not consistently in 120's, worked with pt today, no falls or complications throughout shift. Will continue to monitor    Problem: Pain, Acute (Adult)  Intervention: Monitor/Manage Analgesia  Tramadol given for pain, arom encouraged, worked with pt today

## 2017-12-03 NOTE — NURSING
Dr. Alonso @ the bedside, placed 18G w/ US guidance for CT scan. Instructed to give PRN albumin for hypotension and will input IV metoprolol for increased HR. WCTM

## 2017-12-03 NOTE — PROGRESS NOTES
"[facility] Hospital Medicine  Progress Note    Patient Name: Donta Cline  MRN: [id number]  Patient Class: IP- Inpatient   Admission Date: 11/14/2017  Length of Stay: 17 days  Attending Physician: Gurmeet Alonso MD  Primary Care Provider: ZAID Richardson Iii, MD    [facility] Medicine Team: [facility]OSP MED L Gurmeet Alonso MD    Subjective:     Principal Problem:Leukocytosis    HPI:  Donta Cline is a 85 y.o. male who presents with PMHx of ESRD with dialysis MWF, HFpEF, NSTEMI, bladder/prostate cancer, DM II, aortic stenosis and CAD for evaluation of back pain s/p lumbar fusion/laminectomy (10/20/17). No family at bedside. Difficult to obtain HPI as speech garbled, however patient endorses progressive lower extremity weakness with difficulty ambulating for the past 4-5 days. His back pain is without radiation. Patient is now unable to ambulate independently. Denies trauma and urinary bowel/bladder incontinence, fever.    Per EDMD:  "The daughter brought patient to ED due to concern of his severe back pain and inability to perform activities as he was doing previously. She had discussed with Dr. Saul, and he recommended the patient come to ED and obtain imaging for spine."    Imaging:    Ct Lumbar Spine Without Contrast    Result Date: 11/14/2017  Comparison: MRI 10/12/17. Findings: Routine CT lumbar spine protocol performed without IV contrast. Prior L2-L4 instrumented lumbar fusion with interbody disc spacer at L3-4. Decompressive posterior laminectomies at from L2-3 through L5-S1. No evidence of hardware failure. No fracture identified, noting the inferior endplate of L4 is indistinct. Infection or postsurgical fluid collection not excluded, noting limited evaluation without IV contrast and artifact from adjacent pedicle screws. There is partial fusion of the right L5-S1 facet. The SI joints are unremarkable. There is extensive calcified atherosclerotic disease. Atrophic kidneys. Partially " imaged right renal stent noted. No hydronephrosis. Small renal lesions, too small to characterize without IV contrast.      Mri Lumbar Spine Without Contrast    Result Date: 11/14/2017  MRI LUMBAR SPINE TECHNIQUE: MRI lumbar spine was performed without contrast. There is an ill-defined heterogeneous collection encircling L1 spinous process demonstrating fluid levels on the right. This may represents a seroma or hematoma postoperatively however abscess cannot be excluded. This collection is best seen on series 11 image 4.    Hospital Course:  In discussion with daughter who is at bedside today (11/15) patient was discharged after surgical procedure to an inpatient rehab and he reports that he was doing well at the rehab but reached a plateau in his activity/functional level.  She states patient was walking 50 feet with minimal assist, able to toilet with minimal assistance.  Patient was transferred to SNF and since transfer, last Friday (11/10) patient requiring max assistance to stand and reported inability to bear weight secondary to pain, and since this time patient has been bed bound.     She also notes that patient with poor appetite as speech therapy has recommended puree nectar thick diet with Nepro shakes, reports that pt is losing weight and some increased abdominal girth.     Overnight patient admitted due to concerns of worsening pain and inability to ambulate, patient underwent MRI and CT imaging of the lumbar spine.  He has intact fusion, no spinal canal stenosis or cord compression noted, concern for a fluid collection encircling L1 spinous process, post op L2-L4 fusion changes and s/p laminectomy L3-L4.  Discussed imaging findings with attending neurosurgeon Dr. Sands who performed pts operation and he reported that fluid collection appears outside of range where instrumentation was.  Patient with no reported falls, workup today reveals elevated CRP >150, ESR 92, has as supratherapeutic INR  3.8    Informed plan will be to take patient to OR for washout and exploration to evaluate for hematoma vs infection.     11/16 - Patient without acute events overnight, patient states he and his daughter are going to [place] today.  Daughter has provided consent for surgery and blood products, patient is receiving serial doses of FFP with intermittent INR checks with plan for surgery this afternoon if INR <1.4.  Daughter reports that patient w/o complaints of pain when lying in bed, but if he is moved/turned causes exacerbation of his pain.     Patient taken for operative intervention and in discussion with neurosurgeon Dr. Sands, he reports that patient with well healed wound and surgical fusion was intact, when cut into the dura and in subdural region there was area of hematoma that was evacuated (full op report pending in EMR)  Cultures sent to rule out infection but clinical impression was not an appearance of an infected operative site.  Dr. Sands noted that degree of patient's pain incongruent with surgical findings.     Post-oepratively in the morning patient coverted to afib with RVR and this morning with borderline BP, his Hgb downtrending since admission and was 6.7 post-op.  Patient received intermittent fluid boluses early AM of 11/17 but also with intake of 3+L prior to surgery due to need for high amount of FFP (6units).  Patient will receive 1 unit PRBC during Hemodialysis today, giving midodrine as well.     11/18 - Overnight initiated CPAP for patient due to concerns for pulmonary edema, blood gas with some hypercapnia but ph 7.3,  Discussed with Nephrology patient to receive UF today, and will give another 1unit PRBC due to hgb 6.8-7.0.  Pt reports slept well with cpap, still has back pain with movement and on examination.  Culture data from surgery remains negative - neurosurgery has ordered cefazolin, will renally dose this.     11/19 - Patient seen this AM, awake, alert, reporting still having  back pain and reporting frequent cough today, coughing during interview.  S/p UF by nephrology yesterday and negative -1.4L total for the day and s/p 2nd unit PRBC and hgb is stable at 8.6, remains in afib and blood pressures are stable.      11/20 _Patient with ongoing back pain, neurosurgery removed 2 surgical drains,  Hemoglobin remains  Stable  W/o requiring  Transfusion.  He remains in Afib rhythmwise and is  Planned for dialysis.  PT worked with pt and SLP eval pt upgraded to Dental soft nectar thick liquids.     11/20- Called by HD unit and patient 1 hour into session with AFIB with RVR and BP in 80/50s, given 300cc bolus by nephrology and asking for additional recs, HD session stopped early and 250cc bolus given and stat CBC ordered.      11/21 - Pt tolerated dialysis well. Completed 3 hours.    11/22-11/24. Pt had increasing delirium and wbc elevated thus started on ceftriaxone based on dirty UA. DDx includes aspiration PNA as pt with cough.     11/24: Called to the bedside around 4:00pm as pt had increasing oxygen requirements - sating in low 80's on 2L and complaining of SOB. Also with increasing audible rales. Vitals were significant for hypotension - with BP 80/40-50's with HR in the 80's. RR in the high 20-30's. Exam significant for diffuse rhonchi bilaterally.      Pt given 250 cc bolus and oxygen increased to 5L. Pt deep suctioned.  Vitals improved after bolus to 100/50's , HR 80's, RR improved, O2 sats improved to high %. Hypotension and hypoxia likely  sepsis secondary PNA  given increasing oxygen requirement, notable cough in recent days, and leukocytosis. Abx broadened to vanc and cefepime for presumed hospital acquired PNA. DDx includes aspiration PNA as pt has had delirium in recent days.      11/25: Started on Lyrica for back pain,. Reports new onset difficulty swallowing. Respiratory culture positive for yeast. Started fluconazole.    11/26: ID consulted. Recommending DC vanc/cefepime-  unlikely PNA. Continue fluconazole.    11/28 - Patient seen, upset that symptoms havent improved, ready to leave the hospital.  Wants to get out of the bed.     11/29 - noted by nursing to be hypoxic this morning, placed on non-rebreather and  Tapered to 4L nasal cannula o2.  Patient taken for routine dialysis and had UF of 2 liters, had some hypotensionin HD requiring albumin infusion and midodrine dosage.  This afternoon patient w/o acute complaints, wishes he was feeling better.     11/30 - patient with some depressed mental status, difficult to understand speech today, overall patient with rising WBC over the last week, remains with O2 requireiment but not as high as last week, has undergone HD sessions w/o dramatic improvement in symptoms, has been afebrile.     Updated daughter (Wendy) regarding his condition this week, given rising WBC cound, repeat infectious workup with cultures and obtain Pan CTA chest to eval lungs and rule out PE as recd by ID and also CT abd/pelvis, triple phase to characterize liver lesion and check for signs of infection.     12/1 - Patient seen after dialysis this afternoon.  Patient appears in moderate distress, mouth breathing, oxygen tubing tangled in bed.  He is oriented to self/year, believes he's in [place].  When oriented to [facility], states he was there for 4 weeks because of a problem with his back.   RN reports patient arrived from dialysis disoriented, on face mask oxygen supplementation, reports that unable to get oxygen saturation.     CTA Chest is pending, CT abdomen will have to be completed on a different Day.  ID re-consulted given rising WBC count and progressive encephalopathy.   Speech contacted me and reports concern that patient aspirating and recommend NPO, Pureed with Honey thick liquids if preference is to feed pt, will discuss with daughter.     12/2 - Patient is NPO, mental status wax and wanes, but has been mostly depressed and patient is mostly lethargic, he  complained of unbearable pain this AM and onetime dose of dilaudid given, but patient has been persistently lethargic in the afternoon and evening following this.  Some hypotension today, and IV Albumin ordered.  If this does not correct with albumin, will have to broaden antibiotics.  Discussed with CT and scans not completed as pt did not have 18ga IV appropriate for contrast use, 18 ga left basilic vein IV placed under ultrasound guidance tonight and night coverage requested to order CT scans.     Updated Wendy Jenkins about patient's status, she would like to pursue antibiotic therapy, ok with NPO, and CT scans to determine for a reversible etiology, she is POA but if these studies do not reveal reversible cause and patient does not improve, may require arranging a family meeting next week.     Interval History: as above    Review of Systems   Unable to perform ROS: Mental status change   Gastrointestinal: Negative for constipation.   Musculoskeletal: Positive for back pain and gait problem. Negative for joint swelling, myalgias and neck pain.   Psychiatric/Behavioral: Positive for confusion. Negative for behavioral problems.     Objective:     Vital Signs (Most Recent):  Temp: 98.5 °F (36.9 °C) (12/02/17 1914)  Pulse: 103 (12/02/17 2038)  Resp: (!) 22 (12/02/17 2038)  BP: (!) 106/52 (12/02/17 2123)  SpO2: (!) 88 % (12/02/17 2038) Vital Signs (24h Range):  Temp:  [97.8 °F (36.6 °C)-100.1 °F (37.8 °C)] 98.5 °F (36.9 °C)  Pulse:  [] 103  Resp:  [16-22] 22  SpO2:  [88 %-99 %] 88 %  BP: ()/(49-61) 106/52     Weight: 75.3 kg (166 lb 1.6 oz)  Body mass index is 30.38 kg/m².    Intake/Output Summary (Last 24 hours) at 12/02/17 2155  Last data filed at 12/02/17 1949   Gross per 24 hour   Intake              325 ml   Output                0 ml   Net              325 ml      Physical Exam   Constitutional: He appears well-developed.   Awake/alert   HENT:   Mouth/Throat: Oropharynx is clear and moist.   Wet quality  to voice   Eyes: Conjunctivae are normal. No scleral icterus.   Cardiovascular: Normal heart sounds and intact distal pulses.    No murmur heard.  Irregular rhythm, tachycardic   Pulmonary/Chest: Effort normal. He has no wheezes.   Mouth breathing, accessory muscle use, Bilateral coarse breath sounds with crackles present   Abdominal: Soft. Bowel sounds are normal.   Distended abdomen, abdominal wall asymmetric distension, Left >Right.  Non-tender, non-rigid, no guarding.    Musculoskeletal:   RUE forearm AV fistula with palpable thrill.      Lymphadenopathy:     He has no cervical adenopathy.   Neurological:   Lethargic, arousable, Oriented to Self, year   Skin: Skin is warm. There is pallor.       Significant Labs:   CBC:     Recent Labs  Lab 12/01/17  0856 12/02/17  0536   WBC 21.00* 17.07*   HGB 8.8* 8.0*   HCT 29.2* 26.7*    239     CMP:     Recent Labs  Lab 12/01/17  0856 12/02/17  0536    138   K 4.7 4.4    101   CO2 21* 25   * 133*   BUN 37* 30*   CREATININE 4.5* 3.5*   CALCIUM 10.6* 10.4   PROT 6.5 6.5   ALBUMIN 2.5* 2.6*   BILITOT 0.3 0.4   ALKPHOS 182* 164*   AST 70* 80*   ALT 20 21   ANIONGAP 12 12   EGFRNONAA 11.1* 15.0*       Significant Imaging:   CTA Chest pending.       Assessment/Plan:      * Leukocytosis    -WBC count has been rising since 11/27, patient was stopped from broad spectrum antibiotics due to concerns that cefepime may have been causing AMS and allowing Candidiasis to spread.   -obtain CTA chest to rule out PE and eval for parenchymal disease and then CT abdomen to rule out abdominal source.   -Reconsult ID  -started on moxifloxacin, if still hypotensive broaden to Vanc/moxi/ and possibly zosyn.   -UA, urine culture and repeat blood cultures are pending.         Acute bilateral low back pain, post-lumbar spinal fusion    - s/p washout and evacuation of hematoma on 11/16  - Hgb dropped during admission admit - s/p 2unit pRBC, Hgb now grossly stable  -  Supportive care, PT/OT  - Pain control with scheduled tylenol 650 q8hrs;  tramadol 50 qhs for breakthrough ; will cont pregabalin to 75mg Qd for now ; also add flexeril as muscle relaxants apparently have worked in the past   - component of pain likely severe right hip osteoarthritis - will cont lidocaine patches   - Mobility remains poor and strength in hip flexors/proximally still poor.Patient unable to participate with therapy, poor progress from functional standpoint.         Delirium    Repeat infectious workup  -ABG- showed acute respiratory alkalosis - ph 7.47, pco2 30s.   -Rising leukocytosis of unclear etiology.   -Delirium precautions.   - make patient NPO from a safety standpoint.   or for signs of infection given leukocytosis ; recent infectious workup negative - only consistent with candidiasis            Paroxysmal atrial fibrillation    -HR stable,remains in Afib rhythm wise  Holding tablets as pt strict NPO - unless awake alert/upright and following commands  -Can use IV metoprolol for rate control if needed.         ESRD (end stage renal disease) on dialysis    - nephrology consulted  -12/1 - patient s/p HD but with minimal volume removal kept nearly even (-181)        Acute respiratory failure with hypoxia    -Etiology for aspiration pneumonia vs pulmonary edema vs fungal pneumonia  -difficulty obtaining oxygen saturations, progressive delirium with rising leukocytosis.   -CTA Chest pending to evaluate parenchymal disease and rule out PE  -Obtain ABG now to determine if pt is hypercapnic too, and if he may require Non-invasive ventilation.           L-S radiculopathy    Management as above, patient now with chronic pain that is debilitating and patient has been mostly bed bound since his surgery.           Chronic diastolic heart failure    -last echo 10/2017 with pulmonary HTN and undetermined diastolic function, EF 55  -continue metoprolol 25 TID   -HD is main source of volume removal           Status post lumbar spinal fusion    -neurosurgery consultation as above          Coronary artery disease involving native coronary artery of native heart without angina pectoris    -continue asa, statin          Type 2 diabetes mellitus with chronic kidney disease on chronic dialysis, with long-term current use of insulin    Reduce levemir dose, as pt with minimal oral intake, may need NPo        Severe aortic stenosis    - no acute intervention at this time.   - Will cont to monitor         Abnormal US (ultrasound) of abdomen    - RUQ US given elevated LFT's - showed hypodense area - no evidence of infection  CT scan ordered.           Candidiasis    Per family pt with hx of yeast in urine - has old urinary stent in place.  Respiratory cx from 11/24 and 11/25 growing yeast .   - cont diflucan for now 200 qd x 10 days - renal dosing            Back pain                VTE Risk Mitigation         Ordered     Medium Risk of VTE  Once      11/17/17 0011     Place sequential compression device  Until discontinued      11/14/17 1907     Place BRAIN hose  Until discontinued      11/14/17 1907              Gurmeet Alonso MD  Department of Hospital Medicine   [facility]

## 2017-12-03 NOTE — PROGRESS NOTES
Patient , EKG ordered showing a fib rvr.  Patient SpO2 100% on non rebreather.  Spoke with David Mckeon NP,  Chest X-Ray ordered, metoprolol 2.5mg IV ordered.  Will round on patient again in 1 hour.

## 2017-12-03 NOTE — PLAN OF CARE
Problem: Physical Therapy Goal  Goal: Physical Therapy Goal  Goals to be met by: 2017     Patient will increase functional independence with mobility by performin. Supine to sit with MInimal Assistance  2. Sit to supine with MInimal Assistance  3. Sit to stand transfer with Moderate Assistance  4. Bed to chair transfer with Moderate Assistance  5. Gait  x 10 feet with Maximum Assistance using Rolling Walker.   6. Lower extremity exercise program x15 reps per handout, with assistance as needed       Outcome: Ongoing (interventions implemented as appropriate)  Inconsistent postural control and limited mobility continues to be noted.

## 2017-12-03 NOTE — SUBJECTIVE & OBJECTIVE
Interval History: No change    Review of Systems   Unable to perform ROS: Mental status change   Gastrointestinal: Negative for constipation.   Musculoskeletal: Positive for back pain and gait problem. Negative for joint swelling, myalgias and neck pain.   Psychiatric/Behavioral: Positive for confusion. Negative for behavioral problems.     Objective:     Vital Signs (Most Recent):  Temp: 98.5 °F (36.9 °C) (12/03/17 0742)  Pulse: 87 (12/03/17 0806)  Resp: (!) 21 (12/03/17 0806)  BP: (!) 109/51 (12/03/17 0742)  SpO2: 97 % (12/03/17 0806) Vital Signs (24h Range):  Temp:  [97.9 °F (36.6 °C)-100.1 °F (37.8 °C)] 98.5 °F (36.9 °C)  Pulse:  [] 87  Resp:  [16-22] 21  SpO2:  [88 %-100 %] 97 %  BP: ()/(46-71) 109/51     Weight: 74.6 kg (164 lb 8 oz)  Body mass index is 30.09 kg/m².    Intake/Output Summary (Last 24 hours) at 12/03/17 1023  Last data filed at 12/03/17 0451   Gross per 24 hour   Intake              260 ml   Output                0 ml   Net              260 ml      Physical Exam   Constitutional: He appears well-developed.   Awake/alert   HENT:   Mouth/Throat: Oropharynx is clear and moist.   Wet quality to voice   Eyes: Conjunctivae are normal. No scleral icterus.   Cardiovascular: Normal heart sounds and intact distal pulses.    No murmur heard.  Irregular rhythm, tachycardic   Pulmonary/Chest: Effort normal. He has no wheezes.   Mouth breathing, accessory muscle use, Bilateral coarse breath sounds with crackles present   Abdominal: Soft. Bowel sounds are normal.   Distended abdomen, abdominal wall asymmetric distension, Left >Right.  Non-tender, non-rigid, no guarding.    Musculoskeletal:   RUE forearm AV fistula with palpable thrill.      Lymphadenopathy:     He has no cervical adenopathy.   Neurological:   Lethargic, arousable, Oriented to Self, year   Skin: Skin is warm. There is pallor.       Significant Labs:   CBC:     Recent Labs  Lab 12/02/17  0536 12/03/17  0434   WBC 17.07* 16.68*   HGB  8.0* 8.1*   HCT 26.7* 27.1*    234     CMP:     Recent Labs  Lab 12/02/17  0536 12/03/17  0434    141   K 4.4 5.0    103   CO2 25 22*   * 150*   BUN 30* 45*   CREATININE 3.5* 4.7*   CALCIUM 10.4 10.5   PROT 6.5 6.7   ALBUMIN 2.6* 2.9*   BILITOT 0.4 0.4   ALKPHOS 164* 155*   AST 80* 83*   ALT 21 26   ANIONGAP 12 16   EGFRNONAA 15.0* 10.5*       Significant Imaging:   Reviewed

## 2017-12-03 NOTE — PROGRESS NOTES
RN Proactive Rounding Note  Time of Visit:     Admit Date: 2017  LOS: 17  Code Status: DNR   Date of Visit: 2017  : 1932  Age: 85 y.o.  Sex: male  Bed: [address] [place] [address] A:   MRN: [id number]  Was the patient discharged from an ICU this admission?No   Was the patient discharged from a PACU within last 24 hours? No  Did the patient receive conscious sedation/general anesthesia in last 24 hours? No  Was the patient in the ED within the past 24 hours? NO  Was the patient started on NIPPV within the past 24 hours? NO  Attending Physician: Gurmeet Alonso MD  Primary Service: [facility] MED       ASSESSMENT:     Modified Early Warning Score (MEWS): 5  Abnormal Vital Signs: BP: 86/61,  RR: 22  , SpO2 88  Clinical Issues: Respiratory     INTERVENTIONS/ RECOMMENDATIONS:     Midodrine ordered for BP, Nurse will try increasing O2, Will round on patient again in 2 hours    Discussed plan of care with HECTOR Lopez    PHYSICIAN ESCALATION:     Yes/No Not at this time    Orders received and case discussed with Dr:  N/A     Disposition: N/A    FOLLOW-UP/CONTINGENCY:       Call back the Rapid Response Nurse at [phone]  for additional questions or concerns

## 2017-12-03 NOTE — PLAN OF CARE
Problem: Patient Care Overview  Goal: Plan of Care Review  Outcome: Ongoing (interventions implemented as appropriate)  Patient remains disoriented to place/time/situation. Bp low @ the beginning of shift. Albumin administered, stable thereafter. IV metoprolol pushed for HR running 120-140s. EKG performed, showed A Fib in RVR. Chest xray performed at the bedside, unchanged since 11/29. Pt remains NPO due to inability to swallow, frequent oral care provided. 18G placed for CT, yet to be performed. WCTM.

## 2017-12-03 NOTE — ASSESSMENT & PLAN NOTE
Repeat infectious workup  -ABG- showed acute respiratory alkalosis - ph 7.47, pco2 30s.   -Rising leukocytosis of unclear etiology.   -Delirium precautions.   - make patient NPO from a safety standpoint.   or for signs of infection given leukocytosis ; recent infectious workup negative - only consistent with candidiasis

## 2017-12-03 NOTE — PT/OT/SLP PROGRESS
Occupational Therapy  Treatment    Donta Cline   MRN: [id number]   Admitting Diagnosis: Leukocytosis    OT Date of Treatment: 12/03/17   OT Start Time: 1305  OT Stop Time: 1330  OT Total Time (min): 25 min    Billable Minutes:  Self Care/Home Management 8 and Therapeutic Exercise 10  *Co-Tx with PT 2* pt with decreased activity tolerance    General Precautions: Standard, fall, aspiration, pureed diet, honey thick  Orthopedic Precautions: N/A  Braces: TLSO       Subjective:  Communicated with RN prior to session.  While seated EOB pt requested to lie down several times.  OT and PTA provided encouragement and education on importance of participating in EOB activity.    Pain/Comfort  Pain Rating 1:  (Pt unable to rate, but reported back pain with mobility)    Objective:  Patient found with: oxygen (5 L).  Daughter present for part of session.     Functional Mobility:  Bed Mobility:  Rolling/Turning to Left: Total assistance  Rolling/Turning Right: Total assistance  Scooting/Bridging: Total Assistance, With assist of 2  Supine to Sit: With assist of 2, Total Assistance  Sit to Supine: Total Assistance, With assist of 2    Transfers:   Sit <> Stand Assistance: Activity did not occur; pt not appropriate for activity this date 2* poor sitting balance    Functional Ambulation: Unable to assess this date.    Activities of Daily Living:    UE Dressing Level of Assistance:   -Maximum assistance for donning gown on backside like robe while seated EOB.    -Total A for donning TLSO brace while seated EOB.    Balance:   Static Sit: POOR: Needs MODERATE assist to maintain  Dynamic Sit: POOR: N/A    Therapeutic Activities and Exercises:  *Pt sat EOB for ~15 minutes with Mod A-Max A given to maintain upright position.  Pt demonstrated L lateral lean requiring consistent cues and physical assist to correct.  OT and PTA provided tactile cues and facilitated with hand placement to help pt adjust posture.  Pt remained with head in full  "forward flexion throughout session.  *Pt performed cervical spine stretch with Total A: 1 set x 8 trials to provide stretch and promote increased cervical spine extension.  Pt unable to hold head up once placed in neutral position.  *Once pt returned to supine position pt performed 2 UE exercises to address endurance needed for ADLs: 1 set x 10 reps per exercise on each side  -Shoulder flexion  -Shoulder abduction/adduction  *POC reviewed with pt and daughter    AM-PAC 6 CLICK ADL   How much help from another person does this patient currently need?   1 = Unable, Total/Dependent Assistance  2 = A lot, Maximum/Moderate Assistance  3 = A little, Minimum/Contact Guard/Supervision  4 = None, Modified [place]/Independent    Putting on and taking off regular lower body clothing? : 1  Bathing (including washing, rinsing, drying)?: 1  Toileting, which includes using toilet, bedpan, or urinal? : 1  Putting on and taking off regular upper body clothing?: 2  Taking care of personal grooming such as brushing teeth?: 2  Eating meals?: 1 (Pt currently NPO)  Total Score: 8     AM-PAC Raw Score CMS "G-Code Modifier Level of Impairment Assistance   6 % Total / Unable   7 - 8 CM 80 - 100% Maximal Assist   9-13 CL 60 - 80% Moderate Assist   14 - 19 CK 40 - 60% Moderate Assist   20 - 22 CJ 20 - 40% Minimal Assist   23 CI 1-20% SBA / CGA   24 CH 0% Independent/ Mod I       Patient left supine with all lines intact, call button in reach and daughter present    ASSESSMENT:  Donta Cline is a 85 y.o. male with a medical diagnosis of Leukocytosis and presents with weakness, decreased endurance, head in forward flexion, and impaired balance impacting performance with ADLs and mobility.  Pt tolerated sitting EOB for 15 minutes requiring Mod A-Max A to maintain full upright position 2* left lateral lean.  Pt displayed difficulty maintaining full upright position and was unable to elevate head to neutral position.  Pt continues " to require significant assist with all ADLs and mobility.  He would continue to benefit from skilled OT services to address problems listed below and increase independence with ADLs.    Rehab identified problem list/impairments: Rehab identified problem list/impairments: weakness, impaired endurance, impaired functional mobilty, impaired self care skills, decreased lower extremity function, decreased upper extremity function, pain, impaired cardiopulmonary response to activity, orthopedic precautions, decreased ROM, decreased coordination, decreased safety awareness, impaired balance    Rehab potential is fair.    Activity tolerance: Fair    Discharge recommendations: Discharge Facility/Level Of Care Needs: nursing facility, basic     Barriers to discharge: Barriers to Discharge: Decreased caregiver support    Equipment recommendations: wheelchair, hospital bed, lift device     GOALS:    Occupational Therapy Goals        Problem: Occupational Therapy Goal    Goal Priority Disciplines Outcome Interventions   Occupational Therapy Goal     OT, PT/OT Ongoing (interventions implemented as appropriate)    Description:  Goals to be met by 12/10/2017:     Patient will increase functional independence with ADLs by performing:    Bed mobility with demo understanding for spinal precautions with min(A).  Stand Pivot transfer to multiple surfaces (chair, wheelchair, bedside commode) with Moderate Assistance.   Donning LSO with Minimal Assistance while seated EOB.  Donning socks with sock aid with minimal assistance.   Functional dynamic sitting task ~8 min duration while seated EOB with CGA for postural control.   UE endurance HEP with set up and assistance as needed.   [place] on eob with CG A for balance  BSC transfer with max a                       Plan:  Patient to be seen 4 x/week to address the above listed problems via self-care/home management, therapeutic activities, therapeutic exercises, neuromuscular  re-education  Plan of Care expires: 12/14/17  Plan of Care reviewed with: patient, daughter         Elisabeth NORRIS PIYUSH Gotti  12/03/2017

## 2017-12-03 NOTE — PROGRESS NOTES
RN Proactive Rounding Note  Time of Visit: 1700    Admit Date: 2017  LOS: 18  Code Status: DNR   Date of Visit: 2017  : 1932  Age: 85 y.o.  Sex: male  Bed: [facility] A:   MRN: [id number]  Was the patient discharged from an ICU this admission? no  Was the patient discharged from a PACU within last 24 hours? no  Did the patient receive conscious sedation/general anesthesia in last 24 hours? no  Was the patient in the ED within the past 24 hours? no  Was the patient started on NIPPV within the past 24 hours? yes  Attending Physician: Gurmeet Alonso MD  Primary Service: [facility] MED       ASSESSMENT:     Modified Early Warning Score (MEWS): 5  Abnormal Vital Signs: HR, SpO2, BP  Clinical Issues: Circulatory     INTERVENTIONS/ RECOMMENDATIONS:     Patient seen for MEWS score of 5. Patient seen overnight by RRT RN (see note).  HR ranging from  bpm. 2.5mg IV metoprolol ordered q8. Spoke with bedside HECTOR Mcginnis. Asked RN to obtain and document another set of vitals at this time. Would recommend frequent vitals and consult to cardiology for dysrhythmia management if persists. Appears that patient has a history of paroxysmal atrial fibrillation (10/8/2015).     Discussed plan of care with HECTOR Mcginnis     PHYSICIAN ESCALATION:     Yes/No No    Disposition: Remain 1043A    FOLLOW-UP/CONTINGENCY:     Will check chart for next set of vitals.     Call back the Rapid Response Nurse at x [phone]  for additional questions or concerns

## 2017-12-03 NOTE — PLAN OF CARE
Problem: Occupational Therapy Goal  Goal: Occupational Therapy Goal  Goals to be met by 12/10/2017:     Patient will increase functional independence with ADLs by performing:    Bed mobility with demo understanding for spinal precautions with min(A).  Stand Pivot transfer to multiple surfaces (chair, wheelchair, bedside commode) with Moderate Assistance.   Donning LSO with Minimal Assistance while seated EOB.  Donning socks with sock aid with minimal assistance.   Functional dynamic sitting task ~8 min duration while seated EOB with CGA for postural control.   UE endurance HEP with set up and assistance as needed.   [place] on eob with CG A for balance  BSC transfer with max a        POC remains appropriate.    PIYUSH Brannon  12/3/2017

## 2017-12-03 NOTE — ASSESSMENT & PLAN NOTE
-WBC count has been rising since 11/27, patient was stopped from broad spectrum antibiotics due to concerns that cefepime may have been causing AMS and allowing Candidiasis to spread.   -obtain CTA chest to rule out PE and eval for parenchymal disease and then CT abdomen to rule out abdominal source.   -Reconsult ID  -started on moxifloxacin, if still hypotensive broaden to Vanc/moxi/ and possibly zosyn.   -UA, urine culture and repeat blood cultures are pending.

## 2017-12-04 PROBLEM — R68.89 SUSPECTED DEEP TISSUE INJURY: Status: ACTIVE | Noted: 2017-01-01

## 2017-12-04 NOTE — PROGRESS NOTES
"[facility] Hospital Medicine  Progress Note    Patient Name: Donta Cline  MRN: [id number]  Patient Class: IP- Inpatient   Admission Date: 11/14/2017  Length of Stay: 18 days  Attending Physician: Gurmeet Alonso MD  Primary Care Provider: ZAID Richardson Iii, MD    [facility] Medicine Team: [facility] HOSP MED L Gurmeet Alonso MD    Subjective:     Principal Problem:Leukocytosis    HPI:  Donta Cline is a 85 y.o. male who presents with PMHx of ESRD with dialysis MWF, HFpEF, NSTEMI, bladder/prostate cancer, DM II, aortic stenosis and CAD for evaluation of back pain s/p lumbar fusion/laminectomy (10/20/17). No family at bedside. Difficult to obtain HPI as speech garbled, however patient endorses progressive lower extremity weakness with difficulty ambulating for the past 4-5 days. His back pain is without radiation. Patient is now unable to ambulate independently. Denies trauma and urinary bowel/bladder incontinence, fever.    Per EDMD:  "The daughter brought patient to ED due to concern of his severe back pain and inability to perform activities as he was doing previously. She had discussed with Dr. Saul, and he recommended the patient come to ED and obtain imaging for spine."    Imaging:    Ct Lumbar Spine Without Contrast    Result Date: 11/14/2017  Comparison: MRI 10/12/17. Findings: Routine CT lumbar spine protocol performed without IV contrast. Prior L2-L4 instrumented lumbar fusion with interbody disc spacer at L3-4. Decompressive posterior laminectomies at from L2-3 through L5-S1. No evidence of hardware failure. No fracture identified, noting the inferior endplate of L4 is indistinct. Infection or postsurgical fluid collection not excluded, noting limited evaluation without IV contrast and artifact from adjacent pedicle screws. There is partial fusion of the right L5-S1 facet. The SI joints are unremarkable. There is extensive calcified atherosclerotic disease. Atrophic kidneys. Partially " imaged right renal stent noted. No hydronephrosis. Small renal lesions, too small to characterize without IV contrast.      Mri Lumbar Spine Without Contrast    Result Date: 11/14/2017  MRI LUMBAR SPINE TECHNIQUE: MRI lumbar spine was performed without contrast. There is an ill-defined heterogeneous collection encircling L1 spinous process demonstrating fluid levels on the right. This may represents a seroma or hematoma postoperatively however abscess cannot be excluded. This collection is best seen on series 11 image 4.    Hospital Course:  In discussion with daughter who is at bedside today (11/15) patient was discharged after surgical procedure to an inpatient rehab and he reports that he was doing well at the rehab but reached a plateau in his activity/functional level.  She states patient was walking 50 feet with minimal assist, able to toilet with minimal assistance.  Patient was transferred to SNF and since transfer, last Friday (11/10) patient requiring max assistance to stand and reported inability to bear weight secondary to pain, and since this time patient has been bed bound.     She also notes that patient with poor appetite as speech therapy has recommended puree nectar thick diet with Nepro shakes, reports that pt is losing weight and some increased abdominal girth.     Overnight patient admitted due to concerns of worsening pain and inability to ambulate, patient underwent MRI and CT imaging of the lumbar spine.  He has intact fusion, no spinal canal stenosis or cord compression noted, concern for a fluid collection encircling L1 spinous process, post op L2-L4 fusion changes and s/p laminectomy L3-L4.  Discussed imaging findings with attending neurosurgeon Dr. Sands who performed pts operation and he reported that fluid collection appears outside of range where instrumentation was.  Patient with no reported falls, workup today reveals elevated CRP >150, ESR 92, has as supratherapeutic INR  3.8    Informed plan will be to take patient to OR for washout and exploration to evaluate for hematoma vs infection.     11/16 - Patient without acute events overnight, patient states he and his daughter are going to [place] today.  Daughter has provided consent for surgery and blood products, patient is receiving serial doses of FFP with intermittent INR checks with plan for surgery this afternoon if INR <1.4.  Daughter reports that patient w/o complaints of pain when lying in bed, but if he is moved/turned causes exacerbation of his pain.     Patient taken for operative intervention and in discussion with neurosurgeon Dr. Sands, he reports that patient with well healed wound and surgical fusion was intact, when cut into the dura and in subdural region there was area of hematoma that was evacuated (full op report pending in EMR)  Cultures sent to rule out infection but clinical impression was not an appearance of an infected operative site.  Dr. Sands noted that degree of patient's pain incongruent with surgical findings.     Post-oepratively in the morning patient coverted to afib with RVR and this morning with borderline BP, his Hgb downtrending since admission and was 6.7 post-op.  Patient received intermittent fluid boluses early AM of 11/17 but also with intake of 3+L prior to surgery due to need for high amount of FFP (6units).  Patient will receive 1 unit PRBC during Hemodialysis today, giving midodrine as well.     11/18 - Overnight initiated CPAP for patient due to concerns for pulmonary edema, blood gas with some hypercapnia but ph 7.3,  Discussed with Nephrology patient to receive UF today, and will give another 1unit PRBC due to hgb 6.8-7.0.  Pt reports slept well with cpap, still has back pain with movement and on examination.  Culture data from surgery remains negative - neurosurgery has ordered cefazolin, will renally dose this.     11/19 - Patient seen this AM, awake, alert, reporting still having  back pain and reporting frequent cough today, coughing during interview.  S/p UF by nephrology yesterday and negative -1.4L total for the day and s/p 2nd unit PRBC and hgb is stable at 8.6, remains in afib and blood pressures are stable.      11/20 _Patient with ongoing back pain, neurosurgery removed 2 surgical drains,  Hemoglobin remains  Stable  W/o requiring  Transfusion.  He remains in Afib rhythmwise and is  Planned for dialysis.  PT worked with pt and SLP eval pt upgraded to Dental soft nectar thick liquids.     11/20- Called by HD unit and patient 1 hour into session with AFIB with RVR and BP in 80/50s, given 300cc bolus by nephrology and asking for additional recs, HD session stopped early and 250cc bolus given and stat CBC ordered.      11/21 - Pt tolerated dialysis well. Completed 3 hours.    11/22-11/24. Pt had increasing delirium and wbc elevated thus started on ceftriaxone based on dirty UA. DDx includes aspiration PNA as pt with cough.     11/24: Called to the bedside around 4:00pm as pt had increasing oxygen requirements - sating in low 80's on 2L and complaining of SOB. Also with increasing audible rales. Vitals were significant for hypotension - with BP 80/40-50's with HR in the 80's. RR in the high 20-30's. Exam significant for diffuse rhonchi bilaterally.      Pt given 250 cc bolus and oxygen increased to 5L. Pt deep suctioned.  Vitals improved after bolus to 100/50's , HR 80's, RR improved, O2 sats improved to high %. Hypotension and hypoxia likely  sepsis secondary PNA  given increasing oxygen requirement, notable cough in recent days, and leukocytosis. Abx broadened to vanc and cefepime for presumed hospital acquired PNA. DDx includes aspiration PNA as pt has had delirium in recent days.      11/25: Started on Lyrica for back pain,. Reports new onset difficulty swallowing. Respiratory culture positive for yeast. Started fluconazole.    11/26: ID consulted. Recommending DC vanc/cefepime-  unlikely PNA. Continue fluconazole.    11/28 - Patient seen, upset that symptoms havent improved, ready to leave the hospital.  Wants to get out of the bed.     11/29 - noted by nursing to be hypoxic this morning, placed on non-rebreather and  Tapered to 4L nasal cannula o2.  Patient taken for routine dialysis and had UF of 2 liters, had some hypotensionin HD requiring albumin infusion and midodrine dosage.  This afternoon patient w/o acute complaints, wishes he was feeling better.     11/30 - patient with some depressed mental status, difficult to understand speech today, overall patient with rising WBC over the last week, remains with O2 requireiment but not as high as last week, has undergone HD sessions w/o dramatic improvement in symptoms, has been afebrile.     Updated daughter (Wendy) regarding his condition this week, given rising WBC cound, repeat infectious workup with cultures and obtain Pan CTA chest to eval lungs and rule out PE as recd by ID and also CT abd/pelvis, triple phase to characterize liver lesion and check for signs of infection.     12/1 - Patient seen after dialysis this afternoon.  Patient appears in moderate distress, mouth breathing, oxygen tubing tangled in bed.  He is oriented to self/year, believes he's in [place].  When oriented to [facility], states he was there for 4 weeks because of a problem with his back.   RN reports patient arrived from dialysis disoriented, on face mask oxygen supplementation, reports that unable to get oxygen saturation.     CTA Chest is pending, CT abdomen will have to be completed on a different Day.  ID re-consulted given rising WBC count and progressive encephalopathy.   Speech contacted me and reports concern that patient aspirating and recommend NPO, Pureed with Honey thick liquids if preference is to feed pt, will discuss with daughter.     12/2 - Patient is NPO, mental status wax and wanes, but has been mostly depressed and patient is mostly lethargic, he  complained of unbearable pain this AM and onetime dose of dilaudid given, but patient has been persistently lethargic in the afternoon and evening following this.  Some hypotension today, and IV Albumin ordered.  If this does not correct with albumin, will have to broaden antibiotics.  Discussed with CT and scans not completed as pt did not have 18ga IV appropriate for contrast use, 18 ga left basilic vein IV placed under ultrasound guidance tonight and night coverage requested to order CT scans.     Updated Wendy Jenkins about patient's status, she would like to pursue antibiotic therapy, ok with NPO, and CT scans to determine for a reversible etiology, she is POA but if these studies do not reveal reversible cause and patient does not improve, may require arranging a family meeting next week.     12/3 - CTA Chest and triple phase abd ct obtained, returned with results most significant of Numerous pulmonary nodules concerning for metastatic cancer, left lower lobe pneumonia, diffuse atherosclerosis, abdominal granulomatous disease in liver and spleen.  Extensive stool in colon, no acute abnormality on CT of the Bladder or Prostate.  He has a history of bladder cancer that was previously treated.     I spoke with Wendy late this evening and she states a few years ago under the care of a private pulmonologist after cancer was considered in remission that he had a chest CT that was reported as normal.  She also states previously was told that if bladder cancer metastasized first site of spread is commonly the lung.  She inquired if patient needs CNS imaging to rule out brain metastases.  As patient is ESRD and would require MRI with contrast, will have to organize with both radiology and nephrology to have patient dialyzed after undergoing an MRI Brain with contrast.     Starting vancomycin for pneumonia on CT scan, and patient has been NPO due to mental status, on IV intermittent doses of metoprolol for rate control of  afib with RVR.     Interval History: as above    Review of Systems   Unable to perform ROS: Mental status change   Gastrointestinal: Negative for constipation.   Musculoskeletal: Positive for back pain and gait problem. Negative for joint swelling, myalgias and neck pain.   Psychiatric/Behavioral: Positive for confusion. Negative for behavioral problems.     Objective:     Vital Signs (Most Recent):  Temp: 97.7 °F (36.5 °C) (12/03/17 1944)  Pulse: 100 (12/03/17 2100)  Resp: 18 (12/03/17 2100)  BP: (!) 103/53 (12/03/17 1944)  SpO2: 98 % (12/03/17 2100) Vital Signs (24h Range):  Temp:  [97.7 °F (36.5 °C)-98.6 °F (37 °C)] 97.7 °F (36.5 °C)  Pulse:  [] 100  Resp:  [16-23] 18  SpO2:  [92 %-100 %] 98 %  BP: ()/(46-87) 103/53     Weight: 74.6 kg (164 lb 8 oz)  Body mass index is 30.09 kg/m².    Intake/Output Summary (Last 24 hours) at 12/03/17 2133  Last data filed at 12/03/17 1700   Gross per 24 hour   Intake              280 ml   Output                0 ml   Net              280 ml      Physical Exam   Constitutional: He appears well-developed.   Awake/alert   HENT:   Mouth/Throat: Oropharynx is clear and moist.   Wet quality to voice   Eyes: Conjunctivae are normal. No scleral icterus.   Cardiovascular: Normal heart sounds and intact distal pulses.    No murmur heard.  Irregular rhythm, tachycardic   Pulmonary/Chest: Effort normal. He has no wheezes.   Mouth breathing, accessory muscle use, Bilateral coarse breath sounds with crackles present   Abdominal: Soft. Bowel sounds are normal.   Distended abdomen, abdominal wall asymmetric distension, Left >Right.  Non-tender, non-rigid, no guarding.    Musculoskeletal:   RUE forearm AV fistula with palpable thrill.      Lymphadenopathy:     He has no cervical adenopathy.   Neurological:   Lethargic, arousable, Oriented to Self, year   Skin: Skin is warm. There is pallor.       Significant Labs:   CBC:   Recent Labs  Lab 12/02/17  0536 12/03/17  0434   WBC 17.07*  16.68*   HGB 8.0* 8.1*   HCT 26.7* 27.1*    234     CMP:   Recent Labs  Lab 12/02/17  0536 12/03/17  0434    141   K 4.4 5.0    103   CO2 25 22*   * 150*   BUN 30* 45*   CREATININE 3.5* 4.7*   CALCIUM 10.4 10.5   PROT 6.5 6.7   ALBUMIN 2.6* 2.9*   BILITOT 0.4 0.4   ALKPHOS 164* 155*   AST 80* 83*   ALT 21 26   ANIONGAP 12 16   EGFRNONAA 15.0* 10.5*     Lactic Acid:   Recent Labs  Lab 12/02/17  0536 12/03/17  0434   LACTATE 1.1 1.2       Significant Imaging: I have reviewed all pertinent imaging results/findings within the past 24 hours.     CTA CHEST, CT ABDOMEN PELVIS TRIPLE PHASE  Impression         1. No evidence of pulmonary thromboembolism or focal mass lesion within the liver.    2. Numerous solid pulmonary nodules throughout both lungs, the largest of which is in the right upper lobe abutting the pleural margin measuring up to 2.4 cm concerning for metastatic disease.    3. Left lower lobe consolidation concerning for pneumonia with dependent bilateral small pleural effusions.    4. Significant aortic and coronary artery atherosclerosis.    5. Calcified liver and splenic granulomas compatible with old granulomatous disease.    6. Small sized kidneys with parenchymal loss and bilateral renal cysts compatible with chronic medical renal disease.    7. Significant degenerative changes of the thoracic and lumbar spine consistent with DISH with posterior spinal fusion at L3-L5.    Additional findings as detailed above.         Assessment/Plan:      * Leukocytosis    -WBC count has been rising since 11/27, CT studies show metastatic disease in lung and left lower lobe pneumonia.   -ID consults following, continue Moxifloxacin for total 7 days.  Restart Vancomycin today  -blood cultures show NGTD  -if still hypotensive broaden to zosyn vs cefepime.   -UA, urine culture was attempted via straight cath, no UOP per nursing.         Delirium    Repeat infectious workup  -ABG- showed acute  respiratory alkalosis - ph 7.47, pco2 30s.   -Delirium precautions.   - make patient NPO from a safety standpoint.   or for signs of infection given leukocytosis ; recent infectious workup negative - only consistent with candidiasis    -Will dicussed with family obtaining MRI Brain and need for coordination.         Left lower lobe pneumonia    -continue moxifloxacin and vancomycin          Acute bilateral low back pain, post-lumbar spinal fusion    - s/p washout and evacuation of hematoma on 11/16  - Supportive care, PT/OT - as pt unable to participate recommendation for nursing facility, no longer skilled  - Pain control with scheduled tylenol 650 q8hrs;  tramadol 50 qhs for breakthrough ; will cont pregabalin to 75mg Qd for now ;  - hasnt been taking much PO safely, IV low dose dilaudid seems to cause too much hypotension/depressed mental status.   - component of pain likely severe right hip osteoarthritis - will cont lidocaine patches   - Mobility remains poor and strength in hip flexors/proximally still poor.Patient unable to participate with therapy, poor progress from functional standpoint.         ESRD (end stage renal disease) on dialysis    - nephrology consulted  -12/1 - patient s/p HD but with minimal volume removal kept nearly even (-181)        Metastatic cancer to lung    NEW finding on chest CT  -may need to plan MRI BRain and discuss with nephrology and radiology to coordinate possible for Wed dialysis session, patient on schedule for dialysis on Monday morning.   -Lesions noted are peripheral, if tissue diagnoses desired, likely could be obtained via CT guided IR biopsy.             Acute respiratory failure with hypoxia    -Etiology for aspiration pneumonia vs pulmonary edema vs fungal pneumonia vs  metastatic disease found  -difficulty obtaining oxygen saturations, progressive delirium with rising leukocytosis.   -o2 supplementation, nightly CPAP          L-S radiculopathy    Management as above,  patient now with chronic pain that is debilitating and patient has been mostly bed bound since his surgery.           Paroxysmal atrial fibrillation    -HR rising ,remains in Afib rhythm wise  Holding tablets as pt strict NPO - unless awake alert/upright and following commands  -Using 2.5mg IV metoprolol for rate control if needed. As patient has not been adequately anticoagulated, cardioversion with amiodarone likely not indicate, but may improve patients hemodynamics if risk is discussed and other options seem not to be working.         Chronic diastolic heart failure    -last echo 10/2017 with pulmonary HTN and undetermined diastolic function, EF 55    -HD is main source of volume removal          Status post lumbar spinal fusion    -neurosurgery consultation as above          Coronary artery disease involving native coronary artery of native heart without angina pectoris    -continue asa, statin          Type 2 diabetes mellitus with chronic kidney disease on chronic dialysis, with long-term current use of insulin    Reduce levemir dose, as pt with minimal oral intake, may need NPo        Severe aortic stenosis    - no acute intervention at this time.   - Will cont to monitor         Abnormal US (ultrasound) of abdomen    - RUQ US given elevated LFT's - showed hypodense area - no evidence of infection  CT scan ordered.           Candidiasis    Per family pt with hx of yeast in urine - has old urinary stent in place.  Respiratory cx from 11/24 and 11/25 growing yeast .   - cont diflucan for now 200 qd x 10 days - renal dosing            Back pain                VTE Risk Mitigation         Ordered     Medium Risk of VTE  Once      11/17/17 0011     Place sequential compression device  Until discontinued      11/14/17 1907     Place BRAIN hose  Until discontinued      11/14/17 1907              Gurmeet Alonso MD  Department of Hospital Medicine   [facility]

## 2017-12-04 NOTE — PLAN OF CARE
Problem: SLP Goal  Goal: SLP Goal  Speech Language Pathology Goals  Goals to be met by 12/11/17  1. Pt will participate in ongoing swallow assessment to determine least restrictive diet.  2. Educate pt and family on S/S aspiration and aspiration precautions              POC changed to 3x/week.    Fair progress towards goals.    Nimisha Robles M.A. CCC-SLP  Speech Language Pathologist  [phone]  12/4/2017

## 2017-12-04 NOTE — ASSESSMENT & PLAN NOTE
-WBC count has been rising since 11/27, CT studies show metastatic disease in lung and left lower lobe pneumonia.   -ID consults following, continue Moxifloxacin for total 7 days.  Restart Vancomycin today  -blood cultures show NGTD  -if still hypotensive broaden to zosyn vs cefepime.   -UA, urine culture was attempted via straight cath, no UOP per nursing.

## 2017-12-04 NOTE — ASSESSMENT & PLAN NOTE
Mr. Cline is a 85 y.o. male with ESRD on dialysis MWF, HFpEF, NSTEMI, bladder/prostate cancer, DM II, aortic stenosis and CAD, previous fungal UTI's now having continual increased O2 requirements and some slowed mentation with leucocytosis     - fever curve and WBC continue to improve  - CT chest with numerous pulmonary nodules, feel that biopsy of one could aid with diagnosis  - would discontinue vancomycin at this time.   - continue moxifloxacin 400 mg PO daily for 7 days total  - will continue to follow.

## 2017-12-04 NOTE — PROGRESS NOTES
Wound care consult received from nursing.  Pt with daughter at bedside who states concern that wounds are becoming worse over the weekend.  Discussed with daughter pressure prevention off-loading techniques and skin care along with recommended plan of care.    Call placed to Medicine team to also discuss wound care recommendations:  1. Triad to sacrum BID  2. Mepilex border to Left hip  3. Pulsate bed surface  Nurse Kurtis notified of the care provided today.  Pt's daughter has wound care team contact information for any further wound care concerns or questions.  [phone]         12/04/17 1356   Cognitive   Level of Consciousness (AVPU) alert   Respiratory   Rhythm/Pattern, Respiratory depth regular;pattern regular   Expansion/Accessory Muscles/Retractions expansion symmetric;no retractions   Cough Frequency frequent   Cough Type weak;loose   Suction Method not required   Breath Sounds   All Lung Fields Breath Sounds Anterior:;Lateral:;coarse   Respiratory Pre/Post-Treatment Assess   Post-treatment Heart Rate (beats/min) 110   Post-treatment Resp Rate (breaths/min) 16   Oxygen Therapy   SpO2 98 %   O2 Device (Oxygen Therapy) nasal cannula   Flow (L/min) 4   Pulse Oximetry Type Intermittent   Aerosol Therapy   Respiratory Treatment Status given   SVN/Inhaler Treatment Route with oxygen;mask   Position During Treatment HOB at 45 degrees   Patient Tolerance good   Chest Physiotherapy   Chest Physiotherapy Type percussion   Method by hand   Position HOB elevated 30-45 degrees   Patient Tolerance good   Total Time (Min) 10   Vibratory PEP Therapy   Type acapella, high (green)   Administration done with encouragement   Number of Repetitions 10   ECG   Pulse 100       Pressure Ulcer 12/01/17 2130 sacral spine suspected deep tissue injury   Date First Assessed/Time First Assessed: 12/01/17 2130   Pressure Ulcer Present on Admission: no  Location: sacral spine  Staging: suspected deep tissue injury   Wound Image    Staging  suspected deep tissue injury   Healing Pressure Ulcer no   Pressure Ulcer Risk Factors activity;mobility;nutrition;shear/friction   Dressing Appearance intact   Drainage Amount scant   Drainage Characteristics/Odor serosanguineous   Appearance maroon;purple;reddened   Periwound Area redness   Wound Length (cm) 7   Wound Width (cm) 6.5   Depth (cm) 0.1   Dressing other (see comments)  (Mepilex border)   Dressing Change Due 12/07/17       Pressure Ulcer 12/04/17 Left hip suspected deep tissue injury   Date First Assessed: 12/04/17   Side: Left  Location: hip  Staging: suspected deep tissue injury   Wound Image    Staging suspected deep tissue injury   Pressure Ulcer Risk Factors activity;mobility;nutrition;shear/friction;moisture   Dressing Appearance no dressing   Drainage Amount scant   Drainage Characteristics/Odor serosanguineous   Wound Edges jagged   Wound Length (cm) 5   Wound Width (cm) 3   Depth (cm) 0   Cleansed W/ sterile normal saline   Interventions other (see comments)  (Triad)   Dressing Change Due 12/04/17

## 2017-12-04 NOTE — ASSESSMENT & PLAN NOTE
-HR rising ,remains in Afib rhythm wise  Holding tablets as pt strict NPO - unless awake alert/upright and following commands  -Using 2.5mg IV metoprolol for rate control if needed. As patient has not been adequately anticoagulated, cardioversion with amiodarone likely not indicate, but may improve patients hemodynamics if risk is discussed and other options seem not to be working.

## 2017-12-04 NOTE — PROGRESS NOTES
"  [facility]  Adult Nutrition  Consult Note    SUMMARY     Recommendations    1. If able to advance diet, recommend renal diet, texture per SLP.   2. If unable to advance diet, place NGT, and initiate enteral nutrition. Recommend Novasource @ 35 mL/hr to provide 1680 calories, 76 g of protein, 602 mL fluid.    - Hold for residuals >500 mL; additional fluid per MD.  3. RD to monitor & follow-up.    Goals: Meet % EEN, EPN  Nutrition Goal Status: new  Communication of RD Recs: reviewed with RN    Reason for Assessment    Reason for Assessment: RD follow-up  Diagnosis: other (see comments) (Pneumonia)  Relevent Medical History: Ca, DM, ESRD on HD   Interdisciplinary Rounds: did not attend     General Information Comments: Pt remains NPO. ONOFRE at HD.  Nutrition Discharge Planning: Unable to determine    Nutrition Prescription Ordered    Current Diet Order: NPO    Nutrition/Diet History    Patient Reported Diet/Restrictions/Preferences: other (see comments) (FELICIA)     Factors Affecting Nutritional Intake: NPO, difficulty/impaired swallowing    Labs/Tests/Procedures/Meds    Pertinent Labs Reviewed: reviewed, pertinent  Pertinent Labs Comments: K 5.6, BUN 58, Creat 5.9, GFR 9.2, Gluc 155-196, A1C 5.1  Pertinent Medications Reviewed: reviewed, pertinent  Pertinent Medications Comments: Statin, Insulin    Physical Findings    Overall Physical Appearance: overweight  Oral/Mouth Cavity: WDL  Skin: incision (Back)    Anthropometrics    Height: 5' 2" (157.5 cm)  Weight Method: Bed Scale  Weight: 75.2 kg (165 lb 12.6 oz)    Ideal Body Weight (IBW), Male: 118 lb  % Ideal Body Weight, Male (lb): 140.5 lb     BMI (Calculated): 30.4  BMI Grade: 30 - 34.9- obesity - grade I    Estimated/Assessed Needs    Weight Used For Calorie Calculations: 75.2 kg (165 lb 12.6 oz)      Energy Calorie Requirements (kcal): 1645 kcal/d  Energy Need Method: Gem-St Jeor (1.25 PAL)     Weight Used For Protein Calculations: 75.2 " kg (165 lb 12.6 oz)  Protein Requirements: 75 g/d (1 g/kg)     Fluid Need Method: RDA Method, other (see comments) (Per MD or 1 mL/kcal)    Assessment and Plan    Inadequate energy intake r/t inability to consume sufficient energy AEB NPO with no alternate means of nutrition.  Status: New    Monitor and Evaluation    Food and Nutrient Intake: energy intake, food and beverage intake, enteral nutrition intake  Food and Nutrient Adminstration: diet order, enteral and parenteral nutrition administration     Physical Activity and Function: nutrition-related ADLs and IADLs  Anthropometric Measurements: weight, weight change  Biochemical Data, Medical Tests and Procedures: lipid profile, inflammatory profile, gastrointestinal profile, electrolyte and renal panel, glucose/endocrine profile  Nutrition-Focused Physical Findings: overall appearance    Nutrition Risk    Level of Risk: other (see comments) (2x/week)    Nutrition Follow-Up    RD Follow-up?: Yes

## 2017-12-04 NOTE — SUBJECTIVE & OBJECTIVE
Interval History: Pt complaining of productive cough, denies fever or chills, no chest pain but some SOB    Review of Systems   Constitutional: Negative for chills and fever.   Respiratory: Positive for cough. Negative for chest tightness and shortness of breath.    Cardiovascular: Negative for chest pain and palpitations.   Gastrointestinal: Negative for abdominal distention, abdominal pain, constipation, diarrhea, nausea and vomiting.   Genitourinary: Negative for difficulty urinating, dysuria and hematuria.   Skin: Negative for color change and rash.   Neurological: Negative for dizziness and weakness.   Psychiatric/Behavioral: Positive for confusion.     Objective:     Vital Signs (Most Recent):  Temp: 97.7 °F (36.5 °C) (12/04/17 0758)  Pulse: 105 (12/04/17 0823)  Resp: 18 (12/04/17 0823)  BP: (!) 103/54 (12/04/17 0758)  SpO2: 97 % (12/04/17 0823) Vital Signs (24h Range):  Temp:  [97.4 °F (36.3 °C)-98.7 °F (37.1 °C)] 97.7 °F (36.5 °C)  Pulse:  [] 105  Resp:  [16-23] 18  SpO2:  [90 %-100 %] 97 %  BP: ()/(51-87) 103/54     Weight: 75.2 kg (165 lb 11.2 oz)  Body mass index is 30.31 kg/m².    Estimated Creatinine Clearance: 8.1 mL/min (based on SCr of 5.9 mg/dL (H)).    Physical Exam   Constitutional: He appears well-developed and well-nourished.   Pt appears moderately distressed   HENT:   Head: Normocephalic and atraumatic.   Mouth/Throat: No oropharyngeal exudate.   Eyes: Pupils are equal, round, and reactive to light. No scleral icterus.   Neck: No JVD present.   Cardiovascular: Exam reveals no friction rub.    No murmur heard.  Tachycardic, irregularly irregular   Pulmonary/Chest: He is in respiratory distress. He has no wheezes. He has no rales. He exhibits no tenderness.   Pt appears to be slightly dyspneic, rhonchorus breath sounds present in TYLER.    Abdominal: Soft. He exhibits no distension. There is no tenderness. There is no rebound and no guarding.   Musculoskeletal: Normal range of motion.  He exhibits no edema.   Lymphadenopathy:     He has no cervical adenopathy.   Neurological: He is alert.   Pt oriented to self    Skin: Skin is warm and dry. Capillary refill takes less than 2 seconds.       Significant Labs:   CBC:   Recent Labs  Lab 12/03/17 0434 12/04/17 0436   WBC 16.68* 15.08*   HGB 8.1* 8.3*   HCT 27.1* 28.2*    227     CMP:   Recent Labs  Lab 12/03/17 0434 12/04/17 0436    143   K 5.0 5.6*    103   CO2 22* 24   * 153*   BUN 45* 58*   CREATININE 4.7* 5.9*   CALCIUM 10.5 10.3   PROT 6.7 6.6   ALBUMIN 2.9* 2.7*   BILITOT 0.4 0.4   ALKPHOS 155* 164*   AST 83* 101*   ALT 26 31   ANIONGAP 16 16   EGFRNONAA 10.5* 8.0*     Microbiology Results (last 7 days)     Procedure Component Value Units Date/Time    Blood culture [[id number]] Collected:  11/30/17 2050    Order Status:  Completed Specimen:  Blood from Peripheral, Lower Arm, Left Updated:  12/03/17 2212     Blood Culture, Routine No Growth to date     Blood Culture, Routine No Growth to date     Blood Culture, Routine No Growth to date     Blood Culture, Routine No Growth to date    Narrative:       Left Peripheral    Blood culture [[id number]] Collected:  11/30/17 2038    Order Status:  Completed Specimen:  Blood Updated:  12/03/17 2212     Blood Culture, Routine No Growth to date     Blood Culture, Routine No Growth to date     Blood Culture, Routine No Growth to date     Blood Culture, Routine No Growth to date    Narrative:       Left peripheral #2    Urine culture [[id number]]     Order Status:  No result Specimen:  Urine from Urine, Catheterized     Gram stain [[id number]]     Order Status:  No result Specimen:  Urine from Urine     Culture, Respiratory with Gram Stain [[id number]]     Order Status:  No result Specimen:  Respiratory     Blood culture [[id number]] Collected:  11/25/17 2222    Order Status:  Completed Specimen:  Blood Updated:  12/01/17 0612     Blood Culture, Routine No growth after 5 days.    Fungus  Culture, Blood or Bone Marrow [[id number]] Collected:  11/25/17 2222    Order Status:  Completed Specimen:  Blood from Blood Updated:  11/29/17 0955     Fungus Cult, blood or BM Culture in progress    Blood culture [[id number]] Collected:  11/23/17 0909    Order Status:  Completed Specimen:  Blood Updated:  11/28/17 1212     Blood Culture, Routine No growth after 5 days.    Blood culture [[id number]] Collected:  11/23/17 0909    Order Status:  Completed Specimen:  Blood Updated:  11/28/17 1212     Blood Culture, Routine No growth after 5 days.    Culture, Respiratory with Gram Stain [[id number]] Collected:  11/25/17 1420    Order Status:  Completed Specimen:  Respiratory from Sputum, Induced Updated:  11/27/17 1215     Respiratory Culture --     CANDIDA ALBICANS  Moderate       Gram Stain (Respiratory) >10 epithelial cells per low power field     Gram Stain (Respiratory) Many WBC's     Gram Stain (Respiratory) Moderate yeast     Gram Stain (Respiratory) Rare Gram positive rods    Culture, Respiratory with Gram Stain [[id number]] Collected:  11/24/17 1530    Order Status:  Completed Specimen:  Respiratory from Sputum, Expectorated Updated:  11/27/17 1158     Respiratory Culture --     CANDIDA ALBICANS  Few       Gram Stain (Respiratory) <10 epithelial cells per low power field.     Gram Stain (Respiratory) Few WBC's     Gram Stain (Respiratory) Few Gram positive cocci     Gram Stain (Respiratory) Rare yeast     Gram Stain (Respiratory) Rare Gram positive rods          Significant Imaging: I have reviewed all pertinent imaging results/findings within the past 24 hours.

## 2017-12-04 NOTE — PROGRESS NOTES
[facility]  Nephrology  Progress Note    Patient Name: Donta Cline  MRN: [id number]  Admission Date: 11/14/2017  Hospital Length of Stay: 19 days  Attending Provider: Gurmeet Alonso MD   Primary Care Physician: ZAID Richardson Iii, MD  Principal Problem:Leukocytosis    Subjective:     HPI: 86 yo male with significant history for hypertension, hyperlipidemia, remote smoker, PAF, Severe AS, DMT2, CAD sp NSTEMI, dcognitive impairment, recent sp lumbar fusion/laminectomy 10/20/17, and ESRD x 2 years who is admitted for lower back pain associated with BLE pain/spasm since discharge to [facility] on 11/10 from inpatient rehab at [facility]. Patient confuse.Daughter Wendy at bedside reports patient had acute decline on 11/11 as patient went from walking 50 to 100 ft and minimal assist with ADL's to not able to hold self up or walk. MRI lumbar showed fluid collection at L1. Neurosurgery consulted. Nephrology consult for hemodialysis. HD MWF 3.5 hrs duration via LFA AVF at [facility]. Some residual but not sure how much. Unclear of EDW. Last HD 11/13. CXR bilateral basilar atelectasis otherwise clear.       Interval History: seen in [facility] on dialysis, just started session, no issues, tolerating well    Review of patient's allergies indicates:   Allergen Reactions    Morphine Other (See Comments)     Other reaction(s): severe abdominal pain    Darvocet a500  [propoxyphene n-acetaminophen]      Other reaction(s): Unknown     Current Facility-Administered Medications   Medication Frequency    0.9%  NaCl infusion PRN    0.9%  NaCl infusion Once    acetaminophen tablet 650 mg Q8H    albuterol nebulizer solution 2.5 mg Q4H PRN    albuterol-ipratropium 2.5mg-0.5mg/3mL nebulizer solution 3 mL Q6H WAKE    atorvastatin tablet 80 mg Daily    benzonatate capsule 100 mg TID PRN    bisacodyl suppository 10 mg Daily    cyclobenzaprine tablet 5 mg TID PRN     dextromethorphan-guaifenesin  mg/5 ml liquid 10 mL Q6H    dextrose 50% injection 12.5 g PRN    dextrose 50% injection 25 g PRN    epoetin preeti injection 10,000 Units Every Mon, Wed, Fri    fluconazole tablet 200 mg Daily    glucagon (human recombinant) injection 1 mg PRN    glucose chewable tablet 16 g PRN    glucose chewable tablet 24 g PRN    insulin aspart pen 0-5 Units QID (AC + HS) PRN    insulin detemir pen 4 Units QHS    lidocaine 5 % patch 3 patch Q24H    metoprolol injection 2.5 mg Q8H    miconazole NITRATE 2 % top powder BID    midodrine tablet 10 mg Continuous PRN    midodrine tablet 10 mg TID    moxifloxacin 400 mg/250 mL IVPB 400 mg Q24H    pantoprazole EC tablet 40 mg Nightly    pneumoc 13-osiel conj-dip cr(PF) 0.5 mL vaccine x 1 dose    polyethylene glycol packet 17 g BID    pregabalin capsule 50 mg Daily    promethazine (PHENERGAN) 6.25 mg in dextrose 5 % 50 mL IVPB Q6H PRN    ramelteon tablet 8 mg Nightly PRN    senna-docusate 8.6-50 mg per tablet 1 tablet BID    senna-docusate 8.6-50 mg per tablet 2 tablet Nightly PRN    sertraline tablet 100 mg QHS    sodium chloride 0.9% flush 3 mL PRN    traMADol tablet 50 mg Q8H PRN       Objective:     Vital Signs (Most Recent):  Temp: 98.7 °F (37.1 °C) (12/04/17 1118)  Pulse: 100 (12/04/17 1356)  Resp: 16 (12/04/17 1356)  BP: (!) 137/49 (12/04/17 1118)  SpO2: 98 % (12/04/17 1356)  O2 Device (Oxygen Therapy): nasal cannula (12/04/17 1356) Vital Signs (24h Range):  Temp:  [97.4 °F (36.3 °C)-98.7 °F (37.1 °C)] 98.7 °F (37.1 °C)  Pulse:  [] 100  Resp:  [16-20] 16  SpO2:  [90 %-100 %] 98 %  BP: ()/(49-87) 137/49     Weight: 75.2 kg (165 lb 12.6 oz) (12/04/17 1400)  Body mass index is 30.32 kg/m².  Body surface area is 1.81 meters squared.    I/O last 3 completed shifts:  In: 280 [P.O.:30; IV Piggyback:250]  Out: -     Physical Exam   HENT:   Head: Atraumatic.   Neck: No JVD present.   Cardiovascular: Normal rate and  "regular rhythm.  Exam reveals no friction rub.    Pulmonary/Chest: Effort normal and breath sounds normal.   Abdominal: Soft. He exhibits no distension.   Musculoskeletal: He exhibits no edema.   Neurological: He is alert.   Skin: Skin is warm.         Assessment/Plan:     ESRD (end stage renal disease) on dialysis    HD MWF 3.5 hrs duration via LFA AVF at [facility]. Unsure EDW or residual function.     Plan  -HD treatment today for metabolic clearance/volume management.  BPs labile, keep UF goal of 1L as tolerated.  Albumin/midodrine as needed for BP support, will monitor hemodynamics closely during his session                  Addendum:  After about 20 minutes on the dialysis machine patient developed a tachycardia and dropping Bp, he has had these events in the past, he did receive midodrine at 1pm, SBP is in the 70s-80s, he feels "ok," and there is no acute distress, at this time reviewing his labs there is no emergent need to have dialysis today, his K is borderline at 5.6, but ok for him, so due to tachy and hemodynamic instability, will hold on dialysis today, will also give another 10mg of midodrine, 5mg IV lopressor, hospitalist service notified    Thank you for your consult. I will follow-up with patient. Please contact us if you have any additional questions.    David Hamilton MD  Nephrology  [facility]          I have reviewed and concur with the fellow's history, physical, assessment, and plan. I have personally interviewed and examined the patient at bedside    "

## 2017-12-04 NOTE — ASSESSMENT & PLAN NOTE
HD MWF 3.5 hrs duration via LFA AVF at [facility]. Unsure EDW or residual function.     Plan  -HD treatment today for metabolic clearance/volume management.  BPs labile, keep UF goal of 1L as tolerated.  Albumin/midodrine as needed for BP support, will monitor hemodynamics closely during his session

## 2017-12-04 NOTE — PROGRESS NOTES
Pt arrived VIA Pt. Escort and was transferred to bed without incident.  AV Fistula to LFA  was then prepped and cannulated with 15 gauge needles as per protocol.  Both lines aspirate and flush without resistance or infiltration.  Patient denies any pain.  Maintenance dialysis was then initiated.

## 2017-12-04 NOTE — ASSESSMENT & PLAN NOTE
-last echo 10/2017 with pulmonary HTN and undetermined diastolic function, EF 55    -HD is main source of volume removal

## 2017-12-04 NOTE — ASSESSMENT & PLAN NOTE
Repeat infectious workup  -ABG- showed acute respiratory alkalosis - ph 7.47, pco2 30s.   -Delirium precautions.   - make patient NPO from a safety standpoint.   or for signs of infection given leukocytosis ; recent infectious workup negative - only consistent with candidiasis    -Will dicussed with family obtaining MRI Brain and need for coordination.

## 2017-12-04 NOTE — ASSESSMENT & PLAN NOTE
-Etiology for aspiration pneumonia vs pulmonary edema vs fungal pneumonia vs  metastatic disease found  -difficulty obtaining oxygen saturations, progressive delirium with rising leukocytosis.   -o2 supplementation, nightly CPAP

## 2017-12-04 NOTE — PLAN OF CARE
Problem: Patient Care Overview  Goal: Plan of Care Review  Outcome: Ongoing (interventions implemented as appropriate)  Pt awake and alert to person. Pt disoriented to place, time, and situation. Pt complains of pain to his right hip throughout shift. Pt remains free from falls and injuries throughout shift. Bed in lowest and locked position. Call bell and personal items at bedside. Pt went to dialysis today. During dialysis pt became tachycardiac (HR in the 150's) and hypotensive. Dialysis was discontinued. Wound care consult placed, and wound care nurse came to bedside and assessed pt wounds. IR with CT guidance ordered. No other changes. Will continue to monitor pt.

## 2017-12-04 NOTE — SUBJECTIVE & OBJECTIVE
Interval History: seen in [facility] on dialysis, just started session, no issues, tolerating well    Review of patient's allergies indicates:   Allergen Reactions    Morphine Other (See Comments)     Other reaction(s): severe abdominal pain    Darvocet a500  [propoxyphene n-acetaminophen]      Other reaction(s): Unknown     Current Facility-Administered Medications   Medication Frequency    0.9%  NaCl infusion PRN    0.9%  NaCl infusion Once    acetaminophen tablet 650 mg Q8H    albuterol nebulizer solution 2.5 mg Q4H PRN    albuterol-ipratropium 2.5mg-0.5mg/3mL nebulizer solution 3 mL Q6H WAKE    atorvastatin tablet 80 mg Daily    benzonatate capsule 100 mg TID PRN    bisacodyl suppository 10 mg Daily    cyclobenzaprine tablet 5 mg TID PRN    dextromethorphan-guaifenesin  mg/5 ml liquid 10 mL Q6H    dextrose 50% injection 12.5 g PRN    dextrose 50% injection 25 g PRN    epoetin preeti injection 10,000 Units Every Mon, Wed, Fri    fluconazole tablet 200 mg Daily    glucagon (human recombinant) injection 1 mg PRN    glucose chewable tablet 16 g PRN    glucose chewable tablet 24 g PRN    insulin aspart pen 0-5 Units QID (AC + HS) PRN    insulin detemir pen 4 Units QHS    lidocaine 5 % patch 3 patch Q24H    metoprolol injection 2.5 mg Q8H    miconazole NITRATE 2 % top powder BID    midodrine tablet 10 mg Continuous PRN    midodrine tablet 10 mg TID    moxifloxacin 400 mg/250 mL IVPB 400 mg Q24H    pantoprazole EC tablet 40 mg Nightly    pneumoc 13-osiel conj-dip cr(PF) 0.5 mL vaccine x 1 dose    polyethylene glycol packet 17 g BID    pregabalin capsule 50 mg Daily    promethazine (PHENERGAN) 6.25 mg in dextrose 5 % 50 mL IVPB Q6H PRN    ramelteon tablet 8 mg Nightly PRN    senna-docusate 8.6-50 mg per tablet 1 tablet BID    senna-docusate 8.6-50 mg per tablet 2 tablet Nightly PRN    sertraline tablet 100 mg QHS    sodium chloride 0.9% flush 3 mL PRN    traMADol tablet 50 mg Q8H PRN        Objective:     Vital Signs (Most Recent):  Temp: 98.7 °F (37.1 °C) (12/04/17 1118)  Pulse: 100 (12/04/17 1356)  Resp: 16 (12/04/17 1356)  BP: (!) 137/49 (12/04/17 1118)  SpO2: 98 % (12/04/17 1356)  O2 Device (Oxygen Therapy): nasal cannula (12/04/17 1356) Vital Signs (24h Range):  Temp:  [97.4 °F (36.3 °C)-98.7 °F (37.1 °C)] 98.7 °F (37.1 °C)  Pulse:  [] 100  Resp:  [16-20] 16  SpO2:  [90 %-100 %] 98 %  BP: ()/(49-87) 137/49     Weight: 75.2 kg (165 lb 12.6 oz) (12/04/17 1400)  Body mass index is 30.32 kg/m².  Body surface area is 1.81 meters squared.    I/O last 3 completed shifts:  In: 280 [P.O.:30; IV Piggyback:250]  Out: -     Physical Exam   HENT:   Head: Atraumatic.   Neck: No JVD present.   Cardiovascular: Normal rate and regular rhythm.  Exam reveals no friction rub.    Pulmonary/Chest: Effort normal and breath sounds normal.   Abdominal: Soft. He exhibits no distension.   Musculoskeletal: He exhibits no edema.   Neurological: He is alert.   Skin: Skin is warm.

## 2017-12-04 NOTE — PLAN OF CARE
Problem: Patient Care Overview  Goal: Plan of Care Review  Outcome: Ongoing (interventions implemented as appropriate)   12/04/17 0585   Coping/Psychosocial   Plan Of Care Reviewed With patient     Assumed care of patient at 1900. Patient only oriented to self. Speech garbled. Anuric, scheduled for dialysis today. BP low, lopressor given once during shift because heart rate kept jumping up into the 130s and BP sys was greater than 90; held dose from earlier given later in shift. Cpap through some of the night, sats have been good, but patient has poor peripheral reading. Moved pulse ox to forehead. Turn q 2. Will continue to monitor.

## 2017-12-04 NOTE — SIGNIFICANT EVENT
Called to HD unit by Dr. Morel    Patient started on HD for approximately 20 minutes and HR in 150, reports pt also hypotensive.     He received midodrine and IV metoprolol dose before HD in preparation, HD was discontinued, nephrology to assess tomorrow.     On my eval, patient with HR 110s to 120s,  Bp systolic 90-100s.     EKG with afib, precordial leads with lateral/anterior T-wave inversions.     Will discuss with family later.  Not a candidate for digoxin, would have to discuss with family/cardiology use of amiodarone as he has not been adequately anticoagulated.     Also can consider use of diltiazem.             Gurmeet Alonso M.D.  Attending Physician  [facility] Medicine Dept.  Pager: [phone]  Spectralink -x [phone]

## 2017-12-04 NOTE — PROGRESS NOTES
[facility]  Infectious Disease  Progress Note    Patient Name: Donta Cline  MRN: [id number]  Admission Date: 11/14/2017  Length of Stay: 19 days  Attending Physician: Gurmeet Alonso MD  Primary Care Provider: ZAID Richardson Iii, MD    Isolation Status: No active isolations  Assessment/Plan:      * Leukocytosis              Left lower lobe pneumonia     Mr. Cline is a 85 y.o. male with ESRD on dialysis MWF, HFpEF, NSTEMI, bladder/prostate cancer, DM II, aortic stenosis and CAD, previous fungal UTI's now having continual increased O2 requirements and some slowed mentation with leucocytosis     - fever curve and WBC continue to improve  - CT chest with numerous pulmonary nodules, feel that biopsy of one could aid with diagnosis  - would discontinue vancomycin at this time.   - continue moxifloxacin 400 mg PO daily for 7 days total  - will continue to follow.               Candidiasis    - Patient with evident candida in the urine and thrush   - Recommend to continue fluconazole 200 mg qd as he is ESRD patient  - Patient on exam today much improved erythema in his groin.   - Will need treatment for around ten more days 12/7/2017              Anticipated Disposition: As per primary team     Thank you for your consult. I will follow-up with patient. Please contact us if you have any additional questions.    Jean Beach MD, PhD  Infectious Disease, PGY-4  [facility]    Subjective:     Principal Problem:Leukocytosis    HPI: Pt is an 84 y/o male with a pmhx of ESRD with HD MWF HFpEF, NSTEMI, bladder/prostate cancer, DM II, aortic stenosis and CAD, previous fungal UTI's known to ID service on this admission hwen we were consulted for a concern of disseminated yeast infection and new onset dysphagia and abx management of presumed HAP. Thought at that time was that pt did not in fact ahve a PNA and if pt remained altered it would be best to rule out ohter causes of AMS such as PE or  volume overload in HD patient. Recommendations were to stop antibiotics at that time and continue HD dosed fluconazole with an end date of 12/07/2017, at which point ID signed off on 11/27/2017. Pt continues to have problems with mentation and ID has been re-consulted for an increasing WBC, while off antibiotic therapy.    Pt answers questions slowly but appears to be oriented to self, place, time and mostly situation. Denies any fever or chills, denies SOB, is coughing up scan phlegm. States that his hands and legs are hurting in a myalgic pattern. Denies constipation or diarrhea, no dysuria. Pt's BP's have labile, today was only able to have UF instead of HD secondary to pressure issues.     Previous Positive Cultures  01/16/2004 UCx Pseudomonas aeruginosa  10/07/2017 UCx Candida albicans    Cultures This Admission   11/22/2017 UCx  C.albicans  11/23/2017 Resp Cx C.albicans, few WBC's, few GPC's, rare yeast, Rare GPR  11/24/2017 Bcx, NSI No growth  11/24/2017 BCx, NSI No growth  11/25/2017 Resp Cx C.albicans, many WBC's, mod yeast, rare GPC's  11/25/2017 BCx  No growth  11/30/2017 BCx  No growth   11/30/2017 BCx  No growth      Interval History: Pt complaining of productive cough, denies fever or chills, no chest pain but some SOB    Review of Systems   Constitutional: Negative for chills and fever.   Respiratory: Positive for cough. Negative for chest tightness and shortness of breath.    Cardiovascular: Negative for chest pain and palpitations.   Gastrointestinal: Negative for abdominal distention, abdominal pain, constipation, diarrhea, nausea and vomiting.   Genitourinary: Negative for difficulty urinating, dysuria and hematuria.   Skin: Negative for color change and rash.   Neurological: Negative for dizziness and weakness.   Psychiatric/Behavioral: Positive for confusion.     Objective:     Vital Signs (Most Recent):  Temp: 97.7 °F (36.5 °C) (12/04/17 0758)  Pulse: 105 (12/04/17 0823)  Resp: 18 (12/04/17  0823)  BP: (!) 103/54 (12/04/17 0758)  SpO2: 97 % (12/04/17 0823) Vital Signs (24h Range):  Temp:  [97.4 °F (36.3 °C)-98.7 °F (37.1 °C)] 97.7 °F (36.5 °C)  Pulse:  [] 105  Resp:  [16-23] 18  SpO2:  [90 %-100 %] 97 %  BP: ()/(51-87) 103/54     Weight: 75.2 kg (165 lb 11.2 oz)  Body mass index is 30.31 kg/m².    Estimated Creatinine Clearance: 8.1 mL/min (based on SCr of 5.9 mg/dL (H)).    Physical Exam   Constitutional: He appears well-developed and well-nourished.   Pt appears moderately distressed   HENT:   Head: Normocephalic and atraumatic.   Mouth/Throat: No oropharyngeal exudate.   Eyes: Pupils are equal, round, and reactive to light. No scleral icterus.   Neck: No JVD present.   Cardiovascular: Exam reveals no friction rub.    No murmur heard.  Tachycardic, irregularly irregular   Pulmonary/Chest: He is in respiratory distress. He has no wheezes. He has no rales. He exhibits no tenderness.   Pt appears to be slightly dyspneic, rhonchorus breath sounds present in TYLER.    Abdominal: Soft. He exhibits no distension. There is no tenderness. There is no rebound and no guarding.   Musculoskeletal: Normal range of motion. He exhibits no edema.   Lymphadenopathy:     He has no cervical adenopathy.   Neurological: He is alert.   Pt oriented to self    Skin: Skin is warm and dry. Capillary refill takes less than 2 seconds.       Significant Labs:   CBC:   Recent Labs  Lab 12/03/17 0434 12/04/17 0436   WBC 16.68* 15.08*   HGB 8.1* 8.3*   HCT 27.1* 28.2*    227     CMP:   Recent Labs  Lab 12/03/17 0434 12/04/17 0436    143   K 5.0 5.6*    103   CO2 22* 24   * 153*   BUN 45* 58*   CREATININE 4.7* 5.9*   CALCIUM 10.5 10.3   PROT 6.7 6.6   ALBUMIN 2.9* 2.7*   BILITOT 0.4 0.4   ALKPHOS 155* 164*   AST 83* 101*   ALT 26 31   ANIONGAP 16 16   EGFRNONAA 10.5* 8.0*     Microbiology Results (last 7 days)     Procedure Component Value Units Date/Time    Blood culture [[id number]] Collected:   11/30/17 2050    Order Status:  Completed Specimen:  Blood from Peripheral, Lower Arm, Left Updated:  12/03/17 2212     Blood Culture, Routine No Growth to date     Blood Culture, Routine No Growth to date     Blood Culture, Routine No Growth to date     Blood Culture, Routine No Growth to date    Narrative:       Left Peripheral    Blood culture [[id number]] Collected:  11/30/17 2038    Order Status:  Completed Specimen:  Blood Updated:  12/03/17 2212     Blood Culture, Routine No Growth to date     Blood Culture, Routine No Growth to date     Blood Culture, Routine No Growth to date     Blood Culture, Routine No Growth to date    Narrative:       Left peripheral #2    Urine culture [[id number]]     Order Status:  No result Specimen:  Urine from Urine, Catheterized     Gram stain [[id number]]     Order Status:  No result Specimen:  Urine from Urine     Culture, Respiratory with Gram Stain [[id number]]     Order Status:  No result Specimen:  Respiratory     Blood culture [[id number]] Collected:  11/25/17 2222    Order Status:  Completed Specimen:  Blood Updated:  12/01/17 0612     Blood Culture, Routine No growth after 5 days.    Fungus Culture, Blood or Bone Marrow [[id number]] Collected:  11/25/17 2222    Order Status:  Completed Specimen:  Blood from Blood Updated:  11/29/17 0955     Fungus Cult, blood or BM Culture in progress    Blood culture [[id number]] Collected:  11/23/17 0909    Order Status:  Completed Specimen:  Blood Updated:  11/28/17 1212     Blood Culture, Routine No growth after 5 days.    Blood culture [[id number]] Collected:  11/23/17 0909    Order Status:  Completed Specimen:  Blood Updated:  11/28/17 1212     Blood Culture, Routine No growth after 5 days.    Culture, Respiratory with Gram Stain [[id number]] Collected:  11/25/17 1420    Order Status:  Completed Specimen:  Respiratory from Sputum, Induced Updated:  11/27/17 1215     Respiratory Culture --     CANDIDA ALBICANS  Moderate       Gram Stain  (Respiratory) >10 epithelial cells per low power field     Gram Stain (Respiratory) Many WBC's     Gram Stain (Respiratory) Moderate yeast     Gram Stain (Respiratory) Rare Gram positive rods    Culture, Respiratory with Gram Stain [[id number]] Collected:  11/24/17 1530    Order Status:  Completed Specimen:  Respiratory from Sputum, Expectorated Updated:  11/27/17 1152     Respiratory Culture --     CANDIDA ALBICANS  Few       Gram Stain (Respiratory) <10 epithelial cells per low power field.     Gram Stain (Respiratory) Few WBC's     Gram Stain (Respiratory) Few Gram positive cocci     Gram Stain (Respiratory) Rare yeast     Gram Stain (Respiratory) Rare Gram positive rods          Significant Imaging: I have reviewed all pertinent imaging results/findings within the past 24 hours.

## 2017-12-04 NOTE — PLAN OF CARE
Not medically stable for dc  snf vs nsg home snf     12/04/17 1142   Right Care Assessment   Can the patient answer the patient profile reliably? (waxes and wanes per therapist and dr jimenez in huddle)   How often would a person be available to care for the patient? Whenever needed   Describe the patient's ability to walk at the present time. Major restrictions/daily assistance from another person   How does the patient rate their overall health at the present time? Poor   Number of comorbid conditions (as recorded on the chart) Five or more   During the past month, has the patient often been bothered by feeling down, depressed or hopeless? No   Have you felt down, depressed, or hopeless? 0   During the past month, has the patient often been bothered by little interest or pleasure in doing things? No

## 2017-12-04 NOTE — ASSESSMENT & PLAN NOTE
NEW finding on chest CT  -may need to plan MRI BRain and discuss with nephrology and radiology to coordinate possible for Wed dialysis session, patient on schedule for dialysis on Monday morning.   -Lesions noted are peripheral, if tissue diagnoses desired, likely could be obtained via CT guided IR biopsy.

## 2017-12-04 NOTE — ASSESSMENT & PLAN NOTE
- s/p washout and evacuation of hematoma on 11/16  - Supportive care, PT/OT - as pt unable to participate recommendation for nursing facility, no longer skilled  - Pain control with scheduled tylenol 650 q8hrs;  tramadol 50 qhs for breakthrough ; will cont pregabalin to 75mg Qd for now ;  - hasnt been taking much PO safely, IV low dose dilaudid seems to cause too much hypotension/depressed mental status.   - component of pain likely severe right hip osteoarthritis - will cont lidocaine patches   - Mobility remains poor and strength in hip flexors/proximally still poor.Patient unable to participate with therapy, poor progress from functional standpoint.

## 2017-12-05 PROBLEM — I25.2 OLD MI (MYOCARDIAL INFARCTION): Status: ACTIVE | Noted: 2017-01-01

## 2017-12-05 PROBLEM — I27.81 COR PULMONALE, CHRONIC: Status: ACTIVE | Noted: 2017-01-01

## 2017-12-05 PROBLEM — D64.9 ANEMIA: Status: ACTIVE | Noted: 2017-01-01

## 2017-12-05 PROBLEM — E87.5 HYPERKALEMIA: Status: ACTIVE | Noted: 2017-01-01

## 2017-12-05 PROBLEM — N39.0 URINARY TRACT INFECTION: Status: RESOLVED | Noted: 2017-01-01 | Resolved: 2017-01-01

## 2017-12-05 NOTE — ASSESSMENT & PLAN NOTE
- RUQ US given elevated LFT's - showed hypodense area - no evidence of infection  CT scan comments only on granulomatous inflammation of the liver and spleen, no discrete liver mass.

## 2017-12-05 NOTE — SUBJECTIVE & OBJECTIVE
Interval History: Had an episode of Ventricular rate sustaining in 130s.    Review of Systems   Unable to perform ROS: mental status change     Objective:     Vital Signs (Most Recent):  Temp: 97.4 °F (36.3 °C) (12/05/17 0412)  Pulse: (!) 111 (12/05/17 0412)  Resp: 16 (12/05/17 0412)  BP: (!) 103/56 (12/05/17 0412)  SpO2: 100 % (12/05/17 0426) Vital Signs (24h Range):  Temp:  [97.4 °F (36.3 °C)-98.7 °F (37.1 °C)] 97.4 °F (36.3 °C)  Pulse:  [100-145] 111  Resp:  [16-20] 16  SpO2:  [93 %-100 %] 100 %  BP: ()/(42-86) 103/56     Weight: 75.2 kg (165 lb 12.6 oz)  Body mass index is 30.32 kg/m².     SpO2: 100 %  O2 Device (Oxygen Therapy): nasal cannula      Intake/Output Summary (Last 24 hours) at 12/05/17 0630  Last data filed at 12/04/17 1655   Gross per 24 hour   Intake              550 ml   Output              240 ml   Net              310 ml       Lines/Drains/Airways     Drain                 Hemodialysis AV Fistula Right forearm -- days          Pressure Ulcer                 Pressure Ulcer 12/01/17 2130 sacral spine suspected deep tissue injury 3 days         Pressure Ulcer 12/04/17 Left hip suspected deep tissue injury 1 day          Peripheral Intravenous Line                 Peripheral IV - Single Lumen 12/02/17 2020 Left Upper Arm 2 days                Physical Exam  General: alert, awake and oriented to person and place  Neck:+ HJR  Lungs:  B/l rhonchi  Cardiovascular: Heart: Irregular rate and rhythm, S1, S2 normal, Soft SEKOU at RUSB 2/6  Chest Wall: no tenderness.   Extremities: no cyanosis or edema.   Pulses: symmetric.  Abdomen/Rectal: Abdomen: soft, non-tender non-distented; bowel sounds normal  Neurologic: Generalized weakness  Significant Labs:   BMP:   Recent Labs  Lab 12/04/17  0436 12/05/17  0402   * 146*    142   K 5.6* 5.3*    103   CO2 24 23   BUN 58* 56*   CREATININE 5.9* 5.7*   CALCIUM 10.3 10.2   MG  --  2.0   , CMP   Recent Labs  Lab 12/04/17  0436 12/05/17  0402   NA  143 142   K 5.6* 5.3*    103   CO2 24 23   * 146*   BUN 58* 56*   CREATININE 5.9* 5.7*   CALCIUM 10.3 10.2   PROT 6.6 6.4   ALBUMIN 2.7* 2.5*   BILITOT 0.4 0.4   ALKPHOS 164* 168*   * 121*   ALT 31 38   ANIONGAP 16 16   ESTGFRAFRICA 9.2* 9.6*   EGFRNONAA 8.0* 8.3*    and CBC   Recent Labs  Lab 12/04/17  0436 12/05/17  0402   WBC 15.08* 13.15*   HGB 8.3* 8.3*   HCT 28.2* 28.1*    204       Significant Imaging: Echocardiogram:   2D echo with color flow doppler:   Results for orders placed or performed during the hospital encounter of 10/10/17   2D echo with color flow doppler   Result Value Ref Range    EF 55 55 - 65    Aortic Valve Regurgitation TRIVIAL     Est. PA Systolic Pressure 43.09 (A)     Tricuspid Valve Regurgitation TRIVIAL

## 2017-12-05 NOTE — NURSING
Med team L called concerning patient in room 1043. HR has been sustaining above 120. Informed of Cardiology consult. Orders for prn lopressor to be ordered.Will continue to monitor.

## 2017-12-05 NOTE — PROGRESS NOTES
"[facility] Hospital Medicine  Progress Note    Patient Name: Donta Cline  MRN: [id number]  Patient Class: IP- Inpatient   Admission Date: 11/14/2017  Length of Stay: 20 days  Attending Physician: Kirit Martinez MD  Primary Care Provider: ZAID Richardson Iii, MD    [facility] Medicine Team: [facility] Kirit Martinez MD    Subjective:     Principal Problem:Leukocytosis    HPI:  Donta Cline is a 85 y.o. male who presents with PMHx of ESRD with dialysis MWF, HFpEF, NSTEMI, bladder/prostate cancer, DM II, aortic stenosis and CAD for evaluation of back pain s/p lumbar fusion/laminectomy (10/20/17). No family at bedside. Difficult to obtain HPI as speech garbled, however patient endorses progressive lower extremity weakness with difficulty ambulating for the past 4-5 days. His back pain is without radiation. Patient is now unable to ambulate independently. Denies trauma and urinary bowel/bladder incontinence, fever.    Per EDMD:  "The daughter brought patient to ED due to concern of his severe back pain and inability to perform activities as he was doing previously. She had discussed with Dr. Saul, and he recommended the patient come to ED and obtain imaging for spine."    Imaging:    Ct Lumbar Spine Without Contrast    Result Date: 11/14/2017  Comparison: MRI 10/12/17. Findings: Routine CT lumbar spine protocol performed without IV contrast. Prior L2-L4 instrumented lumbar fusion with interbody disc spacer at L3-4. Decompressive posterior laminectomies at from L2-3 through L5-S1. No evidence of hardware failure. No fracture identified, noting the inferior endplate of L4 is indistinct. Infection or postsurgical fluid collection not excluded, noting limited evaluation without IV contrast and artifact from adjacent pedicle screws. There is partial fusion of the right L5-S1 facet. The SI joints are unremarkable. There is extensive calcified atherosclerotic disease. Atrophic kidneys. Partially imaged " right renal stent noted. No hydronephrosis. Small renal lesions, too small to characterize without IV contrast.      Mri Lumbar Spine Without Contrast    Result Date: 11/14/2017  MRI LUMBAR SPINE TECHNIQUE: MRI lumbar spine was performed without contrast. There is an ill-defined heterogeneous collection encircling L1 spinous process demonstrating fluid levels on the right. This may represents a seroma or hematoma postoperatively however abscess cannot be excluded. This collection is best seen on series 11 image 4.    Hospital Course:  In discussion with daughter who is at bedside today (11/15) patient was discharged after surgical procedure to an inpatient rehab and he reports that he was doing well at the rehab but reached a plateau in his activity/functional level.  She states patient was walking 50 feet with minimal assist, able to toilet with minimal assistance.  Patient was transferred to SNF and since transfer, last Friday (11/10) patient requiring max assistance to stand and reported inability to bear weight secondary to pain, and since this time patient has been bed bound.     She also notes that patient with poor appetite as speech therapy has recommended puree nectar thick diet with Nepro shakes, reports that pt is losing weight and some increased abdominal girth.     Overnight patient admitted due to concerns of worsening pain and inability to ambulate, patient underwent MRI and CT imaging of the lumbar spine.  He has intact fusion, no spinal canal stenosis or cord compression noted, concern for a fluid collection encircling L1 spinous process, post op L2-L4 fusion changes and s/p laminectomy L3-L4.  Discussed imaging findings with attending neurosurgeon Dr. Sands who performed pts operation and he reported that fluid collection appears outside of range where instrumentation was.  Patient with no reported falls, workup today reveals elevated CRP >150, ESR 92, has as supratherapeutic INR 3.8    Informed  plan will be to take patient to OR for washout and exploration to evaluate for hematoma vs infection.     11/16 - 11/21   Patient  required total of 6 units of FFP in order to correct his supratherapeutic INR on day of surgery, when taken to the OR patient was found with a hematoma in the wound bed (see op not for details)      Patient taken for operative intervention and in discussion with neurosurgeon Dr. Sands, he reports that patient with well healed wound and surgical fusion was intact, when cut into the dura and in subdural region there was area of hematoma that was evacuated (full op report pending in EMR)  Cultures sent to rule out infection but clinical impression was not an appearance of an infected operative site.  Dr. Sands noted that degree of patient's pain incongruent with surgical findings.     Post operatively patient converted from sinus rhythm to Afib with RVR, his hemoglobin downtrended and and he required PRBC transfusion.     There was difficulty with hypotension and exacerbation of RVR with HD sessions this week.  Post-operatively patient remained with severe back pain and repeat PT/OT was ordered but no significant improvement in his functional status.  Initial Neurosurgery impressions were that size and location of hematoma did not correspond to severity of his symptoms but as his hospital course advanced, supposition from neurosurgery team that patient may have had some type of permanent damage when the hematoma initially formed.  All infectious workup both surgical and pan culture on admission was negative and antibiotic therapy while patient had surgical drains in was continued Cefazolin for wound infection prophylaxis, this was discontinued when surgical drains were removed.     CPAP initiated for hypoxic respiratory failure secondary to concerns of FFP/IV fluid and PRBC administration resulting in volume overload.  CPAP has remained as a nightly therapy patient doing well with this.    Week of 11/22 - 11/27    Pt had increasing delirium and wbc elevated thus started on ceftriaxone based on dirty UA. DDx includes aspiration PNA as pt with cough.  Repeat bout of respiratory failure, hypoxic and also hypotensive, concerning for sepsis.  He was started on Vanc and Cefepime for HCAP coverage, patient with worsening delirium during the week and infectious workup undertaken revealed Candida in sputum and urine.      ID was consulted and patient started on 10 day course of fluconazole for candidiasis, empiric antibiotics were discontinued.     For worsening delirium his pain management regimen was tapered as he had been on fentanyl patches on admission and he was tapered to lyrica, APAP, lidocaine patches and PRN tramadol.      Week of 11/28     Patient had ongoing hypoxia, and waxing/waning delirium, he was reported to be fully alert and oriented on 11/27 but noted to wax and wane.  Over the course of the week, mental status declined his oral intake decreased.  Repeat Speech asessments concerning for increased aspiration and recommendation that Pureed with Honey Thick would be safest but still with a high aspiration risk.  Due to fluctuations in mental status, nursing often not comfortable offering oral intake.  Patient has complained of thirst and asking for water.     His WBC also began rising, repeat blood cultures, lactic acid levels have been non-revelatory.  ID was reconsulted and patient was started on Moxifloxacin and have added back intermittent dosages of Vancomycin when appropriate.     Updated daughter (Wendy) regarding his condition this week, given rising WBC cound, repeat infectious workup with cultures and obtain Pan CTA chest to eval lungs and rule out PE as recd by ID and also CT abd/pelvis, triple phase to characterize liver lesion and check for signs of infection.     (12/2) - Patient is NPO, mental status wax and wanes, but has been mostly depressed and patient is mostly lethargic,  he complained of unbearable pain this AM and onetime dose of dilaudid given, but patient has been persistently lethargic in the afternoon and evening following this.  Some hypotension today, and IV Albumin ordered.  Daughter reports very sensitive to IV Opiates and often they result in hypotension.     Updated Quynh about patient's status, she would like to pursue antibiotic therapy, ok with NPO, and CT scans to determine for a reversible etiology, she is POA but if these studies do not reveal reversible cause and patient does not improve, may require arranging a family meeting next week.  CTA Chest, Triple Phase CT Abd/Pelvis obtained and most concerning for new findings of numerous pulmonary nodules concerning for metastatic disease.  Patient with a history of bladder cancer that was treated and reported in remission, daughter Wendy reports that with a non[facility] pulmonlogist patient underwent CT in 2010-11 approx and was reported with no lesions.  Pulmonlogist had advised Wendy that if bladder cancer metastasized, first location is likely the lung.       12/3 - CTA Chest and triple phase abd ct obtained, returned with results most significant of Numerous pulmonary nodules concerning for metastatic cancer, left lower lobe pneumonia, diffuse atherosclerosis, abdominal granulomatous disease in liver and spleen.  Extensive stool in colon, no acute abnormality on CT of the Bladder or Prostate.  He has a history of bladder cancer that was previously treated.     I spoke with Wendy late this evening and she states a few years ago under the care of a private pulmonologist after cancer was considered in remission that he had a chest CT that was reported as normal.  She also states previously was told that if bladder cancer metastasized first site of spread is commonly the lung.  She inquired if patient needs CNS imaging to rule out brain metastases.  As patient is ESRD and would require MRI with contrast, will have to  organize with both radiology and nephrology to have patient dialyzed after undergoing an MRI Brain with contrast.     Starting vancomycin for pneumonia on CT scan, and patient has been NPO due to mental status, on IV intermittent doses of metoprolol for rate control of afib with RVR.     12/4 - Patient seen this AM, daughter, DEBRA Nguyen is at bedside.  She reports she discussed results of CT scan with patient and states that she's not sure if he fully understands.  On basic orientation questions, patient stated he was at [facility] in the [employer], later stated he was at [facility] on [facility].     12/5 - pt w/ improved mental status this am but appears to wax/wane per report from daughter at bedside. Plan was for IR biopsy of lesions but after discussion, daughter believes that pt would not want to continue at current level of function and discomfort. He has had recent discussions about discontinuing out-pt Hd. Pt denies pain today.     Interval History: 12/5 - pt w/ improved mental status this am but appears to wax/wane per report from daughter at bedside. Plan was for IR biopsy of lesions but after discussion, daughter believes that pt would not want to continue at current level of function and discomfort. He has had recent discussions about discontinuing out-pt Hd. Pt denies pain today.     Review of Systems   Unable to perform ROS: Mental status change   Musculoskeletal: Positive for back pain. Negative for neck pain.   Psychiatric/Behavioral: Positive for confusion. Negative for behavioral problems.     Objective:     Vital Signs (Most Recent):  Temp: 98.3 °F (36.8 °C) (12/05/17 1146)  Pulse: 104 (12/05/17 1146)  Resp: 20 (12/05/17 1146)  BP: (!) 103/54 (12/05/17 1146)  SpO2: 100 % (12/05/17 1146) Vital Signs (24h Range):  Temp:  [97.4 °F (36.3 °C)-98.3 °F (36.8 °C)] 98.3 °F (36.8 °C)  Pulse:  [] 104  Resp:  [16-20] 20  SpO2:  [93 %-100 %] 100 %  BP: ()/(42-86) 103/54     Weight: 74.6 kg (164 lb  7.4 oz)  Body mass index is 30.08 kg/m².    Intake/Output Summary (Last 24 hours) at 12/05/17 1211  Last data filed at 12/05/17 1100   Gross per 24 hour   Intake              550 ml   Output              240 ml   Net              310 ml      Physical Exam   Constitutional: He appears well-developed.   Awake/alert   HENT:   Mouth/Throat: Oropharynx is clear and moist.   Wet quality to voice. Dry oral mucous membranes   Eyes: Conjunctivae are normal. No scleral icterus.   Cardiovascular: Normal heart sounds and intact distal pulses.    No murmur heard.  Irregular rhythm, tachycardic   Pulmonary/Chest: Effort normal. He has no wheezes.   Mouth breathing, accessory muscle use, Bilateral coarse breath sounds with crackles present in all lung fields.    Abdominal: Soft. Bowel sounds are normal. He exhibits distension.   Distended abdomen, abdominal wall asymmetric distension, Left >Right.  Non-tender, non-rigid, no guarding.    Musculoskeletal:   RUE forearm AV fistula with palpable thrill.      Lymphadenopathy:     He has no cervical adenopathy.   Skin: Skin is warm. There is pallor.         Assessment/Plan:      * Leukocytosis    -WBC improving to 13k, CT studies show concern for metastatic disease in lung and left lower lobe pneumonia.   -ID consults following, continue Moxifloxacin for total 7 days, stop Vancomycin based on ID recs  -blood cultures show NGTD  -UA, urine culture was attempted via straight cath, no UOP per nursing.         Left lower lobe pneumonia    -continue moxifloxacin          Metastatic cancer to lung    NEW finding on chest CT  -holding pursuing brain MRI.  Discussed with IR attending, usually patient receives conscious sedation, given patient's COPD and comorbidities, requesting further family discussion by primary team prior to subjecting patient to procedure given risk of pneumothorax or if patient will tolerate conscious sedation.  -goals of care discussed w/ daughter, nurse at bedside and  she initially was thinking that is Dx is cancer then hospice and comfort care would be pursued. But if infection and recovery and improvement in function could occur, then pt would want that. I explained that given the entire picture, functional recovery to prior level is not likely (but not impossible). Pt had been contemplating stopping HD anyway. She says he would not like to live in current state. Bx may not . Palliative care consult.          Acute respiratory failure with hypoxia    -Etiology for aspiration pneumonia vs pulmonary edema vs fungal pneumonia vs  metastatic disease found  -difficulty obtaining oxygen saturations, progressive delirium with rising leukocytosis.   -o2 supplementation, nightly CPAP          Abnormal US (ultrasound) of abdomen    - RUQ US given elevated LFT's - showed hypodense area - no evidence of infection  CT scan comments only on granulomatous inflammation of the liver and spleen, no discrete liver mass.           Delirium    -Delirium precautions.   - clears started based on pt and daughter prefernce, understand risk of aspiration and death in pt who is DNR  or for signs of infection given leukocytosis ; recent infectious workup negative - only consistent with candidiasis    -Holding brain MRI at this time.         Candidiasis    Per family pt with hx of yeast in urine - has old urinary stent in place.  Respiratory cx from 11/24 and 11/25 growing yeast .   - cont diflucan for now 200 qd, ID recs continuing until 12/7.         Back pain              L-S radiculopathy    Management as above, patient now with chronic pain that is debilitating and patient has been mostly bed bound since his surgery.           Status post lumbar spinal fusion    -neurosurgery consultation as above          Acute bilateral low back pain, post-lumbar spinal fusion    - s/p washout and evacuation of hematoma on 11/16  - Supportive care, PT/OT - as pt unable to participate recommendation  for nursing facility, no longer skilled  - Pain control with scheduled tylenol 650 q8hrs;  tramadol 50 qhs for breakthrough ; will cont pregabalin to 75mg Qd for now ;  - hasnt been taking much PO safely, IV low dose dilaudid seems to cause too much hypotension/depressed mental status.   - component of pain likely severe right hip osteoarthritis - will cont lidocaine patches   - Mobility remains poor and strength in hip flexors/proximally still poor.Patient unable to participate with therapy, poor progress from functional standpoint.         Paroxysmal atrial fibrillation    -HR rising ,remains in Afib rhythm wise  -given 2.5mg IV metoprolol for rate control, Cards rec dig 0.25 q6h for 2 doses and check level in am.     -With afib with RVR in HD as per above description, BNP is 800s, Trop 0.9, last check was in 0.3-0.4 baseline range.  Cardiology, consider use of Diltiazem for rate control, cannot use Digoxin in ESRD and hyperkalemia. tte pending         Severe aortic stenosis    - no acute intervention at this time.   - Will cont to monitor         ESRD (end stage renal disease) on dialysis    - nephrology consulted, family may not want HD anymore  -Patient went into afib with RVR and hypotensive in dialysis 20minutes into session, reports that systolic pressure as low as 60 but when dialysis was discontinued and patient given fluid/blood back from HD machine, HR lowers to 110-120 range, BP with systolic 90s-100s  -His EKG showed newer T-wave inversions from 12/2 and Dr. Morel nephrology attending did not want to dialyze and potentially precipitate ACS or further clinical decompensation.        Type 2 diabetes mellitus with chronic kidney disease on chronic dialysis, with long-term current use of insulin    Stopped levemir dose d/t poor oral intake and normal glucose levels. May need if eating again.         Coronary artery disease involving native coronary artery of native heart without angina pectoris     -continue asa, statin          Chronic diastolic heart failure    -last echo 10/2017 with pulmonary HTN and undetermined diastolic function, EF 55    -HD is main source of volume removal    -repeat ordered by cards          VTE Risk Mitigation         Ordered     Medium Risk of VTE  Once      11/17/17 0011     Place sequential compression device  Until discontinued      11/14/17 1907     Place BRAIN hose  Until discontinued      11/14/17 1907        After discussion with palliative care team. Daughter decided to send patient to NH in [facility] with hospice agency       Kirit Martinez MD  Department of Hospital Medicine   [facility]

## 2017-12-05 NOTE — PLAN OF CARE
Problem: Patient Care Overview  Goal: Plan of Care Review  Outcome: Ongoing (interventions implemented as appropriate)   12/05/17 0256   Coping/Psychosocial   Plan Of Care Reviewed With patient     Assumed care of patient at 1900. Patient continues to be confused at times he can answer some questions appropriately. He said his son was at bedside on phone, but no one was there. His HR continues to be 110-120. He was in the high 120/130s around 1am, prn lopressor 5mg ordered. BP continues to be low. Patient was more alert tonight and taking of monitor and clothing. He was easily redirected and very pleasant. Cardiology came to see patient at the beginning of the shift, progress note in the system. Bed in lowest position, bed alarm on, call light in reach. Will continue to monitor. Daughter also called earlier in the night about a missed call. I told her I didn't see any notes about a call but if I heard anything or if anything changes I would call her back and that if someone was trying to get a hold of her that they would call back in the morning.

## 2017-12-05 NOTE — ASSESSMENT & PLAN NOTE
NEW finding on chest CT  -holding pursuing brain MRI.  Discussed with IR attending, usually patient receives conscious sedation, given patient's COPD and comorbidities, requesting further family discussion by primary team prior to subjecting patient to procedure given risk of pneumothorax or if patient will tolerate conscious sedation.  -IR suggested continued abx  And repeat imaging and if masses are not decreased in size concern is more likely malignancy.

## 2017-12-05 NOTE — ASSESSMENT & PLAN NOTE
-in a patient with multiple co-existing active conditions, ESRD, pneumonia, severe Hypotension, Atrial fibrillation  -Patient denies any chest pain but has delirium and unable to provide a good history  -Non specific changes on EKG can be rate related  -Can be troponin leak or type II NSTEMI-Will not repeat  -Echo in am

## 2017-12-05 NOTE — PT/OT/SLP PROGRESS
Physical Therapy Treatment    Patient Name:  Donta Cline   MRN:  [id number]    Recommendations:     Discharge Recommendations:  nursing facility, basic   Discharge Equipment Recommendations: hospital bed   Barriers to discharge: Inaccessible home and Decreased caregiver support    Assessment:     Donta Cline is a 85 y.o. male admitted with a medical diagnosis of Leukocytosis.  He presents with the following impairments/functional limitations:  weakness, impaired endurance, impaired self care skills, impaired functional mobilty, gait instability, impaired balance, decreased lower extremity function, decreased upper extremity function, pain, decreased safety awareness, impaired cognition, orthopedic precautions, impaired cardiopulmonary response to activity, impaired coordination, decreased ROM, decreased coordination, impaired sensation, impaired fine motor. Patient appears to have hit a plateau with improvements in functional mobility. Regressed to sitting EOB with instances of Max/Total Assist <= 5 mins. Max/total Assist for bed mobility. Minimal active movement of BLE; good UE and  strength. Daughter in agreement of decrease in POC to 2x/week. To benefit from continued skilled intervention to address family training on safety with bed mobility, patient positioning, and PROM. To be assessed next visit with subsequent DC if family needs met.    Rehab Prognosis:  Poor; patient would benefit from acute skilled PT services to address these deficits and reach maximum level of function.      Recent Surgery: Procedure(s) (LRB):  REVISION-WOUND--lumbar washout (N/A) 19 Days Post-Op    Plan:     During this hospitalization, patient to be seen 2 x/week to address the above listed problems via therapeutic activities, neuromuscular re-education  · Plan of Care Expires:  12/15/17   Plan of Care Reviewed with: patient, daughter    Subjective     Communicated with RN prior to session.  Patient found supine upon PT entry  to room, agreeable to treatment.  Daughter present. States palliative care has been consulted.    Chief Complaint: LBP  Patient comments/goals: none stated  Pain/Comfort:  · Pain Rating 1:  (LBP not rated with VAS)  · Pain Addressed 1: Reposition  · Pain Rating Post-Intervention 1:  (not rated with VAS)    Patients cultural, spiritual, Restoration conflicts given the current situation: none stated    Objective:     Patient found with: oxygen, pulse ox (continuous), telemetry, pressure relief boots, SCD     General Precautions: Standard, aspiration, fall, pureed diet, honey thick   Orthopedic Precautions:N/A   Braces: TLSO     Functional Mobility:  · Bed Mobility:     · Rolling Left:  maximal assistance and of 1 persons  · Supine to Sit: total assistance and of 1 persons  · Sit to supine: Total assist of 2 persons  · Balance: poor; Instances of Total Assist x 2 persons to sit EOB with BUE x <=5 mins      AM-PAC 6 CLICK MOBILITY  Turning over in bed (including adjusting bedclothes, sheets and blankets)?: 2  Sitting down on and standing up from a chair with arms (e.g., wheelchair, bedside commode, etc.): 1  Moving from lying on back to sitting on the side of the bed?: 2  Moving to and from a bed to a chair (including a wheelchair)?: 1  Need to walk in hospital room?: 1  Climbing 3-5 steps with a railing?: 1  Total Score: 8       Therapeutic Activities and Exercises:   Patient educated on:   - role of PT/POC    - safety with all functional mobility   - bed mobility training   - family training with safety with bed mobility   - discussed benefits of sitting EOB with total Assist vs patient comfort    Daughter verbalized understanding of all education provided. To have palliative consult this week per daughter to determine next step in plan of care.       Patient left supine with all lines intact, call button in reach, RN notified and daughter present..    GOALS:    Physical Therapy Goals        Problem: Physical Therapy  Goal    Goal Priority Disciplines Outcome Goal Variances Interventions   Physical Therapy Goal     PT/OT, PT Ongoing (interventions implemented as appropriate)     Description:  Goals to be met by: 12/15/2017     Patient will increase functional independence with mobility by performin. Supine to sit with MInimal Assistance  2. Sit to supine with MInimal Assistance  3. Sit to stand transfer with Moderate Assistance  4. Bed to chair transfer with Moderate Assistance  5. Gait  x 10 feet with Maximum Assistance using Rolling Walker.   6. Lower extremity exercise program x15 reps per handout, with assistance as needed                         Time Tracking:     PT Received On: 17  PT Start Time: 1407     PT Stop Time: 1423  PT Total Time (min): 16 min     Billable Minutes: Therapeutic Activity 16    Treatment Type: Treatment  PT/PTA: PT     PTA Visit Number: 1     Edgar Wright III, PT  2017

## 2017-12-05 NOTE — ASSESSMENT & PLAN NOTE
- nephrology consulted  -Patient went into afib with RVR and hypotensive in dialysis 20minutes into session, reports that systolic pressure as low as 60 but when dialysis was discontinued and patient given fluid/blood back from HD machine, HR lowers to 110-120 range, BP with systolic 90s-100s  -Did not arrive in AM when called for from transport, received midodrine this AM and nephrology team ordered second dose  -His EKG showed newer T-wave inversions from 12/2 and Dr. Morel nephrology attending did not want to dialyze and potentially precipitate ACS or further clinical decompensation.  Have asked nephrology team to contact POA and convey their concerns for continued HD treatments if patient cannot tolerate sessions properly.

## 2017-12-05 NOTE — ASSESSMENT & PLAN NOTE
-WBC count has been rising since 11/27, CT studies show metastatic disease in lung and left lower lobe pneumonia.   -ID consults following, continue Moxifloxacin for total 7 days.  - I Restart Vancomycin and obtaining daily level, re-dose when less than 15, today patient did not complete dialysis so no dose given.  ID recs to d/c, but give continued clinical deterioration will continue after discussion with daughter.   -blood cultures show NGTD  -if still hypotensive broaden to zosyn vs cefepime.   -UA, urine culture was attempted via straight cath, no UOP per nursing.

## 2017-12-05 NOTE — SUBJECTIVE & OBJECTIVE
Interval History: No interval change    Review of Systems   Constitutional: Negative for chills and fever.   Respiratory: Positive for cough. Negative for chest tightness and shortness of breath.    Cardiovascular: Negative for chest pain and palpitations.   Gastrointestinal: Negative for abdominal distention, abdominal pain, constipation, diarrhea, nausea and vomiting.   Genitourinary: Negative for difficulty urinating, dysuria and hematuria.   Skin: Negative for color change and rash.   Neurological: Negative for dizziness and weakness.   Psychiatric/Behavioral: Positive for confusion.     Objective:     Vital Signs (Most Recent):  Temp: 98.3 °F (36.8 °C) (12/05/17 1616)  Pulse: (!) 111 (12/05/17 1616)  Resp: 20 (12/05/17 1616)  BP: (!) 94/50 (12/05/17 1616)  SpO2: 100 % (12/05/17 1553) Vital Signs (24h Range):  Temp:  [97.4 °F (36.3 °C)-98.3 °F (36.8 °C)] 98.3 °F (36.8 °C)  Pulse:  [] 111  Resp:  [16-20] 20  SpO2:  [94 %-100 %] 100 %  BP: ()/(50-86) 94/50     Weight: 74.6 kg (164 lb 7.4 oz)  Body mass index is 30.08 kg/m².    Estimated Creatinine Clearance: 8.4 mL/min (based on SCr of 5.7 mg/dL (H)).    Physical Exam   Constitutional: He appears well-developed and well-nourished.   Pt appears moderately distressed   HENT:   Head: Normocephalic and atraumatic.   Mouth/Throat: No oropharyngeal exudate.   Eyes: Pupils are equal, round, and reactive to light. No scleral icterus.   Neck: No JVD present.   Cardiovascular: Exam reveals no friction rub.    No murmur heard.  Tachycardic, irregularly irregular   Pulmonary/Chest: He has no wheezes. He has no rales. He exhibits no tenderness.   Pt appears to be slightly dyspneic, rhonchorus breath sounds present in TYLER.    Abdominal: Soft. He exhibits no distension. There is no tenderness. There is no rebound and no guarding.   Musculoskeletal: Normal range of motion. He exhibits no edema.   Lymphadenopathy:     He has no cervical adenopathy.   Neurological: He  is alert.   Pt oriented to self    Skin: Skin is warm and dry. Capillary refill takes less than 2 seconds.       Significant Labs:   CBC:   Recent Labs  Lab 12/04/17  0436 12/05/17  0402   WBC 15.08* 13.15*   HGB 8.3* 8.3*   HCT 28.2* 28.1*    204     CMP:   Recent Labs  Lab 12/04/17  0436 12/05/17  0402    142   K 5.6* 5.3*    103   CO2 24 23   * 146*   BUN 58* 56*   CREATININE 5.9* 5.7*   CALCIUM 10.3 10.2   PROT 6.6 6.4   ALBUMIN 2.7* 2.5*   BILITOT 0.4 0.4   ALKPHOS 164* 168*   * 121*   ALT 31 38   ANIONGAP 16 16   EGFRNONAA 8.0* 8.3*     Microbiology Results (last 7 days)     Procedure Component Value Units Date/Time    Culture, Respiratory with Gram Stain [[id number]] Collected:  12/05/17 1233    Order Status:  Sent Specimen:  Respiratory from Sputum Updated:  12/05/17 1512    Blood culture [[id number]] Collected:  11/30/17 2050    Order Status:  Completed Specimen:  Blood from Peripheral, Lower Arm, Left Updated:  12/04/17 2212     Blood Culture, Routine No Growth to date     Blood Culture, Routine No Growth to date     Blood Culture, Routine No Growth to date     Blood Culture, Routine No Growth to date     Blood Culture, Routine No Growth to date    Narrative:       Left Peripheral    Blood culture [[id number]] Collected:  11/30/17 2038    Order Status:  Completed Specimen:  Blood Updated:  12/04/17 2212     Blood Culture, Routine No Growth to date     Blood Culture, Routine No Growth to date     Blood Culture, Routine No Growth to date     Blood Culture, Routine No Growth to date     Blood Culture, Routine No Growth to date    Narrative:       Left peripheral #2    Urine culture [[id number]]     Order Status:  No result Specimen:  Urine from Urine, Catheterized     Gram stain [[id number]]     Order Status:  No result Specimen:  Urine from Urine     Blood culture [[id number]] Collected:  11/25/17 2222    Order Status:  Completed Specimen:  Blood Updated:  12/01/17 0612     Blood  Culture, Routine No growth after 5 days.    Fungus Culture, Blood or Bone Marrow [[id number]] Collected:  11/25/17 2222    Order Status:  Completed Specimen:  Blood from Blood Updated:  11/29/17 0955     Fungus Cult, blood or BM Culture in progress          Significant Imaging: I have reviewed all pertinent imaging results/findings within the past 24 hours.

## 2017-12-05 NOTE — ASSESSMENT & PLAN NOTE
-HR rising ,remains in Afib rhythm wise  Holding tablets as pt strict NPO - unless awake alert/upright and following commands  -Using 2.5mg IV metoprolol for rate control     -With afib with RVR in HD as per above description, BNP is 800s, Trop 0.9, last check was in 0.3-0.4 baseline range.  Will consult cardiology, consider use of Diltiazem for rate control, cannot use Digoxin in ESRD and hyperkalemia.   -Trend Troponins q6hrs if rising will have to contact cardiology and discuss initiation of ACS management.   I spoke with Wendy this AM at bedside but unable to speak with her this evening to update her on afternoon events.

## 2017-12-05 NOTE — PROGRESS NOTES
"[facility] Hospital Medicine  Progress Note    Patient Name: Donta Cline  MRN: [id number]  Patient Class: IP- Inpatient   Admission Date: 11/14/2017  Length of Stay: 19 days  Attending Physician: Gurmeet Alonso MD  Primary Care Provider: ZAID Richardson Iii, MD    [facility] Medicine Team: [facility] MED L Gurmeet Alonso MD    Subjective:     Principal Problem:Leukocytosis    HPI:  Donta Cline is a 85 y.o. male who presents with PMHx of ESRD with dialysis MWF, HFpEF, NSTEMI, bladder/prostate cancer, DM II, aortic stenosis and CAD for evaluation of back pain s/p lumbar fusion/laminectomy (10/20/17). No family at bedside. Difficult to obtain HPI as speech garbled, however patient endorses progressive lower extremity weakness with difficulty ambulating for the past 4-5 days. His back pain is without radiation. Patient is now unable to ambulate independently. Denies trauma and urinary bowel/bladder incontinence, fever.    Per EDMD:  "The daughter brought patient to ED due to concern of his severe back pain and inability to perform activities as he was doing previously. She had discussed with Dr. Saul, and he recommended the patient come to ED and obtain imaging for spine."    Imaging:    Ct Lumbar Spine Without Contrast    Result Date: 11/14/2017  Comparison: MRI 10/12/17. Findings: Routine CT lumbar spine protocol performed without IV contrast. Prior L2-L4 instrumented lumbar fusion with interbody disc spacer at L3-4. Decompressive posterior laminectomies at from L2-3 through L5-S1. No evidence of hardware failure. No fracture identified, noting the inferior endplate of L4 is indistinct. Infection or postsurgical fluid collection not excluded, noting limited evaluation without IV contrast and artifact from adjacent pedicle screws. There is partial fusion of the right L5-S1 facet. The SI joints are unremarkable. There is extensive calcified atherosclerotic disease. Atrophic kidneys. Partially " imaged right renal stent noted. No hydronephrosis. Small renal lesions, too small to characterize without IV contrast.      Mri Lumbar Spine Without Contrast    Result Date: 11/14/2017  MRI LUMBAR SPINE TECHNIQUE: MRI lumbar spine was performed without contrast. There is an ill-defined heterogeneous collection encircling L1 spinous process demonstrating fluid levels on the right. This may represents a seroma or hematoma postoperatively however abscess cannot be excluded. This collection is best seen on series 11 image 4.    Hospital Course:  In discussion with daughter who is at bedside today (11/15) patient was discharged after surgical procedure to an inpatient rehab and he reports that he was doing well at the rehab but reached a plateau in his activity/functional level.  She states patient was walking 50 feet with minimal assist, able to toilet with minimal assistance.  Patient was transferred to SNF and since transfer, last Friday (11/10) patient requiring max assistance to stand and reported inability to bear weight secondary to pain, and since this time patient has been bed bound.     She also notes that patient with poor appetite as speech therapy has recommended puree nectar thick diet with Nepro shakes, reports that pt is losing weight and some increased abdominal girth.     Overnight patient admitted due to concerns of worsening pain and inability to ambulate, patient underwent MRI and CT imaging of the lumbar spine.  He has intact fusion, no spinal canal stenosis or cord compression noted, concern for a fluid collection encircling L1 spinous process, post op L2-L4 fusion changes and s/p laminectomy L3-L4.  Discussed imaging findings with attending neurosurgeon Dr. Sands who performed pts operation and he reported that fluid collection appears outside of range where instrumentation was.  Patient with no reported falls, workup today reveals elevated CRP >150, ESR 92, has as supratherapeutic INR  3.8    Informed plan will be to take patient to OR for washout and exploration to evaluate for hematoma vs infection.     11/16 - 11/21   Patient  required total of 6 units of FFP in order to correct his supratherapeutic INR on day of surgery, when taken to the OR patient was found with a hematoma in the wound bed (see op not for details)      Patient taken for operative intervention and in discussion with neurosurgeon Dr. Sands, he reports that patient with well healed wound and surgical fusion was intact, when cut into the dura and in subdural region there was area of hematoma that was evacuated (full op report pending in EMR)  Cultures sent to rule out infection but clinical impression was not an appearance of an infected operative site.  Dr. Sands noted that degree of patient's pain incongruent with surgical findings.     Post operatively patient converted from sinus rhythm to Afib with RVR, his hemoglobin downtrended and and he required PRBC transfusion.     There was difficulty with hypotension and exacerbation of RVR with HD sessions this week.  Post-operatively patient remained with severe back pain and repeat PT/OT was ordered but no significant improvement in his functional status.  Initial Neurosurgery impressions were that size and location of hematoma did not correspond to severity of his symptoms but as his hospital course advanced, supposition from neurosurgery team that patient may have had some type of permanent damage when the hematoma initially formed.  All infectious workup both surgical and pan culture on admission was negative and antibiotic therapy while patient had surgical drains in was continued Cefazolin for wound infection prophylaxis, this was discontinued when surgical drains were removed.     CPAP initiated for hypoxic respiratory failure secondary to concerns of FFP/IV fluid and PRBC administration resulting in volume overload.  CPAP has remained as a nightly therapy patient doing well  with this.   Week of 11/22 - 11/27    Pt had increasing delirium and wbc elevated thus started on ceftriaxone based on dirty UA. DDx includes aspiration PNA as pt with cough.  Repeat bout of respiratory failure, hypoxic and also hypotensive, concerning for sepsis.  He was started on Vanc and Cefepime for HCAP coverage, patient with worsening delirium during the week and infectious workup undertaken revealed Candida in sputum and urine.      ID was consulted and patient started on 10 day course of fluconazole for candidiasis, empiric antibiotics were discontinued.     For worsening delirium his pain management regimen was tapered as he had been on fentanyl patches on admission and he was tapered to lyrica, APAP, lidocaine patches and PRN tramadol.      Week of 11/28     Patient had ongoing hypoxia, and waxing/waning delirium, he was reported to be fully alert and oriented on 11/27 but noted to wax and wane.  Over the course of the week, mental status declined his oral intake decreased.  Repeat Speech asessments concerning for increased aspiration and recommendation that Pureed with Honey Thick would be safest but still with a high aspiration risk.  Due to fluctuations in mental status, nursing often not comfortable offering oral intake.  Patient has complained of thirst and asking for water.     His WBC also began rising, repeat blood cultures, lactic acid levels have been non-revelatory.  ID was reconsulted and patient was started on Moxifloxacin and have added back intermittent dosages of Vancomycin when appropriate.     Updated daughter (Wendy) regarding his condition this week, given rising WBC cound, repeat infectious workup with cultures and obtain Pan CTA chest to eval lungs and rule out PE as recd by ID and also CT abd/pelvis, triple phase to characterize liver lesion and check for signs of infection.     (12/2) - Patient is NPO, mental status wax and wanes, but has been mostly depressed and patient is mostly  lethargic, he complained of unbearable pain this AM and onetime dose of dilaudid given, but patient has been persistently lethargic in the afternoon and evening following this.  Some hypotension today, and IV Albumin ordered.  Daughter reports very sensitive to IV Opiates and often they result in hypotension.     Updated Wendy Jenkins about patient's status, she would like to pursue antibiotic therapy, ok with NPO, and CT scans to determine for a reversible etiology, she is POA but if these studies do not reveal reversible cause and patient does not improve, may require arranging a family meeting next week.  CTA Chest, Triple Phase CT Abd/Pelvis obtained and most concerning for new findings of numerous pulmonary nodules concerning for metastatic disease.  Patient with a history of bladder cancer that was treated and reported in remission, daughter Wendy reports that with a [facility] pulmonlogist patient underwent CT in 2010-11 approx and was reported with no lesions.  Pulmonlogist had advised Wendy that if bladder cancer metastasized, first location is likely the lung.       12/3 - CTA Chest and triple phase abd ct obtained, returned with results most significant of Numerous pulmonary nodules concerning for metastatic cancer, left lower lobe pneumonia, diffuse atherosclerosis, abdominal granulomatous disease in liver and spleen.  Extensive stool in colon, no acute abnormality on CT of the Bladder or Prostate.  He has a history of bladder cancer that was previously treated.     I spoke with Wendy late this evening and she states a few years ago under the care of a private pulmonologist after cancer was considered in remission that he had a chest CT that was reported as normal.  She also states previously was told that if bladder cancer metastasized first site of spread is commonly the lung.  She inquired if patient needs CNS imaging to rule out brain metastases.  As patient is ESRD and would require MRI with contrast, will  have to organize with both radiology and nephrology to have patient dialyzed after undergoing an MRI Brain with contrast.     Starting vancomycin for pneumonia on CT scan, and patient has been NPO due to mental status, on IV intermittent doses of metoprolol for rate control of afib with RVR.     12/4 - Patient seen this AM, daughter, DEBRA Nguyen is at bedside.  She reports she discussed results of CT scan with patient and states that she's not sure if he fully understands.  On basic orientation questions, patient stated he was at [employer] in the [employer], later stated he was at [facility] on [address].     Interval History: as above    Review of Systems   Unable to perform ROS: Mental status change   Musculoskeletal: Positive for back pain and gait problem. Negative for neck pain.   Psychiatric/Behavioral: Positive for confusion. Negative for behavioral problems.     Objective:     Vital Signs (Most Recent):  Temp: 98.2 °F (36.8 °C) (12/04/17 1929)  Pulse: (!) 125 (12/04/17 1941)  Resp: 16 (12/04/17 1929)  BP: 112/86 (12/04/17 1929)  SpO2: 95 % (12/04/17 1929) Vital Signs (24h Range):  Temp:  [97.4 °F (36.3 °C)-98.7 °F (37.1 °C)] 98.2 °F (36.8 °C)  Pulse:  [100-145] 125  Resp:  [16-20] 16  SpO2:  [90 %-100 %] 95 %  BP: ()/(42-86) 112/86     Weight: 75.2 kg (165 lb 12.6 oz)  Body mass index is 30.32 kg/m².    Intake/Output Summary (Last 24 hours) at 12/04/17 1956  Last data filed at 12/04/17 1655   Gross per 24 hour   Intake              550 ml   Output              240 ml   Net              310 ml      Physical Exam   Constitutional: He appears well-developed.   Awake/alert   HENT:   Mouth/Throat: Oropharynx is clear and moist.   Wet quality to voice. Dry oral mucous membranes   Eyes: Conjunctivae are normal. No scleral icterus.   Cardiovascular: Normal heart sounds and intact distal pulses.    No murmur heard.  Irregular rhythm, tachycardic   Pulmonary/Chest: Effort normal. He has no wheezes.   Mouth  breathing, accessory muscle use, Bilateral coarse breath sounds with crackles present in all lung fields.    Abdominal: Soft. Bowel sounds are normal. He exhibits distension.   Distended abdomen, abdominal wall asymmetric distension, Left >Right.  Non-tender, non-rigid, no guarding.    Musculoskeletal:   RUE forearm AV fistula with palpable thrill.      Lymphadenopathy:     He has no cervical adenopathy.   Skin: Skin is warm. There is pallor.       Significant Labs:   CBC:     Recent Labs  Lab 12/03/17 0434 12/04/17 0436   WBC 16.68* 15.08*   HGB 8.1* 8.3*   HCT 27.1* 28.2*    227     CMP:     Recent Labs  Lab 12/03/17 0434 12/04/17 0436    143   K 5.0 5.6*    103   CO2 22* 24   * 153*   BUN 45* 58*   CREATININE 4.7* 5.9*   CALCIUM 10.5 10.3   PROT 6.7 6.6   ALBUMIN 2.9* 2.7*   BILITOT 0.4 0.4   ALKPHOS 155* 164*   AST 83* 101*   ALT 26 31   ANIONGAP 16 16   EGFRNONAA 10.5* 8.0*     Lactic Acid:     Recent Labs  Lab 12/03/17 0434 12/04/17 0436   LACTATE 1.2 1.3       Significant Imaging: I have reviewed all pertinent imaging results/findings within the past 24 hours.     CTA CHEST, CT ABDOMEN PELVIS TRIPLE PHASE  Impression         1. No evidence of pulmonary thromboembolism or focal mass lesion within the liver.    2. Numerous solid pulmonary nodules throughout both lungs, the largest of which is in the right upper lobe abutting the pleural margin measuring up to 2.4 cm concerning for metastatic disease.    3. Left lower lobe consolidation concerning for pneumonia with dependent bilateral small pleural effusions.    4. Significant aortic and coronary artery atherosclerosis.    5. Calcified liver and splenic granulomas compatible with old granulomatous disease.    6. Small sized kidneys with parenchymal loss and bilateral renal cysts compatible with chronic medical renal disease.    7. Significant degenerative changes of the thoracic and lumbar spine consistent with DISH with  posterior spinal fusion at L3-L5.    Additional findings as detailed above.         Assessment/Plan:      * Leukocytosis    -WBC count has been rising since 11/27, CT studies show metastatic disease in lung and left lower lobe pneumonia.   -ID consults following, continue Moxifloxacin for total 7 days.  - I Restart Vancomycin and obtaining daily level, re-dose when less than 15, today patient did not complete dialysis so no dose given.  ID recs to d/c, but give continued clinical deterioration will continue after discussion with daughter.   -blood cultures show NGTD  -if still hypotensive broaden to zosyn vs cefepime.   -UA, urine culture was attempted via straight cath, no UOP per nursing.         Delirium    Repeat infectious workup - Left Lower lobe Pneumonia.   -ABG- showed acute respiratory alkalosis - ph 7.47, pco2 30s.   -Delirium precautions.   - make patient NPO from a safety standpoint.   or for signs of infection given leukocytosis ; recent infectious workup negative - only consistent with candidiasis    -Holding brain MRI at this time.         Left lower lobe pneumonia    -continue moxifloxacin and vancomycin          Acute bilateral low back pain, post-lumbar spinal fusion    - s/p washout and evacuation of hematoma on 11/16  - Supportive care, PT/OT - as pt unable to participate recommendation for nursing facility, no longer skilled  - Pain control with scheduled tylenol 650 q8hrs;  tramadol 50 qhs for breakthrough ; will cont pregabalin to 75mg Qd for now ;  - hasnt been taking much PO safely, IV low dose dilaudid seems to cause too much hypotension/depressed mental status.   - component of pain likely severe right hip osteoarthritis - will cont lidocaine patches   - Mobility remains poor and strength in hip flexors/proximally still poor.Patient unable to participate with therapy, poor progress from functional standpoint.         ESRD (end stage renal disease) on dialysis    - nephrology  consulted  -Patient went into afib with RVR and hypotensive in dialysis 20minutes into session, reports that systolic pressure as low as 60 but when dialysis was discontinued and patient given fluid/blood back from HD machine, HR lowers to 110-120 range, BP with systolic 90s-100s  -Did not arrive in AM when called for from transport, received midodrine this AM and nephrology team ordered second dose  -His EKG showed newer T-wave inversions from 12/2 and Dr. Morel nephrology attending did not want to dialyze and potentially precipitate ACS or further clinical decompensation.  Have asked nephrology team to contact POA and convey their concerns for continued HD treatments if patient cannot tolerate sessions properly.         Metastatic cancer to lung    NEW finding on chest CT  -holding pursuing brain MRI.  Discussed with IR attending, usually patient receives conscious sedation, given patient's COPD and comorbidities, requesting further family discussion by primary team prior to subjecting patient to procedure given risk of pneumothorax or if patient will tolerate conscious sedation.  -IR suggested continued abx  And repeat imaging and if masses are not decreased in size concern is more likely malignancy.           Acute respiratory failure with hypoxia    -Etiology for aspiration pneumonia vs pulmonary edema vs fungal pneumonia vs  metastatic disease found  -difficulty obtaining oxygen saturations, progressive delirium with rising leukocytosis.   -o2 supplementation, nightly CPAP          L-S radiculopathy    Management as above, patient now with chronic pain that is debilitating and patient has been mostly bed bound since his surgery.           Paroxysmal atrial fibrillation    -HR rising ,remains in Afib rhythm wise  Holding tablets as pt strict NPO - unless awake alert/upright and following commands  -Using 2.5mg IV metoprolol for rate control     -With afib with RVR in HD as per above description, BNP is 800s,  Trop 0.9, last check was in 0.3-0.4 baseline range.  Will consult cardiology, consider use of Diltiazem for rate control, cannot use Digoxin in ESRD and hyperkalemia.   -Trend Troponins q6hrs if rising will have to contact cardiology and discuss initiation of ACS management.   I spoke with Wendy this AM at bedside but unable to speak with her this evening to update her on afternoon events.         Chronic diastolic heart failure    -last echo 10/2017 with pulmonary HTN and undetermined diastolic function, EF 55    -HD is main source of volume removal          Status post lumbar spinal fusion    -neurosurgery consultation as above          Coronary artery disease involving native coronary artery of native heart without angina pectoris    -continue asa, statin          Type 2 diabetes mellitus with chronic kidney disease on chronic dialysis, with long-term current use of insulin    Reduce levemir dose, as pt with minimal oral intake, may need NPo        Severe aortic stenosis    - no acute intervention at this time.   - Will cont to monitor         Abnormal US (ultrasound) of abdomen    - RUQ US given elevated LFT's - showed hypodense area - no evidence of infection  CT scan comments only on granulomatous inflammation of the liver and spleen, no discrete liver mass.           Candidiasis    Per family pt with hx of yeast in urine - has old urinary stent in place.  Respiratory cx from 11/24 and 11/25 growing yeast .   - cont diflucan for now 200 qd x 10 days  - Today is 9th dose, ID recs continuing until 12/7.         Back pain                VTE Risk Mitigation         Ordered     Medium Risk of VTE  Once      11/17/17 0011     Place sequential compression device  Until discontinued      11/14/17 1907     Place BRAIN hose  Until discontinued      11/14/17 1907              Gurmeet Alonso MD  Department of Hospital Medicine   [facility]

## 2017-12-05 NOTE — ASSESSMENT & PLAN NOTE
Repeat infectious workup - Left Lower lobe Pneumonia.   -ABG- showed acute respiratory alkalosis - ph 7.47, pco2 30s.   -Delirium precautions.   - make patient NPO from a safety standpoint.   or for signs of infection given leukocytosis ; recent infectious workup negative - only consistent with candidiasis    -Holding brain MRI at this time.

## 2017-12-05 NOTE — ASSESSMENT & PLAN NOTE
- nephrology consulted, family may not want HD anymore  -Patient went into afib with RVR and hypotensive in dialysis 20minutes into session, reports that systolic pressure as low as 60 but when dialysis was discontinued and patient given fluid/blood back from HD machine, HR lowers to 110-120 range, BP with systolic 90s-100s  -His EKG showed newer T-wave inversions from 12/2 and Dr. Morel nephrology attending did not want to dialyze and potentially precipitate ACS or further clinical decompensation.

## 2017-12-05 NOTE — ASSESSMENT & PLAN NOTE
-last echo 10/2017 with pulmonary HTN and undetermined diastolic function, EF 55    -HD is main source of volume removal    -repeat ordered by cards

## 2017-12-05 NOTE — PLAN OF CARE
3:26 PM  SW received notification from physician that family wishes to pursue hospice.     Called daughter, Wendy who stated she would like pt to go back to [facility] with [facility]. Called [facility] and they are holding bed for pt.     Will inform physician.     Raina Calixto LMSW   [facility]  Ext [phone]

## 2017-12-05 NOTE — ASSESSMENT & PLAN NOTE
Per family pt with hx of yeast in urine - has old urinary stent in place.  Respiratory cx from 11/24 and 11/25 growing yeast .   - cont diflucan for now 200 qd x 10 days  - Today is 9th dose, ID recs continuing until 12/7.

## 2017-12-05 NOTE — PROGRESS NOTES
[facility]  Infectious Disease  Progress Note    Patient Name: Donta Cline  MRN: [id number]  Admission Date: 11/14/2017  Length of Stay: 20 days  Attending Physician: Kirit Martinez MD  Primary Care Provider: ZAID Richardson Iii, MD    Isolation Status: No active isolations  Assessment/Plan:      * Leukocytosis              Left lower lobe pneumonia     Mr. Cline is a 85 y.o. male with ESRD on dialysis MWF, HFpEF, NSTEMI, bladder/prostate cancer, DM II, aortic stenosis and CAD, previous fungal UTI's now having continual increased O2 requirements and some slowed mentation with leucocytosis     - fever curve and WBC continue to improve  - CT chest with numerous pulmonary nodules, feel that biopsy of one could aid with diagnosis  - continue moxifloxacin 400 mg PO daily for 7 days total, stop date will be 12/08/2017  - will continue to follow.                   Anticipated Disposition: As per primary team    Thank you for your consult. I will sign off. Please contact us if you have any additional questions.    Jean Beach MD, PhD  Infectious Disease, PGY-4  [facility]    Subjective:     Principal Problem:Leukocytosis    HPI: Pt is an 86 y/o male with a pmhx of ESRD with HD MWF HFpEF, NSTEMI, bladder/prostate cancer, DM II, aortic stenosis and CAD, previous fungal UTI's known to ID service on this admission hwen we were consulted for a concern of disseminated yeast infection and new onset dysphagia and abx management of presumed HAP. Thought at that time was that pt did not in fact ahve a PNA and if pt remained altered it would be best to rule out ohter causes of AMS such as PE or volume overload in HD patient. Recommendations were to stop antibiotics at that time and continue HD dosed fluconazole with an end date of 12/07/2017, at which point ID signed off on 11/27/2017. Pt continues to have problems with mentation and ID has been re-consulted for an increasing WBC, while off  antibiotic therapy.    Pt answers questions slowly but appears to be oriented to self, place, time and mostly situation. Denies any fever or chills, denies SOB, is coughing up scan phlegm. States that his hands and legs are hurting in a myalgic pattern. Denies constipation or diarrhea, no dysuria. Pt's BP's have labile, today was only able to have UF instead of HD secondary to pressure issues.     Previous Positive Cultures  01/16/2004 UCx Pseudomonas aeruginosa  10/07/2017 UCx Candida albicans    Cultures This Admission   11/22/2017 UCx  C.albicans  11/23/2017 Resp Cx C.albicans, few WBC's, few GPC's, rare yeast, Rare GPR  11/24/2017 Bcx, NSI No growth  11/24/2017 BCx, NSI No growth  11/25/2017 Resp Cx C.albicans, many WBC's, mod yeast, rare GPC's  11/25/2017 BCx  No growth  11/30/2017 BCx  No growth   11/30/2017 BCx  No growth      Interval History: No interval change    Review of Systems   Constitutional: Negative for chills and fever.   Respiratory: Positive for cough. Negative for chest tightness and shortness of breath.    Cardiovascular: Negative for chest pain and palpitations.   Gastrointestinal: Negative for abdominal distention, abdominal pain, constipation, diarrhea, nausea and vomiting.   Genitourinary: Negative for difficulty urinating, dysuria and hematuria.   Skin: Negative for color change and rash.   Neurological: Negative for dizziness and weakness.   Psychiatric/Behavioral: Positive for confusion.     Objective:     Vital Signs (Most Recent):  Temp: 98.3 °F (36.8 °C) (12/05/17 1616)  Pulse: (!) 111 (12/05/17 1616)  Resp: 20 (12/05/17 1616)  BP: (!) 94/50 (12/05/17 1616)  SpO2: 100 % (12/05/17 1553) Vital Signs (24h Range):  Temp:  [97.4 °F (36.3 °C)-98.3 °F (36.8 °C)] 98.3 °F (36.8 °C)  Pulse:  [] 111  Resp:  [16-20] 20  SpO2:  [94 %-100 %] 100 %  BP: ()/(50-86) 94/50     Weight: 74.6 kg (164 lb 7.4 oz)  Body mass index is 30.08 kg/m².    Estimated Creatinine Clearance: 8.4 mL/min  (based on SCr of 5.7 mg/dL (H)).    Physical Exam   Constitutional: He appears well-developed and well-nourished.   Pt appears moderately distressed   HENT:   Head: Normocephalic and atraumatic.   Mouth/Throat: No oropharyngeal exudate.   Eyes: Pupils are equal, round, and reactive to light. No scleral icterus.   Neck: No JVD present.   Cardiovascular: Exam reveals no friction rub.    No murmur heard.  Tachycardic, irregularly irregular   Pulmonary/Chest: He has no wheezes. He has no rales. He exhibits no tenderness.   Pt appears to be slightly dyspneic, rhonchorus breath sounds present in TYLER.    Abdominal: Soft. He exhibits no distension. There is no tenderness. There is no rebound and no guarding.   Musculoskeletal: Normal range of motion. He exhibits no edema.   Lymphadenopathy:     He has no cervical adenopathy.   Neurological: He is alert.   Pt oriented to self    Skin: Skin is warm and dry. Capillary refill takes less than 2 seconds.       Significant Labs:   CBC:   Recent Labs  Lab 12/04/17  0436 12/05/17  0402   WBC 15.08* 13.15*   HGB 8.3* 8.3*   HCT 28.2* 28.1*    204     CMP:   Recent Labs  Lab 12/04/17  0436 12/05/17  0402    142   K 5.6* 5.3*    103   CO2 24 23   * 146*   BUN 58* 56*   CREATININE 5.9* 5.7*   CALCIUM 10.3 10.2   PROT 6.6 6.4   ALBUMIN 2.7* 2.5*   BILITOT 0.4 0.4   ALKPHOS 164* 168*   * 121*   ALT 31 38   ANIONGAP 16 16   EGFRNONAA 8.0* 8.3*     Microbiology Results (last 7 days)     Procedure Component Value Units Date/Time    Culture, Respiratory with Gram Stain [[id number]] Collected:  12/05/17 1233    Order Status:  Sent Specimen:  Respiratory from Sputum Updated:  12/05/17 1512    Blood culture [[id number]] Collected:  11/30/17 2050    Order Status:  Completed Specimen:  Blood from Peripheral, Lower Arm, Left Updated:  12/04/17 2212     Blood Culture, Routine No Growth to date     Blood Culture, Routine No Growth to date     Blood Culture, Routine No  Growth to date     Blood Culture, Routine No Growth to date     Blood Culture, Routine No Growth to date    Narrative:       Left Peripheral    Blood culture [[id number]] Collected:  11/30/17 2038    Order Status:  Completed Specimen:  Blood Updated:  12/04/17 2212     Blood Culture, Routine No Growth to date     Blood Culture, Routine No Growth to date     Blood Culture, Routine No Growth to date     Blood Culture, Routine No Growth to date     Blood Culture, Routine No Growth to date    Narrative:       Left peripheral #2    Urine culture [[id number]]     Order Status:  No result Specimen:  Urine from Urine, Catheterized     Gram stain [[id number]]     Order Status:  No result Specimen:  Urine from Urine     Blood culture [[id number]] Collected:  11/25/17 2222    Order Status:  Completed Specimen:  Blood Updated:  12/01/17 0612     Blood Culture, Routine No growth after 5 days.    Fungus Culture, Blood or Bone Marrow [[id number]] Collected:  11/25/17 2222    Order Status:  Completed Specimen:  Blood from Blood Updated:  11/29/17 0955     Fungus Cult, blood or BM Culture in progress          Significant Imaging: I have reviewed all pertinent imaging results/findings within the past 24 hours.

## 2017-12-05 NOTE — HPI
86 y/o male with h/o ESRD(HD M, W, F-since 2015), Persistent atrial fibrillation(was on metoprolol and coumadin as outpatient), Bladder cancer, AS Moderate but worsening(MG 28, VEL 0.85 cm2, PV 3.56 m/s), CAD (last [facility] with RCA stenosis 75 %-10/2017) who has been admitted since 11/14/2017.Patient underwent Lumbar fusion/laminectomy 2 months ago-was sent to inpatient rehab and was doing well-transferred to SNF and since then has been declining-Transferred from SNF for severe back pain-Underwent washout by neursurgery that showed hematoma-His coumadin was reversed at that time. He has been in atrial fibrillation with -120s.HD has been difficult. Also suspicion for aspiration pneumonia, candidiasis.CT Pe protocol done recently shows pulmonary nodules-concer for metastatic disease from bladder cancer.Cardiology consulted for atrial fibrillation with RVR during HD today with rates in 140s and Bp systolic in 60s-Now this has recovered to BP 120s systolic and -120s.Also with elevated troponin of 0.921.

## 2017-12-05 NOTE — PROGRESS NOTES
[facility]  Cardiology  Progress Note    Patient Name: Donta Cline  MRN: [id number]  Admission Date: 11/14/2017  Hospital Length of Stay: 20 days  Code Status: DNR   Attending Physician: Kirit Martinez MD   Primary Care Physician: ZAID Richardson Iii, MD  Expected Discharge Date: 12/8/2017  Principal Problem:Leukocytosis    Subjective:     Hospital Course:   No notes on file    Interval History: Had an episode of Ventricular rate sustaining in 130s.    Review of Systems   Unable to perform ROS: mental status change     Objective:     Vital Signs (Most Recent):  Temp: 97.4 °F (36.3 °C) (12/05/17 0412)  Pulse: (!) 111 (12/05/17 0412)  Resp: 16 (12/05/17 0412)  BP: (!) 103/56 (12/05/17 0412)  SpO2: 100 % (12/05/17 0426) Vital Signs (24h Range):  Temp:  [97.4 °F (36.3 °C)-98.7 °F (37.1 °C)] 97.4 °F (36.3 °C)  Pulse:  [100-145] 111  Resp:  [16-20] 16  SpO2:  [93 %-100 %] 100 %  BP: ()/(42-86) 103/56     Weight: 75.2 kg (165 lb 12.6 oz)  Body mass index is 30.32 kg/m².     SpO2: 100 %  O2 Device (Oxygen Therapy): nasal cannula      Intake/Output Summary (Last 24 hours) at 12/05/17 0630  Last data filed at 12/04/17 1655   Gross per 24 hour   Intake              550 ml   Output              240 ml   Net              310 ml       Lines/Drains/Airways     Drain                 Hemodialysis AV Fistula Right forearm -- days          Pressure Ulcer                 Pressure Ulcer 12/01/17 2130 sacral spine suspected deep tissue injury 3 days         Pressure Ulcer 12/04/17 Left hip suspected deep tissue injury 1 day          Peripheral Intravenous Line                 Peripheral IV - Single Lumen 12/02/17 2020 Left Upper Arm 2 days                Physical Exam  General: alert, awake and oriented to person and place  Neck:+ HJR  Lungs:  B/l rhonchi  Cardiovascular: Heart: Irregular rate and rhythm, S1, S2 normal, Soft SEKOU at RUSB 2/6  Chest Wall: no tenderness.   Extremities: no cyanosis or edema.    Pulses: symmetric.  Abdomen/Rectal: Abdomen: soft, non-tender non-distented; bowel sounds normal  Neurologic: Generalized weakness  Significant Labs:   BMP:   Recent Labs  Lab 12/04/17  0436 12/05/17  0402   * 146*    142   K 5.6* 5.3*    103   CO2 24 23   BUN 58* 56*   CREATININE 5.9* 5.7*   CALCIUM 10.3 10.2   MG  --  2.0   , CMP   Recent Labs  Lab 12/04/17  0436 12/05/17  0402    142   K 5.6* 5.3*    103   CO2 24 23   * 146*   BUN 58* 56*   CREATININE 5.9* 5.7*   CALCIUM 10.3 10.2   PROT 6.6 6.4   ALBUMIN 2.7* 2.5*   BILITOT 0.4 0.4   ALKPHOS 164* 168*   * 121*   ALT 31 38   ANIONGAP 16 16   ESTGFRAFRICA 9.2* 9.6*   EGFRNONAA 8.0* 8.3*    and CBC   Recent Labs  Lab 12/04/17  0436 12/05/17  0402   WBC 15.08* 13.15*   HGB 8.3* 8.3*   HCT 28.2* 28.1*    204       Significant Imaging: Echocardiogram:   2D echo with color flow doppler:   Results for orders placed or performed during the hospital encounter of 10/10/17   2D echo with color flow doppler   Result Value Ref Range    EF 55 55 - 65    Aortic Valve Regurgitation TRIVIAL     Est. PA Systolic Pressure 43.09 (A)     Tricuspid Valve Regurgitation TRIVIAL      Assessment and Plan:     Brief HPI:     Elevated troponin    -in a patient with multiple co-existing active conditions, ESRD, pneumonia, severe Hypotension, Atrial fibrillation  -Patient denies any chest pain but has delirium and unable to provide a good history  -Non specific changes on EKG can be rate related  -Can be troponin leak or type II NSTEMI-Will not repeat  -Echo in am        Paroxysmal atrial fibrillation    Atrial Fibrillation  Now  Persistent    -CHA2DS2-VASc-4-not on AC  -Will not use amiodarone at this point especially without discussion with family-Patient has been off anticoagulation, has a  waxing and waning mental status and this can confuse the picture if patient undergoes chemical cardioversion and end up having an ischemic  CVA  -Recommend Digoxin 250 mcg IV q6 hrs x 2 doses for rate control  -Can use BB prn            VTE Risk Mitigation         Ordered     Medium Risk of VTE  Once      11/17/17 0011     Place sequential compression device  Until discontinued      11/14/17 1907     Place BRAIN hose  Until discontinued      11/14/17 1907          John Varma MD  Cardiology  [facility]

## 2017-12-05 NOTE — SUBJECTIVE & OBJECTIVE
Past Medical History:   Diagnosis Date    Bladder cancer     Chronic diastolic heart failure 8/21/2012    3/13: AOSAT: 98, FICKCI: 2.41, FICKCO: 5.18 PAPRES: 34/16 (23), PASAT: 65, PVR: 1.74 PWPRES: 18/18 (14), RA PRES: 14/13 (12), RV: 40/0, 10     Chronic hip pain, right 10/4/2017    Coronary artery disease involving native coronary artery of native heart without angina pectoris     Dyslipidemia     Encounter for blood transfusion     ESRD (end stage renal disease) on dialysis 6/9/2014    Essential hypertension     Hypercholesteremia 8/21/2012    Long-term (current) use of anticoagulants 10/9/2015    NSTEMI (non-ST elevated myocardial infarction) 10/4/2017    Paroxysmal atrial fibrillation 10/8/2015    Prostate cancer     Severe aortic stenosis 10/14/2014    Type 2 diabetes mellitus with chronic kidney disease on chronic dialysis, with long-term current use of insulin 12/15/2013    Ventricular tachycardia     monomorphic       Past Surgical History:   Procedure Laterality Date    BACK SURGERY      laminectomy x2    COLON SURGERY      EXTENSIVE PROSTATE SURGER      Prostatectomy, Radical    JOINT REPLACEMENT      left hip       Review of patient's allergies indicates:   Allergen Reactions    Morphine Other (See Comments)     Other reaction(s): severe abdominal pain    Darvocet a500  [propoxyphene n-acetaminophen]      Other reaction(s): Unknown       No current facility-administered medications on file prior to encounter.      Current Outpatient Prescriptions on File Prior to Encounter   Medication Sig    hydrocodone-acetaminophen 10-325mg (NORCO)  mg Tab Take 1 tablet by mouth every 6 (six) hours as needed for Pain.     insulin detemir (LEVEMIR FLEXTOUCH) 100 unit/mL (3 mL) SubQ InPn pen Inject 14 Units into the skin every evening.    midodrine (PROAMATINE) 10 MG tablet Take 1 tablet (10 mg total) by mouth 3 (three) times daily.    pantoprazole (PROTONIX) 40 MG tablet Take 1 tablet  (40 mg total) by mouth nightly.    sertraline (ZOLOFT) 100 MG tablet Take 1 tablet by mouth every evening.    atorvastatin (LIPITOR) 80 MG tablet Take 1 tablet (80 mg total) by mouth once daily.    coenzyme Q10 200 mg capsule Take 200 mg by mouth once daily.    gabapentin (NEURONTIN) 300 MG capsule Take 300 mg by mouth every evening.    metoprolol tartrate (LOPRESSOR) 25 MG tablet Take 1 tablet (25 mg total) by mouth 2 (two) times daily.    NITROSTAT 0.4 mg SL tablet place 1 tablet under the tongue if needed every 5 minutes for chest pain for 3 doses IF NO RELIEF AFTER 3RD DOSE CALL PRESCRIBER .    ramelteon (ROZEREM) 8 mg tablet Take 1 tablet (8 mg total) by mouth nightly as needed for Insomnia.    senna-docusate 8.6-50 mg (PERICOLACE) 8.6-50 mg per tablet Take 1 tablet by mouth 2 (two) times daily.    vit b cmplx 3-fa-vit c-biotin 1- mg-mg-mcg (ANKIT-AZ RX) 1- mg-mg-mcg Tab Take 1 tablet by mouth once daily.     Family History     None        Social History Main Topics    Smoking status: Former Smoker     Packs/day: 3.00     Years: 40.00     Quit date: 1/1/1980    Smokeless tobacco: Former User    Alcohol use No      Comment: a few drinks    Drug use: Unknown    Sexual activity: Not on file     Review of Systems   Unable to perform ROS: mental status change     Objective:     Vital Signs (Most Recent):  Temp: 98.2 °F (36.8 °C) (12/04/17 1929)  Pulse: (!) 125 (12/04/17 1941)  Resp: 16 (12/04/17 1929)  BP: 112/86 (12/04/17 1929)  SpO2: 95 % (12/04/17 1929) Vital Signs (24h Range):  Temp:  [97.4 °F (36.3 °C)-98.7 °F (37.1 °C)] 98.2 °F (36.8 °C)  Pulse:  [100-145] 125  Resp:  [16-20] 16  SpO2:  [90 %-100 %] 95 %  BP: ()/(42-86) 112/86     Weight: 75.2 kg (165 lb 12.6 oz)  Body mass index is 30.32 kg/m².    SpO2: 95 %  O2 Device (Oxygen Therapy): nasal cannula      Intake/Output Summary (Last 24 hours) at 12/04/17 2125  Last data filed at 12/04/17 1655   Gross per 24 hour   Intake               550 ml   Output              240 ml   Net              310 ml       Lines/Drains/Airways     Drain                 Hemodialysis AV Fistula Right forearm -- days          Pressure Ulcer                 Pressure Ulcer 12/01/17 2130 sacral spine suspected deep tissue injury 2 days         Pressure Ulcer 12/04/17 Left hip suspected deep tissue injury less than 1 day          Peripheral Intravenous Line                 Peripheral IV - Single Lumen 12/02/17 2020 Left Upper Arm 2 days                Physical Exam   General: alert, awake and oriented to person and place  Neck:+ HJR  Lungs:  B/l rhonchi  Cardiovascular: Heart: Irregular rate and rhythm, S1, S2 normal, Soft SEKOU at RUSB 2/6  Chest Wall: no tenderness.   Extremities: no cyanosis or edema.   Pulses: symmetric.  Abdomen/Rectal: Abdomen: soft, non-tender non-distented; bowel sounds normal  Neurologic: Generalized weakness      Significant Labs:   CMP   Recent Labs  Lab 12/03/17 0434 12/04/17 0436    143   K 5.0 5.6*    103   CO2 22* 24   * 153*   BUN 45* 58*   CREATININE 4.7* 5.9*   CALCIUM 10.5 10.3   PROT 6.7 6.6   ALBUMIN 2.9* 2.7*   BILITOT 0.4 0.4   ALKPHOS 155* 164*   AST 83* 101*   ALT 26 31   ANIONGAP 16 16   ESTGFRAFRICA 12.2* 9.2*   EGFRNONAA 10.5* 8.0*   , CBC   Recent Labs  Lab 12/03/17 0434 12/04/17 0436   WBC 16.68* 15.08*   HGB 8.1* 8.3*   HCT 27.1* 28.2*    227    and INR   Recent Labs  Lab 12/03/17 0434 12/04/17 0436   INR 1.3* 1.3*       Significant Imaging: Echocardiogram:   2D echo with color flow doppler:   Results for orders placed or performed during the hospital encounter of 10/10/17   2D echo with color flow doppler   Result Value Ref Range    EF 55 55 - 65    Aortic Valve Regurgitation TRIVIAL     Est. PA Systolic Pressure 43.09 (A)     Tricuspid Valve Regurgitation TRIVIAL

## 2017-12-05 NOTE — PLAN OF CARE
Problem: Patient Care Overview  Goal: Plan of Care Review  Outcome: Ongoing (interventions implemented as appropriate)  Pt awake and alert to person. Pt disoriented to place, time, and situation. Pt complains of pain to his right hip throughout shift. Scheduled lidocaine patches applied. Pt remains free from falls and injuries throughout shift. Bed in lowest and locked position. Call bell and personal items at bedside. Pt has several areas of skin breakdown. Wound care performed. No dialysis today. Per pt daughter, they are stopping dialysis and canceling IR with CT guidance. Digoxin started today at 0.25 mg Q6H. No other changes. Will continue to monitor pt.

## 2017-12-05 NOTE — PLAN OF CARE
Problem: Spiritual Distress, Risk/Actual (Adult,Obstetrics,Pediatric)  Intervention: Facilitate Personal Exploration/Expression of Spirituality  Provided initial visit. Pt and family present. Introduced and offered pastoral support with reflective listening and prayer upon request.  notified to visit when available. Family aware of 's presence as needed for continued support. Family mentioned pt has been anointed by family .

## 2017-12-05 NOTE — SUBJECTIVE & OBJECTIVE
Interval History: 12/5 - pt w/ improved mental status this am but appears to wax/wane per report from daughter at bedside. Plan was for IR biopsy of lesions but after discussion, daughter believes that pt would not want to continue at current level of function and discomfort. He has had recent discussions about discontinuing out-pt Hd. Pt denies pain today.     Review of Systems   Unable to perform ROS: Mental status change   Musculoskeletal: Positive for back pain. Negative for neck pain.   Psychiatric/Behavioral: Positive for confusion. Negative for behavioral problems.     Objective:     Vital Signs (Most Recent):  Temp: 98.3 °F (36.8 °C) (12/05/17 1146)  Pulse: 104 (12/05/17 1146)  Resp: 20 (12/05/17 1146)  BP: (!) 103/54 (12/05/17 1146)  SpO2: 100 % (12/05/17 1146) Vital Signs (24h Range):  Temp:  [97.4 °F (36.3 °C)-98.3 °F (36.8 °C)] 98.3 °F (36.8 °C)  Pulse:  [] 104  Resp:  [16-20] 20  SpO2:  [93 %-100 %] 100 %  BP: ()/(42-86) 103/54     Weight: 74.6 kg (164 lb 7.4 oz)  Body mass index is 30.08 kg/m².    Intake/Output Summary (Last 24 hours) at 12/05/17 1211  Last data filed at 12/05/17 1100   Gross per 24 hour   Intake              550 ml   Output              240 ml   Net              310 ml      Physical Exam   Constitutional: He appears well-developed.   Awake/alert   HENT:   Mouth/Throat: Oropharynx is clear and moist.   Wet quality to voice. Dry oral mucous membranes   Eyes: Conjunctivae are normal. No scleral icterus.   Cardiovascular: Normal heart sounds and intact distal pulses.    No murmur heard.  Irregular rhythm, tachycardic   Pulmonary/Chest: Effort normal. He has no wheezes.   Mouth breathing, accessory muscle use, Bilateral coarse breath sounds with crackles present in all lung fields.    Abdominal: Soft. Bowel sounds are normal. He exhibits distension.   Distended abdomen, abdominal wall asymmetric distension, Left >Right.  Non-tender, non-rigid, no guarding.    Musculoskeletal:    RUE forearm AV fistula with palpable thrill.      Lymphadenopathy:     He has no cervical adenopathy.   Skin: Skin is warm. There is pallor.

## 2017-12-05 NOTE — ASSESSMENT & PLAN NOTE
Mr. Cline is a 85 y.o. male with ESRD on dialysis MWF, HFpEF, NSTEMI, bladder/prostate cancer, DM II, aortic stenosis and CAD, previous fungal UTI's now having continual increased O2 requirements and some slowed mentation with leucocytosis     - fever curve and WBC continue to improve  - CT chest with numerous pulmonary nodules, feel that biopsy of one could aid with diagnosis  - continue moxifloxacin 400 mg PO daily for 7 days total, stop date will be 12/08/2017  - will continue to follow.

## 2017-12-05 NOTE — SUBJECTIVE & OBJECTIVE
Interval History: as above    Review of Systems   Unable to perform ROS: Mental status change   Musculoskeletal: Positive for back pain and gait problem. Negative for neck pain.   Psychiatric/Behavioral: Positive for confusion. Negative for behavioral problems.     Objective:     Vital Signs (Most Recent):  Temp: 98.2 °F (36.8 °C) (12/04/17 1929)  Pulse: (!) 125 (12/04/17 1941)  Resp: 16 (12/04/17 1929)  BP: 112/86 (12/04/17 1929)  SpO2: 95 % (12/04/17 1929) Vital Signs (24h Range):  Temp:  [97.4 °F (36.3 °C)-98.7 °F (37.1 °C)] 98.2 °F (36.8 °C)  Pulse:  [100-145] 125  Resp:  [16-20] 16  SpO2:  [90 %-100 %] 95 %  BP: ()/(42-86) 112/86     Weight: 75.2 kg (165 lb 12.6 oz)  Body mass index is 30.32 kg/m².    Intake/Output Summary (Last 24 hours) at 12/04/17 1956  Last data filed at 12/04/17 1655   Gross per 24 hour   Intake              550 ml   Output              240 ml   Net              310 ml      Physical Exam   Constitutional: He appears well-developed.   Awake/alert   HENT:   Mouth/Throat: Oropharynx is clear and moist.   Wet quality to voice. Dry oral mucous membranes   Eyes: Conjunctivae are normal. No scleral icterus.   Cardiovascular: Normal heart sounds and intact distal pulses.    No murmur heard.  Irregular rhythm, tachycardic   Pulmonary/Chest: Effort normal. He has no wheezes.   Mouth breathing, accessory muscle use, Bilateral coarse breath sounds with crackles present in all lung fields.    Abdominal: Soft. Bowel sounds are normal. He exhibits distension.   Distended abdomen, abdominal wall asymmetric distension, Left >Right.  Non-tender, non-rigid, no guarding.    Musculoskeletal:   RUE forearm AV fistula with palpable thrill.      Lymphadenopathy:     He has no cervical adenopathy.   Skin: Skin is warm. There is pallor.       Significant Labs:   CBC:     Recent Labs  Lab 12/03/17  0434 12/04/17  0436   WBC 16.68* 15.08*   HGB 8.1* 8.3*   HCT 27.1* 28.2*    227     CMP:     Recent  Labs  Lab 12/03/17 0434 12/04/17 0436    143   K 5.0 5.6*    103   CO2 22* 24   * 153*   BUN 45* 58*   CREATININE 4.7* 5.9*   CALCIUM 10.5 10.3   PROT 6.7 6.6   ALBUMIN 2.9* 2.7*   BILITOT 0.4 0.4   ALKPHOS 155* 164*   AST 83* 101*   ALT 26 31   ANIONGAP 16 16   EGFRNONAA 10.5* 8.0*     Lactic Acid:     Recent Labs  Lab 12/03/17 0434 12/04/17 0436   LACTATE 1.2 1.3       Significant Imaging: I have reviewed all pertinent imaging results/findings within the past 24 hours.     CTA CHEST, CT ABDOMEN PELVIS TRIPLE PHASE  Impression         1. No evidence of pulmonary thromboembolism or focal mass lesion within the liver.    2. Numerous solid pulmonary nodules throughout both lungs, the largest of which is in the right upper lobe abutting the pleural margin measuring up to 2.4 cm concerning for metastatic disease.    3. Left lower lobe consolidation concerning for pneumonia with dependent bilateral small pleural effusions.    4. Significant aortic and coronary artery atherosclerosis.    5. Calcified liver and splenic granulomas compatible with old granulomatous disease.    6. Small sized kidneys with parenchymal loss and bilateral renal cysts compatible with chronic medical renal disease.    7. Significant degenerative changes of the thoracic and lumbar spine consistent with DISH with posterior spinal fusion at L3-L5.    Additional findings as detailed above.

## 2017-12-05 NOTE — SIGNIFICANT EVENT
Rapid Response RN review :    MEWS score 4    Spoke with BS RN Ellen concerning MEWS  trigger tachycardia. Pt tachy Afib 130's, slightly higher than his baseline of  110-120. IV Lopressor 5 mg IVP given with formal consult to Cardiology. Pt was seen and recs made.  Pt HR now 117.  Please notify Rapid Response RN for future concerns [id number]

## 2017-12-05 NOTE — CONSULTS
Consult Note  Palliative Care      Consult Requested By: Kirit Martinez MD  Reason for Consult: Goals of Care Discussion     SUBJECTIVE:     History of Present Illness:  Disease Process: Cancer, Advanced Respiratory Disease, End Stage Renal Disease, Failure to Thrive      Past Medical History:   Diagnosis Date    Bladder cancer     Chronic diastolic heart failure 8/21/2012    3/13: AOSAT: 98, FICKCI: 2.41, FICKCO: 5.18 PAPRES: 34/16 (23), PASAT: 65, PVR: 1.74 PWPRES: 18/18 (14), RA PRES: 14/13 (12), RV: 40/0, 10     Chronic hip pain, right 10/4/2017    Coronary artery disease involving native coronary artery of native heart without angina pectoris     Dyslipidemia     Encounter for blood transfusion     ESRD (end stage renal disease) on dialysis 6/9/2014    Essential hypertension     Hypercholesteremia 8/21/2012    Long-term (current) use of anticoagulants 10/9/2015    NSTEMI (non-ST elevated myocardial infarction) 10/4/2017    Paroxysmal atrial fibrillation 10/8/2015    Prostate cancer     Severe aortic stenosis 10/14/2014    Type 2 diabetes mellitus with chronic kidney disease on chronic dialysis, with long-term current use of insulin 12/15/2013    Ventricular tachycardia     monomorphic     Past Surgical History:   Procedure Laterality Date    BACK SURGERY      laminectomy x2    COLON SURGERY      EXTENSIVE PROSTATE SURGER      Prostatectomy, Radical    JOINT REPLACEMENT      left hip     History reviewed. No pertinent family history.  Social History   Substance Use Topics    Smoking status: Former Smoker     Packs/day: 3.00     Years: 40.00     Quit date: 1/1/1980    Smokeless tobacco: Former User    Alcohol use No      Comment: a few drinks       Mental Status: Engaged, Disoriented    ECOG Performance Status Grade: 4 - Completely disabled    Review of Systems:  Unable to perform ROS: Mental status change   Musculoskeletal: Positive for back pain. Negative for neck pain.  "  Psychiatric/Behavioral: Positive for confusion. Negative for behavioral problems.     OBJECTIVE:     Pain Assessment: Cannot quantity, complains of leg spasms, chronic back pain, and leg weakness severely limiting him.    Decision-Making Capacity: Family answered questions, Patient unable to communicate due to disease severity/cognitive impairment    Advanced Directives:  Living Will: No  Do Not Resuscitate Status: Yes  Medical Power of : Yes. Agent's Name: Wendy Cisneros. Agent's Contact Number: [phone]    Living Arrangements: Lives in nursing home    Psychosocial, Spiritual, Cultural:  Most of the interview was with patient's daughter at bedside as patient is disoriented with waxing and waning mentation. Per daughter, until 6 months ago her father was completely independent, had recently purchased truck, and lived alone. Following his back surgery at the end of October and a limited stay at an inpatient rehab facility, in which he was getting better, he began to have significant and rapid decline in regards to his functional capacity and ability to walk. She states that when he was transferred from inpatient rehab to SNF from Friday to the following Monday, he completely lost ability to lift himself up or walk. Patient is at bedside stating he wants to be "2 feet under" and does not want to live this way when he does have lucid intervals. Patient's daughter states that 30 years ago he had prolonged stay at hospital and near death experience and felt the years since have been a blessing. Today, he had made his peace with God with the  services.    During the last three weeks, the patient's daughter has been discouraged by the cognitive and physical decline of her father. She expresses understanding that he will not be able to recover the strength in his lower limbs. She believes that the hematoma that he had has likely caused irreversible nerve damage, as was communicated to her by the " "neurosurgery team. She believes his mentation may improve and that it is likely infectious in cause because he seems to improve when he gets vancomycin.     Patient's daughter is amenable to hospice, whether through nursing home or inpatient hospice, as long as her father qualifies. Patient himself expresses desires to get out of hospital and for some "peace."    ASSESSMENT/PLAN:     Spoke extensively with daughter in regards of her desires to pursue comfort measures only and placement in hospice, in which they are amenable.     Recommendations:  Considering patient and daughter expressing pursuing comfort measures only, would discontinue all medications that would not achieve those ends and artificially prolonging dying process.     For pain control (primarily leg spasms and back pain), would be judicious in use of opiates considering patient's renal failure and risk for delirium/neurotoxicity in this population. That being said, patient's daughter states that Norco has been beneficial before (morphine gives severe abdominal pain, and hydromorphone drops blood pressure). If pain control cannot be achieved on current regimen, would recommend low dose PO hydromorphone. NSAIDs contra-indicated considering risk of re-accumulating hematoma.     Spoke with social work who had [facility] who will visit with patient. Patient expresses desires to stopping dialysis on outpatient.     Time Spent: 45 minutes       "

## 2017-12-05 NOTE — ASSESSMENT & PLAN NOTE
Atrial Fibrillation  Now  Persistent    -CHA2DS2-VASc-4-not on AC  -Will not use amiodarone at this point especially without discussion with family-Patient has been off anticoagulation, has a  waxing and waning mental status and this can confuse the picture if patient undergoes chemical cardioversion and end up having an ischemic CVA  -Can use low dose BB-lopressor 25 mg po BID-TID  for rate control as tolerated by BP-IF po meds are an issue can use Lopressor IV prn for HR control if he sustains above 130  -will repeat an echo in am-If EF is preserved can consider using cardizem for rate control provided BP is stable

## 2017-12-05 NOTE — ASSESSMENT & PLAN NOTE
-HR rising ,remains in Afib rhythm wise  -given 2.5mg IV metoprolol for rate control     -With afib with RVR in HD as per above description, BNP is 800s, Trop 0.9, last check was in 0.3-0.4 baseline range.  Cardiology, consider use of Diltiazem for rate control, cannot use Digoxin in ESRD and hyperkalemia. tte pending

## 2017-12-05 NOTE — CONSULTS
[facility]  Cardiology  Consult Note    Patient Name: Donta Cline  MRN: [id number]  Admission Date: 11/14/2017  Hospital Length of Stay: 19 days  Code Status: DNR   Attending Provider: Liz Alonso MD   Consulting Provider: Rajeev Varma MD  Primary Care Physician: ZAID Richardson Iii, MD  Principal Problem:Leukocytosis    Patient information was obtained from past medical records and ER records.     Inpatient consult to Cardiology  Consult performed by: RAJEEV VARMA  Consult ordered by: LIZ ALONSO  Reason for consult: Atrial fibrillation with RVR        Subjective:     Chief Complaint:  Atrial fibrillation with RVR     HPI:   86 y/o male with h/o ESRD(HD M, W, F-since 2015), Persistent atrial fibrillation(was on metoprolol and coumadin as outpatient), Bladder cancer, AS Moderate but worsening(MG 28, VEL 0.85 cm2, PV 3.56 m/s), CAD (last [facility] with RCA stenosis 75 %-10/2017) who has been admitted since 11/14/2017.Patient underwent Lumbar fusion/laminectomy 2 months ago-was sent to inpatient rehab and was doing well-transferred to SNF and since then has been declining-Transferred from SNF for severe back pain-Underwent washout by neursurgery that showed hematoma-His coumadin was reversed at that time. He has been in atrial fibrillation with -120s.HD has been difficult. Also suspicion for aspiration pneumonia, candidiasis.CT Pe protocol done recently shows pulmonary nodules-concer for metastatic disease from bladder cancer.Cardiology consulted for atrial fibrillation with RVR during HD today with rates in 140s and Bp systolic in 60s-Now this has recovered to BP 120s systolic and -120s.Also with elevated troponin of 0.921.    Past Medical History:   Diagnosis Date    Bladder cancer     Chronic diastolic heart failure 8/21/2012    3/13: AOSAT: 98, FICKCI: 2.41, FICKCO: 5.18 PAPRES: 34/16 (23), PASAT: 65, PVR: 1.74 PWPRES: 18/18 (14), RA PRES: 14/13 (12), RV: 40/0, 10      Chronic hip pain, right 10/4/2017    Coronary artery disease involving native coronary artery of native heart without angina pectoris     Dyslipidemia     Encounter for blood transfusion     ESRD (end stage renal disease) on dialysis 6/9/2014    Essential hypertension     Hypercholesteremia 8/21/2012    Long-term (current) use of anticoagulants 10/9/2015    NSTEMI (non-ST elevated myocardial infarction) 10/4/2017    Paroxysmal atrial fibrillation 10/8/2015    Prostate cancer     Severe aortic stenosis 10/14/2014    Type 2 diabetes mellitus with chronic kidney disease on chronic dialysis, with long-term current use of insulin 12/15/2013    Ventricular tachycardia     monomorphic       Past Surgical History:   Procedure Laterality Date    BACK SURGERY      laminectomy x2    COLON SURGERY      EXTENSIVE PROSTATE SURGER      Prostatectomy, Radical    JOINT REPLACEMENT      left hip       Review of patient's allergies indicates:   Allergen Reactions    Morphine Other (See Comments)     Other reaction(s): severe abdominal pain    Darvocet a500  [propoxyphene n-acetaminophen]      Other reaction(s): Unknown       No current facility-administered medications on file prior to encounter.      Current Outpatient Prescriptions on File Prior to Encounter   Medication Sig    hydrocodone-acetaminophen 10-325mg (NORCO)  mg Tab Take 1 tablet by mouth every 6 (six) hours as needed for Pain.     insulin detemir (LEVEMIR FLEXTOUCH) 100 unit/mL (3 mL) SubQ InPn pen Inject 14 Units into the skin every evening.    midodrine (PROAMATINE) 10 MG tablet Take 1 tablet (10 mg total) by mouth 3 (three) times daily.    pantoprazole (PROTONIX) 40 MG tablet Take 1 tablet (40 mg total) by mouth nightly.    sertraline (ZOLOFT) 100 MG tablet Take 1 tablet by mouth every evening.    atorvastatin (LIPITOR) 80 MG tablet Take 1 tablet (80 mg total) by mouth once daily.    coenzyme Q10 200 mg capsule Take 200 mg by mouth  once daily.    gabapentin (NEURONTIN) 300 MG capsule Take 300 mg by mouth every evening.    metoprolol tartrate (LOPRESSOR) 25 MG tablet Take 1 tablet (25 mg total) by mouth 2 (two) times daily.    NITROSTAT 0.4 mg SL tablet place 1 tablet under the tongue if needed every 5 minutes for chest pain for 3 doses IF NO RELIEF AFTER 3RD DOSE CALL PRESCRIBER .    ramelteon (ROZEREM) 8 mg tablet Take 1 tablet (8 mg total) by mouth nightly as needed for Insomnia.    senna-docusate 8.6-50 mg (PERICOLACE) 8.6-50 mg per tablet Take 1 tablet by mouth 2 (two) times daily.    vit b cmplx 3-fa-vit c-biotin 1- mg-mg-mcg (ANKIT-AZ RX) 1- mg-mg-mcg Tab Take 1 tablet by mouth once daily.     Family History     None        Social History Main Topics    Smoking status: Former Smoker     Packs/day: 3.00     Years: 40.00     Quit date: 1/1/1980    Smokeless tobacco: Former User    Alcohol use No      Comment: a few drinks    Drug use: Unknown    Sexual activity: Not on file     Review of Systems   Unable to perform ROS: mental status change     Objective:     Vital Signs (Most Recent):  Temp: 98.2 °F (36.8 °C) (12/04/17 1929)  Pulse: (!) 125 (12/04/17 1941)  Resp: 16 (12/04/17 1929)  BP: 112/86 (12/04/17 1929)  SpO2: 95 % (12/04/17 1929) Vital Signs (24h Range):  Temp:  [97.4 °F (36.3 °C)-98.7 °F (37.1 °C)] 98.2 °F (36.8 °C)  Pulse:  [100-145] 125  Resp:  [16-20] 16  SpO2:  [90 %-100 %] 95 %  BP: ()/(42-86) 112/86     Weight: 75.2 kg (165 lb 12.6 oz)  Body mass index is 30.32 kg/m².    SpO2: 95 %  O2 Device (Oxygen Therapy): nasal cannula      Intake/Output Summary (Last 24 hours) at 12/04/17 2123  Last data filed at 12/04/17 1655   Gross per 24 hour   Intake              550 ml   Output              240 ml   Net              310 ml       Lines/Drains/Airways     Drain                 Hemodialysis AV Fistula Right forearm -- days          Pressure Ulcer                 Pressure Ulcer 12/01/17 2130 sacral  spine suspected deep tissue injury 2 days         Pressure Ulcer 12/04/17 Left hip suspected deep tissue injury less than 1 day          Peripheral Intravenous Line                 Peripheral IV - Single Lumen 12/02/17 2020 Left Upper Arm 2 days                Physical Exam   General: alert, awake and oriented to person and place  Neck:+ HJR  Lungs:  B/l rhonchi  Cardiovascular: Heart: Irregular rate and rhythm, S1, S2 normal, Soft SEKOU at RUSB 2/6  Chest Wall: no tenderness.   Extremities: no cyanosis or edema.   Pulses: symmetric.  Abdomen/Rectal: Abdomen: soft, non-tender non-distented; bowel sounds normal  Neurologic: Generalized weakness      Significant Labs:   CMP   Recent Labs  Lab 12/03/17  0434 12/04/17 0436    143   K 5.0 5.6*    103   CO2 22* 24   * 153*   BUN 45* 58*   CREATININE 4.7* 5.9*   CALCIUM 10.5 10.3   PROT 6.7 6.6   ALBUMIN 2.9* 2.7*   BILITOT 0.4 0.4   ALKPHOS 155* 164*   AST 83* 101*   ALT 26 31   ANIONGAP 16 16   ESTGFRAFRICA 12.2* 9.2*   EGFRNONAA 10.5* 8.0*   , CBC   Recent Labs  Lab 12/03/17 0434 12/04/17 0436   WBC 16.68* 15.08*   HGB 8.1* 8.3*   HCT 27.1* 28.2*    227    and INR   Recent Labs  Lab 12/03/17 0434 12/04/17 0436   INR 1.3* 1.3*     Last EKG in file(physical/Epic)-12 /2-atrial fibrillation with RVR with non specific ST and T wave abnormalities  Significant Imaging: Echocardiogram:   2D echo with color flow doppler:   Results for orders placed or performed during the hospital encounter of 10/10/17   2D echo with color flow doppler   Result Value Ref Range    EF 55 55 - 65    Aortic Valve Regurgitation TRIVIAL     Est. PA Systolic Pressure 43.09 (A)     Tricuspid Valve Regurgitation TRIVIAL      Assessment and Plan:     Elevated troponin    -in a patient with multiple co-existing active conditions, ESRD, pneumonia, severe Hypotension, Atrial fibrillation  -Patient denies any chest pain but has delirium and unable to provide a good history  -Non  specific changes on EKG can be rate related  -Can be troponin leak or type II NSTEMI-Will not repeat  -Echo in am        Paroxysmal atrial fibrillation    Atrial Fibrillation  Now  Persistent    -CHA2DS2-VASc-4-not on AC  -Will not use amiodarone at this point especially without discussion with family-Patient has been off anticoagulation, has a  waxing and waning mental status and this can confuse the picture if patient undergoes chemical cardioversion and end up having an ischemic CVA  -Can use low dose BB-lopressor 25 mg po BID-TID  for rate control as tolerated by BP-IF po meds are an issue can use Lopressor IV prn for HR control if he sustains above 130  -will repeat an echo in am-If EF is preserved can consider using cardizem for rate control provided BP is stable  -Would recommend changing albuterol to leva-albuterol          DISCUSSED WITH DR SEYMOUR  RELAYED TO HOSPITAL MEDICINE TEAM L  VTE Risk Mitigation         Ordered     Medium Risk of VTE  Once      11/17/17 0011     Place sequential compression device  Until discontinued      11/14/17 1907     Place BRAIN hose  Until discontinued      11/14/17 1907          Thank you for your consult. Cardiology will follow-up with patient. Please contact us if you have any additional questions.    John Varma MD  Cardiology   [facility]

## 2017-12-05 NOTE — PLAN OF CARE
Problem: Physical Therapy Goal  Goal: Physical Therapy Goal  Goals to be met by: 12/15/2017     Patient will increase functional independence with mobility by performin. Supine to sit with MInimal Assistance  2. Sit to supine with MInimal Assistance  3. Sit to stand transfer with Moderate Assistance  4. Bed to chair transfer with Moderate Assistance  5. Gait  x 10 feet with Maximum Assistance using Rolling Walker.   6. Lower extremity exercise program x15 reps per handout, with assistance as needed       Outcome: Ongoing (interventions implemented as appropriate)  Goals remain appropriate to improve functional mobility.    Daughter states having palliative consult. Discussed decreased in POC to 2x/week for family training on bed mobility.    Edgar Wright III, DPT, PT  2017

## 2017-12-05 NOTE — PROGRESS NOTES
Blood was returned.  HR now down to 120's with a BP - 94/62.  Nephrology orders held by Dr. Morel (Middodrine and Lopressor IV).  12 lead performed.  Report called and continued to monitor patient.

## 2017-12-05 NOTE — ASSESSMENT & PLAN NOTE
Repeat infectious workup - Left Lower lobe Pneumonia.   -ABG- showed acute respiratory alkalosis - ph 7.47, pco2 30s.   -Delirium precautions.   - clears started based on pt and daughter rec, understand risk of aspiration and death in pt who is DNR  or for signs of infection given leukocytosis ; recent infectious workup negative - only consistent with candidiasis    -Holding brain MRI at this time.

## 2017-12-05 NOTE — PROGRESS NOTES
Patient became tachycardic with A fib with RVR rhythm in 140's.  Patient's BP = 67/42.  Nephrology notified; Dr. Morel to bedside.   HD treatment discontinued by Nephrology.  5mg Lopressor ordered as well as 12 lead, and an aditional dose of Midodrine.

## 2017-12-06 NOTE — SIGNIFICANT EVENT
Notified by nurse of apparent expiration of patient.  Patient assessed at bedside.  Pulse, breath sounds, heart sounds absent.  Corneal/pupillary reflexes absent.  Death pronounced 0340am.

## 2017-12-06 NOTE — PLAN OF CARE
JAMAL and RUSTAM Paris were notified at [facility] that pt is .  No future appointments.     17 1021   Final Note   Assessment Type Final Discharge Note

## 2017-12-06 NOTE — PT/OT/SLP DISCHARGE
Occupational Therapy Discharge Summary    Donta Cline  MRN: [id number]   Principal Problem: Leukocytosis      Patient Discharged from acute Occupational Therapy on 12/6/17.  Please refer to prior OT note dated 12/3/17 for functional status.    Assessment:      Patient was discharged unexpectedly.  Information required to complete an accurate discharge summary is unknown.  Refer to therapy initial evaluation and last progress note for initial and most recent functional status and goal achievement.  Recommendations made may be found in medical record.    Objective:     GOALS:    Occupational Therapy Goals        Problem: Occupational Therapy Goal    Goal Priority Disciplines Outcome Interventions   Occupational Therapy Goal     OT, PT/OT Unable to achieve outcome(s) by discharge    Description:  Goals to be met by 12/10/2017:     Patient will increase functional independence with ADLs by performing:    Bed mobility with demo understanding for spinal precautions with min(A).  Stand Pivot transfer to multiple surfaces (chair, wheelchair, bedside commode) with Moderate Assistance.   Donning LSO with Minimal Assistance while seated EOB.  Donning socks with sock aid with minimal assistance.   Functional dynamic sitting task ~8 min duration while seated EOB with CGA for postural control.   UE endurance HEP with set up and assistance as needed.   [place] on eob with CG A for balance  BSC transfer with max a                       Reasons for Discontinuation of Therapy Services  Pt passed away.      Plan:     Patient Discharged to: Pt has passed away.    PIYUSH Conway  12/6/2017  Pager: [phone]

## 2017-12-06 NOTE — PLAN OF CARE
Problem: Patient Care Overview  Goal: Plan of Care Review  Outcome: Ongoing (interventions implemented as appropriate)  Pt HR remained tachy 115 -130's. A rattle was heard with each breath. Respiratory raised the pts O2 from 3L to 5L Daughter remained at bedside. Pt passed. Time of death 0340 12/6.  was called and did meet w/ the pt. Daughter took all of pt belongings.

## 2017-12-06 NOTE — PLAN OF CARE
Problem: Occupational Therapy Goal  Goal: Occupational Therapy Goal  Goals to be met by 12/10/2017:     Patient will increase functional independence with ADLs by performing:    Bed mobility with demo understanding for spinal precautions with min(A).  Stand Pivot transfer to multiple surfaces (chair, wheelchair, bedside commode) with Moderate Assistance.   Donning LSO with Minimal Assistance while seated EOB.  Donning socks with sock aid with minimal assistance.   Functional dynamic sitting task ~8 min duration while seated EOB with CGA for postural control.   UE endurance HEP with set up and assistance as needed.   [place] on eob with CG A for balance  BSC transfer with max a      Outcome: Unable to achieve outcome(s) by discharge  Pt has passed away.    PIYUSH Conway  12/6/2017  Pager: [phone]

## 2017-12-07 LAB
BACTERIA SPEC AEROBE CULT: NORMAL
GRAM STN SPEC: NORMAL

## 2017-12-07 NOTE — PT/OT/SLP DISCHARGE
Physical Therapy Discharge Summary    Name: Donta Cline  MRN: [id number]   Principal Problem: Leukocytosis     Patient Discharged from acute Physical Therapy on 17.  Please refer to prior PT noted date on 17 for functional status.     Assessment:     Patient . DC PT orders.    Objective:     GOALS:    Physical Therapy Goals        Problem: Physical Therapy Goal    Goal Priority Disciplines Outcome Goal Variances Interventions   Physical Therapy Goal     PT/OT, PT Ongoing (interventions implemented as appropriate)     Description:  Goals to be met by: 12/15/2017     Patient will increase functional independence with mobility by performin. Supine to sit with MInimal Assistance  2. Sit to supine with MInimal Assistance  3. Sit to stand transfer with Moderate Assistance  4. Bed to chair transfer with Moderate Assistance  5. Gait  x 10 feet with Maximum Assistance using Rolling Walker.   6. Lower extremity exercise program x15 reps per handout, with assistance as needed                         Reasons for Discontinuation of Therapy Services  Patient .      Plan:     DC PT orders.    Edgar Wright III, PT  2017

## 2017-12-08 NOTE — DISCHARGE SUMMARY
"[facility] Hospital Medicine  Discharge Summary      Patient Name: Donta Cline  MRN: [id number]  Admission Date: 11/14/2017  Hospital Length of Stay: 21 days  Discharge Date and Time: 12/6/2017     Attending Physician: No att. providers found   Discharging Provider: Nilesh Galaviz MD  Primary Care Provider: ZAID Richardson Iii, MD    [facility] Medicine Team: [facility]  Nilesh Galaviz MD    HPI: Donta Cline is a 85 y.o. male who presents with PMHx of ESRD with dialysis MWF, HFpEF, NSTEMI, bladder/prostate cancer, DM II, aortic stenosis and CAD for evaluation of back pain s/p lumbar fusion/laminectomy (10/20/17). No family at bedside. Difficult to obtain HPI as speech garbled, however patient endorses progressive lower extremity weakness with difficulty ambulating for the past 4-5 days. His back pain is without radiation. Patient is now unable to ambulate independently. Denies trauma and urinary bowel/bladder incontinence, fever.     Per EDMD:  "The daughter brought patient to ED due to concern of his severe back pain and inability to perform activities as he was doing previously. She had discussed with Dr. Saul, and he recommended the patient come to ED and obtain imaging for spine."       Procedure(s) (LRB):  REVISION-WOUND--lumbar washout (N/A)      Hospital Course: In discussion with daughter who is at bedside today (11/15) patient was discharged after surgical procedure to an inpatient rehab and he reports that he was doing well at the rehab but reached a plateau in his activity/functional level.  She states patient was walking 50 feet with minimal assist, able to toilet with minimal assistance.  Patient was transferred to SNF and since transfer, last Friday (11/10) patient requiring max assistance to stand and reported inability to bear weight secondary to pain, and since this time patient has been bed bound.      She also notes that patient with poor appetite as speech " therapy has recommended puree nectar thick diet with Nepro shakes, reports that pt is losing weight and some increased abdominal girth.      Overnight patient admitted due to concerns of worsening pain and inability to ambulate, patient underwent MRI and CT imaging of the lumbar spine.  He has intact fusion, no spinal canal stenosis or cord compression noted, concern for a fluid collection encircling L1 spinous process, post op L2-L4 fusion changes and s/p laminectomy L3-L4.  Discussed imaging findings with attending neurosurgeon Dr. Sands who performed pts operation and he reported that fluid collection appears outside of range where instrumentation was.  Patient with no reported falls, workup today reveals elevated CRP >150, ESR 92, has as supratherapeutic INR 3.8     Informed plan will be to take patient to OR for washout and exploration to evaluate for hematoma vs infection.      11/16 - 11/21   Patient  required total of 6 units of FFP in order to correct his supratherapeutic INR on day of surgery, when taken to the OR patient was found with a hematoma in the wound bed (see op not for details)       Patient taken for operative intervention and in discussion with neurosurgeon Dr. Sands, he reports that patient with well healed wound and surgical fusion was intact, when cut into the dura and in subdural region there was area of hematoma that was evacuated (full op report pending in EMR)  Cultures sent to rule out infection but clinical impression was not an appearance of an infected operative site.  Dr. Sands noted that degree of patient's pain incongruent with surgical findings.      Post operatively patient converted from sinus rhythm to Afib with RVR, his hemoglobin downtrended and and he required PRBC transfusion.      There was difficulty with hypotension and exacerbation of RVR with HD sessions this week.  Post-operatively patient remained with severe back pain and repeat PT/OT was ordered but no significant  improvement in his functional status.  Initial Neurosurgery impressions were that size and location of hematoma did not correspond to severity of his symptoms but as his hospital course advanced, supposition from neurosurgery team that patient may have had some type of permanent damage when the hematoma initially formed.  All infectious workup both surgical and pan culture on admission was negative and antibiotic therapy while patient had surgical drains in was continued Cefazolin for wound infection prophylaxis, this was discontinued when surgical drains were removed.      CPAP initiated for hypoxic respiratory failure secondary to concerns of FFP/IV fluid and PRBC administration resulting in volume overload.  CPAP has remained as a nightly therapy patient doing well with this.   Week of 11/22 - 11/27    Pt had increasing delirium and wbc elevated thus started on ceftriaxone based on dirty UA. DDx includes aspiration PNA as pt with cough.  Repeat bout of respiratory failure, hypoxic and also hypotensive, concerning for sepsis.  He was started on Vanc and Cefepime for HCAP coverage, patient with worsening delirium during the week and infectious workup undertaken revealed Candida in sputum and urine.       ID was consulted and patient started on 10 day course of fluconazole for candidiasis, empiric antibiotics were discontinued.      For worsening delirium his pain management regimen was tapered as he had been on fentanyl patches on admission and he was tapered to lyrica, APAP, lidocaine patches and PRN tramadol.       Week of 11/28      Patient had ongoing hypoxia, and waxing/waning delirium, he was reported to be fully alert and oriented on 11/27 but noted to wax and wane.  Over the course of the week, mental status declined his oral intake decreased.  Repeat Speech asessments concerning for increased aspiration and recommendation that Pureed with Honey Thick would be safest but still with a high aspiration risk.   Due to fluctuations in mental status, nursing often not comfortable offering oral intake.  Patient has complained of thirst and asking for water.      His WBC also began rising, repeat blood cultures, lactic acid levels have been non-revelatory.  ID was reconsulted and patient was started on Moxifloxacin and have added back intermittent dosages of Vancomycin when appropriate.      Updated daughter (Wendy) regarding his condition this week, given rising WBC cound, repeat infectious workup with cultures and obtain Pan CTA chest to eval lungs and rule out PE as recd by ID and also CT abd/pelvis, triple phase to characterize liver lesion and check for signs of infection.      (12/2) - Patient is NPO, mental status wax and wanes, but has been mostly depressed and patient is mostly lethargic, he complained of unbearable pain this AM and onetime dose of dilaudid given, but patient has been persistently lethargic in the afternoon and evening following this.  Some hypotension today, and IV Albumin ordered.  Daughter reports very sensitive to IV Opiates and often they result in hypotension.      Updated Quynh about patient's status, she would like to pursue antibiotic therapy, ok with NPO, and CT scans to determine for a reversible etiology, she is POA but if these studies do not reveal reversible cause and patient does not improve, may require arranging a family meeting next week.  CTA Chest, Triple Phase CT Abd/Pelvis obtained and most concerning for new findings of numerous pulmonary nodules concerning for metastatic disease.  Patient with a history of bladder cancer that was treated and reported in remission, daughter Wendy reports that with a non[facility] pulmonlogist patient underwent CT in 2010-11 approx and was reported with no lesions.  Pulmonlogist had advised Wendy that if bladder cancer metastasized, first location is likely the lung.         12/3 - CTA Chest and triple phase abd ct obtained, returned with results  most significant of Numerous pulmonary nodules concerning for metastatic cancer, left lower lobe pneumonia, diffuse atherosclerosis, abdominal granulomatous disease in liver and spleen.  Extensive stool in colon, no acute abnormality on CT of the Bladder or Prostate.  He has a history of bladder cancer that was previously treated.      I spoke with Wendy late this evening and she states a few years ago under the care of a private pulmonologist after cancer was considered in remission that he had a chest CT that was reported as normal.  She also states previously was told that if bladder cancer metastasized first site of spread is commonly the lung.  She inquired if patient needs CNS imaging to rule out brain metastases.  As patient is ESRD and would require MRI with contrast, will have to organize with both radiology and nephrology to have patient dialyzed after undergoing an MRI Brain with contrast.      Starting vancomycin for pneumonia on CT scan, and patient has been NPO due to mental status, on IV intermittent doses of metoprolol for rate control of afib with RVR.       - Patient seen this AM, daughter, DEBRA Nguyen is at bedside.  She reports she discussed results of CT scan with patient and states that she's not sure if he fully understands.  On basic orientation questions, patient stated he was at [employer] [facility] the [place], later stated he was at [facility] on [employer].       - pt w/ improved mental status this am but appears to wax/wane per report from daughter at bedside. Plan was for IR biopsy of lesions but after discussion, daughter believes that pt would not want to continue at current level of function and discomfort. He has had recent discussions about discontinuing out-pt Hd. Pt denies pain today.     Early am  patient  peacefully in his sleep and death was declared at 0340am.  CoD: acute hypoxemic respiratory failure due to HCAP.    Consults:   Consults         Status Ordering  Provider     [facility] PharmD Consult  Once     Provider:  (Not yet assigned)    Completed LIZ WAN     Inpatient consult to Cardiology  Once     Provider:  (Not yet assigned)    Completed LIZ WAN     Inpatient consult to Infectious Diseases  Once     Provider:  (Not yet assigned)    Completed AMANUEL ALVARADO     Inpatient consult to Infectious Diseases  Once     Provider:  (Not yet assigned)    Completed LIZ WAN     Inpatient consult to Nephrology  Once     Provider:  (Not yet assigned)    Completed HENRY THOMPSON     Inpatient consult to Palliative Care  Once     Provider:  (Not yet assigned)    Completed DAPHNE LEI          Final Active Diagnoses:    Diagnosis Date Noted POA    PRINCIPAL PROBLEM:  Leukocytosis [D72.829] 11/26/2017 Yes    Anemia [D64.9] 12/05/2017 Yes    Hyperkalemia [E87.5] 12/05/2017 No    Cor pulmonale, chronic [I27.81] 12/05/2017 Yes    Old MI (myocardial infarction) [I25.2] 12/05/2017 Not Applicable    Suspected deep tissue injury [Z04.9] 12/04/2017 Not Applicable    Metastatic cancer to lung [C78.00] 12/03/2017 Clinically Undetermined    Left lower lobe pneumonia [J18.1] 12/03/2017 No    Acute respiratory failure with hypoxia [J96.01] 12/01/2017 No    Lumbar back pain with radiculopathy affecting right lower extremity [M54.17] 11/28/2017 Yes    Abnormal US (ultrasound) of abdomen [R93.5] 11/26/2017 Yes    Candidiasis [B37.9] 11/24/2017 Yes    Delirium [R41.0] 11/24/2017 Yes    L-S radiculopathy [M54.17] 11/15/2017 Yes    Acute bilateral low back pain, post-lumbar spinal fusion [M54.5] 10/12/2017 Yes    Elevated troponin [R74.8] 10/12/2017 Yes    Paroxysmal atrial fibrillation [I48.0] 10/08/2015 Yes    Severe aortic stenosis [I35.0] 10/14/2014 Yes    ESRD (end stage renal disease) on dialysis [N18.6, Z99.2] 06/09/2014 Not Applicable    Type 2 diabetes mellitus with chronic kidney disease on chronic dialysis, with long-term  current use of insulin [E11.22, N18.6, Z99.2, Z79.4] 12/15/2013 Not Applicable    Coronary artery disease involving native coronary artery of native heart without angina pectoris [I25.10]  Yes    Chronic diastolic heart failure [I50.32] 2012 Yes     Chronic      Problems Resolved During this Admission:    Diagnosis Date Noted Date Resolved POA    Hematoma [T14.8XXA] 2017 Yes    Acute blood loss anemia [D62] 2017 Yes    Pyuria [N39.0] 11/15/2017 2017 Yes    Supratherapeutic INR [R79.1] 11/15/2017 2017 Yes      Discharged Condition:     Disposition:     Follow Up:    Patient Instructions:   No discharge procedures on file.  Medications:  None (patient  at medical facility)    Significant Diagnostic Studies: see chart    Pending Diagnostic Studies:     Procedure Component Value Units Date/Time    2D echo with color flow doppler [[id number]]     Order Status:  Sent Lab Status:  No result     IR CT Guidance [[id number]]     Order Status:  Sent Lab Status:  No result     Protime-INR [[id number]] Collected:  17    Order Status:  Sent Lab Status:  In process Updated:  17    Specimen:  Blood from Blood     Renal function panel [[id number]] Collected:  17    Order Status:  Sent Lab Status:  In process Updated:  17    Specimen:  Blood from Blood         Indwelling Lines/Drains at time of discharge:   Lines/Drains/Airways     Drain                 Hemodialysis AV Fistula Right forearm -- days          Pressure Ulcer                 Pressure Ulcer 17 sacral spine suspected deep tissue injury 6 days         Pressure Ulcer 17 Left hip suspected deep tissue injury 4 days                Time spent on the discharge of patient: <30 minutes       Nilesh Galaviz MD  Department of Hospital Medicine  [facility]

## 2017-12-15 NOTE — ADDENDUM NOTE
Addendum  created 12/15/17 1245 by Joaquin Perez MD    Anesthesia Attestations filed, Anesthesia Intra Blocks edited, Sign clinical note

## 2017-12-18 LAB
FUNGUS SPEC CULT: NORMAL
FUNGUS SPEC CULT: NORMAL

## 2017-12-27 LAB — FUNGUS BLD CULT: NORMAL

## 2018-01-19 LAB
ACID FAST MOD KINY STN SPEC: NORMAL
ACID FAST MOD KINY STN SPEC: NORMAL
MYCOBACTERIUM SPEC QL CULT: NORMAL
MYCOBACTERIUM SPEC QL CULT: NORMAL

## 2020-02-10 NOTE — PLAN OF CARE
Problem: Physical Therapy Goal  Goal: Physical Therapy Goal  Goals to be met by: 10/21/2017    Patient will increase functional independence with mobility by performin. Supine to sit with Modified [place]  2. Sit to supine with Modified [place]  3. Sit to stand transfer with Contact Guard Assistance using Rolling Walker - Met   Updated: Sit to stand transfer with Supervision using rolling walker  4. Bed to chair transfer with Contact Guard Assistance using Rolling Walker - Met   Updated: Bed to chair transfer with Supervision using rolling walker  5. Gait  x 75 feet with Contact Guard Assistance using Rolling Walker.       Outcome: Ongoing (interventions implemented as appropriate)  Goals appropriate to improve functional mobility.    Edgar Wright III, DPT, PT  10/17/2017           10-Feb-2020 17:17

## 2020-11-16 NOTE — ASSESSMENT & PLAN NOTE
- Increased metoprolol to XL 100mg qdaily for better HR control (goal HR would be 60's) and uptitrate as tolerated  - Continue aspirin, rosuvastatin  - Nitro SL PRN  - Recommend restarting warfarin as no procedures are planned at this time  - Should patient decide to proceed with neurosurgical intervention, [facility] for risk stratification would be appropriate at that time   Bi-Rhombic Flap Text: The defect edges were debeveled with a #15 scalpel blade.  Given the location of the defect and the proximity to free margins a bi-rhombic flap was deemed most appropriate.  Using a sterile surgical marker, an appropriate rhombic flap was drawn incorporating the defect. The area thus outlined was incised deep to adipose tissue with a #15 scalpel blade.  The skin margins were undermined to an appropriate distance in all directions utilizing iris scissors.

## 2021-01-06 NOTE — ASSESSMENT & PLAN NOTE
- issue at OSH- plan for cath initially and never received due to issues with pulm edema  - RCA stenosis, low risk per cards can have outpatient LHC with intervention after discharge  - was on hep ggt - Holding heparin gtt post-op per neurosurgery  - aspirin 81mg daily held post op   Brief Operative Note    Patient: Hernan Emerson Jr. 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                                   Complications: None    Description: na    Findings: as above    Specimens Removed: No specimens collected     Estimated Blood Loss: No Value <40    Assistant Tasks: Opening and closing     Implants: * No surgical log found *      I was present for the key portions of the procedure and was immediately available for the non-key portions

## 2021-03-23 NOTE — HPI
84 yo male with significant history for hypertension, hyperlipidemia, remote smoker, PAF, Severe AS, DMT2, CAD sp NSTEMI, dcognitive impairment, recent sp lumbar fusion/laminectomy 10/20/17, and ESRD x 2 years who is admitted for lower back pain associated with BLE pain/spasm since discharge to [facility] on 11/10 from inpatient rehab at [facility]. Patient confuse.Daughter Wendy at bedside reports patient had acute decline on 11/11 as patient went from walking 50 to 100 ft and minimal assist with ADL's to not able to hold self up or walk. MRI lumbar showed fluid collection at L1. Neurosurgery consulted. Nephrology consult for hemodialysis. HD MWF 3.5 hrs duration via LFA AVF at [facility]. Some residual but not sure how much. Unclear of EDW. Last HD 11/13. CXR bilateral basilar atelectasis otherwise clear.      Patient has given consent to record this visit for documentation in their clinical record.

## 2023-01-12 NOTE — ASSESSMENT & PLAN NOTE
-INR is 1.9, pt with surgical wound bed hematoma  -Hold coumadin/anti-platelet  -s/p vitamin K 2.5mg 11/16, and 6units FFP  -   0

## 2023-01-26 NOTE — SUBJECTIVE & OBJECTIVE
Interval History: as above    Review of Systems   Unable to perform ROS: Mental status change   Gastrointestinal: Negative for constipation.   Musculoskeletal: Positive for back pain and gait problem. Negative for joint swelling, myalgias and neck pain.   Psychiatric/Behavioral: Positive for confusion. Negative for behavioral problems.     Objective:     Vital Signs (Most Recent):  Temp: 97.7 °F (36.5 °C) (12/03/17 1944)  Pulse: 100 (12/03/17 2100)  Resp: 18 (12/03/17 2100)  BP: (!) 103/53 (12/03/17 1944)  SpO2: 98 % (12/03/17 2100) Vital Signs (24h Range):  Temp:  [97.7 °F (36.5 °C)-98.6 °F (37 °C)] 97.7 °F (36.5 °C)  Pulse:  [] 100  Resp:  [16-23] 18  SpO2:  [92 %-100 %] 98 %  BP: ()/(46-87) 103/53     Weight: 74.6 kg (164 lb 8 oz)  Body mass index is 30.09 kg/m².    Intake/Output Summary (Last 24 hours) at 12/03/17 2133  Last data filed at 12/03/17 1700   Gross per 24 hour   Intake              280 ml   Output                0 ml   Net              280 ml      Physical Exam   Constitutional: He appears well-developed.   Awake/alert   HENT:   Mouth/Throat: Oropharynx is clear and moist.   Wet quality to voice   Eyes: Conjunctivae are normal. No scleral icterus.   Cardiovascular: Normal heart sounds and intact distal pulses.    No murmur heard.  Irregular rhythm, tachycardic   Pulmonary/Chest: Effort normal. He has no wheezes.   Mouth breathing, accessory muscle use, Bilateral coarse breath sounds with crackles present   Abdominal: Soft. Bowel sounds are normal.   Distended abdomen, abdominal wall asymmetric distension, Left >Right.  Non-tender, non-rigid, no guarding.    Musculoskeletal:   RUE forearm AV fistula with palpable thrill.      Lymphadenopathy:     He has no cervical adenopathy.   Neurological:   Lethargic, arousable, Oriented to Self, year   Skin: Skin is warm. There is pallor.       Significant Labs:   CBC:   Recent Labs  Lab 12/02/17  0536 12/03/17  0434   WBC 17.07* 16.68*   HGB 8.0*  8.1*   HCT 26.7* 27.1*    234     CMP:   Recent Labs  Lab 12/02/17  0536 12/03/17  0434    141   K 4.4 5.0    103   CO2 25 22*   * 150*   BUN 30* 45*   CREATININE 3.5* 4.7*   CALCIUM 10.4 10.5   PROT 6.5 6.7   ALBUMIN 2.6* 2.9*   BILITOT 0.4 0.4   ALKPHOS 164* 155*   AST 80* 83*   ALT 21 26   ANIONGAP 12 16   EGFRNONAA 15.0* 10.5*     Lactic Acid:   Recent Labs  Lab 12/02/17  0536 12/03/17  0434   LACTATE 1.1 1.2       Significant Imaging: I have reviewed all pertinent imaging results/findings within the past 24 hours.     CTA CHEST, CT ABDOMEN PELVIS TRIPLE PHASE  Impression         1. No evidence of pulmonary thromboembolism or focal mass lesion within the liver.    2. Numerous solid pulmonary nodules throughout both lungs, the largest of which is in the right upper lobe abutting the pleural margin measuring up to 2.4 cm concerning for metastatic disease.    3. Left lower lobe consolidation concerning for pneumonia with dependent bilateral small pleural effusions.    4. Significant aortic and coronary artery atherosclerosis.    5. Calcified liver and splenic granulomas compatible with old granulomatous disease.    6. Small sized kidneys with parenchymal loss and bilateral renal cysts compatible with chronic medical renal disease.    7. Significant degenerative changes of the thoracic and lumbar spine consistent with DISH with posterior spinal fusion at L3-L5.    Additional findings as detailed above.        10

## 2023-10-12 NOTE — PLAN OF CARE
EUFEMIA Whipple PA-C Problem: SLP Goal  Goal: SLP Goal  Speech Language Pathology Goals  Goals expected to be met by 11/27/17  1. Pt will tolerate dental soft diet without overt S/S aspiration, Supervision  2. Pt will tolerate nectar-thickened liquids without overt S/S aspiration, Supervision  3. Pt will complete dysphagia exercises x10 ea. , MIN A, to improve BOT and laryngeal elevation  4. Educate pt and family on S/S aspiration and aspiration precautions     Goals expected to be met by 11/24/17  1. Pt will tolerate puree diet without overt S/S aspiration, Supervision- met   2. Pt will tolerate nectar-thickened liquids without overt S/S aspiration, Supervision -ongoing  3. Pt will complete dysphagia exercises x10 ea. , MIN A, to improve BOT and laryngeal elevation -ongoing  4. Educate pt and family on S/S aspiration and aspiration precautions -ongoing          Outcome: Ongoing (interventions implemented as appropriate)  Goals remain appropriate. ST will continue to follow.   YUMIKO Ortiz., CCC-SLP  11/22/2017

## 2024-04-29 NOTE — ASSESSMENT & PLAN NOTE
- cardiology consulted  - given type of stenosis patient will not benefit from TAVR or balloon angioplasty  - recommend angiography prior to any surgical intervention  - medications recommendations appreciated      Prepped: chest. Prepped with: ChloraPrep.

## 2025-03-12 NOTE — ASSESSMENT & PLAN NOTE
-HR stable today but remains in Afib rhythm wise  -continue metoprolol 25 TID  -continue scheduled midodrine TID for now as pt tolerted dialysis well with this; discuss with nephrology - may only be needed on dialysis days    Pt presents as a prearrival with c/o skin blisters that appeared beginning last week. 2 vesicles present on pts chest, and 2 on the back. No drainage noted. Mom putting bacitracin on spots. No fevers.     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

## (undated) DEVICE — TUBE FRAZIER 5MM 2FT SOFT TIP

## (undated) DEVICE — SUT VICRYL PLUS 3-0 SH 18IN

## (undated) DEVICE — DRAPE INCISE IOBAN 2 23X17IN

## (undated) DEVICE — CORD BIPOLAR 12 FOOT

## (undated) DEVICE — DRAPE THYROID WITH ARMBOARD

## (undated) DEVICE — SEE MEDLINE ITEM 157117

## (undated) DEVICE — SUT D SPECIAL VICRYL 2-0

## (undated) DEVICE — DRESSING MEPILEX BORDER 4 X 4

## (undated) DEVICE — SEE MEDLINE ITEM 156905

## (undated) DEVICE — ELECTRODE REM PLYHSV RETURN 9

## (undated) DEVICE — CARTRIDGE OIL

## (undated) DEVICE — KIT EVACUATOR 3-SPRING 1/8 DRN

## (undated) DEVICE — SUT VICRYL PLUS 0 CT1 18IN

## (undated) DEVICE — DRAPE MINI C ARM STERILE

## (undated) DEVICE — ELECTRODE BLADE INSULATED 1 IN

## (undated) DEVICE — DRAPE C-ARMOR EQUIPMENT COVER

## (undated) DEVICE — DRESSING MEPILEX BORDR AG 4X12

## (undated) DEVICE — DIFFUSER

## (undated) DEVICE — DRAPE STERI INSTRUMENT 1018

## (undated) DEVICE — DRESSING ABSRBNT ISLAND 3.6X8

## (undated) DEVICE — TRAY FOLEY 16FR INFECTION CONT

## (undated) DEVICE — DRAPE LAVH LAPAROSCOPY W/FLUID

## (undated) DEVICE — SUT 3/0 30IN ETHILON BLK M

## (undated) DEVICE — GAUZE SPONGE 4X4 12PLY

## (undated) DEVICE — CLOSURE SKIN STERI STRIP 1/2X4

## (undated) DEVICE — DURAPREP SURG SCRUB 26ML

## (undated) DEVICE — BLADE MILL BONE MEDIUM

## (undated) DEVICE — DRESSING SURGICAL 1X3

## (undated) DEVICE — DRESSING AQUACEL SACRAL 9 X 9

## (undated) DEVICE — KIT IRR SUCTION HND PIECE

## (undated) DEVICE — SEE MEDLINE ITEM 146292

## (undated) DEVICE — SUT STRATAFIX 1 PDS CT-1

## (undated) DEVICE — CATH SUCTION 10FR

## (undated) DEVICE — DRESSING AQUACEL FOAM 5 X 5